# Patient Record
Sex: FEMALE | Race: BLACK OR AFRICAN AMERICAN | NOT HISPANIC OR LATINO | Employment: FULL TIME | URBAN - METROPOLITAN AREA
[De-identification: names, ages, dates, MRNs, and addresses within clinical notes are randomized per-mention and may not be internally consistent; named-entity substitution may affect disease eponyms.]

---

## 2017-01-06 ENCOUNTER — OFFICE VISIT (OUTPATIENT)
Dept: INTERNAL MEDICINE | Facility: CLINIC | Age: 42
End: 2017-01-06
Payer: COMMERCIAL

## 2017-01-06 VITALS
SYSTOLIC BLOOD PRESSURE: 132 MMHG | HEART RATE: 60 BPM | DIASTOLIC BLOOD PRESSURE: 86 MMHG | BODY MASS INDEX: 49.57 KG/M2 | WEIGHT: 269.38 LBS | TEMPERATURE: 98 F | HEIGHT: 62 IN | OXYGEN SATURATION: 99 %

## 2017-01-06 DIAGNOSIS — M54.2 CERVICALGIA: ICD-10-CM

## 2017-01-06 DIAGNOSIS — M62.838 MUSCLE SPASM: ICD-10-CM
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
## 2017-01-06 PROCEDURE — 99213 OFFICE O/P EST LOW 20 MIN: CPT | Mod: S$GLB,,, | Performed by: PHYSICIAN ASSISTANT

## 2017-01-06 PROCEDURE — 96372 THER/PROPH/DIAG INJ SC/IM: CPT | Mod: S$GLB,,, | Performed by: FAMILY MEDICINE

## 2017-01-06 PROCEDURE — 99999 PR PBB SHADOW E&M-EST. PATIENT-LVL IV: CPT | Mod: PBBFAC,,, | Performed by: PHYSICIAN ASSISTANT

## 2017-01-06 PROCEDURE — 1159F MED LIST DOCD IN RCRD: CPT | Mod: S$GLB,,, | Performed by: PHYSICIAN ASSISTANT

## 2017-01-06 RX ORDER — EFINACONAZOLE 100 MG/ML
SOLUTION TOPICAL
Refills: 0 | COMMUNITY
Start: 2016-12-15 | End: 2017-03-13 | Stop reason: SDUPTHER

## 2017-01-06 RX ORDER — MUPIROCIN 20 MG/G
OINTMENT TOPICAL
Refills: 0 | COMMUNITY
Start: 2016-12-15 | End: 2017-02-24

## 2017-01-06 RX ORDER — NABUMETONE 500 MG/1
500 TABLET, FILM COATED ORAL 2 TIMES DAILY
Qty: 14 TABLET | Refills: 0 | Status: SHIPPED | OUTPATIENT
Start: 2017-01-06 | End: 2017-01-13

## 2017-01-06 RX ORDER — KETOROLAC TROMETHAMINE 30 MG/ML
60 INJECTION, SOLUTION INTRAMUSCULAR; INTRAVENOUS
Status: COMPLETED | OUTPATIENT
Start: 2017-01-06 | End: 2017-01-06

## 2017-01-06 RX ORDER — METHOCARBAMOL 750 MG/1
TABLET, FILM COATED ORAL
Qty: 32 TABLET | Refills: 0 | Status: SHIPPED | OUTPATIENT
Start: 2017-01-06 | End: 2017-02-24

## 2017-01-06 RX ADMIN — KETOROLAC TROMETHAMINE 60 MG: 30 INJECTION, SOLUTION INTRAMUSCULAR; INTRAVENOUS at 04:01
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
## 2017-01-08 ENCOUNTER — PATIENT MESSAGE (OUTPATIENT)
Dept: INTERNAL MEDICINE | Facility: CLINIC | Age: 42
End: 2017-01-08

## 2017-01-09 ENCOUNTER — TELEPHONE (OUTPATIENT)
Dept: INTERNAL MEDICINE | Facility: CLINIC | Age: 42
End: 2017-01-09

## 2017-01-30 ENCOUNTER — TELEPHONE (OUTPATIENT)
Dept: SMOKING CESSATION | Facility: CLINIC | Age: 42
End: 2017-01-30

## 2017-01-31 ENCOUNTER — TELEPHONE (OUTPATIENT)
Dept: SMOKING CESSATION | Facility: CLINIC | Age: 42
End: 2017-01-31

## 2017-02-01 ENCOUNTER — TELEPHONE (OUTPATIENT)
Dept: SMOKING CESSATION | Facility: CLINIC | Age: 42
End: 2017-02-01

## 2017-02-02 ENCOUNTER — HOSPITAL ENCOUNTER (EMERGENCY)
Facility: HOSPITAL | Age: 42
Discharge: HOME | End: 2017-02-02
Attending: EMERGENCY MEDICINE
Payer: COMMERCIAL

## 2017-02-02 VITALS
TEMPERATURE: 98 F | WEIGHT: 269 LBS | HEIGHT: 63 IN | SYSTOLIC BLOOD PRESSURE: 134 MMHG | DIASTOLIC BLOOD PRESSURE: 70 MMHG | BODY MASS INDEX: 47.66 KG/M2 | RESPIRATION RATE: 16 BRPM | OXYGEN SATURATION: 99 % | HEART RATE: 78 BPM

## 2017-02-02 DIAGNOSIS — M25.569 KNEE PAIN: ICD-10-CM

## 2017-02-02 PROCEDURE — 99283 EMERGENCY DEPT VISIT LOW MDM: CPT

## 2017-02-02 RX ORDER — NAPROXEN 375 MG/1
375 TABLET ORAL 2 TIMES DAILY WITH MEALS
Qty: 14 TABLET | Refills: 0 | Status: SHIPPED | OUTPATIENT
Start: 2017-02-02 | End: 2017-02-24

## 2017-02-02 NOTE — DISCHARGE INSTRUCTIONS
Arthralgia    Arthralgia is the term for pain in or around the joint. It is a symptom, not a disease. This pain may involve one or more joints. In some cases, the pain moves from joint to joint.  There are many causes for joint pain. These include:  · Injury  · Osteoarthritis (wearing out of the joint surface)  · Gout (inflammation of the joint due to crystals in the joint fluid)  · Infection inside the joint    · Bursitis (inflammation of the fluid-filled sacs around the joint)  · Autoimmune disorders such as rheumatoid arthritis or lupus  · Tendonitis (inflamation of chords that attach muscle to bone)  Home care  · Rest the involved joint(s) until your symptoms improve.   · You may be prescribed pain medication. If none is prescribed, you may use acetaminophen or ibuprofen to control pain and inflammation.  Follow up  Follow up with your healthcare provider or our staff as advised.  When to seek medical care  Contact your healthcare provider right away if any of the following occurs:  · Pain, swelling, or redness of joint increases  · Pain worsens or recurs after a period of improvement  · Pain moves to other joints  · You cannot bear weight on the affected joint   · You cannot move the affected joint  · Joint appears deformed  · New rash appears  · Fever of 101ºF (38.8ºC) or higher, or as directed by your healthcare provider  © 2325-5737 [employer]. [address] [place] [address], [place] [address]. All rights reserved. This information is not intended as a substitute for professional medical care. Always follow your healthcare professional's instructions.

## 2017-02-02 NOTE — ED AVS SNAPSHOT
[facility]  [id number] [place] [id number]               Teresa Cazares   2017  1:07 AM   ED    Description:  Female : 1975   Department:  [facility]           Your Care was Coordinated By:     Provider Role From To    Christina Clements MD Attending Provider 17 0114 --      Reason for Visit     Knee Pain           Diagnoses this Visit        Comments    Knee pain           ED Disposition     ED Disposition Condition Comment    Discharge             To Do List           Follow-up Information     Follow up with Taylor Harrison MD In 2 days.    Specialty:  Family Medicine    Contact information:    [address]  [facility] [id number] [phone]          Follow up with [facility].    Specialty:  Emergency Medicine    Why:  As needed, If symptoms worsen    Contact information:    [address] [place] [id number] [phone]       These Medications        Disp Refills Start End    naproxen (NAPROSYN) 375 MG tablet 14 tablet 0 2017     Take 1 tablet (375 mg total) by mouth 2 (two) times daily with meals. - Oral    Pharmacy: [facility] [address] [place] [address] [address] [facility] Ph #: [phone]         [facility]     [facility] -  Assistance  Registered nurses in the [facility] provide clinical advisement, health education, appointment booking, and other advisory services.  Call for this free service at [phone].             Medications           Message regarding Medications     Verify the changes and/or additions to your medication regime listed below are the same as discussed with your clinician today.  If any of these changes or additions are incorrect, please notify your healthcare provider.        START taking these NEW medications        Refills    naproxen (NAPROSYN) 375 MG  "tablet 0    Sig: Take 1 tablet (375 mg total) by mouth 2 (two) times daily with meals.    Class: Print    Route: Oral           Verify that the below list of medications is an accurate representation of the medications you are currently taking.  If none reported, the list may be blank. If incorrect, please contact your healthcare provider. Carry this list with you in case of emergency.           Current Medications     BIOTIN ORAL Take by mouth.    fluticasone (FLONASE) 50 mcg/actuation nasal spray 1 spray by Each Nare route 2 (two) times daily as needed.    JUBLIA 10 % Nisha APPLY DAILY FOR 48 WEEKS    methocarbamol (ROBAXIN) 750 MG Tab Take 1,500 mg (two tablets) four times daily for first 2 days followed by 750mg (one tablet) four times/day.    mupirocin (BACTROBAN) 2 % ointment APPLY OINTMENT 3 TIMES A DAY FOR 10 DAYS    naproxen (NAPROSYN) 375 MG tablet Take 1 tablet (375 mg total) by mouth 2 (two) times daily with meals.    varenicline (CHANTIX) 1 mg Tab Take 1 tablet (1 mg total) by mouth 2 (two) times daily.           Clinical Reference Information           Your Vitals Were     BP Pulse Temp Resp Height Weight    143/66 (BP Location: Right arm, Patient Position: Sitting) 72 97.9 °F (36.6 °C) (Oral) 20 5' 3" (1.6 m) 122 kg (269 lb)    SpO2 BMI             97% 47.65 kg/m2         Allergies as of 2/2/2017        Reactions    Flagyl [Metronidazole Hcl] Hives      Immunizations Administered on Date of Encounter - 2/2/2017     None      ED Micro, Lab, POCT     None      ED Imaging Orders     Start Ordered       Status Ordering Provider    02/02/17 0139 02/02/17 0139  X-Ray Knee Complete 4 or More Views Left  1 time imaging      In process         Discharge Instructions         Arthralgia    Arthralgia is the term for pain in or around the joint. It is a symptom, not a disease. This pain may involve one or more joints. In some cases, the pain moves from joint to joint.  There are many causes for joint pain. These " include:  · Injury  · Osteoarthritis (wearing out of the joint surface)  · Gout (inflammation of the joint due to crystals in the joint fluid)  · Infection inside the joint    · Bursitis (inflammation of the fluid-filled sacs around the joint)  · Autoimmune disorders such as rheumatoid arthritis or lupus  · Tendonitis (inflamation of chords that attach muscle to bone)  Home care  · Rest the involved joint(s) until your symptoms improve.   · You may be prescribed pain medication. If none is prescribed, you may use acetaminophen or ibuprofen to control pain and inflammation.  Follow up  Follow up with your healthcare provider or our staff as advised.  When to seek medical care  Contact your healthcare provider right away if any of the following occurs:  · Pain, swelling, or redness of joint increases  · Pain worsens or recurs after a period of improvement  · Pain moves to other joints  · You cannot bear weight on the affected joint   · You cannot move the affected joint  · Joint appears deformed  · New rash appears  · Fever of 101ºF (38.8ºC) or higher, or as directed by your healthcare provider  © 2086-0185 [employer]. [address] [place] [address]. All rights reserved. This information is not intended as a substitute for professional medical care. Always follow your healthcare professional's instructions.          Smoking Cessation     If you would like to quit smoking:   You may be eligible for free services if you are a [place] resident and started smoking cigarettes before September 1, 1988.  Call the Smoking Cessation Trust (SCT) toll free at (109) 400-7512 or (377) 696-9628.   Call 1-800-QUIT-NOW if you do not meet the above criteria.             [facility] complies with applicable Federal civil rights laws and does not discriminate on the basis of race, color, national origin, age, disability, or sex.        Language Assistance Services     ATTENTION: Language  assistance services are available, free of charge. Please call [phone].      ATENCIÓN: Si habla español, tiene a heard disposición servicios gratuitos de asistencia lingüística. Llame al [phone].     CHÚ Ý: N?u b?n nói Ti?ng Vi?t, có các d?ch v? h? tr? ngôn ng? mi?n phí dành cho b?n. G?i s? [phone].
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
## 2017-02-24 ENCOUNTER — HOSPITAL ENCOUNTER (OUTPATIENT)
Dept: RADIOLOGY | Facility: HOSPITAL | Age: 42
Discharge: HOME | End: 2017-02-24
Attending: PODIATRIST
Payer: COMMERCIAL

## 2017-02-24 ENCOUNTER — OFFICE VISIT (OUTPATIENT)
Dept: PODIATRY | Facility: CLINIC | Age: 42
End: 2017-02-24
Payer: COMMERCIAL

## 2017-02-24 VITALS
HEIGHT: 63 IN | WEIGHT: 268.94 LBS | SYSTOLIC BLOOD PRESSURE: 140 MMHG | BODY MASS INDEX: 47.65 KG/M2 | DIASTOLIC BLOOD PRESSURE: 82 MMHG | HEART RATE: 62 BPM

## 2017-02-24 DIAGNOSIS — M20.41 HAMMER TOES OF BOTH FEET: Primary | ICD-10-CM

## 2017-02-24 DIAGNOSIS — M20.5X9 ADDUCTOVARUS ROTATION OF TOE, ACQUIRED, UNSPECIFIED LATERALITY: ICD-10-CM

## 2017-02-24 DIAGNOSIS — M20.42 HAMMER TOES OF BOTH FEET: Primary | ICD-10-CM

## 2017-02-24 DIAGNOSIS — Z01.818 PRE-OP TESTING: ICD-10-CM

## 2017-02-24 PROCEDURE — 73630 X-RAY EXAM OF FOOT: CPT | Mod: 26,RT,, | Performed by: RADIOLOGY

## 2017-02-24 PROCEDURE — 73630 X-RAY EXAM OF FOOT: CPT | Mod: 50,TC,PO

## 2017-02-24 PROCEDURE — 99214 OFFICE O/P EST MOD 30 MIN: CPT | Mod: S$GLB,,, | Performed by: PODIATRIST

## 2017-02-24 PROCEDURE — 73630 X-RAY EXAM OF FOOT: CPT | Mod: 26,LT,, | Performed by: RADIOLOGY

## 2017-02-24 PROCEDURE — 71010 XR CHEST 1 VIEW PRE-OP: CPT | Mod: 26,,, | Performed by: RADIOLOGY

## 2017-02-24 PROCEDURE — 1160F RVW MEDS BY RX/DR IN RCRD: CPT | Mod: S$GLB,,, | Performed by: PODIATRIST

## 2017-02-24 PROCEDURE — 99999 PR PBB SHADOW E&M-EST. PATIENT-LVL IV: CPT | Mod: PBBFAC,,, | Performed by: PODIATRIST

## 2017-02-24 PROCEDURE — 71010 XR CHEST 1 VIEW PRE-OP: CPT | Mod: TC,PO

## 2017-02-24 NOTE — MR AVS SNAPSHOT
[facility]  [address] [place] [address]  Phone: [phone]  Fax: [phone]                  Teresa Cazares   2017 1:40 PM   Office Visit    Description:  Female : 1975   Provider:  Anastasia Maurice DPM   Department:  [facility]           Reason for Visit     Callouses           Diagnoses this Visit        Comments    Pre-op testing    -  Primary            To Do List           Future Appointments        Provider Department Dept Phone    2017 2:30 PM LAB, SAME DAY [facility] [phone]    2017 3:00 PM [facility] [phone]    2017 9:00 AM Taylor Harrison MD [facility] Internal Medicine [phone]    2017 3:00 PM Shannon Erazo DPM [facility] [phone]    3/10/2017 11:00 AM Anastasia Maurice DPM [facility] [phone]      Goals (5 Years of Data)     None      [facility]     [facility] -  Assistance  Registered nurses in the [facility] provide clinical advisement, health education, appointment booking, and other advisory services.  Call for this free service at [phone].             Medications           Message regarding Medications     Verify the changes and/or additions to your medication regime listed below are the same as discussed with your clinician today.  If any of these changes or additions are incorrect, please notify your healthcare provider.        STOP taking these medications     methocarbamol (ROBAXIN) 750 MG Tab Take 1,500 mg (two tablets) four times daily for first 2 days followed by 750mg (one tablet) four times/day.    mupirocin (BACTROBAN) 2 % ointment APPLY OINTMENT 3 TIMES A DAY FOR 10 DAYS    naproxen (NAPROSYN) 375 MG tablet Take 1 tablet (375 mg total) by mouth 2 (two) times daily with meals.    varenicline (CHANTIX) 1 mg Tab Take 1 tablet (1 mg total) by mouth 2 (two) times daily.            Verify that the below list of medications is an accurate representation of the medications you are currently taking.  If none reported, the list may be blank. If incorrect, please contact your healthcare provider. Carry this list with you in case of emergency.           Current Medications     BIOTIN ORAL Take by mouth.    JUBLIA 10 % Nisha APPLY DAILY FOR 48 WEEKS    fluticasone (FLONASE) 50 mcg/actuation nasal spray 1 spray by Each Nare route 2 (two) times daily as needed.           Clinical Reference Information           Your Vitals Were     BP                   140/82 (BP Location: Right arm, Patient Position: Sitting, BP Method: Automatic)           Blood Pressure          Most Recent Value    BP  (!)  140/82      Allergies as of 2/24/2017     Flagyl [Metronidazole Hcl]      Immunizations Administered on Date of Encounter - 2/24/2017     None      Orders Placed During Today's Visit      Normal Orders This Visit    POCT urine pregnancy     Future Labs/Procedures Expected by Expires    CBC auto differential  2/24/2017 4/25/2018    X-Ray Foot Complete Bilateral  2/24/2017 2/24/2018    Basic metabolic panel  3/9/2017 4/25/2018    X-Ray Chest 1 View Pre-OP  3/9/2017 2/24/2018      Language Assistance Services     ATTENTION: Language assistance services are available, free of charge. Please call [phone].      ATENCIÓN: Si habla español, tiene a heard disposición servicios gratuitos de asistencia lingüística. Llame al [phone].     CHÚ Ý: N?u b?n nói Ti?ng Vi?t, có các d?ch v? h? tr? ngôn ng? mi?n phí dành cho b?n. G?i s? [phone].         [facility] complies with applicable Federal civil rights laws and does not discriminate on the basis of race, color, national origin, age, disability, or sex.

## 2017-02-24 NOTE — PROGRESS NOTES
Subjective:     Patient ID: Teresa Cazares is a 41 y.o. female.    Chief Complaint: Callouses (bilateral 5th toe callous )    HPI: This 41 year old female presents today complaining of painful callus of bilateral toes. The patient states they have had pain in bilateral toes for several months. Patient denies any history of trauma. When asked to indicate where the pain is located, patient points to both 5th toes. Patient states she has tried to file it but it has become to painful. Patient states she has changed several shoes and tried corn pads with very little relief. Patient rates pain today 4/10. Patient denies other complaints at this time.    Patient Active Problem List   Diagnosis    Depression    Obesity       Medication List with Changes/Refills   Current Medications    BIOTIN ORAL    Take by mouth.    FLUTICASONE (FLONASE) 50 MCG/ACTUATION NASAL SPRAY    1 spray by Each Nare route 2 (two) times daily as needed.    JUBLIA 10 % LORI    APPLY DAILY FOR 48 WEEKS   Discontinued Medications    METHOCARBAMOL (ROBAXIN) 750 MG TAB    Take 1,500 mg (two tablets) four times daily for first 2 days followed by 750mg (one tablet) four times/day.    MUPIROCIN (BACTROBAN) 2 % OINTMENT    APPLY OINTMENT 3 TIMES A DAY FOR 10 DAYS    NAPROXEN (NAPROSYN) 375 MG TABLET    Take 1 tablet (375 mg total) by mouth 2 (two) times daily with meals.    VARENICLINE (CHANTIX) 1 MG TAB    Take 1 tablet (1 mg total) by mouth 2 (two) times daily.       Review of patient's allergies indicates:   Allergen Reactions    Flagyl [metronidazole hcl] Hives       Past Surgical History:   Procedure Laterality Date    FRACTURE SURGERY      C1 neck- halo    [facility]    UMBILICAL HERNIA REPAIR         Family History   Problem Relation Age of Onset    Breast cancer Mother 58    Breast cancer Sister 46       Social History     Social History    Marital status: Single     Spouse name: N/A    Number of children: 5     "Years of education: N/A     Occupational History          Social History Main Topics    Smoking status: Former Smoker     Packs/day: 0.50     Years: 27.00     Quit date: 7/14/2016    Smokeless tobacco: Not on file    Alcohol use Yes      Comment: occasionally    Drug use: No    Sexual activity: Not on file     Other Topics Concern    Not on file     Social History Narrative       Vitals:    02/24/17 1327   BP: (!) 140/82   Pulse: 62   Weight: 122 kg (268 lb 15.4 oz)   Height: 5' 3" (1.6 m)       Review of Systems   Constitutional: Negative for chills and fever.   Respiratory: Negative for shortness of breath.    Cardiovascular: Negative for chest pain, palpitations, orthopnea, claudication and leg swelling.   Gastrointestinal: Negative for diarrhea, nausea and vomiting.   Musculoskeletal: Negative for joint pain.   Skin: Negative for rash.   Neurological: Negative for dizziness, tingling, sensory change, focal weakness and weakness.   Psychiatric/Behavioral: Negative.              Objective:       PHYSICAL EXAM: Apperance: Alert and orient in no distress,well developed, and with good attention to grooming and body habits  Patient presents ambulating in sandals.   Lower Extremity Physical Exam:  VASCULAR: Dorsalis pedis pulses 2/4 bilateral and Posterior Tibial pulses 2/4 bilateral. Capillary fill time <3 seconds bilateral. No edema observed bilateral. Varicosities absent bilateral. Skin temperature of the lower extremities is warm to warm, proximal to distal. Hair growth WNL bilateral.  DERMATOLOGICAL: No skin rashes, subcutaneous nodules, lesions, or ulcers observed bilateral. Mild hyperkeratotic tissue noted bilateral dorsal 5th toes.   NEUROLOGICAL: Light touch, sharp-dull, proprioception all present and equal bilaterally.    MUSCULOSKELETAL: Muscle strength is 5/5 for foot inverters, everters, plantarflexors, and dorsiflexors. Muscle tone is normal. (+) pain on palpation of bilateral 5th toes. " Adductovarus hammertoes noted to bilateral 5th toes. .        Assessment:       Encounter Diagnoses   Name Primary?    Hammer toes of both feet Yes    Adductovarus rotation of toe, acquired, unspecified laterality     Pre-op testing          Plan:   Hammer toes of both feet  -     Case Request Operating Room: REPAIR-HAMMER TOE FIFTH    Adductovarus rotation of toe, acquired, unspecified laterality  -     Case Request Operating Room: REPAIR-HAMMER TOE FIFTH    Pre-op testing  -     CBC auto differential; Future; Expected date: 2/24/17  -     Basic metabolic panel; Future; Expected date: 3/9/17  -     X-Ray Chest 1 View Pre-OP; Future; Expected date: 3/9/17  -     X-Ray Foot Complete Bilateral; Future; Expected date: 2/24/17  -     POCT urine pregnancy    I counseled the patient on her conditions, their implications and medical management.  Treatment options for were discussed with the patient.  The patient was told that she can have no treatment, conservative treatment or surgical treatment.  The patient was informed that the only way to resolve the structural deformity is via surgery.  The patient was instructed that surgical option is reserved for patients with painful deformity and that if she did not have pain that she should not elect the surgical option.  Discussed surgical and conservative management of hammertoe deformity. Conservatively we did discuss padding, and shoe modifications such as softer shoes with wide toe boxes. Surgically we briefly discussed pre and post operative expectations. The patient elects for surgical management at this time.   Medical clearance, pre-op testing ordered.   Patient tentatively scheduled for surgery 3/17/17 pending medical clearance and pre-op testing.   Patient to return for consent signing.             Anastasia Maurice DPM  [facility]

## 2017-03-05 PROBLEM — E66.9 OBESITY: Status: ACTIVE | Noted: 2017-03-05

## 2017-03-05 PROBLEM — F32.A DEPRESSION: Status: ACTIVE | Noted: 2017-03-05

## 2017-03-10 ENCOUNTER — OFFICE VISIT (OUTPATIENT)
Dept: PODIATRY | Facility: CLINIC | Age: 42
End: 2017-03-10
Payer: COMMERCIAL

## 2017-03-10 VITALS
DIASTOLIC BLOOD PRESSURE: 80 MMHG | HEART RATE: 59 BPM | HEIGHT: 63 IN | BODY MASS INDEX: 47.65 KG/M2 | WEIGHT: 268.94 LBS | SYSTOLIC BLOOD PRESSURE: 118 MMHG

## 2017-03-10 DIAGNOSIS — B35.1 ONYCHOMYCOSIS DUE TO DERMATOPHYTE: ICD-10-CM

## 2017-03-10 DIAGNOSIS — M20.5X9 ADDUCTOVARUS ROTATION OF TOE, ACQUIRED, UNSPECIFIED LATERALITY: ICD-10-CM

## 2017-03-10 DIAGNOSIS — M20.41 HAMMER TOES OF BOTH FEET: Primary | ICD-10-CM

## 2017-03-10 DIAGNOSIS — M20.42 HAMMER TOES OF BOTH FEET: Primary | ICD-10-CM

## 2017-03-10 DIAGNOSIS — Z98.890 POST-OPERATIVE STATE: Primary | ICD-10-CM

## 2017-03-10 PROCEDURE — 1160F RVW MEDS BY RX/DR IN RCRD: CPT | Mod: S$GLB,,, | Performed by: PODIATRIST

## 2017-03-10 PROCEDURE — 99212 OFFICE O/P EST SF 10 MIN: CPT | Mod: S$GLB,,, | Performed by: PODIATRIST

## 2017-03-10 PROCEDURE — 99999 PR PBB SHADOW E&M-EST. PATIENT-LVL II: CPT | Mod: PBBFAC,,, | Performed by: PODIATRIST

## 2017-03-10 NOTE — PROGRESS NOTES
Subjective:     Patient ID: Teresa Cazares is a 41 y.o. female.    Chief Complaint: Follow-up (consent signing DOS 3/17/17 bilateral 5th hammertoe repair )    HPI: This 41 year old female presents today for follow-up of bilateral toe deformity.  Date of last exam was 2/24/17.  Patient was advised to go home and review her treatment options.  The patient states that she is here to discuss the surgical treatment.  She chooses to have surgical treatment to alleviate her pain and deformity. When asked to indicate where the pain is located, patient points to both 5th and 4th toes. Today patient states she would also like to have the 4th toes done now. Patient states she has tried to file it but it has become to painful. Patient states she has changed several shoes and tried corn pads with very little relief. Patient rates pain today 4/10. Patient denies other complaints at this time.    Patient Active Problem List   Diagnosis    Morbid obesity with BMI of 45.0-49.9, adult       Medication List with Changes/Refills   New Medications    EFINACONAZOLE 10 % LORI    Apply 1 application topically once daily.   Current Medications    BIOTIN ORAL    Take by mouth.    FLUTICASONE (FLONASE) 50 MCG/ACTUATION NASAL SPRAY    1 spray by Each Nare route 2 (two) times daily as needed.   Discontinued Medications    JUBLIA 10 % LORI    APPLY DAILY FOR 48 WEEKS       Review of patient's allergies indicates:   Allergen Reactions    Flagyl [metronidazole hcl] Hives       Past Surgical History:   Procedure Laterality Date    FRACTURE SURGERY      C1 neck- halo    [facility]    UMBILICAL HERNIA REPAIR         Family History   Problem Relation Age of Onset    Breast cancer Mother 58    Breast cancer Sister 46       Social History     Social History    Marital status: Single     Spouse name: N/A    Number of children: 5    Years of education: N/A     Occupational History          Social History Main  "Topics    Smoking status: Former Smoker     Packs/day: 0.50     Years: 27.00     Quit date: 7/14/2016    Smokeless tobacco: Not on file    Alcohol use Yes      Comment: occasionally    Drug use: No    Sexual activity: Not on file     Other Topics Concern    Not on file     Social History Narrative       Vitals:    03/10/17 0922   BP: 118/80   Pulse: (!) 59   Weight: 122 kg (268 lb 15.4 oz)   Height: 5' 3" (1.6 m)       Review of Systems   Constitutional: Negative for chills and fever.   Respiratory: Negative for shortness of breath.    Cardiovascular: Negative for chest pain, palpitations, orthopnea, claudication and leg swelling.   Gastrointestinal: Negative for diarrhea, nausea and vomiting.   Musculoskeletal: Negative for joint pain.   Skin: Negative for rash.   Neurological: Negative for dizziness, tingling, sensory change, focal weakness and weakness.   Psychiatric/Behavioral: Negative.            Objective:       PHYSICAL EXAM: Apperance: Alert and orient in no distress,well developed, and with good attention to grooming and body habits  Patient presents ambulating in sandals.   Lower Extremity Physical Exam:  VASCULAR: Dorsalis pedis pulses 2/4 bilateral and Posterior Tibial pulses 2/4 bilateral. Capillary fill time <3 seconds bilateral. No edema observed bilateral. Varicosities absent bilateral. Skin temperature of the lower extremities is warm to warm, proximal to distal. Hair growth WNL bilateral.  DERMATOLOGICAL: No skin rashes, subcutaneous nodules, lesions, or ulcers observed bilateral. Mild hyperkeratotic tissue noted bilateral dorsal 5th toes.   NEUROLOGICAL: Light touch, sharp-dull, proprioception all present and equal bilaterally.    MUSCULOSKELETAL: Muscle strength is 5/5 for foot inverters, everters, plantarflexors, and dorsiflexors. Muscle tone is normal. (+) pain on palpation of bilateral 5th toes. Adductovarus hammertoes noted to bilateral 4th and 5th toes.    TEST RESULTS: Radiographs of " bilateral feet reveal there is no radiographic evidence of acute osseous, articular, or soft tissue abnormality.  There are adductovarus contracted toes 3-5 bilateral.There are mild bilateral hallux valgus deformities.  Joint spaces are well preserved.  No erosive osseous changes demonstrated.Small bilateral plantar calcaneal enthesophytes are present.        Assessment:       Encounter Diagnoses   Name Primary?    Hammer toes of both feet Yes    Adductovarus rotation of toe, acquired, unspecified laterality     Onychomycosis due to dermatophyte          Plan:   Hammer toes of both feet    Adductovarus rotation of toe, acquired, unspecified laterality    Onychomycosis due to dermatophyte  -     efinaconazole 10 % Nisha; Apply 1 application topically once daily.  Dispense: 8 mL; Refill: 12      I counseled the patient on her conditions, their implications and medical management.  Treatment options for were discussed with the patient.  The patient was told that she can have no treatment, conservative treatment or surgical treatment.  The patient was informed that the only way to resolve the structural deformity is via surgery.  The patient was instructed that surgical option is reserved for patients with painful deformity and that if she did not have pain that she should not elect the surgical option.  Derotational skin plasty with arthroplasty surgery was discussed with the patient including risks and complications including but not limited to the following: Post-operative infection requiring IV antibiotics and hospitalization, hardware failure and possible need for hardware removal, non-union, mal-union, delayed union, re-occurrence, nerve damage, numbness, overcorrection/undercorrection, painful scar, keloid, prolonged edema and RSD.  Patient stated that she understood.  Arthroplastywith K-wire fixation was discussed in detail include hardware placement, radiographic demonstrations were used.  Patient stated that  she understood.  Post-operative course was also discussed including a period of 6-8 weeks requiring the use of surgical shoes. 2 weeks non-weight bearing period, and sutures for two weeks. Patient stated that she understood.   Consent reviewed and signed with patient. Copies given to patient.   Medical clearance, pre-op testing ordered. Patient tentatively scheduled for surgery 3/31/17 pending medical clearance and pre-op testing.   No guarantees given as to the outcome of surgery.  Answered all questions to patient's satisfaction and told the patient to call should she have any other questions between now and time of surgery.  Prescription refilled for Jublia.           Anastasia Maurice DPM  [facility]

## 2017-03-13 ENCOUNTER — OFFICE VISIT (OUTPATIENT)
Dept: INTERNAL MEDICINE | Facility: CLINIC | Age: 42
End: 2017-03-13
Payer: COMMERCIAL

## 2017-03-13 ENCOUNTER — CLINICAL SUPPORT (OUTPATIENT)
Dept: CARDIOLOGY | Facility: CLINIC | Age: 42
End: 2017-03-13
Payer: COMMERCIAL

## 2017-03-13 VITALS
TEMPERATURE: 98 F | DIASTOLIC BLOOD PRESSURE: 80 MMHG | HEIGHT: 63 IN | HEART RATE: 77 BPM | BODY MASS INDEX: 48.24 KG/M2 | SYSTOLIC BLOOD PRESSURE: 132 MMHG | WEIGHT: 272.25 LBS | OXYGEN SATURATION: 99 %

## 2017-03-13 DIAGNOSIS — J30.2 SEASONAL ALLERGIC RHINITIS, UNSPECIFIED ALLERGIC RHINITIS TRIGGER: ICD-10-CM

## 2017-03-13 DIAGNOSIS — E66.01 MORBID OBESITY WITH BMI OF 45.0-49.9, ADULT: ICD-10-CM

## 2017-03-13 DIAGNOSIS — M20.41 HAMMER TOES, BILATERAL: ICD-10-CM

## 2017-03-13 DIAGNOSIS — M20.42 HAMMER TOES, BILATERAL: ICD-10-CM

## 2017-03-13 DIAGNOSIS — M20.41 HAMMER TOES, BILATERAL: Primary | ICD-10-CM

## 2017-03-13 DIAGNOSIS — Z01.818 PRE-OP EXAM: ICD-10-CM

## 2017-03-13 DIAGNOSIS — M20.42 HAMMER TOES, BILATERAL: Primary | ICD-10-CM

## 2017-03-13 PROBLEM — E66.9 OBESITY: Status: RESOLVED | Noted: 2017-03-05 | Resolved: 2017-03-13

## 2017-03-13 PROBLEM — F32.A DEPRESSION: Status: RESOLVED | Noted: 2017-03-05 | Resolved: 2017-03-13

## 2017-03-13 PROCEDURE — 99999 PR PBB SHADOW E&M-EST. PATIENT-LVL III: CPT | Mod: PBBFAC,,, | Performed by: FAMILY MEDICINE

## 2017-03-13 PROCEDURE — 99214 OFFICE O/P EST MOD 30 MIN: CPT | Mod: S$GLB,,, | Performed by: FAMILY MEDICINE

## 2017-03-13 PROCEDURE — 93000 ELECTROCARDIOGRAM COMPLETE: CPT | Mod: S$GLB,,, | Performed by: NUCLEAR MEDICINE

## 2017-03-13 PROCEDURE — 1160F RVW MEDS BY RX/DR IN RCRD: CPT | Mod: S$GLB,,, | Performed by: FAMILY MEDICINE

## 2017-03-13 NOTE — MR AVS SNAPSHOT
[facility]  [address] [address]  Phone: [phone]  Fax: [phone]                  Teresa Cazares   3/13/2017 4:00 PM   Office Visit    Description:  Female : 1975   Provider:  Taylor Harrison MD   Department:  [facility]           Reason for Visit     Pre-op Exam           Diagnoses this Visit        Comments    Hammer toes, bilateral    -  Primary     Pre-op exam         Seasonal allergic rhinitis, unspecified allergic rhinitis trigger         Morbid obesity with BMI of 45.0-49.9, adult                To Do List           Future Appointments        Provider Department Dept Phone    3[facility]13[facility]2017 3:50 [phone]    3/13/2017 4:00 PM Taylor Harrison MD [facility] [phone]    2017 10:00 AM Anastasia Maurice DPM [facility] [phone]    2017 10:00 AM Anastasia Maurice DPM [facility] [phone]    2017 10:00 AM [facility] XR2 [facility] [phone]      Your Future Surgeries/Procedures     Mar 31, 2017   Surgery with Anastasia Maurice DPM   [facility])    [address] [address] [phone]              Goals (5 Years of Data)     None      Follow-Up and Disposition     Return in about 6 months (around 2017), or if symptoms worsen or fail to improve.      [facility]     [facility] -  Assistance  Registered nurses in the [facility] provide clinical advisement, health education, appointment booking, and other advisory services.  Call for this free service at [phone].             Medications           Message regarding Medications     Verify the changes and/or additions to your medication regime listed below are the same as discussed with your clinician today.  If any of these changes or additions are incorrect, please notify your healthcare  "provider.             Verify that the below list of medications is an accurate representation of the medications you are currently taking.  If none reported, the list may be blank. If incorrect, please contact your healthcare provider. Carry this list with you in case of emergency.           Current Medications     BIOTIN ORAL Take by mouth.    efinaconazole 10 % Nisha Apply 1 application topically once daily.    fluticasone (FLONASE) 50 mcg/actuation nasal spray 1 spray by Each Nare route 2 (two) times daily as needed.           Clinical Reference Information           Your Vitals Were     BP Pulse Temp Height Weight SpO2    132/80 77 97.5 °F (36.4 °C) (Tympanic) 5' 3" (1.6 m) 123.5 kg (272 lb 4.3 oz) 99%    BMI                48.23 kg/m2          Blood Pressure          Most Recent Value    BP  132/80      Allergies as of 3/13/2017     Flagyl [Metronidazole Hcl]      Immunizations Administered on Date of Encounter - 3/13/2017     None      Orders Placed During Today's Visit     Future Labs/Procedures Expected by Expires    EKG 12-lead  As directed 3/13/2018      Language Assistance Services     ATTENTION: Language assistance services are available, free of charge. Please call [phone].      ATENCIÓN: Si habla blank, tiene a heard disposición servicios gratuitos de asistencia lingüística. Llame al [phone].     SAADIA Ý: N?u b?n nói Ti?ng Vi?t, có các d?ch v? h? tr? ngôn ng? mi?n phí dành cho b?n. G?i s? [phone].         [facility] complies with applicable Federal civil rights laws and does not discriminate on the basis of race, color, national origin, age, disability, or sex.        "

## 2017-03-13 NOTE — PROGRESS NOTES
"Subjective:       Patient ID: Teresa Cazares is a 41 y.o. female.    Chief Complaint: Pre-op Exam    HPI Comments: 41-year-old Afro-American female patient here for preop clearance for bilateral hammertoe repair, scheduled by Dr. Maurice on 3/31/17.   Secondary to fungal infection patient has been using Jublia lotion  Patient has been having pain to bilateral feet secondary to Corns, has tried several shoes but no response noted  Denies of any fever, chest pain shortness of breath, leg swelling  Reports that she has quit smoking last year, and has noticed gaining weight, patient has not been cooking, currently in culinary school and, works as a , has been eating anything on the go    Review of Systems   Constitutional: Negative for fatigue and fever.   Eyes: Negative for visual disturbance.   Respiratory: Negative for shortness of breath.    Cardiovascular: Negative for chest pain and leg swelling.   Gastrointestinal: Negative for abdominal pain, nausea and vomiting.   Musculoskeletal: Positive for arthralgias. Negative for joint swelling and myalgias.   Skin: Negative for rash.   Neurological: Negative for weakness, light-headedness, numbness and headaches.   Psychiatric/Behavioral: Negative for sleep disturbance.         /80  Pulse 77  Temp 97.5 °F (36.4 °C) (Tympanic)   Ht 5' 3" (1.6 m)  Wt 123.5 kg (272 lb 4.3 oz)  SpO2 99%  BMI 48.23 kg/m2  Objective:      Physical Exam   Constitutional: She is oriented to person, place, and time. She appears well-developed and well-nourished.   HENT:   Head: Normocephalic and atraumatic.   Mouth/Throat: Oropharynx is clear and moist.   Cardiovascular: Normal rate, regular rhythm and normal heart sounds.    No murmur heard.  Pulmonary/Chest: Effort normal and breath sounds normal. She has no wheezes.   Abdominal: Soft. Bowel sounds are normal. There is no tenderness.   Musculoskeletal: She exhibits tenderness. She exhibits no edema.   Positive for " tenderness to bilateral fifth toe laterally also noted corns.    Neurological: She is alert and oriented to person, place, and time.   Skin: Skin is warm and dry. No rash noted. There is erythema.   Psychiatric: She has a normal mood and affect.         Assessment:       1. Hammer toes, bilateral    2. Pre-op exam    3. Seasonal allergic rhinitis, unspecified allergic rhinitis trigger    4. Morbid obesity with BMI of 45.0-49.9, adult        Plan:   Hammer toes, bilateral  -     EKG 12-lead; Future  Pre-op exam  -     EKG 12-lead; Future  Reviewed preop labs including chest x-ray which alesia been stable.   Will get EKG .    Reviewed EKG showing normal sinus rhythm .   Patient seems to be at low risk for proposed surgery,    Seasonal allergic rhinitis, unspecified allergic rhinitis trigger-stable on Flonase as needed    Morbid obesity with BMI of 45.0-49.9, adult- counseling given to patient today to restrict portion sizes, eat low-fat and low-cholesterol diet and exercise 30 minutes daily to lose weight with BMI 48.2.

## 2017-03-29 NOTE — PRE-PROCEDURE INSTRUCTIONS
.Pre op instructions reviewed with patient per phone:    To confirm, Your surgeon has instructed you:  Surgery is scheduled 3/31/17 at 0700.      Please report to [facility] 1st floor main lobby by 0530 Pre admit nurse will call day prior to surgery for final arrival time.      INSTRUCTIONS IMPORTANT!!!  ¨ Do not eat, drink, or smoke after 12 midnight-including water. OK to brush teeth, no gum, candy or mints!    ¨ Take only these medicines with a small swallow of water-morning of surgery.  None  ____  Do not wear makeup, including mascara.  ____  No powder, lotions or creams to surgical area.  ____  Please remove all jewelry, including piercings and leave at home.  ____  No money or valuables needed. Please leave at home.  ____  Please bring identification and insurance information to hospital.  ____  If going home the same day, arrange for a ride home. You will not be able to   drive if Anesthesia was used.  ____  Children, under 12 years old, must remain in the waiting room with an adult.  They are not allowed in patient areas.  ____  Wear loose fitting clothing. Allow for dressings, bandages.  ____  Stop Aspirin, Ibuprofen, Motrin and Aleve at least 5-7 days before surgery, unless otherwise instructed by your doctor, or the nurse.   You MAY use Tylenol/acetaminophen until day of surgery.  ____  If you take diabetic medication, do not take am of surgery unless instructed by   Doctor.  ____ Stop taking any Fish Oil supplement or any Vitamins that contain Vitamin E at least 5 days prior to surgery.          Bathing Instructions-- The night before surgery and the morning prior to coming to the hospital:   -Do not shave the surgical area.   -Shower and wash your hair and body as usual with anti-bacterial  soap and shampoo.   -Rinse your hair and body completely.   -Use one packet of hibiclens to wash the surgical site (using your hand) gently for 5 minutes.  Do not scrub you skin too  hard.   -Do not use hibiclens on your head, face, or genitals.   -Do not wash with anti-bacterial soap after you use the hibiclens.   -Rinse your body thoroughly.   -Dry with clean, soft towel.  Do not use lotion, cream, deodorant, or powders on   the surgical site.    Use antibacterial soap in place of hibiclens if your surgery is on the head, face or genitals.         Surgical Site Infection    Prevention of surgical site infections:     -Keep incisions clean and dry.   -Do not soak/submerge incisions in water until completely healed.   -Do not apply lotions, powders, creams, or deodorants to site.   -Always make sure hands are cleaned with antibacterial soap/ alcohol-based   prior to touching the surgical site.  (This includes doctors, nurses, staff, and yourself.)    Signs and symptoms:   -Redness and pain around the area where you had surgery   -Drainage of cloudy fluid from your surgical wound   -Fever over 100.4  I have read or had read and explained to me, and understand the above information.

## 2017-03-30 ENCOUNTER — ANESTHESIA EVENT (OUTPATIENT)
Dept: SURGERY | Facility: HOSPITAL | Age: 42
End: 2017-03-30
Payer: COMMERCIAL

## 2017-03-31 ENCOUNTER — HOSPITAL ENCOUNTER (OUTPATIENT)
Facility: HOSPITAL | Age: 42
Discharge: HOME | End: 2017-03-31
Attending: PODIATRIST | Admitting: PODIATRIST
Payer: COMMERCIAL

## 2017-03-31 ENCOUNTER — ANESTHESIA (OUTPATIENT)
Dept: SURGERY | Facility: HOSPITAL | Age: 42
End: 2017-03-31
Payer: COMMERCIAL

## 2017-03-31 ENCOUNTER — SURGERY (OUTPATIENT)
Age: 42
End: 2017-03-31

## 2017-03-31 VITALS
OXYGEN SATURATION: 95 % | TEMPERATURE: 98 F | DIASTOLIC BLOOD PRESSURE: 81 MMHG | RESPIRATION RATE: 20 BRPM | WEIGHT: 264.13 LBS | HEART RATE: 67 BPM | SYSTOLIC BLOOD PRESSURE: 153 MMHG | HEIGHT: 63 IN | BODY MASS INDEX: 46.8 KG/M2

## 2017-03-31 DIAGNOSIS — E66.01 MORBID OBESITY WITH BMI OF 45.0-49.9, ADULT: ICD-10-CM

## 2017-03-31 DIAGNOSIS — Z98.890 POST-OPERATIVE STATE: ICD-10-CM

## 2017-03-31 DIAGNOSIS — M20.42 HAMMER TOES OF BOTH FEET: Primary | ICD-10-CM

## 2017-03-31 DIAGNOSIS — M20.5X9 ADDUCTOVARUS ROTATION OF TOE, ACQUIRED, UNSPECIFIED LATERALITY: ICD-10-CM

## 2017-03-31 DIAGNOSIS — M20.41 HAMMER TOES OF BOTH FEET: Primary | ICD-10-CM

## 2017-03-31 DIAGNOSIS — M20.40 ACQUIRED HAMMERTOE: ICD-10-CM

## 2017-03-31 PROCEDURE — 63600175 PHARM REV CODE 636 W HCPCS: Performed by: PODIATRIST

## 2017-03-31 PROCEDURE — 36000709 HC OR TIME LEV III EA ADD 15 MIN: Performed by: PODIATRIST

## 2017-03-31 PROCEDURE — 36000708 HC OR TIME LEV III 1ST 15 MIN: Performed by: PODIATRIST

## 2017-03-31 PROCEDURE — 71000033 HC RECOVERY, INTIAL HOUR: Performed by: PODIATRIST

## 2017-03-31 PROCEDURE — 25000003 PHARM REV CODE 250: Performed by: PODIATRIST

## 2017-03-31 PROCEDURE — 37000008 HC ANESTHESIA 1ST 15 MINUTES: Performed by: PODIATRIST

## 2017-03-31 PROCEDURE — 28285 REPAIR OF HAMMERTOE: CPT | Mod: T3,,, | Performed by: PODIATRIST

## 2017-03-31 PROCEDURE — 63600175 PHARM REV CODE 636 W HCPCS: Performed by: NURSE ANESTHETIST, CERTIFIED REGISTERED

## 2017-03-31 PROCEDURE — 25000003 PHARM REV CODE 250: Performed by: NURSE ANESTHETIST, CERTIFIED REGISTERED

## 2017-03-31 PROCEDURE — 71000015 HC POSTOP RECOV 1ST HR: Performed by: PODIATRIST

## 2017-03-31 PROCEDURE — 25000003 PHARM REV CODE 250: Performed by: ANESTHESIOLOGY

## 2017-03-31 PROCEDURE — 28286 REPAIR OF HAMMERTOE: CPT | Mod: 51,T4,, | Performed by: PODIATRIST

## 2017-03-31 PROCEDURE — 27201423 OPTIME MED/SURG SUP & DEVICES STERILE SUPPLY: Performed by: PODIATRIST

## 2017-03-31 PROCEDURE — 37000009 HC ANESTHESIA EA ADD 15 MINS: Performed by: PODIATRIST

## 2017-03-31 RX ORDER — MORPHINE SULFATE 10 MG/ML
2 INJECTION INTRAMUSCULAR; INTRAVENOUS; SUBCUTANEOUS EVERY 5 MIN PRN
Status: DISCONTINUED | OUTPATIENT
Start: 2017-03-31 | End: 2017-03-31 | Stop reason: HOSPADM

## 2017-03-31 RX ORDER — OXYCODONE AND ACETAMINOPHEN 5; 325 MG/1; MG/1
1 TABLET ORAL EVERY 4 HOURS PRN
Qty: 60 TABLET | Refills: 0 | Status: SHIPPED | OUTPATIENT
Start: 2017-03-31 | End: 2017-04-06 | Stop reason: SDUPTHER

## 2017-03-31 RX ORDER — BUPIVACAINE HYDROCHLORIDE 5 MG/ML
INJECTION, SOLUTION PERINEURAL
Status: DISCONTINUED | OUTPATIENT
Start: 2017-03-31 | End: 2017-03-31 | Stop reason: HOSPADM

## 2017-03-31 RX ORDER — HYDROCODONE BITARTRATE AND ACETAMINOPHEN 5; 325 MG/1; MG/1
1 TABLET ORAL EVERY 4 HOURS PRN
Status: CANCELLED | OUTPATIENT
Start: 2017-03-31

## 2017-03-31 RX ORDER — DEXAMETHASONE SODIUM PHOSPHATE 4 MG/ML
INJECTION, SOLUTION INTRA-ARTICULAR; INTRALESIONAL; INTRAMUSCULAR; INTRAVENOUS; SOFT TISSUE
Status: DISCONTINUED | OUTPATIENT
Start: 2017-03-31 | End: 2017-03-31

## 2017-03-31 RX ORDER — PROPOFOL 10 MG/ML
VIAL (ML) INTRAVENOUS CONTINUOUS PRN
Status: DISCONTINUED | OUTPATIENT
Start: 2017-03-31 | End: 2017-03-31

## 2017-03-31 RX ORDER — SODIUM CHLORIDE 9 MG/ML
3 INJECTION, SOLUTION INTRAMUSCULAR; INTRAVENOUS; SUBCUTANEOUS
Status: DISCONTINUED | OUTPATIENT
Start: 2017-03-31 | End: 2017-03-31 | Stop reason: HOSPADM

## 2017-03-31 RX ORDER — GLYCOPYRROLATE 0.2 MG/ML
INJECTION INTRAMUSCULAR; INTRAVENOUS
Status: DISCONTINUED | OUTPATIENT
Start: 2017-03-31 | End: 2017-03-31

## 2017-03-31 RX ORDER — LIDOCAINE HYDROCHLORIDE 10 MG/ML
INJECTION, SOLUTION EPIDURAL; INFILTRATION; INTRACAUDAL; PERINEURAL
Status: DISCONTINUED | OUTPATIENT
Start: 2017-03-31 | End: 2017-03-31 | Stop reason: HOSPADM

## 2017-03-31 RX ORDER — MIDAZOLAM HYDROCHLORIDE 1 MG/ML
INJECTION, SOLUTION INTRAMUSCULAR; INTRAVENOUS
Status: DISCONTINUED | OUTPATIENT
Start: 2017-03-31 | End: 2017-03-31

## 2017-03-31 RX ORDER — CEFAZOLIN SODIUM 1 G/50ML
1 SOLUTION INTRAVENOUS
Status: COMPLETED | OUTPATIENT
Start: 2017-03-31 | End: 2017-03-31

## 2017-03-31 RX ORDER — MEPERIDINE HYDROCHLORIDE 50 MG/ML
12.5 INJECTION INTRAMUSCULAR; INTRAVENOUS; SUBCUTANEOUS ONCE AS NEEDED
Status: DISCONTINUED | OUTPATIENT
Start: 2017-03-31 | End: 2017-03-31 | Stop reason: HOSPADM

## 2017-03-31 RX ORDER — METOPROLOL TARTRATE 1 MG/ML
INJECTION, SOLUTION INTRAVENOUS
Status: DISCONTINUED | OUTPATIENT
Start: 2017-03-31 | End: 2017-03-31

## 2017-03-31 RX ORDER — PROPOFOL 10 MG/ML
VIAL (ML) INTRAVENOUS
Status: DISCONTINUED | OUTPATIENT
Start: 2017-03-31 | End: 2017-03-31

## 2017-03-31 RX ORDER — LIDOCAINE HYDROCHLORIDE 20 MG/ML
INJECTION, SOLUTION EPIDURAL; INFILTRATION; INTRACAUDAL; PERINEURAL
Status: DISCONTINUED | OUTPATIENT
Start: 2017-03-31 | End: 2017-03-31

## 2017-03-31 RX ORDER — SODIUM CHLORIDE, SODIUM LACTATE, POTASSIUM CHLORIDE, CALCIUM CHLORIDE 600; 310; 30; 20 MG/100ML; MG/100ML; MG/100ML; MG/100ML
INJECTION, SOLUTION INTRAVENOUS CONTINUOUS
Status: DISCONTINUED | OUTPATIENT
Start: 2017-03-31 | End: 2017-03-31 | Stop reason: HOSPADM

## 2017-03-31 RX ORDER — OXYCODONE HYDROCHLORIDE 5 MG/1
5 TABLET ORAL
Status: DISCONTINUED | OUTPATIENT
Start: 2017-03-31 | End: 2017-03-31 | Stop reason: HOSPADM

## 2017-03-31 RX ORDER — OXYCODONE AND ACETAMINOPHEN 7.5; 325 MG/1; MG/1
1 TABLET ORAL EVERY 4 HOURS PRN
Status: CANCELLED | OUTPATIENT
Start: 2017-03-31

## 2017-03-31 RX ORDER — LIDOCAINE HYDROCHLORIDE 10 MG/ML
1 INJECTION, SOLUTION EPIDURAL; INFILTRATION; INTRACAUDAL; PERINEURAL ONCE
Status: DISCONTINUED | OUTPATIENT
Start: 2017-03-31 | End: 2017-03-31 | Stop reason: HOSPADM

## 2017-03-31 RX ORDER — ONDANSETRON 2 MG/ML
INJECTION INTRAMUSCULAR; INTRAVENOUS
Status: DISCONTINUED | OUTPATIENT
Start: 2017-03-31 | End: 2017-03-31

## 2017-03-31 RX ORDER — HYDROCODONE BITARTRATE AND ACETAMINOPHEN 5; 325 MG/1; MG/1
1 TABLET ORAL EVERY 4 HOURS PRN
Status: DISCONTINUED | OUTPATIENT
Start: 2017-03-31 | End: 2017-03-31 | Stop reason: HOSPADM

## 2017-03-31 RX ADMIN — LIDOCAINE HYDROCHLORIDE 10 ML: 10 INJECTION, SOLUTION EPIDURAL; INFILTRATION; INTRACAUDAL; PERINEURAL at 08:03

## 2017-03-31 RX ADMIN — Medication 20 MG: at 07:03

## 2017-03-31 RX ADMIN — GLYCOPYRROLATE 0.2 MG: 0.2 INJECTION, SOLUTION INTRAMUSCULAR; INTRAVENOUS at 07:03

## 2017-03-31 RX ADMIN — PROPOFOL 200 MCG/KG/MIN: 10 INJECTION, EMULSION INTRAVENOUS at 07:03

## 2017-03-31 RX ADMIN — BUPIVACAINE HYDROCHLORIDE 60 ML: 5 INJECTION, SOLUTION PERINEURAL at 08:03

## 2017-03-31 RX ADMIN — METOPROLOL TARTRATE 2.5 MG: 1 INJECTION, SOLUTION INTRAVENOUS at 08:03

## 2017-03-31 RX ADMIN — SODIUM CHLORIDE, SODIUM LACTATE, POTASSIUM CHLORIDE, AND CALCIUM CHLORIDE: 600; 310; 30; 20 INJECTION, SOLUTION INTRAVENOUS at 06:03

## 2017-03-31 RX ADMIN — CEFAZOLIN SODIUM 2 G: 1 SOLUTION INTRAVENOUS at 06:03

## 2017-03-31 RX ADMIN — PROPOFOL 200 MG: 10 INJECTION, EMULSION INTRAVENOUS at 07:03

## 2017-03-31 RX ADMIN — MIDAZOLAM HYDROCHLORIDE 2 MG: 1 INJECTION, SOLUTION INTRAMUSCULAR; INTRAVENOUS at 06:03

## 2017-03-31 RX ADMIN — ONDANSETRON 4 MG: 2 INJECTION, SOLUTION INTRAMUSCULAR; INTRAVENOUS at 07:03

## 2017-03-31 RX ADMIN — Medication 20 MG: at 08:03

## 2017-03-31 RX ADMIN — DEXAMETHASONE SODIUM PHOSPHATE 8 MG: 4 INJECTION, SOLUTION INTRA-ARTICULAR; INTRALESIONAL; INTRAMUSCULAR; INTRAVENOUS; SOFT TISSUE at 07:03

## 2017-03-31 RX ADMIN — LIDOCAINE HYDROCHLORIDE 40 MG: 20 INJECTION, SOLUTION EPIDURAL; INFILTRATION; INTRACAUDAL; PERINEURAL at 06:03

## 2017-03-31 NOTE — TRANSFER OF CARE
"Anesthesia Transfer of Care Note    Patient: Teresa Cazares    Procedure(s) Performed: Procedure(s) (LRB):  REPAIR-HAMMER TOE FIFTH (Bilateral)    Patient location: PACU    Anesthesia Type: MAC    Transport from OR: Transported from OR on room air with adequate spontaneous ventilation    Post pain: adequate analgesia    Post assessment: no apparent anesthetic complications and tolerated procedure well    Post vital signs: stable    Level of consciousness: awake and responds to stimulation    Nausea/Vomiting: no nausea/vomiting    Complications: none          Last vitals:   Visit Vitals    /65    Pulse 76    Temp 36.1 °C (97 °F) (Temporal)    Resp 19    Ht 5' 3" (1.6 m)    Wt 119.8 kg (264 lb 1.8 oz)    SpO2 96%    Breastfeeding No    BMI 46.79 kg/m2     "

## 2017-03-31 NOTE — BRIEF OP NOTE
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]     * Hammer toes of both feet [M20.41, M20.42]    Procedure(s) (LRB):  REPAIR-HAMMER TOE FIFTH (Bilateral)    Anesthesia: Local MAC    Description of the findings of the procedure: Bilateral 4th arthroplasty at DIPJ and Bilateral 4th arthroplasty at PIPJ    Findings/Key Components: none    Estimated Blood Loss: * No values recorded between 3/31/2017  7:40 AM and 3/31/2017  9:21 AM *         Specimens:   Specimen     None          Discharge Note    SUMMARY     Admit Date: 3/31/2017    Discharge Date and Time:  03/31/2017 9:22 AM    Hospital Course (synopsis of major diagnoses, care, treatment, and services provided during the course of the hospital stay):      Final Diagnosis: Post-Op Diagnosis Codes:     * Adductovarus rotation of toe, acquired, 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      efinaconazole 10 % Nisha Apply 1 application topically once daily.  Qty: 8 mL, Refills: 12    Associated Diagnoses: Onychomycosis due to dermatophyte      fluticasone (FLONASE) 50 mcg/actuation nasal spray 1 spray by Each Nare route 2 (two) times daily as needed.  Qty: 15 g, Refills: 0    Associated Diagnoses: Acute tonsillitis           No discharge procedures on file.

## 2017-03-31 NOTE — ANESTHESIA PREPROCEDURE EVALUATION
03/31/2017  Teresa Cazares is a 41 y.o., female.    [facility] Anesthesia Evaluation    I have reviewed the Patient Summary Reports.    I have reviewed the Nursing Notes.   I have reviewed the Medications.     Review of Systems  Anesthesia Hx:  No problems with previous Anesthesia  Denies Family Hx of Anesthesia complications.   Denies Personal Hx of Anesthesia complications.   Social:  Former Smoker, No Alcohol Use    Cardiovascular:   Exercise tolerance: good Denies Hypertension.  Denies MI.      Pulmonary:   Denies Recent URI.  Denies Sleep Apnea.    Renal/:  Renal/ Normal     Hepatic/GI:  Hepatic/GI Normal    Neurological:  Neurology Normal    Endocrine:   Denies Diabetes.    Psych:   depression          Physical Exam  General:  Morbid Obesity    Airway/Jaw/Neck:  Airway Findings: Mouth Opening: Normal General Airway Assessment: Adult  Mallampati: II  TM Distance: Normal, at least 6 cm      Dental:  Dental Findings: In tact   Chest/Lungs:  Chest/Lungs Findings: Clear to auscultation     Heart/Vascular:  Heart Findings: Rate: Normal  Rhythm: Regular Rhythm  Sounds: Normal        Mental Status:  Mental Status Findings:  Alert and Oriented, Cooperative         Anesthesia Plan  Type of Anesthesia, risks & benefits discussed:  Anesthesia Type:  MAC  Patient's Preference:   Intra-op Monitoring Plan:   Intra-op Monitoring Plan Comments:   Post Op Pain Control Plan:   Post Op Pain Control Plan Comments:   Induction:    Beta Blocker:  Patient is not currently on a Beta-Blocker (No further documentation required).       Informed Consent: Patient understands risks and agrees with Anesthesia plan.  Questions answered. Anesthesia consent signed with patient.  ASA Score: 2     Day of Surgery Review of History & Physical: I have interviewed and examined the patient. I have reviewed the patient's H&P dated:  There are  no significant changes.          Ready For Surgery From Anesthesia Perspective.

## 2017-03-31 NOTE — INTERVAL H&P NOTE
The patient has been examined and the H&P has been reviewed:    I concur with the findings and no changes have occurred since H&P was written.    Anesthesia/Surgery risks, benefits and alternative options discussed and understood by patient/family.          Active Hospital Problems    Diagnosis  POA    Acquired mary [M20.40]  Yes      Resolved Hospital Problems    Diagnosis Date Resolved POA   No resolved problems to display.

## 2017-03-31 NOTE — ANESTHESIA RELEASE NOTE
"Anesthesia Release from PACU Note    Patient: Teresa Cazares    Procedure(s) Performed: Procedure(s) (LRB):  REPAIR-HAMMER TOE FIFTH (Bilateral)    Anesthesia type: MAC    Post pain: Adequate analgesia    Post assessment: no apparent anesthetic complications, tolerated procedure well and no evidence of recall    Last Vitals:   Visit Vitals    /65    Pulse 76    Temp 36.1 °C (97 °F) (Temporal)    Resp 19    Ht 5' 3" (1.6 m)    Wt 119.8 kg (264 lb 1.8 oz)    SpO2 96%    Breastfeeding No    BMI 46.79 kg/m2       Post vital signs: stable    Level of consciousness: awake, alert  and oriented    Nausea/Vomiting: no nausea/no vomiting    Complications: none    Airway Patency: patent    Respiratory: unassisted, spontaneous ventilation, room air    Cardiovascular: stable and blood pressure at baseline    Hydration: euvolemic  "

## 2017-03-31 NOTE — ANESTHESIA POSTPROCEDURE EVALUATION
"Anesthesia Post Evaluation    Patient: Teresa Cazares    Procedure(s) Performed: Procedure(s) (LRB):  REPAIR-HAMMER TOE FIFTH (Bilateral)    Final Anesthesia Type: MAC  Patient location during evaluation: PACU  Patient participation: Yes- Able to Participate  Level of consciousness: awake and alert  Post-procedure vital signs: reviewed and stable  Pain management: adequate  Airway patency: patent  PONV status at discharge: No PONV  Anesthetic complications: no      Cardiovascular status: blood pressure returned to baseline  Respiratory status: unassisted  Hydration status: euvolemic  Follow-up not needed.        Visit Vitals    /65    Pulse 76    Temp 36.1 °C (97 °F) (Temporal)    Resp 19    Ht 5' 3" (1.6 m)    Wt 119.8 kg (264 lb 1.8 oz)    SpO2 96%    Breastfeeding No    BMI 46.79 kg/m2       Pain/Mike Score: Pain Assessment Performed: Yes (3/31/2017 10:00 AM)  Presence of Pain: denies (3/31/2017 10:00 AM)  Mike Score: 10 (3/31/2017 10:00 AM)      "

## 2017-03-31 NOTE — DISCHARGE SUMMARY
[facility]   Discharge Summary      Admit Date: 3/31/2017    Discharge Date and Time:  03/31/2017   Attending Physician: Anastasia Maurice DPM     Reason for Admission: Hammertoe repair    Procedures Performed: Procedure(s) (LRB):  REPAIR-HAMMER TOE FIFTH (Bilateral)    Hospital Course (synopsis of major diagnoses, care, treatment, and services provided during the course of the hospital stay):      Consults: none    Significant Diagnostic Studies:     Final Diagnoses:    Principal Problem: <principal problem not specified>   Secondary Diagnoses: .    Discharged Condition: good    Disposition: Home or Self Care    Follow Up/Patient Instructions:     Medications:  Reconciled Home Medications:   Discharge Medication List as of 3/31/2017  9:52 AM      START taking these medications    Details   oxycodone-acetaminophen (PERCOCET) 5-325 mg per tablet Take 1 tablet by mouth every 4 (four) hours as needed for Pain., Starting 3/31/2017, Until Discontinued, Print         CONTINUE these medications which have NOT CHANGED    Details   BIOTIN ORAL Take by mouth., Until Discontinued, Historical Med      efinaconazole 10 % Nisha Apply 1 application topically once daily., Starting 3/10/2017, Until Discontinued, Normal      fluticasone (FLONASE) 50 mcg/actuation nasal spray 1 spray by Each Nare route 2 (two) times daily as needed., Starting 8/18/2016, Until Discontinued, Print           No discharge procedures on file.

## 2017-03-31 NOTE — PLAN OF CARE
Pt resting on stretcher. Denies pain at present. Respirations even and unlabored on room air and tolerating well with O2 sats of 96%. bilat foot dsg remains c/d/i. Neurovascular checks remain intact. See flow sheet for detailed assessment. Pt tolerating ice chips without a problem. VSS. Will cont to monitor. See flow sheet for detailed assessment.

## 2017-03-31 NOTE — PROGRESS NOTES
This 41 year old female presents for surgical management of painful bilateral toe deformities. Patient states deformity has been present for months. Patient states deformity has not responded to conservative treatment.      Patient Active Problem List   Diagnosis    Morbid obesity with BMI of 45.0-49.9, adult    Acquired hammertoe       No current facility-administered medications on file prior to encounter.      Current Outpatient Prescriptions on File Prior to Encounter   Medication Sig Dispense Refill    BIOTIN ORAL Take by mouth.      fluticasone (FLONASE) 50 mcg/actuation nasal spray 1 spray by Each Nare route 2 (two) times daily as needed. 15 g 0       Review of patient's allergies indicates:   Allergen Reactions    Flagyl [metronidazole hcl] Hives       Past Surgical History:   Procedure Laterality Date    FRACTURE SURGERY      C1 neck- halo    HYSTERECTOMY  2009    tahbso    UMBILICAL HERNIA REPAIR         Family History   Problem Relation Age of Onset    Breast cancer Mother 58    Breast cancer Sister 46       Social History     Social History    Marital status: Single     Spouse name: N/A    Number of children: 5    Years of education: N/A     Occupational History          Social History Main Topics    Smoking status: Former Smoker     Packs/day: 0.50     Years: 27.00     Quit date: 7/14/2016    Smokeless tobacco: Not on file    Alcohol use Yes      Comment: occasionally    Drug use: No    Sexual activity: Not on file     Other Topics Concern    Not on file     Social History Narrative     Medical clearance, consent, and EKG reviewed and signed in chart.    AMILCAR:  PHYSICAL EXAM: Apperance: Alert and orient in no distress,well developed, and with good attention to grooming and body habits  Lower Extremity Physical Exam:  VASCULAR: Dorsalis pedis pulses 2/4 bilateral and Posterior Tibial pulses 2/4 bilateral. Capillary fill time <3 seconds bilateral. No edema observed  bilateral. Varicosities absent bilateral. Skin temperature of the lower extremities is warm to warm, proximal to distal. Hair growth WNL bilateral.  DERMATOLOGICAL: No skin rashes, subcutaneous nodules, lesions, or ulcers observed bilateral. Mild hyperkeratotic tissue noted bilateral dorsal 5th toes.   NEUROLOGICAL: Light touch, sharp-dull, proprioception all present and equal bilaterally.    MUSCULOSKELETAL: Muscle strength is 5/5 for foot inverters, everters, plantarflexors, and dorsiflexors. Muscle tone is normal. (+) pain on palpation of bilateral 5th toes at DIPJ and bilateral 4th PIPJ. Adductovarus hammertoes noted to bilateral 5th toes. .    Assessment:  Hammertoes, bilateral 4th and 5th toes  Adductovarus toe bilateral 5th toes    Plan: Patient to OR 3/31/17 for surgical management for painful bilateral toe deformities. All concerns and questions answered by myself, Dr. Maurice, DPM. No gurantees given nor implied.

## 2017-03-31 NOTE — IP AVS SNAPSHOT
[address] [place] [address]           Patient Discharge Instructions   Our goal is to set you up for success. This packet includes information on your condition, medications, and your home care.  It will help you care for yourself to prevent having to return to the hospital.     Please ask your nurse if you have any questions.      There are many details to remember when preparing to leave the hospital. Here is what you will need to do:    1. Take your medicine. If you are prescribed medications, review your Medication List on the following pages. You may have new medications to  at the pharmacy and others that you'll need to stop taking. Review the instructions for how and when to take your medications. Talk with your doctor or nurses if you are unsure of what to do.     2. Go to your follow-up appointments. Specific follow-up information is listed in the following pages. Your may be contacted by a nurse or clinical provider about future appointments. Be sure we have all of the phone numbers to reach you. Please contact your provider's office if you are unable to make an appointment.     3. Watch for warning signs. Your doctor or nurse will give you detailed warning signs to watch for and when to call for assistance. These instructions may also include educational information about your condition. If you experience any of warning signs to your health, call your doctor.               ** Verify the list of medication(s) below is accurate and up to date. Carry this with you in case of emergency. If your medications have changed, please notify your healthcare provider.             Medication List      CONTINUE taking these medications        Additional Info                      BIOTIN ORAL   Refills:  0    Instructions:  Take by mouth.     Begin Date    AM    Noon    PM    Bedtime       efinaconazole 10 % Hattie   Quantity:  8 mL   Refills:  12   Dose:  1  application    Instructions:  Apply 1 application topically once daily.     Begin Date    AM    Noon    PM    Bedtime       fluticasone 50 mcg/actuation nasal spray   Commonly known as:  FLONASE   Quantity:  15 g   Refills:  0   Dose:  1 spray    Instructions:  1 spray by Each Nare route 2 (two) times daily as needed.     Begin Date    AM    Noon    PM    Bedtime         ASK your doctor about these medications        Additional Info                      oxycodone-acetaminophen 5-325 mg per tablet   Commonly known as:  PERCOCET   Quantity:  60 tablet   Refills:  0   Dose:  1 tablet    Instructions:  Take 1 tablet by mouth every 4 (four) hours as needed for Pain.     Begin Date    AM    Noon    PM    Bedtime            Where to Get Your Medications      You can get these medications from any pharmacy     Bring a paper prescription for each of these medications     oxycodone-acetaminophen 5-325 mg per tablet                  Please bring to all follow up appointments:    1. A copy of your discharge instructions.  2. All medicines you are currently taking in their original bottles.  3. Identification and insurance card.    Please arrive 15 minutes ahead of scheduled appointment time.    Please call 24 hours in advance if you must reschedule your appointment and/or time.        Your Scheduled Appointments     Mar 31, 2017  9:55 AM CDT   Diagnostic Xray with JOSE PORTXR3   [facility] ([facility])    [address] [facility] [address]   [phone]            Apr 06, 2017 10:00 AM CDT   Post OP with Anastasia Maurice DPM   [facility] ([facility]    [address] [facility] [address]   [phone]            Apr 13, 2017 10:00 AM CDT   Post OP with Anastasia Maurice DPM   [facility] ([facility]    [address] [facility] [address]   [phone]            Apr 27, 2017 10:00 AM CDT   Diagnostic Xray with [facility]   [facility] ([facility])    [address] [place] [address]   [phone]            Apr 27, 2017 10:40 AM CDT   Post OP with Anastasia Maurice DPM   [facility] ([facility])    [address] [place] [address]   [phone]                Discharge Instructions     Future Orders    Diet general     Questions:    Total calories:      Fat restriction, if any:      Protein restriction, if any:      Na restriction, if any:      Fluid restriction:      Additional restrictions:      Leave dressing on - Keep it clean, dry, and intact until clinic visit         Discharge Instructions         General Information:    1.  Do not drink alcoholic beverages including beer for 24 hours or as long as you are on pain medication..  2.  Do not drive a motor vehicle, operate machinery or power tools, or signs legal papers for 24 hours or as long as you are on pain medication.   3.  You may experience light-headedness, dizziness, and sleepiness following surgery. Please do not stay alone. A responsible adult should be with you for this 24 hour period.  4.  Go home and rest.    5. Progress slowly to a normal diet unless instructed.  Otherwise, begin with liquids such as soft drinks, then soup and crackers working up to solid foods. Drink plenty of nonalcoholic fluids.  6.  Certain anesthetics and pain medications produce nausea and vomiting in certain       individuals. If nausea becomes a problem at home, call you doctor.    7. A nurse will be calling you sometime after surgery. Do not be alarmed. This is our way of finding out how you are doing.    8. Several times every hour while you are awake, take 2-3 deep breaths and cough. If you had stomach surgery hold a pillow or rolled towel firmly against your stomach before you cough. This will help with any pain the cough might cause.  9. Several times every hour while you are awake, pump and flex your feet 5-6 times and do foot circles. This will help  prevent blood clots.    10.Call your doctor for severe pain, bleeding, fever, or signs or symptoms of infection (pain, swelling, redness, foul odor, drainage).    11.You can contact your doctor anytime by [phone] for the [facility] (at [facility]) or [phone] for the [facility] on [facility].   [email or web address] is another way to contact your doctor if you are an active participant online with [facility].          Foot Surgery: Flexible and Rigid Hammertoes  With hammertoes, one or more toes curl or bend abnormally. This can be caused by an inherited muscle problem, an abnormal bone length, or poor foot mechanics. The affected joints can rub inside shoes, causing corns (buildups of dead skin).  There are many nonsurgical treatments for hammertoes, but if these are not effective, you may want to consider surgery.    Flexible hammertoes  When hammertoes are flexible, you can straighten the buckled joints. Flexible hammertoes may become rigid over time.    Tendon release  This treatment helps release the buckled joint. The bottom (flexor) tendon may be repositioned to the top of the affected toe (flexor tendon transfer). Sometimes, the top or bottom tendon is released but not repositioned (tenotomy).    Rigid hammertoes  Rigid hammertoes are fixed (not flexible). You cannot straighten the buckled joints. Corns, pain, and loss of function may be more severe with rigid hammertoes than with flexible ones.    Arthroplasty  A part of the joint is removed, and the toe is straightened. In some cases, the entire joint may be replaced with an implant. When healed, the bones become connected with scar tissue, making your toe flexible.    Fusion  First, the cartilage and some bone on both sides of the joint are removed. Then, the toe is straightened, and the two bones are held together, often with a pin. The pin is removed after several weeks. Once your foot heals, the toe will be less flexible, but  "more stable.  Healing after surgery  The severity of your condition, number of toes involved, and type of surgery done will affect your recovery time. Many people are able to walk right after surgery with a special surgical shoe. Full healing can take several weeks. Your healthcare provider can advise you on what to expect after surgery.     Date Last Reviewed: 10/15/2015  © 2010-5023 [employer]. [address] [place] [address], [place] [address]. All rights reserved. This information is not intended as a substitute for professional medical care. Always follow your healthcare professional's instructions.            Admission Information     Date & Time Provider Department CSN    3/31/2017  5:55 AM Anastasia Maurice DPM [facility] [id number]      Care Providers     Provider Role Specialty Primary office phone    Anastasia Maurice DPM Attending Provider Podiatry [phone]    Anastasia Maurice DPM Surgeon  Podiatry [phone]      Your Vitals Were     BP Pulse Temp Resp Height Weight    171/88 82 97.2 °F (36.2 °C) (Temporal) 17 5' 3" (1.6 m) 119.8 kg (264 lb 1.8 oz)    SpO2 BMI             92% 46.79 kg/m2         Recent Lab Values     No lab values to display.      Allergies as of 3/31/2017        Reactions    Flagyl [Metronidazole Hcl] Hives      [facility]     [facility] Nurse Care Line - 24/7 Assistance  Unless otherwise directed by your provider, please contact [facility], our nurse care line that is available for 24/7 assistance.     Registered nurses in the [facility] provide clinical advisement, health education, appointment booking, and other advisory services.  Call for this free service at [phone].        Advance Directives     An advance directive is a document which, in the event you are no longer able to make decisions for yourself, tells your healthcare team what kind of treatment you do or do not want to receive, or who you would like to make those " decisions for you.  If you do not currently have an advance directive, [facility] encourages you to create one.  For more information call:  [phone]), [phone] ([phone]),  or log on to [email or web address].        Smoking Cessation     If you would like to quit smoking:   You may be eligible for free services if you are a [place] resident and started smoking cigarettes before September 1, 1988.  Call the Smoking Cessation Trust (SCT) toll free at (323) 525-1521 or (104) 152-0417.   Call 9-331-QUIT-NOW if you do not meet the above criteria.   Contact us via email: [email or web address]   View our website for more information: [email or web address]        Language Assistance Services     ATTENTION: Language assistance services are available, free of charge. Please call [phone].      ATENCIÓN: Si habla español, tiene a heard disposición servicios gratuitos de asistencia lingüística. Llame al [phone].     CHÚ Ý: N?u b?n nói Ti?ng Vi?t, có các d?ch v? h? tr? ngôn ng? mi?n phí dành cho b?n. G?i s? [phone].         [facility] complies with applicable Federal civil rights laws and does not discriminate on the basis of race, color, national origin, age, disability, or sex.

## 2017-03-31 NOTE — DISCHARGE INSTRUCTIONS
General Information:    1.  Do not drink alcoholic beverages including beer for 24 hours or as long as you are on pain medication..  2.  Do not drive a motor vehicle, operate machinery or power tools, or signs legal papers for 24 hours or as long as you are on pain medication.   3.  You may experience light-headedness, dizziness, and sleepiness following surgery. Please do not stay alone. A responsible adult should be with you for this 24 hour period.  4.  Go home and rest.    5. Progress slowly to a normal diet unless instructed.  Otherwise, begin with liquids such as soft drinks, then soup and crackers working up to solid foods. Drink plenty of nonalcoholic fluids.  6.  Certain anesthetics and pain medications produce nausea and vomiting in certain       individuals. If nausea becomes a problem at home, call you doctor.    7. A nurse will be calling you sometime after surgery. Do not be alarmed. This is our way of finding out how you are doing.    8. Several times every hour while you are awake, take 2-3 deep breaths and cough. If you had stomach surgery hold a pillow or rolled towel firmly against your stomach before you cough. This will help with any pain the cough might cause.  9. Several times every hour while you are awake, pump and flex your feet 5-6 times and do foot circles. This will help prevent blood clots.    10.Call your doctor for severe pain, bleeding, fever, or signs or symptoms of infection (pain, swelling, redness, foul odor, drainage).    11.You can contact your doctor anytime by [phone] for the [facility] (at [facility]) or [phone] for the [facility] on [facility].   [email or web address] is another way to contact your doctor if you are an active participant online with [facility].          Foot Surgery: Flexible and Rigid Hammertoes  With hammertoes, one or more toes curl or bend abnormally. This can be caused by an inherited muscle problem, an abnormal bone  length, or poor foot mechanics. The affected joints can rub inside shoes, causing corns (buildups of dead skin).  There are many nonsurgical treatments for hammertoes, but if these are not effective, you may want to consider surgery.    Flexible hammertoes  When hammertoes are flexible, you can straighten the buckled joints. Flexible hammertoes may become rigid over time.    Tendon release  This treatment helps release the buckled joint. The bottom (flexor) tendon may be repositioned to the top of the affected toe (flexor tendon transfer). Sometimes, the top or bottom tendon is released but not repositioned (tenotomy).    Rigid hammertoes  Rigid hammertoes are fixed (not flexible). You cannot straighten the buckled joints. Corns, pain, and loss of function may be more severe with rigid hammertoes than with flexible ones.    Arthroplasty  A part of the joint is removed, and the toe is straightened. In some cases, the entire joint may be replaced with an implant. When healed, the bones become connected with scar tissue, making your toe flexible.    Fusion  First, the cartilage and some bone on both sides of the joint are removed. Then, the toe is straightened, and the two bones are held together, often with a pin. The pin is removed after several weeks. Once your foot heals, the toe will be less flexible, but more stable.  Healing after surgery  The severity of your condition, number of toes involved, and type of surgery done will affect your recovery time. Many people are able to walk right after surgery with a special surgical shoe. Full healing can take several weeks. Your healthcare provider can advise you on what to expect after surgery.     Date Last Reviewed: 10/15/2015  © 1371-3221 [employer]. [address] [place] [address]. All rights reserved. This information is not intended as a substitute for professional medical care. Always follow your healthcare professional's  instructions.

## 2017-04-01 NOTE — OP NOTE
Patient: Teresa Cazares  : [birth year]  MR# [id number]  DOS: 15  Surgeon: Dr. Anastasia Maurice D.P.M.  Assistant: None  Pre-Op Dx: Painful Bilateral 4th and 5th Hammertoe/Adductovarus Toe Deformity   Post-Op Dx: Painful Bilateral 4th and 5th Hammertoe/Adductovarus Toe Deformity   Procedure: Bilateral 4th Arthroplasty at the DIPJ and Bilateral 5th toe Derotational Skin Plasty and Arthroplasty at the PIPJ  Anesthesia: IV sedation with local anesthesia  Hemostasis: Pneumatic bilateral ankle tourniquet @ 250mmHg  EBL: 3cc  Materials: 3.0 vicryl,  4-0 nylon  Injectables: pre-op: 20cc 1:1 mixture of 1% Lidocaine plain and 0.5% Marcaine plain                      intra-op: 30cc of 0.5% Marcaine plain     Procedure in Detail:  Patient was brought into operating room and placed on operating table in the supine position. A pneumatic ankle tourniquet was then place about the patients bilateral ankle. Following IV sedation, local anesthesia was obtained about the patients bilateral 4th and 5th toes utilizing a total of 20cc of  1:1 mixture of 1% Lidocaine plain and 0.5% Marcaine plain. The bilateral foot was then scrubbed, prepped, and draped in the usual aseptic manner. An esmarch bandage was used to exsanguinate the left foot and the pneumatic ankle tourniquet was then inflated.The pneumatic ankle tourniquet was then inflated.    Attention was then directed to the dorsal aspect of the 4th toe of the left foot where a transverse elliptical incision over the DIPJ. The ellipsed skin was passed from the operative site. Sharp and blunt dissection was made down to the level of the joint capsule with care taken to retract all vital neural and vascular structures. A transverse tenotomy and capsulotomy as well as ligation of collateral ligaments were then performed at the level of the distal interphalangeal joint. The extensor tendon was then reflected proximally allowing for exposure of the head of the middle phalanx. Next  utilizing a sagittal saw, the head of the proximal phalanx was resected and passed from the operative filed. All rough edges were then smoothed with bone rasp. Correction of the deformity was assessed at this time and noted to be in a good position. The wound was then irrigated with copious amounts of sterile normal saline.   Attention was then directed to the dorsal aspect of the 5th toe of the left foot where a elliptical incision from distal medial to proximal lateral. The ellipsed skin was passed from the operative site. Sharp and blunt dissection was made down to the level of the joint capsule with care taken to retract all vital neural and vascular structures. A transverse tenotomy and capsulotomy as well as ligation of collateral ligaments were then performed at the level of the proximal interphalangeal joint. The extensor tendon was then reflected proximally allowing for exposure of the head of the proximal phalanx. Next utilizing a sagittal saw, the head of the proximal phalanx was resected and passed from the operative filed. All rough edges were then smoothed with bone rasp. Correction of the deformity was assessed at this time and noted to be in a good position. The wound was then irrigated with copious amounts of sterile normal saline.   The extensor tendon and capsular structures were reapproximated and coapted utilizing 3-0 vicryl. The skin was then reapproximated utilizing 4-0 nylon in simple suture technique. At this time, a pneumatic ankle tourniquet was then deflated and a prompt hyperemic response was noted to all toes of the left foot.    An esmarch bandage was used to exsanguinate the right foot and the pneumatic ankle tourniquet was then inflated.The pneumatic ankle tourniquet was then inflated.  The exact above procedures was then performed on the right 4th and 5th toes.     The wounds were then dressed with Betadine soaked adaptic, gauze, lenin, and ACE.    At this time, a pneumatic ankle  tourniquet was then deflated and a prompt hyperemic response was noted to all toes of the right foot. The patient tolerated anesthesia and procedure well. The patient was transported to PACU with vital signs stable and neurovascular status intact to the bilateral foot.

## 2017-04-06 ENCOUNTER — OFFICE VISIT (OUTPATIENT)
Dept: PODIATRY | Facility: CLINIC | Age: 42
End: 2017-04-06
Payer: COMMERCIAL

## 2017-04-06 VITALS
SYSTOLIC BLOOD PRESSURE: 148 MMHG | HEART RATE: 63 BPM | DIASTOLIC BLOOD PRESSURE: 100 MMHG | HEIGHT: 63 IN | WEIGHT: 264 LBS | BODY MASS INDEX: 46.78 KG/M2

## 2017-04-06 DIAGNOSIS — M20.41 HAMMER TOES OF BOTH FEET: ICD-10-CM

## 2017-04-06 DIAGNOSIS — M20.5X9 ADDUCTOVARUS ROTATION OF TOE, ACQUIRED, UNSPECIFIED LATERALITY: ICD-10-CM

## 2017-04-06 DIAGNOSIS — Z98.890 POST-OPERATIVE STATE: Primary | ICD-10-CM

## 2017-04-06 DIAGNOSIS — M20.42 HAMMER TOES OF BOTH FEET: ICD-10-CM

## 2017-04-06 PROCEDURE — 99999 PR PBB SHADOW E&M-EST. PATIENT-LVL III: CPT | Mod: PBBFAC,,, | Performed by: PODIATRIST

## 2017-04-06 PROCEDURE — 99024 POSTOP FOLLOW-UP VISIT: CPT | Mod: S$GLB,,, | Performed by: PODIATRIST

## 2017-04-06 RX ORDER — OXYCODONE AND ACETAMINOPHEN 5; 325 MG/1; MG/1
1 TABLET ORAL EVERY 4 HOURS PRN
Qty: 60 TABLET | Refills: 0 | Status: SHIPPED | OUTPATIENT
Start: 2017-04-06 | End: 2017-04-27 | Stop reason: SDUPTHER

## 2017-04-06 RX ORDER — CLINDAMYCIN HYDROCHLORIDE 300 MG/1
CAPSULE ORAL
Refills: 0 | COMMUNITY
Start: 2017-01-13 | End: 2017-04-12

## 2017-04-06 RX ORDER — FLUCONAZOLE 150 MG/1
TABLET ORAL
Refills: 0 | COMMUNITY
Start: 2017-01-13 | End: 2017-04-27

## 2017-04-06 NOTE — MR AVS SNAPSHOT
[facility]  [address]  [place] [address]  Phone: [phone]  Fax: [phone]                  Teresa Cazares   2017 10:00 AM   Office Visit    Description:  Female : 1975   Provider:  Anastasia Maurice DPM   Department:  [facility]           Reason for Visit     Follow-up           Diagnoses this Visit        Comments    Hammer toes of both feet         Adductovarus rotation of toe, acquired, unspecified laterality         Post-operative state                To Do List           Future Appointments        Provider Department Dept Phone    2017 10:00 AM Anastasia Maurice DPM [facility] [phone]    2017 10:00 AM [facility] XR2 [facility] [phone]    2017 10:40 AM Anastasia Maurice DPM [facility] [phone]    2017 12:40 PM Taylor Harrison MD [facility] [phone]      Goals (5 Years of Data)     None       These Medications        Disp Refills Start End    oxycodone-acetaminophen (PERCOCET) 5-325 mg per tablet 60 tablet 0 2017     Take 1 tablet by mouth every 4 (four) hours as needed for Pain. - Oral    Pharmacy: [facility] [address] [place] [address] [address] [facility] AT [facility] Ph #: [phone]         [facility]     [facility] Nurse Care Line -  Assistance  Unless otherwise directed by your provider, please contact [facility], our nurse care line that is available for  assistance.     Registered nurses in the [facility] provide: appointment scheduling, clinical advisement, health education, and other advisory services.  Call: [phone] (toll free)               Medications           Message regarding Medications     Verify the changes and/or additions to your medication regime listed below are the same as discussed with your clinician today.  If any of these changes or additions are incorrect, please notify your  "healthcare provider.             Verify that the below list of medications is an accurate representation of the medications you are currently taking.  If none reported, the list may be blank. If incorrect, please contact your healthcare provider. Carry this list with you in case of emergency.           Current Medications     BIOTIN ORAL Take by mouth.    clindamycin (CLEOCIN) 300 MG capsule     efinaconazole 10 % Nisha Apply 1 application topically once daily.    fluconazole (DIFLUCAN) 150 MG Tab     fluticasone (FLONASE) 50 mcg/actuation nasal spray 1 spray by Each Nare route 2 (two) times daily as needed.    oxycodone-acetaminophen (PERCOCET) 5-325 mg per tablet Take 1 tablet by mouth every 4 (four) hours as needed for Pain.           Clinical Reference Information           Your Vitals Were     BP Pulse Height Weight BMI    148/100 (BP Location: Left arm, Patient Position: Sitting, BP Method: Automatic) 63 5' 3" (1.6 m) 119.7 kg (264 lb) 46.77 kg/m2      Blood Pressure          Most Recent Value    BP  (!)  148/100      Allergies as of 4/6/2017     Flagyl [Metronidazole Hcl]      Immunizations Administered on Date of Encounter - 4/6/2017     None      Language Assistance Services     ATTENTION: Language assistance services are available, free of charge. Please call [phone].      ATENCIÓN: Si joanla blank, tiene a heard disposición servicios gratuitos de asistencia lingüística. Llame al [phone].     CHÚ Ý: N?u b?n nói Ti?ng Vi?t, có các d?ch v? h? tr? ngôn ng? mi?n phí dành cho b?n. G?i s? [phone].         [facility] complies with applicable Federal civil rights laws and does not discriminate on the basis of race, color, national origin, age, disability, or sex.        "

## 2017-04-10 NOTE — PROGRESS NOTES
Subjective:     Patient ID: Teresa Cazares is a 41 y.o. female.    Chief Complaint: Follow-up (post-op bilateral 5th toe repair. Patient states no current pain but she has been experiencing pain.)    HPI: This 41 year old female returns to the clinic 1 weeks status post bilateral hammertoe procedures. Patient has no complaints of fever chills or sweats. Positive pain. Patient states dressing was kept dry, clean, and intact.       Patient Active Problem List   Diagnosis    Morbid obesity with BMI of 45.0-49.9, adult    Acquired hammertoe       Medication List with Changes/Refills   Current Medications    BIOTIN ORAL    Take by mouth.    CLINDAMYCIN (CLEOCIN) 300 MG CAPSULE        EFINACONAZOLE 10 % LORI    Apply 1 application topically once daily.    FLUCONAZOLE (DIFLUCAN) 150 MG TAB        FLUTICASONE (FLONASE) 50 MCG/ACTUATION NASAL SPRAY    1 spray by Each Nare route 2 (two) times daily as needed.   Changed and/or Refilled Medications    Modified Medication Previous Medication    OXYCODONE-ACETAMINOPHEN (PERCOCET) 5-325 MG PER TABLET oxycodone-acetaminophen (PERCOCET) 5-325 mg per tablet       Take 1 tablet by mouth every 4 (four) hours as needed for Pain.    Take 1 tablet by mouth every 4 (four) hours as needed for Pain.       Review of patient's allergies indicates:   Allergen Reactions    Flagyl [metronidazole hcl] Hives       Past Surgical History:   Procedure Laterality Date    FRACTURE SURGERY      C1 neck- halo    HYSTERECTOMY  2009    tahbso    UMBILICAL HERNIA REPAIR         Family History   Problem Relation Age of Onset    Breast cancer Mother 58    Breast cancer Sister 46       Social History     Social History    Marital status: Single     Spouse name: N/A    Number of children: 5    Years of education: N/A     Occupational History          Social History Main Topics    Smoking status: Former Smoker     Packs/day: 0.50     Years: 27.00     Quit date: 7/14/2016     "Smokeless tobacco: Not on file    Alcohol use Yes      Comment: occasionally    Drug use: No    Sexual activity: Not on file     Other Topics Concern    Not on file     Social History Narrative       Vitals:    04/06/17 1044   BP: (!) 148/100   Pulse: 63   Weight: 119.7 kg (264 lb)   Height: 5' 3" (1.6 m)   PainSc: 0-No pain     Review of Systems   Constitutional: Negative for chills and fever.   Respiratory: Negative for shortness of breath.    Cardiovascular: Negative for chest pain, palpitations, orthopnea, claudication and leg swelling.   Gastrointestinal: Negative for diarrhea, nausea and vomiting.   Musculoskeletal: Negative for joint pain.   Skin: Negative for rash.   Neurological: Negative for dizziness, tingling, sensory change, focal weakness and weakness.   Psychiatric/Behavioral: Negative.              Objective:        Physical examination: General: Patient is in no acute distress, alert and oriented x 3.  Dressing to bilateral foot clean, dry, and intact.   Lower Extremity Exam:  Vascular: Dorsalis pedis and Posterior tibial pulses palpable on bilateral foot.  Capillary fill time <3 sec to toes on bilateral foot. Mild edema noted on bilateral 4th toes.   Dermatologic: Sutures Intact. Incision site well copated on bilateral foot. Negative erythema, drainage, or increased temp noted to surgical site.   Neurological: Light touch sensation intact to bilateral foot.   Musculoskeletal: Positive pain on palpation/ROM of bilateral 4th and 5th toes.         Assessment:       Encounter Diagnoses   Name Primary?    Post-operative state Yes    Hammer toes of both feet     Adductovarus rotation of toe, acquired, unspecified laterality          Plan:   Post-operative state  -     oxycodone-acetaminophen (PERCOCET) 5-325 mg per tablet; Take 1 tablet by mouth every 4 (four) hours as needed for Pain.  Dispense: 60 tablet; Refill: 0    Hammer toes of both feet  -     oxycodone-acetaminophen (PERCOCET) 5-325 mg " per tablet; Take 1 tablet by mouth every 4 (four) hours as needed for Pain.  Dispense: 60 tablet; Refill: 0    Adductovarus rotation of toe, acquired, unspecified laterality  -     oxycodone-acetaminophen (PERCOCET) 5-325 mg per tablet; Take 1 tablet by mouth every 4 (four) hours as needed for Pain.  Dispense: 60 tablet; Refill: 0      I counseled the patient on her conditions, their implications and medical management.  bilateral foot dressed with betadine soaked adaptic gauze, kerlic, and ACE.   Patient instructed to keep dressing dry, clean and intact.   Patient instructed to continue to ambulate in surgical shoes.   Prescription refilled for Percocet 5-325mg to be taken as needed for pain.   Patient should call the clinic immediately if any signs of infection such as fever chills sweats increased redness or pain.  Patient to return in 1 week for suture removal.                 Anastasia Maurice DPM  [facility]

## 2017-04-11 ENCOUNTER — PATIENT MESSAGE (OUTPATIENT)
Dept: PODIATRY | Facility: CLINIC | Age: 42
End: 2017-04-11

## 2017-04-12 ENCOUNTER — OFFICE VISIT (OUTPATIENT)
Dept: PODIATRY | Facility: CLINIC | Age: 42
End: 2017-04-12
Payer: COMMERCIAL

## 2017-04-12 ENCOUNTER — TELEPHONE (OUTPATIENT)
Dept: PODIATRY | Facility: CLINIC | Age: 42
End: 2017-04-12

## 2017-04-12 VITALS
WEIGHT: 263.88 LBS | HEIGHT: 63 IN | SYSTOLIC BLOOD PRESSURE: 145 MMHG | DIASTOLIC BLOOD PRESSURE: 87 MMHG | BODY MASS INDEX: 46.75 KG/M2 | HEART RATE: 79 BPM

## 2017-04-12 DIAGNOSIS — M20.41 HAMMER TOES OF BOTH FEET: ICD-10-CM

## 2017-04-12 DIAGNOSIS — M20.5X9 ADDUCTOVARUS ROTATION OF TOE, ACQUIRED, UNSPECIFIED LATERALITY: ICD-10-CM

## 2017-04-12 DIAGNOSIS — M20.42 HAMMER TOES OF BOTH FEET: ICD-10-CM

## 2017-04-12 DIAGNOSIS — Z98.890 POST-OPERATIVE STATE: Primary | ICD-10-CM

## 2017-04-12 PROCEDURE — 99024 POSTOP FOLLOW-UP VISIT: CPT | Mod: S$GLB,,, | Performed by: PODIATRIST

## 2017-04-12 PROCEDURE — 99999 PR PBB SHADOW E&M-EST. PATIENT-LVL III: CPT | Mod: PBBFAC,,, | Performed by: PODIATRIST

## 2017-04-12 NOTE — TELEPHONE ENCOUNTER
----- Message from Marcelino Suero sent at 4/12/2017  9:24 AM CDT -----  Contact: Patient  Please call pt back regarding being worked into the schedule for an appt today since she has available transportation for today only and cannot come in tomorrow. Pt can be reached @ ..[phone] (home) Thank you/NH

## 2017-04-17 ENCOUNTER — PATIENT MESSAGE (OUTPATIENT)
Dept: PODIATRY | Facility: CLINIC | Age: 42
End: 2017-04-17

## 2017-04-17 ENCOUNTER — TELEPHONE (OUTPATIENT)
Dept: PODIATRY | Facility: CLINIC | Age: 42
End: 2017-04-17

## 2017-04-17 NOTE — TELEPHONE ENCOUNTER
Ms. Cazares was given an return call, and she agreed to returning to work on Monday, April 24, 2017. She was informed that a letter will be typed, printed, signed by Dr. Maurice, and put in the mail today. Ms. Cazares verbalized understanding, and the call ended well.

## 2017-04-17 NOTE — TELEPHONE ENCOUNTER
----- Message from Elisha Ac sent at 4/17/2017 10:03 AM CDT -----  Contact: pt  Pt requests nurse to call her regarding a release date to go back to school. Pt can be reached at [phone].

## 2017-04-21 NOTE — PROGRESS NOTES
Subjective:     Patient ID: Teresa Cazares is a 41 y.o. female.    Chief Complaint: Post-op Evaluation (hammer toe repair (bilateral), patient is accompanied by her mother )    HPI: This 41 year old female returns to the clinic 2 weeks status post bilateral hammertoe procedure. Patient has no complaints of fever chills or sweats. Negative pain. Patient states dressing was kept dry, clean, and intact.       Patient Active Problem List   Diagnosis    Morbid obesity with BMI of 45.0-49.9, adult    Acquired hammertoe       Medication List with Changes/Refills   Current Medications    BIOTIN ORAL    Take by mouth.    EFINACONAZOLE 10 % LORI    Apply 1 application topically once daily.    FLUCONAZOLE (DIFLUCAN) 150 MG TAB        FLUTICASONE (FLONASE) 50 MCG/ACTUATION NASAL SPRAY    1 spray by Each Nare route 2 (two) times daily as needed.    OXYCODONE-ACETAMINOPHEN (PERCOCET) 5-325 MG PER TABLET    Take 1 tablet by mouth every 4 (four) hours as needed for Pain.   Discontinued Medications    CLINDAMYCIN (CLEOCIN) 300 MG CAPSULE           Review of patient's allergies indicates:   Allergen Reactions    Flagyl [metronidazole hcl] Hives       Past Surgical History:   Procedure Laterality Date    FRACTURE SURGERY      C1 neck- halo    HYSTERECTOMY  2009    tahbso    UMBILICAL HERNIA REPAIR         Family History   Problem Relation Age of Onset    Breast cancer Mother 58    Breast cancer Sister 46       Social History     Social History    Marital status: Single     Spouse name: N/A    Number of children: 5    Years of education: N/A     Occupational History          Social History Main Topics    Smoking status: Former Smoker     Packs/day: 0.50     Years: 27.00     Quit date: 7/14/2016    Smokeless tobacco: Not on file    Alcohol use Yes      Comment: occasionally    Drug use: No    Sexual activity: Not on file     Other Topics Concern    Not on file     Social History Narrative  "      Vitals:    04/12/17 1119   BP: (!) 145/87   Pulse: 79   Weight: 119.7 kg (263 lb 14.3 oz)   Height: 5' 3" (1.6 m)   PainSc: 0-No pain       Review of Systems   Constitutional: Negative for chills and fever.   Respiratory: Negative for shortness of breath.    Cardiovascular: Negative for chest pain, palpitations, orthopnea, claudication and leg swelling.   Gastrointestinal: Negative for diarrhea, nausea and vomiting.   Musculoskeletal: Negative for joint pain.   Skin: Negative for rash.   Neurological: Negative for dizziness, tingling, sensory change, focal weakness and weakness.   Psychiatric/Behavioral: Negative.          Objective:      Physical examination: General: Patient is in no acute distress, alert and oriented x 3.  Dressing to bilateral foot clean, dry, and intact.   Lower Extremity Exam:  Vascular: Dorsalis pedis and Posterior tibial pulses palpable on bilateral foot.  Capillary fill time <3 sec to toes on bilateral foot. Mild edema noted on bilateral 4th toes.   Dermatologic: Sutures Intact. Incision site well copated on bilateral foot. Negative erythema, drainage, or increased temp noted to surgical site.   Neurological: Light touch sensation intact to bilateral foot.   Musculoskeletal: Positive pain on palpation/ROM of bilateral 4th and 5th toes.        Assessment:       Encounter Diagnoses   Name Primary?    Post-operative state Yes    Hammer toes of both feet     Adductovarus rotation of toe, acquired, unspecified laterality          Plan:   Post-operative state    Hammer toes of both feet    Adductovarus rotation of toe, acquired, unspecified laterality    I counseled the patient on her conditions, regarding findings of my examination, my impressions, and usual treatment plan.   All sutures removed.   Bilateral foot dressed with betadine soaked gauze,  Jennifer, and coban.   Patient instructed to keep dressing dry, clean and intact for 2 days and hen remove.  Patient instructed to continue to " ambulate in surgical shoe.   Patient should call the clinic immediately if any signs of infection such as fever chills sweats increased redness or pain.  Patient to return in 2 weeks.             Anastasia Maurice DPM  [facility]

## 2017-04-27 ENCOUNTER — OFFICE VISIT (OUTPATIENT)
Dept: PODIATRY | Facility: CLINIC | Age: 42
End: 2017-04-27
Payer: COMMERCIAL

## 2017-04-27 ENCOUNTER — HOSPITAL ENCOUNTER (OUTPATIENT)
Dept: RADIOLOGY | Facility: HOSPITAL | Age: 42
Discharge: HOME | End: 2017-04-27
Attending: PODIATRIST
Payer: COMMERCIAL

## 2017-04-27 VITALS
BODY MASS INDEX: 46.75 KG/M2 | WEIGHT: 263.88 LBS | DIASTOLIC BLOOD PRESSURE: 75 MMHG | HEART RATE: 59 BPM | SYSTOLIC BLOOD PRESSURE: 123 MMHG | HEIGHT: 63 IN

## 2017-04-27 DIAGNOSIS — Z98.890 POST-OPERATIVE STATE: Primary | ICD-10-CM

## 2017-04-27 DIAGNOSIS — M20.42 HAMMER TOES OF BOTH FEET: ICD-10-CM

## 2017-04-27 DIAGNOSIS — M20.41 HAMMER TOES OF BOTH FEET: ICD-10-CM

## 2017-04-27 DIAGNOSIS — M20.5X9 ADDUCTOVARUS ROTATION OF TOE, ACQUIRED, UNSPECIFIED LATERALITY: ICD-10-CM

## 2017-04-27 DIAGNOSIS — Z98.890 POST-OPERATIVE STATE: ICD-10-CM

## 2017-04-27 PROCEDURE — 73630 X-RAY EXAM OF FOOT: CPT | Mod: 50,TC,PO

## 2017-04-27 PROCEDURE — 99999 PR PBB SHADOW E&M-EST. PATIENT-LVL III: CPT | Mod: PBBFAC,,, | Performed by: PODIATRIST

## 2017-04-27 PROCEDURE — 99024 POSTOP FOLLOW-UP VISIT: CPT | Mod: S$GLB,,, | Performed by: PODIATRIST

## 2017-04-27 PROCEDURE — 73630 X-RAY EXAM OF FOOT: CPT | Mod: 26,50,, | Performed by: RADIOLOGY

## 2017-04-27 RX ORDER — OXYCODONE AND ACETAMINOPHEN 5; 325 MG/1; MG/1
1 TABLET ORAL EVERY 4 HOURS PRN
Qty: 60 TABLET | Refills: 0 | Status: SHIPPED | OUTPATIENT
Start: 2017-04-27 | End: 2017-06-26

## 2017-04-27 RX ORDER — IBUPROFEN 800 MG/1
800 TABLET ORAL 2 TIMES DAILY
Qty: 30 TABLET | Refills: 1 | Status: SHIPPED | OUTPATIENT
Start: 2017-04-27 | End: 2017-06-26

## 2017-04-27 NOTE — MR AVS SNAPSHOT
[facility]  [address] [place] [address]  Phone: [phone]  Fax: [phone]                  Teresa Cazares   2017 10:40 AM   Office Visit    Description:  Female : 1975   Provider:  Anastasia Maurice DPM   Department:  [facility]           Reason for Visit     Post-op Evaluation                To Do List           Future Appointments        Provider Department Dept Phone    2017 3:20 PM Anastasia Maurice DPM [facility] [phone]    2017 12:40 PM Taylor Harrison MD [facility] [phone]      Goals (5 Years of Data)     None      [facility]     [facility] Care [facility] -  Assistance  Unless otherwise directed by your provider, please contact [facility], our nurse care line that is available for  assistance.     Registered nurses in the [facility] provide: appointment scheduling, clinical advisement, health education, and other advisory services.  Call: [phone] (toll free)               Medications           Message regarding Medications     Verify the changes and/or additions to your medication regime listed below are the same as discussed with your clinician today.  If any of these changes or additions are incorrect, please notify your healthcare provider.        STOP taking these medications     fluconazole (DIFLUCAN) 150 MG Tab     fluticasone (FLONASE) 50 mcg/actuation nasal spray 1 spray by Each Nare route 2 (two) times daily as needed.           Verify that the below list of medications is an accurate representation of the medications you are currently taking.  If none reported, the list may be blank. If incorrect, please contact your healthcare provider. Carry this list with you in case of emergency.           Current Medications     BIOTIN ORAL Take by mouth.    oxycodone-acetaminophen (PERCOCET) 5-325 mg per tablet Take 1 tablet by mouth every 4 (four) hours as needed for Pain.     "efinaconazole 10 % Nisha Apply 1 application topically once daily.           Clinical Reference Information           Your Vitals Were     BP Pulse Height Weight BMI    123/75 (BP Location: Right arm, Patient Position: Sitting, BP Method: Automatic) 59 5' 3" (1.6 m) 119.7 kg (263 lb 14.3 oz) 46.75 kg/m2      Blood Pressure          Most Recent Value    BP  123/75      Allergies as of 4/27/2017     Flagyl [Metronidazole Hcl]      Immunizations Administered on Date of Encounter - 4/27/2017     None      Language Assistance Services     ATTENTION: Language assistance services are available, free of charge. Please call [phone].      ATENCIÓN: Si habla español, tiene a heard disposición servicios gratuitos de asistencia lingüística. Llame al [phone].     CHÚ Ý: N?u b?n nói Ti?ng Vi?t, có các d?ch v? h? tr? ngôn ng? mi?n phí dành cho b?n. G?i s? [phone].         [facility] complies with applicable Federal civil rights laws and does not discriminate on the basis of race, color, national origin, age, disability, or sex.        "

## 2017-05-08 NOTE — PROGRESS NOTES
Subjective:     Patient ID: Teresa Cazares is a 41 y.o. female.    Chief Complaint: Post-op Evaluation (4 wk post bilateral 4th and 5th arthroplasty xrays prior minor swelling current pain 8/10)    HPI: This 41 year old female returns to the clinic 1 months status post bilateral toe hammertoe procedures. Patient has no complaints of fever chills or sweats. Positive pain. Patient states that due to family issues she has been on her feet more and when she is on feet for long periods of time she get swelling and pain in toes. Patient states on the days she is able to elevate feet her toes are pain free and look normal. Patient states the soles of her post ops shoes are worn down so she has been wearing sandals.       Patient Active Problem List   Diagnosis    Morbid obesity with BMI of 45.0-49.9, adult    Acquired hammertoe       Medication List with Changes/Refills   New Medications    IBUPROFEN (ADVIL,MOTRIN) 800 MG TABLET    Take 1 tablet (800 mg total) by mouth 2 (two) times daily.   Current Medications    BIOTIN ORAL    Take by mouth.    EFINACONAZOLE 10 % LORI    Apply 1 application topically once daily.   Changed and/or Refilled Medications    Modified Medication Previous Medication    OXYCODONE-ACETAMINOPHEN (PERCOCET) 5-325 MG PER TABLET oxycodone-acetaminophen (PERCOCET) 5-325 mg per tablet       Take 1 tablet by mouth every 4 (four) hours as needed for Pain.    Take 1 tablet by mouth every 4 (four) hours as needed for Pain.   Discontinued Medications    FLUCONAZOLE (DIFLUCAN) 150 MG TAB        FLUTICASONE (FLONASE) 50 MCG/ACTUATION NASAL SPRAY    1 spray by Each Nare route 2 (two) times daily as needed.       Review of patient's allergies indicates:   Allergen Reactions    Flagyl [metronidazole hcl] Hives       Past Surgical History:   Procedure Laterality Date    FRACTURE SURGERY      C1 neck- halo    HYSTERECTOMY  2009    tahbso    UMBILICAL HERNIA REPAIR         Family History   Problem  "Relation Age of Onset    Breast cancer Mother 58    Breast cancer Sister 46       Social History     Social History    Marital status: Single     Spouse name: N/A    Number of children: 5    Years of education: N/A     Occupational History          Social History Main Topics    Smoking status: Former Smoker     Packs/day: 0.50     Years: 27.00     Quit date: 7/14/2016    Smokeless tobacco: Not on file    Alcohol use Yes      Comment: occasionally    Drug use: No    Sexual activity: Not on file     Other Topics Concern    Not on file     Social History Narrative       Vitals:    04/27/17 1524   BP: 123/75   Pulse: (!) 59   Weight: 119.7 kg (263 lb 14.3 oz)   Height: 5' 3" (1.6 m)   PainSc:   8   PainLoc: Foot       No results found for: HGBA1C    Review of Systems   Constitutional: Negative for chills and fever.   Respiratory: Negative for shortness of breath.    Cardiovascular: Negative for chest pain, palpitations, orthopnea, claudication and leg swelling.   Gastrointestinal: Negative for diarrhea, nausea and vomiting.   Musculoskeletal: Negative for joint pain.   Skin: Negative for rash.   Neurological: Negative for dizziness, tingling, sensory change, focal weakness and weakness.   Psychiatric/Behavioral: Negative.              Objective:      Physical examination: General: Patient is in no acute distress, alert and oriented x 3.  Patient presents ambulating flip flop type sandals.   Lower Extremity Exam:  Vascular: Dorsalis pedis and Posterior tibial pulses palpable on bilateral foot.  Capillary fill time <3 sec to toes on bilateral foot. Mild edema noted on bilateral 4th and 5th toes.   Dermatologic: Incision site well copated on bilateral foot. Negative erythema, drainage, or increased temp noted to surgical site.   Neurological: Light touch sensation intact to bilateral foot.   Musculoskeletal: Positive decreased pain on palpation/ROM of bilateral 4th and 5th toes.    TEST RESULTS: " Radiographs taken today reveals there are postoperative changes again noted at the 4th DIP and 5th PIP joints bilaterally.  No lytic bone destruction or soft tissue gas identified.  Degenerative changes present in the midfoot region and 1st MTP joints bilaterally.  Small calcaneal spurs on both sides.  No acute fracture or dislocation.        Assessment:       Encounter Diagnoses   Name Primary?    Hammer toes of both feet     Adductovarus rotation of toe, acquired, unspecified laterality     Post-operative state          Plan:   Hammer toes of both feet  -     oxycodone-acetaminophen (PERCOCET) 5-325 mg per tablet; Take 1 tablet by mouth every 4 (four) hours as needed for Pain.  Dispense: 60 tablet; Refill: 0  -     ibuprofen (ADVIL,MOTRIN) 800 MG tablet; Take 1 tablet (800 mg total) by mouth 2 (two) times daily.  Dispense: 30 tablet; Refill: 1    Adductovarus rotation of toe, acquired, unspecified laterality  -     oxycodone-acetaminophen (PERCOCET) 5-325 mg per tablet; Take 1 tablet by mouth every 4 (four) hours as needed for Pain.  Dispense: 60 tablet; Refill: 0  -     ibuprofen (ADVIL,MOTRIN) 800 MG tablet; Take 1 tablet (800 mg total) by mouth 2 (two) times daily.  Dispense: 30 tablet; Refill: 1    Post-operative state  -     oxycodone-acetaminophen (PERCOCET) 5-325 mg per tablet; Take 1 tablet by mouth every 4 (four) hours as needed for Pain.  Dispense: 60 tablet; Refill: 0  -     ibuprofen (ADVIL,MOTRIN) 800 MG tablet; Take 1 tablet (800 mg total) by mouth 2 (two) times daily.  Dispense: 30 tablet; Refill: 1      I counseled the patient on her conditions, regarding findings of my examination, my impressions, and usual treatment plan.   Reviewed x-rays in exam room with patient.   Patient instructed on adequate icing techniques. Patient should ice the affected area at least once per day x 10 minutes for 10 days . I advised the  patient that extra icing would also be beneficial to ensure adequate anti  inflammatory effect.   Dispensed bilateral post-op shoes for patient to return to wearing daily when ambulating for at least 1 more week  Return to work letter completed.   Prescription refilled for Percocet to be taken as needed for pain.   Prescription written for Ibuprofen 800mg to be taken twice daily for 7 days.   Patient to return in 1 month.             Anastasia Maurice DPM  [facility]

## 2017-05-16 ENCOUNTER — OFFICE VISIT (OUTPATIENT)
Dept: FAMILY MEDICINE | Facility: CLINIC | Age: 42
End: 2017-05-16
Payer: COMMERCIAL

## 2017-05-16 VITALS
HEIGHT: 63 IN | OXYGEN SATURATION: 95 % | HEART RATE: 80 BPM | TEMPERATURE: 97 F | RESPIRATION RATE: 16 BRPM | DIASTOLIC BLOOD PRESSURE: 80 MMHG | BODY MASS INDEX: 45.66 KG/M2 | WEIGHT: 257.69 LBS | SYSTOLIC BLOOD PRESSURE: 120 MMHG

## 2017-05-16 DIAGNOSIS — B37.9 YEAST INFECTION: Primary | ICD-10-CM

## 2017-05-16 DIAGNOSIS — R30.0 DYSURIA: ICD-10-CM

## 2017-05-16 PROCEDURE — 99214 OFFICE O/P EST MOD 30 MIN: CPT | Mod: S$GLB,,, | Performed by: FAMILY MEDICINE

## 2017-05-16 PROCEDURE — 1160F RVW MEDS BY RX/DR IN RCRD: CPT | Mod: S$GLB,,, | Performed by: FAMILY MEDICINE

## 2017-05-16 PROCEDURE — 99999 PR PBB SHADOW E&M-EST. PATIENT-LVL III: CPT | Mod: PBBFAC,,, | Performed by: FAMILY MEDICINE

## 2017-05-16 RX ORDER — CIPROFLOXACIN 250 MG/1
250 TABLET, FILM COATED ORAL 2 TIMES DAILY
Qty: 6 TABLET | Refills: 0 | Status: SHIPPED | OUTPATIENT
Start: 2017-05-16 | End: 2017-05-19

## 2017-05-16 RX ORDER — FLUCONAZOLE 150 MG/1
150 TABLET ORAL DAILY
Qty: 2 TABLET | Refills: 0 | Status: SHIPPED | OUTPATIENT
Start: 2017-05-16 | End: 2017-05-17

## 2017-05-16 NOTE — PROGRESS NOTES
Subjective:       Patient ID: Teresa Cazares is a 41 y.o. female.    Chief Complaint: Cystitis      HPI   Ms. Cazares presents to clinic today for complaints of vaginal itching and dysuria.   She states it has been going on for a few days. She states she initially started with some vaginal itching and noticed some thick discharge. She states she used a over the counter monistat and feels this mostly worked.   She also has had some burning when she urinate. She denies any fever.   She is sexually active with 1 partner only. She states she had not had intercourse in a long time and recently did.    Review of Systems   Constitutional: Negative for fever.   Respiratory: Negative for cough and shortness of breath.    Cardiovascular: Negative for chest pain.   Gastrointestinal: Negative for abdominal pain, blood in stool, diarrhea, nausea and vomiting.   Endocrine: Positive for polydipsia.   Genitourinary: Positive for dysuria, frequency, urgency and vaginal discharge.       Medication List with Changes/Refills   Current Medications    BIOTIN ORAL    Take by mouth.    IBUPROFEN (ADVIL,MOTRIN) 800 MG TABLET    Take 1 tablet (800 mg total) by mouth 2 (two) times daily.    OXYCODONE-ACETAMINOPHEN (PERCOCET) 5-325 MG PER TABLET    Take 1 tablet by mouth every 4 (four) hours as needed for Pain.   Discontinued Medications    EFINACONAZOLE 10 % LORI    Apply 1 application topically once daily.       Patient Active Problem List   Diagnosis    Morbid obesity with BMI of 45.0-49.9, adult    Acquired hammertoe         Objective:     Physical Exam   Constitutional: She is oriented to person, place, and time. She appears well-developed and well-nourished. No distress.   HENT:   Head: Normocephalic and atraumatic.   Right Ear: External ear normal.   Left Ear: External ear normal.   Mouth/Throat: Oropharynx is clear and moist.   Eyes: EOM are normal. Right eye exhibits no discharge. Left eye exhibits no discharge.    Cardiovascular: Normal rate and regular rhythm.    Pulmonary/Chest: Effort normal and breath sounds normal. No respiratory distress. She has no wheezes.   Genitourinary: Vaginal discharge found.   Genitourinary Comments: Thick orange discharge noted - which patient states is due to the AZO   Musculoskeletal: She exhibits no edema.   Neurological: She is alert and oriented to person, place, and time.   Skin: Skin is warm and dry. She is not diaphoretic. No erythema.   Psychiatric: She has a normal mood and affect.   Vitals reviewed.    Vitals:    05/16/17 1538   BP: 120/80   Pulse: 80   Resp: 16   Temp: 97.4 °F (36.3 °C)       Assessment/  PLAN     Yeast infection  -     fluconazole (DIFLUCAN) 150 MG Tab; Take 1 tablet (150 mg total) by mouth once daily.  Dispense: 2 tablet; Refill: 0    Dysuria  -     ciprofloxacin HCl (CIPRO) 250 MG tablet; Take 1 tablet (250 mg total) by mouth 2 (two) times daily.  Dispense: 6 tablet; Refill: 0  - could not do urine dipstick because patient was taking AZO and urine appeared very orange   - increase hydration     Plan as above   If symptoms do not get better, rtc     Alyce Alvarez MD  [facility]

## 2017-05-16 NOTE — LETTER
May 16, 2017             [facility]  Family Medicine  [address] [address]  Phone: [phone]   May 16, 2017     Patient: Teresa Cazares   YOB: 1975   Date of Visit: 5/16/2017       To Whom it May Concern:    Teresa Cazares was seen in my clinic on 5/16/2017. She may return to work on 05/16/2017.    If you have any questions or concerns, please don't hesitate to call.    Sincerely,         Kenyon Espana LPN

## 2017-06-26 ENCOUNTER — TELEPHONE (OUTPATIENT)
Dept: FAMILY MEDICINE | Facility: CLINIC | Age: 42
End: 2017-06-26

## 2017-06-26 ENCOUNTER — OFFICE VISIT (OUTPATIENT)
Dept: FAMILY MEDICINE | Facility: CLINIC | Age: 42
End: 2017-06-26
Payer: COMMERCIAL

## 2017-06-26 VITALS
RESPIRATION RATE: 16 BRPM | TEMPERATURE: 97 F | DIASTOLIC BLOOD PRESSURE: 80 MMHG | HEART RATE: 88 BPM | HEIGHT: 63 IN | BODY MASS INDEX: 44.02 KG/M2 | WEIGHT: 248.44 LBS | SYSTOLIC BLOOD PRESSURE: 120 MMHG | OXYGEN SATURATION: 99 %

## 2017-06-26 DIAGNOSIS — H00.022 HORDEOLUM INTERNUM OF RIGHT LOWER EYELID: Primary | ICD-10-CM

## 2017-06-26 DIAGNOSIS — Z76.0 MEDICATION REFILL: ICD-10-CM

## 2017-06-26 PROCEDURE — 99214 OFFICE O/P EST MOD 30 MIN: CPT | Mod: S$GLB,,, | Performed by: FAMILY MEDICINE

## 2017-06-26 PROCEDURE — 99999 PR PBB SHADOW E&M-EST. PATIENT-LVL III: CPT | Mod: PBBFAC,,, | Performed by: FAMILY MEDICINE

## 2017-06-26 RX ORDER — SULFACETAMIDE SODIUM 100 MG/G
OINTMENT OPHTHALMIC EVERY 6 HOURS
Qty: 3.5 G | Refills: 0 | Status: SHIPPED | OUTPATIENT
Start: 2017-06-26 | End: 2017-06-27 | Stop reason: ALTCHOICE

## 2017-06-26 RX ORDER — IBUPROFEN 800 MG/1
800 TABLET ORAL 2 TIMES DAILY
Qty: 30 TABLET | Refills: 1 | Status: SHIPPED | OUTPATIENT
Start: 2017-06-26 | End: 2018-02-22

## 2017-06-26 NOTE — TELEPHONE ENCOUNTER
----- Message from Teri Anaya sent at 6/26/2017 11:23 AM CDT -----  Contact: pt  Please call pt @ [phone] regarding an eye drop script given today, pt states pharmacy do not have in stock, pt need another script called in, pt will be leaving and will not be back until December, pt need this medication before leaving.

## 2017-06-26 NOTE — PROGRESS NOTES
Subjective:       Patient ID: Teresa Cazares is a 41 y.o. female.    Chief Complaint: Stye      HPI  Ms. Cazares presents to clinic today for complaints of stye on her right eye.   She states it has been there for 2 days.   She states she was wearing some fake eyelashes and it was irritating her.   She states she then woke up and her were draining pus.   She states she had a bump on the outside , but now the bump is on the inside.  She denies any fever or photphobia.   She states her vision is okay.   She did try warm compress on it.         Review of Systems   Constitutional: Negative for activity change and unexpected weight change.   HENT: Negative for hearing loss, rhinorrhea and trouble swallowing.    Eyes: Positive for discharge. Negative for visual disturbance.   Respiratory: Negative for chest tightness and wheezing.    Cardiovascular: Negative for chest pain and palpitations.   Gastrointestinal: Negative for blood in stool, constipation, diarrhea and vomiting.   Endocrine: Negative for polydipsia and polyuria.   Genitourinary: Negative for difficulty urinating, dysuria, hematuria and menstrual problem.   Musculoskeletal: Negative for arthralgias, joint swelling and neck pain.   Neurological: Negative for weakness and headaches.   Psychiatric/Behavioral: Negative for confusion and dysphoric mood.       Medication List with Changes/Refills   New Medications    SULFACETAMIDE-PREDNISOLONE 10-0.2% (BLEPHAMIDE) 10-0.2 % DRPS    Place 1 drop into the right eye every 4 (four) hours.   Changed and/or Refilled Medications    Modified Medication Previous Medication    IBUPROFEN (ADVIL,MOTRIN) 800 MG TABLET ibuprofen (ADVIL,MOTRIN) 800 MG tablet       Take 1 tablet (800 mg total) by mouth 2 (two) times daily.    Take 1 tablet (800 mg total) by mouth 2 (two) times daily.   Discontinued Medications    BIOTIN ORAL    Take by mouth.    OXYCODONE-ACETAMINOPHEN (PERCOCET) 5-325 MG PER TABLET    Take 1 tablet by mouth  every 4 (four) hours as needed for Pain.       Patient Active Problem List   Diagnosis    Morbid obesity with BMI of 45.0-49.9, adult    Acquired mary         Objective:     Physical Exam   Constitutional: She is oriented to person, place, and time. She appears well-developed and well-nourished. No distress.   HENT:   Head: Normocephalic and atraumatic.   Right Ear: External ear normal.   Left Ear: External ear normal.   Eyes: EOM are normal. Pupils are equal, round, and reactive to light. Right eye exhibits discharge. Left eye exhibits no discharge.   Right eye with hordeolum, drainage ( clear )   Erythema of conjunctiva    Cardiovascular: Normal rate and regular rhythm.    Pulmonary/Chest: Effort normal and breath sounds normal. No respiratory distress. She has no wheezes.   Musculoskeletal: She exhibits no edema.   Neurological: She is alert and oriented to person, place, and time.   Skin: Skin is warm and dry. She is not diaphoretic. No erythema.   Psychiatric: She has a normal mood and affect.   Vitals reviewed.    Vitals:    06/26/17 1002   BP: 120/80   Pulse: 88   Resp: 16   Temp: 96.8 °F (36 °C)       Assessment/  PLAN     Hordeolum internum of right lower eyelid  -     sulfacetamide-prednisolone 10-0.2% (BLEPHAMIDE) 10-0.2 % DrpS; Place 1 drop into the right eye every 4 (four) hours.  Dispense: 10 mL; Refill: 0  - continue warm compress  - return precautions advised and advised pt to go to eye doctor if symptoms are not getting better     Medication refill  -     ibuprofen (ADVIL,MOTRIN) 800 MG tablet; Take 1 tablet (800 mg total) by mouth 2 (two) times daily.  Dispense: 30 tablet; Refill: 1          Alyce Alvarez MD  [facility]

## 2017-06-26 NOTE — PATIENT INSTRUCTIONS
When Your Child Has a Stye     A stye is a common infection that appears near the rim of the eyelid.   A stye is a common problem in children. Its an infection that appears as a red bump or swelling near the rim of the upper or lower eyelid. A stye can irritate the eye and cause redness, but it should not be confused with pink eye, also called conjunctivitis. Unlike pink eye, a stye is not contagious. That means it cant be spread to another person. A stye isnt a serious problem and can be easily treated.  What causes a stye?  A stye is caused by a clogged oil gland near the rim of the eyelid.  What are the symptoms of a stye?  · Red bump or swelling near the eyelid  · Itchiness of the eye and eyelid  · Feeling that an object is in the eye  How is a stye diagnosed?  A stye is diagnosed by how it looks. To get more information, the healthcare provider will ask about your childs symptoms and health history. The provider will also examine your child. You will be told if any tests are needed.      Apply a warm compress to your childs eye to relieve itchiness and pain caused by a stye.   How is a stye treated?  · To help relieve your childs symptoms, apply a warm compress to the stye 3 to 4 times a day. This can be done with a warm, clean washcloth.  · Dont squeeze or touch the stye. If the stye drains on its own, cleanse the eye with a warm, clean washcloth.  · While most styes dont require treatment, your childs healthcare provider may prescribe antibiotic eye drops or eye ointment.  · If your child does not get better within 4 to 6 weeks, he or she may be referred to a doctor who specializes in treating eye problems. This is an ophthalmologist. In rare cases, a stye may need to be drained or removed.  When to call your childs healthcare provider  Call the provider if your child has any of the following:  · Fever, as directed by your childs provider or:  ¨ In an infant under 3 months old, a fever of 100.4°F  (38.0°C) or higher  ¨ In a child of any age, repeated fevers above 104°F (40°C)  ¨ A fever that lasts more than 24 hours in a child under 2 years old  ¨ A fever that lasts more than 3 days in a child age 2 years or older  · A seizure caused by the fever  · Red or warm skin around the affected eye  · Drainage from the stye  · Trouble seeing from the affected eye  · A stye that wont go away even with treatment  · Styes that keep coming back   Date Last Reviewed: 8/16/2015  © 7029-3784 [employer]. [address] [place] [address], [place] [address]. All rights reserved. This information is not intended as a substitute for professional medical care. Always follow your healthcare professional's instructions.

## 2017-07-02 DIAGNOSIS — F17.200 NICOTINE DEPENDENCE: ICD-10-CM

## 2017-07-03 ENCOUNTER — TELEPHONE (OUTPATIENT)
Dept: FAMILY MEDICINE | Facility: CLINIC | Age: 42
End: 2017-07-03

## 2017-07-03 RX ORDER — VARENICLINE TARTRATE 1 MG/1
TABLET, FILM COATED ORAL
Qty: 60 TABLET | Refills: 0 | OUTPATIENT
Start: 2017-07-03

## 2017-07-03 RX ORDER — VARENICLINE TARTRATE 1 MG/1
1 TABLET, FILM COATED ORAL 2 TIMES DAILY
Qty: 60 TABLET | Refills: 0 | Status: SHIPPED | OUTPATIENT
Start: 2017-07-03 | End: 2017-12-04 | Stop reason: ALTCHOICE

## 2017-07-03 NOTE — TELEPHONE ENCOUNTER
----- Message from Lory Summers sent at 7/3/2017  8:32 AM CDT -----  Contact: pt  1. What is the name of the medication you are requesting?  chantix  2. What is the dose? ?  3. How do you take the medication? Orally, topically, etc? orally  4. How often do you take this medication? Twice a day  5. Do you need a 30 day or 90 day supply? 30  6. How many refills are you requesting? 3  7. What is your preferred pharmacy and location of the pharmacy? [facility] [place] #[id number], [address], [place] [address]  8. Who can we contact with further questions? Pt    States she's a  and she will be at that [facility] in about 45 minutes. Please call pt at [phone]. Thank you

## 2017-07-03 NOTE — TELEPHONE ENCOUNTER
----- Message from Lory Summers sent at 7/3/2017  8:32 AM CDT -----  Contact: pt  1. What is the name of the medication you are requesting?  chantix  2. What is the dose? ?  3. How do you take the medication? Orally, topically, etc? orally  4. How often do you take this medication? Twice a day  5. Do you need a 30 day or 90 day supply? 30  6. How many refills are you requesting? 3  7. What is your preferred pharmacy and location of the pharmacy? [facility] [place] [address]79, [address], [place] [address]  8. Who can we contact with further questions? Pt    States she's a  and she will be at that [facility] in about 45 minutes. Please call pt at [phone]. Thank you

## 2017-07-03 NOTE — TELEPHONE ENCOUNTER
----- Message from Lory Summers sent at 7/3/2017  8:32 AM CDT -----  Contact: pt  1. What is the name of the medication you are requesting?  chantix  2. What is the dose? ?  3. How do you take the medication? Orally, topically, etc? orally  4. How often do you take this medication? Twice a day  5. Do you need a 30 day or 90 day supply? 30  6. How many refills are you requesting? 3  7. What is your preferred pharmacy and location of the pharmacy? [facility] [place] #2753, [address], [place] [address]  8. Who can we contact with further questions? Pt    States she's a  and she will be at that [facility] in about 45 minutes. Please call pt at [phone]. Thank you

## 2017-07-10 ENCOUNTER — TELEPHONE (OUTPATIENT)
Dept: SMOKING CESSATION | Facility: CLINIC | Age: 42
End: 2017-07-10

## 2017-07-10 NOTE — TELEPHONE ENCOUNTER
Attempt to return previous message from patient in regards to medication. No voicemail set up on number that was left.

## 2017-09-08 ENCOUNTER — CLINICAL SUPPORT (OUTPATIENT)
Dept: SMOKING CESSATION | Facility: CLINIC | Age: 42
End: 2017-09-08
Payer: COMMERCIAL

## 2017-09-08 DIAGNOSIS — F17.200 NICOTINE DEPENDENCE: Primary | ICD-10-CM

## 2017-09-08 PROCEDURE — 99407 BEHAV CHNG SMOKING > 10 MIN: CPT | Mod: S$GLB,,,

## 2017-09-11 ENCOUNTER — TELEPHONE (OUTPATIENT)
Dept: SMOKING CESSATION | Facility: CLINIC | Age: 42
End: 2017-09-11

## 2017-10-03 ENCOUNTER — TELEPHONE (OUTPATIENT)
Dept: SMOKING CESSATION | Facility: CLINIC | Age: 42
End: 2017-10-03

## 2017-10-03 NOTE — TELEPHONE ENCOUNTER
Patient called to ask for a prescription of Chantix over the telephone. An explanation of our program and medication protocols explained. Patient stated understanding. She states that she has a hectic work schedule and can't always make her appointment. I explained the importance on compliance with follow up appointments for her long term success. Appointment scheduled.

## 2017-10-11 ENCOUNTER — TELEPHONE (OUTPATIENT)
Dept: SMOKING CESSATION | Facility: CLINIC | Age: 42
End: 2017-10-11

## 2017-11-20 DIAGNOSIS — M25.561 ACUTE PAIN OF BOTH KNEES: Primary | ICD-10-CM

## 2017-11-20 DIAGNOSIS — M25.562 ACUTE PAIN OF BOTH KNEES: Primary | ICD-10-CM

## 2017-11-27 ENCOUNTER — TELEPHONE (OUTPATIENT)
Dept: SMOKING CESSATION | Facility: CLINIC | Age: 42
End: 2017-11-27

## 2017-11-27 NOTE — TELEPHONE ENCOUNTER
Spoke with patient in regards to her missed appointment. She states that she had to go into work today and would like to reschedule.

## 2017-12-04 ENCOUNTER — CLINICAL SUPPORT (OUTPATIENT)
Dept: SMOKING CESSATION | Facility: CLINIC | Age: 42
End: 2017-12-04
Payer: COMMERCIAL

## 2017-12-04 DIAGNOSIS — F17.210 CIGARETTE NICOTINE DEPENDENCE WITHOUT COMPLICATION: ICD-10-CM

## 2017-12-04 DIAGNOSIS — F17.200 NICOTINE DEPENDENCE: Primary | ICD-10-CM

## 2017-12-04 PROCEDURE — 99404 PREV MED CNSL INDIV APPRX 60: CPT | Mod: S$GLB,,, | Performed by: GENERAL PRACTICE

## 2017-12-04 RX ORDER — VARENICLINE TARTRATE 0.5 (11)-1
KIT ORAL
Qty: 1 PACKAGE | Refills: 0 | Status: SHIPPED | OUTPATIENT
Start: 2017-12-04 | End: 2018-01-08 | Stop reason: ALTCHOICE

## 2017-12-04 RX ORDER — MICONAZOLE NITRATE 2 %
2 CREAM (GRAM) TOPICAL
Qty: 120 EACH | Refills: 2 | Status: SHIPPED | OUTPATIENT
Start: 2017-12-04 | End: 2018-10-01 | Stop reason: ALTCHOICE

## 2017-12-04 RX ORDER — MICONAZOLE NITRATE 2 %
2 CREAM (GRAM) TOPICAL
Qty: 120 EACH | Refills: 2 | Status: SHIPPED | OUTPATIENT
Start: 2017-12-04 | End: 2017-12-04 | Stop reason: SDUPTHER

## 2017-12-04 RX ORDER — VARENICLINE TARTRATE 0.5 (11)-1
KIT ORAL
Qty: 1 PACKAGE | Refills: 0 | Status: SHIPPED | OUTPATIENT
Start: 2017-12-04 | End: 2017-12-04 | Stop reason: SDUPTHER

## 2017-12-18 ENCOUNTER — CLINICAL SUPPORT (OUTPATIENT)
Dept: SMOKING CESSATION | Facility: CLINIC | Age: 42
End: 2017-12-18
Payer: COMMERCIAL

## 2017-12-18 DIAGNOSIS — F17.200 NICOTINE DEPENDENCE: Primary | ICD-10-CM

## 2017-12-18 PROCEDURE — 99404 PREV MED CNSL INDIV APPRX 60: CPT | Mod: S$GLB,,, | Performed by: GENERAL PRACTICE

## 2017-12-18 NOTE — PROGRESS NOTES
Individual Follow-Up Form    12/18/2017    Clinical Status of Patient: Outpatient    Length of Service: 60 minutes    Continuing Medication: yes  Chantix   Target Symptoms: Withdrawal and medication side effects. The following were  rated moderate (3) to severe (4) on TCRS:  · Moderate (3): vivid dreams, desire tobacco  · Severe (4): none    Comments: Patient was seen today for a smoking cessation progress update. She states that she continues to use Chantix 1 mg BID without adverse  Reactions noted. She denies any negative thoughts or behavior at this time. She states that she is still smoking 3 small cigars daily. We discussed setting a quit date. She stated understanding. She states that her p[sharon is to be quit by [place]. Coping strategies reviewed. Will continue to encourage and monitor progress.    Diagnosis: F17.200    Next Visit: 2 weeks

## 2018-01-08 ENCOUNTER — CLINICAL SUPPORT (OUTPATIENT)
Dept: SMOKING CESSATION | Facility: CLINIC | Age: 43
End: 2018-01-08
Payer: COMMERCIAL

## 2018-01-08 DIAGNOSIS — F17.200 NICOTINE DEPENDENCE: Primary | ICD-10-CM

## 2018-01-08 PROCEDURE — 99407 BEHAV CHNG SMOKING > 10 MIN: CPT | Mod: S$GLB,,, | Performed by: GENERAL PRACTICE

## 2018-01-08 RX ORDER — VARENICLINE TARTRATE 1 MG/1
1 TABLET, FILM COATED ORAL 2 TIMES DAILY
Qty: 60 TABLET | Refills: 2 | Status: SHIPPED | OUTPATIENT
Start: 2018-01-08 | End: 2018-10-01 | Stop reason: ALTCHOICE

## 2018-01-15 ENCOUNTER — OFFICE VISIT (OUTPATIENT)
Dept: INTERNAL MEDICINE | Facility: CLINIC | Age: 43
End: 2018-01-15
Payer: COMMERCIAL

## 2018-01-15 VITALS
DIASTOLIC BLOOD PRESSURE: 82 MMHG | OXYGEN SATURATION: 98 % | WEIGHT: 252.63 LBS | HEART RATE: 80 BPM | BODY MASS INDEX: 44.76 KG/M2 | SYSTOLIC BLOOD PRESSURE: 130 MMHG | HEIGHT: 63 IN | TEMPERATURE: 98 F

## 2018-01-15 DIAGNOSIS — Z20.828 EXPOSURE TO THE FLU: ICD-10-CM

## 2018-01-15 DIAGNOSIS — R68.89 FLU-LIKE SYMPTOMS: Primary | ICD-10-CM

## 2018-01-15 PROCEDURE — 99999 PR PBB SHADOW E&M-EST. PATIENT-LVL III: CPT | Mod: PBBFAC,,, | Performed by: FAMILY MEDICINE

## 2018-01-15 PROCEDURE — 99214 OFFICE O/P EST MOD 30 MIN: CPT | Mod: S$GLB,,, | Performed by: FAMILY MEDICINE

## 2018-01-15 RX ORDER — OSELTAMIVIR PHOSPHATE 75 MG/1
75 CAPSULE ORAL 2 TIMES DAILY
Qty: 10 CAPSULE | Refills: 0 | Status: SHIPPED | OUTPATIENT
Start: 2018-01-15 | End: 2018-01-20

## 2018-01-15 RX ORDER — PROMETHAZINE HYDROCHLORIDE AND DEXTROMETHORPHAN HYDROBROMIDE 6.25; 15 MG/5ML; MG/5ML
5 SYRUP ORAL EVERY 6 HOURS PRN
Qty: 180 ML | Refills: 0 | Status: SHIPPED | OUTPATIENT
Start: 2018-01-15 | End: 2018-01-25

## 2018-01-15 NOTE — PROGRESS NOTES
Subjective:   Patient ID: Teresa Cazares is a 42 y.o. female.  Chief Complaint:  Cough; Nasal Congestion; Nausea; and Fatigue      Presents evaluation flu like symptoms.  Flu vaccine last week.  Positive exposure from [facility].  Symptoms less than 48 hours.      Influenza   This is a new problem. The current episode started yesterday. The problem occurs constantly. The problem has been unchanged. Associated symptoms include chills, congestion, coughing, diaphoresis, fatigue, a fever, headaches, myalgias, nausea, neck pain and a sore throat. Pertinent negatives include no abdominal pain, anorexia, arthralgias, change in bowel habit, chest pain, joint swelling, numbness, rash, swollen glands, urinary symptoms, vertigo, visual change, vomiting or weakness. Nothing aggravates the symptoms. She has tried nothing for the symptoms.     Review of Systems   Constitutional: Positive for chills, diaphoresis, fatigue and fever.   HENT: Positive for congestion, postnasal drip, rhinorrhea and sore throat. Negative for ear discharge, ear pain, sinus pain, sinus pressure, sneezing and trouble swallowing.    Eyes: Negative for visual disturbance.   Respiratory: Positive for cough. Negative for chest tightness, shortness of breath and wheezing.    Cardiovascular: Negative for chest pain.   Gastrointestinal: Positive for nausea. Negative for abdominal pain, anorexia, change in bowel habit and vomiting.   Genitourinary: Negative for difficulty urinating.   Musculoskeletal: Positive for myalgias and neck pain. Negative for arthralgias, joint swelling and neck stiffness.   Skin: Negative for rash.   Neurological: Positive for headaches. Negative for dizziness, vertigo, weakness and numbness.   Psychiatric/Behavioral: Positive for sleep disturbance. The patient is not nervous/anxious.      Objective:   /82 (BP Location: Right arm, Patient Position: Sitting, BP Method: Large (Manual))   Pulse 80   Temp 98 °F (36.7 °C)  "(Tympanic)   Ht 5' 3" (1.6 m)   Wt 114.6 kg (252 lb 10.4 oz)   SpO2 98%   BMI 44.75 kg/m²     Physical Exam   Constitutional: She appears well-developed and well-nourished.  Non-toxic appearance. She does not have a sickly appearance. She appears ill. No distress.   HENT:   Right Ear: Hearing, tympanic membrane, external ear and ear canal normal.   Left Ear: Hearing, tympanic membrane, external ear and ear canal normal.   Nose: Mucosal edema and rhinorrhea present. Right sinus exhibits no maxillary sinus tenderness and no frontal sinus tenderness. Left sinus exhibits no maxillary sinus tenderness and no frontal sinus tenderness.   Mouth/Throat: Uvula is midline and mucous membranes are normal. Posterior oropharyngeal edema and posterior oropharyngeal erythema present. No oropharyngeal exudate.   Eyes: Right conjunctiva is not injected. Left conjunctiva is not injected.   Neck: Full passive range of motion without pain.   Cardiovascular: Normal rate, regular rhythm, S1 normal, S2 normal and normal heart sounds.    Pulmonary/Chest: Effort normal. She has no decreased breath sounds. She has no wheezes. She has rhonchi. She has no rales.   Abdominal: Soft. She exhibits no distension. There is no tenderness. There is no rebound, no guarding and no CVA tenderness.   Lymphadenopathy:     She has no cervical adenopathy.   Skin: No rash noted.   Psychiatric: She has a normal mood and affect.   Nursing note and vitals reviewed.    Assessment:     1. Flu-like symptoms    2. Exposure to the flu      Plan:   Flu-like symptoms  Exposure to the flu  -     oseltamivir (TAMIFLU) 75 MG capsule; Take 1 capsule (75 mg total) by mouth 2 (two) times daily.  Dispense: 10 capsule; Refill: 0  -     promethazine-dextromethorphan (PROMETHAZINE-DM) 6.25-15 mg/5 mL Syrp; Take 5 mLs by mouth every 6 (six) hours as needed.  Dispense: 180 mL; Refill: 0    Take Phenergan DM for cough and nasal congestion  Rest and Increase Fluids  Tylenol or " Motrin as needed for fever, aches, or pain  Treat with Tamiflu for 5 days  Follow-up as needed.

## 2018-02-22 ENCOUNTER — HOSPITAL ENCOUNTER (OUTPATIENT)
Dept: RADIOLOGY | Facility: HOSPITAL | Age: 43
Discharge: HOME | End: 2018-02-22
Attending: PHYSICIAN ASSISTANT
Payer: COMMERCIAL

## 2018-02-22 ENCOUNTER — TELEPHONE (OUTPATIENT)
Dept: INTERNAL MEDICINE | Facility: CLINIC | Age: 43
End: 2018-02-22

## 2018-02-22 ENCOUNTER — OFFICE VISIT (OUTPATIENT)
Dept: INTERNAL MEDICINE | Facility: CLINIC | Age: 43
End: 2018-02-22
Payer: COMMERCIAL

## 2018-02-22 VITALS
HEIGHT: 63 IN | DIASTOLIC BLOOD PRESSURE: 82 MMHG | SYSTOLIC BLOOD PRESSURE: 124 MMHG | BODY MASS INDEX: 44.14 KG/M2 | TEMPERATURE: 98 F | WEIGHT: 249.13 LBS | HEART RATE: 92 BPM | OXYGEN SATURATION: 99 %

## 2018-02-22 DIAGNOSIS — M25.561 ACUTE PAIN OF BOTH KNEES: Primary | ICD-10-CM

## 2018-02-22 DIAGNOSIS — Z12.39 BREAST CANCER SCREENING: ICD-10-CM

## 2018-02-22 DIAGNOSIS — M25.561 ACUTE PAIN OF BOTH KNEES: ICD-10-CM

## 2018-02-22 DIAGNOSIS — M25.562 ACUTE PAIN OF BOTH KNEES: Primary | ICD-10-CM

## 2018-02-22 DIAGNOSIS — M25.562 ACUTE PAIN OF BOTH KNEES: ICD-10-CM

## 2018-02-22 PROCEDURE — 73564 X-RAY EXAM KNEE 4 OR MORE: CPT | Mod: TC,50,FY,PO

## 2018-02-22 PROCEDURE — 99213 OFFICE O/P EST LOW 20 MIN: CPT | Mod: S$GLB,,, | Performed by: PHYSICIAN ASSISTANT

## 2018-02-22 PROCEDURE — 73564 X-RAY EXAM KNEE 4 OR MORE: CPT | Mod: 26,50,, | Performed by: RADIOLOGY

## 2018-02-22 PROCEDURE — 99999 PR PBB SHADOW E&M-EST. PATIENT-LVL III: CPT | Mod: PBBFAC,,, | Performed by: PHYSICIAN ASSISTANT

## 2018-02-22 PROCEDURE — 3008F BODY MASS INDEX DOCD: CPT | Mod: S$GLB,,, | Performed by: PHYSICIAN ASSISTANT

## 2018-02-22 RX ORDER — PREDNISONE 20 MG/1
TABLET ORAL
Qty: 10 TABLET | Refills: 0 | Status: SHIPPED | OUTPATIENT
Start: 2018-02-22 | End: 2018-03-01

## 2018-02-22 NOTE — PATIENT INSTRUCTIONS
Knee Pain  Knee pain is very common. Its especially common in active people who put a lot of pressure on their knees, like runners. It affects women more often than men.  Your kneecap (patella) is a thick, round bone. It covers and protects the front portion of your knee joint. It moves along a groove in your thighbone (femur) as part of the patellofemoral joint. A layer of cartilage surrounds the underside of your kneecap. This layer protects it from grinding against your femur.  When this cartilage softens and breaks down, it can cause knee pain. This is partly because of repetitive stress. The stress irritates the lining of the joint. This causes pain in the underlying bone.  What causes knee pain?  Many things can cause knee pain. You may have more than one cause. Some of these include:  · Overuse of the knee joint  · The kneecap doesnt line up with the tissue around it  · Damage to small nerves in the area  · Damage to the ligament-like structure that holds the kneecap in place (retinaculum)  · Breakdown of the bone under the cartilage  · Swelling in the soft tissues around the kneecap  · Injury  You might be more likely to have knee pain if you:  · Exercise a lot  · Recently increased the intensity of your workouts  · Have a body mass index (BMI) greater than 25  · Have poor alignment of your kneecap  · Walk with your feet turned overly outward or inward  · Have weakness in surrounding muscle groups (inner quad or hip adductor muscles)  · Have too much tightness in surrounding muscle groups (hamstrings or iliotibial band)  · Have a recent history of injury to the area  · Are female  Symptoms of knee pain  This type of knee pain is a dull, aching pain in the front of the knee in the area under and around the kneecap. This pain may start quickly or slowly. Your pain might be worse when you squat, run, or sit for a long time. You might also sometimes feel like your knee is giving out. You may have symptoms in  one or both of your knees.  Diagnosing knee pain  Your healthcare provider will ask about your medical history and your symptoms. Be sure to describe any activities that make your knee pain worse. He or she will look at your knee. This will include tests of your range of motion, strength, and areas of pain of your knee. Your knee alignment will be checked.  Your healthcare provider will need to rule out other causes of your knee pain, such as arthritis. You may need an imaging test, such as an X-ray or MRI.  Treatment for knee pain  Treatments that can help ease your symptoms may include:  · Avoiding activities for a while that make your pain worse, returning to activity over time  · Icing the outside of your knee when it causes you pain  · Taking over-the-counter pain medicine  · Wearing a knee brace or taping your knee to support it  · Wearing special shoe inserts to help keep your feet in the proper alignment  · Doing special exercises to stretch and strengthen the muscles around your hip and your knee  These steps help most people manage knee pain. But some cases of knee pain need to be treated with surgery. You may need surgery right away. Or you may need it later if other treatments dont work. Your healthcare provider may refer you to an orthopedic surgeon. He or she will talk with you about your choices.  Preventing knee pain  Losing weight and correcting excess muscle tightness or muscle weakness may help lower your risk.  In some cases, you can prevent knee pain. To help prevent a flare-up of knee pain, you do these things:  · Regularly do all the exercises your doctor or physical therapist advises  · Support your knee as advised by your doctor or physical therapist  · Increase training gradually, and ease up on training when needed  · Have an expert check your gait for running or other sporting activities  · Stretch properly before and after exercise  · Replace your running shoes regularly  · Lose excess  weight     When to call your healthcare provider  Call your healthcare provider right away if:  · Your symptoms dont get better after a few weeks of treatment  · You have any new symptoms   Date Last Reviewed: 4/1/2017  © 4905-2478 [employer]. [address] [place] [address]. All rights reserved. This information is not intended as a substitute for professional medical care. Always follow your healthcare professional's instructions.        Reducing Knee Pain and Swelling    Many treatments can help reduce pain and swelling in your knee. Your healthcare provider or physical therapist may suggest one or more of the following treatments:  · Icing your knee helps reduce swelling. You may be asked to ice your knee once a day or more. Apply ice for about 15 to 20 minutes at a time, with at least 40 minutes between sessions. Always keep a towel between the ice and your skin.   · Keeping your leg raised above your heart helps excess fluid flow out of your knee joint. This reduces swelling.  · Compression means wrapping an elastic bandage or neoprene sleeve snugly around your knees. This keeps fluid from collecting in your knee joint.  · Electrical stimulation, done by a physical therapist or , can help reduce excess fluid in your knee joint.  · Anti-inflammatory medicines may be prescribed by your healthcare provider. You may take pills or receive injections in your knee.  · Isometric (juan antonio) exercises strengthen the muscles that support your knee joint. They also help reduce excess fluid in your knee.  · Massage helps fluid drain away from your knee.  Date Last Reviewed: 10/13/2015  © 9324-7162 [employer]. [address] [place] [address]. All rights reserved. This information is not intended as a substitute for professional medical care. Always follow your healthcare professional's instructions.

## 2018-02-22 NOTE — TELEPHONE ENCOUNTER
----- Message from Cheryl Dai sent at 2/22/2018  3:56 PM CST -----  Contact: self [phone]  Would like to consult with nurse regarding x-ray results.  Please call back at [phone].  Md Melissa

## 2018-02-22 NOTE — PROGRESS NOTES
Subjective:      Patient ID: Teresa Cazares is a 42 y.o. female.    Chief Complaint: Knee Pain    Bilateral recurrent knee pain. This time, flare up is on the right.  4 days ago felt a pop while going up and down the stairs.       Knee Pain    There was no injury mechanism. The pain is present in the right knee. The quality of the pain is described as shooting and aching. The pain is at a severity of 10/10. The pain is severe. The pain has been constant since onset. Associated symptoms include muscle weakness. Pertinent negatives include no inability to bear weight, loss of motion, loss of sensation, numbness or tingling. Associated symptoms comments: swelling. She reports no foreign bodies present. The symptoms are aggravated by weight bearing and movement. She has tried ice for the symptoms. The treatment provided mild relief.   Additionally, pt overdue for mammogram. Requesting order.     Review of Systems   Constitutional: Negative for activity change, appetite change, chills, diaphoresis, fatigue, fever and unexpected weight change.   HENT: Negative.  Negative for congestion, hearing loss, postnasal drip, rhinorrhea, sore throat, trouble swallowing and voice change.    Eyes: Negative.  Negative for visual disturbance.   Respiratory: Negative.  Negative for cough, choking, chest tightness and shortness of breath.    Cardiovascular: Negative for chest pain, palpitations and leg swelling.   Gastrointestinal: Negative for abdominal distention, abdominal pain, blood in stool, constipation, diarrhea, nausea and vomiting.   Endocrine: Negative for cold intolerance, heat intolerance, polydipsia and polyuria.   Genitourinary: Negative.  Negative for difficulty urinating and frequency.   Musculoskeletal: Positive for arthralgias and joint swelling. Negative for back pain, gait problem and myalgias.   Skin: Negative for color change, pallor, rash and wound.   Neurological: Negative for dizziness, tingling,  "tremors, weakness, light-headedness, numbness and headaches.   Hematological: Negative for adenopathy.   Psychiatric/Behavioral: Negative for behavioral problems, confusion, self-injury, sleep disturbance and suicidal ideas. The patient is not nervous/anxious.      Objective:   /82   Pulse 92   Temp 97.6 °F (36.4 °C) (Tympanic)   Ht 5' 3" (1.6 m)   Wt 113 kg (249 lb 1.9 oz)   SpO2 99%   BMI 44.13 kg/m²     Physical Exam   Constitutional: She is oriented to person, place, and time. She appears well-developed and well-nourished.   HENT:   Head: Normocephalic and atraumatic.   Right Ear: External ear normal.   Left Ear: External ear normal.   Nose: Nose normal.   Mouth/Throat: Oropharynx is clear and moist.   Eyes: Conjunctivae and EOM are normal. Pupils are equal, round, and reactive to light.   Neck: Normal range of motion. Neck supple.   Cardiovascular: Normal rate, regular rhythm and normal heart sounds.  Exam reveals no gallop and no friction rub.    No murmur heard.  Pulmonary/Chest: Effort normal and breath sounds normal. No respiratory distress. She has no wheezes. She has no rales. She exhibits no tenderness.   Abdominal: Soft. She exhibits no distension. There is no tenderness.   Musculoskeletal: Normal range of motion.        Right knee: She exhibits swelling and effusion. She exhibits normal range of motion, no ecchymosis, no deformity, no laceration, no erythema, normal alignment, no LCL laxity, normal patellar mobility, no bony tenderness, normal meniscus and no MCL laxity. Tenderness found.   Lymphadenopathy:     She has no cervical adenopathy.   Neurological: She is alert and oriented to person, place, and time.   Skin: Skin is warm and dry.   Psychiatric: She has a normal mood and affect. Her behavior is normal. Judgment and thought content normal.   Vitals reviewed.      Assessment:     1. Acute pain of both knees    2. Breast cancer screening      Plan:   Acute pain of both knees  -     " X-ray Knee Ortho Bilateral; Future; Expected date: 02/22/2018  -     predniSONE (DELTASONE) 20 MG tablet; Take 2 tablets with food for 3 days; then take one tablet with food for 2 days; then take 1/2 tablet with food for 2 days.  Dispense: 10 tablet; Refill: 0  -rice. Educational handout on over-the-counter medications and at-home conservative care, pertinent to the patients diagnosis today, was handed to the patient and discussed in detail.    Breast cancer screening  -     Mammo Digital Screening Bilat with CAD; Future; Expected date: 02/22/2018    -advised that she is due for a well visit with her PCP. Will schedule.    Follow-up if symptoms worsen or fail to improve.

## 2018-03-01 ENCOUNTER — HOSPITAL ENCOUNTER (OUTPATIENT)
Dept: RADIOLOGY | Facility: HOSPITAL | Age: 43
Discharge: HOME | End: 2018-03-01
Attending: PHYSICIAN ASSISTANT
Payer: COMMERCIAL

## 2018-03-01 ENCOUNTER — OFFICE VISIT (OUTPATIENT)
Dept: URGENT CARE | Facility: CLINIC | Age: 43
End: 2018-03-01
Payer: COMMERCIAL

## 2018-03-01 ENCOUNTER — OFFICE VISIT (OUTPATIENT)
Dept: PODIATRY | Facility: CLINIC | Age: 43
End: 2018-03-01
Payer: COMMERCIAL

## 2018-03-01 VITALS
TEMPERATURE: 97 F | SYSTOLIC BLOOD PRESSURE: 138 MMHG | DIASTOLIC BLOOD PRESSURE: 85 MMHG | HEIGHT: 63 IN | WEIGHT: 256.38 LBS | BODY MASS INDEX: 45.43 KG/M2 | HEART RATE: 78 BPM

## 2018-03-01 VITALS
HEART RATE: 82 BPM | BODY MASS INDEX: 44.83 KG/M2 | SYSTOLIC BLOOD PRESSURE: 120 MMHG | TEMPERATURE: 98 F | WEIGHT: 253 LBS | HEIGHT: 63 IN | RESPIRATION RATE: 14 BRPM | OXYGEN SATURATION: 98 % | DIASTOLIC BLOOD PRESSURE: 70 MMHG

## 2018-03-01 VITALS — WEIGHT: 249 LBS | HEIGHT: 63 IN | BODY MASS INDEX: 44.12 KG/M2

## 2018-03-01 DIAGNOSIS — B35.3 TINEA PEDIS OF BOTH FEET: Primary | ICD-10-CM

## 2018-03-01 DIAGNOSIS — R30.0 DYSURIA: Primary | ICD-10-CM

## 2018-03-01 DIAGNOSIS — Z12.39 BREAST CANCER SCREENING: ICD-10-CM

## 2018-03-01 LAB
BILIRUB SERPL-MCNC: NEGATIVE MG/DL
BLOOD URINE, POC: NEGATIVE
COLOR, POC UA: YELLOW
GLUCOSE UR QL STRIP: NORMAL
KETONES UR QL STRIP: NEGATIVE
LEUKOCYTE ESTERASE URINE, POC: ABNORMAL
NITRITE, POC UA: ABNORMAL
PH, POC UA: 7
PROTEIN, POC: NEGATIVE
SPECIFIC GRAVITY, POC UA: 1.01
UROBILINOGEN, POC UA: NORMAL

## 2018-03-01 PROCEDURE — 77067 SCR MAMMO BI INCL CAD: CPT | Mod: 26,,, | Performed by: RADIOLOGY

## 2018-03-01 PROCEDURE — 99999 PR PBB SHADOW E&M-EST. PATIENT-LVL III: CPT | Mod: PBBFAC,,, | Performed by: PODIATRIST

## 2018-03-01 PROCEDURE — 77063 BREAST TOMOSYNTHESIS BI: CPT | Mod: 26,,, | Performed by: RADIOLOGY

## 2018-03-01 PROCEDURE — 77067 SCR MAMMO BI INCL CAD: CPT | Mod: TC,PO

## 2018-03-01 PROCEDURE — 99999 PR PBB SHADOW E&M-EST. PATIENT-LVL III: CPT | Mod: PBBFAC,,, | Performed by: FAMILY MEDICINE

## 2018-03-01 PROCEDURE — 81002 URINALYSIS NONAUTO W/O SCOPE: CPT | Mod: S$GLB,,, | Performed by: FAMILY MEDICINE

## 2018-03-01 PROCEDURE — 99213 OFFICE O/P EST LOW 20 MIN: CPT | Mod: S$GLB,,, | Performed by: PODIATRIST

## 2018-03-01 PROCEDURE — 99214 OFFICE O/P EST MOD 30 MIN: CPT | Mod: 25,S$GLB,, | Performed by: FAMILY MEDICINE

## 2018-03-01 RX ORDER — SULFAMETHOXAZOLE AND TRIMETHOPRIM 800; 160 MG/1; MG/1
1 TABLET ORAL 2 TIMES DAILY
Qty: 6 TABLET | Refills: 0 | Status: SHIPPED | OUTPATIENT
Start: 2018-03-01 | End: 2018-03-05

## 2018-03-01 RX ORDER — TERBINAFINE HYDROCHLORIDE 250 MG/1
250 TABLET ORAL DAILY
Qty: 14 TABLET | Refills: 0 | Status: SHIPPED | OUTPATIENT
Start: 2018-03-01 | End: 2018-03-16 | Stop reason: SDUPTHER

## 2018-03-01 NOTE — PATIENT INSTRUCTIONS
Patient instructed to spray all shoes with Lysol disinfectant spray and let dry before wearing.   Patient instructed to wash all socks in hot water and bleach.

## 2018-03-01 NOTE — PROGRESS NOTES
"Subjective:     Patient ID: Teresa Cazares is a 42 y.o. female.    Chief Complaint: athletes feet    HPI: This 42 year old female presents today complaining of fungus. The patient states the fungus has been presents for several weeks. Patient admits treatment of the fungus. Patient denies other complaints at this time.      Patient Active Problem List   Diagnosis    Morbid obesity with BMI of 45.0-49.9, adult    Acquired hammertoe       Medication List with Changes/Refills   New Medications    TERBINAFINE HCL (LAMISIL) 250 MG TABLET    Take 1 tablet (250 mg total) by mouth once daily.   Current Medications    NICOTINE, POLACRILEX, (NICORETTE) 2 MG GUM    Take 1 each (2 mg total) by mouth as needed (use no more than 8 pieces in 24 hours).    VARENICLINE (CHANTIX) 1 MG TAB    Take 1 tablet (1 mg total) by mouth 2 (two) times daily.       Review of patient's allergies indicates:   Allergen Reactions    Flagyl [metronidazole hcl] Hives       Past Surgical History:   Procedure Laterality Date    FRACTURE SURGERY      C1 neck- halo    HYSTERECTOMY  2009    tahbso    OOPHORECTOMY      UMBILICAL HERNIA REPAIR         Family History   Problem Relation Age of Onset    Breast cancer Mother 58    Breast cancer Sister 46       Social History     Social History    Marital status: Single     Spouse name: N/A    Number of children: 5    Years of education: N/A     Occupational History          Social History Main Topics    Smoking status: Former Smoker     Packs/day: 0.50     Years: 27.00    Smokeless tobacco: Former User    Alcohol use Yes      Comment: occasionally    Drug use: No    Sexual activity: Not on file     Other Topics Concern    Not on file     Social History Narrative    No narrative on file       Vitals:    03/01/18 1545   BP: 138/85   Pulse: 78   Temp: 97.2 °F (36.2 °C)   TempSrc: Oral   Weight: 116.3 kg (256 lb 6.3 oz)   Height: 5' 3" (1.6 m)   PainSc: 0-No pain       Review of " Systems   Constitutional: Negative for chills and fever.   Respiratory: Negative for shortness of breath.    Cardiovascular: Negative for chest pain, palpitations, orthopnea, claudication and leg swelling.   Gastrointestinal: Negative for diarrhea, nausea and vomiting.   Musculoskeletal: Negative for joint pain.   Skin: Positive for itching. Negative for rash.   Neurological: Negative for dizziness, tingling, sensory change, focal weakness and weakness.   Psychiatric/Behavioral: Negative.              Objective:      PHYSICAL EXAM: Apperance: Alert and orient in no distress,well developed, and with good attention to grooming and body habits  Lower Extremity Exam  VASCULAR: Dorsalis pedis pulses 2/4 bilateral and Posterior Tibial pulses 2/4 bilateral. Capillary fill time <3 seconds bilateral. No edema observed bilateral. Varicosities absent bilateral. Skin temperature of the lower extremities is warm to warm, proximal to distal. Hair growth WNL bilateral.  DERMATOLOGICAL: No skin rash, subcutaneous nodules, lesions or ulcers observed. Dry and scaly skin noted plantarly bilateral in moccasin distribution. Webspaces 1,2,3,4 bilateral are clean, dry and without evidence of break in skin integrity.    NEUROLOGICAL: Light touch, sharp-dull, proprioception all present and equal bilaterally.    MUSCULOSKELETAL: Muscle strength is 5/5 for foot inverters, everters, plantarflexors, and dorsiflexors. Muscle tone is normal.         Assessment:       Encounter Diagnosis   Name Primary?    Tinea pedis of both feet Yes         Plan:   Tinea pedis of both feet  -     terbinafine HCl (LAMISIL) 250 mg tablet; Take 1 tablet (250 mg total) by mouth once daily.  Dispense: 14 tablet; Refill: 0      I counseled the patient on her conditions, regarding findings of my examination, my impressions, and usual treatment plan.   Discuss treatment options for tinea pedia.  I explained that fungus lives in a warm dark moist environment and  therefore patient should make every attempt to keep feet clean and dry.  We discussed drying feet thoroughly after shower particularly between the toes.   Patient instructed to spray all shoes with Lysol disinfectant spray and let dry before wearing. Patient instructed to wash all socks in hot water and bleach.  Patient instructed to purchase over-the-counter anti-fungal cream (Lotrimin or Lamisil) and apply to bottom of feet twice daily. Patient instructed not to apply cream in between toes.   We discussed using Lamisil for tinea pedis. This drug offers a fairly high but not universal cure rate. A 2-4 week treatment course is recommended. The patient is aware that rare cases of liver injury have been reported; and agrees to have a liver function tests performed. The symptoms of liver disease have been discussed; call if such occurs. Other side effects, such as headaches and rashes, have also been discussed.  Ordered liver function test.  Prescribed Lamisil 250mg to be taken once daily with food. Patient was instructed on dosing information. Discontinue if adverse effects occur   Prescribed Oxistat to be applied twice daily to areas of feet.   Patient to return in 6 weeks.                   Anastasia Maurice DPM  [facility]

## 2018-03-01 NOTE — PROGRESS NOTES
"Subjective:       Patient ID: Teresa Cazares is a 42 y.o. female.    Chief Complaint: Urinary Tract Infection (burning, frequency x2 days)    /70   Pulse 82   Temp 97.9 °F (36.6 °C) (Tympanic)   Resp 14   Ht 5' 3" (1.6 m)   Wt 114.8 kg (253 lb)   SpO2 98%   BMI 44.82 kg/m²     HPI  Patient presented with symptoms described in chief complaint. Urinates every hour.  No fever. S/p hysterectomy    Review of Systems   Constitutional: Negative.  Negative for chills and fever.   HENT: Negative.    Respiratory: Negative.    Cardiovascular: Negative.    Gastrointestinal: Negative.  Negative for abdominal pain, constipation, nausea and vomiting.   Genitourinary: Positive for dysuria and frequency.   Musculoskeletal: Negative.    Skin: Negative.    Neurological: Negative.    Hematological: Negative.        Objective:      Physical Exam   Constitutional: She is oriented to person, place, and time. She appears well-developed and well-nourished. No distress.   HENT:   Head: Normocephalic and atraumatic.   Neck: Normal range of motion. Neck supple.   Cardiovascular: Normal rate.    Pulmonary/Chest: Effort normal. No respiratory distress.   Abdominal: Soft. Bowel sounds are normal. She exhibits no distension. There is no tenderness. There is no rebound and no guarding.   Musculoskeletal: Normal range of motion.   Neurological: She is alert and oriented to person, place, and time.   Skin: Skin is warm and dry. No rash noted. She is not diaphoretic.   Nursing note and vitals reviewed.      Assessment:       1. Dysuria        Plan:     Teresa was seen today for urinary tract infection.    Diagnoses and all orders for this visit:    Dysuria  -     POCT URINE DIPSTICK WITHOUT MICROSCOPE  -     Cancel: POCT Urine Pregnancy  -     sulfamethoxazole-trimethoprim 800-160mg (BACTRIM DS) 800-160 mg Tab; Take 1 tablet by mouth 2 (two) times daily.              Discussed with pt/family all information and results pertaining to " this visit. Discussed diagnosis and plan of treatment.  All questions and concerns were addressed at this time. Pt/family expresses understanding of information and instructions.  Care and follow up instruction provided.

## 2018-03-01 NOTE — PATIENT INSTRUCTIONS

## 2018-03-02 ENCOUNTER — PATIENT MESSAGE (OUTPATIENT)
Dept: PODIATRY | Facility: CLINIC | Age: 43
End: 2018-03-02

## 2018-03-02 DIAGNOSIS — B35.3 TINEA PEDIS OF BOTH FEET: Primary | ICD-10-CM

## 2018-03-02 RX ORDER — CLOTRIMAZOLE AND BETAMETHASONE DIPROPIONATE 10; .64 MG/G; MG/G
CREAM TOPICAL 2 TIMES DAILY
Qty: 45 TUBE | Refills: 0 | Status: SHIPPED | OUTPATIENT
Start: 2018-03-02 | End: 2018-03-16 | Stop reason: SDUPTHER

## 2018-03-05 ENCOUNTER — OFFICE VISIT (OUTPATIENT)
Dept: INTERNAL MEDICINE | Facility: CLINIC | Age: 43
End: 2018-03-05
Payer: COMMERCIAL

## 2018-03-05 VITALS
WEIGHT: 248.69 LBS | SYSTOLIC BLOOD PRESSURE: 124 MMHG | HEIGHT: 63 IN | DIASTOLIC BLOOD PRESSURE: 82 MMHG | BODY MASS INDEX: 44.06 KG/M2 | HEART RATE: 84 BPM | OXYGEN SATURATION: 96 % | TEMPERATURE: 98 F

## 2018-03-05 DIAGNOSIS — Z00.00 ROUTINE GENERAL MEDICAL EXAMINATION AT A HEALTH CARE FACILITY: Primary | ICD-10-CM

## 2018-03-05 DIAGNOSIS — E66.01 MORBID OBESITY WITH BMI OF 40.0-44.9, ADULT: ICD-10-CM

## 2018-03-05 DIAGNOSIS — B35.3 TINEA PEDIS, UNSPECIFIED LATERALITY: ICD-10-CM

## 2018-03-05 PROCEDURE — 99999 PR PBB SHADOW E&M-EST. PATIENT-LVL III: CPT | Mod: PBBFAC,,, | Performed by: FAMILY MEDICINE

## 2018-03-05 PROCEDURE — 99396 PREV VISIT EST AGE 40-64: CPT | Mod: S$GLB,,, | Performed by: FAMILY MEDICINE

## 2018-03-05 NOTE — PROGRESS NOTES
Subjective:       Patient ID: Teresa Cazares is a 42 y.o. female.    Chief Complaint: Annual Exam      42-year-old  female patient with Patient Active Problem List:     Morbid obesity with BMI of 40.0-44.9, adult     Acquired hammertoe  Here for routine annual physicals.  Patient reports that she has been seen by podiatrist and has been placed on Lamisil and Clotrimazole triamcinolone cream for fungal infection to her feet, just started taking medication for past 3-4 days, has been having hyperpigmentation to both the feet  Patient has not been exercising lately, secondary to being a  reports that she does not get enough time, and has not been able to do exercise secondary to knee pains, could not do treadmill  Has not been watching her diet and not eating healthy  Currently not smoking, currently taking Chantix and nicotine gum  Denies of any changes in appetite.   Reports that she has been having mood disorder secondary to not able to lose weight and requesting to see whether she can get Adipex to help her lose weight  Patient reports that she was recently treated for UTI with Bactrim but did not have any symptoms, has been doing well at this time      Review of Systems   Constitutional: Negative for activity change, fatigue and unexpected weight change.   HENT: Negative for hearing loss, rhinorrhea and trouble swallowing.    Eyes: Negative for discharge and visual disturbance.   Respiratory: Negative for chest tightness, shortness of breath and wheezing.    Cardiovascular: Negative for chest pain, palpitations and leg swelling.   Gastrointestinal: Negative for abdominal pain, blood in stool, constipation, diarrhea, nausea and vomiting.   Endocrine: Negative for polydipsia and polyuria.   Genitourinary: Negative for difficulty urinating, dysuria, hematuria and menstrual problem.   Musculoskeletal: Positive for arthralgias. Negative for joint swelling, myalgias and neck pain.  "  Skin: Negative for rash.   Neurological: Negative for weakness, light-headedness and headaches.   Psychiatric/Behavioral: Positive for dysphoric mood. Negative for confusion and sleep disturbance.         /82   Pulse 84   Temp 97.6 °F (36.4 °C) (Tympanic)   Ht 5' 3" (1.6 m)   Wt 112.8 kg (248 lb 10.9 oz)   SpO2 96%   BMI 44.05 kg/m²   Objective:      Physical Exam   Constitutional: She is oriented to person, place, and time. She appears well-developed and well-nourished.   HENT:   Head: Normocephalic and atraumatic.   Mouth/Throat: Oropharynx is clear and moist.   Neck: Neck supple. No thyromegaly present.   Cardiovascular: Normal rate, regular rhythm and normal heart sounds.    No murmur heard.  Pulmonary/Chest: Effort normal and breath sounds normal. She has no wheezes.   Abdominal: Soft. Bowel sounds are normal. There is no tenderness.   Musculoskeletal: She exhibits tenderness. She exhibits no edema.   Positive for bilateral knee tenderness anteriorly   Neurological: She is alert and oriented to person, place, and time.   Skin: Skin is warm and dry. No rash noted.   Psychiatric: She has a normal mood and affect.         Assessment:       1. Routine general medical examination at a health care facility    2. Tinea pedis, unspecified laterality    3. Morbid obesity with BMI of 40.0-44.9, adult        Plan:   Routine general medical examination at a health care facility  -     CBC auto differential; Future; Expected date: 03/05/2018  -     Comprehensive metabolic panel; Future; Expected date: 03/05/2018  -     Lipid panel; Future; Expected date: 03/05/2018  -     TSH; Future; Expected date: 03/05/2018  -     Urinalysis; Future; Expected date: 03/05/2018  Vital signs stable today.  Clinical exam stable  Will check fasting labs  Strict lifestyle changes recommended with low-fat and low-cholesterol diet and exercise 30 minutes daily  Up-to-date with screenings and vaccinations  Continue to quit smoking " currently with smoking cessation program    Tinea pedis, unspecified laterality-continue Lamisil and cream as prescribed by podiatrist, and give 1-2 months for response    Morbid obesity with BMI of 40.0-44.9, adult-strict lifestyle changes recommended to lose weight with BMI 44  Advised to stay away from diet supplements

## 2018-03-06 ENCOUNTER — LAB VISIT (OUTPATIENT)
Dept: LAB | Facility: HOSPITAL | Age: 43
End: 2018-03-06
Attending: FAMILY MEDICINE
Payer: COMMERCIAL

## 2018-03-06 DIAGNOSIS — Z00.00 ROUTINE GENERAL MEDICAL EXAMINATION AT A HEALTH CARE FACILITY: ICD-10-CM

## 2018-03-06 LAB
BASOPHILS # BLD AUTO: 0.07 K/UL
BASOPHILS NFR BLD: 0.7 %
DIFFERENTIAL METHOD: ABNORMAL
EOSINOPHIL # BLD AUTO: 0.2 K/UL
EOSINOPHIL NFR BLD: 1.9 %
ERYTHROCYTE [DISTWIDTH] IN BLOOD BY AUTOMATED COUNT: 13.2 %
HCT VFR BLD AUTO: 39 %
HGB BLD-MCNC: 12.7 G/DL
IMM GRANULOCYTES # BLD AUTO: 0.03 K/UL
IMM GRANULOCYTES NFR BLD AUTO: 0.3 %
LYMPHOCYTES # BLD AUTO: 3.4 K/UL
LYMPHOCYTES NFR BLD: 32.4 %
MCH RBC QN AUTO: 28.8 PG
MCHC RBC AUTO-ENTMCNC: 32.6 G/DL
MCV RBC AUTO: 88 FL
MONOCYTES # BLD AUTO: 1 K/UL
MONOCYTES NFR BLD: 9.7 %
NEUTROPHILS # BLD AUTO: 5.8 K/UL
NEUTROPHILS NFR BLD: 55 %
NRBC BLD-RTO: 0 /100 WBC
PLATELET # BLD AUTO: 462 K/UL
PMV BLD AUTO: 9.4 FL
RBC # BLD AUTO: 4.41 M/UL
WBC # BLD AUTO: 10.54 K/UL

## 2018-03-06 PROCEDURE — 85025 COMPLETE CBC W/AUTO DIFF WBC: CPT

## 2018-03-06 PROCEDURE — 36415 COLL VENOUS BLD VENIPUNCTURE: CPT | Mod: PO

## 2018-03-06 PROCEDURE — 80053 COMPREHEN METABOLIC PANEL: CPT

## 2018-03-06 PROCEDURE — 84443 ASSAY THYROID STIM HORMONE: CPT

## 2018-03-06 PROCEDURE — 80061 LIPID PANEL: CPT

## 2018-03-07 LAB
ALBUMIN SERPL BCP-MCNC: 4.1 G/DL
ALP SERPL-CCNC: 70 U/L
ALT SERPL W/O P-5'-P-CCNC: 18 U/L
ANION GAP SERPL CALC-SCNC: 9 MMOL/L
AST SERPL-CCNC: 20 U/L
BILIRUB SERPL-MCNC: 0.6 MG/DL
BUN SERPL-MCNC: 12 MG/DL
CALCIUM SERPL-MCNC: 9.7 MG/DL
CHLORIDE SERPL-SCNC: 103 MMOL/L
CHOLEST SERPL-MCNC: 152 MG/DL
CHOLEST/HDLC SERPL: 2.7 {RATIO}
CO2 SERPL-SCNC: 26 MMOL/L
CREAT SERPL-MCNC: 0.9 MG/DL
EST. GFR  (AFRICAN AMERICAN): >60 ML/MIN/1.73 M^2
EST. GFR  (NON AFRICAN AMERICAN): >60 ML/MIN/1.73 M^2
GLUCOSE SERPL-MCNC: 85 MG/DL
HDLC SERPL-MCNC: 56 MG/DL
HDLC SERPL: 36.8 %
LDLC SERPL CALC-MCNC: 82.4 MG/DL
NONHDLC SERPL-MCNC: 96 MG/DL
POTASSIUM SERPL-SCNC: 4.9 MMOL/L
PROT SERPL-MCNC: 7.2 G/DL
SODIUM SERPL-SCNC: 138 MMOL/L
TRIGL SERPL-MCNC: 68 MG/DL
TSH SERPL DL<=0.005 MIU/L-ACNC: 2.51 UIU/ML

## 2018-03-16 ENCOUNTER — TELEPHONE (OUTPATIENT)
Dept: PODIATRY | Facility: CLINIC | Age: 43
End: 2018-03-16

## 2018-03-16 ENCOUNTER — PATIENT MESSAGE (OUTPATIENT)
Dept: PODIATRY | Facility: CLINIC | Age: 43
End: 2018-03-16

## 2018-03-16 DIAGNOSIS — B35.3 TINEA PEDIS OF BOTH FEET: ICD-10-CM

## 2018-03-16 RX ORDER — CLOTRIMAZOLE AND BETAMETHASONE DIPROPIONATE 10; .64 MG/G; MG/G
CREAM TOPICAL 2 TIMES DAILY
Qty: 45 TUBE | Refills: 0 | Status: SHIPPED | OUTPATIENT
Start: 2018-03-16 | End: 2018-12-18

## 2018-03-16 RX ORDER — TERBINAFINE HYDROCHLORIDE 250 MG/1
250 TABLET ORAL DAILY
Qty: 14 TABLET | Refills: 0 | Status: SHIPPED | OUTPATIENT
Start: 2018-03-16 | End: 2018-05-20

## 2018-03-16 NOTE — TELEPHONE ENCOUNTER
----- Message from Dilma Flowers sent at 3/16/2018  2:35 PM CDT -----  Contact: Pt   Pt request call back .[phone] (flgs)

## 2018-03-28 ENCOUNTER — PATIENT MESSAGE (OUTPATIENT)
Dept: PODIATRY | Facility: CLINIC | Age: 43
End: 2018-03-28

## 2018-03-29 ENCOUNTER — PATIENT MESSAGE (OUTPATIENT)
Dept: PODIATRY | Facility: CLINIC | Age: 43
End: 2018-03-29

## 2018-04-04 DIAGNOSIS — B35.3 TINEA PEDIS OF BOTH FEET: Primary | ICD-10-CM

## 2018-04-04 RX ORDER — DESOXIMETASONE 2.5 MG/G
CREAM TOPICAL 2 TIMES DAILY
Qty: 60 G | Refills: 0 | Status: SHIPPED | OUTPATIENT
Start: 2018-04-04 | End: 2018-06-15

## 2018-04-09 ENCOUNTER — OFFICE VISIT (OUTPATIENT)
Dept: INTERNAL MEDICINE | Facility: CLINIC | Age: 43
End: 2018-04-09
Payer: COMMERCIAL

## 2018-04-09 VITALS
DIASTOLIC BLOOD PRESSURE: 78 MMHG | TEMPERATURE: 98 F | OXYGEN SATURATION: 99 % | RESPIRATION RATE: 17 BRPM | BODY MASS INDEX: 44.13 KG/M2 | SYSTOLIC BLOOD PRESSURE: 122 MMHG | HEART RATE: 83 BPM | WEIGHT: 249.13 LBS

## 2018-04-09 DIAGNOSIS — M17.0 OSTEOARTHRITIS OF BOTH KNEES, UNSPECIFIED OSTEOARTHRITIS TYPE: Primary | ICD-10-CM

## 2018-04-09 PROCEDURE — 99999 PR PBB SHADOW E&M-EST. PATIENT-LVL IV: CPT | Mod: PBBFAC,,, | Performed by: NURSE PRACTITIONER

## 2018-04-09 PROCEDURE — 99214 OFFICE O/P EST MOD 30 MIN: CPT | Mod: 25,S$GLB,, | Performed by: NURSE PRACTITIONER

## 2018-04-09 PROCEDURE — 96372 THER/PROPH/DIAG INJ SC/IM: CPT | Mod: S$GLB,,, | Performed by: FAMILY MEDICINE

## 2018-04-09 RX ORDER — KETOROLAC TROMETHAMINE 30 MG/ML
60 INJECTION, SOLUTION INTRAMUSCULAR; INTRAVENOUS
Status: COMPLETED | OUTPATIENT
Start: 2018-04-09 | End: 2018-04-09

## 2018-04-09 RX ORDER — MELOXICAM 7.5 MG/1
7.5 TABLET ORAL DAILY
Qty: 30 TABLET | Refills: 1 | Status: SHIPPED | OUTPATIENT
Start: 2018-04-09 | End: 2018-12-18

## 2018-04-09 RX ADMIN — KETOROLAC TROMETHAMINE 60 MG: 30 INJECTION, SOLUTION INTRAMUSCULAR; INTRAVENOUS at 03:04

## 2018-04-09 NOTE — PATIENT INSTRUCTIONS
What is Arthritis?  Arthritis is a disease that affects the joints (the parts where bones meet and move). It can affect any joint in your body. There are many types of arthritis, including osteoarthritis and rheumatoid arthrtitis. If your symptoms are mild, medications may be enough to reduce pain and swelling. For more severe arthritis, surgery may be needed to improve the condition of the joint or replace the joint entirely.                  What causes arthritis?  Cartilage is a smooth substance that protects the ends of your bones and provides cushioning. When you have arthritis, this cartilage breaks down and can no longer protect your bones. The bones rub against each other, causing pain and swelling. Over time, bone spurs (small pieces of rough or splintered bone) may develop, and the joint's range of motion can become limited.  Symptoms  Some of the more common symptoms of arthritis include:  · Joint pain and stiffness. Pain and stiffness get worse with long periods of rest or using a joint too long or too hard.  · Joints that have lost normal shape and motion.  · Tender, inflamed joints. They may look red and feel warm.  · Grinding or popping noise with joint movement.   · Feeling tired all the time.  Reducing symptoms  Following a healthy lifestyle by losing weight and exercising can help reduce symptoms of osteoarthritis. Medicines can be very helpful for arthritis.     Date Last Reviewed: 9/10/2015  © 1819-6172 [employer]. [address] [place] [address], [place] [address]. All rights reserved. This information is not intended as a substitute for professional medical care. Always follow your healthcare professional's instructions.

## 2018-04-09 NOTE — PROGRESS NOTES
Subjective:       Patient ID: Teresa Cazares is a 42 y.o. female.    Chief Complaint: Knee Pain (right)    Knee Pain    The injury mechanism was a fall (about 2 years ago ). The pain is present in the right knee. The quality of the pain is described as aching. The pain is moderate. The pain has been intermittent since onset. She reports no foreign bodies present. The symptoms are aggravated by movement, palpation and weight bearing. She has tried NSAIDs, acetaminophen and ice for the symptoms.     Review of Systems   Constitutional: Negative for appetite change, chills and fatigue.   Respiratory: Negative for cough and shortness of breath.    Musculoskeletal: Positive for arthralgias and joint swelling.   Skin: Negative for color change.   Psychiatric/Behavioral: Negative for agitation and confusion.       Objective:      Physical Exam   Constitutional: She is oriented to person, place, and time. Vital signs are normal. She appears well-developed and well-nourished.   HENT:   Head: Normocephalic and atraumatic.   Neck: Normal range of motion.   Cardiovascular: Normal rate and regular rhythm.    Pulmonary/Chest: Effort normal and breath sounds normal.   Musculoskeletal: She exhibits tenderness.        Right knee: She exhibits decreased range of motion and swelling (mild).   Neurological: She is alert and oriented to person, place, and time.   Skin: Skin is warm.   Psychiatric: She has a normal mood and affect. Her behavior is normal.       Assessment:       1. Osteoarthritis of both knees, unspecified osteoarthritis type        Plan:           Osteoarthritis of both knees, unspecified osteoarthritis type  -     ketorolac injection 60 mg; Inject 2 mLs (60 mg total) into the muscle one time.  -     meloxicam (MOBIC) 7.5 MG tablet; Take 1 tablet (7.5 mg total) by mouth once daily. Take with food  Dispense: 30 tablet; Refill: 1  -     Ambulatory referral to Orthopedics        Referral to orthopedic.  Will start  Mobic.  Instructed to hold all other NSAIDs and take Tylenol as needed for breakthrough pain.  Follow up if needed.

## 2018-04-10 ENCOUNTER — OFFICE VISIT (OUTPATIENT)
Dept: ORTHOPEDICS | Facility: CLINIC | Age: 43
End: 2018-04-10
Payer: COMMERCIAL

## 2018-04-10 VITALS
HEART RATE: 70 BPM | DIASTOLIC BLOOD PRESSURE: 77 MMHG | BODY MASS INDEX: 44.38 KG/M2 | HEIGHT: 63 IN | WEIGHT: 250.44 LBS | SYSTOLIC BLOOD PRESSURE: 131 MMHG

## 2018-04-10 DIAGNOSIS — M76.32 IT BAND SYNDROME, LEFT: ICD-10-CM

## 2018-04-10 DIAGNOSIS — M17.0 BILATERAL PRIMARY OSTEOARTHRITIS OF KNEE: Primary | ICD-10-CM

## 2018-04-10 DIAGNOSIS — M76.31 IT BAND SYNDROME, RIGHT: ICD-10-CM

## 2018-04-10 PROCEDURE — 99999 PR PBB SHADOW E&M-EST. PATIENT-LVL III: CPT | Mod: PBBFAC,,, | Performed by: ORTHOPAEDIC SURGERY

## 2018-04-10 PROCEDURE — 99244 OFF/OP CNSLTJ NEW/EST MOD 40: CPT | Mod: 25,S$GLB,, | Performed by: ORTHOPAEDIC SURGERY

## 2018-04-10 PROCEDURE — 20610 DRAIN/INJ JOINT/BURSA W/O US: CPT | Mod: RT,S$GLB,, | Performed by: ORTHOPAEDIC SURGERY

## 2018-04-10 RX ORDER — TRIAMCINOLONE ACETONIDE 40 MG/ML
40 INJECTION, SUSPENSION INTRA-ARTICULAR; INTRAMUSCULAR
Status: COMPLETED | OUTPATIENT
Start: 2018-04-10 | End: 2018-04-10

## 2018-04-10 RX ADMIN — TRIAMCINOLONE ACETONIDE 40 MG: 40 INJECTION, SUSPENSION INTRA-ARTICULAR; INTRAMUSCULAR at 10:04

## 2018-04-10 NOTE — LETTER
April 10, 2018      Gaby Alvarez, NICA  [address] [facility] [address]  [place] [address]           [facility]  [address]  [place] [address]  Phone: [phone]  Fax: [phone]          Patient: Teresa Cazares   MR Number: [id number]   YOB: 1975   Date of Visit: 4/10/2018       Dear Gaby Alvarez:    Thank you for referring Teresa Cazares to me for evaluation. Attached you will find relevant portions of my assessment and plan of care.    If you have questions, please do not hesitate to call me. I look forward to following Teresa Cazarse along with you.    Sincerely,    Sarthak Stratton MD    Enclosure  CC:  No Recipients    If you would like to receive this communication electronically, please contact [email or web address] or [phone] to request more information on [employer] access.    For providers and/or their staff who would like to refer a patient to [facility], please contact us through our one-stop-shop provider referral line, [facility], at [phone].    If you feel you have received this communication in error or would no longer like to receive these types of communications, please e-mail [email or web address]

## 2018-04-10 NOTE — PROGRESS NOTES
Subjective:     Patient ID: Teresa Cazares is a 42 y.o. female.    Chief Complaint: Pain of the Left Knee and Pain of the Right Knee    Consult from Gaby Alvarez NP, will receive report electronically.    Patient is here for bilateral knee pain. Rt > Lt. Reports no CAROLIN. Reports no previous injury. History of falling down 13 stairs 2 years ago. .      Knee Pain    The pain is present in the right knee and left knee. The pain radiates to the right thigh and left knee. This is a chronic problem. The current episode started more than 1 year ago. The problem occurs constantly. The problem has been gradually worsening. The quality of the pain is described as sharp, shooting and burning. The pain is at a severity of 9/10. Associated symptoms include joint locking, joint swelling, a limited range of motion and stiffness. Pertinent negatives include no fever or numbness. Associated symptoms comments: Catching, clicking, popping, giving out sensations. The symptoms are aggravated by walking, standing, sitting, bending and activity. She has tried cold, heat and NSAIDs (Mobic) for the symptoms. Physical therapy was not tried.      Past Medical History:   Diagnosis Date    Depression     denies hospitalization or bipolar disorder    Obesity      Past Surgical History:   Procedure Laterality Date    FRACTURE SURGERY      C1 neck- halo    HYSTERECTOMY  2009    tahbso    OOPHORECTOMY      UMBILICAL HERNIA REPAIR       Family History   Problem Relation Age of Onset    Breast cancer Mother 58    Breast cancer Sister 46     Social History     Social History    Marital status: Single     Spouse name: N/A    Number of children: 5    Years of education: N/A     Occupational History          Social History Main Topics    Smoking status: Former Smoker     Packs/day: 0.50     Years: 27.00    Smokeless tobacco: Never Used    Alcohol use Yes      Comment: occasionally    Drug use: No    Sexual  activity: Not on file     Other Topics Concern    Not on file     Social History Narrative    No narrative on file     Medication List with Changes/Refills   Current Medications    CLOTRIMAZOLE-BETAMETHASONE 1-0.05% (LOTRISONE) CREAM    Apply topically 2 (two) times daily.    DESOXIMETASONE (TOPICORT) 0.25 % CREAM    Apply topically 2 (two) times daily.    MELOXICAM (MOBIC) 7.5 MG TABLET    Take 1 tablet (7.5 mg total) by mouth once daily. Take with food    NICOTINE, POLACRILEX, (NICORETTE) 2 MG GUM    Take 1 each (2 mg total) by mouth as needed (use no more than 8 pieces in 24 hours).    TERBINAFINE HCL (LAMISIL) 250 MG TABLET    Take 1 tablet (250 mg total) by mouth once daily.    VARENICLINE (CHANTIX) 1 MG TAB    Take 1 tablet (1 mg total) by mouth 2 (two) times daily.     Review of patient's allergies indicates:   Allergen Reactions    Flagyl [metronidazole hcl] Hives     Review of Systems   Constitution: Negative for fever and night sweats.   HENT: Negative for hearing loss.    Eyes: Negative for blurred vision and visual disturbance.   Cardiovascular: Negative for chest pain and leg swelling.   Respiratory: Negative for shortness of breath.    Endocrine: Negative for polyuria.   Hematologic/Lymphatic: Negative for bleeding problem.   Skin: Negative for rash.   Musculoskeletal: Positive for joint pain, joint swelling and stiffness. Negative for back pain, muscle cramps and muscle weakness.   Gastrointestinal: Negative for melena.   Genitourinary: Negative for hematuria.   Neurological: Negative for loss of balance, numbness and paresthesias.   Psychiatric/Behavioral: Negative for altered mental status.       Objective:   Body mass index is 44.36 kg/m².  Vitals:    04/10/18 0929   BP: 131/77   Pulse: 70       General: Teresa is well-developed, well-nourished, appears stated age, in no acute distress, alert and oriented to time, place and person.       General    Vitals reviewed.  Constitutional: She is  oriented to person, place, and time. She appears well-developed and well-nourished. No distress.   HENT:   Mouth/Throat: No oropharyngeal exudate.   Eyes: Right eye exhibits no discharge. Left eye exhibits no discharge.   Neck: Normal range of motion.   Pulmonary/Chest: Effort normal. No respiratory distress.   Neurological: She is alert and oriented to person, place, and time. She has normal reflexes. No cranial nerve deficit. Coordination normal.   Psychiatric: She has a normal mood and affect.     General Musculoskeletal Exam   Gait: normal       Right Knee Exam     Inspection   Erythema: absent  Scars: absent  Swelling: absent  Effusion: present  Deformity: deformity  Bruising: absent    Tenderness   The patient is tender to palpation of the medial joint line, lateral joint line, condyle and iliotibial band.    Crepitus   The patient has crepitus of the patella.    Range of Motion   Extension: 0   Flexion: 130     Tests   Meniscus   Rasheed:  Medial - negative Lateral - negative  Ligament Examination Lachman: normal (-1 to 2mm) PCL-Posterior Drawer: normal (0 to 2mm)     MCL - Valgus: normal (0 to 2mm)  LCL - Varus: normal  Posterior Sag Test: negative  Patella   Patellar Apprehension: negative  Passive Patellar Tilt: neutral  Patellar Tracking: normal  Patellar Glide (quadrants): Lateral - 1   Medial - 2  Q-Angle at 90 degrees: normal  Patellar Grind: positive  J-Sign: none    Other   Meniscal Cyst: absent  Popliteal (Baker's) Cyst: absent  Sensation: normal    Comments:  R SI joint tenderness    Left Knee Exam     Inspection   Erythema: absent  Scars: absent  Swelling: absent  Effusion: absent  Deformity: deformity  Bruising: absent    Tenderness   The patient is experiencing no tenderness.         Crepitus   The patient has crepitus of the patella.    Range of Motion   Extension: 0   Flexion: 130     Tests   Meniscus   Rasheed:  Medial - negative Lateral - negative  Stability Lachman: normal (-1 to 2mm)  PCL-Posterior Drawer: normal (0 to 2mm)  MCL - Valgus: normal (0 to 2mm)  LCL - Varus: normal (0 to 2mm)  Posterior Sag Test: negative  Patella   Patellar Apprehension: negative  Passive Patellar Tilt: neutral  Patellar Tracking: normal  Patellar Glide (Quadrants): Lateral - 1 Medial - 2  Q-Angle at 90 degrees: normal  Patellar Grind: positive  J-Sign: J sign absent    Other   Meniscal Cyst: absent  Popliteal (Baker's) Cyst: absent  Sensation: normal    Right Hip Exam     Tests   Danica: positive  Left Hip Exam     Tests   Danica: positive          Muscle Strength   Right Lower Extremity   Quadriceps:  5/5   Hamstrin/5   Left Lower Extremity   Quadriceps:  5/5   Hamstrin/5     Reflexes     Left Side  Quadriceps:  2+  Achilles:  2+    Right Side   Quadriceps:  2+  Achilles:  2+    Vascular Exam     Right Pulses  Dorsalis Pedis:      2+  Posterior Tibial:      2+        Left Pulses  Dorsalis Pedis:      2+  Posterior Tibial:      2+        X-rays including standing, weight bearing AP and flexion bilateral knees, lateral and merchant views ordered and images reviewed by me show:    No fracture, dislocation or other pathology   Medial compartment: mild degenerative changes   Lateral compartment: no degenerative changes   Patellofemoral compartment: mild degenerative changes      Assessment:     Encounter Diagnoses   Name Primary?    Bilateral primary osteoarthritis of knee Yes    It band syndrome, left     It band syndrome, right         Plan:     1. PT for quad strengthening/Home exercise program    2. Continue tylenol    3. Steroid injection today    4. Weight loss    5. Recommend PT for IT band dry needling/rolling/stretching.    PROCEDURE NOTE: right KNEE INJECTION  After time out was performed, including verification of patient ID, procedure, site and side, availability of information and equipment, review of safety issues, and agreement with consent, the procedure site was marked and the patient was  prepped aseptically. A diagnostic and therapeutic injection of 2cc 40 mg kenalog and 1% lidocaine/0.5% sensorcaine was given under sterile technique using a 22g x 1.5 needle into the knee inferolaterally in seated position.   The patient had no adverse reactions to the medication. Pain decreased. The patient was instructed to apply ice to the joint for 20 minutes and avoid strenuous activities for 24-36 hours following the injection. Patient was warned of possible blood sugar and/or blood pressure changes during that time. Following that time, patient can resume regular activities.    6. Dr. Callahan for R SI joint

## 2018-05-20 ENCOUNTER — OFFICE VISIT (OUTPATIENT)
Dept: URGENT CARE | Facility: CLINIC | Age: 43
End: 2018-05-20
Payer: COMMERCIAL

## 2018-05-20 VITALS
DIASTOLIC BLOOD PRESSURE: 88 MMHG | BODY MASS INDEX: 46.61 KG/M2 | HEART RATE: 85 BPM | RESPIRATION RATE: 18 BRPM | SYSTOLIC BLOOD PRESSURE: 138 MMHG | HEIGHT: 62 IN | TEMPERATURE: 97 F | OXYGEN SATURATION: 97 % | WEIGHT: 253.31 LBS

## 2018-05-20 DIAGNOSIS — L02.92 FURUNCULOSIS: Primary | ICD-10-CM

## 2018-05-20 PROCEDURE — 3008F BODY MASS INDEX DOCD: CPT | Mod: CPTII,S$GLB,, | Performed by: FAMILY MEDICINE

## 2018-05-20 PROCEDURE — 99999 PR PBB SHADOW E&M-EST. PATIENT-LVL III: CPT | Mod: PBBFAC,,, | Performed by: FAMILY MEDICINE

## 2018-05-20 PROCEDURE — 99214 OFFICE O/P EST MOD 30 MIN: CPT | Mod: S$GLB,,, | Performed by: FAMILY MEDICINE

## 2018-05-20 RX ORDER — DOXYCYCLINE 100 MG/1
100 CAPSULE ORAL EVERY 12 HOURS
Qty: 14 CAPSULE | Refills: 0 | Status: SHIPPED | OUTPATIENT
Start: 2018-05-20 | End: 2018-05-27

## 2018-05-20 NOTE — PATIENT INSTRUCTIONS
Hidradenitis Suppurativa (Antibiotics only)  Hidradenitis suppurativa is chronic inflammation of the sweat glands. It is a severe form of acne. Firm, red, painful bumps called nodules form. They are often filled with pus. They often get larger and may break open and drain. Common affected areas are the armpits, groin, and anal area.  The nodules are not contagious. The condition is not caused by poor hygiene.  You may have been given antibiotics and anti-inflammatory medicines to help treat the flare-up. If nodules keep getting bigger, drainage may be needed. Surgically removing the glands is the most effective treatment in severe cases.  Watch for the signs of early inflammation in the future. Look for small, tender lumps. Then follow the treatment advice below.  Home care  Follow these tips when caring for yourself at home:  · If oral antibiotics were prescribed, take all of them as directed.  · Make a warm compress by running hot water over a washcloth. Apply it to the area until the compress cools off. Repeat over a 15 minute period. Apply the hot compress 3 times a day for the first 3 days. Or you can  the shower and direct the warm spray onto the area.  · Gently wash the area with antibacterial soap. Avoid scrubbing it.  · Put on an over-the-counter antibiotic ointment 3 to 4 times a day, unless another topical medicine was prescribed.  · Use over-the-counter medicine to control pain and swelling, unless another pain medicine was given. If you have kidney or liver disease or ever had a stomach ulcer or GI bleeding, talk with your healthcare provider before using this medicine.  Prevention  Try the following to help prevent your condition:  · Avoid antiperspirants and deodorants.  · Avoid heat and sweating as much as possible.  · Avoid shaving the affected area.  · If you smoke, consider quitting.  · Lose excess weight.  · Wear loose clothing.  Follow-up care  Follow up with your healthcare provider  as advised.  When to seek medical care  Call your healthcare provider right away if any of these occur:  · Fever of 100.4°F (38°C) or higher, or as directed by your healthcare provider  · Increasing pain  · Increasing redness  · Nodules keep getting bigger  Date Last Reviewed: 7/1/2016  © 5324-5782 [employer]. [address] [place] [address], [place] [address]. All rights reserved. This information is not intended as a substitute for professional medical care. Always follow your healthcare professional's instructions.

## 2018-05-20 NOTE — PROGRESS NOTES
"Subjective:       Patient ID: Teresa Cazares is a 42 y.o. female.    Chief Complaint: Abscess (Face and underarm )    /88 (BP Location: Right arm, Patient Position: Sitting, BP Method: Large (Manual))   Pulse 85   Temp 97.3 °F (36.3 °C) (Tympanic)   Resp 18   Ht 5' 2" (1.575 m)   Wt 114.9 kg (253 lb 4.9 oz)   SpO2 97%   BMI 46.33 kg/m²     HPI  Several boils came up in the last 3-4 days. One on left thigh pt busted and is better; one in right axilla oozing pus; a third one just came up on right cheek    Review of Systems   Constitutional: Negative for chills and fever.   Gastrointestinal: Negative for nausea.       Objective:      Physical Exam   Constitutional: She is oriented to person, place, and time. She appears well-developed and well-nourished. No distress.   HENT:   Head: Normocephalic and atraumatic.   Eyes: EOM are normal. Pupils are equal, round, and reactive to light.   Neck: Normal range of motion. Neck supple.   Cardiovascular: Normal rate.    Pulmonary/Chest: Effort normal.   Musculoskeletal: Normal range of motion.   Neurological: She is alert and oriented to person, place, and time.   Skin: Skin is warm and dry. She is not diaphoretic.   Right axilla: small nodule with a busted pustular head, no bogginess, no surrounding induration  Right face: 5 by 5 cm induration with a center pustular head. No bogginess   Psychiatric: She has a normal mood and affect.   Nursing note and vitals reviewed.      Assessment:       1. Furunculosis        Plan:     Teresa was seen today for abscess.    Diagnoses and all orders for this visit:    Furunculosis  -     doxycycline (VIBRAMYCIN) 100 MG Cap; Take 1 capsule (100 mg total) by mouth every 12 (twelve) hours.      Instructions  1. Take Rx antibiotics for one week  2. Apply OTC neosporin ointment to the boil and cover with a band aid    Discussed with pt/family all information and results pertaining to this visit. Discussed diagnosis and plan of " treatment.  All questions and concerns were addressed at this time. Pt/family expresses understanding of information and instructions.  Care and follow up instruction provided.

## 2018-05-23 ENCOUNTER — OFFICE VISIT (OUTPATIENT)
Dept: INTERNAL MEDICINE | Facility: CLINIC | Age: 43
End: 2018-05-23
Payer: COMMERCIAL

## 2018-05-23 VITALS
HEART RATE: 79 BPM | DIASTOLIC BLOOD PRESSURE: 86 MMHG | TEMPERATURE: 99 F | WEIGHT: 257.25 LBS | BODY MASS INDEX: 47.34 KG/M2 | SYSTOLIC BLOOD PRESSURE: 140 MMHG | OXYGEN SATURATION: 95 % | HEIGHT: 62 IN

## 2018-05-23 DIAGNOSIS — Z91.09 ENVIRONMENTAL ALLERGIES: ICD-10-CM

## 2018-05-23 DIAGNOSIS — R09.81 SINUS CONGESTION: Primary | ICD-10-CM

## 2018-05-23 DIAGNOSIS — R05.9 COUGH: ICD-10-CM

## 2018-05-23 PROCEDURE — 3008F BODY MASS INDEX DOCD: CPT | Mod: CPTII,S$GLB,, | Performed by: NURSE PRACTITIONER

## 2018-05-23 PROCEDURE — 99999 PR PBB SHADOW E&M-EST. PATIENT-LVL IV: CPT | Mod: PBBFAC,,, | Performed by: NURSE PRACTITIONER

## 2018-05-23 PROCEDURE — 99214 OFFICE O/P EST MOD 30 MIN: CPT | Mod: S$GLB,,, | Performed by: NURSE PRACTITIONER

## 2018-05-23 RX ORDER — METHYLPREDNISOLONE 4 MG/1
TABLET ORAL
Qty: 1 PACKAGE | Refills: 0 | Status: SHIPPED | OUTPATIENT
Start: 2018-05-23 | End: 2018-06-15 | Stop reason: ALTCHOICE

## 2018-05-23 RX ORDER — MONTELUKAST SODIUM 10 MG/1
10 TABLET ORAL NIGHTLY
Qty: 30 TABLET | Refills: 6 | Status: SHIPPED | OUTPATIENT
Start: 2018-05-23 | End: 2018-06-22

## 2018-05-23 RX ORDER — PROMETHAZINE HYDROCHLORIDE AND DEXTROMETHORPHAN HYDROBROMIDE 6.25; 15 MG/5ML; MG/5ML
5 SYRUP ORAL NIGHTLY PRN
Qty: 118 ML | Refills: 0 | Status: SHIPPED | OUTPATIENT
Start: 2018-05-23 | End: 2018-06-15

## 2018-05-23 NOTE — PROGRESS NOTES
Subjective:       Patient ID: Teresa Cazares is a 42 y.o. female.    Chief Complaint: Sinus Problem    Sinus Problem   This is a recurrent problem. Episode onset: yesterday. The problem is unchanged. There has been no fever. She is experiencing no pain. Associated symptoms include congestion, coughing, ear pain, headaches and sinus pressure. Pertinent negatives include no chills, diaphoresis, hoarse voice, neck pain, shortness of breath, sneezing, sore throat or swollen glands. Treatments tried: pt currently on doxycycline, flonase. The treatment provided mild relief.     Review of Systems   Constitutional: Negative for activity change, appetite change, chills, diaphoresis, fatigue, fever and unexpected weight change.   HENT: Positive for congestion, ear pain, postnasal drip, rhinorrhea, sinus pain and sinus pressure. Negative for hoarse voice, sneezing, sore throat, tinnitus, trouble swallowing and voice change.    Eyes: Negative for photophobia, pain and visual disturbance.   Respiratory: Positive for cough. Negative for chest tightness, shortness of breath and wheezing.    Cardiovascular: Negative for chest pain, palpitations and leg swelling.   Gastrointestinal: Negative for abdominal distention, abdominal pain, blood in stool, constipation, diarrhea, nausea and vomiting.   Genitourinary: Negative for dysuria and frequency.   Musculoskeletal: Negative for arthralgias, back pain, joint swelling, neck pain and neck stiffness.   Neurological: Positive for headaches. Negative for dizziness, tremors, seizures, syncope, facial asymmetry, speech difficulty, weakness, light-headedness and numbness.   Psychiatric/Behavioral: Negative for confusion and sleep disturbance.       Objective:      Physical Exam   Constitutional: She is oriented to person, place, and time.   HENT:   Right Ear: Tympanic membrane normal.   Left Ear: Tympanic membrane normal.   Nose: Mucosal edema present. Right sinus exhibits maxillary  sinus tenderness. Left sinus exhibits maxillary sinus tenderness.   Mouth/Throat: Uvula is midline, oropharynx is clear and moist and mucous membranes are normal.   Cardiovascular: Normal rate and regular rhythm.    Pulmonary/Chest: Effort normal and breath sounds normal.   Neurological: She is alert and oriented to person, place, and time.       Assessment:       1. Sinus congestion    2. Environmental allergies    3. Cough        Plan:   Sinus congestion  -     methylPREDNISolone (MEDROL DOSEPACK) 4 mg tablet; use as directed  Dispense: 1 Package; Refill: 0  -     montelukast (SINGULAIR) 10 mg tablet; Take 1 tablet (10 mg total) by mouth every evening.  Dispense: 30 tablet; Refill: 6    Environmental allergies  -     montelukast (SINGULAIR) 10 mg tablet; Take 1 tablet (10 mg total) by mouth every evening.  Dispense: 30 tablet; Refill: 6    Cough  -     promethazine-dextromethorphan (PROMETHAZINE-DM) 6.25-15 mg/5 mL Syrp; Take 5 mLs by mouth nightly as needed (Cough).  Dispense: 118 mL; Refill: 0    as above  Continue and complete doxycycline   meds above for symptomatic treatment  Increase fluids  Follow up with PCP

## 2018-06-07 DIAGNOSIS — F17.200 NICOTINE DEPENDENCE: ICD-10-CM

## 2018-06-07 RX ORDER — VARENICLINE TARTRATE 1 MG/1
TABLET, FILM COATED ORAL
Qty: 60 TABLET | Refills: 0 | OUTPATIENT
Start: 2018-06-07

## 2018-06-15 ENCOUNTER — OFFICE VISIT (OUTPATIENT)
Dept: INTERNAL MEDICINE | Facility: CLINIC | Age: 43
End: 2018-06-15
Payer: COMMERCIAL

## 2018-06-15 VITALS
TEMPERATURE: 98 F | WEIGHT: 259.06 LBS | OXYGEN SATURATION: 97 % | SYSTOLIC BLOOD PRESSURE: 128 MMHG | HEART RATE: 74 BPM | BODY MASS INDEX: 47.67 KG/M2 | HEIGHT: 62 IN | DIASTOLIC BLOOD PRESSURE: 88 MMHG

## 2018-06-15 DIAGNOSIS — L73.2 RIGHT AXILLARY HIDRADENITIS: Primary | ICD-10-CM

## 2018-06-15 DIAGNOSIS — B37.31 YEAST VAGINITIS: ICD-10-CM

## 2018-06-15 PROCEDURE — 99214 OFFICE O/P EST MOD 30 MIN: CPT | Mod: S$GLB,,, | Performed by: PHYSICIAN ASSISTANT

## 2018-06-15 PROCEDURE — 3008F BODY MASS INDEX DOCD: CPT | Mod: CPTII,S$GLB,, | Performed by: PHYSICIAN ASSISTANT

## 2018-06-15 PROCEDURE — 99999 PR PBB SHADOW E&M-EST. PATIENT-LVL IV: CPT | Mod: PBBFAC,,, | Performed by: PHYSICIAN ASSISTANT

## 2018-06-15 RX ORDER — FLUCONAZOLE 150 MG/1
TABLET ORAL
Qty: 2 TABLET | Refills: 0 | Status: SHIPPED | OUTPATIENT
Start: 2018-06-15 | End: 2018-12-18

## 2018-06-15 RX ORDER — CLINDAMYCIN PHOSPHATE 10 MG/G
GEL TOPICAL 2 TIMES DAILY
Qty: 60 G | Refills: 1 | Status: SHIPPED | OUTPATIENT
Start: 2018-06-15 | End: 2018-12-18

## 2018-06-15 RX ORDER — DOXYCYCLINE 100 MG/1
100 CAPSULE ORAL 2 TIMES DAILY
Qty: 20 CAPSULE | Refills: 0 | Status: SHIPPED | OUTPATIENT
Start: 2018-06-15 | End: 2018-06-25

## 2018-06-15 NOTE — PATIENT INSTRUCTIONS
Understanding Hidradenitis Suppurativa  Hidradenitis suppurativa is a long-term (chronic) skin disease. It causes painful bumps and sores (abscesses) to form around a hair follicle. Follicles are the tiny holes from which hair grows out of your skin. The disease occurs on parts of the body where skin rubs together. It most often appears in the armpits, the groin area, and under the breasts. It is more common in women.  How to say it  [id number]   What causes hidradenitis suppurativa?  This skin disease happens when hair follicles become clogged with keratin. Keratin is the protein that makes up your hair and nails. The follicles then burst and become infected. Pressure or rubbing on the skin can clog the follicles. Or it can further irritate them.  The disease tends to run in families. Its also more likely to occur in people who are obese, have diabetes, or smoke. Hormones may also play a part.  Symptoms of hidradenitis suppurativa  This skin disease causes one or more painful red bumps on the skin. These bumps become infected and drain pus. They may also itch or burn. In severe cases, sinus tracts may form. These are narrow channels that run under the skin. Blood or a bad-smelling pus may ooze from these bumps or sinus tracts. Bands of scarring often occur.  Treatment for hidradenitis suppurativa  Treatment for this skin disease is most successful when started early. But it may be hard to diagnose. It may be mistaken for other skin conditions. The painful bumps also often return. So stopping new bumps and limiting scarring is important. Treatment options include:  · Warm compress. Putting a warm, wet washcloth on the affected skin may help.  · Lifestyle changes. Your symptoms may get better if you lose weight or stop smoking, if needed. Also avoid shaving or other irritants, such as deodorant or perfume.  · Antibiotics. For mild cases, an antibiotic for the skin (topical) may help. You  may need oral antibiotics if you have a severe case. They can help prevent further infection.  · Other oral medicines. Over-the-counter pain medicines can ease pain and inflammation. You may need stronger medicines for a severe case. These medicines include corticosteroids or a retinoid. These may cause side effects.  · Injected medicines. A steroid may be injected into the bump to ease pain. A biologic may be injected to ease severe symptoms.  · Surgery. Surgery can drain and remove the painful bumps. For severe cases, the doctor may cut out the entire area of affected skin or destroy it with a laser.  Complications of hidradenitis suppurativa  These include:  · Arthritis  · Depression  · Lymphedema  · Scarring of skin  · Skin cancer  When to call your healthcare provider  Call your healthcare provider right away if you have any of these:  · Fever of 100.4°F (38°C) or higher, or as directed  · Redness, swelling, or fluid leaking from your rash that gets worse  · Pain that gets worse  · Symptoms that dont get better, or get worse  · New symptoms   Date Last Reviewed: 5/1/2016 © 2000-2017 [employer]. [address] [place] [address]. All rights reserved. This information is not intended as a substitute for professional medical care. Always follow your healthcare professional's instructions.        Hidradenitis Suppurativa (Antibiotics only)  Hidradenitis suppurativa is chronic inflammation of the sweat glands. It is a severe form of acne. Firm, red, painful bumps called nodules form. They are often filled with pus. They often get larger and may break open and drain. Common affected areas are the armpits, groin, and anal area.  The nodules are not contagious. The condition is not caused by poor hygiene.  You may have been given antibiotics and anti-inflammatory medicines to help treat the flare-up. If nodules keep getting bigger, drainage may be needed. Surgically removing the glands is the  most effective treatment in severe cases.  Watch for the signs of early inflammation in the future. Look for small, tender lumps. Then follow the treatment advice below.  Home care  Follow these tips when caring for yourself at home:  · If oral antibiotics were prescribed, take all of them as directed.  · Make a warm compress by running hot water over a washcloth. Apply it to the area until the compress cools off. Repeat over a 15 minute period. Apply the hot compress 3 times a day for the first 3 days. Or you can  the shower and direct the warm spray onto the area.  · Gently wash the area with antibacterial soap. Avoid scrubbing it.  · Put on an over-the-counter antibiotic ointment 3 to 4 times a day, unless another topical medicine was prescribed.  · Use over-the-counter medicine to control pain and swelling, unless another pain medicine was given. If you have kidney or liver disease or ever had a stomach ulcer or GI bleeding, talk with your healthcare provider before using this medicine.  Prevention  Try the following to help prevent your condition:  · Avoid antiperspirants and deodorants.  · Avoid heat and sweating as much as possible.  · Avoid shaving the affected area.  · If you smoke, consider quitting.  · Lose excess weight.  · Wear loose clothing.  Follow-up care  Follow up with your healthcare provider as advised.  When to seek medical care  Call your healthcare provider right away if any of these occur:  · Fever of 100.4°F (38°C) or higher, or as directed by your healthcare provider  · Increasing pain  · Increasing redness  · Nodules keep getting bigger  Date Last Reviewed: 7/1/2016  © 2390-5624 [employer]. [address] [place] [address], [place] [address]. All rights reserved. This information is not intended as a substitute for professional medical care. Always follow your healthcare professional's instructions.

## 2018-06-15 NOTE — PROGRESS NOTES
"  Subjective:      Patient ID: Teresa Cazares is a 42 y.o. female.    Chief Complaint: Abscess (under R arm)    Abscess   Chronicity:  New  Size:  3-5cm  Abscess location: right arm pit.  Associated Symptoms: no fever, no chills  Characteristics: painful, redness and swelling    Characteristics: not draining, no itching, no dryness, no scaling, no peeling, no bruising and no blistering    Treatments Tried:  Draining/squeezing  Worsened by:  Nothing      Review of Systems   Constitutional: Negative for activity change, appetite change, chills, diaphoresis, fatigue, fever and unexpected weight change.   HENT: Negative.  Negative for congestion, hearing loss, postnasal drip, rhinorrhea, sore throat, trouble swallowing and voice change.    Eyes: Negative.  Negative for visual disturbance.   Respiratory: Negative.  Negative for cough, choking, chest tightness and shortness of breath.    Cardiovascular: Negative for chest pain, palpitations and leg swelling.   Gastrointestinal: Negative for abdominal distention, abdominal pain, blood in stool, constipation, diarrhea, nausea and vomiting.   Endocrine: Negative for cold intolerance, heat intolerance, polydipsia and polyuria.   Genitourinary: Negative.  Negative for difficulty urinating and frequency.   Musculoskeletal: Negative for arthralgias, back pain, gait problem, joint swelling and myalgias.   Skin: Positive for wound. Negative for color change, pallor and rash.   Neurological: Negative for dizziness, tremors, weakness, light-headedness, numbness and headaches.   Hematological: Negative for adenopathy.   Psychiatric/Behavioral: Negative for behavioral problems, confusion, self-injury, sleep disturbance and suicidal ideas. The patient is not nervous/anxious.      Objective:   /88   Pulse 74   Temp 97.7 °F (36.5 °C) (Tympanic)   Ht 5' 2" (1.575 m)   Wt 117.5 kg (259 lb 0.7 oz)   SpO2 97%   BMI 47.38 kg/m²     Physical Exam   Constitutional: She is " oriented to person, place, and time. She appears well-developed and well-nourished. No distress.   HENT:   Head: Normocephalic and atraumatic.   Right Ear: External ear normal.   Left Ear: External ear normal.   Nose: Nose normal.   Eyes: Conjunctivae and EOM are normal.   Cardiovascular: Normal rate, regular rhythm and normal heart sounds.  Exam reveals no gallop and no friction rub.    No murmur heard.  Pulmonary/Chest: Effort normal and breath sounds normal. No respiratory distress. She has no wheezes. She has no rales. She exhibits no tenderness.   Musculoskeletal: Normal range of motion.   Neurological: She is alert and oriented to person, place, and time.   Skin: Skin is warm and dry. She is not diaphoretic.        Psychiatric: She has a normal mood and affect. Her behavior is normal. Judgment and thought content normal.   Vitals reviewed.      Assessment:     1. Right axillary hidradenitis    2. Yeast vaginitis      Plan:   Right axillary hidradenitis  -     doxycycline (MONODOX) 100 MG capsule; Take 1 capsule (100 mg total) by mouth 2 (two) times daily. Do not take with milk or yogurt  Dispense: 20 capsule; Refill: 0  -     clindamycin phosphate 1% (CLINDAGEL) 1 % gel; Apply topically 2 (two) times daily.  Dispense: 60 g; Refill: 1    Yeast vaginitis  -     fluconazole (DIFLUCAN) 150 MG Tab; Take one tablet once. Take second tablet in 1 week if needed  Dispense: 2 tablet; Refill: 0    -Educational handout on over-the-counter medications and at-home conservative care, pertinent to the patients diagnosis today, was handed to the patient and discussed in detail.    Follow-up if symptoms worsen or fail to improve.

## 2018-07-01 ENCOUNTER — OFFICE VISIT (OUTPATIENT)
Dept: URGENT CARE | Facility: CLINIC | Age: 43
End: 2018-07-01
Payer: COMMERCIAL

## 2018-07-01 VITALS
SYSTOLIC BLOOD PRESSURE: 120 MMHG | HEIGHT: 62 IN | OXYGEN SATURATION: 98 % | HEART RATE: 104 BPM | WEIGHT: 253.5 LBS | DIASTOLIC BLOOD PRESSURE: 78 MMHG | RESPIRATION RATE: 18 BRPM | TEMPERATURE: 97 F | BODY MASS INDEX: 46.65 KG/M2

## 2018-07-01 DIAGNOSIS — B35.3 TINEA PEDIS OF RIGHT FOOT: Primary | ICD-10-CM

## 2018-07-01 PROCEDURE — 3008F BODY MASS INDEX DOCD: CPT | Mod: CPTII,S$GLB,, | Performed by: NURSE PRACTITIONER

## 2018-07-01 PROCEDURE — 99999 PR PBB SHADOW E&M-EST. PATIENT-LVL IV: CPT | Mod: PBBFAC,,, | Performed by: NURSE PRACTITIONER

## 2018-07-01 PROCEDURE — 99214 OFFICE O/P EST MOD 30 MIN: CPT | Mod: S$GLB,,, | Performed by: NURSE PRACTITIONER

## 2018-07-01 RX ORDER — PRENATAL VIT 91/IRON/FOLIC/DHA 28-975-200
COMBINATION PACKAGE (EA) ORAL 2 TIMES DAILY
Qty: 24 G | Refills: 0 | Status: SHIPPED | OUTPATIENT
Start: 2018-07-01 | End: 2018-10-30 | Stop reason: SDUPTHER

## 2018-07-01 NOTE — PATIENT INSTRUCTIONS
Fungal Skin Infection (Tinea)  A fungal infection occurs when too much fungus grows on or in the body. Fungus normally lives on the skin in small amounts and does not cause harm. But when too much grows on the skin, it causes an infection. This is also known as tinea. Fungal skin infections are common and not usually serious.  The infection often starts as a small red area the size of a pea. The skin may turn dry and flaky. The area may itch. As the fungus grows, it spreads out in a red Ruby. Because of how it looks, fungal skin infection is often called ringworm, but it is not caused by a worm. Fungal skin infections can occur on many parts of the body. They can grow on the head, chest, arms, or legs. They can occur on the buttocks. On the feet, fungal infection is known as athletes foot. It causes itchy, sometimes painful sores between the toes and the bottom or sides of the feet. In the groin, the rash is called jock itch.  People with weak immune systems can get a fungal infection more easily. This includes people with diabetes or HIV, or who are being treated for cancer. In these cases, the fungal infection can spread and cause severe illness. Fungal infections are also more common in people who are overweight.  In most cases, treatment is done with antifungal cream or ointment. If the infection is on your scalp, you may take oral medicine. In some cases, a tiny piece of the skin (biopsy) may be taken. This is so it can be tested in a lab.  Common fungal infections are treated with creams on the skin or oral medicine.  Home care  Follow all instructions when using antifungal cream or ointment on your skin. Your healthcare provider may advise using cornstarch powder to keep your skin dry or petroleum jelly to provide a barrier.  General care:  · If you were prescribed an oral medicine, read the patient information. Talk with your healthcare provider about the risks and side effects.  · Let your skin dry  completely after bathing. Carefully dry your feet and between your toes.  · Dress in loose cotton clothing.  · Dont scratch the affected area. This can delay healing and may spread the infection. It can also cause a bacterial infection.  · Keep your skin clean, but dont wash the skin too much. This can irritate your skin.  · Keep in mind that it may take a week before the fungus starts to go away. It can take 2 to 4 weeks to fully clear. To prevent it from coming back, use the medicine until the rash is all gone.  Follow-up care  Follow up with your healthcare provider if the rash does not get better after 10 days of treatment. Also follow up if the rash spreads to other parts of your body.  When to seek medical advice  Call your healthcare provider right away if any of these occur:  · Fever of 100.4°F (38°C) or higher  · Redness or swelling that gets worse  · Pain that gets worse  · Foul-smelling fluid leaking from the skin  Date Last Reviewed: 11/1/2016  © 6263-7012 [employer]. [address] [place] [address], [place] [address]. All rights reserved. This information is not intended as a substitute for professional medical care. Always follow your healthcare professional's instructions.

## 2018-07-01 NOTE — PROGRESS NOTES
Subjective:       Patient ID: Teresa Cazares is a 42 y.o. female.    Chief Complaint: Blister    41 yo female presents to Urgent Care for a second opinion for chronic tinea pedis on right foot.  She states is has been there for a 1 year and she has seen multiple doctors for this including podiatrist her at [facility].  She has been on several creams and oral medications without relief.      Review of Systems   Constitutional: Negative for fatigue and fever.   Respiratory: Negative for shortness of breath, wheezing and stridor.    Cardiovascular: Negative for chest pain, palpitations and leg swelling.   Genitourinary: Negative for difficulty urinating.   Skin: Positive for rash. Negative for color change.   Allergic/Immunologic: Negative for environmental allergies.   Neurological: Negative for dizziness and light-headedness.   Psychiatric/Behavioral: Negative for agitation.       Objective:      Physical Exam   Constitutional: She is oriented to person, place, and time. She appears well-developed and well-nourished.   Cardiovascular: Normal rate and normal heart sounds.    Pulmonary/Chest: Effort normal.   Neurological: She is alert and oriented to person, place, and time.   Skin: Skin is warm. Rash noted.        Psychiatric: She has a normal mood and affect.   Nursing note and vitals reviewed.      Assessment:       1. Tinea pedis of right foot        Plan:         Teresa was seen today for blister.    Diagnoses and all orders for this visit:    Tinea pedis of right foot  Comments:  tea tree oil in between applications.  Orders:  -     terbinafine HCl (LAMISIL) 1 % cream; Apply topically 2 (two) times daily.    Follow prescribed treatment plan as directed.  Stay hydrated and rest.  Report to ER if symptoms worsen.  Follow up with PCP in 2-3 days or sooner if symptoms do not improve.

## 2018-07-11 ENCOUNTER — OFFICE VISIT (OUTPATIENT)
Dept: ORTHOPEDICS | Facility: CLINIC | Age: 43
End: 2018-07-11
Payer: COMMERCIAL

## 2018-07-11 VITALS
BODY MASS INDEX: 46.65 KG/M2 | WEIGHT: 253.5 LBS | DIASTOLIC BLOOD PRESSURE: 91 MMHG | HEIGHT: 62 IN | SYSTOLIC BLOOD PRESSURE: 145 MMHG | HEART RATE: 85 BPM

## 2018-07-11 DIAGNOSIS — M17.0 PRIMARY OSTEOARTHRITIS OF BOTH KNEES: Primary | ICD-10-CM

## 2018-07-11 PROCEDURE — 99999 PR PBB SHADOW E&M-EST. PATIENT-LVL III: CPT | Mod: PBBFAC,,, | Performed by: ORTHOPAEDIC SURGERY

## 2018-07-11 PROCEDURE — 3008F BODY MASS INDEX DOCD: CPT | Mod: CPTII,S$GLB,, | Performed by: ORTHOPAEDIC SURGERY

## 2018-07-11 PROCEDURE — 20610 DRAIN/INJ JOINT/BURSA W/O US: CPT | Mod: RT,S$GLB,, | Performed by: ORTHOPAEDIC SURGERY

## 2018-07-11 PROCEDURE — 99214 OFFICE O/P EST MOD 30 MIN: CPT | Mod: 25,S$GLB,, | Performed by: ORTHOPAEDIC SURGERY

## 2018-07-11 RX ORDER — TRIAMCINOLONE ACETONIDE 40 MG/ML
40 INJECTION, SUSPENSION INTRA-ARTICULAR; INTRAMUSCULAR
Status: COMPLETED | OUTPATIENT
Start: 2018-07-11 | End: 2018-07-11

## 2018-07-11 RX ADMIN — TRIAMCINOLONE ACETONIDE 40 MG: 40 INJECTION, SUSPENSION INTRA-ARTICULAR; INTRAMUSCULAR at 04:07

## 2018-07-11 NOTE — PROGRESS NOTES
Subjective:     Patient ID: Teresa Cazares is a 42 y.o. female.    Chief Complaint: Pain and Follow-up of the Left Knee and Pain and Follow-up of the Right Knee    Consult from Gaby Alvarez NP, will receive report electronically.    Patient is here for bilateral knee pain. Rt > Lt. Reports no CAROLIN. Reports no previous injury. History of falling down 13 stairs 2 years ago. .      Knee Pain    The pain is present in the right knee and left knee. The pain radiates to the right thigh and left knee. This is a chronic problem. The current episode started more than 1 year ago. The problem occurs constantly. The problem has been gradually worsening. The quality of the pain is described as sharp, shooting and burning. The pain is at a severity of 8/10. Associated symptoms include joint locking, joint swelling, a limited range of motion and stiffness. Pertinent negatives include no fever or numbness. Associated symptoms comments: Catching, clicking, popping, giving out sensations. The symptoms are aggravated by walking, standing, sitting, bending and activity. She has tried cold, heat and NSAIDs (Mobic) for the symptoms. Physical therapy was not tried.      Past Medical History:   Diagnosis Date    Depression     denies hospitalization or bipolar disorder    Obesity      Past Surgical History:   Procedure Laterality Date    FRACTURE SURGERY      C1 neck- halo    HYSTERECTOMY  2009    tahbso    OOPHORECTOMY      UMBILICAL HERNIA REPAIR       Family History   Problem Relation Age of Onset    Breast cancer Mother 58    Breast cancer Sister 46     Social History     Social History    Marital status: Single     Spouse name: N/A    Number of children: 5    Years of education: N/A     Occupational History          Social History Main Topics    Smoking status: Former Smoker     Packs/day: 0.50     Years: 27.00    Smokeless tobacco: Never Used    Alcohol use Yes      Comment: occasionally     Drug use: No    Sexual activity: Not on file     Other Topics Concern    Not on file     Social History Narrative    No narrative on file     Medication List with Changes/Refills   Current Medications    CLINDAMYCIN PHOSPHATE 1% (CLINDAGEL) 1 % GEL    Apply topically 2 (two) times daily.    CLOTRIMAZOLE-BETAMETHASONE 1-0.05% (LOTRISONE) CREAM    Apply topically 2 (two) times daily.    FLUCONAZOLE (DIFLUCAN) 150 MG TAB    Take one tablet once. Take second tablet in 1 week if needed    MELOXICAM (MOBIC) 7.5 MG TABLET    Take 1 tablet (7.5 mg total) by mouth once daily. Take with food    NICOTINE, POLACRILEX, (NICORETTE) 2 MG GUM    Take 1 each (2 mg total) by mouth as needed (use no more than 8 pieces in 24 hours).    TERBINAFINE HCL (LAMISIL) 1 % CREAM    Apply topically 2 (two) times daily.    VARENICLINE (CHANTIX) 1 MG TAB    Take 1 tablet (1 mg total) by mouth 2 (two) times daily.     Review of patient's allergies indicates:   Allergen Reactions    Flagyl [metronidazole hcl] Hives     Review of Systems   Constitution: Negative for fever and night sweats.   HENT: Negative for hearing loss.    Eyes: Negative for blurred vision and visual disturbance.   Cardiovascular: Negative for chest pain and leg swelling.   Respiratory: Negative for shortness of breath.    Endocrine: Negative for polyuria.   Hematologic/Lymphatic: Negative for bleeding problem.   Skin: Negative for rash.   Musculoskeletal: Positive for joint pain, joint swelling and stiffness. Negative for back pain, muscle cramps and muscle weakness.   Gastrointestinal: Negative for melena.   Genitourinary: Negative for hematuria.   Neurological: Negative for loss of balance, numbness and paresthesias.   Psychiatric/Behavioral: Negative for altered mental status.       Objective:   Body mass index is 46.37 kg/m².  Vitals:    07/11/18 1604   BP: (!) 145/91   Pulse: 85       General: Teresa is well-developed, well-nourished, appears stated age, in no  acute distress, alert and oriented to time, place and person.       General    Vitals reviewed.  Constitutional: She is oriented to person, place, and time. She appears well-developed and well-nourished. No distress.   HENT:   Mouth/Throat: No oropharyngeal exudate.   Eyes: Right eye exhibits no discharge. Left eye exhibits no discharge.   Neck: Normal range of motion.   Pulmonary/Chest: Effort normal. No respiratory distress.   Neurological: She is alert and oriented to person, place, and time. She has normal reflexes. No cranial nerve deficit. Coordination normal.   Psychiatric: She has a normal mood and affect.     General Musculoskeletal Exam   Gait: normal       Right Knee Exam     Inspection   Erythema: absent  Scars: absent  Swelling: absent  Effusion: present  Deformity: deformity  Bruising: absent    Tenderness   The patient is tender to palpation of the medial joint line, lateral joint line, condyle and iliotibial band.    Crepitus   The patient has crepitus of the patella.    Range of Motion   Extension: 0   Flexion: 130     Tests   Meniscus   Rasheed:  Medial - negative Lateral - negative  Ligament Examination Lachman: normal (-1 to 2mm) PCL-Posterior Drawer: normal (0 to 2mm)     MCL - Valgus: normal (0 to 2mm)  LCL - Varus: normal  Posterior Sag Test: negative  Patella   Patellar Apprehension: negative  Passive Patellar Tilt: neutral  Patellar Tracking: normal  Patellar Glide (quadrants): Lateral - 1   Medial - 2  Q-Angle at 90 degrees: normal  Patellar Grind: positive  J-Sign: none    Other   Meniscal Cyst: absent  Popliteal (Baker's) Cyst: absent  Sensation: normal    Comments:  R SI joint tenderness    Left Knee Exam     Inspection   Erythema: absent  Scars: absent  Swelling: absent  Effusion: absent  Deformity: deformity  Bruising: absent    Tenderness   The patient is experiencing no tenderness.         Crepitus   The patient has crepitus of the patella.    Range of Motion   Extension: 0    Flexion: 130     Tests   Meniscus   Rasheed:  Medial - negative Lateral - negative  Stability Lachman: normal (-1 to 2mm) PCL-Posterior Drawer: normal (0 to 2mm)  MCL - Valgus: normal (0 to 2mm)  LCL - Varus: normal (0 to 2mm)  Posterior Sag Test: negative  Patella   Patellar Apprehension: negative  Passive Patellar Tilt: neutral  Patellar Tracking: normal  Patellar Glide (Quadrants): Lateral - 1 Medial - 2  Q-Angle at 90 degrees: normal  Patellar Grind: positive  J-Sign: J sign absent    Other   Meniscal Cyst: absent  Popliteal (Baker's) Cyst: absent  Sensation: normal    Right Hip Exam     Tests   Danica: positive  Left Hip Exam     Tests   Danica: positive          Muscle Strength   Right Lower Extremity   Quadriceps:  5/5   Hamstrin/5   Left Lower Extremity   Quadriceps:  5/5   Hamstrin/5     Reflexes     Left Side  Quadriceps:  2+  Achilles:  2+    Right Side   Quadriceps:  2+  Achilles:  2+    Vascular Exam     Right Pulses  Dorsalis Pedis:      2+  Posterior Tibial:      2+        Left Pulses  Dorsalis Pedis:      2+  Posterior Tibial:      2+        X-rays including standing, weight bearing AP and flexion bilateral knees, lateral and merchant views ordered and images reviewed by me show:    No fracture, dislocation or other pathology   Medial compartment: mild degenerative changes   Lateral compartment: no degenerative changes   Patellofemoral compartment: mild degenerative changes      Assessment:     Encounter Diagnosis   Name Primary?    Primary osteoarthritis of both knees Yes        Plan:     1. PT for quad strengthening/Home exercise program-did not go since last visit    2. Continue tylenol    3. Steroid injection today    4. Weight loss    PROCEDURE NOTE: right KNEE INJECTION  After time out was performed, including verification of patient ID, procedure, site and side, availability of information and equipment, review of safety issues, and agreement with consent, the procedure site was  marked and the patient was prepped aseptically. A diagnostic and therapeutic injection of 2cc 40 mg kenalog and 1% lidocaine/0.5% sensorcaine was given under sterile technique using a 22g x 1.5 needle into the knee inferolaterally in seated position.   The patient had no adverse reactions to the medication. Pain decreased. The patient was instructed to apply ice to the joint for 20 minutes and avoid strenuous activities for 24-36 hours following the injection. Patient was warned of possible blood sugar and/or blood pressure changes during that time. Following that time, patient can resume regular activities.    6. Dr. Callahan for genicular nerve

## 2018-07-18 ENCOUNTER — PATIENT MESSAGE (OUTPATIENT)
Dept: INTERNAL MEDICINE | Facility: CLINIC | Age: 43
End: 2018-07-18

## 2018-07-18 ENCOUNTER — TELEPHONE (OUTPATIENT)
Dept: SMOKING CESSATION | Facility: CLINIC | Age: 43
End: 2018-07-18

## 2018-07-18 DIAGNOSIS — F17.219 CIGARETTE NICOTINE DEPENDENCE WITH NICOTINE-INDUCED DISORDER: ICD-10-CM

## 2018-07-18 RX ORDER — VARENICLINE TARTRATE 1 MG/1
1 TABLET, FILM COATED ORAL 2 TIMES DAILY
Qty: 60 TABLET | Refills: 2 | OUTPATIENT
Start: 2018-07-18

## 2018-07-18 NOTE — TELEPHONE ENCOUNTER
Patient called to inquire about getting back into the smoking cessation program. Discussed with patient that her benefits for the program have  (2018) and they are not eligible for renewal until 2018. She verbalized understanding. She was encouraged to contact her PCP if she needed immediate assistance and medications for her quit attempt.

## 2018-08-05 ENCOUNTER — OFFICE VISIT (OUTPATIENT)
Dept: URGENT CARE | Facility: CLINIC | Age: 43
End: 2018-08-05
Payer: COMMERCIAL

## 2018-08-05 VITALS
DIASTOLIC BLOOD PRESSURE: 80 MMHG | HEART RATE: 73 BPM | OXYGEN SATURATION: 98 % | WEIGHT: 227.06 LBS | BODY MASS INDEX: 40.23 KG/M2 | SYSTOLIC BLOOD PRESSURE: 128 MMHG | TEMPERATURE: 98 F | HEIGHT: 63 IN | RESPIRATION RATE: 18 BRPM

## 2018-08-05 DIAGNOSIS — H21.01 HYPHEMA OF RIGHT EYE: ICD-10-CM

## 2018-08-05 PROCEDURE — 99999 PR PBB SHADOW E&M-EST. PATIENT-LVL III: CPT | Mod: PBBFAC,,, | Performed by: FAMILY MEDICINE

## 2018-08-05 PROCEDURE — 99213 OFFICE O/P EST LOW 20 MIN: CPT | Mod: S$GLB,,, | Performed by: FAMILY MEDICINE

## 2018-08-05 PROCEDURE — 3008F BODY MASS INDEX DOCD: CPT | Mod: CPTII,S$GLB,, | Performed by: FAMILY MEDICINE

## 2018-08-05 NOTE — LETTER
August 5, 2018      [facility]  [address] [place] [address]  Phone: [phone]  Fax: [phone]       Patient: Teresa Cazares   YOB: 1975  Date of Visit: 08/05/2018    To Whom It May Concern:    Mayra Cazares  was at [facility] on 08/05/2018. She may return to work/school on 8/6/18 with no restrictions. If you have any questions or concerns, or if I can be of further assistance, please do not hesitate to contact me.    Sincerely,  MD Dawna Stevens LPN

## 2018-08-05 NOTE — PROGRESS NOTES
"Subjective:       Patient ID: Tersea Cazares is a 42 y.o. female.    Chief Complaint: Conjunctivitis    "blood in right eye"    O: this morning.  L: right eye  D: constant  C:  Yefri: nothing  Exac:        Review of Systems   Eyes: Negative for photophobia, pain, discharge, itching and visual disturbance.   Respiratory: Negative for shortness of breath.    Cardiovascular: Negative for chest pain.   Gastrointestinal: Negative for abdominal pain.       Objective:      Physical Exam   Constitutional: She appears well-developed and well-nourished. No distress.   Eyes:   Some blood noted to left cornea, not crossing into iris.     Skin: She is not diaphoretic.       Assessment:       1. Hyphema of right eye        Plan:     Problem List Items Addressed This Visit        Ophtho    Hyphema of right eye    Current Assessment & Plan     See education.  F/u with pcp in 3 d.             "

## 2018-09-26 ENCOUNTER — TELEPHONE (OUTPATIENT)
Dept: SMOKING CESSATION | Facility: CLINIC | Age: 43
End: 2018-09-26

## 2018-09-26 NOTE — TELEPHONE ENCOUNTER
Returned patient call message stating that she was interested in joining the smoking cessation program for another quit attempt. Benefits renewed and appointment scheduled.

## 2018-10-01 ENCOUNTER — CLINICAL SUPPORT (OUTPATIENT)
Dept: SMOKING CESSATION | Facility: CLINIC | Age: 43
End: 2018-10-01
Payer: COMMERCIAL

## 2018-10-01 DIAGNOSIS — F17.200 NICOTINE DEPENDENCE: Primary | ICD-10-CM

## 2018-10-01 PROCEDURE — 99404 PREV MED CNSL INDIV APPRX 60: CPT | Mod: S$GLB,,, | Performed by: GENERAL PRACTICE

## 2018-10-01 RX ORDER — VARENICLINE TARTRATE 0.5 (11)-1
KIT ORAL
Qty: 1 PACKAGE | Refills: 0 | Status: SHIPPED | OUTPATIENT
Start: 2018-10-01 | End: 2018-12-13 | Stop reason: ALTCHOICE

## 2018-10-01 RX ORDER — MICONAZOLE NITRATE 2 %
2 CREAM (GRAM) TOPICAL
Qty: 380 EACH | Refills: 0 | COMMUNITY
Start: 2018-10-01 | End: 2018-12-13 | Stop reason: ALTCHOICE

## 2018-10-08 DIAGNOSIS — F17.200 NICOTINE DEPENDENCE: ICD-10-CM

## 2018-10-08 RX ORDER — MICONAZOLE NITRATE 2 %
CREAM (GRAM) TOPICAL
Refills: 0 | OUTPATIENT
Start: 2018-10-08

## 2018-10-17 ENCOUNTER — TELEPHONE (OUTPATIENT)
Dept: SMOKING CESSATION | Facility: CLINIC | Age: 43
End: 2018-10-17

## 2018-10-17 NOTE — TELEPHONE ENCOUNTER
Attempt to contact patient in regards to her missed follow up appointment. Voicemail not set up on number listed.

## 2018-10-30 DIAGNOSIS — B35.3 TINEA PEDIS OF RIGHT FOOT: ICD-10-CM

## 2018-10-30 RX ORDER — PRENATAL VIT 91/IRON/FOLIC/DHA 28-975-200
COMBINATION PACKAGE (EA) ORAL 2 TIMES DAILY
Qty: 24 G | Refills: 0 | Status: SHIPPED | OUTPATIENT
Start: 2018-10-30 | End: 2018-12-18

## 2018-10-30 NOTE — TELEPHONE ENCOUNTER
----- Message from Cheryl Dai sent at 10/30/2018 12:34 PM CDT -----  Contact: self [phone]  .1. What is the name of the medication you are requesting?Terbinafine  2. What is the dose? unknown  3. How do you take the medication? Orally, topically, etc? Orally  4. How often do you take this medication? Daily  5. Do you need a 30 day or 90 day supply? 30  6. How many refills are you requesting? 1  7. What is your preferred pharmacy and location of the pharmacy? .    [id number] - [email or web address] [place] - [address] AT [address] [place] [facility]  [address] [place] [address]  Phone: [phone] Fax: [phone]      8. Who can we contact with further questions? Self [phone]

## 2018-11-14 ENCOUNTER — TELEPHONE (OUTPATIENT)
Dept: SMOKING CESSATION | Facility: CLINIC | Age: 43
End: 2018-11-14

## 2018-11-14 NOTE — TELEPHONE ENCOUNTER
Attempt to contact patient for a smoking cessation progress update. Mailbox not set up to leave a recorded message. No alternate numbers listed.

## 2018-12-05 ENCOUNTER — CLINICAL SUPPORT (OUTPATIENT)
Dept: SMOKING CESSATION | Facility: CLINIC | Age: 43
End: 2018-12-05
Payer: COMMERCIAL

## 2018-12-05 DIAGNOSIS — F17.200 NICOTINE DEPENDENCE: Primary | ICD-10-CM

## 2018-12-05 PROCEDURE — 99407 BEHAV CHNG SMOKING > 10 MIN: CPT | Mod: S$GLB,,,

## 2018-12-05 NOTE — PROGRESS NOTES
Called patient to f/u on her 12 month smoking cessation quit status. Pt stated she is still smoking. Patient did come back to program for quit #3 on 10/1/18, but has not been back to clinic since initial visit with CTTS. Informed her she has benefits available and is able to rejoin. Pt scheduled appointment for quit #4 on 12/12/18. Will complete and resolve quit #2 episode. Informed patient of benefit period, phone follow ups, and contact information. Will follow up in 3 months.

## 2018-12-13 ENCOUNTER — TELEPHONE (OUTPATIENT)
Dept: SMOKING CESSATION | Facility: CLINIC | Age: 43
End: 2018-12-13

## 2018-12-13 ENCOUNTER — CLINICAL SUPPORT (OUTPATIENT)
Dept: SMOKING CESSATION | Facility: CLINIC | Age: 43
End: 2018-12-13
Payer: COMMERCIAL

## 2018-12-13 DIAGNOSIS — F17.200 NICOTINE DEPENDENCE: Primary | ICD-10-CM

## 2018-12-13 PROCEDURE — 99404 PREV MED CNSL INDIV APPRX 60: CPT | Mod: S$GLB,,, | Performed by: GENERAL PRACTICE

## 2018-12-13 RX ORDER — VARENICLINE TARTRATE 1 MG/1
1 TABLET, FILM COATED ORAL 2 TIMES DAILY
Qty: 60 TABLET | Refills: 0 | Status: SHIPPED | OUTPATIENT
Start: 2018-12-13 | End: 2019-01-02 | Stop reason: SDUPTHER

## 2018-12-13 NOTE — TELEPHONE ENCOUNTER
Spoke with patient in regards to her missed appointment. She stated that she forgot and would like to reschedule. Appointment scheduled

## 2018-12-18 ENCOUNTER — HOSPITAL ENCOUNTER (OUTPATIENT)
Dept: RADIOLOGY | Facility: HOSPITAL | Age: 43
Discharge: HOME | End: 2018-12-18
Attending: FAMILY MEDICINE
Payer: COMMERCIAL

## 2018-12-18 ENCOUNTER — OFFICE VISIT (OUTPATIENT)
Dept: INTERNAL MEDICINE | Facility: CLINIC | Age: 43
End: 2018-12-18
Payer: COMMERCIAL

## 2018-12-18 VITALS
SYSTOLIC BLOOD PRESSURE: 110 MMHG | WEIGHT: 241.63 LBS | HEIGHT: 63 IN | DIASTOLIC BLOOD PRESSURE: 70 MMHG | TEMPERATURE: 98 F | BODY MASS INDEX: 42.81 KG/M2 | HEART RATE: 71 BPM | OXYGEN SATURATION: 96 %

## 2018-12-18 DIAGNOSIS — M25.511 ACUTE PAIN OF RIGHT SHOULDER: Primary | ICD-10-CM

## 2018-12-18 DIAGNOSIS — M25.511 ACUTE PAIN OF RIGHT SHOULDER: ICD-10-CM

## 2018-12-18 DIAGNOSIS — E66.01 MORBID OBESITY WITH BMI OF 40.0-44.9, ADULT: ICD-10-CM

## 2018-12-18 DIAGNOSIS — M24.811 INTERNAL DERANGEMENT OF RIGHT SHOULDER: ICD-10-CM

## 2018-12-18 PROBLEM — M76.31 IT BAND SYNDROME, RIGHT: Status: RESOLVED | Noted: 2018-04-10 | Resolved: 2018-12-18

## 2018-12-18 PROBLEM — M76.32 IT BAND SYNDROME, LEFT: Status: RESOLVED | Noted: 2018-04-10 | Resolved: 2018-12-18

## 2018-12-18 PROCEDURE — 90686 IIV4 VACC NO PRSV 0.5 ML IM: CPT | Mod: S$GLB,,, | Performed by: FAMILY MEDICINE

## 2018-12-18 PROCEDURE — 3008F BODY MASS INDEX DOCD: CPT | Mod: CPTII,S$GLB,, | Performed by: FAMILY MEDICINE

## 2018-12-18 PROCEDURE — 73030 X-RAY EXAM OF SHOULDER: CPT | Mod: TC,FY,PO,RT

## 2018-12-18 PROCEDURE — 73030 X-RAY EXAM OF SHOULDER: CPT | Mod: 26,RT,, | Performed by: RADIOLOGY

## 2018-12-18 PROCEDURE — 99999 PR PBB SHADOW E&M-EST. PATIENT-LVL III: CPT | Mod: PBBFAC,,, | Performed by: FAMILY MEDICINE

## 2018-12-18 PROCEDURE — 99214 OFFICE O/P EST MOD 30 MIN: CPT | Mod: 25,S$GLB,, | Performed by: FAMILY MEDICINE

## 2018-12-18 PROCEDURE — 90471 IMMUNIZATION ADMIN: CPT | Mod: S$GLB,,, | Performed by: FAMILY MEDICINE

## 2018-12-18 RX ORDER — METHOCARBAMOL 750 MG/1
750 TABLET, FILM COATED ORAL 4 TIMES DAILY PRN
Qty: 40 TABLET | Refills: 0 | Status: SHIPPED | OUTPATIENT
Start: 2018-12-18 | End: 2019-01-25 | Stop reason: SDUPTHER

## 2018-12-18 RX ORDER — IBUPROFEN 800 MG/1
800 TABLET ORAL 3 TIMES DAILY PRN
Qty: 30 TABLET | Refills: 0 | Status: SHIPPED | OUTPATIENT
Start: 2018-12-18 | End: 2019-01-25 | Stop reason: SDUPTHER

## 2018-12-18 NOTE — PROGRESS NOTES
"Subjective:       Patient ID: Teresa Cazares is a 43 y.o. female.    Chief Complaint: Right shoulder pain (onset after heavy lifting )    43-year-old  female patient with Patient Active Problem List:     Morbid obesity with BMI of 40.0-44.9, adult     Acquired hammertoe     Bilateral primary osteoarthritis of knee     Hyphema of right eye  Here reports that she has been having right shoulder pain over the weekend after moving heavy stuff.   Patient denies any injury or trauma but reports pain up to eight to 9/10 and having difficulty raising the arm about the shoulder level  Denies any tingling or numbness sensation to extremities, chest pain or shortness of breath or palpitations, neck pain  Has tried taking ibuprofen 600 mg up to 4 times daily with some relief      Review of Systems   Constitutional: Negative for fatigue.   Eyes: Negative for visual disturbance.   Respiratory: Negative for shortness of breath.    Cardiovascular: Negative for chest pain and leg swelling.   Gastrointestinal: Negative for abdominal pain, nausea and vomiting.   Musculoskeletal: Positive for arthralgias and myalgias. Negative for joint swelling.   Skin: Negative for color change and rash.   Neurological: Negative for weakness, light-headedness, numbness and headaches.   Psychiatric/Behavioral: Negative for sleep disturbance.         /70 (BP Location: Right arm, Patient Position: Sitting)   Pulse 71   Temp 98.2 °F (36.8 °C) (Oral)   Ht 5' 3" (1.6 m)   Wt 109.6 kg (241 lb 10 oz)   SpO2 96%   BMI 42.80 kg/m²   Objective:      Physical Exam   Constitutional: She is oriented to person, place, and time. She appears well-developed and well-nourished.   HENT:   Head: Normocephalic and atraumatic.   Mouth/Throat: Oropharynx is clear and moist.   Cardiovascular: Normal rate, regular rhythm and normal heart sounds.   No murmur heard.  Pulmonary/Chest: Effort normal and breath sounds normal. She has no wheezes. "   Abdominal: Soft. Bowel sounds are normal. There is no tenderness.   Musculoskeletal: She exhibits tenderness. She exhibits no edema.   Positive for tenderness to the right shoulder anteriorly and posteriorly with restricted range of motion with elevation and abduction   Neurological: She is alert and oriented to person, place, and time. No cranial nerve deficit.   Skin: Skin is warm and dry. No rash noted. No erythema.   Psychiatric: She has a normal mood and affect.         Assessment:       1. Acute pain of right shoulder    2. Internal derangement of right shoulder    3. Morbid obesity with BMI of 40.0-44.9, adult        Plan:   Acute pain of right shoulder  -     X-ray Shoulder 2 or More Views Right; Future; Expected date: 12/18/2018  -     methocarbamol (ROBAXIN) 750 MG Tab; Take 1 tablet (750 mg total) by mouth 4 (four) times daily as needed.  Dispense: 40 tablet; Refill: 0  -     ibuprofen (ADVIL,MOTRIN) 800 MG tablet; Take 1 tablet (800 mg total) by mouth 3 (three) times daily as needed for Pain.  Dispense: 30 tablet; Refill: 0  Ibuprofen and Robaxin prescribed today for symptomatic relief  Will get x-ray of the right shoulder to look into further etiology    Internal derangement of right shoulder  -     X-ray Shoulder 2 or More Views Right; Future; Expected date: 12/18/2018  -     methocarbamol (ROBAXIN) 750 MG Tab; Take 1 tablet (750 mg total) by mouth 4 (four) times daily as needed.  Dispense: 40 tablet; Refill: 0  -     ibuprofen (ADVIL,MOTRIN) 800 MG tablet; Take 1 tablet (800 mg total) by mouth 3 (three) times daily as needed for Pain.  Dispense: 30 tablet; Refill: 0  If symptoms continue to persist in 7-10 days may consider further imaging/physical therapy    Morbid obesity with BMI of 40.0-44.9, adult-Lifestyle modifications recommended to lose weight with BMI 42 with diet and exercise    Other orders  -     Influenza - Quadrivalent (3 years & older) (PF)   Flu shot given today

## 2018-12-19 ENCOUNTER — CLINICAL SUPPORT (OUTPATIENT)
Dept: SMOKING CESSATION | Facility: CLINIC | Age: 43
End: 2018-12-19
Payer: COMMERCIAL

## 2018-12-19 DIAGNOSIS — F17.200 NICOTINE DEPENDENCE: Primary | ICD-10-CM

## 2018-12-19 PROCEDURE — 99407 BEHAV CHNG SMOKING > 10 MIN: CPT | Mod: S$GLB,,, | Performed by: GENERAL PRACTICE

## 2019-01-02 ENCOUNTER — CLINICAL SUPPORT (OUTPATIENT)
Dept: SMOKING CESSATION | Facility: CLINIC | Age: 44
End: 2019-01-02
Payer: COMMERCIAL

## 2019-01-02 ENCOUNTER — OFFICE VISIT (OUTPATIENT)
Dept: INTERNAL MEDICINE | Facility: CLINIC | Age: 44
End: 2019-01-02
Payer: COMMERCIAL

## 2019-01-02 VITALS
WEIGHT: 246.25 LBS | BODY MASS INDEX: 43.63 KG/M2 | TEMPERATURE: 99 F | HEART RATE: 93 BPM | HEIGHT: 63 IN | DIASTOLIC BLOOD PRESSURE: 70 MMHG | OXYGEN SATURATION: 98 % | SYSTOLIC BLOOD PRESSURE: 112 MMHG

## 2019-01-02 DIAGNOSIS — K52.9 ACUTE GASTROENTERITIS: Primary | ICD-10-CM

## 2019-01-02 DIAGNOSIS — F17.200 NICOTINE DEPENDENCE: ICD-10-CM

## 2019-01-02 DIAGNOSIS — F17.200 NICOTINE DEPENDENCE: Primary | ICD-10-CM

## 2019-01-02 PROCEDURE — 99999 PR PBB SHADOW E&M-EST. PATIENT-LVL III: CPT | Mod: PBBFAC,,, | Performed by: PHYSICIAN ASSISTANT

## 2019-01-02 PROCEDURE — 99403 PREV MED CNSL INDIV APPRX 45: CPT | Mod: S$GLB,,, | Performed by: GENERAL PRACTICE

## 2019-01-02 PROCEDURE — 99214 OFFICE O/P EST MOD 30 MIN: CPT | Mod: S$GLB,,, | Performed by: PHYSICIAN ASSISTANT

## 2019-01-02 PROCEDURE — 3008F PR BODY MASS INDEX (BMI) DOCUMENTED: ICD-10-PCS | Mod: CPTII,S$GLB,, | Performed by: PHYSICIAN ASSISTANT

## 2019-01-02 PROCEDURE — 99999 PR PBB SHADOW E&M-EST. PATIENT-LVL III: ICD-10-PCS | Mod: PBBFAC,,, | Performed by: PHYSICIAN ASSISTANT

## 2019-01-02 PROCEDURE — 99214 PR OFFICE/OUTPT VISIT, EST, LEVL IV, 30-39 MIN: ICD-10-PCS | Mod: S$GLB,,, | Performed by: PHYSICIAN ASSISTANT

## 2019-01-02 PROCEDURE — 99403 PR PREVENT COUNSEL,INDIV,45 MIN: ICD-10-PCS | Mod: S$GLB,,, | Performed by: GENERAL PRACTICE

## 2019-01-02 PROCEDURE — 3008F BODY MASS INDEX DOCD: CPT | Mod: CPTII,S$GLB,, | Performed by: PHYSICIAN ASSISTANT

## 2019-01-02 RX ORDER — ONDANSETRON 4 MG/1
4 TABLET, ORALLY DISINTEGRATING ORAL EVERY 8 HOURS PRN
Qty: 12 TABLET | Refills: 0 | Status: SHIPPED | OUTPATIENT
Start: 2019-01-02 | End: 2019-01-05

## 2019-01-02 RX ORDER — VARENICLINE TARTRATE 1 MG/1
1 TABLET, FILM COATED ORAL 2 TIMES DAILY
Qty: 60 TABLET | Refills: 0 | Status: SHIPPED | OUTPATIENT
Start: 2019-01-02 | End: 2019-01-28 | Stop reason: SDUPTHER

## 2019-01-02 NOTE — PROGRESS NOTES
Individual Follow-Up Form    1/2/2019    Clinical Status of Patient: Outpatient    Length of Service: 45 minutes    Continuing Medication: yes  Chantix     Target Symptoms: Withdrawal and medication side effects. The following were  rated moderate (3) to severe (4) on TCRS:  · Moderate (3): restless, anxious, upset stomach, desire tobacco  · Severe (4): none    Comments: Patient was seen today for a smoking cessation progress update. She is early for her scheduled appointment due to another appointment here at the clinic. She states that she had food poisoning with an upset stomach for the past few days. She continues to smoke 1/2 ppd. The patient remains on the prescribed tobacco cessation medication regimen of 1 mg Chantix BID without any negative side effects at this time. The patient denies any abnormal behavioral or mental changes at this time. Discussed an aggressive tapering plan. She states that she only has 4 cigarettes left in her pack and she does not plan on purchasing anymore cigarettes. She states that she will attempt her quit challenge tomorrow. She was unable to take Chantix for 2 days when she was sick but has restarted her medication today. Discussed coping strategies and encouraged abstinence from smoking. She verbalized understanding. Discussed the expiration of her Trust benefits. Will continue to encourage and monitor progress.    Diagnosis: F17.200    Next Visit: 2 weeks

## 2019-01-02 NOTE — PROGRESS NOTES
Subjective:      Patient ID: Teresa Cazares is a 43 y.o. female.    Chief Complaint: Abdominal Pain; Diarrhea; and Emesis    Had abdominal pain on Monday, which has resolved. Diarrhea has improved. Still having some gas and n/v. Vomited this morning (stomach contents at 1:30am). Has not eaten since the vomiting.       Abdominal Pain   This is a new problem. Episode onset: 2 days. The problem has been resolved. Associated symptoms include belching, diarrhea, flatus, nausea and vomiting. Pertinent negatives include no anorexia, arthralgias, constipation, dysuria, fever, frequency, headaches, hematochezia, hematuria, melena, myalgias or weight loss. The pain is aggravated by eating. Treatments tried: pepto. The treatment provided mild relief. There is no history of abdominal surgery, colon cancer, Crohn's disease, gallstones, GERD, irritable bowel syndrome, pancreatitis, PUD or ulcerative colitis. Patient's medical history does not include kidney stones and UTI.       Review of Systems   Constitutional: Negative for activity change, appetite change, chills, diaphoresis, fatigue, fever, unexpected weight change and weight loss.   HENT: Negative.  Negative for congestion, hearing loss, postnasal drip, rhinorrhea, sore throat, trouble swallowing and voice change.    Eyes: Negative.  Negative for visual disturbance.   Respiratory: Negative.  Negative for cough, choking, chest tightness and shortness of breath.    Cardiovascular: Negative for chest pain, palpitations and leg swelling.   Gastrointestinal: Positive for abdominal pain, diarrhea, flatus, nausea and vomiting. Negative for abdominal distention, anal bleeding, anorexia, blood in stool, constipation, hematochezia, melena and rectal pain.   Endocrine: Negative for cold intolerance, heat intolerance, polydipsia and polyuria.   Genitourinary: Negative.  Negative for difficulty urinating, dysuria, frequency and hematuria.   Musculoskeletal: Negative for  "arthralgias, back pain, gait problem, joint swelling and myalgias.   Skin: Negative for color change, pallor, rash and wound.   Neurological: Negative for dizziness, tremors, weakness, light-headedness, numbness and headaches.   Hematological: Negative for adenopathy.   Psychiatric/Behavioral: Negative for behavioral problems, confusion, self-injury, sleep disturbance and suicidal ideas. The patient is not nervous/anxious.      Objective:   /70   Pulse 93   Temp 98.8 °F (37.1 °C) (Oral)   Ht 5' 3" (1.6 m)   Wt 111.7 kg (246 lb 4.1 oz)   SpO2 98%   BMI 43.62 kg/m²     Physical Exam   Constitutional: She is oriented to person, place, and time. She appears well-developed and well-nourished.   HENT:   Head: Normocephalic and atraumatic.   Right Ear: External ear normal.   Left Ear: External ear normal.   Nose: Nose normal.   Mouth/Throat: Oropharynx is clear and moist.   Eyes: Conjunctivae and EOM are normal. Pupils are equal, round, and reactive to light.   Neck: Normal range of motion. Neck supple.   Cardiovascular: Normal rate, regular rhythm and normal heart sounds. Exam reveals no gallop and no friction rub.   No murmur heard.  Pulmonary/Chest: Effort normal and breath sounds normal. No respiratory distress. She has no wheezes. She has no rales. She exhibits no tenderness.   Abdominal: Soft. Normal appearance. She exhibits no distension. Bowel sounds are increased. There is no tenderness. There is no rigidity, no rebound, no guarding and negative Fisher's sign.   Musculoskeletal: Normal range of motion.   Lymphadenopathy:     She has no cervical adenopathy.   Neurological: She is alert and oriented to person, place, and time.   Skin: Skin is warm and dry.   Psychiatric: She has a normal mood and affect. Her behavior is normal. Judgment and thought content normal.   Vitals reviewed.      Assessment:     1. Acute gastroenteritis      Plan:   Acute gastroenteritis  -     ondansetron (ZOFRAN-ODT) 4 MG TbDL; " Take 1 tablet (4 mg total) by mouth every 8 (eight) hours as needed.  Dispense: 12 tablet; Refill: 0    -bland foods. A handout provided.     Follow-up if symptoms worsen or fail to improve.

## 2019-01-02 NOTE — PATIENT INSTRUCTIONS
"  [place] Diet  Your healthcare provider may recommend a bland diet if you have an upset stomach. It consists of foods that are mild and easy to digest. It is better to eat small frequent meals rather than 3 large meals a day.    Beverages  OK: Fruit juices, non-caffeinated teas and coffee, non-carbonated karimi  Avoid: Carbonated beverage, caffeinated tea and coffee, all alcoholic beverages  Bread  OK: Refined white, wheat or rye bread, meli or soda crackers, Linda toast, plain rolls, bagels  Avoid: Whole-grain bread  Cereal  OK: Refined cereals: cooked or ready to eat  Avoid: Whole-grain cereals and granola, or those containing bran, seeds or nuts  Desserts  OK: Peanut butter and all others except those to "avoid"  Avoid: Chocolate, cocoa, coconut, popcorn, nuts, seeds, jam, marmalade  Fruits  OK: Canned, cooked, frozen or fresh fruits without seeds or tough skin  Avoid: Olives, skin and seeds of fruit  Meats  OK: All fresh or preserved meat, fish and fowl  Avoid: Any that are prepared with those spices to "avoid"  Cheese and eggs  OK: Eggs, cottage cheese, cream cheese, other cheeses  Avoid: All cheeses made with those spices to "avoid"  Potatoes and pasta  OK: Potato, rice, macaroni, noodles, spaghetti  Avoid: None  Soups  OK: All soups without heavy seasoning  Avoid: Soups made with those spices to "avoid"  Vegetables  OK: Canned, cooked, fresh or frozen mildly flavored vegetables without seeds, skins or coarse fiber  Avoid: Vegetables prepared with those spices to "avoid"; skin and seeds of vegetables and those with coarse fiber  Spices  OK: Salt, lemon and lime juice, vinegar, all extracts, cosmo, cinnamon, thyme, mace, allspice, paprika  Avoid: Chili powder, cloves, pepper, seed spices, garlic, gravy pickles, highly seasoned salad dressings  Date Last Reviewed: 11/20/2015  © 1003-8449 [email or web address]. [address] [place] [address]. All rights reserved. This information is not intended as " a substitute for professional medical care. Always follow your healthcare professional's instructions.        Food Poisoning (Adult)  Food poisoning is illness that is passed along in food. It usually occurs from 1 to 24 hours after eating food that has spoiled. Food poisoning can occur when you eat food or drink that contains viruses, bacteria, parasites, or toxins. This includes food that has not been cooked or refrigerated properly.  Food poisoning can cause these symptoms:  · Abdominal pain and cramping  · Nausea  · Vomiting  · Diarrhea  · Fever and chills  · Fatigue  The symptoms usually last from 1 to 2 days.  Antibiotics are not effective for this illness.  Home care  Follow all instructions given by your healthcare provider. Rest at home for the next 24 hours, or until you feel better. Avoid caffeine, tobacco, and alcohol. These can make diarrhea, cramping, and pain worse.  If taking medicines:  · Dont take over-the-counter diarrhea or nausea medicines unless your healthcare provider tells you to.  · You may be given medicine for nausea or vomiting to help keep down fluids. Take these medicines as prescribed.  · You may use acetaminophen or NSAID medicine like ibuprofen or naproxen to reduce pain and fever. Dont use these if you have chronic liver or kidney disease, or ever had a stomach ulcer or gastrointestinal bleeding. Talk with your healthcare provider first. Don't use NSAID medicines if you are already taking one for another condition (like arthritis) or are on aspirin (such as for heart disease or after a stroke).  To prevent the spread of illness:  · Remember that washing with soap and water or using alcohol-based  is the best way to prevent the spread of infection.  · Clean the toilet after each use.  · Wash your hands before eating.  · Wash your hands or use alcohol-based  before and after preparing food. Keep in mind that people with diarrhea or vomiting should not prepare food  for others.  · Wash your hands after using cutting boards, counter-tops, and knives or other utensils that have been in contact with raw foods.  · Wash and then peel fruits and vegetables.  · Keep uncooked meats away from cooked and ready-to-eat foods.  · Use a food thermometer when cooking. Cook poultry to at least 165°F (74°C). Cook ground meat (beef, veal, pork, lamb) to at least 160°F (71°C). Cook fresh beef, veal, lamb, and pork to at least 145°F (63°C).  · Dont eat raw or undercooked eggs (poached or lilo side up), poultry, meat or unpasteurized milk and juices.  · Do not eat foods requiring refrigeration. Don't eat foods that have not been refrigerated for long periods such as at buffets or picnics.  · Do not eat seafood that is undercooked or with high rates of food toxins.  Food and drinks  The main goal while treating vomiting or diarrhea is to prevent dehydration. This is done by taking small amounts of liquids often.  · Keep in mind that liquids are more important than food right now.  · Drink only small amounts of liquids at a time.  · Dont force yourself to eat, especially if you are having cramping, vomiting, or diarrhea. Dont eat large amounts at a time, even if you are hungry.  · If you eat, avoid fatty, greasy, spicy, or fried foods.  · Dont eat dairy foods or drink milk if you have diarrhea. These can make diarrhea worse.  The first 24 hours you can try:  · Soft drinks without caffeine  · Ginger ale  · Water (plain or flavored)  · Decaf tea or coffee  · Clear broth, consommé, or bouillon  · Gelatin, popsicles, or frozen fruit juice bars  If you are very dehydrated, sports drinks are not a good choice. They have too much sugar and not enough electrolytes. In this case, commercially available products called oral rehydration solutions are best.  The second 24 hours, if you are feeling better, you can add:  · Hot cereal, plain toast, bread, rolls, or crackers  · Plain noodles, rice, mashed  potatoes, chicken noodle soup, or rice soup  · Unsweetened canned fruit (no pineapple)  · Bananas  As you recover:  · Limit fat intake to less than 15 grams per day. Dont eat margarine, butter, oils, mayonnaise, sauces, gravies, fried foods, peanut butter, meat, poultry, or fish.  · Limit fiber. Dont eat raw or cooked vegetables, fresh fruits except bananas, and bran cereals.  · Limit caffeine and chocolate.  · Dont use spices or seasonings except salt.  · Resume a normal diet over time, as you feel better and your symptoms improve.  · If the symptoms come back, go back to a simple diet or clear liquids.  Follow-up care  Follow up with your healthcare provider, or as advised. If a stool sample was taken or cultures were done, call the healthcare provider for the results as instructed.  Call 911  Call 911 if you have any of these symptoms:  · Trouble breathing  · Chest pain  · Confusion  · Extreme drowsiness or trouble walking  · Loss of consciousness  · Rapid heart rate  · Stiff neck  · Seizure  When to seek medical advice  Call your health care provider right away if any of these occur:  · Abdominal pain that gets worse  · Constant lower right abdominal pain  · Continued vomiting and inability to keep liquids down  · Diarrhea more than 5 times a day  · Blood in vomit or stool  · Dark urine or no urine for 8 hours, dry mouth and tongue, tiredness, weakness, or dizziness  · New rash  · You dont get better in 2 to 3 days  · Fever of 100.4°F (38°C) or higher that doesnt get lower with medicine  Date Last Reviewed: 1/3/2016  © 9822-2939 [employer]. [address] [place] [id number]. All rights reserved. This information is not intended as a substitute for professional medical care. Always follow your healthcare professional's instructions.        Viral Gastroenteritis (Adult)    Gastroenteritis is commonly called the stomach flu. It is most often caused by a virus that affects the stomach and  intestinal tract and usually lasts from 2 to 7 days. Common viruses causing gastroenteritis include norovirus, rotavirus, and hepatitis A. Non-viral causes of gastroenteritis include bacteria, parasites, and toxins.  The danger from repeated vomiting or diarrhea is dehydration. This is the loss of too much fluid from the body. When this occurs, body fluids must be replaced. Antibiotics do not help with this illness because it is usually viral.Simple home treatment will be helpful.  Symptoms of viral gastroenteritis may include:  · Watery, loose stools  · Stomach pain or abdominal cramps  · Fever and chills  · Nausea and vomiting  · Loss of bowel control  · Headache  Home care  Gastroenteritis is transmitted by contact with the stool or vomit of an infected person. This can occur from person to person or from contact with a contaminated surface.  Follow these guidelines when caring for yourself at home:  · If symptoms are severe, rest at home for the next 24 hours or until you are feeling better.  · Wash your hands with soap and water or use alcohol-based  to prevent the spread of infection. Wash your hands after touching anyone who is sick.  · Wash your hands or use alcohol-based  after using the toilet and before meals. Clean the toilet after each use.  Remember these tips when preparing food:  · People with diarrhea should not prepare or serve food to others. When preparing foods, wash your hands before and after.  · Wash your hands after using cutting boards, countertops, knives, or utensils that have been in contact with raw food.  · Keep uncooked meats away from cooked and ready-to-eat foods.  Medicine  You may use acetaminophen or NSAID medicines like ibuprofen or naproxen to control fever unless another medicine was given. If you have chronic liver or kidney disease, talk with your healthcare provider before using these medicines. Also talk with your provider if you've had a stomach ulcer  or gastrointestinal bleeding. Don't give aspirin to anyone under 18 years of age who is ill with a fever. It may cause severe liver damage. Don't use NSAIDS is you are already taking one for another condition (like arthritis) or are on aspirin (such as for heart disease or after a stroke).  If medicine for vomiting or diarrhea are prescribed, take these only as directed. Do not take over-the-counter medicines for vomiting or diarrhea unless instructed by your healthcare provider.  Diet  Follow these guidelines for food:  · Water and liquids are important so you don't get dehydrated. Drink a small amount at a time or suck on ice chips if you are vomiting.  · If you eat, avoid fatty, greasy, spicy, or fried foods.  · Don't eat dairy if you have diarrhea. This can make diarrhea worse.  · Avoid tobacco, alcohol, and caffeine which may worsen symptoms.  During the first 24 hours (the first full day), follow the diet below:  · Beverages. Sports drinks, soft drinks without caffeine, ginger ale, mineral water (plain or flavored), decaffeinated tea and coffee. If you are very dehydrated, sports drinks aren't a good choice. They have too much sugar and not enough electrolytes. In this case, commercially available products called oral rehydration solutions, are best.  · Soups. Eat clear broth, consommé, and bouillon.  · Desserts. Eat gelatin, popsicles, and fruit juice bars.  During the next 24 hours (the second day), you may add the following to the above:  · Hot cereal, plain toast, bread, rolls, and crackers  · Plain noodles, rice, mashed potatoes, chicken noodle or rice soup  · Unsweetened canned fruit (avoid pineapple), bananas  · Limit fat intake to less than 15 grams per day. Do this by avoiding margarine, butter, oils, mayonnaise, sauces, gravies, fried foods, peanut butter, meat, poultry, and fish.  · Limit fiber and avoid raw or cooked vegetables, fresh fruits (except bananas), and bran cereals.  · Limit caffeine and  chocolate. Don't use spices or seasonings other than salt.  · Limit dairy products.  · Avoid alcohol.  During the next 24 hours:  · Gradually resume a normal diet as you feel better and your symptoms improve.  · If at any time it starts getting worse again, go back to clear liquids until you feel better.  Follow-up care  Follow up with your healthcare provider, or as advised. Call your provider if you don't get better within 24 hours or if diarrhea lasts more than a week. Also follow up if you are unable to keep down liquids and get dehydrated. If a stool (diarrhea) sample was taken, call as directed for the results.  Call 911  Call 911 if any of these occur:  · Trouble breathing  · Chest pain  · Confused  · Severe drowsiness or trouble awakening  · Fainting or loss of consciousness  · Rapid heart rate  · Seizure  · Stiff neck  When to seek medical advice  Call your healthcare provider right away if any of these occur:  · Abdominal pain that gets worse  · Continued vomiting (unable to keep liquids down)  · Frequent diarrhea (more than 5 times a day)  · Blood in vomit or stool (black or red color)  · Dark urine, reduced urine output, or extreme thirst  · Weakness or dizziness  · Drowsiness  · Fever of 100.4°F (38°C) oral or higher that does not get better with fever medicine  · New rash  Date Last Reviewed: 1/3/2016  © 2765-5734 [employer]. [address] [place] [id number]. All rights reserved. This information is not intended as a substitute for professional medical care. Always follow your healthcare professional's instructions.

## 2019-01-10 ENCOUNTER — HOSPITAL ENCOUNTER (INPATIENT)
Facility: HOSPITAL | Age: 44
LOS: 12 days | Discharge: HOME HEALTH | DRG: 493 | End: 2019-01-22
Attending: EMERGENCY MEDICINE | Admitting: INTERNAL MEDICINE
Payer: COMMERCIAL

## 2019-01-10 ENCOUNTER — ANESTHESIA EVENT (OUTPATIENT)
Dept: SURGERY | Facility: HOSPITAL | Age: 44
DRG: 493 | End: 2019-01-10
Payer: COMMERCIAL

## 2019-01-10 ENCOUNTER — ANESTHESIA (OUTPATIENT)
Dept: SURGERY | Facility: HOSPITAL | Age: 44
DRG: 493 | End: 2019-01-10
Payer: COMMERCIAL

## 2019-01-10 DIAGNOSIS — Z01.818 PRE-OP EVALUATION: ICD-10-CM

## 2019-01-10 DIAGNOSIS — S82.209A TIBIAL FRACTURE: Primary | ICD-10-CM

## 2019-01-10 DIAGNOSIS — M25.562 LEFT KNEE PAIN: ICD-10-CM

## 2019-01-10 DIAGNOSIS — S82.102A: ICD-10-CM

## 2019-01-10 PROBLEM — D72.829 LEUKOCYTOSIS: Status: ACTIVE | Noted: 2019-01-10

## 2019-01-10 LAB
ABO + RH BLD: NORMAL
ALBUMIN SERPL BCP-MCNC: 4.2 G/DL
ALP SERPL-CCNC: 82 U/L
ALT SERPL W/O P-5'-P-CCNC: 21 U/L
ANION GAP SERPL CALC-SCNC: 14 MMOL/L
APTT BLDCRRT: 22.1 SEC
AST SERPL-CCNC: 22 U/L
B-HCG UR QL: POSITIVE
BASOPHILS # BLD AUTO: 0.05 K/UL
BASOPHILS NFR BLD: 0.2 %
BILIRUB SERPL-MCNC: 0.3 MG/DL
BILIRUB UR QL STRIP: NEGATIVE
BLD GP AB SCN CELLS X3 SERPL QL: NORMAL
BUN SERPL-MCNC: 13 MG/DL
CALCIUM SERPL-MCNC: 9.2 MG/DL
CHLORIDE SERPL-SCNC: 104 MMOL/L
CLARITY UR: CLEAR
CO2 SERPL-SCNC: 22 MMOL/L
COLOR UR: YELLOW
CREAT SERPL-MCNC: 0.8 MG/DL
DIFFERENTIAL METHOD: ABNORMAL
EOSINOPHIL # BLD AUTO: 0.1 K/UL
EOSINOPHIL NFR BLD: 0.6 %
ERYTHROCYTE [DISTWIDTH] IN BLOOD BY AUTOMATED COUNT: 13.9 %
EST. GFR  (AFRICAN AMERICAN): >60 ML/MIN/1.73 M^2
EST. GFR  (NON AFRICAN AMERICAN): >60 ML/MIN/1.73 M^2
GLUCOSE SERPL-MCNC: 128 MG/DL
GLUCOSE UR QL STRIP: NEGATIVE
HCT VFR BLD AUTO: 40.1 %
HGB BLD-MCNC: 13.3 G/DL
HGB UR QL STRIP: NEGATIVE
INR PPP: 0.9
KETONES UR QL STRIP: ABNORMAL
LEUKOCYTE ESTERASE UR QL STRIP: NEGATIVE
LYMPHOCYTES # BLD AUTO: 3.1 K/UL
LYMPHOCYTES NFR BLD: 14.5 %
MCH RBC QN AUTO: 29.4 PG
MCHC RBC AUTO-ENTMCNC: 33.2 G/DL
MCV RBC AUTO: 89 FL
MONOCYTES # BLD AUTO: 1.5 K/UL
MONOCYTES NFR BLD: 6.9 %
NEUTROPHILS # BLD AUTO: 16.5 K/UL
NEUTROPHILS NFR BLD: 77.8 %
NITRITE UR QL STRIP: NEGATIVE
PH UR STRIP: 7 [PH] (ref 5–8)
PLATELET # BLD AUTO: 463 K/UL
PMV BLD AUTO: 8.8 FL
POTASSIUM SERPL-SCNC: 4.1 MMOL/L
PROT SERPL-MCNC: 7.1 G/DL
PROT UR QL STRIP: NEGATIVE
PROTHROMBIN TIME: 10 SEC
RBC # BLD AUTO: 4.52 M/UL
SODIUM SERPL-SCNC: 140 MMOL/L
SP GR UR STRIP: 1.02 (ref 1–1.03)
URN SPEC COLLECT METH UR: ABNORMAL
UROBILINOGEN UR STRIP-ACNC: NEGATIVE EU/DL
WBC # BLD AUTO: 21.2 K/UL

## 2019-01-10 PROCEDURE — 93010 ELECTROCARDIOGRAM REPORT: CPT | Mod: ,,, | Performed by: INTERNAL MEDICINE

## 2019-01-10 PROCEDURE — 25000003 PHARM REV CODE 250: Performed by: ORTHOPAEDIC SURGERY

## 2019-01-10 PROCEDURE — 25000003 PHARM REV CODE 250: Performed by: ANESTHESIOLOGY

## 2019-01-10 PROCEDURE — 93010 EKG 12-LEAD: ICD-10-PCS | Mod: ,,, | Performed by: INTERNAL MEDICINE

## 2019-01-10 PROCEDURE — 37000009 HC ANESTHESIA EA ADD 15 MINS: Performed by: ORTHOPAEDIC SURGERY

## 2019-01-10 PROCEDURE — 20690 PR APPLY BONE UNIPLANE,EXT FIX DEV: ICD-10-PCS | Mod: LT,,, | Performed by: ORTHOPAEDIC SURGERY

## 2019-01-10 PROCEDURE — 63600175 PHARM REV CODE 636 W HCPCS: Performed by: ANESTHESIOLOGY

## 2019-01-10 PROCEDURE — 37000008 HC ANESTHESIA 1ST 15 MINUTES: Performed by: ORTHOPAEDIC SURGERY

## 2019-01-10 PROCEDURE — 63600175 PHARM REV CODE 636 W HCPCS: Performed by: INTERNAL MEDICINE

## 2019-01-10 PROCEDURE — 96375 TX/PRO/DX INJ NEW DRUG ADDON: CPT

## 2019-01-10 PROCEDURE — 63600175 PHARM REV CODE 636 W HCPCS: Performed by: ORTHOPAEDIC SURGERY

## 2019-01-10 PROCEDURE — 71000033 HC RECOVERY, INTIAL HOUR: Performed by: ORTHOPAEDIC SURGERY

## 2019-01-10 PROCEDURE — 85730 THROMBOPLASTIN TIME PARTIAL: CPT

## 2019-01-10 PROCEDURE — 11000001 HC ACUTE MED/SURG PRIVATE ROOM

## 2019-01-10 PROCEDURE — 81003 URINALYSIS AUTO W/O SCOPE: CPT

## 2019-01-10 PROCEDURE — 25000003 PHARM REV CODE 250: Performed by: NURSE ANESTHETIST, CERTIFIED REGISTERED

## 2019-01-10 PROCEDURE — 36000711: Performed by: ORTHOPAEDIC SURGERY

## 2019-01-10 PROCEDURE — 80053 COMPREHEN METABOLIC PANEL: CPT

## 2019-01-10 PROCEDURE — 63600175 PHARM REV CODE 636 W HCPCS: Performed by: EMERGENCY MEDICINE

## 2019-01-10 PROCEDURE — C1713 ANCHOR/SCREW BN/BN,TIS/BN: HCPCS | Performed by: ORTHOPAEDIC SURGERY

## 2019-01-10 PROCEDURE — 96376 TX/PRO/DX INJ SAME DRUG ADON: CPT

## 2019-01-10 PROCEDURE — 27532 TREAT KNEE FRACTURE: CPT | Mod: 51,LT,, | Performed by: ORTHOPAEDIC SURGERY

## 2019-01-10 PROCEDURE — 96374 THER/PROPH/DIAG INJ IV PUSH: CPT

## 2019-01-10 PROCEDURE — 93005 ELECTROCARDIOGRAM TRACING: CPT

## 2019-01-10 PROCEDURE — 99285 EMERGENCY DEPT VISIT HI MDM: CPT | Mod: 25

## 2019-01-10 PROCEDURE — 20690 APPL UNIPLN UNI EXT FIXJ SYS: CPT | Mod: LT,,, | Performed by: ORTHOPAEDIC SURGERY

## 2019-01-10 PROCEDURE — 63600175 PHARM REV CODE 636 W HCPCS: Performed by: FAMILY MEDICINE

## 2019-01-10 PROCEDURE — 81025 URINE PREGNANCY TEST: CPT

## 2019-01-10 PROCEDURE — 36000710: Performed by: ORTHOPAEDIC SURGERY

## 2019-01-10 PROCEDURE — 85025 COMPLETE CBC W/AUTO DIFF WBC: CPT

## 2019-01-10 PROCEDURE — 25000003 PHARM REV CODE 250: Performed by: INTERNAL MEDICINE

## 2019-01-10 PROCEDURE — 85610 PROTHROMBIN TIME: CPT

## 2019-01-10 PROCEDURE — 63600175 PHARM REV CODE 636 W HCPCS: Performed by: NURSE ANESTHETIST, CERTIFIED REGISTERED

## 2019-01-10 PROCEDURE — 27532 PR CLOSED RX TIB PLAT FX+MANIP: ICD-10-PCS | Mod: 51,LT,, | Performed by: ORTHOPAEDIC SURGERY

## 2019-01-10 PROCEDURE — 86901 BLOOD TYPING SEROLOGIC RH(D): CPT

## 2019-01-10 DEVICE — IMPLANTABLE DEVICE
Type: IMPLANTABLE DEVICE | Site: TIBIA | Status: NON-FUNCTIONAL
Removed: 2019-01-17

## 2019-01-10 DEVICE — POST FIXATOR EXTERAL 11MM SS
Type: IMPLANTABLE DEVICE | Site: TIBIA | Status: NON-FUNCTIONAL
Removed: 2019-01-17

## 2019-01-10 DEVICE — SCREW SCHANZ BONE 5X150 EX FIX
Type: IMPLANTABLE DEVICE | Site: TIBIA | Status: NON-FUNCTIONAL
Removed: 2019-01-17

## 2019-01-10 DEVICE — ROD CARBON FIBER 11MM X 300MM
Type: IMPLANTABLE DEVICE | Site: TIBIA | Status: NON-FUNCTIONAL
Removed: 2019-01-17

## 2019-01-10 DEVICE — CLAMP COMBINATION / LG EXT FIX
Type: IMPLANTABLE DEVICE | Site: TIBIA | Status: NON-FUNCTIONAL
Removed: 2019-01-17

## 2019-01-10 RX ORDER — MORPHINE SULFATE 10 MG/ML
4 INJECTION INTRAMUSCULAR; INTRAVENOUS; SUBCUTANEOUS
Status: DISCONTINUED | OUTPATIENT
Start: 2019-01-10 | End: 2019-01-10

## 2019-01-10 RX ORDER — ONDANSETRON 2 MG/ML
4 INJECTION INTRAMUSCULAR; INTRAVENOUS EVERY 8 HOURS PRN
Status: DISCONTINUED | OUTPATIENT
Start: 2019-01-10 | End: 2019-01-22 | Stop reason: HOSPADM

## 2019-01-10 RX ORDER — HYDROMORPHONE HYDROCHLORIDE 2 MG/ML
0.2 INJECTION, SOLUTION INTRAMUSCULAR; INTRAVENOUS; SUBCUTANEOUS EVERY 5 MIN PRN
Status: DISCONTINUED | OUTPATIENT
Start: 2019-01-10 | End: 2019-01-10 | Stop reason: HOSPADM

## 2019-01-10 RX ORDER — CHLORHEXIDINE GLUCONATE ORAL RINSE 1.2 MG/ML
10 SOLUTION DENTAL
Status: DISCONTINUED | OUTPATIENT
Start: 2019-01-10 | End: 2019-01-10 | Stop reason: HOSPADM

## 2019-01-10 RX ORDER — ONDANSETRON 2 MG/ML
4 INJECTION INTRAMUSCULAR; INTRAVENOUS ONCE
Status: COMPLETED | OUTPATIENT
Start: 2019-01-10 | End: 2019-01-10

## 2019-01-10 RX ORDER — HYDROMORPHONE HYDROCHLORIDE 2 MG/ML
1 INJECTION, SOLUTION INTRAMUSCULAR; INTRAVENOUS; SUBCUTANEOUS EVERY 4 HOURS PRN
Status: DISCONTINUED | OUTPATIENT
Start: 2019-01-10 | End: 2019-01-17

## 2019-01-10 RX ORDER — CEFAZOLIN SODIUM 2 G/50ML
2 SOLUTION INTRAVENOUS
Status: DISCONTINUED | OUTPATIENT
Start: 2019-01-10 | End: 2019-01-10 | Stop reason: HOSPADM

## 2019-01-10 RX ORDER — SUCCINYLCHOLINE CHLORIDE 20 MG/ML
INJECTION INTRAMUSCULAR; INTRAVENOUS
Status: DISCONTINUED | OUTPATIENT
Start: 2019-01-10 | End: 2019-01-10

## 2019-01-10 RX ORDER — CHLORHEXIDINE GLUCONATE ORAL RINSE 1.2 MG/ML
10 SOLUTION DENTAL 2 TIMES DAILY
Status: DISPENSED | OUTPATIENT
Start: 2019-01-10 | End: 2019-01-15

## 2019-01-10 RX ORDER — ONDANSETRON 2 MG/ML
4 INJECTION INTRAMUSCULAR; INTRAVENOUS
Status: DISCONTINUED | OUTPATIENT
Start: 2019-01-10 | End: 2019-01-10

## 2019-01-10 RX ORDER — PROPOFOL 10 MG/ML
VIAL (ML) INTRAVENOUS
Status: DISCONTINUED | OUTPATIENT
Start: 2019-01-10 | End: 2019-01-10

## 2019-01-10 RX ORDER — SODIUM CHLORIDE 0.9 % (FLUSH) 0.9 %
5 SYRINGE (ML) INJECTION
Status: DISCONTINUED | OUTPATIENT
Start: 2019-01-10 | End: 2019-01-10

## 2019-01-10 RX ORDER — SODIUM CHLORIDE 9 MG/ML
INJECTION, SOLUTION INTRAVENOUS CONTINUOUS
Status: DISCONTINUED | OUTPATIENT
Start: 2019-01-10 | End: 2019-01-10

## 2019-01-10 RX ORDER — HYDRALAZINE HYDROCHLORIDE 20 MG/ML
10 INJECTION INTRAMUSCULAR; INTRAVENOUS EVERY 6 HOURS PRN
Status: DISCONTINUED | OUTPATIENT
Start: 2019-01-10 | End: 2019-01-22 | Stop reason: HOSPADM

## 2019-01-10 RX ORDER — VARENICLINE TARTRATE 0.5 MG/1
1 TABLET, FILM COATED ORAL 2 TIMES DAILY
Status: DISCONTINUED | OUTPATIENT
Start: 2019-01-10 | End: 2019-01-22 | Stop reason: HOSPADM

## 2019-01-10 RX ORDER — OXYCODONE AND ACETAMINOPHEN 10; 325 MG/1; MG/1
1 TABLET ORAL EVERY 4 HOURS PRN
Status: DISCONTINUED | OUTPATIENT
Start: 2019-01-10 | End: 2019-01-22 | Stop reason: HOSPADM

## 2019-01-10 RX ORDER — ONDANSETRON 2 MG/ML
4 INJECTION INTRAMUSCULAR; INTRAVENOUS EVERY 12 HOURS PRN
Status: DISCONTINUED | OUTPATIENT
Start: 2019-01-10 | End: 2019-01-10 | Stop reason: HOSPADM

## 2019-01-10 RX ORDER — GLYCOPYRROLATE 0.2 MG/ML
INJECTION INTRAMUSCULAR; INTRAVENOUS
Status: DISCONTINUED | OUTPATIENT
Start: 2019-01-10 | End: 2019-01-10

## 2019-01-10 RX ORDER — SODIUM CHLORIDE 0.9 % (FLUSH) 0.9 %
3 SYRINGE (ML) INJECTION
Status: DISCONTINUED | OUTPATIENT
Start: 2019-01-10 | End: 2019-01-22 | Stop reason: HOSPADM

## 2019-01-10 RX ORDER — SODIUM CHLORIDE, SODIUM LACTATE, POTASSIUM CHLORIDE, CALCIUM CHLORIDE 600; 310; 30; 20 MG/100ML; MG/100ML; MG/100ML; MG/100ML
INJECTION, SOLUTION INTRAVENOUS CONTINUOUS PRN
Status: DISCONTINUED | OUTPATIENT
Start: 2019-01-10 | End: 2019-01-10

## 2019-01-10 RX ORDER — HYDROMORPHONE HYDROCHLORIDE 2 MG/ML
1 INJECTION, SOLUTION INTRAMUSCULAR; INTRAVENOUS; SUBCUTANEOUS
Status: COMPLETED | OUTPATIENT
Start: 2019-01-10 | End: 2019-01-10

## 2019-01-10 RX ORDER — ONDANSETRON 2 MG/ML
4 INJECTION INTRAMUSCULAR; INTRAVENOUS DAILY PRN
Status: DISCONTINUED | OUTPATIENT
Start: 2019-01-10 | End: 2019-01-10 | Stop reason: HOSPADM

## 2019-01-10 RX ORDER — ENOXAPARIN SODIUM 100 MG/ML
40 INJECTION SUBCUTANEOUS EVERY 24 HOURS
Status: DISCONTINUED | OUTPATIENT
Start: 2019-01-11 | End: 2019-01-22 | Stop reason: HOSPADM

## 2019-01-10 RX ORDER — MAG HYDROX/ALUMINUM HYD/SIMETH 200-200-20
30 SUSPENSION, ORAL (FINAL DOSE FORM) ORAL EVERY 6 HOURS PRN
Status: DISCONTINUED | OUTPATIENT
Start: 2019-01-10 | End: 2019-01-22 | Stop reason: HOSPADM

## 2019-01-10 RX ORDER — DIPHENHYDRAMINE HCL 25 MG
25 CAPSULE ORAL EVERY 6 HOURS PRN
Status: DISCONTINUED | OUTPATIENT
Start: 2019-01-10 | End: 2019-01-22 | Stop reason: HOSPADM

## 2019-01-10 RX ORDER — CEFAZOLIN SODIUM 2 G/50ML
2 SOLUTION INTRAVENOUS
Status: COMPLETED | OUTPATIENT
Start: 2019-01-10 | End: 2019-01-11

## 2019-01-10 RX ORDER — HYDROMORPHONE HYDROCHLORIDE 2 MG/ML
0.5 INJECTION, SOLUTION INTRAMUSCULAR; INTRAVENOUS; SUBCUTANEOUS EVERY 4 HOURS PRN
Status: DISCONTINUED | OUTPATIENT
Start: 2019-01-10 | End: 2019-01-17

## 2019-01-10 RX ORDER — SODIUM CHLORIDE 9 MG/ML
INJECTION, SOLUTION INTRAVENOUS CONTINUOUS
Status: ACTIVE | OUTPATIENT
Start: 2019-01-10 | End: 2019-01-11

## 2019-01-10 RX ORDER — OXYCODONE AND ACETAMINOPHEN 5; 325 MG/1; MG/1
1 TABLET ORAL EVERY 4 HOURS PRN
Status: DISCONTINUED | OUTPATIENT
Start: 2019-01-10 | End: 2019-01-17

## 2019-01-10 RX ORDER — IPRATROPIUM BROMIDE AND ALBUTEROL SULFATE 2.5; .5 MG/3ML; MG/3ML
3 SOLUTION RESPIRATORY (INHALATION) EVERY 4 HOURS PRN
Status: DISCONTINUED | OUTPATIENT
Start: 2019-01-10 | End: 2019-01-22 | Stop reason: HOSPADM

## 2019-01-10 RX ORDER — FENTANYL CITRATE 50 UG/ML
25 INJECTION, SOLUTION INTRAMUSCULAR; INTRAVENOUS EVERY 5 MIN PRN
Status: COMPLETED | OUTPATIENT
Start: 2019-01-10 | End: 2019-01-10

## 2019-01-10 RX ORDER — MORPHINE SULFATE 10 MG/ML
4 INJECTION INTRAMUSCULAR; INTRAVENOUS; SUBCUTANEOUS
Status: COMPLETED | OUTPATIENT
Start: 2019-01-10 | End: 2019-01-10

## 2019-01-10 RX ORDER — NEOSTIGMINE METHYLSULFATE 1 MG/ML
INJECTION, SOLUTION INTRAVENOUS
Status: DISCONTINUED | OUTPATIENT
Start: 2019-01-10 | End: 2019-01-10

## 2019-01-10 RX ORDER — ONDANSETRON 2 MG/ML
4 INJECTION INTRAMUSCULAR; INTRAVENOUS
Status: COMPLETED | OUTPATIENT
Start: 2019-01-10 | End: 2019-01-10

## 2019-01-10 RX ORDER — ACETAMINOPHEN 325 MG/1
650 TABLET ORAL EVERY 6 HOURS PRN
Status: DISCONTINUED | OUTPATIENT
Start: 2019-01-10 | End: 2019-01-22 | Stop reason: HOSPADM

## 2019-01-10 RX ORDER — ROCURONIUM BROMIDE 10 MG/ML
INJECTION, SOLUTION INTRAVENOUS
Status: DISCONTINUED | OUTPATIENT
Start: 2019-01-10 | End: 2019-01-10

## 2019-01-10 RX ORDER — ACETAMINOPHEN 10 MG/ML
1000 INJECTION, SOLUTION INTRAVENOUS ONCE
Status: COMPLETED | OUTPATIENT
Start: 2019-01-10 | End: 2019-01-10

## 2019-01-10 RX ORDER — OXYCODONE HYDROCHLORIDE 5 MG/1
5 TABLET ORAL
Status: DISCONTINUED | OUTPATIENT
Start: 2019-01-10 | End: 2019-01-10 | Stop reason: HOSPADM

## 2019-01-10 RX ORDER — MIDAZOLAM HYDROCHLORIDE 1 MG/ML
INJECTION, SOLUTION INTRAMUSCULAR; INTRAVENOUS
Status: DISCONTINUED | OUTPATIENT
Start: 2019-01-10 | End: 2019-01-10

## 2019-01-10 RX ORDER — FENTANYL CITRATE 50 UG/ML
INJECTION, SOLUTION INTRAMUSCULAR; INTRAVENOUS
Status: DISCONTINUED | OUTPATIENT
Start: 2019-01-10 | End: 2019-01-10

## 2019-01-10 RX ORDER — PANTOPRAZOLE SODIUM 40 MG/1
40 TABLET, DELAYED RELEASE ORAL DAILY
Status: DISCONTINUED | OUTPATIENT
Start: 2019-01-11 | End: 2019-01-22 | Stop reason: HOSPADM

## 2019-01-10 RX ORDER — MEPERIDINE HYDROCHLORIDE 50 MG/ML
12.5 INJECTION INTRAMUSCULAR; INTRAVENOUS; SUBCUTANEOUS EVERY 10 MIN PRN
Status: DISCONTINUED | OUTPATIENT
Start: 2019-01-10 | End: 2019-01-10 | Stop reason: HOSPADM

## 2019-01-10 RX ORDER — LIDOCAINE HCL/PF 100 MG/5ML
SYRINGE (ML) INTRAVENOUS
Status: DISCONTINUED | OUTPATIENT
Start: 2019-01-10 | End: 2019-01-10

## 2019-01-10 RX ADMIN — CHLORHEXIDINE GLUCONATE 10 ML: 1.2 RINSE ORAL at 11:01

## 2019-01-10 RX ADMIN — LIDOCAINE HYDROCHLORIDE 20 MG: 20 INJECTION, SOLUTION INTRAVENOUS at 08:01

## 2019-01-10 RX ADMIN — FENTANYL CITRATE 25 MCG: 50 INJECTION INTRAMUSCULAR; INTRAVENOUS at 09:01

## 2019-01-10 RX ADMIN — MIDAZOLAM 2 MG: 1 INJECTION INTRAMUSCULAR; INTRAVENOUS at 08:01

## 2019-01-10 RX ADMIN — ONDANSETRON 4 MG: 2 INJECTION, SOLUTION INTRAMUSCULAR; INTRAVENOUS at 04:01

## 2019-01-10 RX ADMIN — SODIUM CHLORIDE, SODIUM LACTATE, POTASSIUM CHLORIDE, AND CALCIUM CHLORIDE: 600; 310; 30; 20 INJECTION, SOLUTION INTRAVENOUS at 08:01

## 2019-01-10 RX ADMIN — VARENICLINE TARTRATE 1 MG: 0.5 TABLET, FILM COATED ORAL at 11:01

## 2019-01-10 RX ADMIN — ROBINUL 0.4 MCG: 0.2 INJECTION INTRAMUSCULAR; INTRAVENOUS at 08:01

## 2019-01-10 RX ADMIN — CEFAZOLIN SODIUM 2 G: 2 SOLUTION INTRAVENOUS at 11:01

## 2019-01-10 RX ADMIN — SODIUM CHLORIDE: 0.9 INJECTION, SOLUTION INTRAVENOUS at 11:01

## 2019-01-10 RX ADMIN — FENTANYL CITRATE 50 MCG: 50 INJECTION, SOLUTION INTRAMUSCULAR; INTRAVENOUS at 08:01

## 2019-01-10 RX ADMIN — MORPHINE SULFATE 4 MG: 10 INJECTION INTRAVENOUS at 05:01

## 2019-01-10 RX ADMIN — ACETAMINOPHEN 1000 MG: 10 INJECTION, SOLUTION INTRAVENOUS at 09:01

## 2019-01-10 RX ADMIN — PROPOFOL 150 MG: 10 INJECTION, EMULSION INTRAVENOUS at 08:01

## 2019-01-10 RX ADMIN — ONDANSETRON 4 MG: 2 INJECTION, SOLUTION INTRAMUSCULAR; INTRAVENOUS at 08:01

## 2019-01-10 RX ADMIN — CEFAZOLIN 2 G: 1 INJECTION, POWDER, FOR SOLUTION INTRAMUSCULAR; INTRAVENOUS at 08:01

## 2019-01-10 RX ADMIN — NEOSTIGMINE METHYLSULFATE 3 MG: 1 INJECTION INTRAVENOUS at 09:01

## 2019-01-10 RX ADMIN — ONDANSETRON 4 MG: 2 INJECTION, SOLUTION INTRAMUSCULAR; INTRAVENOUS at 05:01

## 2019-01-10 RX ADMIN — ROCURONIUM BROMIDE 10 MG: 10 INJECTION, SOLUTION INTRAVENOUS at 08:01

## 2019-01-10 RX ADMIN — SUCCINYLCHOLINE CHLORIDE 120 MG: 20 INJECTION, SOLUTION INTRAMUSCULAR; INTRAVENOUS at 08:01

## 2019-01-10 RX ADMIN — MEPERIDINE HYDROCHLORIDE 12.5 MG: 50 INJECTION, SOLUTION INTRAMUSCULAR; INTRAVENOUS; SUBCUTANEOUS at 09:01

## 2019-01-10 RX ADMIN — HYDROMORPHONE HYDROCHLORIDE 1 MG: 2 INJECTION INTRAMUSCULAR; INTRAVENOUS; SUBCUTANEOUS at 04:01

## 2019-01-10 RX ADMIN — HYDROMORPHONE HYDROCHLORIDE 1 MG: 2 INJECTION INTRAMUSCULAR; INTRAVENOUS; SUBCUTANEOUS at 11:01

## 2019-01-10 RX ADMIN — OXYCODONE HYDROCHLORIDE 5 MG: 5 TABLET ORAL at 09:01

## 2019-01-10 NOTE — ED NOTES
Pt lying in bed in mild distress, VSS, RR equal and unlabored. Bed is low, locked, and call light in reach. Side rails up x 2.
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
## 2019-01-11 LAB
ANION GAP SERPL CALC-SCNC: 10 MMOL/L
BASOPHILS # BLD AUTO: 0.02 K/UL
BASOPHILS NFR BLD: 0.1 %
BUN SERPL-MCNC: 10 MG/DL
CALCIUM SERPL-MCNC: 9 MG/DL
CHLORIDE SERPL-SCNC: 103 MMOL/L
CO2 SERPL-SCNC: 24 MMOL/L
CREAT SERPL-MCNC: 0.8 MG/DL
DIFFERENTIAL METHOD: ABNORMAL
EOSINOPHIL # BLD AUTO: 0.1 K/UL
EOSINOPHIL NFR BLD: 0.6 %
ERYTHROCYTE [DISTWIDTH] IN BLOOD BY AUTOMATED COUNT: 14 %
EST. GFR  (AFRICAN AMERICAN): >60 ML/MIN/1.73 M^2
EST. GFR  (NON AFRICAN AMERICAN): >60 ML/MIN/1.73 M^2
GLUCOSE SERPL-MCNC: 107 MG/DL
HCG INTACT+B SERPL-ACNC: 3.3 MIU/ML
HCT VFR BLD AUTO: 37.4 %
HGB BLD-MCNC: 12.1 G/DL
LYMPHOCYTES # BLD AUTO: 3.2 K/UL
LYMPHOCYTES NFR BLD: 21.9 %
MAGNESIUM SERPL-MCNC: 2 MG/DL
MCH RBC QN AUTO: 28.9 PG
MCHC RBC AUTO-ENTMCNC: 32.4 G/DL
MCV RBC AUTO: 90 FL
MONOCYTES # BLD AUTO: 1.5 K/UL
MONOCYTES NFR BLD: 10.2 %
NEUTROPHILS # BLD AUTO: 9.9 K/UL
NEUTROPHILS NFR BLD: 67.2 %
PHOSPHATE SERPL-MCNC: 3.8 MG/DL
PLATELET # BLD AUTO: 457 K/UL
PMV BLD AUTO: 8.8 FL
POTASSIUM SERPL-SCNC: 4.2 MMOL/L
RBC # BLD AUTO: 4.18 M/UL
SODIUM SERPL-SCNC: 137 MMOL/L
WBC # BLD AUTO: 14.76 K/UL

## 2019-01-11 PROCEDURE — 25000003 PHARM REV CODE 250: Performed by: ORTHOPAEDIC SURGERY

## 2019-01-11 PROCEDURE — 63600175 PHARM REV CODE 636 W HCPCS: Performed by: ORTHOPAEDIC SURGERY

## 2019-01-11 PROCEDURE — 11000001 HC ACUTE MED/SURG PRIVATE ROOM

## 2019-01-11 PROCEDURE — 97530 THERAPEUTIC ACTIVITIES: CPT

## 2019-01-11 PROCEDURE — 36415 COLL VENOUS BLD VENIPUNCTURE: CPT

## 2019-01-11 PROCEDURE — 97535 SELF CARE MNGMENT TRAINING: CPT

## 2019-01-11 PROCEDURE — 97166 OT EVAL MOD COMPLEX 45 MIN: CPT

## 2019-01-11 PROCEDURE — 83735 ASSAY OF MAGNESIUM: CPT

## 2019-01-11 PROCEDURE — 84100 ASSAY OF PHOSPHORUS: CPT

## 2019-01-11 PROCEDURE — 63600175 PHARM REV CODE 636 W HCPCS: Performed by: INTERNAL MEDICINE

## 2019-01-11 PROCEDURE — 99900037 HC PT THERAPY SCREENING (STAT)

## 2019-01-11 PROCEDURE — 84702 CHORIONIC GONADOTROPIN TEST: CPT

## 2019-01-11 PROCEDURE — 25000003 PHARM REV CODE 250: Performed by: INTERNAL MEDICINE

## 2019-01-11 PROCEDURE — 80048 BASIC METABOLIC PNL TOTAL CA: CPT

## 2019-01-11 PROCEDURE — 94760 N-INVAS EAR/PLS OXIMETRY 1: CPT

## 2019-01-11 PROCEDURE — 85025 COMPLETE CBC W/AUTO DIFF WBC: CPT

## 2019-01-11 PROCEDURE — 97162 PT EVAL MOD COMPLEX 30 MIN: CPT

## 2019-01-11 RX ADMIN — CHLORHEXIDINE GLUCONATE 10 ML: 1.2 RINSE ORAL at 08:01

## 2019-01-11 RX ADMIN — OXYCODONE HYDROCHLORIDE AND ACETAMINOPHEN 1 TABLET: 10; 325 TABLET ORAL at 06:01

## 2019-01-11 RX ADMIN — DIPHENHYDRAMINE HYDROCHLORIDE 25 MG: 25 CAPSULE ORAL at 03:01

## 2019-01-11 RX ADMIN — OXYCODONE HYDROCHLORIDE AND ACETAMINOPHEN 1 TABLET: 10; 325 TABLET ORAL at 12:01

## 2019-01-11 RX ADMIN — ENOXAPARIN SODIUM 40 MG: 100 INJECTION SUBCUTANEOUS at 08:01

## 2019-01-11 RX ADMIN — HYDROMORPHONE HYDROCHLORIDE 1 MG: 2 INJECTION INTRAMUSCULAR; INTRAVENOUS; SUBCUTANEOUS at 08:01

## 2019-01-11 RX ADMIN — PANTOPRAZOLE SODIUM 40 MG: 40 TABLET, DELAYED RELEASE ORAL at 08:01

## 2019-01-11 RX ADMIN — CEFAZOLIN SODIUM 2 G: 2 SOLUTION INTRAVENOUS at 05:01

## 2019-01-11 RX ADMIN — OXYCODONE HYDROCHLORIDE AND ACETAMINOPHEN 1 TABLET: 10; 325 TABLET ORAL at 04:01

## 2019-01-11 RX ADMIN — VARENICLINE TARTRATE 1 MG: 0.5 TABLET, FILM COATED ORAL at 08:01

## 2019-01-11 RX ADMIN — HYDROMORPHONE HYDROCHLORIDE 1 MG: 2 INJECTION INTRAMUSCULAR; INTRAVENOUS; SUBCUTANEOUS at 03:01

## 2019-01-11 RX ADMIN — OXYCODONE HYDROCHLORIDE AND ACETAMINOPHEN 1 TABLET: 10; 325 TABLET ORAL at 08:01

## 2019-01-11 RX ADMIN — HYDROMORPHONE HYDROCHLORIDE 1 MG: 2 INJECTION INTRAMUSCULAR; INTRAVENOUS; SUBCUTANEOUS at 10:01

## 2019-01-11 RX ADMIN — SODIUM CHLORIDE: 0.9 INJECTION, SOLUTION INTRAVENOUS at 10:01

## 2019-01-11 RX ADMIN — DIPHENHYDRAMINE HYDROCHLORIDE 25 MG: 25 CAPSULE ORAL at 10:01

## 2019-01-11 RX ADMIN — DIPHENHYDRAMINE HYDROCHLORIDE 25 MG: 25 CAPSULE ORAL at 08:01

## 2019-01-11 RX ADMIN — HYDROMORPHONE HYDROCHLORIDE 1 MG: 2 INJECTION INTRAMUSCULAR; INTRAVENOUS; SUBCUTANEOUS at 05:01

## 2019-01-11 NOTE — PT/OT/SLP PROGRESS
Physical Therapy      Patient Name:  Teresa Cazares   MRN:  [id number]    P.MARIANNA VALDEZ INITIATED THIS AM VIA CHART REVIEW, ATTEMPTED EVAL, PT LEAVING FOR CT, C/O INCREASED PAIN WITH MOVEMENT, WILL ATTEMPT LATER THIS AM    Iva Lincoln, PT   1/11/2019  0746
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
## 2019-01-11 NOTE — PLAN OF CARE
CM met with patient at the bedside to assess for discharge needs.  Patient lives at home and her 30 year old daughter lives with her.  She was independent prior to her leg fracture.  She had surgery yesterday but is not sure what she will need.  She has an external fixator in place.  PT/OT is ordered but has not yet worked with patient.  CM will continue to follow.  CM provided information on advanced directives, information on pharmacy bedside delivery, and discharge planning begins on admission with contact information for any needs/questions.     D/C Plan: Home health vs rehab, equipment to be determined    [id number] - [place], [place] - [address]  [address]  [place] [address]  Phone: [phone] Fax: [phone]    Taylor Harrison MD  Payor: [facility] / Plan: BCBS ALL OUT OF STATE / Product Type: PPO /       01/11/19 0914   Discharge Assessment   Assessment Type Discharge Planning Assessment   Confirmed/corrected address and phone number on facesheet? Yes   Assessment information obtained from? Patient;Medical Record;Caregiver   Expected Length of Stay (days) (TBD)   Communicated expected length of stay with patient/caregiver yes   Prior to hospitilization cognitive status: Alert/Oriented   Prior to hospitalization functional status: Independent   Current cognitive status: Alert/Oriented   Current Functional Status: Assistive Equipment;Needs Assistance   Facility Arrived From: home   Lives With child(vira), adult   Able to Return to Prior Arrangements yes   Is patient able to care for self after discharge? Unable to determine at this time (comments)   Who are your caregiver(s) and their phone number(s)? Arminda Maldonado, mother [phone]   Patient's perception of discharge disposition home health   Readmission Within the Last 30 Days no previous admission in last 30 days   Patient currently being followed by outpatient case management? No    Patient currently receives any other outside agency services? No   Equipment Currently Used at Home none   Do you have any problems affording any of your prescribed medications? No   Is the patient taking medications as prescribed? yes   Does the patient have transportation home? Yes   Transportation Anticipated family or friend will provide   Dialysis Name and Scheduled days NA   Does the patient receive services at the [facility]? No   Discharge Plan A Home with family;Home Health   Discharge Plan B Rehab   DME Needed Upon Discharge  walker, rolling;wheelchair   Patient/Family in Agreement with Plan yes

## 2019-01-11 NOTE — PLAN OF CARE
Problem: Adult Inpatient Plan of Care  Goal: Plan of Care Review  Outcome: Ongoing (interventions implemented as appropriate)  No acute distress noted. Iv fluids infusing. Antibiotics infusing. Drsg dry and intact. Family at bedside. Safety measures are in place. Call bell within reach. Pain being managed. Pt free of falls. Will continue to monitor. Chart check complete.

## 2019-01-11 NOTE — SUBJECTIVE & OBJECTIVE
Past Medical History:   Diagnosis Date    Depression     denies hospitalization or bipolar disorder    Obesity        Past Surgical History:   Procedure Laterality Date    ARTHROPLASTY-TOE Bilateral 3/31/2017    [facility] OR    FRACTURE SURGERY      C1 neck- halo    HYSTERECTOMY  2009    [facility]    OOPHORECTOMY      UMBILICAL HERNIA REPAIR         Review of patient's allergies indicates:   Allergen Reactions    Flagyl [metronidazole hcl] Hives       No current facility-administered medications on file prior to encounter.      Current Outpatient Medications on File Prior to Encounter   Medication Sig    varenicline (CHANTIX) 1 mg Tab Take 1 tablet (1 mg total) by mouth 2 (two) times daily.    ibuprofen (ADVIL,MOTRIN) 800 MG tablet Take 1 tablet (800 mg total) by mouth 3 (three) times daily as needed for Pain.     Family History     Problem Relation (Age of Onset)    Breast cancer Mother (58), Sister (46)        Tobacco Use    Smoking status: Current Every Day Smoker     Packs/day: 0.50     Years: 27.00     Pack years: 13.50     Types: Cigarettes    Smokeless tobacco: Never Used   Substance and Sexual Activity    Alcohol use: Yes     Comment: occasionally    Drug use: No    Sexual activity: Not on file     Review of Systems   Constitutional: Negative.  Negative for chills, diaphoresis, fatigue and fever.   HENT: Negative.  Negative for congestion, nosebleeds and sinus pressure.    Eyes: Negative.  Negative for visual disturbance.   Respiratory: Negative.  Negative for cough, chest tightness, shortness of breath and wheezing.    Cardiovascular: Negative.  Negative for chest pain, palpitations and leg swelling.   Gastrointestinal: Negative.  Negative for abdominal pain, diarrhea, nausea and vomiting.   Endocrine: Negative.  Negative for polyuria.   Genitourinary: Negative.  Negative for dysuria, flank pain, frequency and urgency.   Musculoskeletal: Negative.  Negative for  back pain, joint swelling and neck stiffness.        Left leg pain   Skin: Negative.  Negative for color change, pallor and rash.   Allergic/Immunologic: Negative.  Negative for immunocompromised state.   Neurological: Negative.  Negative for dizziness, syncope, speech difficulty, numbness and headaches.   Hematological: Negative.  Negative for adenopathy. Does not bruise/bleed easily.   Psychiatric/Behavioral: Negative.  Negative for confusion, decreased concentration and hallucinations. The patient is not nervous/anxious.    All other systems reviewed and are negative.    Objective:     Vital Signs (Most Recent):  Temp: 98.5 °F (36.9 °C) (01/10/19 1514)  Pulse: 91 (01/10/19 1831)  Resp: 15 (01/10/19 1831)  BP: 130/60 (01/10/19 1831)  SpO2: 96 % (01/10/19 1831) Vital Signs (24h Range):  Temp:  [98.5 °F (36.9 °C)] 98.5 °F (36.9 °C)  Pulse:  [65-94] 91  Resp:  [15-22] 15  SpO2:  [96 %-100 %] 96 %  BP: (100-149)/(50-87) 130/60     Weight: 114.3 kg (251 lb 14.4 oz)  Body mass index is 44.62 kg/m².    Physical Exam   Constitutional: She is oriented to person, place, and time. No distress.   Morbidly obese  female, uncomfortable lying in bed due to pain the left lower leg.  Alert, oriented x3.  Family members at the bedside.   HENT:   Head: Normocephalic and atraumatic.   Eyes: Conjunctivae and EOM are normal. No scleral icterus.   Neck: Normal range of motion. Neck supple.   Cardiovascular: Normal rate, regular rhythm, normal heart sounds and intact distal pulses.   No murmur heard.  Pulmonary/Chest: Effort normal and breath sounds normal. No respiratory distress. She has no wheezes. She has no rales. She exhibits no tenderness.   Abdominal: Soft. She exhibits no distension. There is no tenderness.   Obese   Musculoskeletal: She exhibits deformity (left lower leg). She exhibits no edema or tenderness.   Distal pulses bilaterally intact.   Neurological: She is alert and oriented to person, place, and  time. No cranial nerve deficit. She exhibits normal muscle tone. Coordination normal.   Skin: Skin is warm and dry. She is not diaphoretic. No erythema.   Psychiatric: She has a normal mood and affect. Her behavior is normal. Judgment and thought content normal.   Nursing note and vitals reviewed.        CRANIAL NERVES     CN III, IV, VI   Extraocular motions are normal.        Significant Labs:   BMP:   Recent Labs   Lab 01/10/19  1737   *      K 4.1      CO2 22*   BUN 13   CREATININE 0.8   CALCIUM 9.2     CBC:   Recent Labs   Lab 01/10/19  1737   WBC 21.20*   HGB 13.3   HCT 40.1   *     CMP:   Recent Labs   Lab 01/10/19  1737      K 4.1      CO2 22*   *   BUN 13   CREATININE 0.8   CALCIUM 9.2   PROT 7.1   ALBUMIN 4.2   BILITOT 0.3   ALKPHOS 82   AST 22   ALT 21   ANIONGAP 14   EGFRNONAA >60     Urine Studies: No results for input(s): COLORU, APPEARANCEUA, PHUR, SPECGRAV, PROTEINUA, GLUCUA, KETONESU, BILIRUBINUA, OCCULTUA, NITRITE, UROBILINOGEN, LEUKOCYTESUR, RBCUA, WBCUA, BACTERIA, SQUAMEPITHEL, HYALINECASTS in the last 48 hours.    Invalid input(s): WRIGHTSUR  All pertinent labs within the past 24 hours have been reviewed.    Significant Imaging: I have reviewed and interpreted all pertinent imaging results/findings within the past 24 hours.     Imaging Results          X-Ray Chest AP Portable (Final result)  Result time 01/10/19 18:50:55    Final result by MARIA FERNANDA Alexander Sr., MD (01/10/19 18:50:55)                 Impression:      1. The current examination is limited secondary to the lack of inclusion of the entire thorax on the film. The left costophrenic angle is not completely included on the film.  2. The visualized portion of the lungs is clear.  .      Electronically signed by: Eusebio Alexander MD  Date:    01/10/2019  Time:    18:50             Narrative:    EXAMINATION:  XR CHEST AP PORTABLE    CLINICAL HISTORY:  tibial  fracture;    COMPARISON:  02/24/2017    FINDINGS:  The current examination is limited secondary to the lack of inclusion of the entire thorax on the film.  The left costophrenic angle is not completely included on the film.  The right costophrenic angle is sharp.  The size of the heart is normal.  The visualized portion of the lungs is clear.  There is no pneumothorax.                               X-Ray Knee 3 View Left (Final result)  Result time 01/10/19 16:47:32    Final result by Daniel Phan MD (01/10/19 16:47:32)                 Impression:    FINDINGS/  Four views of the left tibia/fibula and two views of the left knee were obtained.    Comminuted fracture involving the proximal tibial metaphysis extending to the intercondylar eminence of the tibial plateau.  Oblique fibular head fracture also noted.  Moderate joint effusion.  Bony mineralization is normal.  Moderate soft tissue swelling.  There is mild varus configuration.  Femur and patella appear intact.  Patellar tendon appears grossly intact.    CT WITHOUT CONTRAST CAN BE OBTAINED FOR FURTHER CHARACTERIZATION.      Electronically signed by: Daniel Phan MD  Date:    01/10/2019  Time:    16:47             Narrative:    EXAMINATION:  XR TIBIA FIBULA 2 VIEW LEFT; XR KNEE 3 VIEW LEFT    CLINICAL HISTORY:  XR TIBIA FIBULA 2 VIEW LEFT; XR KNEE 3 VIEW LEFTPain in left knee    COMPARISON:  None                               X-Ray Tibia Fibula 2 View Left (Final result)  Result time 01/10/19 16:47:32    Final result by Daniel Phan MD (01/10/19 16:47:32)                 Impression:    FINDINGS/  Four views of the left tibia/fibula and two views of the left knee were obtained.    Comminuted fracture involving the proximal tibial metaphysis extending to the intercondylar eminence of the tibial plateau.  Oblique fibular head fracture also noted.  Moderate joint effusion.  Bony mineralization is normal.  Moderate soft tissue swelling.  There is mild varus  configuration.  Femur and patella appear intact.  Patellar tendon appears grossly intact.    CT WITHOUT CONTRAST CAN BE OBTAINED FOR FURTHER CHARACTERIZATION.      Electronically signed by: Daniel Phan MD  Date:    01/10/2019  Time:    16:47             Narrative:    EXAMINATION:  XR TIBIA FIBULA 2 VIEW LEFT; XR KNEE 3 VIEW LEFT    CLINICAL HISTORY:  XR TIBIA FIBULA 2 VIEW LEFT; XR KNEE 3 VIEW LEFTPain in left knee    COMPARISON:  None                                I have independently reviewed and interpreted the EKG.    I have independently reviewed all pertinent labs within the past 24 hours.    I have independently reviewed, visualized and interpreted all pertinent imaging results within the past 24 hours and discussed the findings with the ED physician, Dr. Schmidt.

## 2019-01-11 NOTE — PT/OT/SLP EVAL
Occupational Therapy   Evaluation    Name: Teresa Cazares  MRN: [id number]  Admitting Diagnosis:  Closed fracture of proximal end of left tibia 1 Day Post-Op    Recommendations:     Discharge Recommendations: rehabilitation facility  Discharge Equipment Recommendations:  walker, rolling, commode([facility] ; [facility] [place])  Barriers to discharge:  Decreased caregiver support    Assessment:     Teresa Cazares is a 43 y.o. female with a medical diagnosis of Closed fracture of proximal end of left tibia.  She presents with DEBILITY AND GENERALIZED WEAKNESS. Performance deficits affecting function: weakness, impaired self care skills, impaired balance, decreased safety awareness, impaired endurance, impaired functional mobilty, decreased upper extremity function, gait instability.      Rehab Prognosis: Good; patient would benefit from acute skilled OT services to address these deficits and reach maximum level of function.       Plan:     Patient to be seen 3 x/week to address the above listed problems via self-care/home management, therapeutic activities, therapeutic exercises  · Plan of Care Expires: 01/18/19  · Plan of Care Reviewed with: patient, mother    Subjective     Chief Complaint: DEBILITY AND GENERALIZED WEAKNESS  Patient/Family Comments/goals:     Occupational Profile:  Living Environment:   Previous level of function:   Roles and Routines: OCCUPATIONAL THERAPY  Equipment Used at Home:  none  Assistance upon Discharge:     Pain/Comfort:  · Pain Rating 1: 8/10  · Location - Side 1: Left  · Location - Orientation 1: generalized  · Location 1: leg    Patients cultural, spiritual, Baptist conflicts given the current situation:      Objective:     Communicated with: NURSE SHAKESIA  AND Epic CHART REVIEW prior to session.  Patient found up in chair with peripheral IV upon OT entry to room.    General Precautions: Standard, fall   Orthopedic Precautions:LLE non weight bearing   Braces:       Occupational  Performance:    Bed Mobility:    · Patient completed Rolling/Turning to Left with  maximal assistance  · Patient completed Scooting/Bridging with maximal assistance  · Patient completed Supine to Sit with maximal assistance    Functional Mobility/Transfers:  · Patient completed Sit <> Stand Transfer with maximal assistance  with  rolling walker   · Patient completed Bed <> Chair Transfer using Stand Pivot technique with maximal assistance with rolling walker    Activities of Daily Living:  · Upper Body Dressing: minimum assistance .  · Lower Body Dressing: moderate assistance .CARLOS SOCK ON RIGHT FOOT    Cognitive/Visual Perceptual:  Cognitive/Psychosocial Skills:  -       Oriented to: Person, Place, Time and Situation   -       Follows Commands/attention:Follows two-step commands  -       Communication: clear/fluent  -       Memory: No Deficits noted  -       Safety awareness/insight to disability: impaired and TEARFUL   Visual/Perceptual:      -Intact .    Physical Exam:  Upper Extremity Range of Motion:     -       Right Upper Extremity: WFL  -       Left Upper Extremity: WFL  Upper Extremity Strength:    -       Right Upper Extremity: MMT: 4/5 GROSSLY  -       Left Upper Extremity: MMT: 4/5 GROSSLY   Strength:    -       Right Upper Extremity: MMT: 4/5 GROSSLY  -       Left Upper Extremity: MMT: 4/5 GROSSLY    AMPAC 6 Click ADL:  AMPAC Total Score: 15    Treatment & Education:    Education:    Patient left up in chair with all lines intact, call button in reach, NURSE SHANNON notified and MOTHER present    GOALS:   Multidisciplinary Problems     Occupational Therapy Goals        Problem: Occupational Therapy Goal    Goal Priority Disciplines Outcome Interventions   Occupational Therapy Goal     OT, PT/OT     Description:  OT GOALS TO BE MET BY 1-17-19  MIN A WITH BSC T/F'S  PT WILL TOLERATE 1 SET X 15 REPS B UE ROM EXERCISE WITH MIN RESISTANCE  S WITH UE DRESSING  MIN A WITH BE MOBILITY               "      History:     Past Medical History:   Diagnosis Date    Depression     denies hospitalization or bipolar disorder    Obesity        Past Surgical History:   Procedure Laterality Date    APPLICATION, EXTERNAL FIXATION DEVICE, LARGE, TIBIA Left 1/10/2019    Performed by [facility]ic Galvan [facility] at [facility] OR    ARTHROPLASTY-TOE Bilateral 3/31/2017    Performed by Anastasia Maurice DPM at [facility] OR    FRACTURE SURGERY      C1 neck- halo    HYSTERECTOMY  2009    [facility]    OOPHORECTOMY      UMBILICAL HERNIA REPAIR         Clinical Decision Making:     3.  OT High:  "Pt evaluation falls under high complexity for evaluation category due to 5+ performance deficits identified with comprehensive assessments and significant modifications/assistance required. An expanded review of history and occupational profile completed in addition to expanded review of physical, cognitive and psychosocial history. Several treatment options considered in care."     Time Tracking:     OT Date of Treatment: 01/11/19  OT Start Time: 1045  OT Stop Time: 1124  OT Total Time (min): 39 min    Billable Minutes:Evaluation 15 MINUTES  Self Care/Home Management 9 MINUTES  Therapeutic Activity 15 MINUTES    Lucinda Rhoades, OT  1/11/2019    "

## 2019-01-11 NOTE — PROGRESS NOTES
POD1  S/p closed reduction with external fixation  Patient reports moderate pain  Discussed positive urine pregnancy test and she indicates that she is sexually inactive and had a hysterectomy in 2009.  AVSS  PE  Dressing c/d/i  compartmens soft  NVID  Plan:  1. Once soft tissues allow will need ORIF anticipate early next week.
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
## 2019-01-11 NOTE — TRANSFER OF CARE
Anesthesia Transfer of Care Note    Patient: Teresa Cazares    Procedure(s) Performed: Procedure(s) (LRB):  APPLICATION, EXTERNAL FIXATION DEVICE, LARGE, TIBIA (Left)    Patient location: PACU    Anesthesia Type: general    Transport from OR: Transported from OR on room air with adequate spontaneous ventilation    Post pain: adequate analgesia    Post assessment: no apparent anesthetic complications    Post vital signs: stable    Level of consciousness: awake, alert and oriented    Nausea/Vomiting: no nausea/vomiting    Complications: none    Transfer of care protocol was followed      Last vitals:   Visit Vitals  /60   Pulse 91   Temp 36.9 °C (98.5 °F) (Oral)   Resp 15   Wt 114.3 kg (251 lb 14.4 oz)   SpO2 96%   BMI 44.62 kg/m²

## 2019-01-11 NOTE — ASSESSMENT & PLAN NOTE
Evaluated by Orthopedics (Dr. Galvan).  To OR tonight.  Keep NPO.  Pain control.  Gentle IV hydration.
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
## 2019-01-11 NOTE — ANESTHESIA RELEASE NOTE
Anesthesia Release from PACU Note    Patient: Teresa Cazares    Procedure(s) Performed: Procedure(s) (LRB):  APPLICATION, EXTERNAL FIXATION DEVICE, LARGE, TIBIA (Left)    Anesthesia type: general    Post pain: Adequate analgesia    Post assessment: no apparent anesthetic complications, tolerated procedure well and no evidence of recall    Last Vitals:   Visit Vitals  /60   Pulse 91   Temp 36.9 °C (98.5 °F) (Oral)   Resp 15   Wt 114.3 kg (251 lb 14.4 oz)   SpO2 96%   BMI 44.62 kg/m²       Post vital signs: stable    Level of consciousness: awake    Nausea/Vomiting: no nausea/no vomiting    Complications: none    Airway Patency: patent    Respiratory: unassisted    Cardiovascular: stable and blood pressure at baseline    Hydration: euvolemic

## 2019-01-11 NOTE — BRIEF OP NOTE
[facility] -   Brief Operative Note    SUMMARY     Surgery Date: 1/10/2019     Surgeon(s) and Role:     * Domenic Galvan MD - Primary    Assisting Surgeon: None    Pre-op Diagnosis:  Tibial fracture [S82.209A]    Post-op Diagnosis:  Post-Op Diagnosis Codes:     * Tibial fracture [S82.209A]    Procedure(s) (LRB):  APPLICATION, EXTERNAL FIXATION DEVICE, LARGE, TIBIA (Left)    Anesthesia: Choice    Description of Procedure: as above    Description of the findings of the procedure: as above    Estimated Blood Loss: * No values recorded between 1/10/2019  8:25 PM and 1/10/2019  9:07 PM *         Specimens:   Specimen (12h ago, onward)    None

## 2019-01-11 NOTE — OP NOTE
[facility] -   General Surgery  Operative Note    SUMMARY     Date of Procedure: 1/10/2019     Procedure: Procedure(s) (LRB):  APPLICATION, EXTERNAL FIXATION DEVICE, LARGE, TIBIA (Left)       Surgeon(s) and Role:     * Domenic Galvan MD - Primary    Assisting Surgeon: None    Pre-Operative Diagnosis: Tibial fracture [S82.209A]    Post-Operative Diagnosis: Post-Op Diagnosis Codes:     * Tibial fracture [S82.209A]    Anesthesia: General    Technical Procedures Used: Closed reduction and internal fixation of left proximal tibia fracture    Description of the Findings of the Procedure: Patient identified in pre op holding area. Risk,benefots, surgical plan, follow up re reviewed.Consent obtained.Patient transferred to OR. General anesthetic administered. Ancef 2 g IV given. Extremity prepped and draped in standard and sterile fashion. Timeout performed.   Under C arm guidance  2 5.0 mm half pins placed in the distal tibia and 2 5.0 mm half pins in mid femur. Half pins were bi cortical. Frame assembled and proximal tibia reduced. Construct secured. C arm used to verify reduction. Sterile dressing applied.  Patient extubate dad returned to recovery without event.  Significant Surgical Tasks Conducted by the Assistant(s), if Applicable: NA    Complications: No    Estimated Blood Loss (EBL): * No values recorded between 1/10/2019  8:25 PM and 1/10/2019  9:07 PM *           Implants:   Implant Name Type Inv. Item Serial No.  Lot No. LRB No. Used   CLAMP LARGE OPEN ADJUSTABLE - [id number]  CLAMP LARGE OPEN ADJUSTABLE  SYNTHES  Left 1   CLAMP LARGE MULTI-PIN - [id number]  CLAMP LARGE MULTI-PIN  [employer]  Left 2   JIMMY CARBON FIBER 11MM X 300MM - [id number]  JIMMY CARBON FIBER 11MM X 300MM  SYN[employer]ES  Left 1   JIMMY CARBON FIBER 11MM X 400MM - [id number]  JIMMY CARBON FIBER 11MM X 400MM  SYNTHES  Left 1   SCREW SCHANZ BONE 5X150 EX FIX - [id number]  SCREW SCHANZ BONE 5X150 EX FIX  SYNTHES  Left 2   SCREW SCHANZ  5MM X 200MM - [id number]  SCREW SCHANZ 5MM X 200MM  SYNTHES  Left 1       Specimens:   Specimen (12h ago, onward)    None                  Condition: Good    Disposition: PACU - hemodynamically stable.    Attestation: I was present and scrubbed for the entire procedure.
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
## 2019-01-12 LAB
ANION GAP SERPL CALC-SCNC: 8 MMOL/L
BASOPHILS # BLD AUTO: 0.03 K/UL
BASOPHILS NFR BLD: 0.2 %
BUN SERPL-MCNC: 5 MG/DL
CALCIUM SERPL-MCNC: 8.7 MG/DL
CHLORIDE SERPL-SCNC: 102 MMOL/L
CO2 SERPL-SCNC: 25 MMOL/L
CREAT SERPL-MCNC: 0.7 MG/DL
DIFFERENTIAL METHOD: ABNORMAL
EOSINOPHIL # BLD AUTO: 0.1 K/UL
EOSINOPHIL NFR BLD: 0.9 %
ERYTHROCYTE [DISTWIDTH] IN BLOOD BY AUTOMATED COUNT: 14 %
EST. GFR  (AFRICAN AMERICAN): >60 ML/MIN/1.73 M^2
EST. GFR  (NON AFRICAN AMERICAN): >60 ML/MIN/1.73 M^2
GLUCOSE SERPL-MCNC: 108 MG/DL
HCT VFR BLD AUTO: 32.9 %
HGB BLD-MCNC: 10.6 G/DL
LYMPHOCYTES # BLD AUTO: 3 K/UL
LYMPHOCYTES NFR BLD: 21.9 %
MCH RBC QN AUTO: 29 PG
MCHC RBC AUTO-ENTMCNC: 32.2 G/DL
MCV RBC AUTO: 90 FL
MONOCYTES # BLD AUTO: 1.8 K/UL
MONOCYTES NFR BLD: 12.9 %
NEUTROPHILS # BLD AUTO: 8.8 K/UL
NEUTROPHILS NFR BLD: 64.1 %
PLATELET # BLD AUTO: 383 K/UL
PMV BLD AUTO: 8.4 FL
POTASSIUM SERPL-SCNC: 3.8 MMOL/L
RBC # BLD AUTO: 3.65 M/UL
SODIUM SERPL-SCNC: 135 MMOL/L
WBC # BLD AUTO: 13.68 K/UL

## 2019-01-12 PROCEDURE — 97110 THERAPEUTIC EXERCISES: CPT

## 2019-01-12 PROCEDURE — 63600175 PHARM REV CODE 636 W HCPCS: Performed by: ORTHOPAEDIC SURGERY

## 2019-01-12 PROCEDURE — 25000003 PHARM REV CODE 250: Performed by: INTERNAL MEDICINE

## 2019-01-12 PROCEDURE — 36415 COLL VENOUS BLD VENIPUNCTURE: CPT

## 2019-01-12 PROCEDURE — 63600175 PHARM REV CODE 636 W HCPCS: Performed by: INTERNAL MEDICINE

## 2019-01-12 PROCEDURE — 94761 N-INVAS EAR/PLS OXIMETRY MLT: CPT

## 2019-01-12 PROCEDURE — 80048 BASIC METABOLIC PNL TOTAL CA: CPT

## 2019-01-12 PROCEDURE — 97530 THERAPEUTIC ACTIVITIES: CPT

## 2019-01-12 PROCEDURE — 85025 COMPLETE CBC W/AUTO DIFF WBC: CPT

## 2019-01-12 PROCEDURE — 25000003 PHARM REV CODE 250: Performed by: ORTHOPAEDIC SURGERY

## 2019-01-12 PROCEDURE — 11000001 HC ACUTE MED/SURG PRIVATE ROOM

## 2019-01-12 RX ADMIN — CHLORHEXIDINE GLUCONATE 10 ML: 1.2 RINSE ORAL at 09:01

## 2019-01-12 RX ADMIN — OXYCODONE HYDROCHLORIDE AND ACETAMINOPHEN 1 TABLET: 10; 325 TABLET ORAL at 04:01

## 2019-01-12 RX ADMIN — HYDROMORPHONE HYDROCHLORIDE 1 MG: 2 INJECTION INTRAMUSCULAR; INTRAVENOUS; SUBCUTANEOUS at 02:01

## 2019-01-12 RX ADMIN — PANTOPRAZOLE SODIUM 40 MG: 40 TABLET, DELAYED RELEASE ORAL at 09:01

## 2019-01-12 RX ADMIN — ACETAMINOPHEN 650 MG: 325 TABLET ORAL at 12:01

## 2019-01-12 RX ADMIN — DIPHENHYDRAMINE HYDROCHLORIDE 25 MG: 25 CAPSULE ORAL at 09:01

## 2019-01-12 RX ADMIN — OXYCODONE HYDROCHLORIDE AND ACETAMINOPHEN 1 TABLET: 10; 325 TABLET ORAL at 09:01

## 2019-01-12 RX ADMIN — VARENICLINE TARTRATE 1 MG: 0.5 TABLET, FILM COATED ORAL at 09:01

## 2019-01-12 RX ADMIN — HYDROMORPHONE HYDROCHLORIDE 1 MG: 2 INJECTION INTRAMUSCULAR; INTRAVENOUS; SUBCUTANEOUS at 07:01

## 2019-01-12 RX ADMIN — HYDROMORPHONE HYDROCHLORIDE 1 MG: 2 INJECTION INTRAMUSCULAR; INTRAVENOUS; SUBCUTANEOUS at 09:01

## 2019-01-12 RX ADMIN — OXYCODONE HYDROCHLORIDE AND ACETAMINOPHEN 1 TABLET: 10; 325 TABLET ORAL at 12:01

## 2019-01-12 RX ADMIN — DIPHENHYDRAMINE HYDROCHLORIDE 25 MG: 25 CAPSULE ORAL at 05:01

## 2019-01-12 RX ADMIN — HYDROMORPHONE HYDROCHLORIDE 1 MG: 2 INJECTION INTRAMUSCULAR; INTRAVENOUS; SUBCUTANEOUS at 11:01

## 2019-01-12 RX ADMIN — OXYCODONE HYDROCHLORIDE AND ACETAMINOPHEN 1 TABLET: 10; 325 TABLET ORAL at 11:01

## 2019-01-12 RX ADMIN — DIPHENHYDRAMINE HYDROCHLORIDE 25 MG: 25 CAPSULE ORAL at 11:01

## 2019-01-12 RX ADMIN — ENOXAPARIN SODIUM 40 MG: 100 INJECTION SUBCUTANEOUS at 09:01

## 2019-01-12 RX ADMIN — OXYCODONE HYDROCHLORIDE AND ACETAMINOPHEN 1 TABLET: 10; 325 TABLET ORAL at 05:01
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
## 2019-01-12 NOTE — ASSESSMENT & PLAN NOTE
She has been consulted on the need for increased physical activity when medically able, diet, and weight loss

## 2019-01-12 NOTE — PLAN OF CARE
Problem: Adult Inpatient Plan of Care  Goal: Plan of Care Review  Outcome: Ongoing (interventions implemented as appropriate)  Reviewed POC, including indications and possible side effects of administered medications. Pt verbalized understanding and teach back. External fixator in place; no drainage noted around pins or on ACE wrap. Ice packs continuously to LLE. LLE elevated with pillows. Pain controlled with PO and IV pain medications and repositioning. AAOx3. Bed low and locked, SR x 2 up, call bell within reach.    12 hour chart check complete.

## 2019-01-12 NOTE — SUBJECTIVE & OBJECTIVE
Interval History: Pt seen and examined. Feeling well. PT/OT following. Continue DVT prophylactic. S/P external fixator to Lt proximal tibia fracture on 01/10. Plan for ORIF early next week when swelling improve.     Review of Systems   Constitutional: Negative for chills and fever.   HENT: Negative for congestion, rhinorrhea and sinus pressure.    Respiratory: Negative for apnea, cough, choking, chest tightness, shortness of breath, wheezing and stridor.    Cardiovascular: Negative for chest pain, palpitations and leg swelling.   Gastrointestinal: Negative for abdominal distention, abdominal pain, diarrhea, nausea and vomiting.   Endocrine: Negative for cold intolerance and heat intolerance.   Genitourinary: Negative for difficulty urinating and hematuria.   Musculoskeletal: Negative for arthralgias and joint swelling.        Left lower leg pain    Skin: Negative for color change, pallor and rash.   Neurological: Negative for dizziness, seizures, weakness, numbness and headaches.   Psychiatric/Behavioral: Negative for agitation. The patient is not nervous/anxious.      Objective:     Vital Signs (Most Recent):  Temp: 98.6 °F (37 °C) (01/12/19 1617)  Pulse: 92 (01/12/19 1617)  Resp: 18 (01/12/19 1617)  BP: 126/61 (01/12/19 1617)  SpO2: (!) 93 % (01/12/19 1617) Vital Signs (24h Range):  Temp:  [98.6 °F (37 °C)-101.7 °F (38.7 °C)] 98.6 °F (37 °C)  Pulse:  [71-92] 92  Resp:  [18-20] 18  SpO2:  [93 %-98 %] 93 %  BP: (116-138)/(57-69) 126/61     Weight: 114.3 kg (251 lb 15.8 oz)  Body mass index is 39.47 kg/m².    Intake/Output Summary (Last 24 hours) at 1/12/2019 1705  Last data filed at 1/12/2019 0938  Gross per 24 hour   Intake 840 ml   Output --   Net 840 ml      Physical Exam   Constitutional: She is oriented to person, place, and time. No distress.   HENT:   Head: Normocephalic and atraumatic.   Eyes: Right eye exhibits no discharge. Left eye exhibits no discharge.   Neck: No JVD present.   Cardiovascular: Exam  reveals no gallop and no friction rub.   No murmur heard.  Pulmonary/Chest: No stridor. No respiratory distress. She has no wheezes. She has no rales. She exhibits no tenderness.   Abdominal: She exhibits no distension and no mass. There is no tenderness. There is no rebound and no guarding. No hernia.   Musculoskeletal: She exhibits no edema or deformity.   Lt lower leg external fixator intact.  ACE wrap dressing intact    Neurological: She is alert and oriented to person, place, and time.   Skin: Skin is warm and dry. Capillary refill takes less than 2 seconds. She is not diaphoretic.   Nursing note and vitals reviewed.      Significant Labs:   CBC:   Recent Labs   Lab 01/10/19  1737 01/11/19  0523 01/12/19  0543   WBC 21.20* 14.76* 13.68*   HGB 13.3 12.1 10.6*   HCT 40.1 37.4 32.9*   * 457* 383*     CMP:   Recent Labs   Lab 01/10/19  1737 01/11/19  0523 01/12/19  0543    137 135*   K 4.1 4.2 3.8    103 102   CO2 22* 24 25   * 107 108   BUN 13 10 5*   CREATININE 0.8 0.8 0.7   CALCIUM 9.2 9.0 8.7   PROT 7.1  --   --    ALBUMIN 4.2  --   --    BILITOT 0.3  --   --    ALKPHOS 82  --   --    AST 22  --   --    ALT 21  --   --    ANIONGAP 14 10 8   EGFRNONAA >60 >60 >60       Significant Imaging:     Imaging Results          FL Flouro Usage (In process)                X-Ray Chest AP Portable (Final result)  Result time 01/10/19 18:50:55    Final result by MARIA FERNANDA Alexander Sr., MD (01/10/19 18:50:55)                 Impression:      1. The current examination is limited secondary to the lack of inclusion of the entire thorax on the film. The left costophrenic angle is not completely included on the film.  2. The visualized portion of the lungs is clear.  .      Electronically signed by: Eusebio Alexander MD  Date:    01/10/2019  Time:    18:50             Narrative:    EXAMINATION:  XR CHEST AP PORTABLE    CLINICAL HISTORY:  tibial fracture;    COMPARISON:  02/24/2017    FINDINGS:  The current  examination is limited secondary to the lack of inclusion of the entire thorax on the film.  The left costophrenic angle is not completely included on the film.  The right costophrenic angle is sharp.  The size of the heart is normal.  The visualized portion of the lungs is clear.  There is no pneumothorax.                               X-Ray Knee 3 View Left (Final result)  Result time 01/10/19 16:47:32    Final result by Daniel Phan MD (01/10/19 16:47:32)                 Impression:    FINDINGS/  Four views of the left tibia/fibula and two views of the left knee were obtained.    Comminuted fracture involving the proximal tibial metaphysis extending to the intercondylar eminence of the tibial plateau.  Oblique fibular head fracture also noted.  Moderate joint effusion.  Bony mineralization is normal.  Moderate soft tissue swelling.  There is mild varus configuration.  Femur and patella appear intact.  Patellar tendon appears grossly intact.    CT WITHOUT CONTRAST CAN BE OBTAINED FOR FURTHER CHARACTERIZATION.      Electronically signed by: Daniel Phan MD  Date:    01/10/2019  Time:    16:47             Narrative:    EXAMINATION:  XR TIBIA FIBULA 2 VIEW LEFT; XR KNEE 3 VIEW LEFT    CLINICAL HISTORY:  XR TIBIA FIBULA 2 VIEW LEFT; XR KNEE 3 VIEW LEFTPain in left knee    COMPARISON:  None                               X-Ray Tibia Fibula 2 View Left (Final result)  Result time 01/10/19 16:47:32    Final result by Daniel Phan MD (01/10/19 16:47:32)                 Impression:    FINDINGS/  Four views of the left tibia/fibula and two views of the left knee were obtained.    Comminuted fracture involving the proximal tibial metaphysis extending to the intercondylar eminence of the tibial plateau.  Oblique fibular head fracture also noted.  Moderate joint effusion.  Bony mineralization is normal.  Moderate soft tissue swelling.  There is mild varus configuration.  Femur and patella appear intact.  Patellar  tendon appears grossly intact.    CT WITHOUT CONTRAST CAN BE OBTAINED FOR FURTHER CHARACTERIZATION.      Electronically signed by: Daniel Phan MD  Date:    01/10/2019  Time:    16:47             Narrative:    EXAMINATION:  XR TIBIA FIBULA 2 VIEW LEFT; XR KNEE 3 VIEW LEFT    CLINICAL HISTORY:  XR TIBIA FIBULA 2 VIEW LEFT; XR KNEE 3 VIEW LEFTPain in left knee    COMPARISON:  None

## 2019-01-12 NOTE — PT/OT/SLP PROGRESS
Physical Therapy  Treatment    Teresa Cazares   MRN: [id number]   Admitting Diagnosis: Closed fracture of proximal end of left tibia    PT Received On: 01/12/19  PT Start Time: 1300     PT Stop Time: 1340    PT Total Time (min): 40 min       Billable Minutes:  Therapeutic Activity 30 and Therapeutic Exercise 10    Treatment Type: Treatment  PT/PTA: PTA     PTA Visit Number: 1       General Precautions: Standard, fall  Orthopedic Precautions: LLE non weight bearing(EXTERNAL FIXATOR LLE)   Braces: N/A    Spiritual, Cultural Beliefs, Buddhism Practices, Values that Affect Care: no    Subjective:  Communicated with Epic AND NURSEBARI prior to session.  PATIIENT AGREE TO TX NOW.    Pain/Comfort  Pain Rating 1: 8/10  Location - Side 1: Left  Location - Orientation 1: lower  Location 1: leg  Pain Addressed 1: Pre-medicate for activity, Reposition, Distraction, Cessation of Activity, Nurse notified  Pain Rating Post-Intervention 1: 10/10    Objective:   Patient found with: peripheral IV, SUPINE TO SIT T/F AT MAX ASSIST X1 ,  UTILIZING ELEVATED HOB FOR T/F, AS WELL AS PTA ASSISTING WITH LLE AT DEPENDENT LEVEL FOR T/F SUPINE TO SIT, SIT TO SUPINE. PATIENT INSTRUCTED WITH BRIDGING ACTIVITY FOR SCOOTING TO HOB , PTA SUPPORTING LLE AT DEPENDENT LEVEL. LLE HEEL FLOATED.  SHORTSEATED BALANCE ACTIVITY WITH LLE IN DEPENDENT POSITION PROPPED ATOP A PILLOW FOR SUPPORTS, LLE NWB MAINTAINED ALL TX. PATIENT INSTRUCTED WITH JORDON LE EXERCISES WITH VISUAL AND VERBAL CUES FOR FOLLOW THROUGH. PATIENT PAIN LEVEL AT HIGH LEVEL AT PRESENT TIME, NURSE NOTIFIED.    Functional Mobility:  Bed Mobility:     SCOOTING AT MAX ASSIST WITH LLE, CUES FOR BRIDGING WITH RLE FOR SCOOTING TO HOB.  Transfers:   SUPINE TO SIT, SIT TO SUPINE AT MAX ASSISTX1, UTILIZING ELEVATED HOB FOR T/F.  Gait:    UNABLE AT PRESENT TIME.    Stairs:N/A    Balance:   Static Sit: POOR: Needs MODERATE assist to maintain  Dynamic Sit: POOR: N/A  Static Stand: 0: Needs MAXIMAL  assist to maintain   Dynamic stand: 0: N/A     Therapeutic Activities and Exercises:  T/FS , BED MOBILITY, JORDON LE EXERCISES WITHIN ALL AVAILABLE PLANES.    AM-PAC 6 CLICK MOBILITY  How much help from another person does this patient currently need?   1 = Unable, Total/Dependent Assistance  2 = A lot, Maximum/Moderate Assistance  3 = A little, Minimum/Contact Guard/Supervision  4 = None, Modified [place]/Independent    Turning over in bed (including adjusting bedclothes, sheets and blankets)?: 2  Sitting down on and standing up from a chair with arms (e.g., wheelchair, bedside commode, etc.): 2  Moving from lying on back to sitting on the side of the bed?: 2  Moving to and from a bed to a chair (including a wheelchair)?: 2  Need to walk in hospital room?: 1  Climbing 3-5 steps with a railing?: 1  Basic Mobility Total Score: 10    AM-PAC Raw Score CMS G-Code Modifier Level of Impairment Assistance   6 % Total / Unable   7 - 9 CM 80 - 100% Maximal Assist   10 - 14 CL 60 - 80% Moderate Assist   15 - 19 CK 40 - 60% Moderate Assist   20 - 22 CJ 20 - 40% Minimal Assist   23 CI 1-20% SBA / CGA   24 CH 0% Independent/ Mod I     Patient left supine with all lines intact and call button in reach.    Assessment:  Teresa Cazares is a 43 y.o. female with a medical diagnosis of Closed fracture of proximal end of left tibia .    Rehab identified problem list/impairments: Rehab identified problem list/impairments: weakness, impaired self care skills, impaired balance, impaired endurance, impaired functional mobilty, impaired sensation, gait instability, pain, decreased lower extremity function    Rehab potential is excellent.    Activity tolerance: Good    Discharge recommendations: Discharge Facility/Level of Care Needs: rehabilitation facility     Barriers to discharge:      Equipment recommendations: Equipment Needed After Discharge: commode, walker, rolling(BARIATRIC)     GOALS:   Multidisciplinary Problems      Physical Therapy Goals        Problem: Physical Therapy Goal    Goal Priority Disciplines Outcome Goal Variances Interventions   Physical Therapy Goal     PT, PT/OT      Description:  LTG'S TO BE MET IN 7 DAYS (1-18-19)  1. PT WILL REQUIRE BERTO FOR BED MOBILITY  2. PT WILL REQUIRE BERTO FOR TF'S, NWB FOR LLE  3. PT WILL DEMO F- DYNAMIC BALANCE DURING TF                    PLAN:    Patient to be seen 5 x/week  to address the above listed problems via gait training, therapeutic activities, therapeutic exercises  Plan of Care expires: 01/18/19  Plan of Care reviewed with: patient         Crista Marshall, PTA  01/12/2019

## 2019-01-12 NOTE — PROGRESS NOTES
POD2  S/p closed reduction and bridging knee ex fix, left leg  Reports moderate pain  AVSS  Ex fix intact  Moderate swelling  Compartments soft  NVID  Plan:  1. NWB LLE  2. DVT prophylaxis  3. Patient will need definitive ORIF when soft tissues allow. Anticipate early next week. Will discuss patient with oncall ortho
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
## 2019-01-12 NOTE — ASSESSMENT & PLAN NOTE
- Counseled on the need for increased physical activity when medically able, diet, and weight loss
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
## 2019-01-12 NOTE — ASSESSMENT & PLAN NOTE
- WBCs continue to trend downward  - Likely reactive from trauma as she has no evidence of infection thus far.  - Remains afebrile  - Daily CBC  - Monitor

## 2019-01-12 NOTE — ASSESSMENT & PLAN NOTE
- Admitted to Medicine  - Evaluated by Orthopedics Dr. Galvan and is s/p closed reduction with placement of an external fixator on 1/10  - Continue prn analgesia  - PT/OT following  - NWB to LLE at present time  - Will likely need inpatient rehab upon DC pending progress  - Ortho anticipates an ORIF early next week once soft tissue swelling improves.   - Continue DVT prophylaxis with Lovenox

## 2019-01-12 NOTE — ASSESSMENT & PLAN NOTE
- WBCs trending down  - Likely reactive from trauma as she has no evidence of infection thus far.  - Remains afebrile  - UA and CXR negative for infectious source  - Daily CBC  - Monitor
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
## 2019-01-12 NOTE — SUBJECTIVE & OBJECTIVE
Interval History:  No acute events overnight.  Resting comfortably in bed with family at . Reports pain is controlled with prn analgesics.  Patient is doing well post operatively.  PT/OT are following, patient is NWB to her LLE and will likely need rehab placement upon DC.  Case d/w ortho who anticipates an ORIF early next week once soft tissue swelling improves.  Given limited mobility and need for pain management, will plan to keep until ORIF can be done, then likely DC to inpatient rehab pending progress.  Of note, positive pregnancy test noted on admit, however HCG is negative and patient reports she is sexually inactive and had a hysterectomy in 2009.      Review of Systems   Constitutional: Negative.  Negative for chills, diaphoresis, fatigue and fever.   HENT: Negative.  Negative for congestion, nosebleeds and sinus pressure.    Eyes: Negative.  Negative for visual disturbance.   Respiratory: Negative.  Negative for cough, chest tightness, shortness of breath and wheezing.    Cardiovascular: Negative.  Negative for chest pain, palpitations and leg swelling.   Gastrointestinal: Negative.  Negative for abdominal pain, diarrhea, nausea and vomiting.   Endocrine: Negative.  Negative for polyuria.   Genitourinary: Negative.  Negative for dysuria, flank pain, frequency and urgency.   Musculoskeletal: Negative.  Negative for back pain, joint swelling and neck stiffness.        Left leg pain   Skin: Negative.  Negative for color change, pallor and rash.   Allergic/Immunologic: Negative.  Negative for immunocompromised state.   Neurological: Negative.  Negative for dizziness, syncope, speech difficulty, numbness and headaches.   Hematological: Negative.  Negative for adenopathy. Does not bruise/bleed easily.   Psychiatric/Behavioral: Negative.  Negative for confusion, decreased concentration and hallucinations. The patient is not nervous/anxious.    All other systems reviewed and are negative.    Objective:      Vital Signs (Most Recent):  Temp: 99.5 °F (37.5 °C) (01/11/19 1611)  Pulse: 85 (01/11/19 1611)  Resp: 18 (01/11/19 1611)  BP: (!) 145/65 (01/11/19 1611)  SpO2: 96 % (01/11/19 1611) Vital Signs (24h Range):  Temp:  [97.5 °F (36.4 °C)-99.5 °F (37.5 °C)] 99.5 °F (37.5 °C)  Pulse:  [65-98] 85  Resp:  [11-24] 18  SpO2:  [92 %-100 %] 96 %  BP: (126-166)/() 145/65     Weight: 114.3 kg (251 lb 15.8 oz)  Body mass index is 39.47 kg/m².    Intake/Output Summary (Last 24 hours) at 1/11/2019 1803  Last data filed at 1/11/2019 1611  Gross per 24 hour   Intake 950 ml   Output 10 ml   Net 940 ml      Physical Exam   Constitutional: She is oriented to person, place, and time. No distress.   Morbidly obese  female.  Family members at the bedside.   HENT:   Head: Normocephalic and atraumatic.   Eyes: Conjunctivae and EOM are normal. No scleral icterus.   Neck: Normal range of motion. Neck supple.   Cardiovascular: Normal rate, regular rhythm, normal heart sounds and intact distal pulses.   No murmur heard.  Pulmonary/Chest: Effort normal and breath sounds normal. No respiratory distress. She has no wheezes. She has no rales. She exhibits no tenderness.   Abdominal: Soft. She exhibits no distension. There is no tenderness.   Obese   Musculoskeletal: She exhibits no edema, tenderness or deformity (left lower leg).   Distal pulses bilaterally intact; Surgical dressing and external fixator intact.   Neurological: She is alert and oriented to person, place, and time. No cranial nerve deficit. She exhibits normal muscle tone. Coordination normal.   Skin: Skin is warm and dry. She is not diaphoretic. No erythema.   Psychiatric: She has a normal mood and affect. Her behavior is normal. Judgment and thought content normal.   Nursing note and vitals reviewed.      Significant Labs:   BMP:   Recent Labs   Lab 01/11/19  0523         K 4.2      CO2 24   BUN 10   CREATININE 0.8   CALCIUM 9.0   MG 2.0      CBC:   Recent Labs   Lab 01/10/19  1737 01/11/19  0523   WBC 21.20* 14.76*   HGB 13.3 12.1   HCT 40.1 37.4   * 457*     Magnesium:   Recent Labs   Lab 01/11/19  0523   MG 2.0       Significant Imaging: I have reviewed all pertinent imaging results/findings within the past 24 hours.

## 2019-01-12 NOTE — PLAN OF CARE
Problem: Physical Therapy Goal  Goal: Physical Therapy Goal  LTG'S TO BE MET IN 7 DAYS (1-18-19)  1. PT WILL REQUIRE BERTO FOR BED MOBILITY  2. PT WILL REQUIRE BERTO FOR TF'S, NWB FOR LLE  3. PT WILL DEMO F- DYNAMIC BALANCE DURING TF   Outcome: Ongoing (interventions implemented as appropriate)  PATIENT PERFORM SUPINE TO SIT T/FS, SHORTSEATED BALANCE ACTIVITY AT B/S AT MAX ASSIST X1 , UTILIZING ELEVATED HOB FOR T/F ACTIVITY, MAINTAINING LLE NWB STATUS ALL ACTIVITY.

## 2019-01-12 NOTE — ASSESSMENT & PLAN NOTE
- Admitted to Hospital Medicine  - Evaluated by Orthopedics Dr. Galvan and is s/p closed reduction with placement of an external fixator on 1/10  - Continue prn analgesia  - PT/OT following  - NWB to LLE at present time  - Will likely need inpatient rehab upon DC pending progress  - Ortho anticipates an ORIF early next week once soft tissue swelling improves.   - Continue DVT prophylaxis with Lovenox

## 2019-01-13 LAB
ANION GAP SERPL CALC-SCNC: 11 MMOL/L
BASOPHILS # BLD AUTO: 0.03 K/UL
BASOPHILS NFR BLD: 0.2 %
BUN SERPL-MCNC: 6 MG/DL
CALCIUM SERPL-MCNC: 8.9 MG/DL
CHLORIDE SERPL-SCNC: 99 MMOL/L
CO2 SERPL-SCNC: 26 MMOL/L
CREAT SERPL-MCNC: 0.7 MG/DL
DIFFERENTIAL METHOD: ABNORMAL
EOSINOPHIL # BLD AUTO: 0.2 K/UL
EOSINOPHIL NFR BLD: 1.7 %
ERYTHROCYTE [DISTWIDTH] IN BLOOD BY AUTOMATED COUNT: 13.6 %
EST. GFR  (AFRICAN AMERICAN): >60 ML/MIN/1.73 M^2
EST. GFR  (NON AFRICAN AMERICAN): >60 ML/MIN/1.73 M^2
GLUCOSE SERPL-MCNC: 117 MG/DL
HCT VFR BLD AUTO: 31.8 %
HGB BLD-MCNC: 10.3 G/DL
LYMPHOCYTES # BLD AUTO: 3.2 K/UL
LYMPHOCYTES NFR BLD: 23 %
MCH RBC QN AUTO: 28.9 PG
MCHC RBC AUTO-ENTMCNC: 32.4 G/DL
MCV RBC AUTO: 89 FL
MONOCYTES # BLD AUTO: 1.9 K/UL
MONOCYTES NFR BLD: 13.4 %
NEUTROPHILS # BLD AUTO: 8.6 K/UL
NEUTROPHILS NFR BLD: 61.7 %
PLATELET # BLD AUTO: 370 K/UL
PMV BLD AUTO: 8.7 FL
POTASSIUM SERPL-SCNC: 3.9 MMOL/L
RBC # BLD AUTO: 3.56 M/UL
SODIUM SERPL-SCNC: 136 MMOL/L
WBC # BLD AUTO: 13.85 K/UL

## 2019-01-13 PROCEDURE — 97110 THERAPEUTIC EXERCISES: CPT

## 2019-01-13 PROCEDURE — 11000001 HC ACUTE MED/SURG PRIVATE ROOM

## 2019-01-13 PROCEDURE — 80048 BASIC METABOLIC PNL TOTAL CA: CPT

## 2019-01-13 PROCEDURE — 36415 COLL VENOUS BLD VENIPUNCTURE: CPT

## 2019-01-13 PROCEDURE — 63600175 PHARM REV CODE 636 W HCPCS: Performed by: INTERNAL MEDICINE

## 2019-01-13 PROCEDURE — 85025 COMPLETE CBC W/AUTO DIFF WBC: CPT

## 2019-01-13 PROCEDURE — 25000003 PHARM REV CODE 250: Performed by: ORTHOPAEDIC SURGERY

## 2019-01-13 PROCEDURE — 97116 GAIT TRAINING THERAPY: CPT

## 2019-01-13 PROCEDURE — 94761 N-INVAS EAR/PLS OXIMETRY MLT: CPT

## 2019-01-13 PROCEDURE — 25000003 PHARM REV CODE 250: Performed by: INTERNAL MEDICINE

## 2019-01-13 PROCEDURE — 25000003 PHARM REV CODE 250: Performed by: NURSE PRACTITIONER

## 2019-01-13 PROCEDURE — 63600175 PHARM REV CODE 636 W HCPCS: Performed by: ORTHOPAEDIC SURGERY

## 2019-01-13 RX ORDER — LACTULOSE 10 G/15ML
20 SOLUTION ORAL EVERY 6 HOURS PRN
Status: COMPLETED | OUTPATIENT
Start: 2019-01-13 | End: 2019-01-14

## 2019-01-13 RX ADMIN — HYDROMORPHONE HYDROCHLORIDE 1 MG: 2 INJECTION INTRAMUSCULAR; INTRAVENOUS; SUBCUTANEOUS at 06:01

## 2019-01-13 RX ADMIN — HYDROMORPHONE HYDROCHLORIDE 1 MG: 2 INJECTION INTRAMUSCULAR; INTRAVENOUS; SUBCUTANEOUS at 02:01

## 2019-01-13 RX ADMIN — DIPHENHYDRAMINE HYDROCHLORIDE 25 MG: 25 CAPSULE ORAL at 06:01

## 2019-01-13 RX ADMIN — DIPHENHYDRAMINE HYDROCHLORIDE 25 MG: 25 CAPSULE ORAL at 12:01

## 2019-01-13 RX ADMIN — ENOXAPARIN SODIUM 40 MG: 100 INJECTION SUBCUTANEOUS at 08:01

## 2019-01-13 RX ADMIN — OXYCODONE HYDROCHLORIDE AND ACETAMINOPHEN 1 TABLET: 10; 325 TABLET ORAL at 11:01

## 2019-01-13 RX ADMIN — OXYCODONE HYDROCHLORIDE AND ACETAMINOPHEN 1 TABLET: 10; 325 TABLET ORAL at 08:01

## 2019-01-13 RX ADMIN — HYDROMORPHONE HYDROCHLORIDE 1 MG: 2 INJECTION INTRAMUSCULAR; INTRAVENOUS; SUBCUTANEOUS at 10:01

## 2019-01-13 RX ADMIN — VARENICLINE TARTRATE 1 MG: 0.5 TABLET, FILM COATED ORAL at 09:01

## 2019-01-13 RX ADMIN — VARENICLINE TARTRATE 1 MG: 0.5 TABLET, FILM COATED ORAL at 08:01

## 2019-01-13 RX ADMIN — OXYCODONE HYDROCHLORIDE AND ACETAMINOPHEN 1 TABLET: 10; 325 TABLET ORAL at 09:01

## 2019-01-13 RX ADMIN — ACETAMINOPHEN 650 MG: 325 TABLET ORAL at 12:01

## 2019-01-13 RX ADMIN — CHLORHEXIDINE GLUCONATE 10 ML: 1.2 RINSE ORAL at 09:01

## 2019-01-13 RX ADMIN — PANTOPRAZOLE SODIUM 40 MG: 40 TABLET, DELAYED RELEASE ORAL at 08:01

## 2019-01-13 RX ADMIN — OXYCODONE HYDROCHLORIDE AND ACETAMINOPHEN 1 TABLET: 10; 325 TABLET ORAL at 02:01

## 2019-01-13 RX ADMIN — OXYCODONE HYDROCHLORIDE AND ACETAMINOPHEN 1 TABLET: 10; 325 TABLET ORAL at 05:01

## 2019-01-13 RX ADMIN — LACTULOSE 20 G: 20 SOLUTION ORAL at 09:01

## 2019-01-13 RX ADMIN — HYDROMORPHONE HYDROCHLORIDE 1 MG: 2 INJECTION INTRAMUSCULAR; INTRAVENOUS; SUBCUTANEOUS at 05:01

## 2019-01-13 NOTE — PLAN OF CARE
Problem: Adult Inpatient Plan of Care  Goal: Plan of Care Review  Outcome: Ongoing (interventions implemented as appropriate)  Plan of care reviewed and discussed with patient.  No acute distress noted.  Safety and fall precautions in place.  Patient free from injury.  Pain managed adequately.  Oral hydration and bed mobility promoted.  Will continue to monitor.     12 hour chart check complete.
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
## 2019-01-13 NOTE — PT/OT/SLP PROGRESS
Physical Therapy  Treatment    Teresa Cazares   MRN: [id number]   Admitting Diagnosis: Closed fracture of proximal end of left tibia    PT Received On: 01/13/19  PT Start Time: 1041     PT Stop Time: 1104    PT Total Time (min): 23 min       Billable Minutes:  Gait Training 10 minutes  Therapeutic exercise 13 minutes    Treatment Type: Treatment  PT/PTA: PTA     PTA Visit Number: 2       General Precautions: Standard, fall  Orthopedic Precautions: LLE non weight bearing   Braces:      Spiritual, Cultural Beliefs, Restorationist Practices, Values that Affect Care: no    Subjective:  Communicated with epic and nurse Carolina prior to session.      Pain/Comfort  Pain Rating 1: 7/10  Location - Side 1: Left  Location 1: leg  Pain Addressed 1: Pre-medicate for activity    Objective:   Patient found with: peripheral IV, external fixator    Functional Mobility:  Bed Mobility: min assist for LLE management       Transfers:min assist for LLE management       Gait: min assist using RW       Stairs:N/A           Balance:   Static Sit: GOOD+: Takes MAXIMAL challenges from all directions.    Dynamic Sit: GOOD: Maintains balance through MODERATE excursions of active trunk movement  Static Stand: FAIR+: Takes MINIMAL challenges from all directions  Dynamic stand: POOR+: Needs MIN (minimal ) assist during gait     Therapeutic Activities and Exercises:  Good safety awareness with bed mobility and supine <-->sit<-->stand transfer. Min assist needed. Gait training with RW, min assist 2-3 small steps forward pushing through BUE with hop step to maintain NWB status of LLE. Completed 3-small backward steps and returned to sitting. Completed APs, QS, GS in supine.Patient agreeable to do these exercises multiple times per day.     AM-PAC 6 CLICK MOBILITY  How much help from another person does this patient currently need?   1 = Unable, Total/Dependent Assistance  2 = A lot, Maximum/Moderate Assistance  3 = A little, Minimum/Contact  Guard/Supervision  4 = None, Modified [place]/Independent    Turning over in bed (including adjusting bedclothes, sheets and blankets)?: 3  Sitting down on and standing up from a chair with arms (e.g., wheelchair, bedside commode, etc.): 3  Moving from lying on back to sitting on the side of the bed?: 3  Moving to and from a bed to a chair (including a wheelchair)?: 2  Need to walk in hospital room?: 2  Climbing 3-5 steps with a railing?: 1  Basic Mobility Total Score: 14    AM-PAC Raw Score CMS G-Code Modifier Level of Impairment Assistance   6 % Total / Unable   7 - 9 CM 80 - 100% Maximal Assist   10 - 14 CL 60 - 80% Moderate Assist   15 - 19 CK 40 - 60% Moderate Assist   20 - 22 CJ 20 - 40% Minimal Assist   23 CI 1-20% SBA / CGA   24 CH 0% Independent/ Mod I     Patient left supine with all lines intact, call button in reach, nursing notified and family present.    Assessment:  Teresa Cazares is a 43 y.o. female with a medical diagnosis of Closed fracture of proximal end of left tibia.    Rehab identified problem list/impairments: Rehab identified problem list/impairments: weakness, impaired endurance, pain, decreased lower extremity function    Rehab potential is good.    Activity tolerance: Good    Discharge recommendations: Discharge Facility/Level of Care Needs: rehabilitation facility, other (see comments)(HHPT depending on progress)     Barriers to discharge:      Equipment recommendations: Equipment Needed After Discharge: tub bench, walker, rolling     GOALS:   Multidisciplinary Problems     Physical Therapy Goals        Problem: Physical Therapy Goal    Goal Priority Disciplines Outcome Goal Variances Interventions   Physical Therapy Goal     PT, PT/OT Ongoing (interventions implemented as appropriate)     Description:  LTG'S TO BE MET IN 7 DAYS (1-18-19)  1. PT WILL REQUIRE BERTO FOR BED MOBILITY  2. PT WILL REQUIRE BERTO FOR TF'S, NWB FOR LLE  3. PT WILL DEMO F- DYNAMIC BALANCE DURING  TF                    PLAN:    Patient to be seen 5 x/week  to address the above listed problems via gait training, therapeutic activities, therapeutic exercises  Plan of Care expires: 01/18/19  Plan of Care reviewed with: patient         Bladimir Alexey, PTA  01/13/2019

## 2019-01-13 NOTE — ASSESSMENT & PLAN NOTE
- WBCs 13.85 today   - Likely reactive from trauma as she has no evidence of infection thus far.  - Remains afebrile  - Daily CBC  - Monitor

## 2019-01-13 NOTE — SUBJECTIVE & OBJECTIVE
Interval History: S/P left external fixation placement to Lt proximal tibia fracture on 1/10. Will need ORIF later this week.     Review of Systems   Constitutional: Negative for chills and fever.   HENT: Negative for congestion, rhinorrhea and sinus pressure.    Respiratory: Negative for apnea, cough, choking, chest tightness, shortness of breath, wheezing and stridor.    Cardiovascular: Negative for chest pain, palpitations and leg swelling.   Gastrointestinal: Negative for abdominal distention, abdominal pain, diarrhea, nausea and vomiting.   Endocrine: Negative for cold intolerance and heat intolerance.   Genitourinary: Negative for difficulty urinating and hematuria.   Musculoskeletal: Negative for arthralgias and joint swelling.        Left lower leg pain    Skin: Negative for color change, pallor and rash.   Neurological: Negative for dizziness, seizures, weakness, numbness and headaches.   Psychiatric/Behavioral: Negative for agitation. The patient is not nervous/anxious.      Objective:     Vital Signs (Most Recent):  Temp: 98.8 °F (37.1 °C) (01/13/19 1134)  Pulse: 88 (01/13/19 1134)  Resp: 18 (01/13/19 1134)  BP: 121/63 (01/13/19 1134)  SpO2: 97 % (01/13/19 1134) Vital Signs (24h Range):  Temp:  [98.6 °F (37 °C)-99.3 °F (37.4 °C)] 98.8 °F (37.1 °C)  Pulse:  [73-92] 88  Resp:  [18-20] 18  SpO2:  [93 %-98 %] 97 %  BP: (118-139)/(58-73) 121/63     Weight: 114.3 kg (251 lb 15.8 oz)  Body mass index is 39.47 kg/m².    Intake/Output Summary (Last 24 hours) at 1/13/2019 1312  Last data filed at 1/13/2019 1030  Gross per 24 hour   Intake 1080 ml   Output --   Net 1080 ml      Physical Exam   Constitutional: She is oriented to person, place, and time. No distress.   HENT:   Head: Normocephalic and atraumatic.   Mouth/Throat: No oropharyngeal exudate.   Eyes: Right eye exhibits no discharge. Left eye exhibits no discharge.   Neck: No JVD present.   Cardiovascular: Exam reveals no gallop and no friction rub.   No  murmur heard.  Pulmonary/Chest: No stridor. No respiratory distress. She has no wheezes. She has no rales. She exhibits no tenderness.   Abdominal: She exhibits no distension and no mass. There is no tenderness. There is no rebound and no guarding.   Musculoskeletal: She exhibits no edema or deformity.   Lt lower leg external fixator intact, ACE wrap dressing intact/   Neurological: She is alert and oriented to person, place, and time.   Skin: Skin is warm and dry. She is not diaphoretic.   Nursing note and vitals reviewed.      Significant Labs:   CBC:   Recent Labs   Lab 01/12/19 0543 01/13/19 0511   WBC 13.68* 13.85*   HGB 10.6* 10.3*   HCT 32.9* 31.8*   * 370*     CMP:   Recent Labs   Lab 01/12/19 0543 01/13/19 0511   * 136   K 3.8 3.9    99   CO2 25 26    117*   BUN 5* 6   CREATININE 0.7 0.7   CALCIUM 8.7 8.9   ANIONGAP 8 11   EGFRNONAA >60 >60       Significant Imaging:     Imaging Results          FL Flouro Usage (In process)                X-Ray Chest AP Portable (Final result)  Result time 01/10/19 18:50:55    Final result by MARIA FERNANDA Alexander Sr., MD (01/10/19 18:50:55)                 Impression:      1. The current examination is limited secondary to the lack of inclusion of the entire thorax on the film. The left costophrenic angle is not completely included on the film.  2. The visualized portion of the lungs is clear.  .      Electronically signed by: Eusebio Alexander MD  Date:    01/10/2019  Time:    18:50             Narrative:    EXAMINATION:  XR CHEST AP PORTABLE    CLINICAL HISTORY:  tibial fracture;    COMPARISON:  02/24/2017    FINDINGS:  The current examination is limited secondary to the lack of inclusion of the entire thorax on the film.  The left costophrenic angle is not completely included on the film.  The right costophrenic angle is sharp.  The size of the heart is normal.  The visualized portion of the lungs is clear.  There is no pneumothorax.                                X-Ray Knee 3 View Left (Final result)  Result time 01/10/19 16:47:32    Final result by Daniel Phan MD (01/10/19 16:47:32)                 Impression:    FINDINGS/  Four views of the left tibia/fibula and two views of the left knee were obtained.    Comminuted fracture involving the proximal tibial metaphysis extending to the intercondylar eminence of the tibial plateau.  Oblique fibular head fracture also noted.  Moderate joint effusion.  Bony mineralization is normal.  Moderate soft tissue swelling.  There is mild varus configuration.  Femur and patella appear intact.  Patellar tendon appears grossly intact.    CT WITHOUT CONTRAST CAN BE OBTAINED FOR FURTHER CHARACTERIZATION.      Electronically signed by: Daniel Phan MD  Date:    01/10/2019  Time:    16:47             Narrative:    EXAMINATION:  XR TIBIA FIBULA 2 VIEW LEFT; XR KNEE 3 VIEW LEFT    CLINICAL HISTORY:  XR TIBIA FIBULA 2 VIEW LEFT; XR KNEE 3 VIEW LEFTPain in left knee    COMPARISON:  None                               X-Ray Tibia Fibula 2 View Left (Final result)  Result time 01/10/19 16:47:32    Final result by Daniel Phan MD (01/10/19 16:47:32)                 Impression:    FINDINGS/  Four views of the left tibia/fibula and two views of the left knee were obtained.    Comminuted fracture involving the proximal tibial metaphysis extending to the intercondylar eminence of the tibial plateau.  Oblique fibular head fracture also noted.  Moderate joint effusion.  Bony mineralization is normal.  Moderate soft tissue swelling.  There is mild varus configuration.  Femur and patella appear intact.  Patellar tendon appears grossly intact.    CT WITHOUT CONTRAST CAN BE OBTAINED FOR FURTHER CHARACTERIZATION.      Electronically signed by: Daniel Phan MD  Date:    01/10/2019  Time:    16:47             Narrative:    EXAMINATION:  XR TIBIA FIBULA 2 VIEW LEFT; XR KNEE 3 VIEW LEFT    CLINICAL HISTORY:  XR TIBIA FIBULA 2 VIEW LEFT; XR  KNEE 3 VIEW LEFTPain in left knee    COMPARISON:  None

## 2019-01-13 NOTE — PLAN OF CARE
Problem: Physical Therapy Goal  Goal: Physical Therapy Goal  LTG'S TO BE MET IN 7 DAYS (1-18-19)  1. PT WILL REQUIRE BERTO FOR BED MOBILITY  2. PT WILL REQUIRE BERTO FOR TF'S, NWB FOR LLE  3. PT WILL DEMO F- DYNAMIC BALANCE DURING TF   Outcome: Ongoing (interventions implemented as appropriate)  Min assist for LLE management during transfers into/out of bed as well as supine <--> sit. Gait with RW min assist and verbal cues to push through BUE on RW to maintain NWB status of LLE and complete small hop step. Took 2-3 small steps forward and backward at bedside.
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
## 2019-01-13 NOTE — ASSESSMENT & PLAN NOTE
- Admitted to Hospital Medicine  - Evaluated by Orthopedics Dr. Galvan and is s/p closed reduction with placement of an external fixator on 1/10  - Continue prn analgesia  - PT/OT following  - NWB to LLE at present time  - Will likely need inpatient rehab upon DC pending progress  - Ortho anticipates an ORIF early this week once soft tissue swelling improves.   - Continue DVT prophylaxis with Lovenox

## 2019-01-14 LAB
ANION GAP SERPL CALC-SCNC: 9 MMOL/L
BASOPHILS # BLD AUTO: 0.03 K/UL
BASOPHILS NFR BLD: 0.3 %
BUN SERPL-MCNC: 8 MG/DL
CALCIUM SERPL-MCNC: 9.2 MG/DL
CHLORIDE SERPL-SCNC: 100 MMOL/L
CO2 SERPL-SCNC: 28 MMOL/L
CREAT SERPL-MCNC: 0.7 MG/DL
DIFFERENTIAL METHOD: ABNORMAL
EOSINOPHIL # BLD AUTO: 0.4 K/UL
EOSINOPHIL NFR BLD: 3.6 %
ERYTHROCYTE [DISTWIDTH] IN BLOOD BY AUTOMATED COUNT: 13.5 %
EST. GFR  (AFRICAN AMERICAN): >60 ML/MIN/1.73 M^2
EST. GFR  (NON AFRICAN AMERICAN): >60 ML/MIN/1.73 M^2
GLUCOSE SERPL-MCNC: 106 MG/DL
HCT VFR BLD AUTO: 31.8 %
HGB BLD-MCNC: 10.4 G/DL
LYMPHOCYTES # BLD AUTO: 3.1 K/UL
LYMPHOCYTES NFR BLD: 27.3 %
MCH RBC QN AUTO: 29.4 PG
MCHC RBC AUTO-ENTMCNC: 32.7 G/DL
MCV RBC AUTO: 90 FL
MONOCYTES # BLD AUTO: 1.3 K/UL
MONOCYTES NFR BLD: 11.3 %
NEUTROPHILS # BLD AUTO: 6.6 K/UL
NEUTROPHILS NFR BLD: 57.7 %
PLATELET # BLD AUTO: 372 K/UL
PMV BLD AUTO: 8.7 FL
POTASSIUM SERPL-SCNC: 4.1 MMOL/L
RBC # BLD AUTO: 3.54 M/UL
SODIUM SERPL-SCNC: 137 MMOL/L
WBC # BLD AUTO: 11.48 K/UL

## 2019-01-14 PROCEDURE — 63600175 PHARM REV CODE 636 W HCPCS: Performed by: INTERNAL MEDICINE

## 2019-01-14 PROCEDURE — 85025 COMPLETE CBC W/AUTO DIFF WBC: CPT

## 2019-01-14 PROCEDURE — 63600175 PHARM REV CODE 636 W HCPCS: Performed by: ORTHOPAEDIC SURGERY

## 2019-01-14 PROCEDURE — 97530 THERAPEUTIC ACTIVITIES: CPT | Performed by: PHYSICAL THERAPIST

## 2019-01-14 PROCEDURE — 25000003 PHARM REV CODE 250: Performed by: INTERNAL MEDICINE

## 2019-01-14 PROCEDURE — 80048 BASIC METABOLIC PNL TOTAL CA: CPT

## 2019-01-14 PROCEDURE — 36415 COLL VENOUS BLD VENIPUNCTURE: CPT

## 2019-01-14 PROCEDURE — 25000003 PHARM REV CODE 250: Performed by: ORTHOPAEDIC SURGERY

## 2019-01-14 PROCEDURE — 97116 GAIT TRAINING THERAPY: CPT

## 2019-01-14 PROCEDURE — 11000001 HC ACUTE MED/SURG PRIVATE ROOM

## 2019-01-14 PROCEDURE — 25000003 PHARM REV CODE 250: Performed by: NURSE PRACTITIONER

## 2019-01-14 PROCEDURE — 97530 THERAPEUTIC ACTIVITIES: CPT

## 2019-01-14 PROCEDURE — 94760 N-INVAS EAR/PLS OXIMETRY 1: CPT

## 2019-01-14 PROCEDURE — 97116 GAIT TRAINING THERAPY: CPT | Performed by: PHYSICAL THERAPIST

## 2019-01-14 RX ORDER — ALPRAZOLAM 0.25 MG/1
0.25 TABLET ORAL 2 TIMES DAILY PRN
Status: DISCONTINUED | OUTPATIENT
Start: 2019-01-14 | End: 2019-01-22 | Stop reason: HOSPADM

## 2019-01-14 RX ADMIN — OXYCODONE HYDROCHLORIDE AND ACETAMINOPHEN 1 TABLET: 10; 325 TABLET ORAL at 03:01

## 2019-01-14 RX ADMIN — VARENICLINE TARTRATE 1 MG: 0.5 TABLET, FILM COATED ORAL at 08:01

## 2019-01-14 RX ADMIN — OXYCODONE HYDROCHLORIDE AND ACETAMINOPHEN 1 TABLET: 10; 325 TABLET ORAL at 01:01

## 2019-01-14 RX ADMIN — HYDROMORPHONE HYDROCHLORIDE 1 MG: 2 INJECTION INTRAMUSCULAR; INTRAVENOUS; SUBCUTANEOUS at 05:01

## 2019-01-14 RX ADMIN — OXYCODONE HYDROCHLORIDE AND ACETAMINOPHEN 1 TABLET: 10; 325 TABLET ORAL at 08:01

## 2019-01-14 RX ADMIN — HYDROMORPHONE HYDROCHLORIDE 1 MG: 2 INJECTION INTRAMUSCULAR; INTRAVENOUS; SUBCUTANEOUS at 01:01

## 2019-01-14 RX ADMIN — OXYCODONE HYDROCHLORIDE AND ACETAMINOPHEN 1 TABLET: 10; 325 TABLET ORAL at 11:01

## 2019-01-14 RX ADMIN — ALPRAZOLAM 0.25 MG: 0.25 TABLET ORAL at 07:01

## 2019-01-14 RX ADMIN — HYDROMORPHONE HYDROCHLORIDE 1 MG: 2 INJECTION INTRAMUSCULAR; INTRAVENOUS; SUBCUTANEOUS at 10:01

## 2019-01-14 RX ADMIN — LACTULOSE 20 G: 20 SOLUTION ORAL at 03:01

## 2019-01-14 RX ADMIN — CHLORHEXIDINE GLUCONATE 10 ML: 1.2 RINSE ORAL at 08:01

## 2019-01-14 RX ADMIN — HYDROMORPHONE HYDROCHLORIDE 1 MG: 2 INJECTION INTRAMUSCULAR; INTRAVENOUS; SUBCUTANEOUS at 09:01

## 2019-01-14 RX ADMIN — HYDROMORPHONE HYDROCHLORIDE 1 MG: 2 INJECTION INTRAMUSCULAR; INTRAVENOUS; SUBCUTANEOUS at 02:01

## 2019-01-14 RX ADMIN — OXYCODONE HYDROCHLORIDE AND ACETAMINOPHEN 1 TABLET: 10; 325 TABLET ORAL at 06:01

## 2019-01-14 RX ADMIN — ACETAMINOPHEN 650 MG: 325 TABLET ORAL at 11:01

## 2019-01-14 RX ADMIN — ENOXAPARIN SODIUM 40 MG: 100 INJECTION SUBCUTANEOUS at 08:01

## 2019-01-14 RX ADMIN — PANTOPRAZOLE SODIUM 40 MG: 40 TABLET, DELAYED RELEASE ORAL at 08:01

## 2019-01-14 NOTE — ASSESSMENT & PLAN NOTE
- WBCs 11.48 today   - Likely reactive from trauma as she has no evidence of infection thus far.  - Remains afebrile  - Daily CBC  - Monitor

## 2019-01-14 NOTE — PLAN OF CARE
Problem: Physical Therapy Goal  Goal: Physical Therapy Goal  LTG'S TO BE MET IN 7 DAYS (1-18-19)  1. PT WILL REQUIRE BERTO FOR BED MOBILITY  2. PT WILL REQUIRE BERTO FOR TF'S, NWB FOR LLE  3. PT WILL DEMO F- DYNAMIC BALANCE DURING TF   Outcome: Ongoing (interventions implemented as appropriate)  Performed bed mobility with Min A lifting (L) LE, t/f with Min A using RW, gait trained using RW ~40ft with Min A and NWB (L) LE
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
## 2019-01-14 NOTE — SUBJECTIVE & OBJECTIVE
Interval History: Patient seen and examined. No distress. Pt having increase Lt leg pain with ambulating physical therapy.     Review of Systems   Constitutional: Negative for chills and fever.   HENT: Negative for congestion, rhinorrhea and sinus pressure.    Respiratory: Negative for apnea, cough, choking, chest tightness, shortness of breath, wheezing and stridor.    Cardiovascular: Negative for chest pain, palpitations and leg swelling.   Gastrointestinal: Negative for abdominal distention, abdominal pain, diarrhea, nausea and vomiting.   Endocrine: Negative for cold intolerance and heat intolerance.   Genitourinary: Negative for difficulty urinating and hematuria.   Musculoskeletal: Negative for arthralgias and joint swelling.        Left leg pain and swelling    Skin: Negative for color change, pallor and rash.        Itching to all over body    Neurological: Negative for dizziness, seizures, weakness, numbness and headaches.   Psychiatric/Behavioral: Negative for agitation. The patient is not nervous/anxious.      Objective:     Vital Signs (Most Recent):  Temp: 98.4 °F (36.9 °C) (01/14/19 0729)  Pulse: 87 (01/14/19 0729)  Resp: 20 (01/14/19 0729)  BP: 126/65 (01/14/19 0729)  SpO2: 96 % (01/14/19 0851) Vital Signs (24h Range):  Temp:  [98.4 °F (36.9 °C)-99.7 °F (37.6 °C)] 98.4 °F (36.9 °C)  Pulse:  [78-91] 87  Resp:  [16-20] 20  SpO2:  [93 %-97 %] 96 %  BP: (114-134)/(58-65) 126/65     Weight: 114.3 kg (251 lb 15.8 oz)  Body mass index is 39.47 kg/m².    Intake/Output Summary (Last 24 hours) at 1/14/2019 1419  Last data filed at 1/14/2019 0826  Gross per 24 hour   Intake 720 ml   Output --   Net 720 ml      Physical Exam   Constitutional: She is oriented to person, place, and time. No distress.   HENT:   Head: Normocephalic and atraumatic.   Eyes: Right eye exhibits no discharge. Left eye exhibits no discharge.   Neck: No JVD present.   Cardiovascular: Exam reveals no gallop and no friction rub.   No murmur  heard.  Pulmonary/Chest: No stridor. No respiratory distress. She has no wheezes. She has no rales. She exhibits no tenderness.   Abdominal: She exhibits no distension and no mass. There is no tenderness. There is no rebound and no guarding. No hernia.   Musculoskeletal: She exhibits no edema or deformity.   Lt lower leg external fixator    Neurological: She is alert and oriented to person, place, and time.   Skin: Skin is warm and dry. Capillary refill takes less than 2 seconds. She is not diaphoretic.   Psychiatric: She has a normal mood and affect. Her behavior is normal. Judgment and thought content normal.   Nursing note and vitals reviewed.      Significant Labs:   CBC:   Recent Labs   Lab 01/13/19  0511 01/14/19  0435   WBC 13.85* 11.48   HGB 10.3* 10.4*   HCT 31.8* 31.8*   * 372*     CMP:   Recent Labs   Lab 01/13/19 0511 01/14/19  0435    137   K 3.9 4.1   CL 99 100   CO2 26 28   * 106   BUN 6 8   CREATININE 0.7 0.7   CALCIUM 8.9 9.2   ANIONGAP 11 9   EGFRNONAA >60 >60       Significant Imaging:     Imaging Results          FL Flouro Usage (In process)                X-Ray Chest AP Portable (Final result)  Result time 01/10/19 18:50:55    Final result by MARIA FERNANDA Alexander Sr., MD (01/10/19 18:50:55)                 Impression:      1. The current examination is limited secondary to the lack of inclusion of the entire thorax on the film. The left costophrenic angle is not completely included on the film.  2. The visualized portion of the lungs is clear.  .      Electronically signed by: Eusebio Alexander MD  Date:    01/10/2019  Time:    18:50             Narrative:    EXAMINATION:  XR CHEST AP PORTABLE    CLINICAL HISTORY:  tibial fracture;    COMPARISON:  02/24/2017    FINDINGS:  The current examination is limited secondary to the lack of inclusion of the entire thorax on the film.  The left costophrenic angle is not completely included on the film.  The right costophrenic angle is sharp.   The size of the heart is normal.  The visualized portion of the lungs is clear.  There is no pneumothorax.                               X-Ray Knee 3 View Left (Final result)  Result time 01/10/19 16:47:32    Final result by Daniel Phan MD (01/10/19 16:47:32)                 Impression:    FINDINGS/  Four views of the left tibia/fibula and two views of the left knee were obtained.    Comminuted fracture involving the proximal tibial metaphysis extending to the intercondylar eminence of the tibial plateau.  Oblique fibular head fracture also noted.  Moderate joint effusion.  Bony mineralization is normal.  Moderate soft tissue swelling.  There is mild varus configuration.  Femur and patella appear intact.  Patellar tendon appears grossly intact.    CT WITHOUT CONTRAST CAN BE OBTAINED FOR FURTHER CHARACTERIZATION.      Electronically signed by: Daniel Phan MD  Date:    01/10/2019  Time:    16:47             Narrative:    EXAMINATION:  XR TIBIA FIBULA 2 VIEW LEFT; XR KNEE 3 VIEW LEFT    CLINICAL HISTORY:  XR TIBIA FIBULA 2 VIEW LEFT; XR KNEE 3 VIEW LEFTPain in left knee    COMPARISON:  None                               X-Ray Tibia Fibula 2 View Left (Final result)  Result time 01/10/19 16:47:32    Final result by Daniel Phan MD (01/10/19 16:47:32)                 Impression:    FINDINGS/  Four views of the left tibia/fibula and two views of the left knee were obtained.    Comminuted fracture involving the proximal tibial metaphysis extending to the intercondylar eminence of the tibial plateau.  Oblique fibular head fracture also noted.  Moderate joint effusion.  Bony mineralization is normal.  Moderate soft tissue swelling.  There is mild varus configuration.  Femur and patella appear intact.  Patellar tendon appears grossly intact.    CT WITHOUT CONTRAST CAN BE OBTAINED FOR FURTHER CHARACTERIZATION.      Electronically signed by: Daniel Phan MD  Date:    01/10/2019  Time:    16:47              Narrative:    EXAMINATION:  XR TIBIA FIBULA 2 VIEW LEFT; XR KNEE 3 VIEW LEFT    CLINICAL HISTORY:  XR TIBIA FIBULA 2 VIEW LEFT; XR KNEE 3 VIEW LEFTPain in left knee    COMPARISON:  None

## 2019-01-14 NOTE — PLAN OF CARE
Problem: Occupational Therapy Goal  Goal: Occupational Therapy Goal  OT GOALS TO BE MET BY 1-17-19  MIN A WITH BSC T/F'S  PT WILL TOLERATE 1 SET X 15 REPS B UE ROM EXERCISE WITH MIN RESISTANCE  S WITH UE DRESSING  MIN A WITH BE MOBILITY   Pt argumentative pt refused OOB activities and reported excurciating pain s/p ambulating . Pt refused ue dressing and said she will do everything by herself.

## 2019-01-14 NOTE — PT/OT/SLP PROGRESS
Physical Therapy  Treatment    Teresa Cazares   MRN: [id number]   Admitting Diagnosis: Closed fracture of proximal end of left tibia    PT Received On: 01/14/19  PT Start Time: 1150     PT Stop Time: 1215    PT Total Time (min): 25 min       Billable Minutes:  Gait Training 10 min and Therapeutic Activity 15 min    Treatment Type: Treatment  PT/PTA: PT             General Precautions: Standard, fall  Orthopedic Precautions: LLE non weight bearing   Braces: N/A    Spiritual, Cultural Beliefs, Jain Practices, Values that Affect Care: no    Subjective:  Communicated with Nurse Fabby and epic chart review prior to session.  Pt found supine in bed, reports just finished bath with CNA but will woke with PT right now.    Pain/Comfort  Pain Rating 1: 10/10  Location - Side 1: Left  Location 1: leg  Pain Addressed 1: Pre-medicate for activity, Reposition, Distraction  Pain Rating Post-Intervention 1: 10/10    Objective:   Patient found with: peripheral IV, external fixator    Pt performed bed mobility with Min A for lifting (L) LE, scooted with EOB with Min A, donned gown as a robe with SBA, sit>stand using RW with Min A, gait train~40ft using RW with Min A and NWB (L) LE, t/f to recliner chair using RW with Min A. Pt educated in and performed (L) LE therapeutic exercises 1 x 10 reps: ankle pumps, knee extensions, quad sets, glute sets.      AM-PAC 6 CLICK MOBILITY  How much help from another person does this patient currently need?   1 = Unable, Total/Dependent Assistance  2 = A lot, Maximum/Moderate Assistance  3 = A little, Minimum/Contact Guard/Supervision  4 = None, Modified [place]/Independent    Turning over in bed (including adjusting bedclothes, sheets and blankets)?: 3  Sitting down on and standing up from a chair with arms (e.g., wheelchair, bedside commode, etc.): 3  Moving from lying on back to sitting on the side of the bed?: 3  Moving to and from a bed to a chair (including a wheelchair)?:  2  Need to walk in hospital room?: 2  Climbing 3-5 steps with a railing?: 1  Basic Mobility Total Score: 14    AM-PAC Raw Score CMS G-Code Modifier Level of Impairment Assistance   6 % Total / Unable   7 - 9 CM 80 - 100% Maximal Assist   10 - 14 CL 60 - 80% Moderate Assist   15 - 19 CK 40 - 60% Moderate Assist   20 - 22 CJ 20 - 40% Minimal Assist   23 CI 1-20% SBA / CGA   24 CH 0% Independent/ Mod I     Patient left up in chair with all lines intact, call button in reach, Nurse Trachessa notified, family present and (B) LE Elevated.    Assessment:  Teresa Cazares is a 43 y.o. female with a medical diagnosis of Closed fracture of proximal end of left tibia and presents with impaired functional mobility. Pt will benefit from continued skilled PT in order to address impairments.    Rehab identified problem list/impairments: Rehab identified problem list/impairments: weakness, impaired endurance, impaired functional mobilty, decreased coordination, impaired balance, gait instability, decreased lower extremity function, decreased safety awareness, pain, edema    Rehab potential is good.    Activity tolerance: Good    Discharge recommendations: Discharge Facility/Level of Care Needs: rehabilitation facility     Barriers to discharge:      Equipment recommendations: Equipment Needed After Discharge: tub bench, walker, rolling     GOALS:   Multidisciplinary Problems     Physical Therapy Goals        Problem: Physical Therapy Goal    Goal Priority Disciplines Outcome Goal Variances Interventions   Physical Therapy Goal     PT, PT/OT Ongoing (interventions implemented as appropriate)     Description:  LTG'S TO BE MET IN 7 DAYS (1-18-19)  1. PT WILL REQUIRE BERTO FOR BED MOBILITY  2. PT WILL REQUIRE BERTO FOR TF'S, NWB FOR LLE  3. PT WILL DEMO F- DYNAMIC BALANCE DURING TF                    PLAN:    Patient to be seen 5 x/week  to address the above listed problems via gait training, therapeutic activities,  therapeutic exercises  Plan of Care expires: 01/18/19  Plan of Care reviewed with: patient, mother    PT G-Codes  Functional Assessment Tool Used: [facility]  Score: 14    Alley Villagomez, PT/OT  01/14/2019

## 2019-01-14 NOTE — PLAN OF CARE
Problem: Adult Inpatient Plan of Care  Goal: Plan of Care Review  Outcome: Ongoing (interventions implemented as appropriate)  Patient remained free from injury. PRN pain meds given as prescribed. No bowel movement, just passing gas. Completed wound dressing change. No s/s of acute distress. 12hr chart check complete.

## 2019-01-14 NOTE — ASSESSMENT & PLAN NOTE
- Admitted to Hospital Medicine  - Evaluated by Orthopedics Dr. Galvan and is s/p closed reduction with placement of an external fixator on 1/10  - Continue prn analgesia  - PT/OT following  - NWB to LLE at present time  - Will likely need inpatient rehab upon DC pending progress  - Ortho anticipates an ORIF this week once soft tissue swelling improves.   - Continue DVT prophylaxis with Lovenox
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
## 2019-01-15 LAB
ANION GAP SERPL CALC-SCNC: 9 MMOL/L
BASOPHILS # BLD AUTO: 0.03 K/UL
BASOPHILS NFR BLD: 0.3 %
BUN SERPL-MCNC: 8 MG/DL
CALCIUM SERPL-MCNC: 8.7 MG/DL
CHLORIDE SERPL-SCNC: 99 MMOL/L
CO2 SERPL-SCNC: 28 MMOL/L
CREAT SERPL-MCNC: 0.7 MG/DL
DIFFERENTIAL METHOD: ABNORMAL
EOSINOPHIL # BLD AUTO: 0.5 K/UL
EOSINOPHIL NFR BLD: 4.5 %
ERYTHROCYTE [DISTWIDTH] IN BLOOD BY AUTOMATED COUNT: 13.6 %
EST. GFR  (AFRICAN AMERICAN): >60 ML/MIN/1.73 M^2
EST. GFR  (NON AFRICAN AMERICAN): >60 ML/MIN/1.73 M^2
GLUCOSE SERPL-MCNC: 134 MG/DL
HCT VFR BLD AUTO: 30.7 %
HGB BLD-MCNC: 10 G/DL
LYMPHOCYTES # BLD AUTO: 2.5 K/UL
LYMPHOCYTES NFR BLD: 24.4 %
MCH RBC QN AUTO: 29.2 PG
MCHC RBC AUTO-ENTMCNC: 32.6 G/DL
MCV RBC AUTO: 90 FL
MONOCYTES # BLD AUTO: 1 K/UL
MONOCYTES NFR BLD: 10.1 %
NEUTROPHILS # BLD AUTO: 6.3 K/UL
NEUTROPHILS NFR BLD: 60.7 %
PLATELET # BLD AUTO: 438 K/UL
PMV BLD AUTO: 8.8 FL
POTASSIUM SERPL-SCNC: 3.6 MMOL/L
RBC # BLD AUTO: 3.43 M/UL
SODIUM SERPL-SCNC: 136 MMOL/L
WBC # BLD AUTO: 10.34 K/UL

## 2019-01-15 PROCEDURE — 25000003 PHARM REV CODE 250: Performed by: ORTHOPAEDIC SURGERY

## 2019-01-15 PROCEDURE — 11000001 HC ACUTE MED/SURG PRIVATE ROOM

## 2019-01-15 PROCEDURE — 97530 THERAPEUTIC ACTIVITIES: CPT | Performed by: PHYSICAL THERAPIST

## 2019-01-15 PROCEDURE — 96372 THER/PROPH/DIAG INJ SC/IM: CPT

## 2019-01-15 PROCEDURE — 36415 COLL VENOUS BLD VENIPUNCTURE: CPT

## 2019-01-15 PROCEDURE — 63600175 PHARM REV CODE 636 W HCPCS: Performed by: ORTHOPAEDIC SURGERY

## 2019-01-15 PROCEDURE — 97116 GAIT TRAINING THERAPY: CPT

## 2019-01-15 PROCEDURE — 25000003 PHARM REV CODE 250: Performed by: INTERNAL MEDICINE

## 2019-01-15 PROCEDURE — 94761 N-INVAS EAR/PLS OXIMETRY MLT: CPT

## 2019-01-15 PROCEDURE — 85025 COMPLETE CBC W/AUTO DIFF WBC: CPT

## 2019-01-15 PROCEDURE — 63600175 PHARM REV CODE 636 W HCPCS: Performed by: INTERNAL MEDICINE

## 2019-01-15 PROCEDURE — 25000003 PHARM REV CODE 250: Performed by: NURSE PRACTITIONER

## 2019-01-15 PROCEDURE — 97116 GAIT TRAINING THERAPY: CPT | Performed by: PHYSICAL THERAPIST

## 2019-01-15 PROCEDURE — 97530 THERAPEUTIC ACTIVITIES: CPT

## 2019-01-15 PROCEDURE — 97535 SELF CARE MNGMENT TRAINING: CPT | Performed by: PHYSICAL THERAPIST

## 2019-01-15 PROCEDURE — 80048 BASIC METABOLIC PNL TOTAL CA: CPT

## 2019-01-15 RX ADMIN — VARENICLINE TARTRATE 1 MG: 0.5 TABLET, FILM COATED ORAL at 08:01

## 2019-01-15 RX ADMIN — OXYCODONE HYDROCHLORIDE AND ACETAMINOPHEN 1 TABLET: 10; 325 TABLET ORAL at 03:01

## 2019-01-15 RX ADMIN — DIPHENHYDRAMINE HYDROCHLORIDE 25 MG: 25 CAPSULE ORAL at 08:01

## 2019-01-15 RX ADMIN — OXYCODONE HYDROCHLORIDE AND ACETAMINOPHEN 1 TABLET: 10; 325 TABLET ORAL at 04:01

## 2019-01-15 RX ADMIN — ENOXAPARIN SODIUM 40 MG: 100 INJECTION SUBCUTANEOUS at 08:01

## 2019-01-15 RX ADMIN — ALPRAZOLAM 0.25 MG: 0.25 TABLET ORAL at 08:01

## 2019-01-15 RX ADMIN — HYDROMORPHONE HYDROCHLORIDE 1 MG: 2 INJECTION INTRAMUSCULAR; INTRAVENOUS; SUBCUTANEOUS at 10:01

## 2019-01-15 RX ADMIN — HYDROMORPHONE HYDROCHLORIDE 1 MG: 2 INJECTION INTRAMUSCULAR; INTRAVENOUS; SUBCUTANEOUS at 07:01

## 2019-01-15 RX ADMIN — OXYCODONE HYDROCHLORIDE AND ACETAMINOPHEN 1 TABLET: 10; 325 TABLET ORAL at 08:01

## 2019-01-15 RX ADMIN — HYDROMORPHONE HYDROCHLORIDE 1 MG: 2 INJECTION INTRAMUSCULAR; INTRAVENOUS; SUBCUTANEOUS at 02:01

## 2019-01-15 RX ADMIN — CHLORHEXIDINE GLUCONATE 10 ML: 1.2 RINSE ORAL at 08:01

## 2019-01-15 RX ADMIN — PANTOPRAZOLE SODIUM 40 MG: 40 TABLET, DELAYED RELEASE ORAL at 08:01

## 2019-01-15 RX ADMIN — HYDROMORPHONE HYDROCHLORIDE 1 MG: 2 INJECTION INTRAMUSCULAR; INTRAVENOUS; SUBCUTANEOUS at 08:01

## 2019-01-15 RX ADMIN — OXYCODONE HYDROCHLORIDE AND ACETAMINOPHEN 1 TABLET: 10; 325 TABLET ORAL at 09:01

## 2019-01-15 RX ADMIN — OXYCODONE HYDROCHLORIDE AND ACETAMINOPHEN 1 TABLET: 10; 325 TABLET ORAL at 01:01

## 2019-01-15 NOTE — ASSESSMENT & PLAN NOTE
- Admitted to Hospital Medicine  - Evaluated by Orthopedics Dr. Galvan and is s/p closed reduction with placement of an external fixator on 1/10  - Continue prn analgesia  - PT/OT following  - NWB to LLE at present time  - Will likely need inpatient rehab upon DC pending progress  - Ortho anticipates an ORIF this week once soft tissue swelling improves.   - Continue DVT prophylaxis with Lovenox    - Elevated Left lower extremely about the level to the heart, to help decrease swelling. Ice to Left leg.  Plan Left proximal tibia ORIF on Thursday if swelling improves.

## 2019-01-15 NOTE — PLAN OF CARE
Problem: Adult Inpatient Plan of Care  Goal: Plan of Care Review  Outcome: Ongoing (interventions implemented as appropriate)  POC reviewed, including indications and possible side effects of administered medications. Patient verbalized understanding and teach back. No adverse reactions noted. Patient c/o LLE pain and anxiety. Administered medications per order. Wound care performed. Ice packs to site. Patient remains free of falls and injuries during shift. Daughter at bedside. Will continue to monitor.    Chart check complete.
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
## 2019-01-15 NOTE — PLAN OF CARE
Problem: Occupational Therapy Goal  Goal: Occupational Therapy Goal  OT GOALS TO BE MET BY 1-17-19  MIN A WITH BSC T/F'S  PT WILL TOLERATE 1 SET X 15 REPS B UE ROM EXERCISE WITH MIN RESISTANCE  S WITH UE DRESSING  MIN A WITH BE MOBILITY   Outcome: Ongoing (interventions implemented as appropriate)  Min A with bed mobility; performed G/H tasks while sitting on sofa in rrom

## 2019-01-15 NOTE — PLAN OF CARE
Problem: Physical Therapy Goal  Goal: Physical Therapy Goal  LTG'S TO BE MET IN 7 DAYS (1-18-19)  1. PT WILL REQUIRE BERTO FOR BED MOBILITY  2. PT WILL REQUIRE BERTO FOR TF'S, NWB FOR LLE  3. PT WILL DEMO F- DYNAMIC BALANCE DURING TF   Outcome: Ongoing (interventions implemented as appropriate)  PT GT TRAINED X 25' WITH RW AND NWB L CYNDY.

## 2019-01-15 NOTE — PT/OT/SLP PROGRESS
"Occupational Therapy  Treatment    Teresa Cazares   MRN: [id number]   Admitting Diagnosis: Closed fracture of proximal end of left tibia    OT Date of Treatment: 01/14/19   OT Start Time: 1645  OT Stop Time: 1700  OT Total Time (min): 15 min    Billable Minutes:  Therapeutic Activity 15 minutes    General Precautions: Standard, fall  Orthopedic Precautions: LLE non weight bearing  Braces: N/A         Subjective:  Communicated with nurse Nikia prior to session.  Pain/Comfort  Pain Rating 1: 0/10(pt reported excurciating pain earlier s/p ambulation)  Pain Addressed 1: Pre-medicate for activity, Reposition, Distraction    Objective:  Patient found with: peripheral IV, external fixator     Functional Mobility:  Bed Mobility: long sitting in bed s                   Activities of Daily Living:     Feeding adaptive equipment: na     UE adaptive equipment: na     LE adaptive equipment: na        Bathing adaptive equipment: na    Balance:   Static Sit: FAIR+: Able to take MINIMAL challenges from all directions long sitting in bed  Dynamic Sit: na  Static Stand: na  Dynamic stand: na    Therapeutic Activities and Exercises:  Pt seen in room with CNA in room and mother, family. Therapist reported to CNA we will finish up positioning and ice placement with pt once complete with therapy session. Conversation about employment prior to therapy session. Therapist reported lowering b le of bed. Pt educated on tx session . Pt informed we (PTA that will be arriving shortly) would like for you to walk. Mother reported she can not walk due to increase pain s/p ambulation of other therapy session earlier today. Mother then said if she walks it will only be to door and back. therapist said ok and proceeded to get hospital gown to wear as a robe. Once back in room, Mother then reported she can only pivot and pt stated I cant walk as "Dr. Winters said I don't have to walk," I said "ok but Dr. Winters did not tell me that." ( Dr. Winters is NP " "Clint Winters) mother reported she can only pivot to right.." Therapist reported ok and proceeded to move bed over  And table to give her room to get to chair and placement of chair. Pt became very arugmentative. Therapist stated " I can see up getting upset I apologize if this is making you upset but you are a priority pt. Due to being an orthopedic pt.  and we will like to work with you. I will call PTA to make sure we can all document on tx session and not having you to do things over and over again." pt increasing in tone and yelling at me reported "now you want to apologize now that I have an attitude  "I reported I will have another O.T. to work with you tomorrow and have a nice day.  AM-PAC 6 CLICK ADL   How much help from another person does this patient currently need?   1 = Unable, Total/Dependent Assistance  2 = A lot, Maximum/Moderate Assistance  3 = A little, Minimum/Contact Guard/Supervision  4 = None, Modified [place]/Independent    Putting on and taking off regular lower body clothing? : 1(not performed)  Bathing (including washing, rinsing, drying)?: 1(not performed)  Toileting, which includes using toilet, bedpan, or urinal? : 1(not performed)  Putting on and taking off regular upper body clothing?: 1(pt refefused ue dressing)  Taking care of personal grooming such as brushing teeth?: 3(not performed)  Eating meals?: 4(not performed)  Daily Activity Total Score: 11     AM-PAC Raw Score CMS "G-Code Modifier Level of Impairment Assistance   6 % Total / Unable   7 - 8 CM 80 - 100% Maximal Assist   9-13 CL 60 - 80% Moderate Assist   14 - 19 CK 40 - 60% Moderate Assist   20 - 22 CJ 20 - 40% Minimal Assist   23 CI 1-20% SBA / CGA   24 CH 0% Independent/ Mod I       Patient left long sitting in bed with all lines intact, call button in reach, nurse Gricel Montejo rehab supervisior , Alley PT/OT,  NP Clint Winters. notified and family present    ASSESSMENT:  Teresa Cazares is a 43 y.o. female " with a medical diagnosis of Closed fracture of proximal end of left tibia and presents unable to access  Rehab identified problem list/impairments: Rehab identified problem list/impairments: weakness, impaired self care skills, impaired balance, decreased safety awareness, impaired endurance, impaired functional mobilty, decreased upper extremity function, gait instability    Rehab potential is to be determined.    Activity tolerance: to be determined    Discharge recommendations: Discharge Facility/Level of Care Needs: other (see comments)(to be determined)     Barriers to discharge: Barriers to Discharge: Decreased caregiver support    Equipment recommendations: tub bench, transfer board, wheelchair, manual, commode(drop arm bsc;)     GOALS:   Multidisciplinary Problems     Occupational Therapy Goals        Problem: Occupational Therapy Goal    Goal Priority Disciplines Outcome Interventions   Occupational Therapy Goal     OT, PT/OT     Description:  OT GOALS TO BE MET BY 1-17-19  MIN A WITH BSC T/F'S  PT WILL TOLERATE 1 SET X 15 REPS B UE ROM EXERCISE WITH MIN RESISTANCE  S WITH UE DRESSING  MIN A WITH BE MOBILITY                    Plan:  Patient to be seen 3 x/week to address the above listed problems via self-care/home management, therapeutic activities, therapeutic exercises  Plan of Care expires: 01/18/19  Plan of Care reviewed with: patient, mother, family         Lucinda Rhoades OT  01/14/2019

## 2019-01-15 NOTE — SUBJECTIVE & OBJECTIVE
Interval History: Patient seen and examined. Continues to have Left leg swelling. Case discussed with Orthopedic Surgeon, Dr Guzman, plan for surgery on 1/17/2019 if swelling allows.     Review of Systems   Constitutional: Negative for chills and fever.   HENT: Negative for congestion, rhinorrhea and sinus pressure.    Respiratory: Negative for apnea, cough, choking, chest tightness, shortness of breath, wheezing and stridor.    Cardiovascular: Negative for chest pain, palpitations and leg swelling.   Gastrointestinal: Negative for abdominal distention, abdominal pain, diarrhea, nausea and vomiting.   Endocrine: Negative for cold intolerance and heat intolerance.   Genitourinary: Negative for difficulty urinating and hematuria.   Musculoskeletal: Negative for arthralgias and joint swelling.        Left leg pain    Skin: Negative for color change, pallor and rash.   Neurological: Negative for dizziness, seizures, weakness, numbness and headaches.   Psychiatric/Behavioral: Positive for agitation. The patient is not nervous/anxious.      Objective:     Vital Signs (Most Recent):  Temp: 97.7 °F (36.5 °C) (01/15/19 0738)  Pulse: 93 (01/15/19 1110)  Resp: 16 (01/15/19 0738)  BP: 138/64 (01/15/19 0738)  SpO2: 98 % (01/15/19 1110) Vital Signs (24h Range):  Temp:  [97.7 °F (36.5 °C)-98.7 °F (37.1 °C)] 97.7 °F (36.5 °C)  Pulse:  [80-93] 93  Resp:  [16-18] 16  SpO2:  [96 %-99 %] 98 %  BP: (117-138)/(57-64) 138/64     Weight: 114.3 kg (251 lb 15.8 oz)  Body mass index is 39.47 kg/m².    Intake/Output Summary (Last 24 hours) at 1/15/2019 1151  Last data filed at 1/15/2019 1010  Gross per 24 hour   Intake 600 ml   Output --   Net 600 ml      Physical Exam   Constitutional: She is oriented to person, place, and time. No distress.   HENT:   Head: Normocephalic and atraumatic.   Eyes: Right eye exhibits no discharge. Left eye exhibits no discharge.   Neck: No JVD present.   Cardiovascular: Exam reveals no gallop and no friction rub.    No murmur heard.  Pulmonary/Chest: No stridor. No respiratory distress. She has no wheezes. She has no rales. She exhibits no tenderness.   Abdominal: She exhibits no distension and no mass. There is no tenderness. There is no rebound and no guarding. No hernia.   Musculoskeletal: She exhibits edema (Lt leg ).   External fixator intact to Lt leg    Neurological: She is alert and oriented to person, place, and time.   Skin: Skin is warm and dry. Capillary refill takes less than 2 seconds. She is not diaphoretic.   Psychiatric: Her mood appears anxious.   Nursing note and vitals reviewed.      Significant Labs:   BMP:   Recent Labs   Lab 01/15/19  0447   *      K 3.6   CL 99   CO2 28   BUN 8   CREATININE 0.7   CALCIUM 8.7     CBC:   Recent Labs   Lab 01/14/19  0435 01/15/19  0447   WBC 11.48 10.34   HGB 10.4* 10.0*   HCT 31.8* 30.7*   * 438*       Significant Imaging:     Imaging Results          FL Flouro Usage (Final result)  Result time 01/14/19 16:08:39    Final result by RADIOLOGIST, VIRTUAL (01/14/19 16:08:39)                 Impression:      Please see above.      Electronically signed by: Joycelyn Radiologist  Date:    01/14/2019  Time:    16:08             Narrative:    EXAMINATION:  FL FLOURO USAGE    CLINICAL HISTORY:  intra op;    COMPARISON:  None    FINDINGS:  Fluoroscopy used for procedure.  Please see physician notes for details and radiology report with accession #    Fluorscopy time:    Number of images:                               X-Ray Chest AP Portable (Final result)  Result time 01/10/19 18:50:55    Final result by MARIA FERNANDA Alexander Sr., MD (01/10/19 18:50:55)                 Impression:      1. The current examination is limited secondary to the lack of inclusion of the entire thorax on the film. The left costophrenic angle is not completely included on the film.  2. The visualized portion of the lungs is clear.  .      Electronically signed by: Eusebio Alexander  MD  Date:    01/10/2019  Time:    18:50             Narrative:    EXAMINATION:  XR CHEST AP PORTABLE    CLINICAL HISTORY:  tibial fracture;    COMPARISON:  02/24/2017    FINDINGS:  The current examination is limited secondary to the lack of inclusion of the entire thorax on the film.  The left costophrenic angle is not completely included on the film.  The right costophrenic angle is sharp.  The size of the heart is normal.  The visualized portion of the lungs is clear.  There is no pneumothorax.                               X-Ray Knee 3 View Left (Final result)  Result time 01/10/19 16:47:32    Final result by Daniel Phan MD (01/10/19 16:47:32)                 Impression:    FINDINGS/  Four views of the left tibia/fibula and two views of the left knee were obtained.    Comminuted fracture involving the proximal tibial metaphysis extending to the intercondylar eminence of the tibial plateau.  Oblique fibular head fracture also noted.  Moderate joint effusion.  Bony mineralization is normal.  Moderate soft tissue swelling.  There is mild varus configuration.  Femur and patella appear intact.  Patellar tendon appears grossly intact.    CT WITHOUT CONTRAST CAN BE OBTAINED FOR FURTHER CHARACTERIZATION.      Electronically signed by: Daniel Phan MD  Date:    01/10/2019  Time:    16:47             Narrative:    EXAMINATION:  XR TIBIA FIBULA 2 VIEW LEFT; XR KNEE 3 VIEW LEFT    CLINICAL HISTORY:  XR TIBIA FIBULA 2 VIEW LEFT; XR KNEE 3 VIEW LEFTPain in left knee    COMPARISON:  None                               X-Ray Tibia Fibula 2 View Left (Final result)  Result time 01/10/19 16:47:32    Final result by Daniel Phan MD (01/10/19 16:47:32)                 Impression:    FINDINGS/  Four views of the left tibia/fibula and two views of the left knee were obtained.    Comminuted fracture involving the proximal tibial metaphysis extending to the intercondylar eminence of the tibial plateau.  Oblique fibular head  fracture also noted.  Moderate joint effusion.  Bony mineralization is normal.  Moderate soft tissue swelling.  There is mild varus configuration.  Femur and patella appear intact.  Patellar tendon appears grossly intact.    CT WITHOUT CONTRAST CAN BE OBTAINED FOR FURTHER CHARACTERIZATION.      Electronically signed by: Daniel Phan MD  Date:    01/10/2019  Time:    16:47             Narrative:    EXAMINATION:  XR TIBIA FIBULA 2 VIEW LEFT; XR KNEE 3 VIEW LEFT    CLINICAL HISTORY:  XR TIBIA FIBULA 2 VIEW LEFT; XR KNEE 3 VIEW LEFTPain in left knee    COMPARISON:  None

## 2019-01-15 NOTE — PT/OT/SLP PROGRESS
Physical Therapy Treatment    Patient Name:  Teresa Cazares   MRN:  [id number]    Recommendations:     Discharge Recommendations:  rehabilitation facility   Discharge Equipment Recommendations: tub bench, walker, rolling   Barriers to discharge: None    Assessment:     Teresa Cazares is a 43 y.o. female admitted with a medical diagnosis of Closed fracture of proximal end of left tibia.  She presents with the following impairments/functional limitations:  weakness, impaired self care skills, impaired functional mobilty, gait instability, decreased ROM .    Pt presents agitated and slightly argumentative after an interaction with other staff just prior to my arrival. Pt stated multiple times that she doesn't need help. She and her mom have been moving around the room with out issue and ambulating to the restroom. Pt tolerated tx well despite being upset. Tx was limited by her current mood.     Rehab Prognosis: Fair; patient would benefit from acute skilled PT services to address these deficits and reach maximum level of function.    Recent Surgery: Procedure(s) (LRB):  APPLICATION, EXTERNAL FIXATION DEVICE, LARGE, TIBIA (Left) 4 Days Post-Op    Plan:     During this hospitalization, patient to be seen 5 x/week to address the identified rehab impairments via gait training, therapeutic exercises, therapeutic activities and progress toward the following goals:    · Plan of Care Expires:  01/18/19    Subjective     Chief Complaint: L leg pain from morning PT session. Increased swelling from activity.   Patient/Family Comments/goals: Get out of the hospital  Pain/Comfort:  · Pain Rating 1: 10/10  · Location - Side 1: Left  · Location 1: leg  · Pain Addressed 1: Pre-medicate for activity, Reposition, Nurse notified, Other (see comments)(ice pack )  · Pain Rating Post-Intervention 1: 10/10      Objective:     Communicated with epic and nurse prior to session.  Patient found all lines intact and call button in reach  peripheral IV(ex fix to L thigh)  upon PT entry to room.     General Precautions: Standard, fall   Orthopedic Precautions:LLE non weight bearing   Braces:       Functional Mobility:  · Bed Mobility:     · Rolling Left:  contact guard assistance  · Rolling Right: contact guard assistance  · Supine to Sit: minimum assistance  · Sit to Supine: minimum assistance  · Transfers:     · Sit to Stand:  supervision with rolling walker  · Gait: 20 feet in the room with a RW. Appears to put some pressure through the L foot however pt states she is letting it swing. No lob or sob. Distance limited by pt due to her pain. (capable of more if her pain was less)       AM-PAC 6 CLICK MOBILITY  Turning over in bed (including adjusting bedclothes, sheets and blankets)?: 4  Sitting down on and standing up from a chair with arms (e.g., wheelchair, bedside commode, etc.): 4  Moving from lying on back to sitting on the side of the bed?: 3  Moving to and from a bed to a chair (including a wheelchair)?: 4  Need to walk in hospital room?: 4  Climbing 3-5 steps with a railing?: 1  Basic Mobility Total Score: 20       Therapeutic Activities and Exercises:   None attempted due to pt's agitated state. Reports normal sensation in L foot . Demonstrated the ability to dorsiflex foot x 10 reps. Leg elevated after tx and Ice applied.     Patient left HOB elevated with all lines intact, Nurse: Trachessa  notified and mom and other family present..    GOALS:   Multidisciplinary Problems     Physical Therapy Goals        Problem: Physical Therapy Goal    Goal Priority Disciplines Outcome Goal Variances Interventions   Physical Therapy Goal     PT, PT/OT Ongoing (interventions implemented as appropriate)     Description:  LTG'S TO BE MET IN 7 DAYS (1-18-19)  1. PT WILL REQUIRE BERTO FOR BED MOBILITY  2. PT WILL REQUIRE BERTO FOR TF'S, NWB FOR LLE  3. PT WILL DEMO F- DYNAMIC BALANCE DURING TF                    Time Tracking:     PT Received On: 01/14/19  PT  Start Time: 1700     PT Stop Time: 1715  PT Total Time (min): 15 min     Billable Minutes: Gait Training 15       PT/PTA: PT     PTA Visit Number: 1     Anne Lan PTA  01/14/2019

## 2019-01-15 NOTE — PLAN OF CARE
Problem: Adult Inpatient Plan of Care  Goal: Plan of Care Review  Outcome: Ongoing (interventions implemented as appropriate)  Pt requires min a for supine to sit and sit to supine. Ambulated 20 feet with SPV with a RW.

## 2019-01-15 NOTE — PT/OT/SLP PROGRESS
"Occupational Therapy  Treatment    Teresa Cazares   MRN: [id number]   Admitting Diagnosis: Closed fracture of proximal end of left tibia    OT Date of Treatment: 01/15/19   OT Start Time: 0830  OT Stop Time: 0855  OT Total Time (min): 25 min    Billable Minutes:  Self Care/Home Management 15 min and Therapeutic Activity 10 min    General Precautions: Standard, fall  Orthopedic Precautions: LLE non weight bearing  Braces: N/A         Subjective:  Communicated with Nurse Yadav and Epic chart review prior to session.  Pt found supine in bed and agreeable to tx at this time.   Pain/Comfort  Pain Rating 1: 10/10  Location - Side 1: Left  Location - Orientation 1: lower  Location 1: leg  Pain Addressed 1: Pre-medicate for activity, Reposition, Distraction, Cessation of Activity, Nurse notified  Pain Rating Post-Intervention 1: 10/10    Objective:  Patient found with: peripheral IV     Pt performed sit>supine with Min A moving (L) LE, scooted to EOB with CGA, sit>stand using RW with Min A, functional mobility ~25 ft using RW with Min A and NWB (L) LE, t/f to sitting on sofa using RW with Min A. Pt performed G/H tasks with set up while sitting on sofa in room.      AM-PAC 6 CLICK ADL   How much help from another person does this patient currently need?   1 = Unable, Total/Dependent Assistance  2 = A lot, Maximum/Moderate Assistance  3 = A little, Minimum/Contact Guard/Supervision  4 = None, Modified [place]/Independent    Putting on and taking off regular lower body clothing? : 2  Bathing (including washing, rinsing, drying)?: 2  Toileting, which includes using toilet, bedpan, or urinal? : 3  Putting on and taking off regular upper body clothing?: 3  Taking care of personal grooming such as brushing teeth?: 4  Eating meals?: 4  Daily Activity Total Score: 18     AM-PAC Raw Score CMS "G-Code Modifier Level of Impairment Assistance   6 % Total / Unable   7 - 8 CM 80 - 100% Maximal Assist   9-13 CL 60 - 80% " Moderate Assist   14 - 19 CK 40 - 60% Moderate Assist   20 - 22 CJ 20 - 40% Minimal Assist   23 CI 1-20% SBA / CGA   24 CH 0% Independent/ Mod I       Patient left sitting on sofa with (L) LE elevated on 2 sofa cushions with all lines intact, call button in reach, Nurse Vicenta notified and pt set up for breakfast.    ASSESSMENT:  Teresa Cazares is a 43 y.o. female with a medical diagnosis of Closed fracture of proximal end of left tibia and presents with impaired functional mobility and ADLs. Pt will benefit from continued skilled OT in order to address impairments.    Rehab identified problem list/impairments: Rehab identified problem list/impairments: weakness, impaired endurance, impaired self care skills, impaired functional mobilty, decreased coordination, impaired balance, gait instability, decreased lower extremity function, decreased safety awareness, pain, edema, decreased ROM    Rehab potential is good.    Activity tolerance: Good    Discharge recommendations: Discharge Facility/Level of Care Needs: rehabilitation facility     Barriers to discharge:      Equipment recommendations: walker, rolling, tub bench     GOALS:   Multidisciplinary Problems     Occupational Therapy Goals        Problem: Occupational Therapy Goal    Goal Priority Disciplines Outcome Interventions   Occupational Therapy Goal     OT, PT/OT Ongoing (interventions implemented as appropriate)    Description:  OT GOALS TO BE MET BY 1-17-19  MIN A WITH BSC T/F'S  PT WILL TOLERATE 1 SET X 15 REPS B UE ROM EXERCISE WITH MIN RESISTANCE  S WITH UE DRESSING  MIN A WITH BE MOBILITY                    Plan:  Patient to be seen 3 x/week to address the above listed problems via self-care/home management, therapeutic activities, therapeutic exercises  Plan of Care expires: 01/18/19  Plan of Care reviewed with: patient    OT G-codes  Functional Assessment Tool Used: [facility]  Score: 18    Alley Villagomez, PT/OT  01/15/2019

## 2019-01-15 NOTE — PT/OT/SLP PROGRESS
Physical Therapy  Treatment    Teresa Cazares   MRN: [id number]   Admitting Diagnosis: Closed fracture of proximal end of left tibia    PT Received On: 01/15/19  PT Start Time: 0905     PT Stop Time: 0930    PT Total Time (min): 25 min       Billable Minutes:  Gait Training 15 and Therapeutic Activity 10    Treatment Type: Treatment  PT/PTA: PT     PTA Visit Number: 1       General Precautions: Standard, fall  Orthopedic Precautions: LLE non weight bearing   Braces: N/A    Spiritual, Cultural Beliefs, Muslim Practices, Values that Affect Care: no    Subjective:  Communicated with NURSE RAJAN AND [facility] CHART REVIEW prior to session.   PT AGREED TO TX    Pain/Comfort  Pain Rating 1: 10/10  Location - Side 1: Left  Location 1: leg  Pain Rating Post-Intervention 1: 10/10    Objective:   Patient found with: peripheral IV    Functional Mobility:  PT MET IN RM.P.T. DISCUSSED TX PLAN WITH PT AS PT WAS UPSET ABOUT HOW TX SESSION YESTERDAY. PT SUP>SIT EOB WITH MIN A. PT SCOOTED AND STOOD WITH RW AND MIN A NWB L LE. PT GT TRAINED X 25' WITH SLOW PACE GT NWB L LE. PT RETURNED TO RM T/F TO CHAIR WITH MIN A. PT L LE ELEVATED AND PT LEFT SEATED WITH ALL NEEDS MET.     AM-PAC 6 CLICK MOBILITY  How much help from another person does this patient currently need?   1 = Unable, Total/Dependent Assistance  2 = A lot, Maximum/Moderate Assistance  3 = A little, Minimum/Contact Guard/Supervision  4 = None, Modified [place]/Independent    Turning over in bed (including adjusting bedclothes, sheets and blankets)?: 3  Sitting down on and standing up from a chair with arms (e.g., wheelchair, bedside commode, etc.): 3  Moving from lying on back to sitting on the side of the bed?: 3  Moving to and from a bed to a chair (including a wheelchair)?: 3  Need to walk in hospital room?: 3  Climbing 3-5 steps with a railing?: 1  Basic Mobility Total Score: 16    AM-PAC Raw Score CMS G-Code Modifier Level of Impairment Assistance   6 CN  100% Total / Unable   7 - 9 CM 80 - 100% Maximal Assist   10 - 14 CL 60 - 80% Moderate Assist   15 - 19 CK 40 - 60% Moderate Assist   20 - 22 CJ 20 - 40% Minimal Assist   23 CI 1-20% SBA / CGA   24 CH 0% Independent/ Mod I     Patient left up in chair with call button in reach.    Assessment:  PT PROGRESSING WITH GT AND GROSS FUNC MOBILITY     Rehab identified problem list/impairments: Rehab identified problem list/impairments: weakness, impaired self care skills, impaired functional mobilty, impaired endurance, gait instability, decreased lower extremity function, pain, decreased ROM, impaired balance, decreased upper extremity function, orthopedic precautions    Rehab potential is good.    Activity tolerance: Fair    Discharge recommendations: Discharge Facility/Level of Care Needs: rehabilitation facility     Barriers to discharge:      Equipment recommendations:       GOALS:   Multidisciplinary Problems     Physical Therapy Goals        Problem: Physical Therapy Goal    Goal Priority Disciplines Outcome Goal Variances Interventions   Physical Therapy Goal     PT, PT/OT Ongoing (interventions implemented as appropriate)     Description:  LTG'S TO BE MET IN 7 DAYS (1-18-19)  1. PT WILL REQUIRE BERTO FOR BED MOBILITY  2. PT WILL REQUIRE BERTO FOR TF'S, NWB FOR LLE  3. PT WILL DEMO F- DYNAMIC BALANCE DURING TF                    PLAN:    Patient to be seen 5 x/week  to address the above listed problems via gait training, therapeutic activities, therapeutic exercises  Plan of Care expires: 01/18/19  Plan of Care reviewed with: patient         Brenda Pugh, PT  01/15/2019

## 2019-01-15 NOTE — PLAN OF CARE
Problem: Physical Therapy Goal  Goal: Physical Therapy Goal  LTG'S TO BE MET IN 7 DAYS (1-18-19)  1. PT WILL REQUIRE BERTO FOR BED MOBILITY  2. PT WILL REQUIRE BERTO FOR TF'S, NWB FOR LLE  3. PT WILL DEMO F- DYNAMIC BALANCE DURING TF   Outcome: Ongoing (interventions implemented as appropriate)  Pt gait trained ~25ft using RW with CGA and NWB (L) LE; t/f on/on commode with SBA

## 2019-01-15 NOTE — PT/OT/SLP PROGRESS
Physical Therapy  Treatment    Teresa Cazares   MRN: [id number]   Admitting Diagnosis: Closed fracture of proximal end of left tibia    PT Received On: 01/15/19  PT Start Time: 1150     PT Stop Time: 1215    PT Total Time (min): 25 min       Billable Minutes:  Gait Training 10 min and Therapeutic Activity 15 min    Treatment Type: Treatment  PT/PTA: PT     PTA Visit Number: 0       General Precautions: Standard, fall  Orthopedic Precautions: LLE non weight bearing   Braces: N/A    Spiritual, Cultural Beliefs, Synagogue Practices, Values that Affect Care: no    Subjective:  Communicated with Nurse Yadav and [facility] chart review prior to session.  Pt found supine with (L) LE elevated on pillow with ice and agreeable to tx at this time.     Pain/Comfort  Pain Rating 1: 7/10  Location - Side 1: Left  Location 1: leg  Pain Addressed 1: Pre-medicate for activity, Reposition, Distraction, Cessation of Activity  Pain Rating Post-Intervention 1: 7/10    Objective:   Patient found with: peripheral IV    Pt performed supine>sit with Min A for moving (L) LE, sit>stand using RW with CGA, gait trained ~25ft using RW with CGA, t/f on/off commode with SBA, toileted with SBA, t/f to sitting on bed with CGA, sit>supine with Min A for moving (L) LE, scooted up in bed with SBA.      AM-PAC 6 CLICK MOBILITY  How much help from another person does this patient currently need?   1 = Unable, Total/Dependent Assistance  2 = A lot, Maximum/Moderate Assistance  3 = A little, Minimum/Contact Guard/Supervision  4 = None, Modified [place]/Independent    Turning over in bed (including adjusting bedclothes, sheets and blankets)?: 3  Sitting down on and standing up from a chair with arms (e.g., wheelchair, bedside commode, etc.): 3  Moving from lying on back to sitting on the side of the bed?: 3  Moving to and from a bed to a chair (including a wheelchair)?: 3  Need to walk in hospital room?: 3  Climbing 3-5 steps with a railing?: 1  Basic  Mobility Total Score: 16    AM-PAC Raw Score CMS G-Code Modifier Level of Impairment Assistance   6 % Total / Unable   7 - 9 CM 80 - 100% Maximal Assist   10 - 14 CL 60 - 80% Moderate Assist   15 - 19 CK 40 - 60% Moderate Assist   20 - 22 CJ 20 - 40% Minimal Assist   23 CI 1-20% SBA / CGA   24 CH 0% Independent/ Mod I     Patient left supine inbed with HOB elevated and (L) LE elevated on pillow and 3 ice packs on (L) LE with all lines intact, call button in reach, Nurse Vicenta notified and ligia present.    Assessment:  Teresa Cazares is a 43 y.o. female with a medical diagnosis of Closed fracture of proximal end of left tibia and presents with impaired functional mobility. Pt will benefit from continued skilled PT in order to address impairments.    Rehab identified problem list/impairments: Rehab identified problem list/impairments: weakness, impaired endurance, impaired self care skills, impaired functional mobilty, decreased coordination, impaired balance, gait instability, decreased lower extremity function, decreased safety awareness, pain, edema    Rehab potential is good.    Activity tolerance: Good    Discharge recommendations: Discharge Facility/Level of Care Needs: rehabilitation facility     Barriers to discharge:      Equipment recommendations: Equipment Needed After Discharge: walker, rolling, tub bench     GOALS:   Multidisciplinary Problems     Physical Therapy Goals        Problem: Physical Therapy Goal    Goal Priority Disciplines Outcome Goal Variances Interventions   Physical Therapy Goal     PT, PT/OT Ongoing (interventions implemented as appropriate)     Description:  LTG'S TO BE MET IN 7 DAYS (1-18-19)  1. PT WILL REQUIRE BERTO FOR BED MOBILITY  2. PT WILL REQUIRE BERTO FOR TF'S, NWB FOR LLE  3. PT WILL DEMO F- DYNAMIC BALANCE DURING TF                    PLAN:    Patient to be seen 5 x/week  to address the above listed problems via gait training, therapeutic activities,  therapeutic exercises  Plan of Care expires: 01/18/19  Plan of Care reviewed with: patient, daughter    PT G-Codes  Functional Assessment Tool Used: [facility]  Score: 16    Alley Villagoemz, PT/OT  01/15/2019

## 2019-01-16 PROBLEM — K59.00 CONSTIPATION: Status: ACTIVE | Noted: 2019-01-16

## 2019-01-16 LAB
ANION GAP SERPL CALC-SCNC: 8 MMOL/L
BASOPHILS # BLD AUTO: 0.02 K/UL
BASOPHILS NFR BLD: 0.2 %
BUN SERPL-MCNC: 9 MG/DL
CALCIUM SERPL-MCNC: 9.4 MG/DL
CHLORIDE SERPL-SCNC: 98 MMOL/L
CO2 SERPL-SCNC: 29 MMOL/L
CREAT SERPL-MCNC: 0.7 MG/DL
DIFFERENTIAL METHOD: ABNORMAL
EOSINOPHIL # BLD AUTO: 0.4 K/UL
EOSINOPHIL NFR BLD: 4 %
ERYTHROCYTE [DISTWIDTH] IN BLOOD BY AUTOMATED COUNT: 13.5 %
EST. GFR  (AFRICAN AMERICAN): >60 ML/MIN/1.73 M^2
EST. GFR  (NON AFRICAN AMERICAN): >60 ML/MIN/1.73 M^2
GLUCOSE SERPL-MCNC: 101 MG/DL
HCT VFR BLD AUTO: 32.5 %
HGB BLD-MCNC: 10.6 G/DL
LYMPHOCYTES # BLD AUTO: 2.5 K/UL
LYMPHOCYTES NFR BLD: 25 %
MCH RBC QN AUTO: 29.1 PG
MCHC RBC AUTO-ENTMCNC: 32.6 G/DL
MCV RBC AUTO: 89 FL
MONOCYTES # BLD AUTO: 1.3 K/UL
MONOCYTES NFR BLD: 12.8 %
NEUTROPHILS # BLD AUTO: 5.8 K/UL
NEUTROPHILS NFR BLD: 58 %
PLATELET # BLD AUTO: 466 K/UL
PMV BLD AUTO: 8.6 FL
POTASSIUM SERPL-SCNC: 4.6 MMOL/L
RBC # BLD AUTO: 3.64 M/UL
SODIUM SERPL-SCNC: 135 MMOL/L
WBC # BLD AUTO: 10.04 K/UL

## 2019-01-16 PROCEDURE — 97116 GAIT TRAINING THERAPY: CPT

## 2019-01-16 PROCEDURE — 80048 BASIC METABOLIC PNL TOTAL CA: CPT

## 2019-01-16 PROCEDURE — 63600175 PHARM REV CODE 636 W HCPCS: Performed by: ORTHOPAEDIC SURGERY

## 2019-01-16 PROCEDURE — 85025 COMPLETE CBC W/AUTO DIFF WBC: CPT

## 2019-01-16 PROCEDURE — 94761 N-INVAS EAR/PLS OXIMETRY MLT: CPT

## 2019-01-16 PROCEDURE — 36415 COLL VENOUS BLD VENIPUNCTURE: CPT

## 2019-01-16 PROCEDURE — 25000003 PHARM REV CODE 250: Performed by: ORTHOPAEDIC SURGERY

## 2019-01-16 PROCEDURE — 25000003 PHARM REV CODE 250: Performed by: INTERNAL MEDICINE

## 2019-01-16 PROCEDURE — 11000001 HC ACUTE MED/SURG PRIVATE ROOM

## 2019-01-16 PROCEDURE — 25000003 PHARM REV CODE 250: Performed by: NURSE PRACTITIONER

## 2019-01-16 PROCEDURE — 63600175 PHARM REV CODE 636 W HCPCS: Performed by: INTERNAL MEDICINE

## 2019-01-16 PROCEDURE — 97530 THERAPEUTIC ACTIVITIES: CPT

## 2019-01-16 RX ORDER — SODIUM CHLORIDE, SODIUM LACTATE, POTASSIUM CHLORIDE, CALCIUM CHLORIDE 600; 310; 30; 20 MG/100ML; MG/100ML; MG/100ML; MG/100ML
INJECTION, SOLUTION INTRAVENOUS CONTINUOUS
Status: DISCONTINUED | OUTPATIENT
Start: 2019-01-17 | End: 2019-01-20

## 2019-01-16 RX ORDER — BISACODYL 5 MG
10 TABLET, DELAYED RELEASE (ENTERIC COATED) ORAL ONCE
Status: COMPLETED | OUTPATIENT
Start: 2019-01-16 | End: 2019-01-16

## 2019-01-16 RX ORDER — SYRING-NEEDL,DISP,INSUL,0.3 ML 29 G X1/2"
296 SYRINGE, EMPTY DISPOSABLE MISCELLANEOUS ONCE
Status: COMPLETED | OUTPATIENT
Start: 2019-01-16 | End: 2019-01-16

## 2019-01-16 RX ADMIN — HYDROMORPHONE HYDROCHLORIDE 1 MG: 2 INJECTION INTRAMUSCULAR; INTRAVENOUS; SUBCUTANEOUS at 01:01

## 2019-01-16 RX ADMIN — BISACODYL 10 MG: 5 TABLET, COATED ORAL at 11:01

## 2019-01-16 RX ADMIN — ALPRAZOLAM 0.25 MG: 0.25 TABLET ORAL at 08:01

## 2019-01-16 RX ADMIN — HYDROMORPHONE HYDROCHLORIDE 1 MG: 2 INJECTION INTRAMUSCULAR; INTRAVENOUS; SUBCUTANEOUS at 09:01

## 2019-01-16 RX ADMIN — HYDROMORPHONE HYDROCHLORIDE 1 MG: 2 INJECTION INTRAMUSCULAR; INTRAVENOUS; SUBCUTANEOUS at 05:01

## 2019-01-16 RX ADMIN — OXYCODONE HYDROCHLORIDE AND ACETAMINOPHEN 1 TABLET: 10; 325 TABLET ORAL at 07:01

## 2019-01-16 RX ADMIN — PANTOPRAZOLE SODIUM 40 MG: 40 TABLET, DELAYED RELEASE ORAL at 08:01

## 2019-01-16 RX ADMIN — ENOXAPARIN SODIUM 40 MG: 100 INJECTION SUBCUTANEOUS at 08:01

## 2019-01-16 RX ADMIN — MAGESIUM CITRATE 296 ML: 1.75 LIQUID ORAL at 11:01

## 2019-01-16 RX ADMIN — HYDROMORPHONE HYDROCHLORIDE 1 MG: 2 INJECTION INTRAMUSCULAR; INTRAVENOUS; SUBCUTANEOUS at 11:01

## 2019-01-16 RX ADMIN — VARENICLINE TARTRATE 1 MG: 0.5 TABLET, FILM COATED ORAL at 08:01

## 2019-01-16 RX ADMIN — DIPHENHYDRAMINE HYDROCHLORIDE 25 MG: 25 CAPSULE ORAL at 09:01

## 2019-01-16 RX ADMIN — OXYCODONE HYDROCHLORIDE AND ACETAMINOPHEN 1 TABLET: 10; 325 TABLET ORAL at 03:01

## 2019-01-16 RX ADMIN — OXYCODONE HYDROCHLORIDE AND ACETAMINOPHEN 1 TABLET: 10; 325 TABLET ORAL at 02:01

## 2019-01-16 RX ADMIN — OXYCODONE HYDROCHLORIDE AND ACETAMINOPHEN 1 TABLET: 10; 325 TABLET ORAL at 11:01

## 2019-01-16 NOTE — PT/OT/SLP PROGRESS
Physical Therapy  Treatment    Teresa Cazares   MRN: [id number]   Admitting Diagnosis: Closed fracture of proximal end of left tibia    PT Received On: 01/16/19  PT Start Time: 0858     PT Stop Time: 0921    PT Total Time (min): 23 min       Billable Minutes:  Gait Training 13 and Therapeutic Activity 10    Treatment Type: Treatment  PT/PTA: PT     PTA Visit Number: 1       General Precautions: Standard, fall  Orthopedic Precautions: LLE non weight bearing   Braces: N/A    Spiritual, Cultural Beliefs, Voodoo Practices, Values that Affect Care: no    Subjective:  Communicated with NURSE WAN AND [facility] CHART REVIEW prior to session.   PT AGREED TO TX     Pain/Comfort  Pain Rating 1: 7/10  Location - Side 1: Left  Location 1: leg  Pain Addressed 1: Pre-medicate for activity  Pain Rating Post-Intervention 1: 7/10    Objective:   Patient found with: peripheral IV    Functional Mobility:  PT MET IN RM SUP>SIT EOB WITH MIN A OF L LE. PT SCOOTED TO EOB WITH BERTO. PT STOOD WITH RW AND SBA FOR GT X 25' WITH RW AND NWB L LE. PT TO BATHROOM FOR TOILETING WITH CGA ON /OFF COMMODE. PT RETURNED TO RM T/F TO EOB AND SUP IN BED WITH MIN A. P.T. EDUCATED PT ON TE AND PT DEMONSTRATED TE. PT LEFT SUP IN BED WITH ALL NEEDS MET.     AM-PAC 6 CLICK MOBILITY  How much help from another person does this patient currently need?   1 = Unable, Total/Dependent Assistance  2 = A lot, Maximum/Moderate Assistance  3 = A little, Minimum/Contact Guard/Supervision  4 = None, Modified [place]/Independent    Turning over in bed (including adjusting bedclothes, sheets and blankets)?: 3  Sitting down on and standing up from a chair with arms (e.g., wheelchair, bedside commode, etc.): 3  Moving from lying on back to sitting on the side of the bed?: 3  Moving to and from a bed to a chair (including a wheelchair)?: 3  Need to walk in hospital room?: 4  Climbing 3-5 steps with a railing?: 1  Basic Mobility Total Score: 17    AM-PAC Raw Score CMS  G-Code Modifier Level of Impairment Assistance   6 % Total / Unable   7 - 9 CM 80 - 100% Maximal Assist   10 - 14 CL 60 - 80% Moderate Assist   15 - 19 CK 40 - 60% Moderate Assist   20 - 22 CJ 20 - 40% Minimal Assist   23 CI 1-20% SBA / CGA   24 CH 0% Independent/ Mod I     Patient left supine with call button in reach.    Assessment:  PT [place] WELL WITH INC PAIN.     Rehab identified problem list/impairments: Rehab identified problem list/impairments: weakness, gait instability, impaired balance, pain, decreased ROM, orthopedic precautions, impaired functional mobilty    Rehab potential is good.    Activity tolerance: Fair    Discharge recommendations: Discharge Facility/Level of Care Needs: ([facility] P.T. )     Barriers to discharge:      Equipment recommendations: Equipment Needed After Discharge: walker, rolling     GOALS:   Multidisciplinary Problems     Physical Therapy Goals        Problem: Physical Therapy Goal    Goal Priority Disciplines Outcome Goal Variances Interventions   Physical Therapy Goal     PT, PT/OT Ongoing (interventions implemented as appropriate)     Description:  LTG'S TO BE MET IN 7 DAYS (1-18-19)  1. PT WILL REQUIRE BERTO FOR BED MOBILITY  2. PT WILL REQUIRE BERTO FOR TF'S, NWB FOR LLE  3. PT WILL DEMO F- DYNAMIC BALANCE DURING TF                    PLAN:    Patient to be seen 5 x/week  to address the above listed problems via gait training, therapeutic activities, therapeutic exercises  Plan of Care expires: 01/18/19  Plan of Care reviewed with: patient         Brenda Pugh, PT  01/16/2019
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
## 2019-01-16 NOTE — PT/OT/SLP PROGRESS
Physical Therapy      Patient Name:  Teresa Cazares   MRN:  [id number]    PT attempt tx at this time, pt refused secondary to having 10/10 (L) LE pain and wanted to rest. PT will attempt tx at later date in order to continue with POC.     Alley Villagomez, PT/OT   1/16/2019

## 2019-01-16 NOTE — PLAN OF CARE
Problem: Adult Inpatient Plan of Care  Goal: Plan of Care Review  Outcome: Ongoing (interventions implemented as appropriate)  Remains free from injury. States relief of pain with available prn meds. External fixator in place, dressing CDI. Vital signs stable. Chart reviewed. Will continue to monitor.

## 2019-01-16 NOTE — SUBJECTIVE & OBJECTIVE
Interval History: Patient seen and examined. No distress. Vital signs stable. Plan for Left proximal tibia ORIF on tomorrow, if swelling allows.     Review of Systems   Constitutional: Negative for chills and fever.   HENT: Negative for congestion, rhinorrhea and sinus pressure.    Respiratory: Negative for apnea, cough, choking, chest tightness, shortness of breath, wheezing and stridor.    Cardiovascular: Negative for chest pain, palpitations and leg swelling.   Gastrointestinal: Positive for constipation. Negative for abdominal distention, abdominal pain, diarrhea, nausea and vomiting.   Endocrine: Negative for cold intolerance and heat intolerance.   Genitourinary: Negative for difficulty urinating and hematuria.   Musculoskeletal: Negative for arthralgias and joint swelling.        Left leg pain    Skin: Negative for color change, pallor and rash.   Neurological: Negative for dizziness, seizures, weakness, numbness and headaches.   Psychiatric/Behavioral: Negative for agitation. The patient is not nervous/anxious.      Objective:     Vital Signs (Most Recent):  Temp: 98.2 °F (36.8 °C) (01/16/19 1225)  Pulse: 79 (01/16/19 1225)  Resp: 18 (01/16/19 1225)  BP: 122/62 (01/16/19 1225)  SpO2: 100 % (01/16/19 1225) Vital Signs (24h Range):  Temp:  [98 °F (36.7 °C)-99.2 °F (37.3 °C)] 98.2 °F (36.8 °C)  Pulse:  [79-92] 79  Resp:  [16-18] 18  SpO2:  [94 %-100 %] 100 %  BP: (121-135)/(62-87) 122/62     Weight: 114.3 kg (251 lb 15.8 oz)  Body mass index is 39.47 kg/m².    Intake/Output Summary (Last 24 hours) at 1/16/2019 1319  Last data filed at 1/16/2019 0612  Gross per 24 hour   Intake 120 ml   Output --   Net 120 ml      Physical Exam   Constitutional: She is oriented to person, place, and time. No distress.   Eyes: Right eye exhibits no discharge. Left eye exhibits no discharge.   Cardiovascular: Exam reveals no gallop and no friction rub.   No murmur heard.  Pulmonary/Chest: No stridor. No respiratory distress. She  has no wheezes. She has no rales. She exhibits no tenderness.   Abdominal: She exhibits no distension and no mass. Bowel sounds are decreased. There is no tenderness. There is no rebound and no guarding. No hernia.   Musculoskeletal: She exhibits no edema or deformity.   Lt leg external fixation    Neurological: She is alert and oriented to person, place, and time.   Skin: Skin is warm and dry. Capillary refill takes less than 2 seconds. She is not diaphoretic.   Nursing note and vitals reviewed.      Significant Labs:   CBC:   Recent Labs   Lab 01/15/19  0447 01/16/19  0510   WBC 10.34 10.04   HGB 10.0* 10.6*   HCT 30.7* 32.5*   * 466*     CMP:   Recent Labs   Lab 01/15/19  0447 01/16/19  0510    135*   K 3.6 4.6   CL 99 98   CO2 28 29   * 101   BUN 8 9   CREATININE 0.7 0.7   CALCIUM 8.7 9.4   ANIONGAP 9 8   EGFRNONAA >60 >60       Significant Imaging:     Imaging Results          FL Flouro Usage (Final result)  Result time 01/14/19 16:08:39    Final result by RADIOLOGIST, VIRTUAL (01/14/19 16:08:39)                 Impression:      Please see above.      Electronically signed by: Joycelyn Radiologist  Date:    01/14/2019  Time:    16:08             Narrative:    EXAMINATION:  FL FLOURO USAGE    CLINICAL HISTORY:  intra op;    COMPARISON:  None    FINDINGS:  Fluoroscopy used for procedure.  Please see physician notes for details and radiology report with accession #    Fluorscopy time:    Number of images:                               X-Ray Chest AP Portable (Final result)  Result time 01/10/19 18:50:55    Final result by MARIA FERNANDA Alexander Sr., MD (01/10/19 18:50:55)                 Impression:      1. The current examination is limited secondary to the lack of inclusion of the entire thorax on the film. The left costophrenic angle is not completely included on the film.  2. The visualized portion of the lungs is clear.  .      Electronically signed by: Eusebio Alexander  MD  Date:    01/10/2019  Time:    18:50             Narrative:    EXAMINATION:  XR CHEST AP PORTABLE    CLINICAL HISTORY:  tibial fracture;    COMPARISON:  02/24/2017    FINDINGS:  The current examination is limited secondary to the lack of inclusion of the entire thorax on the film.  The left costophrenic angle is not completely included on the film.  The right costophrenic angle is sharp.  The size of the heart is normal.  The visualized portion of the lungs is clear.  There is no pneumothorax.                               X-Ray Knee 3 View Left (Final result)  Result time 01/10/19 16:47:32    Final result by Daniel Phan MD (01/10/19 16:47:32)                 Impression:    FINDINGS/  Four views of the left tibia/fibula and two views of the left knee were obtained.    Comminuted fracture involving the proximal tibial metaphysis extending to the intercondylar eminence of the tibial plateau.  Oblique fibular head fracture also noted.  Moderate joint effusion.  Bony mineralization is normal.  Moderate soft tissue swelling.  There is mild varus configuration.  Femur and patella appear intact.  Patellar tendon appears grossly intact.    CT WITHOUT CONTRAST CAN BE OBTAINED FOR FURTHER CHARACTERIZATION.      Electronically signed by: Daniel Phan MD  Date:    01/10/2019  Time:    16:47             Narrative:    EXAMINATION:  XR TIBIA FIBULA 2 VIEW LEFT; XR KNEE 3 VIEW LEFT    CLINICAL HISTORY:  XR TIBIA FIBULA 2 VIEW LEFT; XR KNEE 3 VIEW LEFTPain in left knee    COMPARISON:  None                               X-Ray Tibia Fibula 2 View Left (Final result)  Result time 01/10/19 16:47:32    Final result by Daniel Phan MD (01/10/19 16:47:32)                 Impression:    FINDINGS/  Four views of the left tibia/fibula and two views of the left knee were obtained.    Comminuted fracture involving the proximal tibial metaphysis extending to the intercondylar eminence of the tibial plateau.  Oblique fibular head  fracture also noted.  Moderate joint effusion.  Bony mineralization is normal.  Moderate soft tissue swelling.  There is mild varus configuration.  Femur and patella appear intact.  Patellar tendon appears grossly intact.    CT WITHOUT CONTRAST CAN BE OBTAINED FOR FURTHER CHARACTERIZATION.      Electronically signed by: Daniel Phan MD  Date:    01/10/2019  Time:    16:47             Narrative:    EXAMINATION:  XR TIBIA FIBULA 2 VIEW LEFT; XR KNEE 3 VIEW LEFT    CLINICAL HISTORY:  XR TIBIA FIBULA 2 VIEW LEFT; XR KNEE 3 VIEW LEFTPain in left knee    COMPARISON:  None

## 2019-01-16 NOTE — PLAN OF CARE
Problem: Physical Therapy Goal  Goal: Physical Therapy Goal  LTG'S TO BE MET IN 7 DAYS (1-18-19)  1. PT WILL REQUIRE BERTO FOR BED MOBILITY  2. PT WILL REQUIRE BERTO FOR TF'S, NWB FOR LLE  3. PT WILL DEMO F- DYNAMIC BALANCE DURING TF   Outcome: Ongoing (interventions implemented as appropriate)  PT GT TRAINED X 25' WITH RW NWB L LE WITH SBA.

## 2019-01-16 NOTE — PLAN OF CARE
Problem: Adult Inpatient Plan of Care  Goal: Plan of Care Review  Outcome: Ongoing (interventions implemented as appropriate)  Remains injury free. Pain managed with pain medication.left leg elevated. Patient repositions frequently. No s/s acute distress.

## 2019-01-16 NOTE — PT/OT/SLP PROGRESS
Occupational Therapy      Patient Name:  Teresa Cazares   MRN:  [id number]    OT attempted tx at this time, pt refused secondary to 10/10 (L) LE pain and would like to rest. OT will attempt tx at later date in order to continue with POC.     Alley Villagomez, PT/OT  1/16/2019

## 2019-01-16 NOTE — PLAN OF CARE
Problem: Adult Inpatient Plan of Care  Goal: Plan of Care Review  Outcome: Ongoing (interventions implemented as appropriate)  Plan of care and medication reviewed with patient. Verbalized understanding using teach back method. Patient has constant c/o pain which are moderately controlled with PRN pain medication, repositioning, and relaxation techniques. LLE remained elevated with ice. External fixator dressing changed per MD. Dressing remained CDI. Patient ambulates with rolling walker and assistance x1. NWB to LLE.  Regular liquid diet tolerated. Peripheral IV maintained. Fall precautions in place. Patient remained free from falls and/or injuries. Bed low and locked. Side rails up x2. Personal items and call light within reach. Chart Check complete

## 2019-01-17 ENCOUNTER — ANESTHESIA EVENT (OUTPATIENT)
Dept: SURGERY | Facility: HOSPITAL | Age: 44
DRG: 493 | End: 2019-01-17
Payer: COMMERCIAL

## 2019-01-17 ENCOUNTER — ANESTHESIA (OUTPATIENT)
Dept: SURGERY | Facility: HOSPITAL | Age: 44
DRG: 493 | End: 2019-01-17
Payer: COMMERCIAL

## 2019-01-17 LAB
ANION GAP SERPL CALC-SCNC: 9 MMOL/L
BASOPHILS # BLD AUTO: 0.02 K/UL
BASOPHILS NFR BLD: 0.2 %
BUN SERPL-MCNC: 10 MG/DL
CALCIUM SERPL-MCNC: 9.2 MG/DL
CHLORIDE SERPL-SCNC: 97 MMOL/L
CO2 SERPL-SCNC: 28 MMOL/L
CREAT SERPL-MCNC: 0.7 MG/DL
DIFFERENTIAL METHOD: ABNORMAL
EOSINOPHIL # BLD AUTO: 0.4 K/UL
EOSINOPHIL NFR BLD: 3.1 %
ERYTHROCYTE [DISTWIDTH] IN BLOOD BY AUTOMATED COUNT: 13.7 %
EST. GFR  (AFRICAN AMERICAN): >60 ML/MIN/1.73 M^2
EST. GFR  (NON AFRICAN AMERICAN): >60 ML/MIN/1.73 M^2
GLUCOSE SERPL-MCNC: 87 MG/DL
HCT VFR BLD AUTO: 30.9 %
HGB BLD-MCNC: 10 G/DL
LYMPHOCYTES # BLD AUTO: 2.5 K/UL
LYMPHOCYTES NFR BLD: 22.2 %
MCH RBC QN AUTO: 28.9 PG
MCHC RBC AUTO-ENTMCNC: 32.4 G/DL
MCV RBC AUTO: 89 FL
MONOCYTES # BLD AUTO: 1.4 K/UL
MONOCYTES NFR BLD: 12 %
NEUTROPHILS # BLD AUTO: 7.2 K/UL
NEUTROPHILS NFR BLD: 62.5 %
PLATELET # BLD AUTO: 475 K/UL
PMV BLD AUTO: 8.6 FL
POTASSIUM SERPL-SCNC: 4.3 MMOL/L
RBC # BLD AUTO: 3.46 M/UL
SODIUM SERPL-SCNC: 134 MMOL/L
WBC # BLD AUTO: 11.44 K/UL

## 2019-01-17 PROCEDURE — 25000003 PHARM REV CODE 250: Performed by: ORTHOPAEDIC SURGERY

## 2019-01-17 PROCEDURE — 25000003 PHARM REV CODE 250: Performed by: NURSE PRACTITIONER

## 2019-01-17 PROCEDURE — 71000033 HC RECOVERY, INTIAL HOUR: Performed by: ORTHOPAEDIC SURGERY

## 2019-01-17 PROCEDURE — C1729 CATH, DRAINAGE: HCPCS | Performed by: ORTHOPAEDIC SURGERY

## 2019-01-17 PROCEDURE — 25000003 PHARM REV CODE 250: Performed by: INTERNAL MEDICINE

## 2019-01-17 PROCEDURE — 25000003 PHARM REV CODE 250: Performed by: NURSE ANESTHETIST, CERTIFIED REGISTERED

## 2019-01-17 PROCEDURE — 36000710: Performed by: ORTHOPAEDIC SURGERY

## 2019-01-17 PROCEDURE — 80048 BASIC METABOLIC PNL TOTAL CA: CPT

## 2019-01-17 PROCEDURE — 36000711: Performed by: ORTHOPAEDIC SURGERY

## 2019-01-17 PROCEDURE — 63600175 PHARM REV CODE 636 W HCPCS: Performed by: NURSE ANESTHETIST, CERTIFIED REGISTERED

## 2019-01-17 PROCEDURE — 27201423 OPTIME MED/SURG SUP & DEVICES STERILE SUPPLY: Performed by: ORTHOPAEDIC SURGERY

## 2019-01-17 PROCEDURE — C1769 GUIDE WIRE: HCPCS | Performed by: ORTHOPAEDIC SURGERY

## 2019-01-17 PROCEDURE — 31500 INSERT EMERGENCY AIRWAY: CPT | Performed by: ANESTHESIOLOGY

## 2019-01-17 PROCEDURE — C1713 ANCHOR/SCREW BN/BN,TIS/BN: HCPCS | Performed by: ORTHOPAEDIC SURGERY

## 2019-01-17 PROCEDURE — 63600175 PHARM REV CODE 636 W HCPCS: Performed by: INTERNAL MEDICINE

## 2019-01-17 PROCEDURE — 94761 N-INVAS EAR/PLS OXIMETRY MLT: CPT

## 2019-01-17 PROCEDURE — 37000009 HC ANESTHESIA EA ADD 15 MINS: Performed by: ORTHOPAEDIC SURGERY

## 2019-01-17 PROCEDURE — 85025 COMPLETE CBC W/AUTO DIFF WBC: CPT

## 2019-01-17 PROCEDURE — 11000001 HC ACUTE MED/SURG PRIVATE ROOM

## 2019-01-17 PROCEDURE — 37000008 HC ANESTHESIA 1ST 15 MINUTES: Performed by: ORTHOPAEDIC SURGERY

## 2019-01-17 PROCEDURE — 36415 COLL VENOUS BLD VENIPUNCTURE: CPT

## 2019-01-17 PROCEDURE — 25000003 PHARM REV CODE 250: Performed by: ANESTHESIOLOGY

## 2019-01-17 PROCEDURE — 63600175 PHARM REV CODE 636 W HCPCS: Performed by: ANESTHESIOLOGY

## 2019-01-17 PROCEDURE — 97116 GAIT TRAINING THERAPY: CPT

## 2019-01-17 PROCEDURE — 63600175 PHARM REV CODE 636 W HCPCS: Performed by: ORTHOPAEDIC SURGERY

## 2019-01-17 DEVICE — SCREW CORTEX 3.5 38M: Type: IMPLANTABLE DEVICE | Site: TIBIA | Status: FUNCTIONAL

## 2019-01-17 DEVICE — SCREW CORTEX 3.5MM X 14MM.: Type: IMPLANTABLE DEVICE | Site: TIBIA | Status: FUNCTIONAL

## 2019-01-17 DEVICE — SCREW LOCKING 3.5MM/60MM RECES: Type: IMPLANTABLE DEVICE | Site: TIBIA | Status: FUNCTIONAL

## 2019-01-17 DEVICE — SCREW CORTEX 3.5 X 30: Type: IMPLANTABLE DEVICE | Site: TIBIA | Status: FUNCTIONAL

## 2019-01-17 DEVICE — SCREW CORTEX 3.5 32M: Type: IMPLANTABLE DEVICE | Site: TIBIA | Status: FUNCTIONAL

## 2019-01-17 DEVICE — SCREW CORTEX S/T 3.5X55: Type: IMPLANTABLE DEVICE | Site: TIBIA | Status: FUNCTIONAL

## 2019-01-17 DEVICE — IMPLANTABLE DEVICE: Type: IMPLANTABLE DEVICE | Site: TIBIA | Status: FUNCTIONAL

## 2019-01-17 DEVICE — SCREW CORTEX 3.5 X 40: Type: IMPLANTABLE DEVICE | Site: TIBIA | Status: FUNCTIONAL

## 2019-01-17 DEVICE — SCREW CORTEX 3.5 45M: Type: IMPLANTABLE DEVICE | Site: TIBIA | Status: FUNCTIONAL

## 2019-01-17 DEVICE — SCREW CORTEX 3.5MM X 34MM: Type: IMPLANTABLE DEVICE | Site: TIBIA | Status: FUNCTIONAL

## 2019-01-17 DEVICE — SCREW LOCKING 3.5X 70MM: Type: IMPLANTABLE DEVICE | Site: TIBIA | Status: FUNCTIONAL

## 2019-01-17 DEVICE — SCREW LOCKING 3.5X 75MM: Type: IMPLANTABLE DEVICE | Site: TIBIA | Status: FUNCTIONAL

## 2019-01-17 DEVICE — SCREW CORTEX 3.5 X 24: Type: IMPLANTABLE DEVICE | Site: TIBIA | Status: FUNCTIONAL

## 2019-01-17 RX ORDER — FENTANYL CITRATE 50 UG/ML
INJECTION, SOLUTION INTRAMUSCULAR; INTRAVENOUS
Status: DISCONTINUED | OUTPATIENT
Start: 2019-01-17 | End: 2019-01-17

## 2019-01-17 RX ORDER — SUCCINYLCHOLINE CHLORIDE 20 MG/ML
INJECTION INTRAMUSCULAR; INTRAVENOUS
Status: DISCONTINUED | OUTPATIENT
Start: 2019-01-17 | End: 2019-01-17

## 2019-01-17 RX ORDER — AMOXICILLIN 250 MG
1 CAPSULE ORAL 2 TIMES DAILY
Status: DISCONTINUED | OUTPATIENT
Start: 2019-01-17 | End: 2019-01-22 | Stop reason: HOSPADM

## 2019-01-17 RX ORDER — POLYETHYLENE GLYCOL 3350 17 G/17G
17 POWDER, FOR SOLUTION ORAL DAILY
Status: DISCONTINUED | OUTPATIENT
Start: 2019-01-18 | End: 2019-01-22 | Stop reason: HOSPADM

## 2019-01-17 RX ORDER — SODIUM CHLORIDE, SODIUM LACTATE, POTASSIUM CHLORIDE, CALCIUM CHLORIDE 600; 310; 30; 20 MG/100ML; MG/100ML; MG/100ML; MG/100ML
INJECTION, SOLUTION INTRAVENOUS CONTINUOUS PRN
Status: DISCONTINUED | OUTPATIENT
Start: 2019-01-17 | End: 2019-01-17

## 2019-01-17 RX ORDER — PROPOFOL 10 MG/ML
VIAL (ML) INTRAVENOUS
Status: DISCONTINUED | OUTPATIENT
Start: 2019-01-17 | End: 2019-01-17

## 2019-01-17 RX ORDER — OXYCODONE HYDROCHLORIDE 5 MG/1
5 TABLET ORAL
Status: DISCONTINUED | OUTPATIENT
Start: 2019-01-17 | End: 2019-01-17 | Stop reason: HOSPADM

## 2019-01-17 RX ORDER — ONDANSETRON 2 MG/ML
4 INJECTION INTRAMUSCULAR; INTRAVENOUS DAILY PRN
Status: DISCONTINUED | OUTPATIENT
Start: 2019-01-17 | End: 2019-01-17 | Stop reason: HOSPADM

## 2019-01-17 RX ORDER — CHLORHEXIDINE GLUCONATE ORAL RINSE 1.2 MG/ML
10 SOLUTION DENTAL 2 TIMES DAILY
Status: DISCONTINUED | OUTPATIENT
Start: 2019-01-17 | End: 2019-01-22 | Stop reason: HOSPADM

## 2019-01-17 RX ORDER — ONDANSETRON 2 MG/ML
INJECTION INTRAMUSCULAR; INTRAVENOUS
Status: DISCONTINUED | OUTPATIENT
Start: 2019-01-17 | End: 2019-01-17

## 2019-01-17 RX ORDER — SODIUM CHLORIDE 0.9 % (FLUSH) 0.9 %
5 SYRINGE (ML) INJECTION
Status: DISCONTINUED | OUTPATIENT
Start: 2019-01-17 | End: 2019-01-22 | Stop reason: HOSPADM

## 2019-01-17 RX ORDER — CEFAZOLIN SODIUM 2 G/50ML
2 SOLUTION INTRAVENOUS
Status: DISPENSED | OUTPATIENT
Start: 2019-01-17 | End: 2019-01-18

## 2019-01-17 RX ORDER — HYDROCODONE BITARTRATE AND ACETAMINOPHEN 5; 325 MG/1; MG/1
1 TABLET ORAL EVERY 4 HOURS PRN
Status: DISCONTINUED | OUTPATIENT
Start: 2019-01-17 | End: 2019-01-18

## 2019-01-17 RX ORDER — NEOSTIGMINE METHYLSULFATE 1 MG/ML
INJECTION, SOLUTION INTRAVENOUS
Status: DISCONTINUED | OUTPATIENT
Start: 2019-01-17 | End: 2019-01-17

## 2019-01-17 RX ORDER — GLYCOPYRROLATE 0.2 MG/ML
INJECTION INTRAMUSCULAR; INTRAVENOUS
Status: DISCONTINUED | OUTPATIENT
Start: 2019-01-17 | End: 2019-01-17

## 2019-01-17 RX ORDER — ACETAMINOPHEN 10 MG/ML
INJECTION, SOLUTION INTRAVENOUS
Status: DISCONTINUED | OUTPATIENT
Start: 2019-01-17 | End: 2019-01-17

## 2019-01-17 RX ORDER — TRANEXAMIC ACID 100 MG/ML
1000 INJECTION, SOLUTION INTRAVENOUS ONCE
Status: COMPLETED | OUTPATIENT
Start: 2019-01-17 | End: 2019-01-17

## 2019-01-17 RX ORDER — MIDAZOLAM HYDROCHLORIDE 1 MG/ML
INJECTION, SOLUTION INTRAMUSCULAR; INTRAVENOUS
Status: DISCONTINUED | OUTPATIENT
Start: 2019-01-17 | End: 2019-01-17

## 2019-01-17 RX ORDER — DEXAMETHASONE SODIUM PHOSPHATE 4 MG/ML
INJECTION, SOLUTION INTRA-ARTICULAR; INTRALESIONAL; INTRAMUSCULAR; INTRAVENOUS; SOFT TISSUE
Status: DISCONTINUED | OUTPATIENT
Start: 2019-01-17 | End: 2019-01-17

## 2019-01-17 RX ORDER — SODIUM CHLORIDE 0.9 % (FLUSH) 0.9 %
3 SYRINGE (ML) INJECTION EVERY 8 HOURS
Status: DISCONTINUED | OUTPATIENT
Start: 2019-01-17 | End: 2019-01-17 | Stop reason: HOSPADM

## 2019-01-17 RX ORDER — SODIUM CHLORIDE 0.9 % (FLUSH) 0.9 %
3 SYRINGE (ML) INJECTION
Status: DISCONTINUED | OUTPATIENT
Start: 2019-01-17 | End: 2019-01-17 | Stop reason: HOSPADM

## 2019-01-17 RX ORDER — VANCOMYCIN HYDROCHLORIDE 1 G/20ML
INJECTION, POWDER, LYOPHILIZED, FOR SOLUTION INTRAVENOUS
Status: DISCONTINUED | OUTPATIENT
Start: 2019-01-17 | End: 2019-01-17 | Stop reason: HOSPADM

## 2019-01-17 RX ORDER — FENTANYL CITRATE 50 UG/ML
25 INJECTION, SOLUTION INTRAMUSCULAR; INTRAVENOUS EVERY 5 MIN PRN
Status: COMPLETED | OUTPATIENT
Start: 2019-01-17 | End: 2019-01-17

## 2019-01-17 RX ORDER — ROCURONIUM BROMIDE 10 MG/ML
INJECTION, SOLUTION INTRAVENOUS
Status: DISCONTINUED | OUTPATIENT
Start: 2019-01-17 | End: 2019-01-17

## 2019-01-17 RX ORDER — MEPERIDINE HYDROCHLORIDE 50 MG/ML
12.5 INJECTION INTRAMUSCULAR; INTRAVENOUS; SUBCUTANEOUS ONCE AS NEEDED
Status: COMPLETED | OUTPATIENT
Start: 2019-01-17 | End: 2019-01-17

## 2019-01-17 RX ORDER — LIDOCAINE HYDROCHLORIDE 10 MG/ML
INJECTION INFILTRATION; PERINEURAL
Status: DISCONTINUED | OUTPATIENT
Start: 2019-01-17 | End: 2019-01-17

## 2019-01-17 RX ADMIN — FENTANYL CITRATE 25 MCG: 50 INJECTION INTRAMUSCULAR; INTRAVENOUS at 08:01

## 2019-01-17 RX ADMIN — DIPHENHYDRAMINE HYDROCHLORIDE 25 MG: 25 CAPSULE ORAL at 10:01

## 2019-01-17 RX ADMIN — DIPHENHYDRAMINE HYDROCHLORIDE 25 MG: 25 CAPSULE ORAL at 08:01

## 2019-01-17 RX ADMIN — HYDROMORPHONE HYDROCHLORIDE 1 MG: 2 INJECTION INTRAMUSCULAR; INTRAVENOUS; SUBCUTANEOUS at 12:01

## 2019-01-17 RX ADMIN — ROCURONIUM BROMIDE 15 MG: 10 INJECTION, SOLUTION INTRAVENOUS at 04:01

## 2019-01-17 RX ADMIN — OXYCODONE HYDROCHLORIDE 5 MG: 5 TABLET ORAL at 08:01

## 2019-01-17 RX ADMIN — ROCURONIUM BROMIDE 10 MG: 10 INJECTION, SOLUTION INTRAVENOUS at 06:01

## 2019-01-17 RX ADMIN — HYDROMORPHONE HYDROCHLORIDE 1 MG: 2 INJECTION INTRAMUSCULAR; INTRAVENOUS; SUBCUTANEOUS at 08:01

## 2019-01-17 RX ADMIN — HYDROMORPHONE HYDROCHLORIDE 1 MG: 2 INJECTION INTRAMUSCULAR; INTRAVENOUS; SUBCUTANEOUS at 04:01

## 2019-01-17 RX ADMIN — SODIUM CHLORIDE, SODIUM LACTATE, POTASSIUM CHLORIDE, AND CALCIUM CHLORIDE: 600; 310; 30; 20 INJECTION, SOLUTION INTRAVENOUS at 07:01

## 2019-01-17 RX ADMIN — PANTOPRAZOLE SODIUM 40 MG: 40 TABLET, DELAYED RELEASE ORAL at 08:01

## 2019-01-17 RX ADMIN — ACETAMINOPHEN 1000 MG: 10 INJECTION, SOLUTION INTRAVENOUS at 08:01

## 2019-01-17 RX ADMIN — MEPERIDINE HYDROCHLORIDE 12.5 MG: 50 INJECTION, SOLUTION INTRAMUSCULAR; INTRAVENOUS; SUBCUTANEOUS at 08:01

## 2019-01-17 RX ADMIN — ALPRAZOLAM 0.25 MG: 0.25 TABLET ORAL at 10:01

## 2019-01-17 RX ADMIN — ONDANSETRON 8 MG: 2 INJECTION, SOLUTION INTRAMUSCULAR; INTRAVENOUS at 03:01

## 2019-01-17 RX ADMIN — FENTANYL CITRATE 50 MCG: 50 INJECTION, SOLUTION INTRAMUSCULAR; INTRAVENOUS at 03:01

## 2019-01-17 RX ADMIN — PROPOFOL 150 MG: 10 INJECTION, EMULSION INTRAVENOUS at 03:01

## 2019-01-17 RX ADMIN — SUCCINYLCHOLINE CHLORIDE 120 MG: 20 INJECTION, SOLUTION INTRAMUSCULAR; INTRAVENOUS at 03:01

## 2019-01-17 RX ADMIN — TRANEXAMIC ACID 1000 MG: 100 INJECTION, SOLUTION INTRAVENOUS at 04:01

## 2019-01-17 RX ADMIN — OXYCODONE HYDROCHLORIDE AND ACETAMINOPHEN 1 TABLET: 10; 325 TABLET ORAL at 06:01

## 2019-01-17 RX ADMIN — OXYCODONE HYDROCHLORIDE AND ACETAMINOPHEN 1 TABLET: 10; 325 TABLET ORAL at 12:01

## 2019-01-17 RX ADMIN — FENTANYL CITRATE 50 MCG: 50 INJECTION, SOLUTION INTRAMUSCULAR; INTRAVENOUS at 04:01

## 2019-01-17 RX ADMIN — ENOXAPARIN SODIUM 40 MG: 100 INJECTION SUBCUTANEOUS at 08:01

## 2019-01-17 RX ADMIN — FENTANYL CITRATE 50 MCG: 50 INJECTION, SOLUTION INTRAMUSCULAR; INTRAVENOUS at 07:01

## 2019-01-17 RX ADMIN — ROCURONIUM BROMIDE 35 MG: 10 INJECTION, SOLUTION INTRAVENOUS at 03:01

## 2019-01-17 RX ADMIN — ROBINUL 0.2 MG: 0.2 INJECTION INTRAMUSCULAR; INTRAVENOUS at 03:01

## 2019-01-17 RX ADMIN — VARENICLINE TARTRATE 1 MG: 0.5 TABLET, FILM COATED ORAL at 08:01

## 2019-01-17 RX ADMIN — FENTANYL CITRATE 50 MCG: 50 INJECTION, SOLUTION INTRAMUSCULAR; INTRAVENOUS at 06:01

## 2019-01-17 RX ADMIN — OXYCODONE HYDROCHLORIDE AND ACETAMINOPHEN 1 TABLET: 10; 325 TABLET ORAL at 10:01

## 2019-01-17 RX ADMIN — NEOSTIGMINE METHYLSULFATE 4 MG: 1 INJECTION INTRAVENOUS at 07:01

## 2019-01-17 RX ADMIN — MIDAZOLAM 1 MG: 1 INJECTION INTRAMUSCULAR; INTRAVENOUS at 03:01

## 2019-01-17 RX ADMIN — FENTANYL CITRATE 50 MCG: 50 INJECTION, SOLUTION INTRAMUSCULAR; INTRAVENOUS at 08:01

## 2019-01-17 RX ADMIN — SODIUM CHLORIDE, SODIUM LACTATE, POTASSIUM CHLORIDE, AND CALCIUM CHLORIDE: .6; .31; .03; .02 INJECTION, SOLUTION INTRAVENOUS at 10:01

## 2019-01-17 RX ADMIN — ROBINUL 0.3 MG: 0.2 INJECTION INTRAMUSCULAR; INTRAVENOUS at 07:01

## 2019-01-17 RX ADMIN — DEXAMETHASONE SODIUM PHOSPHATE 8 MG: 4 INJECTION, SOLUTION INTRA-ARTICULAR; INTRALESIONAL; INTRAMUSCULAR; INTRAVENOUS; SOFT TISSUE at 03:01

## 2019-01-17 RX ADMIN — FENTANYL CITRATE 50 MCG: 50 INJECTION, SOLUTION INTRAMUSCULAR; INTRAVENOUS at 05:01

## 2019-01-17 RX ADMIN — LIDOCAINE HYDROCHLORIDE 50 MG: 10 INJECTION, SOLUTION INFILTRATION; PERINEURAL at 03:01

## 2019-01-17 RX ADMIN — FENTANYL CITRATE 25 MCG: 50 INJECTION INTRAMUSCULAR; INTRAVENOUS at 09:01

## 2019-01-17 RX ADMIN — SODIUM CHLORIDE, SODIUM LACTATE, POTASSIUM CHLORIDE, AND CALCIUM CHLORIDE: .6; .31; .03; .02 INJECTION, SOLUTION INTRAVENOUS at 12:01

## 2019-01-17 RX ADMIN — SODIUM CHLORIDE, SODIUM LACTATE, POTASSIUM CHLORIDE, AND CALCIUM CHLORIDE: 600; 310; 30; 20 INJECTION, SOLUTION INTRAVENOUS at 03:01

## 2019-01-17 RX ADMIN — ALPRAZOLAM 0.25 MG: 0.25 TABLET ORAL at 08:01

## 2019-01-17 NOTE — PT/OT/SLP PROGRESS
Physical Therapy      Patient Name:  Teresa Cazares   MRN:  [id number]    Patient not seen today secondary to  PATIENT ADAMANTLY DECLINED DUE TO CURRENT PAIN LEVEL AND GOING TO SX LATER TODAY.. Will follow-up WHEN APPROPRIATE.  TIME: 11:50A.MDeya Marshall PTA

## 2019-01-17 NOTE — PLAN OF CARE
ALAN requested a home health order from Dr Guzman.  ALAN ordered equipment requested by patient:  Tub transfer bench, rolling walker, wheelchair, and bedside commode.  Equipment orders faxed to Jj at [facility] @[phone].  ALAN will fax home health orders when received.
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
## 2019-01-17 NOTE — PT/OT/SLP PROGRESS
Occupational Therapy      Patient Name:  Teresa Cazares   MRN:  [id number]    OT attempted tx at 10:00am, pt refused secondary to wanting to rest since she will be going to surgery later today.     Alley Villagomez, PT/OT  1/17/2019

## 2019-01-17 NOTE — PLAN OF CARE
Patient prepared for surgery. Mother at bedside. Pt states she does not want to receive blood products; blood refusal form completed and placed on chart.

## 2019-01-17 NOTE — PT/OT/SLP PROGRESS
Physical Therapy  Treatment    Teresa Cazares   MRN: [id number]   Admitting Diagnosis: Closed fracture of proximal end of left tibia    PT Received On: 01/17/19  PT Start Time: 0841     PT Stop Time: 0905    PT Total Time (min): 24 min       Billable Minutes:  Gait Training 24    Treatment Type: Treatment  PT/PTA: PT     PTA Visit Number: 1       General Precautions: Standard, fall  Orthopedic Precautions: LLE non weight bearing   Braces: N/A    Spiritual, Cultural Beliefs, Anglican Practices, Values that Affect Care: no    Subjective:  Communicated with NURSE RIVAS AND Epic CHART REVIEW prior to session.  PT AGREED TO TX     Pain/Comfort  Pain Rating 1: 7/10  Location - Side 1: Left  Location 1: leg  Pain Addressed 1: Pre-medicate for activity  Pain Rating Post-Intervention 1: 7/10    Objective:   Patient found with: peripheral IV    Functional Mobility:  PT MET IN RM SUP IN BED AND SEATED EOB WITH MIN A OF L LE. PT STOOD WITH RW AND CGA. PT GT TRAINED X 60' WITH NWB L LE AND SBA AND SLOW PACED GT. PT RETURNED TO RM T/F TO BED AND SUP IN BED WITH MIN A OF L LE. PT SCOOTED TO HOB AND LEFT SEATED WITH ALL NEEDS MET.     AM-PAC 6 CLICK MOBILITY  How much help from another person does this patient currently need?   1 = Unable, Total/Dependent Assistance  2 = A lot, Maximum/Moderate Assistance  3 = A little, Minimum/Contact Guard/Supervision  4 = None, Modified [place]/Independent    Turning over in bed (including adjusting bedclothes, sheets and blankets)?: 3  Sitting down on and standing up from a chair with arms (e.g., wheelchair, bedside commode, etc.): 4  Moving from lying on back to sitting on the side of the bed?: 3  Moving to and from a bed to a chair (including a wheelchair)?: 4  Need to walk in hospital room?: 4  Climbing 3-5 steps with a railing?: 1  Basic Mobility Total Score: 19    AM-PAC Raw Score CMS G-Code Modifier Level of Impairment Assistance   6 % Total / Unable   7 - 9 CM 80 - 100%  Maximal Assist   10 - 14 CL 60 - 80% Moderate Assist   15 - 19 CK 40 - 60% Moderate Assist   20 - 22 CJ 20 - 40% Minimal Assist   23 CI 1-20% SBA / CGA   24 CH 0% Independent/ Mod I     Patient left supine with call button in reach.    Assessment:  PT PROGRESSING WITH GT TRAINING.     Rehab identified problem list/impairments: Rehab identified problem list/impairments: weakness, impaired endurance, impaired functional mobilty, gait instability, impaired self care skills, impaired balance, decreased lower extremity function, decreased upper extremity function, decreased ROM, orthopedic precautions, pain    Rehab potential is good.    Activity tolerance: Fair    Discharge recommendations: Discharge Facility/Level of Care Needs: other (see comments)([facility] P[facility]T.)     Barriers to discharge:      Equipment recommendations: Equipment Needed After Discharge: walker, rolling     GOALS:   Multidisciplinary Problems     Physical Therapy Goals        Problem: Physical Therapy Goal    Goal Priority Disciplines Outcome Goal Variances Interventions   Physical Therapy Goal     PT, PT/OT Ongoing (interventions implemented as appropriate)     Description:  LTG'S TO BE MET IN 7 DAYS (1-18-19)  1. PT WILL REQUIRE BERTO FOR BED MOBILITY  2. PT WILL REQUIRE BERTO FOR TF'S, NWB FOR LLE  3. PT WILL DEMO F- DYNAMIC BALANCE DURING TF                    PLAN:    Patient to be seen 5 x/week  to address the above listed problems via gait training, therapeutic activities, therapeutic exercises  Plan of Care expires: 01/18/19  Plan of Care reviewed with: patient         Brenda Pugh, PT  01/17/2019
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
## 2019-01-17 NOTE — ANESTHESIA PROCEDURE NOTES
Intubation    Diagnosis: left tibia plateau fracture  Patient location during procedure: pre-op  Staffing  Anesthesiologist: Omar Hawkins MD  Resident/CRNA: Higinio Tapia Jr., CRNA  Anesthesiologist was present at the time of the procedure.  Preanesthetic Checklist  Completed: patient identified, site marked, surgical consent, pre-op evaluation, timeout performed, IV checked, risks and benefits discussed, monitors and equipment checked and anesthesia consent given  Intubation  Indication: airway protection, surgery  Pre-oxygenation. Induction: inhalational - mask, mask ventilation: easy mask.  Intubation: postinduction, laryngoscopy direct, Melgar 2.  Endotracheal Tube: oral, 7.0 mm ID, cuffed (inflated to minimal occlusive pressure)  Attempts: 1, Grade II - cords partially seen  Complicating Factors: none  Tube secured at 22 cm at the lips.  Findings post-intubation: bilateral breath sounds, positive ETCO2, atraumatic / condition of teeth unchanged  Position Confirmation: auscultation  Eye Care: taped closed

## 2019-01-17 NOTE — ASSESSMENT & PLAN NOTE
- Admitted to Hospital Medicine  - Evaluated by Orthopedics Dr. Galvan and is s/p closed reduction with placement of an external fixator on 1/10  - Continue prn analgesia  - PT/OT following  - NWB to LLE at present time  - Will likely need inpatient rehab upon DC pending progress  - Ortho anticipates an ORIF this week once soft tissue swelling improves.   - Continue DVT prophylaxis with Lovenox    - Elevated Left lower extremely about the level to the heart, to help decrease swelling. Ice to Left leg.  Plan Left proximal tibia ORIF today if enough swelling has resolved.

## 2019-01-17 NOTE — SUBJECTIVE & OBJECTIVE
Interval History: Pt seen and examined today. Vital signs and labs stable. Plan for ORIF of Lt proximal femur if swelling has decrease enough for surgery.    Review of Systems   Constitutional: Negative for chills and fever.   HENT: Negative for congestion, rhinorrhea and sinus pressure.    Respiratory: Negative for apnea, cough, choking, chest tightness, shortness of breath, wheezing and stridor.    Cardiovascular: Negative for chest pain, palpitations and leg swelling.   Gastrointestinal: Negative for abdominal distention, abdominal pain, diarrhea, nausea and vomiting.   Endocrine: Negative for cold intolerance and heat intolerance.   Genitourinary: Negative for difficulty urinating and hematuria.   Musculoskeletal: Negative for arthralgias and joint swelling.        Left leg pain    Skin: Negative for color change, pallor and rash.   Neurological: Negative for dizziness, seizures, weakness, numbness and headaches.   Psychiatric/Behavioral: Negative for agitation. The patient is not nervous/anxious.      Objective:     Vital Signs (Most Recent):  Temp: 98.9 °F (37.2 °C) (01/17/19 1144)  Pulse: 69 (01/17/19 1144)  Resp: 18 (01/17/19 1144)  BP: 124/63 (01/17/19 1144)  SpO2: 98 % (01/17/19 1144) Vital Signs (24h Range):  Temp:  [98.2 °F (36.8 °C)-99.3 °F (37.4 °C)] 98.9 °F (37.2 °C)  Pulse:  [68-91] 69  Resp:  [18] 18  SpO2:  [94 %-98 %] 98 %  BP: (112-131)/(53-73) 124/63     Weight: 114.3 kg (251 lb 15.8 oz)  Body mass index is 39.47 kg/m².    Intake/Output Summary (Last 24 hours) at 1/17/2019 1321  Last data filed at 1/17/2019 0800  Gross per 24 hour   Intake 503.75 ml   Output --   Net 503.75 ml      Physical Exam   Constitutional: No distress.   HENT:   Mouth/Throat: No oropharyngeal exudate.   Eyes: Right eye exhibits no discharge. Left eye exhibits no discharge.   Cardiovascular: Exam reveals no gallop and no friction rub.   No murmur heard.  Pulmonary/Chest: No stridor. No respiratory distress. She has no  wheezes. She has no rales. She exhibits no tenderness.   Abdominal: She exhibits no distension and no mass. There is no tenderness. There is no rebound and no guarding. No hernia.   Musculoskeletal: She exhibits no edema or deformity.   Left leg external fixator    Neurological: She is alert.   Skin: Skin is warm and dry. Capillary refill takes less than 2 seconds. She is not diaphoretic.   Psychiatric: She has a normal mood and affect. Her behavior is normal. Judgment and thought content normal.   Nursing note and vitals reviewed.      Significant Labs:   CBC:   Recent Labs   Lab 01/16/19 0510 01/17/19 0430   WBC 10.04 11.44   HGB 10.6* 10.0*   HCT 32.5* 30.9*   * 475*     CMP:   Recent Labs   Lab 01/16/19 0510 01/17/19 0430   * 134*   K 4.6 4.3   CL 98 97   CO2 29 28    87   BUN 9 10   CREATININE 0.7 0.7   CALCIUM 9.4 9.2   ANIONGAP 8 9   EGFRNONAA >60 >60       Significant Imaging:     Imaging Results          FL Flouro Usage (Final result)  Result time 01/14/19 16:08:39    Final result by RADIOLOGIST, VIRTUAL (01/14/19 16:08:39)                 Impression:      Please see above.      Electronically signed by: Joycelyn Radiologist  Date:    01/14/2019  Time:    16:08             Narrative:    EXAMINATION:  FL FLOURO USAGE    CLINICAL HISTORY:  intra op;    COMPARISON:  None    FINDINGS:  Fluoroscopy used for procedure.  Please see physician notes for details and radiology report with accession #    Fluorscopy time:    Number of images:                               X-Ray Chest AP Portable (Final result)  Result time 01/10/19 18:50:55    Final result by MARIA FERNANDA Alexander Sr., MD (01/10/19 18:50:55)                 Impression:      1. The current examination is limited secondary to the lack of inclusion of the entire thorax on the film. The left costophrenic angle is not completely included on the film.  2. The visualized portion of the lungs is clear.  .      Electronically signed by: Eusebio  MD Benjamin  Date:    01/10/2019  Time:    18:50             Narrative:    EXAMINATION:  XR CHEST AP PORTABLE    CLINICAL HISTORY:  tibial fracture;    COMPARISON:  02/24/2017    FINDINGS:  The current examination is limited secondary to the lack of inclusion of the entire thorax on the film.  The left costophrenic angle is not completely included on the film.  The right costophrenic angle is sharp.  The size of the heart is normal.  The visualized portion of the lungs is clear.  There is no pneumothorax.                               X-Ray Knee 3 View Left (Final result)  Result time 01/10/19 16:47:32    Final result by Daniel Phan MD (01/10/19 16:47:32)                 Impression:    FINDINGS/  Four views of the left tibia/fibula and two views of the left knee were obtained.    Comminuted fracture involving the proximal tibial metaphysis extending to the intercondylar eminence of the tibial plateau.  Oblique fibular head fracture also noted.  Moderate joint effusion.  Bony mineralization is normal.  Moderate soft tissue swelling.  There is mild varus configuration.  Femur and patella appear intact.  Patellar tendon appears grossly intact.    CT WITHOUT CONTRAST CAN BE OBTAINED FOR FURTHER CHARACTERIZATION.      Electronically signed by: Daniel Phan MD  Date:    01/10/2019  Time:    16:47             Narrative:    EXAMINATION:  XR TIBIA FIBULA 2 VIEW LEFT; XR KNEE 3 VIEW LEFT    CLINICAL HISTORY:  XR TIBIA FIBULA 2 VIEW LEFT; XR KNEE 3 VIEW LEFTPain in left knee    COMPARISON:  None                               X-Ray Tibia Fibula 2 View Left (Final result)  Result time 01/10/19 16:47:32    Final result by Daniel Phan MD (01/10/19 16:47:32)                 Impression:    FINDINGS/  Four views of the left tibia/fibula and two views of the left knee were obtained.    Comminuted fracture involving the proximal tibial metaphysis extending to the intercondylar eminence of the tibial plateau.  Oblique fibular  head fracture also noted.  Moderate joint effusion.  Bony mineralization is normal.  Moderate soft tissue swelling.  There is mild varus configuration.  Femur and patella appear intact.  Patellar tendon appears grossly intact.    CT WITHOUT CONTRAST CAN BE OBTAINED FOR FURTHER CHARACTERIZATION.      Electronically signed by: Daniel Phan MD  Date:    01/10/2019  Time:    16:47             Narrative:    EXAMINATION:  XR TIBIA FIBULA 2 VIEW LEFT; XR KNEE 3 VIEW LEFT    CLINICAL HISTORY:  XR TIBIA FIBULA 2 VIEW LEFT; XR KNEE 3 VIEW LEFTPain in left knee    COMPARISON:  None

## 2019-01-17 NOTE — PLAN OF CARE
Problem: Physical Therapy Goal  Goal: Physical Therapy Goal  LTG'S TO BE MET IN 7 DAYS (1-18-19)  1. PT WILL REQUIRE BERTO FOR BED MOBILITY  2. PT WILL REQUIRE BERTO FOR TF'S, NWB FOR LLE  3. PT WILL DEMO F- DYNAMIC BALANCE DURING TF   Outcome: Ongoing (interventions implemented as appropriate)  PT GT TRAINED X 60' WITH RW AND NWB L LE.

## 2019-01-17 NOTE — PLAN OF CARE
Problem: Adult Inpatient Plan of Care  Goal: Plan of Care Review  Reviewed POC,  Including indications and possible side effects of administer medications. Pt. Verbalized understanding and teach back.  Remain free from injury. Bed low and lock, SRx2 up and call bell within reach.  Patient compliant of pain especially during ambulation to bathroom  Often.  Chart Check complete.

## 2019-01-18 LAB
ANION GAP SERPL CALC-SCNC: 12 MMOL/L
BASOPHILS # BLD AUTO: 0.01 K/UL
BASOPHILS NFR BLD: 0.1 %
BUN SERPL-MCNC: 7 MG/DL
CALCIUM SERPL-MCNC: 9.2 MG/DL
CHLORIDE SERPL-SCNC: 97 MMOL/L
CO2 SERPL-SCNC: 25 MMOL/L
CREAT SERPL-MCNC: 0.7 MG/DL
DIFFERENTIAL METHOD: ABNORMAL
EOSINOPHIL # BLD AUTO: 0 K/UL
EOSINOPHIL NFR BLD: 0 %
ERYTHROCYTE [DISTWIDTH] IN BLOOD BY AUTOMATED COUNT: 13.6 %
EST. GFR  (AFRICAN AMERICAN): >60 ML/MIN/1.73 M^2
EST. GFR  (NON AFRICAN AMERICAN): >60 ML/MIN/1.73 M^2
GLUCOSE SERPL-MCNC: 115 MG/DL
HCT VFR BLD AUTO: 32.6 %
HGB BLD-MCNC: 10.7 G/DL
LYMPHOCYTES # BLD AUTO: 1.5 K/UL
LYMPHOCYTES NFR BLD: 9.7 %
MCH RBC QN AUTO: 28.9 PG
MCHC RBC AUTO-ENTMCNC: 32.8 G/DL
MCV RBC AUTO: 88 FL
MONOCYTES # BLD AUTO: 1.4 K/UL
MONOCYTES NFR BLD: 8.9 %
NEUTROPHILS # BLD AUTO: 13 K/UL
NEUTROPHILS NFR BLD: 81.3 %
PLATELET # BLD AUTO: 496 K/UL
PMV BLD AUTO: 8.3 FL
POTASSIUM SERPL-SCNC: 4.4 MMOL/L
RBC # BLD AUTO: 3.7 M/UL
SODIUM SERPL-SCNC: 134 MMOL/L
WBC # BLD AUTO: 15.91 K/UL

## 2019-01-18 PROCEDURE — 63600175 PHARM REV CODE 636 W HCPCS: Performed by: ORTHOPAEDIC SURGERY

## 2019-01-18 PROCEDURE — 11000001 HC ACUTE MED/SURG PRIVATE ROOM

## 2019-01-18 PROCEDURE — 25000003 PHARM REV CODE 250: Performed by: NURSE PRACTITIONER

## 2019-01-18 PROCEDURE — 97802 MEDICAL NUTRITION INDIV IN: CPT

## 2019-01-18 PROCEDURE — 97116 GAIT TRAINING THERAPY: CPT

## 2019-01-18 PROCEDURE — 80048 BASIC METABOLIC PNL TOTAL CA: CPT

## 2019-01-18 PROCEDURE — 36415 COLL VENOUS BLD VENIPUNCTURE: CPT

## 2019-01-18 PROCEDURE — 94761 N-INVAS EAR/PLS OXIMETRY MLT: CPT

## 2019-01-18 PROCEDURE — 25000003 PHARM REV CODE 250: Performed by: ORTHOPAEDIC SURGERY

## 2019-01-18 PROCEDURE — 85025 COMPLETE CBC W/AUTO DIFF WBC: CPT

## 2019-01-18 PROCEDURE — 97530 THERAPEUTIC ACTIVITIES: CPT

## 2019-01-18 PROCEDURE — 63600175 PHARM REV CODE 636 W HCPCS: Performed by: NURSE PRACTITIONER

## 2019-01-18 PROCEDURE — 25000003 PHARM REV CODE 250: Performed by: INTERNAL MEDICINE

## 2019-01-18 RX ORDER — MORPHINE SULFATE 4 MG/ML
2 INJECTION, SOLUTION INTRAMUSCULAR; INTRAVENOUS EVERY 6 HOURS PRN
Status: DISPENSED | OUTPATIENT
Start: 2019-01-18 | End: 2019-01-19

## 2019-01-18 RX ADMIN — VARENICLINE TARTRATE 1 MG: 0.5 TABLET, FILM COATED ORAL at 08:01

## 2019-01-18 RX ADMIN — OXYCODONE HYDROCHLORIDE AND ACETAMINOPHEN 1 TABLET: 10; 325 TABLET ORAL at 08:01

## 2019-01-18 RX ADMIN — CEFAZOLIN SODIUM 2 G: 2 SOLUTION INTRAVENOUS at 12:01

## 2019-01-18 RX ADMIN — OXYCODONE HYDROCHLORIDE AND ACETAMINOPHEN 1 TABLET: 10; 325 TABLET ORAL at 05:01

## 2019-01-18 RX ADMIN — CHLORHEXIDINE GLUCONATE 10 ML: 1.2 RINSE ORAL at 08:01

## 2019-01-18 RX ADMIN — MORPHINE SULFATE 2 MG: 4 INJECTION INTRAVENOUS at 11:01

## 2019-01-18 RX ADMIN — POLYETHYLENE GLYCOL 3350 17 G: 17 POWDER, FOR SOLUTION ORAL at 08:01

## 2019-01-18 RX ADMIN — OXYCODONE HYDROCHLORIDE AND ACETAMINOPHEN 1 TABLET: 10; 325 TABLET ORAL at 03:01

## 2019-01-18 RX ADMIN — OXYCODONE HYDROCHLORIDE AND ACETAMINOPHEN 1 TABLET: 10; 325 TABLET ORAL at 12:01

## 2019-01-18 RX ADMIN — STANDARDIZED SENNA CONCENTRATE AND DOCUSATE SODIUM 1 TABLET: 8.6; 5 TABLET, FILM COATED ORAL at 08:01

## 2019-01-18 RX ADMIN — OXYCODONE HYDROCHLORIDE AND ACETAMINOPHEN 1 TABLET: 10; 325 TABLET ORAL at 11:01

## 2019-01-18 RX ADMIN — MORPHINE SULFATE 2 MG: 4 INJECTION INTRAVENOUS at 10:01

## 2019-01-18 RX ADMIN — PANTOPRAZOLE SODIUM 40 MG: 40 TABLET, DELAYED RELEASE ORAL at 08:01

## 2019-01-18 RX ADMIN — ALPRAZOLAM 0.25 MG: 0.25 TABLET ORAL at 08:01

## 2019-01-18 RX ADMIN — DIPHENHYDRAMINE HYDROCHLORIDE 25 MG: 25 CAPSULE ORAL at 08:01

## 2019-01-18 RX ADMIN — ENOXAPARIN SODIUM 40 MG: 100 INJECTION SUBCUTANEOUS at 08:01

## 2019-01-18 RX ADMIN — HYDROCODONE BITARTRATE AND ACETAMINOPHEN 1 TABLET: 5; 325 TABLET ORAL at 08:01

## 2019-01-18 RX ADMIN — MORPHINE SULFATE 2 MG: 4 INJECTION INTRAVENOUS at 05:01

## 2019-01-18 NOTE — OP NOTE
Operative note    Date of surgery: 1/17/19    Surgeon: Barry Guzman MD    Assistant: None    Preoperative diagnosis: Left knee comminuted bicondylar tibial plateau fracture, retained external fixator    Postoperative diagnosis: same    Procedure: Left lower extremity external fixator removal under anesthesia, left bicondylar tibial plateau fracture open reduction internal fixation    EBL: 200 cc    Specimens: None    Drains: Hemovac x1, incisional VAC over the wound    Indications: Patient is a 43-year-old female status post fall.  She suffered an axial load to her left leg.  She was found to have a highly comminuted bicondylar tibial plateau fracture.  My colleagues placed her into an external fixator shortly after injury secondary to swelling.  Her swelling has improved over the last week.  She is amenable to fixation at this time.  Had an extensive discussion with the patient regarding her diagnosis and options for further diagnosis and treatment.  Risk benefits and alternatives of surgery were discussed.  Specific risks including pain, bleeding, infection, nerve vessel injury, stiffness, arthritis, malunion, nonunion, painful hardware, loss of limb function, loss of limb, need for additional surgery were all discussed in great detail.  She expressed complete understanding regarding all this and elected to the.  I consented a marker prior to transfer to the.    Procedure detail after induction of anesthesia the patient was positioned supine on the operative table.  All bony prominences padded.  IV antibiotics were administered appropriate timeout was performed confirming correct patient, site, site suture to be performed.    Left lower extremity external fixator was removed by clipping the pins just below the bar clamps.  Pins were then removed from the tibia and the femur uneventfully.  Left lower extremity was then prepped and draped in standard fashion.  A repeat timeout was performed.  Limb was exsanguinated  upper thigh tourniquet was elevated to 300 mmHg.  It was up for 2 hours total tourniquet time.  Standard posterior medial approach to tibia was performed, incising skin along the posterior medial border, extending up toward the medial epicondyle.  Subcutaneous tissues were divided.  Deep fascia was incised. Pes tendons were incised.  Fracture site was identified.  Fracture is quite complex in nature.  Involved the distal shaft component, comminuted articular components, as well as tibial tubercle component.  Using a combination of clamps and wires, the plateau was pieced back together.  Lag screws were used to place the tibial tubercle component back to the shaft.  This reconstituted a portion of the medial plateau, but was brought back together with the posterior medial fragments.  Then brought together with the lateral plateau.  Overall the anatomy was restored quite nicely.  Synthes 3.5 mm medial tibial plate was then positioned along the medial aspect of the proximal tibia to serve as a buttress.  It was anchored down distally using cortical screws, and then proximally using a combination of conical and locking screws.  Conical screw was then replaced with a locking screw after adequate compression was achieved.  Additional plate was placed along the posterior aspect of the tibia, also to serve as a buttress for the posterior medial fragment.  Overall the anatomy of the tibia been restored quite nicely.  The knee was stable.  Is able to range it comfortably.  Wounds were copiously irrigated and closed in a layered fashion after ensuring adequate hemostasis and released tourniquet.  1 g of vancomycin powder was placed into the wound and the drain was left exiting the distal medial thigh.  Incisional VAC was placed over the wound after closing it with interrupted nylon stitches on the skin.  2-0 Vicryl and 0 Vicryl had to use the deeper tissues.  Pin sites were irrigated left open, covered with Xeroform and gauze  dressing.  Bulky dressing was applied she was placed in a knee immobilizer.  All of the proximal tolerated the procedure well without complication.  I was present performed all portion of the procedure.  All counts were correct.    Postoperative plan: Nonweightbearing left lower extremity, the immobilizer on at all times.  Drain will be removed once output slows.  Incisional VAC will stay on for 5 days total.  She was treated perioperative antibiotics.  Lovenox restart tomorrow.  Plan to follow-up in 2 weeks with the for likely stitch removal.  We will initiate range of motion at that visit    22-modifier justification:  Patient is obese BMI of 39.47.  The added weight added complexity to the case, with regard to positioning, exposure of the fracture site, reduction of fragments, fixation, as well as closure.  Additional payment is warranted.

## 2019-01-18 NOTE — PLAN OF CARE
Problem: Physical Therapy Goal  Goal: Physical Therapy Goal  LTG'S TO BE MET IN 7 DAYS (1-18-19)  1. PT WILL REQUIRE BERTO FOR BED MOBILITY  2. PT WILL REQUIRE BERTO FOR TF'S, NWB FOR LLE  3. PT WILL DEMO F- DYNAMIC BALANCE DURING TF   Outcome: Ongoing (interventions implemented as appropriate)  PATIENT DID WELL WITH GT 5', 15' INTO HALLWAY WITH RW ASSISTX2 FOR SAFETY, FOLLOWING WITH CHAIR . PATIENT MAINTAINING LLE ,NWB STATUS THROUGHOUT ALL TX.

## 2019-01-18 NOTE — NURSING
"Pt called saying her foot felt hot/tingling and her leg was very painful. Anxious, worried that surgery was not successful.  Administered pain medication. Pt states she has been unable to sleep but mother at bedside stated she was asleep after she came back from surgery. No discoloration to foot, MIREYA pulse d/t dressing in place, pt able to wiggle her toes and flex her foot. Foot is warm, no excess heat. Pt said she is unsure if she can feel her foot, applied pressure to foot and pt said "ouch, I can feel that". Reassured pt and encouraged her to rest for the night. Applied ice to knee and ankle.   "

## 2019-01-18 NOTE — PT/OT/SLP PROGRESS
"Physical Therapy      Patient Name:  Teresa Cazares   MRN:  [id number]    0826 P.T. COMPLETED CHART REVIEW. PT MET IN RM. PT REFUSED P.T. TX AT THIS TIME. PT STATED, " NO WAY! IM IN PAIN MORE THAN 10/10." P.T. EDUCATAED PT WE WOULD ATTEMPT TX IN P.M. PT REPORTED UNDERSTANDING.     Brenda Pugh, PT,1/18/2019      "

## 2019-01-18 NOTE — PROGRESS NOTES
"  [facility] -   Adult Nutrition  Progress Note    SUMMARY     Recommendations    Recommendation: 1. Continue current diet. 2. If PO intake is consistently < 50 %, add boost plus TID.3. Will continue to monitor.   Goals: Meet > 85 % EEN/EPN while admitted  Nutrition Goal Status: new  Communication of RD Recs: (POc, sticky note)    Reason for Assessment    Reason For Assessment: length of stay   Dx:  1. Tibial fracture    2. Left knee pain    3. Pre-op evaluation    4. Closed fracture of proximal end of left tibia      Past Medical History:   Diagnosis Date    Depression     denies hospitalization or bipolar disorder    Obesity      General info comments: S/p ORIF Lt proximal tibia. Pt on regular diet. PO intake today 25 %. Per epic records, no wt loss. NFPE not completed d/t pt w/ other healthcare providers at time of visit. Will complete at f/u.   Discharge plan:  Regular diet     Nutrition Risk Screen    Nutrition Risk Screen: no indicators present    Nutrition/Diet History    Spiritual, Cultural Beliefs, Anabaptist Practices, Values that Affect Care: no    Anthropometrics    Temp: 98 °F (36.7 °C)  Height Method: Stated  Height: 5' 7" (170.2 cm)  Height (inches): 67 in  Weight Method: Bed Scale  Weight: 114.3 kg (251 lb 15.8 oz)  Weight (lb): 251.99 lb  Ideal Body Weight (IBW), Female: 135 lb  % Ideal Body Weight, Female (lb): 186.66 lb  BMI (Calculated): 39.5  BMI Grade: 35 - 39.9 - obesity - grade II       Lab/Procedures/Meds    Pertinent Labs Reviewed: reviewed  BMP  Lab Results   Component Value Date     (L) 01/18/2019    K 4.4 01/18/2019    CL 97 01/18/2019    CO2 25 01/18/2019    BUN 7 01/18/2019    CREATININE 0.7 01/18/2019    CALCIUM 9.2 01/18/2019    ANIONGAP 12 01/18/2019    ESTGFRAFRICA >60 01/18/2019    EGFRNONAA >60 01/18/2019     Lab Results   Component Value Date    CALCIUM 9.2 01/18/2019    PHOS 3.8 01/11/2019     Lab Results   Component Value Date    ALBUMIN 4.2 01/10/2019 "     No results for input(s): POCTGLUCOSE in the last 24 hours.    Pertinent Medications Reviewed: reviewed    Physical Findings/Assessment     skin: incision leg     Estimated/Assessed Needs    Weight Used For Calorie Calculations: 114.3 kg (251 lb 15.8 oz)  Energy Calorie Requirements (kcal): 2184  Energy Need Method: [place]-St Jeor(x1.2)  Protein Requirements: 137 g  Weight Used For Protein Calculations: 114.3 kg (251 lb 15.8 oz)     Estimated Fluid Requirement Method: RDA Method(or pe rMD)  RDA Method (mL): 2184         Nutrition Prescription Ordered    Nutrition Order Comments: Regular diet    Evaluation of Received Nutrient/Fluid Intake          Intake/Output Summary (Last 24 hours) at 1/18/2019 1455  Last data filed at 1/18/2019 1041  Gross per 24 hour   Intake 4190 ml   Output 2600 ml   Net 1590 ml       % Intake of Estimated Energy Needs: 25 - 50 %  % Meal Intake: 25 - 50 %    Nutrition Risk      2xweekly    Assessment and Plan      Nutrition Problem  Inadequate energy intake     Related to (etiology):   Decreased appetite     Signs and Symptoms (as evidenced by):   PO intake 25 %     Interventions/Recommendations (treatment strategy):  See above     Nutrition Diagnosis Status:   New      Monitor and Evaluation    Food and Nutrient Intake: energy intake, food and beverage intake  Food and Nutrient Adminstration: diet order  Anthropometric Measurements: weight  Biochemical Data, Medical Tests and Procedures: electrolyte and renal panel, glucose/endocrine profile  Nutrition-Focused Physical Findings: overall appearance     Malnutrition Assessment               to be completed at f/u                        Nutrition Follow-Up    RD Follow-up?: Yes

## 2019-01-18 NOTE — PLAN OF CARE
Problem: Adult Inpatient Plan of Care  Goal: Plan of Care Review  Outcome: Ongoing (interventions implemented as appropriate)  Pt had no adverse events during shift. Pt free of falls. Call light in reach. Side rails x 2. Pain managed w/ PRN PO meds. IVF/abx administered as ordered. Dressing maintained CDI. Hemovac/wound vac output monitored. Knee immobilizer in place. LLE elevated on pillows. VSS. Chart reviewed, will continue to monitor.

## 2019-01-18 NOTE — PT/OT/SLP PROGRESS
"Occupational Therapy      Patient Name:  Teresa Cazares   MRN:  [id number]    OT attempted tx at 8:30am, pt refused and stated "NO WAY! I'M IN PAIN MORE THAN 10/10." OT will attempt tx at later time/ date in order to continue with POC.       Alley Villagomez, PT/OT  1/18/2019  "

## 2019-01-18 NOTE — SUBJECTIVE & OBJECTIVE
Interval History: s/p ORIF Lt proximal tibia, POD # 1, vital signs and labs stable. Continue to experiencing Left leg pain. Continue pain management.     Review of Systems   Constitutional: Positive for activity change. Negative for chills and fever.   HENT: Negative for congestion, rhinorrhea and sinus pressure.    Respiratory: Negative for apnea, cough, choking, chest tightness, shortness of breath, wheezing and stridor.    Cardiovascular: Negative for chest pain, palpitations and leg swelling.   Gastrointestinal: Negative for abdominal distention, abdominal pain, diarrhea, nausea and vomiting.   Endocrine: Negative for cold intolerance and heat intolerance.   Genitourinary: Negative for difficulty urinating and hematuria.   Musculoskeletal: Negative for arthralgias and joint swelling.        Lt leg pain    Skin: Negative for color change, pallor and rash.   Neurological: Positive for weakness. Negative for dizziness, seizures, numbness and headaches.   Psychiatric/Behavioral: Negative for agitation. The patient is not nervous/anxious.      Objective:     Vital Signs (Most Recent):  Temp: 99 °F (37.2 °C) (01/18/19 0845)  Pulse: 106 (01/18/19 0845)  Resp: 18 (01/18/19 0845)  BP: 126/61 (01/18/19 0845)  SpO2: 95 % (01/18/19 0845) Vital Signs (24h Range):  Temp:  [97.9 °F (36.6 °C)-99 °F (37.2 °C)] 99 °F (37.2 °C)  Pulse:  [] 106  Resp:  [10-20] 18  SpO2:  [91 %-100 %] 95 %  BP: (122-161)/(61-86) 126/61     Weight: 114.3 kg (251 lb 15.8 oz)  Body mass index is 39.47 kg/m².    Intake/Output Summary (Last 24 hours) at 1/18/2019 1210  Last data filed at 1/18/2019 1041  Gross per 24 hour   Intake 4190 ml   Output 2600 ml   Net 1590 ml      Physical Exam   HENT:   Head: Normocephalic and atraumatic.   Eyes: Right eye exhibits no discharge. Left eye exhibits no discharge.   Neck: No JVD present.   Cardiovascular: Exam reveals no gallop and no friction rub.   No murmur heard.  Pulmonary/Chest: No stridor. No  respiratory distress. She has no wheezes. She has no rales. She exhibits no tenderness.   Abdominal: She exhibits no distension and no mass. There is no tenderness. There is no rebound and no guarding.   Musculoskeletal: She exhibits no edema or deformity.   Left leg ACE wrap and knee immobilizer intact   Neurological: She is alert.   Skin: Skin is warm and dry.   Psychiatric: Her mood appears anxious.   Nursing note and vitals reviewed.      Significant Labs:   CBC:   Recent Labs   Lab 01/17/19 0430 01/18/19  0526   WBC 11.44 15.91*   HGB 10.0* 10.7*   HCT 30.9* 32.6*   * 496*     CMP:   Recent Labs   Lab 01/17/19 0430 01/18/19  0526   * 134*   K 4.3 4.4   CL 97 97   CO2 28 25   GLU 87 115*   BUN 10 7   CREATININE 0.7 0.7   CALCIUM 9.2 9.2   ANIONGAP 9 12   EGFRNONAA >60 >60       Significant Imaging:     Imaging Results          FL Flouro Usage (Final result)  Result time 01/14/19 16:08:39    Final result by RADIOLOGIST, VIRTUAL (01/14/19 16:08:39)                 Impression:      Please see above.      Electronically signed by: Joycelyn Radiologist  Date:    01/14/2019  Time:    16:08             Narrative:    EXAMINATION:  FL FLOURO USAGE    CLINICAL HISTORY:  intra op;    COMPARISON:  None    FINDINGS:  Fluoroscopy used for procedure.  Please see physician notes for details and radiology report with accession #    Fluorscopy time:    Number of images:                               X-Ray Chest AP Portable (Final result)  Result time 01/10/19 18:50:55    Final result by MARIA FERNANDA Alexander Sr., MD (01/10/19 18:50:55)                 Impression:      1. The current examination is limited secondary to the lack of inclusion of the entire thorax on the film. The left costophrenic angle is not completely included on the film.  2. The visualized portion of the lungs is clear.  .      Electronically signed by: Eusebio Alexander MD  Date:    01/10/2019  Time:    18:50             Narrative:    EXAMINATION:  XR  CHEST AP PORTABLE    CLINICAL HISTORY:  tibial fracture;    COMPARISON:  02/24/2017    FINDINGS:  The current examination is limited secondary to the lack of inclusion of the entire thorax on the film.  The left costophrenic angle is not completely included on the film.  The right costophrenic angle is sharp.  The size of the heart is normal.  The visualized portion of the lungs is clear.  There is no pneumothorax.                               X-Ray Knee 3 View Left (Final result)  Result time 01/10/19 16:47:32    Final result by Daniel Phan MD (01/10/19 16:47:32)                 Impression:    FINDINGS/  Four views of the left tibia/fibula and two views of the left knee were obtained.    Comminuted fracture involving the proximal tibial metaphysis extending to the intercondylar eminence of the tibial plateau.  Oblique fibular head fracture also noted.  Moderate joint effusion.  Bony mineralization is normal.  Moderate soft tissue swelling.  There is mild varus configuration.  Femur and patella appear intact.  Patellar tendon appears grossly intact.    CT WITHOUT CONTRAST CAN BE OBTAINED FOR FURTHER CHARACTERIZATION.      Electronically signed by: Daniel Phan MD  Date:    01/10/2019  Time:    16:47             Narrative:    EXAMINATION:  XR TIBIA FIBULA 2 VIEW LEFT; XR KNEE 3 VIEW LEFT    CLINICAL HISTORY:  XR TIBIA FIBULA 2 VIEW LEFT; XR KNEE 3 VIEW LEFTPain in left knee    COMPARISON:  None                               X-Ray Tibia Fibula 2 View Left (Final result)  Result time 01/10/19 16:47:32    Final result by Daniel Phan MD (01/10/19 16:47:32)                 Impression:    FINDINGS/  Four views of the left tibia/fibula and two views of the left knee were obtained.    Comminuted fracture involving the proximal tibial metaphysis extending to the intercondylar eminence of the tibial plateau.  Oblique fibular head fracture also noted.  Moderate joint effusion.  Bony mineralization is normal.   Moderate soft tissue swelling.  There is mild varus configuration.  Femur and patella appear intact.  Patellar tendon appears grossly intact.    CT WITHOUT CONTRAST CAN BE OBTAINED FOR FURTHER CHARACTERIZATION.      Electronically signed by: Daniel Phan MD  Date:    01/10/2019  Time:    16:47             Narrative:    EXAMINATION:  XR TIBIA FIBULA 2 VIEW LEFT; XR KNEE 3 VIEW LEFT    CLINICAL HISTORY:  XR TIBIA FIBULA 2 VIEW LEFT; XR KNEE 3 VIEW LEFTPain in left knee    COMPARISON:  None

## 2019-01-18 NOTE — PLAN OF CARE
Problem: Adult Inpatient Plan of Care  Goal: Plan of Care Review  Outcome: Ongoing (interventions implemented as appropriate)  Recommendations     Recommendation: 1. Continue current diet. 2. If PO intake is consistently < 50 %, add boost plus TID.3. Will continue to monitor.   Goals: Meet > 85 % EEN/EPN while admitted  Nutrition Goal Status: new  Communication of RD Recs: (POc, sticky note)
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
## 2019-01-18 NOTE — PLAN OF CARE
ALAN spoke to Andra with WC @[phone].  She has received all orders for wheelchair, rolling walker, bedside commode, and tub transfer bench.  ALAN provided Andra with a phone number to contact the patient to schedule delivery.  Andra requested that the home health orders be faxed directly to her so she can process.  ALAN faxed home health orders to Andra @[phone]

## 2019-01-18 NOTE — PT/OT/SLP PROGRESS
Physical Therapy  Treatment    Teresa Cazares   MRN: [id number]   Admitting Diagnosis: Closed fracture of proximal end of left tibia    PT Received On: 01/18/19  PT Start Time: 1525     PT Stop Time: 1605    PT Total Time (min): 40 min       Billable Minutes:  Gait Training 30 and Therapeutic Activity 10    Treatment Type: Treatment  PT/PTA: PTA     PTA Visit Number: 1       General Precautions: Standard, fall  Orthopedic Precautions: LLE non weight bearing   Braces: N/A    Spiritual, Cultural Beliefs, Temple Practices, Values that Affect Care: no    Subjective:  Communicated with *Epic AND NURSEEVELYN  prior to session.  PATIENT AGREE TO TX NOW.    Pain/Comfort  Pain Rating 1: 8/10  Location - Side 1: Left  Location - Orientation 1: lower  Location 1: leg  Pain Addressed 1: Pre-medicate for activity, Cessation of Activity, Reposition, Distraction  Pain Rating Post-Intervention 1: 8/10    Objective:   Patient found with: peripheral IV, SUPINE , ASSISTED WITH OOB T/FS, GT INTO HALLWAY AT MOD ASSIST X2 FOR SAFETY , W/C IN TOW, PATIENT LLE NWB , KNEE IMMOBILIZER, SEVERAL REST PERIODS STANDING AND SEATED. PATIENT GT AT 5',15' WITH RW AT MOD ASSIST X2 FOR SAFETY.LLE HEEL FLOATED, SCD DONNED TO RLE TURNED ON AFTER TX.    Functional Mobility:  Bed Mobility:    SUPINE TO SIT, SIT TO SUPINE AT MIN TO MOD ASSIST X1    Transfers:   SIT TO STAND , STAND TO SIT AT MOD ASSIST X2 FOR SAFETY.  Gait:    AMBULATE INTO HALLWAY , MOD ASSIST X2 WITH W/C IN TOW, 5',15' NWB LLE.    Stairs:  N/A      Balance:   Static Sit: FAIR: Maintains without assist, but unable to take any challenges   Dynamic Sit: FAIR: Cannot move trunk without losing balance  Static Stand: POOR+: Needs MINIMAL assist to maintain  Dynamic stand: 0: N/A     Therapeutic Activities and Exercises:  OOB T/FS, GT INTO HALLWAY, LE EXERCISES WITHIN AVAILABLE PLANES.    AM-PAC 6 CLICK MOBILITY  How much help from another person does this patient currently need?   1 =  Unable, Total/Dependent Assistance  2 = A lot, Maximum/Moderate Assistance  3 = A little, Minimum/Contact Guard/Supervision  4 = None, Modified [place]/Independent    Turning over in bed (including adjusting bedclothes, sheets and blankets)?: 3  Sitting down on and standing up from a chair with arms (e.g., wheelchair, bedside commode, etc.): 3  Moving from lying on back to sitting on the side of the bed?: 3  Moving to and from a bed to a chair (including a wheelchair)?: 3  Need to walk in hospital room?: 2  Climbing 3-5 steps with a railing?: 1  Basic Mobility Total Score: 15    AM-PAC Raw Score CMS G-Code Modifier Level of Impairment Assistance   6 % Total / Unable   7 - 9 CM 80 - 100% Maximal Assist   10 - 14 CL 60 - 80% Moderate Assist   15 - 19 CK 40 - 60% Moderate Assist   20 - 22 CJ 20 - 40% Minimal Assist   23 CI 1-20% SBA / CGA   24 CH 0% Independent/ Mod I     Patient left supine with all lines intact and call button in reach.    Assessment:  Teresa Cazares is a 43 y.o. female with a medical diagnosis of Closed fracture of proximal end of left tibia.    Rehab identified problem list/impairments: Rehab identified problem list/impairments: weakness, impaired self care skills, impaired balance, decreased coordination, impaired endurance, impaired functional mobilty, gait instability, impaired sensation, decreased lower extremity function, pain, edema, decreased ROM    Rehab potential is excellent.    Activity tolerance: Excellent    Discharge recommendations: Discharge Facility/Level of Care Needs: rehabilitation facility     Barriers to discharge:      Equipment recommendations: Equipment Needed After Discharge: walker, rolling     GOALS:   Multidisciplinary Problems     Physical Therapy Goals        Problem: Physical Therapy Goal    Goal Priority Disciplines Outcome Goal Variances Interventions   Physical Therapy Goal     PT, PT/OT Ongoing (interventions implemented as appropriate)      Description:  LTG'S TO BE MET IN 7 DAYS (1-18-19)  1. PT WILL REQUIRE BERTO FOR BED MOBILITY  2. PT WILL REQUIRE BERTO FOR TF'S, NWB FOR LLE  3. PT WILL DEMO F- DYNAMIC BALANCE DURING TF                    PLAN:    Patient to be seen 5 x/week  to address the above listed problems via gait training, therapeutic exercises  Plan of Care expires: 01/18/19  Plan of Care reviewed with: patient         Crista Marshall, PTA  01/18/2019

## 2019-01-18 NOTE — ASSESSMENT & PLAN NOTE
- Admitted to Hospital Medicine  - Evaluated by Orthopedics Dr. Galvan and is s/p closed reduction with placement of an external fixator on 1/10  - Continue prn analgesia  - PT/OT following  - NWB to LLE at present time  - Will likely need inpatient rehab upon DC pending progress  - Ortho anticipates an ORIF this week once soft tissue swelling improves.   - Continue DVT prophylaxis with Lovenox    - Elevated Left lower extremely about the level to the heart, to help decrease swelling. Ice to Left leg.  Plan Left proximal tibia ORIF today if enough swelling has resolved.     1/18  S/P Left proximal tibia ORIF on 1/17/2019  Continue pain management   PT/OT   Non weight bearing Left lower extremely  DVT prophylactic   for Discharge planning

## 2019-01-18 NOTE — ANESTHESIA RELEASE NOTE
"Anesthesia Release from PACU Note    Patient: Teresa Cazraes    Procedure(s) Performed: Procedure(s) (LRB):  ORIF, FRACTURE, TIBIA, PLATEAU (Left)  REMOVAL, EXTERNAL FIXATION DEVICE (Left)    Anesthesia type: {PROCEDURES; ANE POST ANESTHESIA TYPE:}    Post pain: {FINDINGS; ANE POST PAIN:}    Post assessment: {ASSESSMENT; ANE POST:}    Last Vitals:   Visit Vitals  /71   Pulse 99   Temp 37 °C (98.6 °F) (Temporal)   Resp 14   Ht 5' 7" (1.702 m)   Wt 114.3 kg (251 lb 15.8 oz)   SpO2 100%   Breastfeeding? No   BMI 39.47 kg/m²       Post vital signs: {DESC; ANE POST VITALS:::"stable"}    Level of consciousness: {FINDINGS; ANE POST LEVEL OF CONSCIOUSNESS:}    Nausea/Vomiting: {Nausea/vomitin}    Complications: {FINDINGS; ANE POST COMPLICATIONS:}    Airway Patency: {OHS FINDINGS; AN POST AIRWAY PATENCY:43809::"patent"}    Respiratory: {OHS ASSESSMENT; AN RESPIRATORY:45240::"unassisted"}    Cardiovascular: {OHS ASSESSMENT; AN CARDIOVASCULAR:19461::"stable","blood pressure at baseline"}    Hydration: {OHS FINDINGS; AN POST HYDRATION:31258::"euvolemic"}  "

## 2019-01-18 NOTE — TRANSFER OF CARE
"Anesthesia Transfer of Care Note    Patient: Teresa Cazares    Procedure(s) Performed: Procedure(s) (LRB):  ORIF, FRACTURE, TIBIA, PLATEAU (Left)  REMOVAL, EXTERNAL FIXATION DEVICE (Left)    Patient location: PACU    Anesthesia Type: general    Transport from OR: Transported from OR on room air with adequate spontaneous ventilation    Post pain: adequate analgesia    Post assessment: no apparent anesthetic complications    Post vital signs: stable    Level of consciousness: awake    Nausea/Vomiting: no nausea/vomiting    Complications: none    Transfer of care protocol was followed      Last vitals:   Visit Vitals  /63 (Patient Position: Lying)   Pulse 69   Temp 37.2 °C (98.9 °F) (Oral)   Resp 18   Ht 5' 7" (1.702 m)   Wt 114.3 kg (251 lb 15.8 oz)   SpO2 98%   Breastfeeding? No   BMI 39.47 kg/m²     "

## 2019-01-18 NOTE — ANESTHESIA POSTPROCEDURE EVALUATION
"Anesthesia Post Evaluation    Patient: Teresa Cazares    Procedure(s) Performed: Procedure(s) (LRB):  ORIF, FRACTURE, TIBIA, PLATEAU (Left)  REMOVAL, EXTERNAL FIXATION DEVICE (Left)    Final Anesthesia Type: general  Patient location during evaluation: PACU  Patient participation: Yes- Able to Participate  Level of consciousness: awake and alert  Post-procedure vital signs: reviewed and stable  Pain management: adequate  Airway patency: patent  PONV status at discharge: No PONV  Anesthetic complications: no      Cardiovascular status: blood pressure returned to baseline  Respiratory status: spontaneous ventilation  Hydration status: euvolemic  Follow-up not needed.        Visit Vitals  BP (!) 147/67   Pulse 109   Temp 37 °C (98.6 °F) (Temporal)   Resp 13   Ht 5' 7" (1.702 m)   Wt 114.3 kg (251 lb 15.8 oz)   SpO2 96%   Breastfeeding? No   BMI 39.47 kg/m²       Pain/Mike Score: Pain Rating Prior to Med Admin: 8 (1/17/2019  8:58 PM)  Pain Rating Post Med Admin: 7 (1/17/2019 12:57 PM)  Mike Score: 9 (1/17/2019  1:04 AM)        "

## 2019-01-19 PROBLEM — R52 PAIN: Status: ACTIVE | Noted: 2019-01-19

## 2019-01-19 LAB
ANION GAP SERPL CALC-SCNC: 11 MMOL/L
BASOPHILS # BLD AUTO: 0.03 K/UL
BASOPHILS NFR BLD: 0.3 %
BUN SERPL-MCNC: 8 MG/DL
CALCIUM SERPL-MCNC: 9 MG/DL
CHLORIDE SERPL-SCNC: 101 MMOL/L
CO2 SERPL-SCNC: 23 MMOL/L
CREAT SERPL-MCNC: 0.7 MG/DL
DIFFERENTIAL METHOD: ABNORMAL
EOSINOPHIL # BLD AUTO: 0.3 K/UL
EOSINOPHIL NFR BLD: 2.4 %
ERYTHROCYTE [DISTWIDTH] IN BLOOD BY AUTOMATED COUNT: 13.6 %
EST. GFR  (AFRICAN AMERICAN): >60 ML/MIN/1.73 M^2
EST. GFR  (NON AFRICAN AMERICAN): >60 ML/MIN/1.73 M^2
GLUCOSE SERPL-MCNC: 97 MG/DL
HCT VFR BLD AUTO: 28.4 %
HGB BLD-MCNC: 9.1 G/DL
LYMPHOCYTES # BLD AUTO: 2.7 K/UL
LYMPHOCYTES NFR BLD: 23.8 %
MCH RBC QN AUTO: 28.3 PG
MCHC RBC AUTO-ENTMCNC: 32 G/DL
MCV RBC AUTO: 89 FL
MONOCYTES # BLD AUTO: 1.6 K/UL
MONOCYTES NFR BLD: 14.1 %
NEUTROPHILS # BLD AUTO: 6.7 K/UL
NEUTROPHILS NFR BLD: 59.4 %
PLATELET # BLD AUTO: 481 K/UL
PMV BLD AUTO: 8.5 FL
POTASSIUM SERPL-SCNC: 4.3 MMOL/L
RBC # BLD AUTO: 3.21 M/UL
SODIUM SERPL-SCNC: 135 MMOL/L
WBC # BLD AUTO: 11.26 K/UL

## 2019-01-19 PROCEDURE — G8988 SELF CARE GOAL STATUS: HCPCS | Mod: CK

## 2019-01-19 PROCEDURE — 25000003 PHARM REV CODE 250: Performed by: INTERNAL MEDICINE

## 2019-01-19 PROCEDURE — 63600175 PHARM REV CODE 636 W HCPCS: Performed by: NURSE PRACTITIONER

## 2019-01-19 PROCEDURE — 80048 BASIC METABOLIC PNL TOTAL CA: CPT

## 2019-01-19 PROCEDURE — 97116 GAIT TRAINING THERAPY: CPT

## 2019-01-19 PROCEDURE — 97110 THERAPEUTIC EXERCISES: CPT

## 2019-01-19 PROCEDURE — G8987 SELF CARE CURRENT STATUS: HCPCS | Mod: CL

## 2019-01-19 PROCEDURE — 25000003 PHARM REV CODE 250: Performed by: ORTHOPAEDIC SURGERY

## 2019-01-19 PROCEDURE — 36415 COLL VENOUS BLD VENIPUNCTURE: CPT

## 2019-01-19 PROCEDURE — 63600175 PHARM REV CODE 636 W HCPCS: Performed by: ORTHOPAEDIC SURGERY

## 2019-01-19 PROCEDURE — 25000003 PHARM REV CODE 250: Performed by: NURSE PRACTITIONER

## 2019-01-19 PROCEDURE — 85025 COMPLETE CBC W/AUTO DIFF WBC: CPT

## 2019-01-19 PROCEDURE — 11000001 HC ACUTE MED/SURG PRIVATE ROOM

## 2019-01-19 RX ORDER — AMITRIPTYLINE HYDROCHLORIDE 25 MG/1
25 TABLET, FILM COATED ORAL NIGHTLY
Status: DISCONTINUED | OUTPATIENT
Start: 2019-01-19 | End: 2019-01-20

## 2019-01-19 RX ADMIN — SODIUM CHLORIDE, SODIUM LACTATE, POTASSIUM CHLORIDE, AND CALCIUM CHLORIDE: .6; .31; .03; .02 INJECTION, SOLUTION INTRAVENOUS at 09:01

## 2019-01-19 RX ADMIN — OXYCODONE HYDROCHLORIDE AND ACETAMINOPHEN 1 TABLET: 10; 325 TABLET ORAL at 10:01

## 2019-01-19 RX ADMIN — VARENICLINE TARTRATE 1 MG: 0.5 TABLET, FILM COATED ORAL at 08:01

## 2019-01-19 RX ADMIN — ENOXAPARIN SODIUM 40 MG: 100 INJECTION SUBCUTANEOUS at 08:01

## 2019-01-19 RX ADMIN — MORPHINE SULFATE 2 MG: 4 INJECTION INTRAVENOUS at 08:01

## 2019-01-19 RX ADMIN — STANDARDIZED SENNA CONCENTRATE AND DOCUSATE SODIUM 1 TABLET: 8.6; 5 TABLET, FILM COATED ORAL at 08:01

## 2019-01-19 RX ADMIN — CHLORHEXIDINE GLUCONATE 10 ML: 1.2 RINSE ORAL at 09:01

## 2019-01-19 RX ADMIN — OXYCODONE HYDROCHLORIDE AND ACETAMINOPHEN 1 TABLET: 10; 325 TABLET ORAL at 01:01

## 2019-01-19 RX ADMIN — CHLORHEXIDINE GLUCONATE 10 ML: 1.2 RINSE ORAL at 08:01

## 2019-01-19 RX ADMIN — VARENICLINE TARTRATE 1 MG: 0.5 TABLET, FILM COATED ORAL at 09:01

## 2019-01-19 RX ADMIN — PANTOPRAZOLE SODIUM 40 MG: 40 TABLET, DELAYED RELEASE ORAL at 08:01

## 2019-01-19 RX ADMIN — STANDARDIZED SENNA CONCENTRATE AND DOCUSATE SODIUM 1 TABLET: 8.6; 5 TABLET, FILM COATED ORAL at 09:01

## 2019-01-19 RX ADMIN — AMITRIPTYLINE HYDROCHLORIDE 25 MG: 25 TABLET, FILM COATED ORAL at 09:01

## 2019-01-19 RX ADMIN — POLYETHYLENE GLYCOL 3350 17 G: 17 POWDER, FOR SOLUTION ORAL at 08:01

## 2019-01-19 NOTE — PLAN OF CARE
Problem: Physical Therapy Goal  Goal: Physical Therapy Goal  LTG'S TO BE MET IN 7 DAYS (1-26-19)  1. PT WILL REQUIRE BERTO FOR BED MOBILITY  2. PT WILL REQUIRE BERTO FOR TF'S, NWB FOR LLE  3. PT WILL DEMO F- DYNAMIC BALANCE DURING TF   4. PT WILL AMB 50' WITH RW WITH MOD ASSIST FOLLOWING NWB LLE STATUS AND NO GROSS LOB  Outcome: Ongoing (interventions implemented as appropriate)  Goals for bed mobility, transfers, and balance were continued as patient had not met them as of 1/18/19.  A new goal was added for ambulation.

## 2019-01-19 NOTE — PT/OT/SLP PROGRESS
Physical Therapy  Treatment    Teresa Cazares   MRN: [id number]   Admitting Diagnosis: Closed fracture of proximal end of left tibia    PT Received On: 01/19/19  PT Start Time: 0910     PT Stop Time: 0950    PT Total Time (min): 40 min       Billable Minutes:  Gait Training 30 and Therapeutic Exercise 10    Treatment Type: Treatment  PT/PTA: PTA     PTA Visit Number: 2       General Precautions: Standard, fall  Orthopedic Precautions: LLE non weight bearing   Braces: Knee immobilizer    Spiritual, Cultural Beliefs, [employer]itan Practices, Values that Affect Care: no    Subjective:  Communicated with Epic AND NURSEJIGAR prior to session.  PATIENT AGREE TO TX NOW.    Pain/Comfort  Pain Rating 1: 8/10  Location - Side 1: Left  Location - Orientation 1: lower  Location 1: leg  Pain Addressed 1: Pre-medicate for activity, Cessation of Activity, Reposition, Distraction  Pain Rating Post-Intervention 1: 4/10(AT REST ,S HORTSEATED.)    Objective:   Patient found with: peripheral IV, wound vac, SUPINE . PATIENT ASSISTED OOB T/FS, GT INTO HALLWAY AT MOD ASSIST X2 FOR SAFETY , FOLLOWING WITH CHAIR FOR SAFETY, GT AT 40'X2, VERY SLOWED STEP TO GT . PATIENT MAINTAIN NWB LLE STATUS ALL TX .    Functional Mobility:  Bed Mobility:    SUPINE TO SIT AT MOD ASSIST X1.    Transfers:   SIT TO STAND, STAND TO SIT AT MOD ASSIST X2 FOR SAFETY WITH RW.NWB LLE.LLE IMMOBILIZER ON ALL TX.    Gait:    AMBULATE 40'X2 AT MOD ASSIST X2 WITH RW , CHAIR IN TOW FOR SAFETY, LLE NWB.    Balance:   Static Sit: FAIR+: Able to take MINIMAL challenges from all directions  Dynamic Sit: FAIR: Cannot move trunk without losing balance  Static Stand: FAIR: Maintains without assist but unable to take challenges  Dynamic stand: POOR: Needs MOD (moderate) assist during gait     Therapeutic Activities and Exercises:  JORDON LE ALL AVAILABLE PLANES, LONGSEATED, GT INTO HALLWAY, T/FS OOB TO B/S CHAIR JORDON LE ELEVATED.    AM-PAC 6 CLICK MOBILITY  How much help from  another person does this patient currently need?   1 = Unable, Total/Dependent Assistance  2 = A lot, Maximum/Moderate Assistance  3 = A little, Minimum/Contact Guard/Supervision  4 = None, Modified [place]/Independent    Turning over in bed (including adjusting bedclothes, sheets and blankets)?: 3  Sitting down on and standing up from a chair with arms (e.g., wheelchair, bedside commode, etc.): 3  Moving from lying on back to sitting on the side of the bed?: 3  Moving to and from a bed to a chair (including a wheelchair)?: 3  Need to walk in hospital room?: 2  Climbing 3-5 steps with a railing?: 1  Basic Mobility Total Score: 15    AM-PAC Raw Score CMS G-Code Modifier Level of Impairment Assistance   6 % Total / Unable   7 - 9 CM 80 - 100% Maximal Assist   10 - 14 CL 60 - 80% Moderate Assist   15 - 19 CK 40 - 60% Moderate Assist   20 - 22 CJ 20 - 40% Minimal Assist   23 CI 1-20% SBA / CGA   24 CH 0% Independent/ Mod I     Patient left up in chair with all lines intact and call button in reach.    Assessment:  Teresa Cazares is a 43 y.o. female with a medical diagnosis of Closed fracture of proximal end of left tibia .    Rehab identified problem list/impairments: Rehab identified problem list/impairments: weakness, impaired self care skills, impaired balance, impaired endurance, impaired functional mobilty, decreased lower extremity function, gait instability, impaired sensation, pain    Rehab potential is excellent.    Activity tolerance: Excellent    Discharge recommendations: Discharge Facility/Level of Care Needs: rehabilitation facility     Barriers to discharge:      Equipment recommendations: Equipment Needed After Discharge: walker, rolling     GOALS:   Multidisciplinary Problems     Physical Therapy Goals        Problem: Physical Therapy Goal    Goal Priority Disciplines Outcome Goal Variances Interventions   Physical Therapy Goal     PT, PT/OT Ongoing (interventions implemented as  appropriate)     Description:  LTG'S TO BE MET IN 7 DAYS (1-18-19)  1. PT WILL REQUIRE BERTO FOR BED MOBILITY  2. PT WILL REQUIRE BERTO FOR TF'S, NWB FOR LLE  3. PT WILL DEMO F- DYNAMIC BALANCE DURING TF                    PLAN:    Patient to be seen 5 x/week  to address the above listed problems via gait training, therapeutic activities, therapeutic exercises  Plan of Care expires: 01/18/19  Plan of Care reviewed with: patient         Crista Marshall, PTA  01/19/2019

## 2019-01-19 NOTE — ASSESSMENT & PLAN NOTE
--pt c/o burning pain upon standing to R thigh, unrelieved with MS04  --discussed with pharmacy  --start amitriptyline today

## 2019-01-19 NOTE — PLAN OF CARE
Problem: Adult Inpatient Plan of Care  Goal: Plan of Care Review  Outcome: Ongoing (interventions implemented as appropriate)  Reviewed POC,  Including indications and possible side effects of administer medications. Pt. Verbalized understanding and teach back.  Remain free from injury. Bed low and lock, SRx2 up and call bell within reach. Patient continue to receive pain med prn around clock.    Chart Check complete.

## 2019-01-19 NOTE — PLAN OF CARE
Problem: Occupational Therapy Goal  Goal: Occupational Therapy Goal  OT GOALS TO BE MET BY 1-17-19  MIN A WITH BSC T/F'S  PT WILL TOLERATE 1 SET X 15 REPS B UE ROM EXERCISE WITH MIN RESISTANCE  S WITH UE DRESSING  MIN A WITH BE MOBILITY   Outcome: Ongoing (interventions implemented as appropriate)  PT SEATED IN BEDSIDE CHAIR WITH LE'S ELEVATED AT START OF TREATMENT SESSION. PT STATES HER PAIN IS HIGH AT INCISION SITE, AND IS EXHAUSTED FROM PT SESSION. OT ENCOURAGED PT TO PERFORM UE AROM EXERCISES WHILE SEATED IN CHAIR TO MAINTAIN MOBILITY/STRENGTH. PT PERFORMED AROM 1X10 REPS WITH SHLDR FLEXION, CURLS, AND CHEST PRESS, AS WELL AS FIST PUMPS. PT LEFT SEATED UP IN CHAIR WITH ALL NEEDS MET.
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
## 2019-01-19 NOTE — PLAN OF CARE
Problem: Physical Therapy Goal  Goal: Physical Therapy Goal  LTG'S TO BE MET IN 7 DAYS (1-18-19)  1. PT WILL REQUIRE BERTO FOR BED MOBILITY  2. PT WILL REQUIRE BERTO FOR TF'S, NWB FOR LLE  3. PT WILL DEMO F- DYNAMIC BALANCE DURING TF   Outcome: Ongoing (interventions implemented as appropriate)  PATIENT DID WELL WITH OOB T/FS, GT INTO HALLWAY 40'X2 AT MOD ASSIST X2 FOR SAFETYWITH RW, 2ND ASSIST FOLLOWING WITH CHAIR FOR SAFETY. PATIENT ADHERE WELL TO NWB LLE STATUS ALL TX.

## 2019-01-19 NOTE — SUBJECTIVE & OBJECTIVE
Interval History: Pt c/o burning pain to incision site that is made much worse with movement. Will start Elavil and monitor    Review of Systems   Constitutional: Positive for activity change. Negative for chills and fever.   HENT: Negative for congestion, rhinorrhea and sinus pressure.    Eyes: Negative for pain and visual disturbance.   Respiratory: Negative for cough, shortness of breath and wheezing.    Cardiovascular: Negative for chest pain, palpitations and leg swelling.   Gastrointestinal: Negative for abdominal distention, abdominal pain, diarrhea, nausea and vomiting.   Endocrine: Negative for cold intolerance and heat intolerance.   Genitourinary: Negative for difficulty urinating, dysuria and hematuria.   Musculoskeletal: Negative for arthralgias and joint swelling.        Lt leg pain    Skin: Negative for color change, pallor and rash.   Allergic/Immunologic: Negative.    Neurological: Positive for weakness. Negative for dizziness, seizures, numbness and headaches.   Hematological: Negative.    Psychiatric/Behavioral: Negative for agitation, behavioral problems and confusion. The patient is nervous/anxious.      Objective:     Vital Signs (Most Recent):  Temp: 98.8 °F (37.1 °C) (01/19/19 0823)  Pulse: 80 (01/19/19 0823)  Resp: 18 (01/19/19 0823)  BP: 113/68 (01/19/19 0823)  SpO2: 95 % (01/19/19 0823) Vital Signs (24h Range):  Temp:  [98 °F (36.7 °C)-99.1 °F (37.3 °C)] 98.8 °F (37.1 °C)  Pulse:  [73-90] 80  Resp:  [16-20] 18  SpO2:  [92 %-96 %] 95 %  BP: (111-123)/(53-68) 113/68     Weight: 114.3 kg (251 lb 15.8 oz)  Body mass index is 39.47 kg/m².    Intake/Output Summary (Last 24 hours) at 1/19/2019 1214  Last data filed at 1/19/2019 0506  Gross per 24 hour   Intake 1822.5 ml   Output 100 ml   Net 1722.5 ml      Physical Exam   Constitutional: She is oriented to person, place, and time. She appears well-developed and well-nourished. No distress.   HENT:   Head: Normocephalic and atraumatic.   Nose:  Nose normal.   Eyes: Conjunctivae and EOM are normal. No scleral icterus.   Neck: Normal range of motion. Neck supple. No JVD present.   Cardiovascular: Normal rate, regular rhythm, normal heart sounds and intact distal pulses. Exam reveals no gallop and no friction rub.   No murmur heard.  Pulmonary/Chest: Effort normal and breath sounds normal. No stridor. No respiratory distress. She has no wheezes. She has no rales. She exhibits no tenderness.   Abdominal: Soft. Bowel sounds are normal. She exhibits no distension and no mass. There is no tenderness.   Musculoskeletal: She exhibits no edema or deformity.   Left leg ACE wrap and knee immobilizer intact with hemovac drain    Neurological: She is alert and oriented to person, place, and time.   Skin: Skin is warm and dry. She is not diaphoretic.   Psychiatric: Her mood appears anxious.   Nursing note and vitals reviewed.      Significant Labs:   Recent Lab Results       01/19/19  0426        Anion Gap 11     Baso # 0.03     Basophil% 0.3     BUN, Bld 8     Calcium 9.0     Chloride 101     CO2 23     Creatinine 0.7     Differential Method Automated     eGFR if  >60     eGFR if non  >60  Comment:  Calculation used to obtain the estimated glomerular filtration  rate (eGFR) is the CKD-EPI equation.        Eos # 0.3     Eosinophil% 2.4     Glucose 97     Gran # (ANC) 6.7     Gran% 59.4     Hematocrit 28.4     Hemoglobin 9.1     Lymph # 2.7     Lymph% 23.8     MCH 28.3     MCHC 32.0     MCV 89     Mono # 1.6     Mono% 14.1     MPV 8.5     Platelets 481     Potassium 4.3     RBC 3.21     RDW 13.6     Sodium 135     WBC 11.26         All pertinent labs within the past 24 hours have been reviewed.    Significant Imaging: I have reviewed all pertinent imaging results/findings within the past 24 hours.

## 2019-01-19 NOTE — PT/OT/SLP PROGRESS
Occupational Therapy  Treatment    Teresa Cazares   MRN: [id number]   Admitting Diagnosis: Closed fracture of proximal end of left tibia    OT Date of Treatment: 01/19/19   OT Start Time: 1010  OT Stop Time: 1025  OT Total Time (min): 15 min    Billable Minutes:  Therapeutic Exercise 15    General Precautions: Standard, fall  Orthopedic Precautions: LLE non weight bearing  Braces: Knee immobilizer         Subjective:  Communicated with NURSE SIMONS prior to session.    Pain/Comfort  Pain Rating 1: 10/10  Location - Side 1: Left  Location - Orientation 1: lower  Location 1: leg  Pain Addressed 1: Pre-medicate for activity, Cessation of Activity, Reposition, Distraction, Nurse notified  Pain Rating Post-Intervention 1: 10/10    Objective:  Patient found with: peripheral IV, wound vac     Functional Mobility:  Bed Mobility:       Transfers:        Functional Ambulation: DID NOT OCCUR    Balance:   Static Sit: GOOD: Takes MODERATE challenges from all directions  Dynamic Sit: GOOD-: Incosistently Maintains balance through MODERATE excursions of active trunk movement,         Therapeutic Activities and Exercises:  PT SEATED IN BEDSIDE CHAIR WITH LE'S ELEVATED AT START OF TREATMENT SESSION. PT STATES HER PAIN IS HIGH AT INCISION SITE, AND IS EXHAUSTED FROM PT SESSION. OT ENCOURAGED PT TO PERFORM UE AROM EXERCISES WHILE SEATED IN CHAIR TO MAINTAIN MOBILITY/STRENGTH. PT PERFORMED AROM 1X10 REPS WITH SHLDR FLEXION, CURLS, AND CHEST PRESS, AS WELL AS FIST PUMPS. PT LEFT SEATED UP IN CHAIR WITH ALL NEEDS MET.     AM-PAC 6 CLICK ADL   How much help from another person does this patient currently need?   1 = Unable, Total/Dependent Assistance  2 = A lot, Maximum/Moderate Assistance  3 = A little, Minimum/Contact Guard/Supervision  4 = None, Modified [place]/Independent    Putting on and taking off regular lower body clothing? : 2  Bathing (including washing, rinsing, drying)?: 2  Toileting, which includes using toilet,  "bedpan, or urinal? : 3  Putting on and taking off regular upper body clothing?: 3  Taking care of personal grooming such as brushing teeth?: 4  Eating meals?: 4  Daily Activity Total Score: 18     AM-PAC Raw Score CMS "G-Code Modifier Level of Impairment Assistance   6 % Total / Unable   7 - 8 CM 80 - 100% Maximal Assist   9-13 CL 60 - 80% Moderate Assist   14 - 19 CK 40 - 60% Moderate Assist   20 - 22 CJ 20 - 40% Minimal Assist   23 CI 1-20% SBA / CGA   24 CH 0% Independent/ Mod I       Patient left up in chair with all lines intact, call button in reach and NRSING notified    ASSESSMENT:  Teresa Cazares is a 43 y.o. female with a medical diagnosis of Closed fracture of proximal end of left tibia and presents with DEBILITY, IMPAIRED ADLS, FUNCTIONAL MOBILITY, PAIN, AND IMPAIRED SAFETY AWARENESS. PT WILL BENEFIT FROM CONTINUED SKILLED OT SERVICES TO INCREASE FUNCTIONAL INDEPENDENCE AND SAFETY AWARENESS.    Rehab identified problem list/impairments: Rehab identified problem list/impairments: weakness, impaired endurance, impaired self care skills, impaired functional mobilty, impaired balance, gait instability, decreased lower extremity function, pain, decreased safety awareness, decreased ROM, orthopedic precautions    Rehab potential is good.    Activity tolerance: Good    Discharge recommendations: Discharge Facility/Level of Care Needs: rehabilitation facility     Barriers to discharge: Barriers to Discharge: Decreased caregiver support    Equipment recommendations: walker, rolling     GOALS:   Multidisciplinary Problems     Occupational Therapy Goals        Problem: Occupational Therapy Goal    Goal Priority Disciplines Outcome Interventions   Occupational Therapy Goal     OT, PT/OT Ongoing (interventions implemented as appropriate)    Description:  OT GOALS TO BE MET BY 1-17-19  MIN A WITH BSC T/F'S  PT WILL TOLERATE 1 SET X 15 REPS B UE ROM EXERCISE WITH MIN RESISTANCE  S WITH UE DRESSING  MIN A " WITH BE MOBILITY                    Plan:  Patient to be seen 3 x/week to address the above listed problems via self-care/home management, therapeutic activities, therapeutic exercises  Plan of Care expires: 01/24/19  Plan of Care reviewed with: patient    OT G-codes  Functional Assessment Tool Used: [place] AM-PAC  Score: 18  Functional Limitation: Self care  Self Care Current Status (): CL  Self Care Goal Status (): TOBY Rosa, OT  01/19/2019

## 2019-01-20 LAB
ANION GAP SERPL CALC-SCNC: 10 MMOL/L
BASOPHILS # BLD AUTO: 0.03 K/UL
BASOPHILS NFR BLD: 0.3 %
BUN SERPL-MCNC: 9 MG/DL
CALCIUM SERPL-MCNC: 9.1 MG/DL
CHLORIDE SERPL-SCNC: 101 MMOL/L
CO2 SERPL-SCNC: 26 MMOL/L
CREAT SERPL-MCNC: 0.7 MG/DL
DIFFERENTIAL METHOD: ABNORMAL
EOSINOPHIL # BLD AUTO: 0.4 K/UL
EOSINOPHIL NFR BLD: 3.1 %
ERYTHROCYTE [DISTWIDTH] IN BLOOD BY AUTOMATED COUNT: 13.6 %
EST. GFR  (AFRICAN AMERICAN): >60 ML/MIN/1.73 M^2
EST. GFR  (NON AFRICAN AMERICAN): >60 ML/MIN/1.73 M^2
GLUCOSE SERPL-MCNC: 90 MG/DL
HCT VFR BLD AUTO: 28.2 %
HGB BLD-MCNC: 9.2 G/DL
LYMPHOCYTES # BLD AUTO: 2.8 K/UL
LYMPHOCYTES NFR BLD: 23.8 %
MCH RBC QN AUTO: 28.9 PG
MCHC RBC AUTO-ENTMCNC: 32.6 G/DL
MCV RBC AUTO: 89 FL
MONOCYTES # BLD AUTO: 1.5 K/UL
MONOCYTES NFR BLD: 12.9 %
NEUTROPHILS # BLD AUTO: 7 K/UL
NEUTROPHILS NFR BLD: 59.9 %
PLATELET # BLD AUTO: 526 K/UL
PMV BLD AUTO: 8.2 FL
POTASSIUM SERPL-SCNC: 3.9 MMOL/L
RBC # BLD AUTO: 3.18 M/UL
SODIUM SERPL-SCNC: 137 MMOL/L
WBC # BLD AUTO: 11.68 K/UL

## 2019-01-20 PROCEDURE — 25000003 PHARM REV CODE 250: Performed by: INTERNAL MEDICINE

## 2019-01-20 PROCEDURE — 25000003 PHARM REV CODE 250: Performed by: ORTHOPAEDIC SURGERY

## 2019-01-20 PROCEDURE — 11000001 HC ACUTE MED/SURG PRIVATE ROOM

## 2019-01-20 PROCEDURE — 36415 COLL VENOUS BLD VENIPUNCTURE: CPT

## 2019-01-20 PROCEDURE — 63600175 PHARM REV CODE 636 W HCPCS: Performed by: ORTHOPAEDIC SURGERY

## 2019-01-20 PROCEDURE — 25000003 PHARM REV CODE 250: Performed by: NURSE PRACTITIONER

## 2019-01-20 PROCEDURE — 96372 THER/PROPH/DIAG INJ SC/IM: CPT

## 2019-01-20 PROCEDURE — 85025 COMPLETE CBC W/AUTO DIFF WBC: CPT

## 2019-01-20 PROCEDURE — 97116 GAIT TRAINING THERAPY: CPT

## 2019-01-20 PROCEDURE — 80048 BASIC METABOLIC PNL TOTAL CA: CPT

## 2019-01-20 RX ORDER — AMITRIPTYLINE HYDROCHLORIDE 50 MG/1
50 TABLET, FILM COATED ORAL NIGHTLY
Status: DISCONTINUED | OUTPATIENT
Start: 2019-01-20 | End: 2019-01-22 | Stop reason: HOSPADM

## 2019-01-20 RX ADMIN — VARENICLINE TARTRATE 1 MG: 0.5 TABLET, FILM COATED ORAL at 09:01

## 2019-01-20 RX ADMIN — STANDARDIZED SENNA CONCENTRATE AND DOCUSATE SODIUM 1 TABLET: 8.6; 5 TABLET, FILM COATED ORAL at 09:01

## 2019-01-20 RX ADMIN — CHLORHEXIDINE GLUCONATE 10 ML: 1.2 RINSE ORAL at 08:01

## 2019-01-20 RX ADMIN — OXYCODONE HYDROCHLORIDE AND ACETAMINOPHEN 1 TABLET: 10; 325 TABLET ORAL at 09:01

## 2019-01-20 RX ADMIN — ENOXAPARIN SODIUM 40 MG: 100 INJECTION SUBCUTANEOUS at 09:01

## 2019-01-20 RX ADMIN — PANTOPRAZOLE SODIUM 40 MG: 40 TABLET, DELAYED RELEASE ORAL at 09:01

## 2019-01-20 RX ADMIN — STANDARDIZED SENNA CONCENTRATE AND DOCUSATE SODIUM 1 TABLET: 8.6; 5 TABLET, FILM COATED ORAL at 08:01

## 2019-01-20 RX ADMIN — AMITRIPTYLINE HYDROCHLORIDE 50 MG: 50 TABLET, FILM COATED ORAL at 08:01

## 2019-01-20 RX ADMIN — CHLORHEXIDINE GLUCONATE 10 ML: 1.2 RINSE ORAL at 09:01

## 2019-01-20 RX ADMIN — VARENICLINE TARTRATE 1 MG: 0.5 TABLET, FILM COATED ORAL at 08:01

## 2019-01-20 RX ADMIN — POLYETHYLENE GLYCOL 3350 17 G: 17 POWDER, FOR SOLUTION ORAL at 09:01
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
## 2019-01-20 NOTE — ASSESSMENT & PLAN NOTE
--pt c/o burning pain upon standing to R thigh, unrelieved with MS04  --discussed with pharmacy  --start amitriptyline today  1/20:  --pt reports pain much better with amitriptyline  --continue and monitor

## 2019-01-20 NOTE — ASSESSMENT & PLAN NOTE
- Admitted to Hospital Medicine  - Evaluated by Orthopedics Dr. Galvan and is s/p closed reduction with placement of an external fixator on 1/10  - Continue prn analgesia  - PT/OT following  - NWB to LLE at present time  - Will likely need inpatient rehab upon DC pending progress  - Ortho anticipates an ORIF this week once soft tissue swelling improves.   - Continue DVT prophylaxis with Lovenox    - Elevated Left lower extremely about the level to the heart, to help decrease swelling. Ice to Left leg.  Plan Left proximal tibia ORIF today if enough swelling has resolved.   1/18  S/P Left proximal tibia ORIF on 1/17/2019  Continue pain management   PT/OT   Non weight bearing Left lower extremely  DVT prophylactic   for Discharge planning   1/20:  --PT/OT  --PRN analgesia  --DVT prophylactic  --Ortho following  --will likely dc in next day or two

## 2019-01-20 NOTE — PT/OT/SLP PROGRESS
Physical Therapy  Treatment    Teresa Cazares   MRN: [id number]   Admitting Diagnosis: Closed fracture of proximal end of left tibia    PT Received On: 01/20/19  PT Start Time: 0830     PT Stop Time: 0845    PT Total Time (min): 15 min       Billable Minutes:  Gait Training 15 minutes    Treatment Type: Treatment  PT/PTA: PTA     PTA Visit Number: 1       General Precautions: Standard, fall  Orthopedic Precautions: LLE non weight bearing   Braces:      Spiritual, Cultural Beliefs, Lutheran Practices, Values that Affect Care: no    Subjective:  Communicated with epic and nurse Lacey prior to session.      Pain/Comfort  Pain Rating 1: 5/10  Location - Side 1: Left  Location 1: leg  Pain Addressed 1: Pre-medicate for activity    Objective:   Patient found with: peripheral IV, wound vac    Functional Mobility:  Bed Mobility: min assist for L LE management       Transfers:min assist       Gait: min assist with RW       Stairs:n/a          Balance:   Static Sit: GOOD+: Takes MAXIMAL challenges from all directions.    Dynamic Sit: GOOD: Maintains balance through MODERATE excursions of active trunk movement  Static Stand: GOOD-: Takes MODERATE challenges from all directions inconsistently  Dynamic stand: FAIR: Needs CONTACT GUARD during gait     Therapeutic Activities and Exercises:  Min asssit with bed mobility for L LE management. Gait training with RW and min assist ambulated ~ 50 feet x 2 with slow marilyn, step-to pattern. SPT with min assist into bedside chair.  AM-PAC 6 CLICK MOBILITY  How much help from another person does this patient currently need?   1 = Unable, Total/Dependent Assistance  2 = A lot, Maximum/Moderate Assistance  3 = A little, Minimum/Contact Guard/Supervision  4 = None, Modified [place]/Independent    Turning over in bed (including adjusting bedclothes, sheets and blankets)?: 3  Sitting down on and standing up from a chair with arms (e.g., wheelchair, bedside commode, etc.): 3  Moving  from lying on back to sitting on the side of the bed?: 3  Moving to and from a bed to a chair (including a wheelchair)?: 3  Need to walk in hospital room?: 3  Climbing 3-5 steps with a railing?: 1  Basic Mobility Total Score: 16    AM-PAC Raw Score CMS G-Code Modifier Level of Impairment Assistance   6 % Total / Unable   7 - 9 CM 80 - 100% Maximal Assist   10 - 14 CL 60 - 80% Moderate Assist   15 - 19 CK 40 - 60% Moderate Assist   20 - 22 CJ 20 - 40% Minimal Assist   23 CI 1-20% SBA / CGA   24 CH 0% Independent/ Mod I     Patient left up in chair with all lines intact, call button in reach and nursing present.    Assessment:  Teresa Cazares is a 43 y.o. female with a medical diagnosis of Closed fracture of proximal end of left tibia.    Rehab identified problem list/impairments: Rehab identified problem list/impairments: weakness, impaired endurance    Rehab potential is good.    Activity tolerance: Good    Discharge recommendations: Discharge Facility/Level of Care Needs: rehabilitation facility     Barriers to discharge:      Equipment recommendations: Equipment Needed After Discharge: tub bench, walker, rolling     GOALS:   Multidisciplinary Problems     Physical Therapy Goals        Problem: Physical Therapy Goal    Goal Priority Disciplines Outcome Goal Variances Interventions   Physical Therapy Goal     PT, PT/OT Ongoing (interventions implemented as appropriate)     Description:  LTG'S TO BE MET IN 7 DAYS (1-26-19)  1. PT WILL REQUIRE BERTO FOR BED MOBILITY  2. PT WILL REQUIRE BERTO FOR TF'S, NWB FOR LLE  3. PT WILL DEMO F- DYNAMIC BALANCE DURING TF   4. PT WILL AMB 50' WITH RW WITH MOD ASSIST FOLLOWING NWB LLE STATUS AND NO GROSS LOB                    PLAN:    Patient to be seen 5 x/week  to address the above listed problems via gait training, therapeutic activities, therapeutic exercises  Plan of Care expires: 01/18/19  Plan of Care reviewed with: patient         Bladimir Blackburn,  PTA  01/20/2019

## 2019-01-20 NOTE — PROGRESS NOTES
Pt reports not resting night before. Pt appeared to be resting comfortably and sleeping well. Did not log roll pt while pt appeared to be sleeping.

## 2019-01-20 NOTE — PLAN OF CARE
Problem: Physical Therapy Goal  Goal: Physical Therapy Goal  LTG'S TO BE MET IN 7 DAYS (1-26-19)  1. PT WILL REQUIRE BERTO FOR BED MOBILITY  2. PT WILL REQUIRE BERTO FOR TF'S, NWB FOR LLE  3. PT WILL DEMO F- DYNAMIC BALANCE DURING TF   4. PT WILL AMB 50' WITH RW WITH MOD ASSIST FOLLOWING NWB LLE STATUS AND NO GROSS LOB   Outcome: Ongoing (interventions implemented as appropriate)  Ambulated ~ 50 feet x 2 with RW and min assist.

## 2019-01-20 NOTE — PLAN OF CARE
Problem: Adult Inpatient Plan of Care  Goal: Plan of Care Review  Outcome: Ongoing (interventions implemented as appropriate)  Patient remained free from injury. No c/o pain at this time. Resting with eyes closed. Mother at bedside. No distress noted. Oriented x3. Respirations even and non labored. IV patent and infusing. Bed locked and low. Call light in reach. Knee immobilizer in place. Safety measures in place. Will continue to monitor. Reviewed plan of care. Patient verbalized understanding and teach back.    12hr chart check complete.

## 2019-01-20 NOTE — SUBJECTIVE & OBJECTIVE
Interval History: Surgeon removed hemovac drain late yesterday. Patient had amitriptaline last night for neuropathic pain - she reports she had a great night of sleep and is not having any pain this morning.     Review of Systems   Constitutional: Positive for activity change. Negative for chills and fever.   HENT: Negative for congestion, rhinorrhea and sinus pressure.    Eyes: Negative for pain and visual disturbance.   Respiratory: Negative for cough, shortness of breath and wheezing.    Cardiovascular: Negative for chest pain, palpitations and leg swelling.   Gastrointestinal: Negative for abdominal distention, abdominal pain, diarrhea, nausea and vomiting.   Endocrine: Negative for cold intolerance and heat intolerance.   Genitourinary: Negative for difficulty urinating, dysuria and hematuria.   Musculoskeletal: Negative for arthralgias and joint swelling.        Lt leg pain    Skin: Negative for color change, pallor and rash.   Allergic/Immunologic: Negative.    Neurological: Positive for weakness. Negative for dizziness, seizures, numbness and headaches.   Hematological: Negative.    Psychiatric/Behavioral: Negative for agitation, behavioral problems and confusion. The patient is nervous/anxious.      Objective:     Vital Signs (Most Recent):  Temp: 98.8 °F (37.1 °C) (01/20/19 0302)  Pulse: 84 (01/20/19 0302)  Resp: 18 (01/20/19 0302)  BP: 118/72 (01/20/19 0302)  SpO2: 96 % (01/20/19 0302) Vital Signs (24h Range):  Temp:  [98.5 °F (36.9 °C)-99.8 °F (37.7 °C)] 98.8 °F (37.1 °C)  Pulse:  [81-90] 84  Resp:  [16-18] 18  SpO2:  [93 %-96 %] 96 %  BP: (118-130)/(59-78) 118/72     Weight: 114.3 kg (251 lb 15.8 oz)  Body mass index is 39.47 kg/m².    Intake/Output Summary (Last 24 hours) at 1/20/2019 0845  Last data filed at 1/20/2019 0600  Gross per 24 hour   Intake 3067.5 ml   Output 0 ml   Net 3067.5 ml      Physical Exam   Constitutional: She is oriented to person, place, and time. She appears well-developed and  well-nourished. No distress.   HENT:   Head: Normocephalic and atraumatic.   Nose: Nose normal.   Eyes: Conjunctivae and EOM are normal. No scleral icterus.   Neck: Normal range of motion. Neck supple. No JVD present.   Cardiovascular: Normal rate, regular rhythm, normal heart sounds and intact distal pulses. Exam reveals no gallop and no friction rub.   No murmur heard.  Pulmonary/Chest: Effort normal and breath sounds normal. No stridor. No respiratory distress. She has no wheezes. She has no rales. She exhibits no tenderness.   Abdominal: Soft. Bowel sounds are normal. She exhibits no distension and no mass. There is no tenderness.   Musculoskeletal: She exhibits no edema or deformity.   Left leg ACE wrap and knee immobilizer intact    Neurological: She is alert and oriented to person, place, and time.   Skin: Skin is warm and dry. She is not diaphoretic.   Psychiatric: Her mood appears anxious.   Nursing note and vitals reviewed.      Significant Labs:   Recent Lab Results       01/20/19  0419        Anion Gap 10     Baso # 0.03     Basophil% 0.3     BUN, Bld 9     Calcium 9.1     Chloride 101     CO2 26     Creatinine 0.7     Differential Method Automated     eGFR if  >60     eGFR if non  >60  Comment:  Calculation used to obtain the estimated glomerular filtration  rate (eGFR) is the CKD-EPI equation.        Eos # 0.4     Eosinophil% 3.1     Glucose 90     Gran # (ANC) 7.0     Gran% 59.9     Hematocrit 28.2     Hemoglobin 9.2     Lymph # 2.8     Lymph% 23.8     MCH 28.9     MCHC 32.6     MCV 89     Mono # 1.5     Mono% 12.9     MPV 8.2     Platelets 526     Potassium 3.9     RBC 3.18     RDW 13.6     Sodium 137     WBC 11.68         All pertinent labs within the past 24 hours have been reviewed.    Significant Imaging: I have reviewed all pertinent imaging results/findings within the past 24 hours.

## 2019-01-21 LAB
ANION GAP SERPL CALC-SCNC: 8 MMOL/L
BASOPHILS # BLD AUTO: 0.03 K/UL
BASOPHILS NFR BLD: 0.2 %
BUN SERPL-MCNC: 11 MG/DL
CALCIUM SERPL-MCNC: 9.3 MG/DL
CHLORIDE SERPL-SCNC: 101 MMOL/L
CO2 SERPL-SCNC: 28 MMOL/L
CREAT SERPL-MCNC: 0.7 MG/DL
DIFFERENTIAL METHOD: ABNORMAL
EOSINOPHIL # BLD AUTO: 0.4 K/UL
EOSINOPHIL NFR BLD: 3 %
ERYTHROCYTE [DISTWIDTH] IN BLOOD BY AUTOMATED COUNT: 13.6 %
EST. GFR  (AFRICAN AMERICAN): >60 ML/MIN/1.73 M^2
EST. GFR  (NON AFRICAN AMERICAN): >60 ML/MIN/1.73 M^2
GLUCOSE SERPL-MCNC: 96 MG/DL
HCT VFR BLD AUTO: 28.7 %
HGB BLD-MCNC: 9.3 G/DL
LYMPHOCYTES # BLD AUTO: 3.2 K/UL
LYMPHOCYTES NFR BLD: 25.8 %
MCH RBC QN AUTO: 28.5 PG
MCHC RBC AUTO-ENTMCNC: 32.4 G/DL
MCV RBC AUTO: 88 FL
MONOCYTES # BLD AUTO: 1.4 K/UL
MONOCYTES NFR BLD: 11.1 %
NEUTROPHILS # BLD AUTO: 7.5 K/UL
NEUTROPHILS NFR BLD: 59.9 %
PLATELET # BLD AUTO: 565 K/UL
PMV BLD AUTO: 8.2 FL
POTASSIUM SERPL-SCNC: 4.3 MMOL/L
RBC # BLD AUTO: 3.26 M/UL
SODIUM SERPL-SCNC: 137 MMOL/L
WBC # BLD AUTO: 12.5 K/UL

## 2019-01-21 PROCEDURE — 80048 BASIC METABOLIC PNL TOTAL CA: CPT

## 2019-01-21 PROCEDURE — 85025 COMPLETE CBC W/AUTO DIFF WBC: CPT

## 2019-01-21 PROCEDURE — 11000001 HC ACUTE MED/SURG PRIVATE ROOM

## 2019-01-21 PROCEDURE — 97803 MED NUTRITION INDIV SUBSEQ: CPT

## 2019-01-21 PROCEDURE — 97530 THERAPEUTIC ACTIVITIES: CPT | Performed by: PHYSICAL THERAPIST

## 2019-01-21 PROCEDURE — 25000003 PHARM REV CODE 250: Performed by: INTERNAL MEDICINE

## 2019-01-21 PROCEDURE — 97116 GAIT TRAINING THERAPY: CPT

## 2019-01-21 PROCEDURE — 25000003 PHARM REV CODE 250: Performed by: NURSE PRACTITIONER

## 2019-01-21 PROCEDURE — 63600175 PHARM REV CODE 636 W HCPCS: Performed by: ORTHOPAEDIC SURGERY

## 2019-01-21 PROCEDURE — 25000003 PHARM REV CODE 250: Performed by: ORTHOPAEDIC SURGERY

## 2019-01-21 PROCEDURE — 36415 COLL VENOUS BLD VENIPUNCTURE: CPT

## 2019-01-21 PROCEDURE — 97535 SELF CARE MNGMENT TRAINING: CPT | Performed by: PHYSICAL THERAPIST

## 2019-01-21 RX ADMIN — POLYETHYLENE GLYCOL 3350 17 G: 17 POWDER, FOR SOLUTION ORAL at 08:01

## 2019-01-21 RX ADMIN — AMITRIPTYLINE HYDROCHLORIDE 50 MG: 50 TABLET, FILM COATED ORAL at 08:01

## 2019-01-21 RX ADMIN — PANTOPRAZOLE SODIUM 40 MG: 40 TABLET, DELAYED RELEASE ORAL at 08:01

## 2019-01-21 RX ADMIN — VARENICLINE TARTRATE 1 MG: 0.5 TABLET, FILM COATED ORAL at 08:01

## 2019-01-21 RX ADMIN — STANDARDIZED SENNA CONCENTRATE AND DOCUSATE SODIUM 1 TABLET: 8.6; 5 TABLET, FILM COATED ORAL at 08:01

## 2019-01-21 RX ADMIN — CHLORHEXIDINE GLUCONATE 10 ML: 1.2 RINSE ORAL at 08:01

## 2019-01-21 RX ADMIN — ENOXAPARIN SODIUM 40 MG: 100 INJECTION SUBCUTANEOUS at 08:01

## 2019-01-21 RX ADMIN — OXYCODONE HYDROCHLORIDE AND ACETAMINOPHEN 1 TABLET: 10; 325 TABLET ORAL at 12:01

## 2019-01-21 NOTE — SUBJECTIVE & OBJECTIVE
Interval History: Patient reports nerve pain to L thigh much better with amitriptyline. She will have dressing change tomorrow by Ortho and then will be ready to transfer to rehab pending insurance approval. Discussed with case management this morning.    Review of Systems   Constitutional: Positive for activity change. Negative for chills and fever.   HENT: Negative for congestion, rhinorrhea and sinus pressure.    Eyes: Negative for pain and visual disturbance.   Respiratory: Negative for cough, shortness of breath and wheezing.    Cardiovascular: Negative for chest pain, palpitations and leg swelling.   Gastrointestinal: Negative for abdominal distention, abdominal pain, diarrhea, nausea and vomiting.   Endocrine: Negative for cold intolerance and heat intolerance.   Genitourinary: Negative for difficulty urinating, dysuria and hematuria.   Musculoskeletal: Negative for arthralgias and joint swelling.        Lt leg pain    Skin: Negative for color change, pallor and rash.   Allergic/Immunologic: Negative.    Neurological: Positive for weakness. Negative for dizziness, seizures, numbness and headaches.   Hematological: Negative.    Psychiatric/Behavioral: Negative for agitation, behavioral problems and confusion. The patient is nervous/anxious.      Objective:     Vital Signs (Most Recent):  Temp: 99 °F (37.2 °C) (01/21/19 0721)  Pulse: 78 (01/21/19 0721)  Resp: 16 (01/21/19 0721)  BP: 113/66 (01/21/19 0721)  SpO2: 96 % (01/21/19 0721) Vital Signs (24h Range):  Temp:  [98 °F (36.7 °C)-99.3 °F (37.4 °C)] 99 °F (37.2 °C)  Pulse:  [78-90] 78  Resp:  [16-18] 16  SpO2:  [93 %-97 %] 96 %  BP: (103-114)/(52-66) 113/66     Weight: 114.3 kg (251 lb 15.8 oz)  Body mass index is 39.47 kg/m².    Intake/Output Summary (Last 24 hours) at 1/21/2019 0944  Last data filed at 1/20/2019 2000  Gross per 24 hour   Intake 420 ml   Output 0 ml   Net 420 ml      Physical Exam   Constitutional: She is oriented to person, place, and time.  She appears well-developed and well-nourished. No distress.   HENT:   Head: Normocephalic and atraumatic.   Nose: Nose normal.   Eyes: Conjunctivae and EOM are normal. No scleral icterus.   Neck: Normal range of motion. Neck supple. No JVD present.   Cardiovascular: Normal rate, regular rhythm, normal heart sounds and intact distal pulses. Exam reveals no gallop and no friction rub.   No murmur heard.  Pulmonary/Chest: Effort normal and breath sounds normal. No stridor. No respiratory distress. She has no wheezes. She has no rales. She exhibits no tenderness.   Abdominal: Soft. Bowel sounds are normal. She exhibits no distension and no mass. There is no tenderness.   Genitourinary:   Genitourinary Comments: deferred   Musculoskeletal: She exhibits no edema or deformity.   Left leg ACE wrap and knee immobilizer intact    Neurological: She is alert and oriented to person, place, and time.   Skin: Skin is warm and dry. She is not diaphoretic.   Psychiatric: Her mood appears anxious.   Nursing note and vitals reviewed.      Significant Labs:   Recent Lab Results       01/21/19  0423        Anion Gap 8     Baso # 0.03     Basophil% 0.2     BUN, Bld 11     Calcium 9.3     Chloride 101     CO2 28     Creatinine 0.7     Differential Method Automated     eGFR if  >60     eGFR if non  >60  Comment:  Calculation used to obtain the estimated glomerular filtration  rate (eGFR) is the CKD-EPI equation.        Eos # 0.4     Eosinophil% 3.0     Glucose 96     Gran # (ANC) 7.5     Gran% 59.9     Hematocrit 28.7     Hemoglobin 9.3     Lymph # 3.2     Lymph% 25.8     MCH 28.5     MCHC 32.4     MCV 88     Mono # 1.4     Mono% 11.1     MPV 8.2     Platelets 565     Potassium 4.3     RBC 3.26     RDW 13.6     Sodium 137     WBC 12.50         All pertinent labs within the past 24 hours have been reviewed.    Significant Imaging: I have reviewed all pertinent imaging results/findings within the past 24  hours.

## 2019-01-21 NOTE — PLAN OF CARE
Problem: Adult Inpatient Plan of Care  Goal: Plan of Care Review  Outcome: Ongoing (interventions implemented as appropriate)  Free from falls and injuries. Pain controlled. Knee immobilizer to the left leg intact. Wound vac intact at 125. Chart check completed.

## 2019-01-21 NOTE — PLAN OF CARE
CM spoke to patient.  She does not want to discharge to an inpatient rehab facility.  She would like to go home with home health at discharge.  ALAN spoke to Jj at  [phone] to update that patient does not want inpatient rehab.  She wants to go home with home health and equipment.

## 2019-01-21 NOTE — PLAN OF CARE
Problem: Adult Inpatient Plan of Care  Goal: Plan of Care Review  Outcome: Ongoing (interventions implemented as appropriate)  Recommendations     Recommendation: 1. Continue current diet. 2. Will continue to monitor.   Goals: Meet > 85 % EEN/EPN while admitted  Nutrition Goal Status: goal met   Communication of RD Recs: (POc, sticky note)

## 2019-01-21 NOTE — PROGRESS NOTES
No acute events.  Pain controlled.    Vitals:    19 0315   BP: (!) 108/52   Pulse: 84   Resp: 18   Temp: 98 °F (36.7 °C)     Temp (24hrs), Av.6 °F (37 °C), Min:98 °F (36.7 °C), Max:99.3 °F (37.4 °C)  ]    NAD  LLE: wound vac without leaks. SILT. EHL/tA/GS fire.    Labs:  Lab Results   Component Value Date/Time    HCT 28.7 (L) 2019 04:23 AM    K 4.3 2019 04:23 AM    CREATININE 0.7 2019 04:23 AM    INR 0.9 01/10/2019 05:37 PM   ]    Plan:  NWB LLE  Knee immobilizer on at all times  Incisional VAC stays on until tomorrow.  Tomorrow morning, incisional vac comes down, and apply dry, sterile dressings (4x4 gauze, abd pad) and re-wrap with ace wrap  DVT prophylaxis  Follow-up 2 weeks with me, will initiate knee ROM at that time      Barry Guzman M.D.  Orthopaedic Surgery  [facility]  [phone]
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
## 2019-01-21 NOTE — PLAN OF CARE
Problem: Adult Inpatient Plan of Care  Goal: Plan of Care Review  Remains injury free. Pain managed. C/O burning sensation, states it has improved. Repositions per self. Up to chair. tolerated well. No s/s acute distress.

## 2019-01-21 NOTE — PLAN OF CARE
Problem: Physical Therapy Goal  Goal: Physical Therapy Goal  LTG'S TO BE MET IN 7 DAYS (1-26-19)  1. PT WILL REQUIRE BERTO FOR BED MOBILITY  2. PT WILL REQUIRE BERTO FOR TF'S, NWB FOR LLE  3. PT WILL DEMO F- DYNAMIC BALANCE DURING TF   4. PT WILL AMB 50' WITH RW WITH MOD ASSIST FOLLOWING NWB LLE STATUS AND NO GROSS LOB   Outcome: Ongoing (interventions implemented as appropriate)  PT GT TRAINED X 60' WITH RW AND S NWB L LE.

## 2019-01-21 NOTE — PT/OT/SLP PROGRESS
"Occupational Therapy  Treatment    Teresa Cazares   MRN: [id number]   Admitting Diagnosis: Closed fracture of proximal end of left tibia    OT Date of Treatment: 01/21/19   OT Start Time: 0955  OT Stop Time: 1020  OT Total Time (min): 25 min    Billable Minutes:  Self Care/Home Management 15 min and Therapeutic Activity 10 min    General Precautions: Standard, fall  Orthopedic Precautions: LLE non weight bearing  Braces: Knee immobilizer         Subjective:  Communicated with Nurse Hortencia and epic chart review prior to session.  Pt found supine in bed and agreeable to tx at this time.   Pain/Comfort  Pain Rating 1: 0/10  Location - Side 1: Left  Location 1: leg  Pain Addressed 1: Pre-medicate for activity    Objective:  Patient found with: wound vac, peripheral IV     Pt performed supine>sit with SBA, scooted to EOB. Pt educated in and performed (L) LE dressing using sock aid with Min A. Pt performed functional mobility using RW with SBA ~50ft and NWB (L)LE. Pt t/f on/off commode using RW with Supervision and toileted Mod I, t/f to chair using RW with Supervision.     AM-PAC 6 CLICK ADL   How much help from another person does this patient currently need?   1 = Unable, Total/Dependent Assistance  2 = A lot, Maximum/Moderate Assistance  3 = A little, Minimum/Contact Guard/Supervision  4 = None, Modified Mobile/Independent    Putting on and taking off regular lower body clothing? : 2  Bathing (including washing, rinsing, drying)?: 3  Toileting, which includes using toilet, bedpan, or urinal? : 4  Putting on and taking off regular upper body clothing?: 4  Taking care of personal grooming such as brushing teeth?: 4  Eating meals?: 4  Daily Activity Total Score: 21     AM-PAC Raw Score CMS "G-Code Modifier Level of Impairment Assistance   6 % Total / Unable   7 - 8 CM 80 - 100% Maximal Assist   9-13 CL 60 - 80% Moderate Assist   14 - 19 CK 40 - 60% Moderate Assist   20 - 22 CJ 20 - 40% Minimal Assist   23 " CI 1-20% SBA / CGA   24 CH 0% Independent/ Mod I       Patient left up in chair with all lines intact, call button in reach and Nurse Hortencia notified    ASSESSMENT:  Teresa Cazares is a 43 y.o. female with a medical diagnosis of Closed fracture of proximal end of left tibia and presents with impaired functional mobility and ADLs. Pt will benefit from continued skilled OT in order to address impairments. .    Rehab identified problem list/impairments: Rehab identified problem list/impairments: weakness, impaired endurance, gait instability, impaired balance, edema, pain, decreased safety awareness, decreased ROM    Rehab potential is good.    Activity tolerance: Good    Discharge recommendations: Discharge Facility/Level of Care Needs: other (see comments)(home health)     Barriers to discharge: Barriers to Discharge: Decreased caregiver support    Equipment recommendations: walker, rolling     GOALS:   Multidisciplinary Problems     Occupational Therapy Goals        Problem: Occupational Therapy Goal    Goal Priority Disciplines Outcome Interventions   Occupational Therapy Goal     OT, PT/OT Ongoing (interventions implemented as appropriate)    Description:  OT GOALS TO BE MET BY 1-23-19  Mod I WITH BSC T/F'S  PT WILL TOLERATE 1 SET X 15 REPS B UE ROM EXERCISE WITH MIN RESISTANCE  S WITH UE DRESSING  Mod I WITH BE MOBILITY                     Plan:  Patient to be seen 3 x/week to address the above listed problems via self-care/home management, therapeutic activities, therapeutic exercises  Plan of Care expires: 01/23/19  Plan of Care reviewed with: patient    OT G-codes  Functional Assessment Tool Used: [facility]  Score: 21    Alley Villagomez, PT/OT  01/21/2019

## 2019-01-21 NOTE — PLAN OF CARE
Problem: Adult Inpatient Plan of Care  Goal: Patient-Specific Goal (Individualization)  Fall precautions in place. Call light and personal items within reach. Educated pt on side effects of medications administered. Pt verbalized understanding. 24 hour chart check done. Will continue to monitor.      Problem: Infection  Goal: Infection Symptom Resolution  Outcome: Ongoing (interventions implemented as appropriate)  Intervention: Prevent or Manage Infection  Aseptic technique maintained. Educated the patient on the importance of handwashing. Fall precautions in place. Call light and personal items within reach.Pt verbalized understanding. 24 hour chart check done. Will continue to monitor.

## 2019-01-21 NOTE — PLAN OF CARE
Problem: Occupational Therapy Goal  Goal: Occupational Therapy Goal  OT GOALS TO BE MET BY 1-23-19  Mod I WITH BSC T/F'S  PT WILL TOLERATE 1 SET X 15 REPS B UE ROM EXERCISE WITH MIN RESISTANCE  S WITH UE DRESSING  Mod I WITH BE MOBILITY   Outcome: Ongoing (interventions implemented as appropriate)  Working toward goals

## 2019-01-21 NOTE — PROGRESS NOTES
"  [facility] -   Adult Nutrition  Progress Note    SUMMARY     Recommendations    Recommendation: 1. Continue current diet. 2. Will continue to monitor.   Goals: Meet > 85 % EEN/EPN while admitted  Nutrition Goal Status: goal met   Communication of RD Recs: (POc, sticky note)    Reason for Assessment    Reason For Assessment: Rd f/u  Dx:  1. Tibial fracture    2. Left knee pain    3. Pre-op evaluation    4. Closed fracture of proximal end of left tibia      Past Medical History:   Diagnosis Date    Depression     denies hospitalization or bipolar disorder    Obesity      General info comments:   1.18.19.S/p ORIF Lt proximal tibia. Pt on regular diet. PO intake today 25 %. Per epic records, no wt loss. NFPE not completed d/t pt w/ other healthcare providers at time of visit. Will complete at f/u.   1.21.19. Pt reports good appetite today (PO intake ~ 100%). Pt reports no wt loss. Pt reports good appetite PTA. Per NFPE 1.21.19, no muscle wasting/ fat depletion noted.   Discharge plan:  Regular diet     Nutrition Risk Screen    Nutrition Risk Screen: no indicators present    Nutrition/Diet History    Spiritual, Cultural Beliefs, Episcopalian Practices, Values that Affect Care: no    Anthropometrics    Temp: 98.7 °F (37.1 °C)  Height Method: Stated  Height: 5' 7" (170.2 cm)  Height (inches): 67 in  Weight Method: Bed Scale  Weight: 114.3 kg (251 lb 15.8 oz)  Weight (lb): 251.99 lb  Ideal Body Weight (IBW), Female: 135 lb  % Ideal Body Weight, Female (lb): 186.66 lb  BMI (Calculated): 39.5  BMI Grade: 35 - 39.9 - obesity - grade II       Lab/Procedures/Meds    Pertinent Labs Reviewed: reviewed  BMP  Lab Results   Component Value Date     01/21/2019    K 4.3 01/21/2019     01/21/2019    CO2 28 01/21/2019    BUN 11 01/21/2019    CREATININE 0.7 01/21/2019    CALCIUM 9.3 01/21/2019    ANIONGAP 8 01/21/2019    ESTGFRAFRICA >60 01/21/2019    EGFRNONAA >60 01/21/2019     Lab Results   Component Value " Date    CALCIUM 9.3 01/21/2019    PHOS 3.8 01/11/2019     Lab Results   Component Value Date    ALBUMIN 4.2 01/10/2019     No results for input(s): POCTGLUCOSE in the last 24 hours.    Pertinent Medications Reviewed: reviewed    Physical Findings/Assessment     skin: incision leg     Estimated/Assessed Needs    Weight Used For Calorie Calculations: 114.3 kg (251 lb 15.8 oz)  Energy Calorie Requirements (kcal): 2184  Energy Need Method: Atkinson-St Jeor(x1.2)  Protein Requirements: 137 g  Weight Used For Protein Calculations: 114.3 kg (251 lb 15.8 oz)     Estimated Fluid Requirement Method: RDA Method(or pe rMD)  RDA Method (mL): 2184         Nutrition Prescription Ordered    Nutrition Order Comments: Regular diet    Evaluation of Received Nutrient/Fluid Intake          Intake/Output Summary (Last 24 hours) at 1/21/2019 1525  Last data filed at 1/21/2019 1200  Gross per 24 hour   Intake 1170 ml   Output 0 ml   Net 1170 ml       % Intake of Estimated Energy Needs: 25 - 50 %  % Meal Intake: 25 - 50 %    Nutrition Risk      1xweekly    Assessment and Plan      Nutrition Problem  Inadequate energy intake     Related to (etiology):   Decreased appetite     Signs and Symptoms (as evidenced by):   PO intake 25 %     Interventions/Recommendations (treatment strategy):  See above     Nutrition Diagnosis Status:   Resolved. PO today ~ 100%      Monitor and Evaluation    Food and Nutrient Intake: energy intake, food and beverage intake  Food and Nutrient Adminstration: diet order  Anthropometric Measurements: weight  Biochemical Data, Medical Tests and Procedures: electrolyte and renal panel, glucose/endocrine profile  Nutrition-Focused Physical Findings: overall appearance          Nutrition Follow-Up    RD Follow-up?: Yes

## 2019-01-21 NOTE — PT/OT/SLP PROGRESS
Physical Therapy  Treatment    Teresa Cazares   MRN: [id number]   Admitting Diagnosis: Closed fracture of proximal end of left tibia    PT Received On: 01/21/19  PT Start Time: 1201     PT Stop Time: 1215    PT Total Time (min): 14 min       Billable Minutes:  Gait Training 14    Treatment Type: Treatment  PT/PTA: PT     PTA Visit Number: 1       General Precautions: Standard, fall  Orthopedic Precautions: LLE non weight bearing   Braces: Knee immobilizer    Spiritual, Cultural Beliefs, Rastafari Practices, Values that Affect Care: no    Subjective:  Communicated with NURSE FIGUEROA AND [facility] CHART REVIEW prior to session.   PT MET IN  AGREED TO TX. PT REFUSED GT BELT    Pain/Comfort  Pain Rating 1: 0/10  Pain Addressed 1: Pre-medicate for activity    Objective:   Patient found with: peripheral IV, wound vac    Functional Mobility:  PT STOOD FROM CHAIR WITH S AND GT TRAINED X 60' WITH NWB  L LE AND S. PT RETURNED TO RM AFTER IV CAME OUT. P.T. CALLED FOR NURSE. PT RETURNED TO RM T/F TO CHAIR WITH RW AND S. PT STOOD WITH RW AND NWB L LE AND T/F TO CHAIR WITH S. PT SUP IN BED WITH CGA OF L LE. PT LEFT SUP WITH ALL NEEDS MET AND CALL BELL IN REACH.     AM-PAC 6 CLICK MOBILITY  How much help from another person does this patient currently need?   1 = Unable, Total/Dependent Assistance  2 = A lot, Maximum/Moderate Assistance  3 = A little, Minimum/Contact Guard/Supervision  4 = None, Modified [place]/Independent    Turning over in bed (including adjusting bedclothes, sheets and blankets)?: 3  Sitting down on and standing up from a chair with arms (e.g., wheelchair, bedside commode, etc.): 4  Moving from lying on back to sitting on the side of the bed?: 3  Moving to and from a bed to a chair (including a wheelchair)?: 4  Need to walk in hospital room?: 4  Climbing 3-5 steps with a railing?: 1  Basic Mobility Total Score: 19    AM-PAC Raw Score CMS G-Code Modifier Level of Impairment Assistance   6 % Total  / Unable   7 - 9 CM 80 - 100% Maximal Assist   10 - 14 CL 60 - 80% Moderate Assist   15 - 19 CK 40 - 60% Moderate Assist   20 - 22 CJ 20 - 40% Minimal Assist   23 CI 1-20% SBA / CGA   24 CH 0% Independent/ Mod I     Patient left supine with call button in reach.    Assessment:  PT [place] MIN TX. PT DECLINED TE AS SHE REPORTED SHE HAS BEEN DOING THEM AND SOME OF THEM ARE TO MUCH AS HER L LE WAS TO HEAVY.     Rehab identified problem list/impairments: Rehab identified problem list/impairments: weakness, impaired endurance, decreased lower extremity function, impaired functional mobilty    Rehab potential is good.    Activity tolerance: Fair    Discharge recommendations: Discharge Facility/Level of Care Needs: other (see comments)(HH P.T. )     Barriers to discharge:      Equipment recommendations: Equipment Needed After Discharge: walker, rolling     GOALS:   Multidisciplinary Problems     Physical Therapy Goals        Problem: Physical Therapy Goal    Goal Priority Disciplines Outcome Goal Variances Interventions   Physical Therapy Goal     PT, PT/OT Ongoing (interventions implemented as appropriate)     Description:  LTG'S TO BE MET IN 7 DAYS (1-28-19)  1. PT WILL REQUIRE SBA FOR BED MOBILITY  2. PT WILL REQUIRE SBA FOR TF'S, NWB FOR LLE  3. PT WILL DEMO F- DYNAMIC BALANCE DURING TF   4. PT WILL ' WITH RW WITH S ASSIST FOLLOWING NWB LLE STATUS AND NO GROSS LOB                     PLAN:    Patient to be seen 5 x/week  to address the above listed problems via gait training, therapeutic activities, therapeutic exercises  Plan of Care expires: 01/28/19  Plan of Care reviewed with: patient         Brenda Pugh, PT  01/21/2019

## 2019-01-22 VITALS
RESPIRATION RATE: 18 BRPM | OXYGEN SATURATION: 96 % | HEIGHT: 67 IN | WEIGHT: 252 LBS | BODY MASS INDEX: 39.55 KG/M2 | SYSTOLIC BLOOD PRESSURE: 123 MMHG | HEART RATE: 98 BPM | TEMPERATURE: 99 F | DIASTOLIC BLOOD PRESSURE: 73 MMHG

## 2019-01-22 LAB
ANION GAP SERPL CALC-SCNC: 8 MMOL/L
BASOPHILS # BLD AUTO: 0.04 K/UL
BASOPHILS NFR BLD: 0.3 %
BUN SERPL-MCNC: 13 MG/DL
CALCIUM SERPL-MCNC: 9.3 MG/DL
CHLORIDE SERPL-SCNC: 100 MMOL/L
CO2 SERPL-SCNC: 27 MMOL/L
CREAT SERPL-MCNC: 0.7 MG/DL
DIFFERENTIAL METHOD: ABNORMAL
EOSINOPHIL # BLD AUTO: 0.4 K/UL
EOSINOPHIL NFR BLD: 2.8 %
ERYTHROCYTE [DISTWIDTH] IN BLOOD BY AUTOMATED COUNT: 13.4 %
EST. GFR  (AFRICAN AMERICAN): >60 ML/MIN/1.73 M^2
EST. GFR  (NON AFRICAN AMERICAN): >60 ML/MIN/1.73 M^2
GLUCOSE SERPL-MCNC: 107 MG/DL
HCT VFR BLD AUTO: 29.3 %
HGB BLD-MCNC: 9.5 G/DL
LYMPHOCYTES # BLD AUTO: 3.2 K/UL
LYMPHOCYTES NFR BLD: 24.2 %
MCH RBC QN AUTO: 28.5 PG
MCHC RBC AUTO-ENTMCNC: 32.4 G/DL
MCV RBC AUTO: 88 FL
MONOCYTES # BLD AUTO: 1.3 K/UL
MONOCYTES NFR BLD: 9.7 %
NEUTROPHILS # BLD AUTO: 8.3 K/UL
NEUTROPHILS NFR BLD: 63.2 %
PLATELET # BLD AUTO: 601 K/UL
PMV BLD AUTO: 8.2 FL
POTASSIUM SERPL-SCNC: 4.3 MMOL/L
RBC # BLD AUTO: 3.33 M/UL
SODIUM SERPL-SCNC: 135 MMOL/L
WBC # BLD AUTO: 13.12 K/UL

## 2019-01-22 PROCEDURE — 97116 GAIT TRAINING THERAPY: CPT

## 2019-01-22 PROCEDURE — 97110 THERAPEUTIC EXERCISES: CPT

## 2019-01-22 PROCEDURE — 80048 BASIC METABOLIC PNL TOTAL CA: CPT

## 2019-01-22 PROCEDURE — 25000003 PHARM REV CODE 250: Performed by: ORTHOPAEDIC SURGERY

## 2019-01-22 PROCEDURE — 25000003 PHARM REV CODE 250: Performed by: INTERNAL MEDICINE

## 2019-01-22 PROCEDURE — 63600175 PHARM REV CODE 636 W HCPCS: Performed by: ORTHOPAEDIC SURGERY

## 2019-01-22 PROCEDURE — 85025 COMPLETE CBC W/AUTO DIFF WBC: CPT

## 2019-01-22 PROCEDURE — 36415 COLL VENOUS BLD VENIPUNCTURE: CPT

## 2019-01-22 RX ORDER — OXYCODONE AND ACETAMINOPHEN 10; 325 MG/1; MG/1
1 TABLET ORAL EVERY 4 HOURS PRN
Qty: 42 TABLET | Refills: 0 | Status: SHIPPED | OUTPATIENT
Start: 2019-01-22 | End: 2019-03-26

## 2019-01-22 RX ORDER — AMOXICILLIN 250 MG
1 CAPSULE ORAL 2 TIMES DAILY
COMMUNITY
Start: 2019-01-22 | End: 2019-03-26

## 2019-01-22 RX ORDER — AMITRIPTYLINE HYDROCHLORIDE 50 MG/1
50 TABLET, FILM COATED ORAL NIGHTLY
Qty: 30 TABLET | Refills: 1 | Status: SHIPPED | OUTPATIENT
Start: 2019-01-22 | End: 2019-03-26

## 2019-01-22 RX ORDER — POLYETHYLENE GLYCOL 3350 17 G/17G
17 POWDER, FOR SOLUTION ORAL DAILY
Qty: 510 G | Refills: 0 | Status: SHIPPED | OUTPATIENT
Start: 2019-01-23 | End: 2019-03-28 | Stop reason: ALTCHOICE

## 2019-01-22 RX ADMIN — STANDARDIZED SENNA CONCENTRATE AND DOCUSATE SODIUM 1 TABLET: 8.6; 5 TABLET, FILM COATED ORAL at 08:01

## 2019-01-22 RX ADMIN — CHLORHEXIDINE GLUCONATE 10 ML: 1.2 RINSE ORAL at 08:01

## 2019-01-22 RX ADMIN — VARENICLINE TARTRATE 1 MG: 0.5 TABLET, FILM COATED ORAL at 08:01

## 2019-01-22 RX ADMIN — POLYETHYLENE GLYCOL 3350 17 G: 17 POWDER, FOR SOLUTION ORAL at 08:01

## 2019-01-22 RX ADMIN — ENOXAPARIN SODIUM 40 MG: 100 INJECTION SUBCUTANEOUS at 08:01

## 2019-01-22 RX ADMIN — PANTOPRAZOLE SODIUM 40 MG: 40 TABLET, DELAYED RELEASE ORAL at 08:01

## 2019-01-22 NOTE — PT/OT/SLP PROGRESS
Physical Therapy  Treatment    Teresa Cazares   MRN: [id number]   Admitting Diagnosis: Closed fracture of proximal end of left tibia    PT Received On: 01/22/19  PT Start Time: 0830     PT Stop Time: 0855    PT Total Time (min): 25 min       Billable Minutes:  Gait Training 15 and Therapeutic Exercise 10    Treatment Type: Treatment  PT/PTA: PTA     PTA Visit Number: 2       General Precautions: Standard, fall  Orthopedic Precautions: LLE non weight bearing   Braces: Knee immobilizer    Spiritual, Cultural Beliefs, Mu-ism Practices, Values that Affect Care: no    Subjective:  Communicated with Epic AND NURSEMOHINI prior to session.  PATIENT AGREE TO TX NOW.    Pain/Comfort  Pain Rating 1: 0/10    Objective:   Patient found with: peripheral IV, wound vac, SHORTSEATED IN BED.    Functional Mobility:  Bed Mobility:    T/FS OOB AT CLOSE SBAX1    Transfers:   SIT TO STAND AT CGAX1    Gait:    GT 'X1 WITH RW, ADHERE WELL WITH NWB STATUS WITH LLE.        Balance:   Static Sit: GOOD-: Takes MODERATE challenges from all directions but inconsistently  Dynamic Sit: GOOD: Maintains balance through MODERATE excursions of active trunk movement  Static Stand: GOOD-: Takes MODERATE challenges from all directions inconsistently  Dynamic stand: GOOD: Needs SUPERVISION only during gait and able to self right with moderate LOB     Therapeutic Activities and Exercises:  JORDON LE EXERCISES, OOB T/FS, GT     AM-PAC 6 CLICK MOBILITY  How much help from another person does this patient currently need?   1 = Unable, Total/Dependent Assistance  2 = A lot, Maximum/Moderate Assistance  3 = A little, Minimum/Contact Guard/Supervision  4 = None, Modified [place]/Independent    Turning over in bed (including adjusting bedclothes, sheets and blankets)?: 3  Sitting down on and standing up from a chair with arms (e.g., wheelchair, bedside commode, etc.): 4  Moving from lying on back to sitting on the side of the bed?: 3  Moving to  and from a bed to a chair (including a wheelchair)?: 4  Need to walk in hospital room?: 1  Climbing 3-5 steps with a railing?: 1  Basic Mobility Total Score: 16    AM-PAC Raw Score CMS G-Code Modifier Level of Impairment Assistance   6 % Total / Unable   7 - 9 CM 80 - 100% Maximal Assist   10 - 14 CL 60 - 80% Moderate Assist   15 - 19 CK 40 - 60% Moderate Assist   20 - 22 CJ 20 - 40% Minimal Assist   23 CI 1-20% SBA / CGA   24 CH 0% Independent/ Mod I     Patient LEFT IN BED LONG SEATED,  with all lines intact and call button in reach.    Assessment:  Teresa Cazares is a 43 y.o. female with a medical diagnosis of Closed fracture of proximal end of left tibia .    Rehab identified problem list/impairments: Rehab identified problem list/impairments: weakness, impaired self care skills, impaired endurance, impaired functional mobilty, impaired sensation, gait instability    Rehab potential is excellent.    Activity tolerance: Excellent    Discharge recommendations: Discharge Facility/Level of Care Needs: rehabilitation facility     Barriers to discharge:      Equipment recommendations: Equipment Needed After Discharge: walker, rolling     GOALS:   Multidisciplinary Problems     Physical Therapy Goals        Problem: Physical Therapy Goal    Goal Priority Disciplines Outcome Goal Variances Interventions   Physical Therapy Goal     PT, PT/OT Ongoing (interventions implemented as appropriate)     Description:  LTG'S TO BE MET IN 7 DAYS (1-28-19)  1. PT WILL REQUIRE SBA FOR BED MOBILITY  2. PT WILL REQUIRE SBA FOR TF'S, NWB FOR LLE  3. PT WILL DEMO F- DYNAMIC BALANCE DURING TF   4. PT WILL ' WITH RW WITH S ASSIST FOLLOWING NWB LLE STATUS AND NO GROSS LOB                     PLAN:    Patient to be seen 5 x/week  to address the above listed problems via gait training, therapeutic activities, therapeutic exercises  Plan of Care expires: 01/28/19  Plan of Care reviewed with: patient         Crista Rolando,  PTA  01/22/2019

## 2019-01-22 NOTE — PLAN OF CARE
Problem: Adult Inpatient Plan of Care  Goal: Plan of Care Review  Outcome: Ongoing (interventions implemented as appropriate)  NWB  To LLE, Q2hr turns, nv check complete, Pt remains free of injury, pain managed adequately, no s/s of distress. 24 hour chart check completed. Will continue to monitor.

## 2019-01-22 NOTE — NURSING
AVS reviewed with patient. Patient verbalized understanding with teach back. Patient understands to change dressing to LLE every other day and to have knee immobilizer in place at all times. D/C medication sent to [facility]. 2 out of 3 medications approved by workman's comp. Patient refused to wait for 3rd medication to be approved. States she will have family go pick it up from pharmacy. Patient agrees to pay for medication out of pocket if not covered by [employer]. Spoke with pharmacist. States they will deliver the 2 medications to bedside. Discussed follow up appointment with Dr. Guzman. States it has already been scheduled per Dr. Guzman's staff. No further needs at this time. Family to transport home and Home Health is set up to follow up with patient on scheduled visit. Family at bedside to transport patient home. Aware to call nurses station for staff assisted transport to vehicle in w/c

## 2019-01-22 NOTE — PLAN OF CARE
Problem: Physical Therapy Goal  Goal: Physical Therapy Goal  LTG'S TO BE MET IN 7 DAYS (1-28-19)  1. PT WILL REQUIRE SBA FOR BED MOBILITY  2. PT WILL REQUIRE SBA FOR TF'S, NWB FOR LLE  3. PT WILL DEMO F- DYNAMIC BALANCE DURING TF   4. PT WILL ' WITH RW WITH S ASSIST FOLLOWING NWB LLE STATUS AND NO GROSS LOB    Outcome: Ongoing (interventions implemented as appropriate)  PATIENT AMBUALTING 100'X1 , GOOD STEADY PACE WITH RW , ADHERE WELL WITH NWB STATUS LLE.

## 2019-01-22 NOTE — PHYSICIAN QUERY
"PT Name: Teresa Cazares  MR #: [id number]    Physician Query Form - Hematology Clarification      CDS/: Eveline Blackmon RN              Contact information:[email or web address]    This form is a permanent document in the medical record.      Query Date: January 22, 2019    By submitting this query, we are merely seeking further clarification of documentation. Please utilize your independent clinical judgment when addressing the question(s) below.    The Medical record contains the following:   Indicators  Supporting Clinical Findings Location in Medical Record    "Anemia" documented     x H & H = H&H= 13.3/40.1  H&H= 10.6/32.9  H&H=10/30.7  H&H= 9.1/28.4  H&H= 9.2/28.2  H&H= 9.5/29.3 Labs 1/10  Labs 1/12  Labs 1/15  Labs 1/19  Labs 1/20  Labs 1/22   x BP =                     HR= BP: 100/50    HR: 65  BP:  128/69   HR: 88  BP: 134/64    HR: 91  BP: 122/65    HR: 102  BP: 118/55    HR: 83  BP: 103/53    HR: 85 Vital signs 1/10@1514  Vital signs 1/11@2338  Vital signs 1/13@2110  Vital signs 1/18@0314  Vital signs 1/19@2347  Vital signs 1/20@1249    "GI bleeding" documented      Acute bleeding (Non GI site)      Transfusion(s)      Treatment:     x Other:  Tibial fracture. Procedure: external fixation device application to left tibia    Left knee comminuted bicondylar tibial plateau fracture, retained external fixator  Procedure: Left lower extremity external fixator removal under anesthesia, left bicondylar tibial plateau fracture open reduction internal fixation  EBL: 200 cc    Smoker, leukocytosis OP note 1/10      OP note 1/17              H&P     Provider, please specify diagnosis or diagnoses associated with above clinical findings.    [X  ] Acute blood loss anemia expected post-operatively   [  ] Acute blood loss anemia   [  ] Precipitous drop in Hematocrit       [  ] Other Hematological Diagnosis (please specify):     [  ] Clinically Undetermined       Please document in your progress notes " daily for the duration of treatment, until resolved, and include in your discharge summary.

## 2019-01-22 NOTE — PLAN OF CARE
Jan30 Follow up with Barry Guzman MD   Wednesday Jan 30, 2019   Post op apppointment at 1:00 pm.  Please arrive a few minutes early to complete paperwork  [address] [facility]   [address]   [facility] [place] [id number]   [phone]      DME already delivered to patient's home.  CM notified Ariadne with [employer] ([phone]) with  of discharge.  CM spoke to Crista (- [phone]) to determine how medication would be paid.  Awaiting a call back from Crista.     01/22/19 1141   Final Note   Assessment Type Final Discharge Note   Anticipated Discharge Disposition Home-Health   Hospital Follow Up  Appt(s) scheduled? Yes   Right Care Referral Info   Post Acute Recommendation Home-care   Facility Name [place], [place]
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
## 2019-01-24 ENCOUNTER — PATIENT OUTREACH (OUTPATIENT)
Dept: ADMINISTRATIVE | Facility: CLINIC | Age: 44
End: 2019-01-24

## 2019-01-24 RX ORDER — METAXALONE 800 MG/1
800 TABLET ORAL 3 TIMES DAILY
COMMUNITY
End: 2019-03-26

## 2019-01-24 NOTE — PATIENT INSTRUCTIONS
Preventing Deep Vein Thrombosis  Healthcare providers use the term venous thromboembolism (VTE) to describe two conditions, deep vein thrombosis (DVT) and pulmonary embolism (PE). They use the term VTE because the two conditions are very closely related. And, because their prevention and treatment are closely related.   DVT is a blood clot or thrombus in a deep vein. Most of these clots develop in the leg or thigh. But, they may form in a vein in the arm, or other part of the body.   Part of the blood clot may separate from the vein. This is called an embolus. It may travel to the lungs and form a pulmonary embolus. This can cut off the flow of blood to a portion of or to the entire lung. A blood clot in the lungs is a medical emergency and may cause death.  Over time, blood clots can also permanently damage veins. They must be treated right away to prevent problems.   Risk factors  Anyone can develop a blood clot. But the following risk factors make a blood clot more likely to happen:  · Being inactive for a long period, such as when youre in the hospital, or traveling by plane or car  · Injury to a vein from an accident, a broken bone, or surgery  · Having blood clots in the past or a family history of blood clots  · Blood clotting disorder  · Recent surgery  · Cancer and certain cancer treatments  · Smoking  Other factors can also put you at higher risk for a blood clot. They include:  · Age over 60 years  · Pregnancy  · Taking birth control pills or hormone replacement  · Having other vein problems, such as varicose veins   · Being overweight  · Having a pacemaker or a central venous catheter. They increase the chance of a blood clot forming in an arm.  · Injection drug use. This also increases the chance of a blood clot forming in an arm.  How to prevent DVT  Preventing a blood clot means improving blood flow back to your heart. To help prevent a blood clot:  · Talk with your healthcare provider about a  program of regular exercise.  · If your legs feel swollen or heavy, take a break and sit comfortably or lie down with your feet up.  · Keep a healthy weight.  · Quit smoking, if you smoke.  · Avoid sitting, standing, or lying down for long periods without moving your legs and feet:  ¨ When traveling by car, make frequent stops to get out and move around.  ¨ On long airplane, train, or bus rides, get up and move around when possible.  ¨ If you cant get up, wiggle your toes and tighten your calves to keep your blood moving, as pictured below.  If you need to have surgery, talk with your healthcare provider about a plan to prevent blood clots.   If you are in the hospital, your risk for blood clots increases. Your healthcare provider may prescribe an anticoagulant medicine or a blood thinner to help prevent blood clots. Or your healthcare provider may prescribe a sequential compression device (SCD) or intermittent pneumatic compression (IPC). The device has sleeves that fit around your legs. It applies gentle pressure to help with blood flow and prevent blood clots. Remove the sleeves so that you do not trip or fall when you are walking, like when you use the bathroom or shower. If you need help removing the sleeves, ask the nurse or aid. You may also want to try the following:    When to seek immediate medical attention  If you have symptoms of a blood clot in your lungs, call 911 or get emergency help. The symptoms are:  · Chest pain  · Trouble breathing  · Fast heartbeat  · Coughing (may cough up blood)  · Sweating  · Fainting  When to call your healthcare provider  If you have symptoms of a blood clot, call your healthcare provider. The symptoms are:  · Pain  · Swelling  · Redness or discoloration in a leg, arm, or other area   Date Last Reviewed: 5/1/2016  © 4472-1088 [employer]. [address] [place] [address], [place] [address]. All rights reserved. This information is not intended as a substitute for  professional medical care. Always follow your healthcare professional's instructions.

## 2019-01-25 ENCOUNTER — HOSPITAL ENCOUNTER (EMERGENCY)
Facility: HOSPITAL | Age: 44
Discharge: HOME | End: 2019-01-25
Attending: EMERGENCY MEDICINE
Payer: COMMERCIAL

## 2019-01-25 VITALS
TEMPERATURE: 98 F | SYSTOLIC BLOOD PRESSURE: 168 MMHG | RESPIRATION RATE: 20 BRPM | HEART RATE: 78 BPM | OXYGEN SATURATION: 97 % | DIASTOLIC BLOOD PRESSURE: 89 MMHG | BODY MASS INDEX: 46.09 KG/M2 | HEIGHT: 62 IN

## 2019-01-25 DIAGNOSIS — F17.200 NICOTINE DEPENDENCE: ICD-10-CM

## 2019-01-25 DIAGNOSIS — M79.662 PAIN AND SWELLING OF LEFT LOWER LEG: ICD-10-CM

## 2019-01-25 DIAGNOSIS — M25.511 ACUTE PAIN OF RIGHT SHOULDER: ICD-10-CM

## 2019-01-25 DIAGNOSIS — M24.811 INTERNAL DERANGEMENT OF RIGHT SHOULDER: ICD-10-CM

## 2019-01-25 DIAGNOSIS — M79.89 PAIN AND SWELLING OF LEFT LOWER LEG: ICD-10-CM

## 2019-01-25 PROCEDURE — 99284 EMERGENCY DEPT VISIT MOD MDM: CPT | Mod: 25

## 2019-01-25 RX ORDER — VARENICLINE TARTRATE 1 MG/1
1 TABLET, FILM COATED ORAL 2 TIMES DAILY
Qty: 60 TABLET | Refills: 0 | OUTPATIENT
Start: 2019-01-25

## 2019-01-25 RX ORDER — IBUPROFEN 800 MG/1
800 TABLET ORAL 3 TIMES DAILY PRN
Qty: 30 TABLET | Refills: 0 | Status: SHIPPED | OUTPATIENT
Start: 2019-01-25 | End: 2019-03-26

## 2019-01-25 RX ORDER — METHOCARBAMOL 750 MG/1
750 TABLET, FILM COATED ORAL 4 TIMES DAILY PRN
Qty: 40 TABLET | Refills: 0 | Status: SHIPPED | OUTPATIENT
Start: 2019-01-25 | End: 2019-02-04
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
## 2019-01-28 ENCOUNTER — CLINICAL SUPPORT (OUTPATIENT)
Dept: SMOKING CESSATION | Facility: CLINIC | Age: 44
End: 2019-01-28
Payer: COMMERCIAL

## 2019-01-28 DIAGNOSIS — F17.200 NICOTINE DEPENDENCE: Primary | ICD-10-CM

## 2019-01-28 DIAGNOSIS — F17.200 NICOTINE DEPENDENCE: ICD-10-CM

## 2019-01-28 PROCEDURE — 99407 BEHAV CHNG SMOKING > 10 MIN: CPT | Mod: S$GLB,,, | Performed by: GENERAL PRACTICE

## 2019-01-28 PROCEDURE — 99407 PR TOBACCO USE CESSATION INTENSIVE >10 MINUTES: ICD-10-PCS | Mod: S$GLB,,, | Performed by: GENERAL PRACTICE

## 2019-01-28 RX ORDER — VARENICLINE TARTRATE 1 MG/1
1 TABLET, FILM COATED ORAL 2 TIMES DAILY
Qty: 60 TABLET | Refills: 0 | Status: SHIPPED | OUTPATIENT
Start: 2019-01-28 | End: 2019-03-26

## 2019-01-31 ENCOUNTER — TELEPHONE (OUTPATIENT)
Dept: INTERNAL MEDICINE | Facility: CLINIC | Age: 44
End: 2019-01-31

## 2019-01-31 NOTE — TELEPHONE ENCOUNTER
----- Message from Wandy MACIEJ Charles sent at 1/31/2019  3:40 PM CST -----  Contact: Pt   States she's calling to get a refill and can be reached at [phone]//thanks/dbw     1. What is the name of the medication you are requesting? clindomycian gel  2. What is the dose? Unknown to pt   3. How do you take the medication? Orally, topically, etc? gel  4. How often do you take this medication? Once daily   5. Do you need a 30 day or 90 day supply? 30 days  6. How many refills are you requesting?  7. What is your preferred pharmacy and location of the pharmacy?   8. Who can we contact with further questions? Pt     [id number]  [place]ON - [address] AT [facility]  [address] [address]  Phone: [phone] Fax: [phone]

## 2019-01-31 NOTE — TELEPHONE ENCOUNTER
Spoke with pt. Advised pt she would have to come in for evaluation to get the gel prescribed. Pt stated she broke her leg and cannot make it to an appt at this time. She will schedule an appt when appropriate. Pt was thankful for the call back. Call ended well.

## 2019-02-19 ENCOUNTER — TELEPHONE (OUTPATIENT)
Dept: SMOKING CESSATION | Facility: CLINIC | Age: 44
End: 2019-02-19

## 2019-03-26 ENCOUNTER — OFFICE VISIT (OUTPATIENT)
Dept: INTERNAL MEDICINE | Facility: CLINIC | Age: 44
End: 2019-03-26
Payer: COMMERCIAL

## 2019-03-26 ENCOUNTER — CLINICAL SUPPORT (OUTPATIENT)
Dept: SMOKING CESSATION | Facility: CLINIC | Age: 44
End: 2019-03-26
Payer: COMMERCIAL

## 2019-03-26 VITALS
WEIGHT: 250 LBS | HEIGHT: 62 IN | HEART RATE: 68 BPM | OXYGEN SATURATION: 99 % | TEMPERATURE: 99 F | BODY MASS INDEX: 46.01 KG/M2

## 2019-03-26 DIAGNOSIS — Z29.9 PREVENTIVE MEASURE: ICD-10-CM

## 2019-03-26 DIAGNOSIS — M17.0 BILATERAL PRIMARY OSTEOARTHRITIS OF KNEE: ICD-10-CM

## 2019-03-26 DIAGNOSIS — L81.9 HYPERPIGMENTED SKIN LESION: Primary | ICD-10-CM

## 2019-03-26 DIAGNOSIS — Z72.0 TOBACCO ABUSE: ICD-10-CM

## 2019-03-26 DIAGNOSIS — F41.8 ANXIETY ASSOCIATED WITH DEPRESSION: ICD-10-CM

## 2019-03-26 DIAGNOSIS — F17.200 NICOTINE DEPENDENCE: Primary | ICD-10-CM

## 2019-03-26 DIAGNOSIS — E66.01 MORBID OBESITY WITH BMI OF 45.0-49.9, ADULT: ICD-10-CM

## 2019-03-26 PROBLEM — M20.40 ACQUIRED HAMMERTOE: Status: RESOLVED | Noted: 2017-03-31 | Resolved: 2019-03-26

## 2019-03-26 PROBLEM — S82.102A CLOSED FRACTURE OF PROXIMAL END OF LEFT TIBIA: Status: RESOLVED | Noted: 2019-01-10 | Resolved: 2019-03-26

## 2019-03-26 PROBLEM — R52 PAIN: Status: RESOLVED | Noted: 2019-01-19 | Resolved: 2019-03-26

## 2019-03-26 PROBLEM — K59.00 CONSTIPATION: Status: RESOLVED | Noted: 2019-01-16 | Resolved: 2019-03-26

## 2019-03-26 PROBLEM — S82.209A TIBIAL FRACTURE: Status: RESOLVED | Noted: 2019-01-10 | Resolved: 2019-03-26

## 2019-03-26 PROCEDURE — 99214 PR OFFICE/OUTPT VISIT, EST, LEVL IV, 30-39 MIN: ICD-10-PCS | Mod: S$GLB,,, | Performed by: FAMILY MEDICINE

## 2019-03-26 PROCEDURE — 99407 PR TOBACCO USE CESSATION INTENSIVE >10 MINUTES: ICD-10-PCS | Mod: S$GLB,,,

## 2019-03-26 PROCEDURE — 99999 PR PBB SHADOW E&M-EST. PATIENT-LVL IV: ICD-10-PCS | Mod: PBBFAC,,, | Performed by: FAMILY MEDICINE

## 2019-03-26 PROCEDURE — 99214 OFFICE O/P EST MOD 30 MIN: CPT | Mod: S$GLB,,, | Performed by: FAMILY MEDICINE

## 2019-03-26 PROCEDURE — 99407 BEHAV CHNG SMOKING > 10 MIN: CPT | Mod: S$GLB,,,

## 2019-03-26 PROCEDURE — 3008F BODY MASS INDEX DOCD: CPT | Mod: CPTII,S$GLB,, | Performed by: FAMILY MEDICINE

## 2019-03-26 PROCEDURE — 99999 PR PBB SHADOW E&M-EST. PATIENT-LVL IV: CPT | Mod: PBBFAC,,, | Performed by: FAMILY MEDICINE

## 2019-03-26 PROCEDURE — 3008F PR BODY MASS INDEX (BMI) DOCUMENTED: ICD-10-PCS | Mod: CPTII,S$GLB,, | Performed by: FAMILY MEDICINE

## 2019-03-26 RX ORDER — ESCITALOPRAM OXALATE 10 MG/1
10 TABLET ORAL DAILY
Qty: 30 TABLET | Refills: 1 | Status: SHIPPED | OUTPATIENT
Start: 2019-03-26 | End: 2019-03-27 | Stop reason: SDUPTHER

## 2019-03-26 RX ORDER — TRIAMCINOLONE ACETONIDE 1 MG/G
CREAM TOPICAL 2 TIMES DAILY
Qty: 45 G | Refills: 0 | Status: SHIPPED | OUTPATIENT
Start: 2019-03-26 | End: 2019-03-27 | Stop reason: SDUPTHER

## 2019-03-26 RX ORDER — OXYCODONE AND ACETAMINOPHEN 5; 325 MG/1; MG/1
TABLET ORAL
Refills: 0 | COMMUNITY
Start: 2019-01-31 | End: 2019-03-26

## 2019-03-26 RX ORDER — GABAPENTIN 100 MG/1
300 CAPSULE ORAL 3 TIMES DAILY
COMMUNITY
Start: 2019-02-18 | End: 2019-04-08 | Stop reason: SDUPTHER

## 2019-03-26 NOTE — PROGRESS NOTES
"Subjective:       Patient ID: Teresa Cazares is a 43 y.o. female.    Chief Complaint: No chief complaint on file.    43-year-old  female patient with Patient Active Problem List:     Morbid obesity with BMI of 45.0-49.9, adult     Bilateral primary osteoarthritis of knee     Hyphema of right eye     Leukocytosis     Anxiety associated with depression     Tobacco abuse  Here reports that she has been having hyperpigmentation rash to bilateral heels, occasionally causing burning sensation, reports that she has been having the symptoms ongoing for the past 2-3 years for which she had seen podiatrist and was prescribed steroid creams and Lamisil but did not have any improvement.  Patient reports that she has responded to Cipro antibiotic in the past and requesting antibiotic today.   Reports that the rash, gets worse after prolonged walking.   Reports that occasionally flares up but has been using cold towels.   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   Denies any suicidal homicidal thoughts  Started back smoking up to 1 pack of cigarettes daily and currently not taking Chantix.   Patient reports that she has been taking gabapentin as needed for neuropathy      Review of Systems   Constitutional: Negative for fatigue.   Eyes: Negative for visual disturbance.   Respiratory: Negative for shortness of breath.    Cardiovascular: Negative for chest pain and leg swelling.   Gastrointestinal: Negative for abdominal pain, nausea and vomiting.   Musculoskeletal: Negative for myalgias.   Skin: Positive for rash.   Neurological: Negative for light-headedness and headaches.   Psychiatric/Behavioral: Positive for dysphoric mood. Negative for decreased concentration, self-injury, sleep disturbance and suicidal ideas. The patient is nervous/anxious.          Pulse 68   Temp 98.8 °F (37.1 °C) (Tympanic)   Ht 5' 2" (1.575 m)   Wt 113.4 kg (250 " lb)   SpO2 99%   BMI 45.73 kg/m²   Objective:      Physical Exam   Constitutional: She is oriented to person, place, and time. She appears well-developed and well-nourished.   HENT:   Head: Normocephalic and atraumatic.   Mouth/Throat: Oropharynx is clear and moist.   Cardiovascular: Normal rate, regular rhythm and normal heart sounds.   No murmur heard.  Pulmonary/Chest: Effort normal and breath sounds normal. She has no wheezes.   Abdominal: Soft. Bowel sounds are normal. There is no tenderness.   Musculoskeletal: She exhibits no edema.   Neurological: She is alert and oriented to person, place, and time.   Skin: Skin is warm and dry. No rash noted.   Positive for hyperpigmented patchy skin lesions noted to bilateral heels, but no tenderness erythema or warmth noted   Psychiatric:   Patient tearful today         Assessment:       1. Hyperpigmented skin lesion    2. Bilateral primary osteoarthritis of knee    3. Morbid obesity with BMI of 45.0-49.9, adult    4. Anxiety associated with depression    5. Tobacco abuse    6. Preventive measure        Plan:   Hyperpigmented skin lesion  -     Ambulatory Referral to Dermatology  -     triamcinolone acetonide 0.1% (KENALOG) 0.1 % cream; Apply topically 2 (two) times daily. Apply to bilateral feet  Dispense: 45 g; Refill: 0  -     CBC auto differential; Future; Expected date: 03/26/2019  -     Sedimentation rate; Future; Expected date: 03/26/2019  -     C-reactive protein; Future; Expected date: 03/26/2019  -     Procalcitonin; Future; Expected date: 03/26/2019  -     DEBBIE Screen w/Reflex; Future; Expected date: 03/26/2019  -     Cardiology Lab Ankle Brachial Indices W Stress; Future  Patient reports that she has been having ongoing skin lesion/rash and has not responded to previous treatments  Kenalog cream prescribed today for symptomatic relief and will refer to Dermatology for further evaluation  Patient requesting antibiotics, Will check further labs to rule out any  infection, does not look like infection and no antibiotics has been prescribed today  Will get circulation studies as well    Bilateral primary osteoarthritis of knee    Morbid obesity with BMI of 45.0-49.9, adult  Lifestyle modifications recommended to lose weight with BMI 45    Anxiety associated with depression  -     escitalopram oxalate (LEXAPRO) 10 MG tablet; Take 1 tablet (10 mg total) by mouth once daily.  Dispense: 30 tablet; Refill: 1  -     Ambulatory consult to Psychiatry  Will start on Lexapro 10 mg daily and will refer to Psychiatry  Patient was encouraged to avoid stress and do deep breathing exercises  If any worsening advised to go to ER immediately    Tobacco abuse  Encouraged to quit smoking and not ready to quit at this time    Preventive measure  -     CBC auto differential; Future; Expected date: 03/26/2019  -     Comprehensive metabolic panel; Future; Expected date: 03/26/2019  -     Lipid panel; Future; Expected date: 03/26/2019  -     TSH; Future; Expected date: 03/26/2019  Will check further fasting labs and follow-up with me in 4 weeks

## 2019-03-26 NOTE — PROGRESS NOTES
Called pt to f/u on her 3 month smoking cessation quit status. Pt stated she remains tobacco free. Congratulated her on her hard work and success. Informed her of benefit period, phone follow ups, and contact information. Will complete smart form and resolve quit #3 episode, and complete 3 month for quit #4. Pt stated she is experiencing strong urges and cravings and wants to rejoin program since she has benefits available until 5/28/19. Scheduled her appointment for 3/28/19 at 10 am. Will continue to follow up on quit status for episode #5.

## 2019-03-27 DIAGNOSIS — F41.8 ANXIETY ASSOCIATED WITH DEPRESSION: ICD-10-CM

## 2019-03-27 DIAGNOSIS — L81.9 HYPERPIGMENTED SKIN LESION: ICD-10-CM

## 2019-03-27 RX ORDER — TRIAMCINOLONE ACETONIDE 1 MG/G
CREAM TOPICAL 2 TIMES DAILY
Qty: 45 G | Refills: 0 | Status: SHIPPED | OUTPATIENT
Start: 2019-03-27 | End: 2019-03-28 | Stop reason: ALTCHOICE

## 2019-03-27 RX ORDER — ESCITALOPRAM OXALATE 10 MG/1
10 TABLET ORAL DAILY
Qty: 30 TABLET | Refills: 1 | Status: SHIPPED | OUTPATIENT
Start: 2019-03-27 | End: 2019-03-28 | Stop reason: ALTCHOICE

## 2019-03-27 NOTE — TELEPHONE ENCOUNTER
----- Message from Lory Summers sent at 3/27/2019 11:56 AM CDT -----  Contact: pt  States she hasn't received her prescription at the [facility] in [place]. States she's been waiting on her prescription since yesterday. States she got a message on [facility] that her prescriptions were ready. Please call pt at [phone]. Thank you

## 2019-03-28 ENCOUNTER — CLINICAL SUPPORT (OUTPATIENT)
Dept: SMOKING CESSATION | Facility: CLINIC | Age: 44
End: 2019-03-28
Payer: COMMERCIAL

## 2019-03-28 ENCOUNTER — OFFICE VISIT (OUTPATIENT)
Dept: INTERNAL MEDICINE | Facility: CLINIC | Age: 44
End: 2019-03-28
Payer: COMMERCIAL

## 2019-03-28 VITALS
SYSTOLIC BLOOD PRESSURE: 108 MMHG | DIASTOLIC BLOOD PRESSURE: 70 MMHG | BODY MASS INDEX: 44.29 KG/M2 | HEIGHT: 63 IN | OXYGEN SATURATION: 97 % | TEMPERATURE: 98 F | HEART RATE: 69 BPM

## 2019-03-28 DIAGNOSIS — S82.92XD: ICD-10-CM

## 2019-03-28 DIAGNOSIS — F17.200 NICOTINE DEPENDENCE: Primary | ICD-10-CM

## 2019-03-28 DIAGNOSIS — F51.02 INSOMNIA DUE TO STRESS: ICD-10-CM

## 2019-03-28 DIAGNOSIS — F33.1 MAJOR DEPRESSIVE DISORDER, RECURRENT, MODERATE: Primary | ICD-10-CM

## 2019-03-28 DIAGNOSIS — F43.0 ACUTE STRESS REACTION: ICD-10-CM

## 2019-03-28 DIAGNOSIS — G89.11 PAIN, ACUTE DUE TO TRAUMA: ICD-10-CM

## 2019-03-28 PROCEDURE — 99999 PR PBB SHADOW E&M-EST. PATIENT-LVL IV: ICD-10-PCS | Mod: PBBFAC,,, | Performed by: FAMILY MEDICINE

## 2019-03-28 PROCEDURE — 99999 PR PBB SHADOW E&M-EST. PATIENT-LVL IV: CPT | Mod: PBBFAC,,, | Performed by: FAMILY MEDICINE

## 2019-03-28 PROCEDURE — 99404 PR PREVENT COUNSEL,INDIV,60 MIN: ICD-10-PCS | Mod: S$GLB,,, | Performed by: GENERAL PRACTICE

## 2019-03-28 PROCEDURE — 99999 PR PBB SHADOW E&M-EST. PATIENT-LVL I: ICD-10-PCS | Mod: PBBFAC,,,

## 2019-03-28 PROCEDURE — 99999 PR PBB SHADOW E&M-EST. PATIENT-LVL I: CPT | Mod: PBBFAC,,,

## 2019-03-28 PROCEDURE — 3008F BODY MASS INDEX DOCD: CPT | Mod: CPTII,S$GLB,, | Performed by: FAMILY MEDICINE

## 2019-03-28 PROCEDURE — 99214 PR OFFICE/OUTPT VISIT, EST, LEVL IV, 30-39 MIN: ICD-10-PCS | Mod: S$GLB,,, | Performed by: FAMILY MEDICINE

## 2019-03-28 PROCEDURE — 99404 PREV MED CNSL INDIV APPRX 60: CPT | Mod: S$GLB,,, | Performed by: GENERAL PRACTICE

## 2019-03-28 PROCEDURE — 99214 OFFICE O/P EST MOD 30 MIN: CPT | Mod: S$GLB,,, | Performed by: FAMILY MEDICINE

## 2019-03-28 PROCEDURE — 3008F PR BODY MASS INDEX (BMI) DOCUMENTED: ICD-10-PCS | Mod: CPTII,S$GLB,, | Performed by: FAMILY MEDICINE

## 2019-03-28 RX ORDER — MICONAZOLE NITRATE 2 %
2 CREAM (GRAM) TOPICAL
Qty: 120 EACH | Refills: 2 | Status: SHIPPED | OUTPATIENT
Start: 2019-03-28 | End: 2020-04-21

## 2019-03-28 RX ORDER — FLUOXETINE HYDROCHLORIDE 20 MG/1
20 CAPSULE ORAL DAILY
Qty: 30 CAPSULE | Refills: 0 | Status: SHIPPED | OUTPATIENT
Start: 2019-03-28 | End: 2019-04-08 | Stop reason: SDUPTHER

## 2019-03-28 RX ORDER — QUETIAPINE FUMARATE 50 MG/1
50 TABLET, FILM COATED ORAL NIGHTLY
Qty: 30 TABLET | Refills: 0 | Status: SHIPPED | OUTPATIENT
Start: 2019-03-28 | End: 2019-04-08 | Stop reason: SDUPTHER
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
## 2019-04-08 ENCOUNTER — LAB VISIT (OUTPATIENT)
Dept: LAB | Facility: HOSPITAL | Age: 44
End: 2019-04-08
Attending: FAMILY MEDICINE
Payer: COMMERCIAL

## 2019-04-08 ENCOUNTER — CLINICAL SUPPORT (OUTPATIENT)
Dept: SMOKING CESSATION | Facility: CLINIC | Age: 44
End: 2019-04-08
Payer: COMMERCIAL

## 2019-04-08 ENCOUNTER — OFFICE VISIT (OUTPATIENT)
Dept: INTERNAL MEDICINE | Facility: CLINIC | Age: 44
End: 2019-04-08
Payer: COMMERCIAL

## 2019-04-08 ENCOUNTER — OFFICE VISIT (OUTPATIENT)
Dept: PAIN MEDICINE | Facility: CLINIC | Age: 44
End: 2019-04-08
Payer: COMMERCIAL

## 2019-04-08 VITALS
RESPIRATION RATE: 18 BRPM | HEIGHT: 63 IN | WEIGHT: 250 LBS | HEART RATE: 61 BPM | SYSTOLIC BLOOD PRESSURE: 142 MMHG | BODY MASS INDEX: 44.3 KG/M2 | DIASTOLIC BLOOD PRESSURE: 87 MMHG

## 2019-04-08 VITALS
OXYGEN SATURATION: 96 % | HEART RATE: 60 BPM | BODY MASS INDEX: 44.29 KG/M2 | SYSTOLIC BLOOD PRESSURE: 120 MMHG | HEIGHT: 63 IN | DIASTOLIC BLOOD PRESSURE: 85 MMHG | TEMPERATURE: 98 F

## 2019-04-08 DIAGNOSIS — L81.9 HYPERPIGMENTED SKIN LESION: ICD-10-CM

## 2019-04-08 DIAGNOSIS — F43.0 ACUTE STRESS REACTION: Primary | ICD-10-CM

## 2019-04-08 DIAGNOSIS — Z29.9 PREVENTIVE MEASURE: ICD-10-CM

## 2019-04-08 DIAGNOSIS — M79.18 MYOFASCIAL MUSCLE PAIN: ICD-10-CM

## 2019-04-08 DIAGNOSIS — F33.1 MAJOR DEPRESSIVE DISORDER, RECURRENT, MODERATE: ICD-10-CM

## 2019-04-08 DIAGNOSIS — S82.92XD: ICD-10-CM

## 2019-04-08 DIAGNOSIS — F17.200 NICOTINE DEPENDENCE: Primary | ICD-10-CM

## 2019-04-08 DIAGNOSIS — F17.211 NICOTINE DEPENDENCE, CIGARETTES, IN REMISSION: Chronic | ICD-10-CM

## 2019-04-08 DIAGNOSIS — M79.2 NERVE PAIN: ICD-10-CM

## 2019-04-08 DIAGNOSIS — E66.01 MORBID OBESITY WITH BMI OF 45.0-49.9, ADULT: ICD-10-CM

## 2019-04-08 DIAGNOSIS — M17.0 BILATERAL PRIMARY OSTEOARTHRITIS OF KNEE: Primary | ICD-10-CM

## 2019-04-08 DIAGNOSIS — Z12.39 SCREENING FOR BREAST CANCER: ICD-10-CM

## 2019-04-08 LAB
ALBUMIN SERPL BCP-MCNC: 4.5 G/DL (ref 3.5–5.2)
ALP SERPL-CCNC: 90 U/L (ref 55–135)
ALT SERPL W/O P-5'-P-CCNC: 16 U/L (ref 10–44)
ANION GAP SERPL CALC-SCNC: 9 MMOL/L (ref 8–16)
AST SERPL-CCNC: 17 U/L (ref 10–40)
BASOPHILS # BLD AUTO: 0.06 K/UL (ref 0–0.2)
BASOPHILS NFR BLD: 0.5 % (ref 0–1.9)
BILIRUB SERPL-MCNC: 0.5 MG/DL (ref 0.1–1)
BUN SERPL-MCNC: 10 MG/DL (ref 6–20)
CALCIUM SERPL-MCNC: 10.1 MG/DL (ref 8.7–10.5)
CHLORIDE SERPL-SCNC: 104 MMOL/L (ref 95–110)
CHOLEST SERPL-MCNC: 169 MG/DL (ref 120–199)
CHOLEST/HDLC SERPL: 2.8 {RATIO} (ref 2–5)
CO2 SERPL-SCNC: 28 MMOL/L (ref 23–29)
CREAT SERPL-MCNC: 0.8 MG/DL (ref 0.5–1.4)
CRP SERPL-MCNC: 10.3 MG/L (ref 0–8.2)
DIFFERENTIAL METHOD: ABNORMAL
EOSINOPHIL # BLD AUTO: 0.2 K/UL (ref 0–0.5)
EOSINOPHIL NFR BLD: 1.4 % (ref 0–8)
ERYTHROCYTE [DISTWIDTH] IN BLOOD BY AUTOMATED COUNT: 14.2 % (ref 11.5–14.5)
ERYTHROCYTE [SEDIMENTATION RATE] IN BLOOD BY WESTERGREN METHOD: 6 MM/HR (ref 0–36)
EST. GFR  (AFRICAN AMERICAN): >60 ML/MIN/1.73 M^2
EST. GFR  (NON AFRICAN AMERICAN): >60 ML/MIN/1.73 M^2
GLUCOSE SERPL-MCNC: 91 MG/DL (ref 70–110)
HCT VFR BLD AUTO: 41.8 % (ref 37–48.5)
HDLC SERPL-MCNC: 60 MG/DL (ref 40–75)
HDLC SERPL: 35.5 % (ref 20–50)
HGB BLD-MCNC: 13.2 G/DL (ref 12–16)
IMM GRANULOCYTES # BLD AUTO: 0.03 K/UL (ref 0–0.04)
IMM GRANULOCYTES NFR BLD AUTO: 0.3 % (ref 0–0.5)
LDLC SERPL CALC-MCNC: 98.4 MG/DL (ref 63–159)
LYMPHOCYTES # BLD AUTO: 3.1 K/UL (ref 1–4.8)
LYMPHOCYTES NFR BLD: 27.8 % (ref 18–48)
MCH RBC QN AUTO: 28.5 PG (ref 27–31)
MCHC RBC AUTO-ENTMCNC: 31.6 G/DL (ref 32–36)
MCV RBC AUTO: 90 FL (ref 82–98)
MONOCYTES # BLD AUTO: 0.8 K/UL (ref 0.3–1)
MONOCYTES NFR BLD: 7.6 % (ref 4–15)
NEUTROPHILS # BLD AUTO: 6.9 K/UL (ref 1.8–7.7)
NEUTROPHILS NFR BLD: 62.4 % (ref 38–73)
NONHDLC SERPL-MCNC: 109 MG/DL
NRBC BLD-RTO: 0 /100 WBC
PLATELET # BLD AUTO: 514 K/UL (ref 150–350)
PMV BLD AUTO: 9.8 FL (ref 9.2–12.9)
POTASSIUM SERPL-SCNC: 4 MMOL/L (ref 3.5–5.1)
PROCALCITONIN SERPL IA-MCNC: 0.02 NG/ML
PROT SERPL-MCNC: 7.6 G/DL (ref 6–8.4)
RBC # BLD AUTO: 4.63 M/UL (ref 4–5.4)
SODIUM SERPL-SCNC: 141 MMOL/L (ref 136–145)
TRIGL SERPL-MCNC: 53 MG/DL (ref 30–150)
TSH SERPL DL<=0.005 MIU/L-ACNC: 2.35 UIU/ML (ref 0.4–4)
WBC # BLD AUTO: 11.07 K/UL (ref 3.9–12.7)

## 2019-04-08 PROCEDURE — 36415 COLL VENOUS BLD VENIPUNCTURE: CPT

## 2019-04-08 PROCEDURE — 99999 PR PBB SHADOW E&M-EST. PATIENT-LVL III: CPT | Mod: PBBFAC,,, | Performed by: FAMILY MEDICINE

## 2019-04-08 PROCEDURE — 80053 COMPREHEN METABOLIC PANEL: CPT

## 2019-04-08 PROCEDURE — 3008F PR BODY MASS INDEX (BMI) DOCUMENTED: ICD-10-PCS | Mod: CPTII,S$GLB,, | Performed by: FAMILY MEDICINE

## 2019-04-08 PROCEDURE — 99999 PR PBB SHADOW E&M-EST. PATIENT-LVL I: CPT | Mod: PBBFAC,,,

## 2019-04-08 PROCEDURE — 84443 ASSAY THYROID STIM HORMONE: CPT

## 2019-04-08 PROCEDURE — 99214 PR OFFICE/OUTPT VISIT, EST, LEVL IV, 30-39 MIN: ICD-10-PCS | Mod: S$GLB,,, | Performed by: FAMILY MEDICINE

## 2019-04-08 PROCEDURE — 80061 LIPID PANEL: CPT

## 2019-04-08 PROCEDURE — 85652 RBC SED RATE AUTOMATED: CPT

## 2019-04-08 PROCEDURE — 99999 PR PBB SHADOW E&M-EST. PATIENT-LVL III: ICD-10-PCS | Mod: PBBFAC,,, | Performed by: ANESTHESIOLOGY

## 2019-04-08 PROCEDURE — 99204 OFFICE O/P NEW MOD 45 MIN: CPT | Mod: S$GLB,,, | Performed by: ANESTHESIOLOGY

## 2019-04-08 PROCEDURE — 99999 PR PBB SHADOW E&M-EST. PATIENT-LVL III: ICD-10-PCS | Mod: PBBFAC,,, | Performed by: FAMILY MEDICINE

## 2019-04-08 PROCEDURE — 99404 PREV MED CNSL INDIV APPRX 60: CPT | Mod: S$GLB,,, | Performed by: GENERAL PRACTICE

## 2019-04-08 PROCEDURE — 99999 PR PBB SHADOW E&M-EST. PATIENT-LVL III: CPT | Mod: PBBFAC,,, | Performed by: ANESTHESIOLOGY

## 2019-04-08 PROCEDURE — 86038 ANTINUCLEAR ANTIBODIES: CPT

## 2019-04-08 PROCEDURE — 86140 C-REACTIVE PROTEIN: CPT

## 2019-04-08 PROCEDURE — 3008F PR BODY MASS INDEX (BMI) DOCUMENTED: ICD-10-PCS | Mod: CPTII,S$GLB,, | Performed by: ANESTHESIOLOGY

## 2019-04-08 PROCEDURE — 85025 COMPLETE CBC W/AUTO DIFF WBC: CPT

## 2019-04-08 PROCEDURE — 3008F BODY MASS INDEX DOCD: CPT | Mod: CPTII,S$GLB,, | Performed by: FAMILY MEDICINE

## 2019-04-08 PROCEDURE — 99999 PR PBB SHADOW E&M-EST. PATIENT-LVL I: ICD-10-PCS | Mod: PBBFAC,,,

## 2019-04-08 PROCEDURE — 99404 PR PREVENT COUNSEL,INDIV,60 MIN: ICD-10-PCS | Mod: S$GLB,,, | Performed by: GENERAL PRACTICE

## 2019-04-08 PROCEDURE — 3008F BODY MASS INDEX DOCD: CPT | Mod: CPTII,S$GLB,, | Performed by: ANESTHESIOLOGY

## 2019-04-08 PROCEDURE — 99214 OFFICE O/P EST MOD 30 MIN: CPT | Mod: S$GLB,,, | Performed by: FAMILY MEDICINE

## 2019-04-08 PROCEDURE — 99204 PR OFFICE/OUTPT VISIT, NEW, LEVL IV, 45-59 MIN: ICD-10-PCS | Mod: S$GLB,,, | Performed by: ANESTHESIOLOGY

## 2019-04-08 PROCEDURE — 84145 PROCALCITONIN (PCT): CPT

## 2019-04-08 RX ORDER — GABAPENTIN 300 MG/1
CAPSULE ORAL
Qty: 120 CAPSULE | Refills: 1 | Status: SHIPPED | OUTPATIENT
Start: 2019-04-08 | End: 2019-04-08 | Stop reason: SDUPTHER

## 2019-04-08 RX ORDER — TIZANIDINE 4 MG/1
TABLET ORAL
Qty: 180 TABLET | Refills: 0 | Status: SHIPPED | OUTPATIENT
Start: 2019-04-08 | End: 2019-07-11 | Stop reason: SDUPTHER

## 2019-04-08 RX ORDER — FLUOXETINE HYDROCHLORIDE 20 MG/1
20 CAPSULE ORAL DAILY
Qty: 90 CAPSULE | Refills: 1 | Status: SHIPPED | OUTPATIENT
Start: 2019-04-08 | End: 2019-10-18 | Stop reason: SDUPTHER

## 2019-04-08 RX ORDER — GABAPENTIN 300 MG/1
CAPSULE ORAL
Qty: 120 CAPSULE | Refills: 1 | Status: SHIPPED | OUTPATIENT
Start: 2019-04-08 | End: 2019-05-08

## 2019-04-08 RX ORDER — TIZANIDINE 4 MG/1
4 TABLET ORAL 2 TIMES DAILY PRN
Qty: 60 TABLET | Refills: 0 | Status: SHIPPED | OUTPATIENT
Start: 2019-04-08 | End: 2019-04-08 | Stop reason: SDUPTHER

## 2019-04-08 RX ORDER — GABAPENTIN 300 MG/1
300 CAPSULE ORAL 3 TIMES DAILY
Qty: 120 CAPSULE | Refills: 1 | Status: SHIPPED | OUTPATIENT
Start: 2019-04-08 | End: 2019-11-11 | Stop reason: SDUPTHER

## 2019-04-08 RX ORDER — QUETIAPINE FUMARATE 100 MG/1
100 TABLET, FILM COATED ORAL NIGHTLY
Qty: 30 TABLET | Refills: 1 | Status: SHIPPED | OUTPATIENT
Start: 2019-04-08 | End: 2019-07-09 | Stop reason: SDUPTHER

## 2019-04-08 NOTE — PROGRESS NOTES
Chief Pain Complaint:  Knee Pain        History of Present Illness:   Teresa Cazares is a 43 y.o. female  who is presenting with a chief complaint of Knee Pain  . The patient began experiencing this problem abruptly, and the pain has been gradually improving over the past 3 month(s). The pain is described as throbbing, shooting, burning and electrical and is located in the left knee. Pain is intermittent and lasts hours. The pain radiates to  left leg. The patient rates her pain a 8 out of ten and interferes with activities of daily living a 7 out of ten. Pain is exacerbated by prolonged standing, ambulation, and is improved by rest. Patient reports prior trauma she fell at work with Left knee comminuted bicondylar tibial plateau fracture s/p left Ex-fix of Tibia on 1/10/18 then ORIF of left Tibia on 1/17/19.      - pertinent negatives: No fever, No chills, No weight loss, No bladder dysfunction, No bowel dysfunction, No saddle anesthesia  - pertinent positives: left leg weakness    - medications, other therapies tried (physical therapy, injections):     >> NSAIDs, Tylenol, Tramadol, Norco and gabapentin    >> Has previously undergone Physical Therapy    >> Has NOT previously undergone spinal injection/s      Imaging / Labs / Studies (reviewed on 4/8/2019):    Review of Systems:  CONSTITUTIONAL: patient denies any fever, chills, or weight loss  SKIN: patient denies any rash or itching  RESPIRATORY: patient denies having any shortness of breath  GASTROINTESTINAL: patient denies having any diarrhea, constipation, or bowel incontinence  GENITOURINARY: patient denies having any abnormal bladder function    MUSCULOSKELETAL:  - patient complains of the above noted pain/s (see chief pain complaint)    NEUROLOGICAL:   - pain as above  - strength in Lower extremities is decreased, on the LEFT  - sensation in Lower extremities is intact, BILATERALLY  - patient denies any loss of bowel or bladder control     "  PSYCHIATRIC: patient denies any change in mood    Other:  All other systems reviewed and are negative      Physical Exam:  BP (!) 142/87 (BP Location: Right arm, Patient Position: Sitting, BP Method: Medium (Automatic))   Pulse 61   Resp 18   Ht 5' 3" (1.6 m)   Wt 113.4 kg (250 lb)   BMI 44.29 kg/m²  (reviewed on 4/8/2019)  General: Alert and oriented, in no apparent distress.  Gait: normal gait.  Skin: No rashes, No discoloration, No obvious lesions  HEENT: Normocephalic, atraumatic. Pupils equal and round.  Cardiovascular: Regular rate and rhythm , no significant peripheral edema present  Respiratory: Without audible wheezing, without use of accessory muscles of respiration.    Musculoskeletal:    Lumbar Spine    - Pain on flexion of lumbar spine Absent  - Straight Leg Raise:  Absent    - Pain on extension of lumbar spine Absent  - TTP over the lumbar facet joints Absent  - Lumbar facet loading Absent    -Pain on palpation over the SI joint  Absent  - ARAVIND: Absent    -TTP over left Patella   -Patient wearing external knee brace      Neuro:    Strength:  LE R/L: HF: 5/5, HE: 5/5, KF: 5/5; KE: 5/5; FE: 5/5; FF: 5/5    Extremity Reflexes: Brisk and symmetric throughout.      Extremity Sensory: Sensation to pinprick and temperature symmetric. Proprioception intact.      Psych:  Mood and affect is appropriate      Assessment:    Teresa Cazares is a 43 y.o. year old female who is presenting with     Encounter Diagnoses   Name Primary?    Bilateral primary osteoarthritis of knee Yes    Closed fracture of left lower leg with routine healing     Morbid obesity with BMI of 45.0-49.9, adult        Plan:    1. Interventional: Consider Left Genicular nerve block.     2. Pharmacologic: Increase Gabapentin from 300 mg PO TID to 300/300/600 mg PO TID. This is helping with minimal side effects. Compound cream prescribed. Tizanidine 4 mg PO BID PRN.      3. Rehabilitative: Encouraged ambulation.     4. Diagnostic: " None for now.    5.  Follow up: PRN.      20 minutes were spent in this encounter with more than 50% of the time used for counseling and review of the plan.  Imaging / studies reviewed, detailed above.  I discussed in detail the risks, benefits, and alternatives to any and all potential treatment options.  All questions and concerns were fully addressed today in clinic. Medical decision making moderate.    Thank you for the opportunity to assist in the care of this patient.    Best wishes,    Signed:    Bebo Callahan MD          Disclaimer:  This note may have been prepared using voice recognition software, it may have not been extensively proofed, as such there could be errors within the text such as sound alike errors.

## 2019-04-08 NOTE — PROGRESS NOTES
"Individual Follow-Up Form    4/8/2019    Quit Date: 1-    Clinical Status of Patient: Outpatient    Length of Service: 60 minutes    Continuing Medication: yes  Nicotine gum     Target Symptoms: Withdrawal and medication side effects. The following were  rated moderate (3) to severe (4) on TCRS:  · Moderate (3): anxious, insomnia, desire tobacco  · Severe (4): none    Comments: Patient was seen today for a smoking cessation progress update. She states that she is doing much better today and is not as emotional as she has been in the past. She stated that the medication that her PCP put her own for anxiety and depression have greatly helped. She stated that she is having insomnia these past few nights but will talk to her PCP today for advice. The patient denies any abnormal behavioral or mental changes at this time. She states that she is using a "few" pieces of nicotine gum daily for strong urges and desires to smoke. Will continue to encourage and monitor progress.    Diagnosis: F17.200    Next Visit: 2 weeks  "

## 2019-04-08 NOTE — PROGRESS NOTES
CHIEF COMPLAINT  Follow-up      ENCOUNTER DIAGNOSES  1. Acute stress reaction    2. Major depressive disorder, recurrent, moderate    3. Nicotine dependence, cigarettes, in remission    4. Screening for breast cancer    5. Closed fracture of left lower leg with routine healing        NEW ORDERS SUMMARY  Orders Placed This Encounter    Mammo Digital Screening Bilat    FLUoxetine 20 MG capsule    QUEtiapine (SEROQUEL) 100 MG Tab       HISTORY, ASSESSMENT, and PLAN    Problem List Items Addressed This Visit        Psychiatric    Acute stress reaction - Primary    Current Assessment & Plan     Much improved.  She looks much more relaxed, and she says she feels much less stressed.         Major depressive disorder, recurrent, moderate    Current Assessment & Plan     Already improved on current treatment.  She feels that the quetiapine dose is sub-therapeutic, and she wants to increased dose, commenting that previously she was on 100-200 milligrams daily.          Relevant Medications    FLUoxetine 20 MG capsule    QUEtiapine (SEROQUEL) 100 MG Tab       Orthopedic    Closed fracture of left lower leg with routine healing    Current Assessment & Plan     Pain control is improving.            Other    Nicotine dependence, cigarettes, in remission (Chronic)    Overview     Tobacco-free since January 09, 2019         Current Assessment & Plan     She is currently at the maintenance stage of smoking cessation (remaining a non-smoker). Smoking cessation encouraged.           Other Visit Diagnoses     Screening for breast cancer        Relevant Orders    Mammo Digital Screening Bilat (Completed)          Outpatient Medications Prior to Visit   Medication Sig Dispense Refill    gabapentin (NEURONTIN) 300 MG capsule Take 1 capsule (300 mg total) by mouth 2 (two) times daily AND 2 capsules (600 mg total) every evening. Can take 3 caps qhs or tid if tolerated, may cause drowsiness. 120 capsule 1    gabapentin (NEURONTIN) 300  MG capsule Take 1 capsule (300 mg total) by mouth 3 (three) times daily. Can take 3 caps tid, can take 2 pills at night 120 capsule 1    nicotine, polacrilex, (NICORETTE) 2 mg Gum Take 1 each (2 mg total) by mouth as needed (use no more than 5 pieces in 24 hours). 120 each 2    FLUoxetine 20 MG capsule Take 1 capsule (20 mg total) by mouth once daily. 30 capsule 0    gabapentin (NEURONTIN) 100 MG capsule Take 300 mg by mouth 3 (three) times daily.       QUEtiapine (SEROQUEL) 50 MG tablet Take 1 tablet (50 mg total) by mouth every evening. 30 tablet 0    tiZANidine (ZANAFLEX) 4 MG tablet Take 1 tablet (4 mg total) by mouth 2 (two) times daily as needed. 60 tablet 0    oxyCODONE-acetaminophen (PERCOCET) 5-325 mg per tablet Take 1 tablet by mouth every 12 (twelve) hours as needed. 14 tablet 0     No facility-administered medications prior to visit.         Medications Ordered This Encounter   Medications    FLUoxetine 20 MG capsule     Sig: Take 1 capsule (20 mg total) by mouth once daily.     Dispense:  90 capsule     Refill:  1    QUEtiapine (SEROQUEL) 100 MG Tab     Sig: Take 1 tablet (100 mg total) by mouth every evening.     Dispense:  30 tablet     Refill:  1     NOTE DOSE CHANGE. This REPLACES any prior prescription for this drug. DISCONTINUE any prior prescription for this drug.       Medications Discontinued During This Encounter   Medication Reason    gabapentin (NEURONTIN) 100 MG capsule Duplicate Order    oxyCODONE-acetaminophen (PERCOCET) 5-325 mg per tablet Patient no longer taking    FLUoxetine 20 MG capsule Reorder    QUEtiapine (SEROQUEL) 50 MG tablet Reorder        Follow up in about 1 month (around 5/8/2019) for re-evaluate problem(s) discussed today.    REVIEW OF SYSTEMS  PSYCHIATRIC: No mood instability reported.   ENDOCRINE: No polyuria or polydipsia reported.     PHYSICAL EXAM  Vitals:    04/08/19 1330   BP: 120/85   BP Location: Left arm   Patient Position: Sitting   Pulse: 60  "  Temp: 98.2 °F (36.8 °C)   TempSrc: Tympanic   SpO2: 96%   Height: 5' 3" (1.6 m)     CONSTITUTIONAL: Vital signs noted. No apparent distress. Does not appear acutely ill or septic. Appears adequately hydrated.  ENT: Oropharynx moist.  PULM: Breathing unlabored.  CV: Heart regular.  DERM: Skin normothermic.  PSYCHIATRIC: Alert and conversant. Grossly oriented. Mood is grossly neutral. Affect appropriate. Judgment and insight not grossly compromised.     Documentation entered by me for this encounter may have been done in part using speech-recognition technology. Although I have made an effort to ensure accuracy, "sound like" errors may exist and should be interpreted in context. -JIM Chery MD   "

## 2019-04-08 NOTE — LETTER
April 8, 2019      JIM Chery MD  [address] [place] [address]           [facility]  [address] [facility] [address] [facility] [address]  Phone: [phone]  Fax: [phone]          Patient: Teresa Cazares   MR Number: [id number]   YOB: 1975   Date of Visit: 4/8/2019       Dear Dr. JIM Chery:    Thank you for referring Teresa Cazares to me for evaluation. Attached you will find relevant portions of my assessment and plan of care.    If you have questions, please do not hesitate to call me. I look forward to following Teresa Cazares along with you.    Sincerely,    Bebo Callahan MD    Enclosure  CC:  No Recipients    If you would like to receive this communication electronically, please contact [email or web address] or [phone] to request more information on [employer] Link access.    For providers and/or their staff who would like to refer a patient to [facility], please contact us through our one-stop-shop provider referral line, [facility], at [phone].    If you feel you have received this communication in error or would no longer like to receive these types of communications, please e-mail [email or web address]

## 2019-04-09 ENCOUNTER — INITIAL CONSULT (OUTPATIENT)
Dept: DERMATOLOGY | Facility: CLINIC | Age: 44
End: 2019-04-09
Payer: COMMERCIAL

## 2019-04-09 DIAGNOSIS — Q82.8 KERATODERMA: Primary | ICD-10-CM

## 2019-04-09 LAB — ANA SER QL IF: NORMAL

## 2019-04-09 PROCEDURE — 99202 PR OFFICE/OUTPT VISIT, NEW, LEVL II, 15-29 MIN: ICD-10-PCS | Mod: S$GLB,,, | Performed by: STUDENT IN AN ORGANIZED HEALTH CARE EDUCATION/TRAINING PROGRAM

## 2019-04-09 PROCEDURE — 99202 OFFICE O/P NEW SF 15 MIN: CPT | Mod: S$GLB,,, | Performed by: STUDENT IN AN ORGANIZED HEALTH CARE EDUCATION/TRAINING PROGRAM

## 2019-04-09 PROCEDURE — 99999 PR PBB SHADOW E&M-EST. PATIENT-LVL II: CPT | Mod: PBBFAC,,, | Performed by: STUDENT IN AN ORGANIZED HEALTH CARE EDUCATION/TRAINING PROGRAM

## 2019-04-09 PROCEDURE — 99999 PR PBB SHADOW E&M-EST. PATIENT-LVL II: ICD-10-PCS | Mod: PBBFAC,,, | Performed by: STUDENT IN AN ORGANIZED HEALTH CARE EDUCATION/TRAINING PROGRAM

## 2019-04-09 NOTE — PROGRESS NOTES
Subjective:       Patient ID:  Teresa Cazares is a 43 y.o. female who presents for   Chief Complaint   Patient presents with    Skin Discoloration     c/o both feet heels being discolored on and off x 1 year tx cipro      History of Present Illness: The patient presents with chief complaint of skin discoloration and thickened skin.  Location: inner heels of the feet.   Duration:  1 year on and off   Signs/Symptoms:  Discolored skin, thickened skin in the areas of the heels, occasionally burn at times   Prior treatments: cipro     She reports to having tight fitting shoes and believes the inner feet frequently rub up against the insides of the shoes.         Review of Systems   Skin: Negative for itching, rash and dry skin.        Objective:    Physical Exam   Constitutional: She appears well-developed and well-nourished. No distress.   Neurological: She is alert and oriented to person, place, and time. She is not disoriented.   Psychiatric: She has a normal mood and affect.   Skin:   Areas Examined (abnormalities noted in diagram):   Head / Face Inspection Performed  Neck Inspection Performed  Nails and Digits Inspection Performed             Diagram Legend     Erythematous scaling macule/papule c/w actinic keratosis       Vascular papule c/w angioma      Pigmented verrucoid papule/plaque c/w seborrheic keratosis      Yellow umbilicated papule c/w sebaceous hyperplasia      Irregularly shaped tan macule c/w lentigo     1-2 mm smooth white papules consistent with Milia      Movable subcutaneous cyst with punctum c/w epidermal inclusion cyst      Subcutaneous movable cyst c/w pilar cyst      Firm pink to brown papule c/w dermatofibroma      Pedunculated fleshy papule(s) c/w skin tag(s)      Evenly pigmented macule c/w junctional nevus     Mildly variegated pigmented, slightly irregular-bordered macule c/w mildly atypical nevus      Flesh colored to evenly pigmented papule c/w intradermal nevus       Pink  pearly papule/plaque c/w basal cell carcinoma      Erythematous hyperkeratotic cursted plaque c/w SCC      Surgical scar with no sign of skin cancer recurrence      Open and closed comedones      Inflammatory papules and pustules      Verrucoid papule consistent consistent with wart     Erythematous eczematous patches and plaques     Dystrophic onycholytic nail with subungual debris c/w onychomycosis     Umbilicated papule    Erythematous-base heme-crusted tan verrucoid plaque consistent with inflamed seborrheic keratosis     Erythematous Silvery Scaling Plaque c/w Psoriasis     See annotation      Assessment / Plan:        Keratoderma - feet.   -     urea (CARMOL) 40 % ointment; Apply topically 3 (three) times daily.  Dispense: 30 g; Refill: 2  -     salicylic acid 40 % Plst; Apply topically to the feet daily.  Dispense: 18 each; Refill: 1  -      If no improvement at follow-up visit, can consider biopsy.              Follow up in about 3 months (around 7/9/2019).

## 2019-04-09 NOTE — LETTER
April 9, 2019      Taylor Harrison MD  [address] [place] [address]           [facility]  [address]  [place] [address]  Phone: [phone]  Fax: [phone]          Patient: Teresa Cazares   MR Number: [id number]   YOB: 1975   Date of Visit: 4/9/2019       Dear Dr. Taylor Harrison:    Thank you for referring Teresa Cazares to me for evaluation. Attached you will find relevant portions of my assessment and plan of care.    If you have questions, please do not hesitate to call me. I look forward to following Teresa Cazares along with you.    Sincerely,    Galo Means MD    Enclosure  CC:  No Recipients    If you would like to receive this communication electronically, please contact [email or web address] or [phone] to request more information on [employer] Link access.    For providers and/or their staff who would like to refer a patient to [facility], please contact us through our one-stop-shop provider referral line, [facility], at [phone].    If you feel you have received this communication in error or would no longer like to receive these types of communications, please e-mail [email or web address]

## 2019-04-10 ENCOUNTER — TELEPHONE (OUTPATIENT)
Dept: DERMATOLOGY | Facility: CLINIC | Age: 44
End: 2019-04-10

## 2019-04-10 ENCOUNTER — PATIENT MESSAGE (OUTPATIENT)
Dept: DERMATOLOGY | Facility: CLINIC | Age: 44
End: 2019-04-10

## 2019-04-10 NOTE — TELEPHONE ENCOUNTER
----- Message from Sandhya Borges sent at 4/10/2019  4:16 PM CDT -----  Contact: self   Patient has spoken with pharmacy and they are saying they did not receive rx.Please call back at [phone].        Thanks,  Sandhya Borges

## 2019-04-11 ENCOUNTER — TELEPHONE (OUTPATIENT)
Dept: DERMATOLOGY | Facility: CLINIC | Age: 44
End: 2019-04-11

## 2019-04-11 DIAGNOSIS — Q82.8 KERATODERMA: ICD-10-CM

## 2019-04-11 RX ORDER — QUETIAPINE FUMARATE 100 MG/1
TABLET, FILM COATED ORAL
Qty: 90 TABLET | Refills: 1 | OUTPATIENT
Start: 2019-04-11

## 2019-04-11 NOTE — TELEPHONE ENCOUNTER
Called patient and informed her that Dr. Chery did not do a 90 day refill of her Seroquel because they will address the dose at her upcomming appointment on May 9th. She verbalized understanding.

## 2019-04-11 NOTE — ASSESSMENT & PLAN NOTE
Already improved on current treatment.  She feels that the quetiapine dose is sub-therapeutic, and she wants to increased dose, commenting that previously she was on 100-200 milligrams daily.

## 2019-04-11 NOTE — TELEPHONE ENCOUNTER
----- Message from Ligia Vann sent at 4/11/2019  3:28 PM CDT -----  Contact: self  Patient requesting that prescription for Salicylic acid 40% plst be sent in again or someone to call and let her know where to purchase it from. Patient states that pharmacy is giving her the run around on the prescription, and says they only carry the 2% on their shelf. Please call back at [phone].      Thanks,  Ligia Vann

## 2019-04-11 NOTE — TELEPHONE ENCOUNTER
Request for 90-day supply NOT approved. REASON: She is to return within next few weeks, and we may adjust dose.

## 2019-04-12 PROBLEM — D75.839 THROMBOCYTOSIS: Status: ACTIVE | Noted: 2019-04-12

## 2019-04-12 NOTE — PROGRESS NOTES
"Hi, Thisia.    I'm happy to report that these tests are NORMAL or at least ACCEPTABLE.  We will discuss your results further at your next appointment.    If you want to ask us a question, you can do so by replying to this message or by calling [phone].    Thanks for letting me care for you, thanks for trusting us with your healthcare needs, and thanks for using [facility].    Sincerely,    JIM Chery MD    P.S. - Want to learn more about your test results and what they mean? It's as simple as 1, 2, 3.     (1) Log in to your [facility] account at [email or web address]     (2) From the "View test results" tab, click on the test you want to know more about.     (3) Click on the "About This Test" link."

## 2019-04-16 ENCOUNTER — HOSPITAL ENCOUNTER (OUTPATIENT)
Dept: RADIOLOGY | Facility: HOSPITAL | Age: 44
Discharge: HOME | End: 2019-04-16
Attending: FAMILY MEDICINE
Payer: COMMERCIAL

## 2019-04-16 VITALS — BODY MASS INDEX: 44.3 KG/M2 | HEIGHT: 63 IN | WEIGHT: 250 LBS

## 2019-04-16 DIAGNOSIS — Z12.39 SCREENING FOR BREAST CANCER: ICD-10-CM

## 2019-04-16 PROCEDURE — 77067 MAMMO DIGITAL SCREENING BILAT WITH CAD: ICD-10-PCS | Mod: 26,,, | Performed by: RADIOLOGY

## 2019-04-16 PROCEDURE — 77067 SCR MAMMO BI INCL CAD: CPT | Mod: TC

## 2019-04-16 PROCEDURE — 77067 SCR MAMMO BI INCL CAD: CPT | Mod: 26,,, | Performed by: RADIOLOGY

## 2019-04-17 ENCOUNTER — HOSPITAL ENCOUNTER (OUTPATIENT)
Dept: RADIOLOGY | Facility: HOSPITAL | Age: 44
Discharge: HOME | End: 2019-04-17
Attending: FAMILY MEDICINE
Payer: COMMERCIAL

## 2019-04-17 DIAGNOSIS — R92.8 ABNORMAL MAMMOGRAM: ICD-10-CM

## 2019-04-17 PROCEDURE — 76642 ULTRASOUND BREAST LIMITED: CPT | Mod: TC,RT

## 2019-04-17 PROCEDURE — 76642 US BREAST RIGHT LIMITED: ICD-10-PCS | Mod: 26,RT,, | Performed by: RADIOLOGY

## 2019-04-17 PROCEDURE — 77061 BREAST TOMOSYNTHESIS UNI: CPT | Mod: 26,,, | Performed by: RADIOLOGY

## 2019-04-17 PROCEDURE — 77061 BREAST TOMOSYNTHESIS UNI: CPT | Mod: TC

## 2019-04-17 PROCEDURE — 77061 MAMMO DIGITAL DIAGNOSTIC RIGHT WITH TOMOSYNTHESIS_CAD: ICD-10-PCS | Mod: 26,,, | Performed by: RADIOLOGY

## 2019-04-17 PROCEDURE — 77065 DX MAMMO INCL CAD UNI: CPT | Mod: 26,,, | Performed by: RADIOLOGY

## 2019-04-17 PROCEDURE — 77065 MAMMO DIGITAL DIAGNOSTIC RIGHT WITH TOMOSYNTHESIS_CAD: ICD-10-PCS | Mod: 26,,, | Performed by: RADIOLOGY

## 2019-04-17 PROCEDURE — 77065 DX MAMMO INCL CAD UNI: CPT | Mod: TC

## 2019-04-17 PROCEDURE — 76642 ULTRASOUND BREAST LIMITED: CPT | Mod: 26,RT,, | Performed by: RADIOLOGY

## 2019-04-18 ENCOUNTER — TELEPHONE (OUTPATIENT)
Dept: INTERNAL MEDICINE | Facility: CLINIC | Age: 44
End: 2019-04-18

## 2019-04-18 NOTE — TELEPHONE ENCOUNTER
"TASK: Please read Patient Portal Message (below), and then contact her to verify her receipt and understanding. Thanks.      Teresa Damico.    I am proud of you for getting your breast cancer screening done!     I have reviewed the radiologist's report of your breast imaging test. The radiologist interpreted your test result findings as "PROBABLY BENIGN." This means that, although it was not normal, it was NOT felt to be high risk for cancer.    You need to have follow-up breast imaging in 6 months to re-evaluate these findings. If they are stable or improved, then you are at low risk for these findings to be cancer.  If the findings are changing, then you will need additional testing.    I'm optimistic that your repeat breast imaging will show everything to be OK. Still, it is important that you get it done so we can know the answer.    Someone from our mammography center should be contacting you to schedule this follow-up test. If the time comes for your follow-up test and no one has reached you, please let me know and I'll help schedule your test.    Annual mammography is the best test to screen for breast cancer, but it is not perfect, and it can miss some cancers. So, even though your mammogram did not show evidence of cancer, if you notice any lump or change in one of your breasts, please schedule an appointment with me for a proper evaluation.    Thanks for letting me care for you.    Sincerely,    JIM Chery MD      "

## 2019-04-18 NOTE — TELEPHONE ENCOUNTER
----- Message from Dom Bowen sent at 4/18/2019  3:29 PM CDT -----  Contact: [phone]  Would like a nurse to explain her mammogram results to her, please contact her @ [phone]. Thanks

## 2019-04-22 NOTE — TELEPHONE ENCOUNTER
Spoke with patient regarding mammogram results. Patient expressed understanding of information given. Patient had no further questions or concerns. DEA

## 2019-04-25 NOTE — ASSESSMENT & PLAN NOTE
She is currently at the maintenance stage of smoking cessation (remaining a non-smoker). Smoking cessation encouraged.

## 2019-04-26 DIAGNOSIS — F33.1 MAJOR DEPRESSIVE DISORDER, RECURRENT, MODERATE: ICD-10-CM

## 2019-04-30 RX ORDER — QUETIAPINE FUMARATE 50 MG/1
TABLET, FILM COATED ORAL
Qty: 30 TABLET | Refills: 0 | OUTPATIENT
Start: 2019-04-30

## 2019-05-02 ENCOUNTER — CLINICAL SUPPORT (OUTPATIENT)
Dept: SMOKING CESSATION | Facility: CLINIC | Age: 44
End: 2019-05-02
Payer: COMMERCIAL

## 2019-05-02 DIAGNOSIS — F17.200 NICOTINE DEPENDENCE: Primary | ICD-10-CM

## 2019-05-02 PROCEDURE — 99407 PR TOBACCO USE CESSATION INTENSIVE >10 MINUTES: ICD-10-PCS | Mod: S$GLB,,, | Performed by: GENERAL PRACTICE

## 2019-05-02 PROCEDURE — 99407 BEHAV CHNG SMOKING > 10 MIN: CPT | Mod: S$GLB,,, | Performed by: GENERAL PRACTICE

## 2019-05-20 RX ORDER — TIZANIDINE 4 MG/1
TABLET ORAL
Qty: 180 TABLET | Refills: 0 | Status: SHIPPED | OUTPATIENT
Start: 2019-05-20 | End: 2019-10-18

## 2019-05-20 RX ORDER — TIZANIDINE 4 MG/1
TABLET ORAL
Qty: 60 TABLET | Refills: 0 | Status: SHIPPED | OUTPATIENT
Start: 2019-05-20 | End: 2019-05-20 | Stop reason: SDUPTHER

## 2019-06-17 ENCOUNTER — TELEPHONE (OUTPATIENT)
Dept: INTERNAL MEDICINE | Facility: CLINIC | Age: 44
End: 2019-06-17

## 2019-06-17 NOTE — TELEPHONE ENCOUNTER
Spoke with patient and she stated that she is still in [place] having her physical therapy and has been having a hard time being out of work and such. She said she has been having to double her fluoxetine to make it sometimes. She wants to know if Dr. Chery will increase the dose and also send in a refill of her gabapentin to the [facility] in [place].

## 2019-06-17 NOTE — TELEPHONE ENCOUNTER
----- Message from Marsha Callahan sent at 6/17/2019  1:55 PM CDT -----  Contact: Pt  Please give pt a call at .[phone] (home) she is requesting her PROZAC to be upped and also she needs to have a GABAPENTIN. (pt is having severe anxiety issues and is very jittery)        ..  [facility] in [place], [place]

## 2019-06-18 NOTE — TELEPHONE ENCOUNTER
Spoke with patient and informed her that Dr. Chery would like her to schedule either an in office visit or a virtual visit to discuss her anxiety and any medication changes that need to be made. Scheduled a virtual visit for this Friday.

## 2019-06-18 NOTE — TELEPHONE ENCOUNTER
There are four [facility] pharmacies in [place]. Which one did she want to us to use?    Regarding request for fluoxetine increase:  Increasing dose above 20 milligrams daily is unlikely to provide significant benefit and may cause side effects.    At her last appointment, we increased her dose of quetiapine (Seroquel), and she was supposed to follow-up in early May for re-evaluation.  If she has been taking the quetiapine as directed, she should have run out by now.    RECOMMENDATION: If she cannot come in for an appointment, schedule her for a Virtual Visit so I can properly evaluate and treat her.    Thanks.

## 2019-07-01 PROBLEM — G89.11 PAIN, ACUTE DUE TO TRAUMA: Status: RESOLVED | Noted: 2019-03-28 | Resolved: 2019-07-01

## 2019-07-09 DIAGNOSIS — F33.1 MAJOR DEPRESSIVE DISORDER, RECURRENT, MODERATE: ICD-10-CM

## 2019-07-09 RX ORDER — FLUOXETINE HYDROCHLORIDE 20 MG/1
20 CAPSULE ORAL DAILY
Qty: 90 CAPSULE | Refills: 1 | Status: CANCELLED | OUTPATIENT
Start: 2019-07-09 | End: 2020-07-08

## 2019-07-09 RX ORDER — TIZANIDINE 4 MG/1
4 TABLET ORAL 2 TIMES DAILY PRN
Qty: 180 TABLET | Refills: 0 | Status: CANCELLED | OUTPATIENT
Start: 2019-07-09

## 2019-07-09 NOTE — TELEPHONE ENCOUNTER
----- Message from Teresa Avila sent at 7/9/2019  8:33 AM CDT -----  Contact: self  Type:  RX Refill Request    Who Called: pt  Refill or New Rx:refill  RX Name and Strength:prozac-20mg/ceraquil-100mg/tincanine- n/a  How is the patient currently taking it? (ex. 1XDay):1xday/1xday/2xday  Is this a 30 day or 90 day RX:30/30  Preferred Pharmacy with phone number:  [facility] [address] [place] [address] [address]  AT [facility] [address]  [address] [facility] [address]  Phone: [phone] Fax: [phone]  Local or Mail Order:local  Ordering Provider:tone  Would the patient rather a call back or a response via "[employer]? Call back  Best Call Back Number:[phone]  Additional Information: none    Thanks,  Teresa Avila

## 2019-07-10 DIAGNOSIS — F33.1 MAJOR DEPRESSIVE DISORDER, RECURRENT, MODERATE: ICD-10-CM

## 2019-07-10 RX ORDER — QUETIAPINE FUMARATE 100 MG/1
100 TABLET, FILM COATED ORAL NIGHTLY
Qty: 30 TABLET | Refills: 0 | Status: SHIPPED | OUTPATIENT
Start: 2019-07-10 | End: 2019-07-11 | Stop reason: SDUPTHER

## 2019-07-10 RX ORDER — QUETIAPINE FUMARATE 100 MG/1
100 TABLET, FILM COATED ORAL NIGHTLY
Qty: 30 TABLET | Refills: 0 | Status: SHIPPED | OUTPATIENT
Start: 2019-07-10 | End: 2019-07-10 | Stop reason: SDUPTHER

## 2019-07-10 RX ORDER — QUETIAPINE FUMARATE 100 MG/1
TABLET, FILM COATED ORAL
Qty: 90 TABLET | Refills: 0 | OUTPATIENT
Start: 2019-07-10

## 2019-07-10 NOTE — TELEPHONE ENCOUNTER
Refill approved.    Medications Ordered This Encounter   Medications    QUEtiapine (SEROQUEL) 100 MG Tab     Sig: Take 1 tablet (100 mg total) by mouth every evening. - APPOINTMENT REQUIRED FOR MORE REFILLS     Dispense:  30 tablet     Refill:  0     Dispense only QTY of 30. Do not request QTY of 90. Please notify her when prescription ready to  and instruct her that APPOINTMENT IS REQUIRED FOR ADDITIONAL REFILLS. Thank you.      Future Appointments   Date Time Provider Department Center   7/11/2019  1:00 PM JIM Chery MD [facility]

## 2019-07-11 ENCOUNTER — OFFICE VISIT (OUTPATIENT)
Dept: INTERNAL MEDICINE | Facility: CLINIC | Age: 44
End: 2019-07-11
Payer: COMMERCIAL

## 2019-07-11 DIAGNOSIS — F33.1 MAJOR DEPRESSIVE DISORDER, RECURRENT, MODERATE: ICD-10-CM

## 2019-07-11 DIAGNOSIS — S82.92XD: ICD-10-CM

## 2019-07-11 DIAGNOSIS — N30.00 ACUTE CYSTITIS WITHOUT HEMATURIA: Primary | ICD-10-CM

## 2019-07-11 PROCEDURE — 99214 OFFICE O/P EST MOD 30 MIN: CPT | Mod: 95,,, | Performed by: FAMILY MEDICINE

## 2019-07-11 PROCEDURE — 99214 PR OFFICE/OUTPT VISIT, EST, LEVL IV, 30-39 MIN: ICD-10-PCS | Mod: 95,,, | Performed by: FAMILY MEDICINE

## 2019-07-11 RX ORDER — QUETIAPINE FUMARATE 100 MG/1
100 TABLET, FILM COATED ORAL NIGHTLY
Qty: 30 TABLET | Refills: 1 | Status: SHIPPED | OUTPATIENT
Start: 2019-07-11 | End: 2019-10-16 | Stop reason: SDUPTHER

## 2019-07-11 RX ORDER — NITROFURANTOIN (MACROCRYSTALS) 100 MG/1
100 CAPSULE ORAL EVERY 12 HOURS
Qty: 28 CAPSULE | Refills: 0 | Status: SHIPPED | OUTPATIENT
Start: 2019-07-11 | End: 2019-07-16

## 2019-07-11 NOTE — PROGRESS NOTES
TELEMEDICINE VIRTUAL VISIT    CHIEF COMPLAINT  Follow-up      HISTORY, ASSESSMENT, and PLAN    1.  Acute cystitis without hematuria        ASSESSMENT:  She reports symptoms of urinary tract infection.  Quality described as urinary urgency.  Associated symptoms include burning dysuria.  Severity described as mild.  Onset reported as over the last day or so.  She reports no associated hematuria, abdominal pain, flank pain, or fever.  She reports that her symptoms are consistent with previous urinary tract infections, but of lesser severity.  We discussed treatment options.  It was agreed to begin a trial of empiric antibiotic treatment and she will come in if this fails to resolve the problem or if her problem worsens.    2.  Major depressive disorder, recurrent, moderate        ASSESSMENT:  Improved, but still not optimally controlled.  She has not schedule appointment with mental health counselor as previously ordered.  She agreed to call and schedule this soon.    3.  Closed fracture of left lower leg with routine healing       ASSESSMENT:  She reports that her pain is continuing to improve, adequately controlled.    Problem List Items Addressed This Visit        Psychiatric    Major depressive disorder, recurrent, moderate    Relevant Medications    QUEtiapine (SEROQUEL) 100 MG Tab       Orthopedic    Closed fracture of left lower leg with routine healing      Other Visit Diagnoses     Acute cystitis without hematuria    -  Primary           Outpatient Medications Prior to Visit   Medication Sig Dispense Refill    FLUoxetine 20 MG capsule Take 1 capsule (20 mg total) by mouth once daily. 90 capsule 1    gabapentin (NEURONTIN) 300 MG capsule Take 1 capsule (300 mg total) by mouth 3 (three) times daily. Can take 3 caps tid, can take 2 pills at night 120 capsule 1    nicotine, polacrilex, (NICORETTE) 2 mg Gum Take 1 each (2 mg total) by mouth as needed (use no more than 5 pieces in 24 hours). 120 each 2     tiZANidine (ZANAFLEX) 4 MG tablet TAKE 1 TABLET BY MOUTH TWICE DAILY AS NEEDED 180 tablet 0    salicylic acid 40 % Plst Apply topically to the feet daily. 18 each 1    urea (CARMOL) 40 % ointment Apply topically 3 (three) times daily. 30 g 2    QUEtiapine (SEROQUEL) 100 MG Tab Take 1 tablet (100 mg total) by mouth every evening. - APPOINTMENT REQUIRED FOR MORE REFILLS 30 tablet 0    tiZANidine (ZANAFLEX) 4 MG tablet TAKE 1 TABLET BY MOUTH TWICE DAILY AS NEEDED 180 tablet 0     No facility-administered medications prior to visit.         Medications Ordered This Encounter   Medications    nitrofurantoin (MACRODANTIN) 100 MG capsule     Sig: Take 1 capsule (100 mg total) by mouth every 12 (twelve) hours. for 5 days     Dispense:  28 capsule     Refill:  0    QUEtiapine (SEROQUEL) 100 MG Tab     Sig: Take 1 tablet (100 mg total) by mouth every evening.     Dispense:  30 tablet     Refill:  1     Do not fill until patient requests.       Medications Discontinued During This Encounter   Medication Reason    tiZANidine (ZANAFLEX) 4 MG tablet Duplicate Order    QUEtiapine (SEROQUEL) 100 MG Tab Reorder        Follow up if symptoms worsen or fail to improve.    REVIEW OF SYSTEMS  CONSTITUTIONAL: No fever or chills reported.   GASTROINTESTINAL: No vomiting or diarrhea reported.   PSYCHIATRIC: No suicidal ideations or mood instability reported.     PHYSICAL EXAM  CONSTITUTIONAL: No apparent distress. Appears well. Does not appear acutely ill or septic. Appears adequately hydrated.  PULM: Breathing unlabored.  PSYCHIATRIC: Alert and conversant and grossly oriented. Mood is grossly neutral. Affect appropriate. Judgment and insight not grossly compromised.     PATIENT INSTRUCTIONS: It is important that act soon and call [phone] to schedule your appointment with the mental health specialist as we discussed today.    Documentation entered by me for this encounter may have been done in part using speech-recognition  "technology. Although I have made an effort to ensure accuracy, "sound like" errors may exist and should be interpreted in context. -JIM Chery MD     Visit Details: This visit was a telemedicine virtual visit with synchronous audio and video. Teresa reported that her location at the time of this visit was in the [facility]. Teresa had the choice to come into office today to receive these medical services. Teresa chose and consented to receive these medical services by telemedicine.          "

## 2019-07-18 ENCOUNTER — TELEPHONE (OUTPATIENT)
Dept: INTERNAL MEDICINE | Facility: CLINIC | Age: 44
End: 2019-07-18

## 2019-07-18 RX ORDER — QUETIAPINE FUMARATE 100 MG/1
100 TABLET, FILM COATED ORAL NIGHTLY
Qty: 90 TABLET | Refills: 0 | Status: SHIPPED | OUTPATIENT
Start: 2019-07-18 | End: 2019-10-16 | Stop reason: SDUPTHER

## 2019-07-18 NOTE — TELEPHONE ENCOUNTER
ACTION NEEDED: Please contact her and let her know that I gave her a 90 day prescription refill for this medicine, but I must see her in the office for re-evaluation before I can give her additional refills.  Remind her that she needs to schedule her appointment with the mental health counselor as we discussed.  Thanks.    Medications Ordered This Encounter   Medications    QUEtiapine (SEROQUEL) 100 MG Tab     Sig: Take 1 tablet (100 mg total) by mouth every evening. - APPOINTMENT REQUIRED FOR MORE REFILLS     Dispense:  90 tablet     Refill:  0     **Patient requests 90 days supply**

## 2019-07-18 NOTE — TELEPHONE ENCOUNTER
Spoke with patient and she stated that she started breaking out in small bumps on her legs back and arms after starting her Nitrofurantoin on Tuesday . Dr. Chery informed and he instructed for patient to stop taking the medication and if her urinary problems persist, for her to schedule an appointment to come in for further evaluation. Called patient back and informed her, she verbalized understanding.

## 2019-10-02 ENCOUNTER — HOSPITAL ENCOUNTER (OUTPATIENT)
Dept: RADIOLOGY | Facility: HOSPITAL | Age: 44
Discharge: HOME | End: 2019-10-02
Attending: NURSE PRACTITIONER
Payer: COMMERCIAL

## 2019-10-02 ENCOUNTER — CLINICAL SUPPORT (OUTPATIENT)
Dept: SMOKING CESSATION | Facility: CLINIC | Age: 44
End: 2019-10-02
Payer: COMMERCIAL

## 2019-10-02 ENCOUNTER — OFFICE VISIT (OUTPATIENT)
Dept: INTERNAL MEDICINE | Facility: CLINIC | Age: 44
End: 2019-10-02
Payer: COMMERCIAL

## 2019-10-02 VITALS
WEIGHT: 229.75 LBS | BODY MASS INDEX: 40.71 KG/M2 | OXYGEN SATURATION: 97 % | HEART RATE: 77 BPM | SYSTOLIC BLOOD PRESSURE: 120 MMHG | TEMPERATURE: 99 F | DIASTOLIC BLOOD PRESSURE: 70 MMHG | HEIGHT: 63 IN

## 2019-10-02 DIAGNOSIS — F17.200 NICOTINE DEPENDENCE: Primary | ICD-10-CM

## 2019-10-02 DIAGNOSIS — S69.92XA INJURY OF LEFT HAND, INITIAL ENCOUNTER: ICD-10-CM

## 2019-10-02 DIAGNOSIS — R39.9 UTI SYMPTOMS: ICD-10-CM

## 2019-10-02 DIAGNOSIS — S69.92XA INJURY OF LEFT HAND, INITIAL ENCOUNTER: Primary | ICD-10-CM

## 2019-10-02 LAB
BILIRUB UR QL STRIP: NEGATIVE
CLARITY UR: CLEAR
COLOR UR: YELLOW
GLUCOSE UR QL STRIP: NEGATIVE
HGB UR QL STRIP: NEGATIVE
KETONES UR QL STRIP: ABNORMAL
LEUKOCYTE ESTERASE UR QL STRIP: NEGATIVE
NITRITE UR QL STRIP: NEGATIVE
PH UR STRIP: 6 [PH] (ref 5–8)
PROT UR QL STRIP: NEGATIVE
SP GR UR STRIP: 1.02 (ref 1–1.03)
URN SPEC COLLECT METH UR: ABNORMAL

## 2019-10-02 PROCEDURE — 99404 PR PREVENT COUNSEL,INDIV,60 MIN: ICD-10-PCS | Mod: S$GLB,,, | Performed by: GENERAL PRACTICE

## 2019-10-02 PROCEDURE — 73130 X-RAY EXAM OF HAND: CPT | Mod: 26,LT,, | Performed by: RADIOLOGY

## 2019-10-02 PROCEDURE — 99999 PR PBB SHADOW E&M-EST. PATIENT-LVL IV: ICD-10-PCS | Mod: PBBFAC,,, | Performed by: NURSE PRACTITIONER

## 2019-10-02 PROCEDURE — 73130 XR HAND COMPLETE 3 VIEW LEFT: ICD-10-PCS | Mod: 26,LT,, | Performed by: RADIOLOGY

## 2019-10-02 PROCEDURE — 99999 PR PBB SHADOW E&M-EST. PATIENT-LVL I: CPT | Mod: PBBFAC,,,

## 2019-10-02 PROCEDURE — 99214 OFFICE O/P EST MOD 30 MIN: CPT | Mod: S$GLB,,, | Performed by: NURSE PRACTITIONER

## 2019-10-02 PROCEDURE — 87086 URINE CULTURE/COLONY COUNT: CPT

## 2019-10-02 PROCEDURE — 3008F BODY MASS INDEX DOCD: CPT | Mod: CPTII,S$GLB,, | Performed by: NURSE PRACTITIONER

## 2019-10-02 PROCEDURE — 99404 PREV MED CNSL INDIV APPRX 60: CPT | Mod: S$GLB,,, | Performed by: GENERAL PRACTICE

## 2019-10-02 PROCEDURE — 81003 URINALYSIS AUTO W/O SCOPE: CPT

## 2019-10-02 PROCEDURE — 99999 PR PBB SHADOW E&M-EST. PATIENT-LVL I: ICD-10-PCS | Mod: PBBFAC,,,

## 2019-10-02 PROCEDURE — 99214 PR OFFICE/OUTPT VISIT, EST, LEVL IV, 30-39 MIN: ICD-10-PCS | Mod: S$GLB,,, | Performed by: NURSE PRACTITIONER

## 2019-10-02 PROCEDURE — 3008F PR BODY MASS INDEX (BMI) DOCUMENTED: ICD-10-PCS | Mod: CPTII,S$GLB,, | Performed by: NURSE PRACTITIONER

## 2019-10-02 PROCEDURE — 73130 X-RAY EXAM OF HAND: CPT | Mod: TC,LT

## 2019-10-02 PROCEDURE — 99999 PR PBB SHADOW E&M-EST. PATIENT-LVL IV: CPT | Mod: PBBFAC,,, | Performed by: NURSE PRACTITIONER

## 2019-10-02 RX ORDER — SULFAMETHOXAZOLE AND TRIMETHOPRIM 800; 160 MG/1; MG/1
1 TABLET ORAL 2 TIMES DAILY
Qty: 14 TABLET | Refills: 0 | Status: SHIPPED | OUTPATIENT
Start: 2019-10-02 | End: 2019-10-09

## 2019-10-02 RX ORDER — IBUPROFEN 200 MG
1 TABLET ORAL DAILY
Qty: 14 PATCH | Refills: 1 | Status: SHIPPED | OUTPATIENT
Start: 2019-10-02 | End: 2020-04-21

## 2019-10-02 RX ORDER — IBUPROFEN 800 MG/1
800 TABLET ORAL 3 TIMES DAILY PRN
Qty: 30 TABLET | Refills: 1 | Status: SHIPPED | OUTPATIENT
Start: 2019-10-02 | End: 2020-04-21

## 2019-10-02 RX ORDER — VARENICLINE TARTRATE 0.5 (11)-1
KIT ORAL
Qty: 1 PACKAGE | Refills: 0 | Status: SHIPPED | OUTPATIENT
Start: 2019-10-02 | End: 2019-11-13 | Stop reason: ALTCHOICE

## 2019-10-02 RX ORDER — IBUPROFEN 800 MG/1
800 TABLET ORAL 3 TIMES DAILY PRN
Qty: 30 TABLET | Refills: 1 | Status: SHIPPED | OUTPATIENT
Start: 2019-10-02 | End: 2019-10-02 | Stop reason: SDUPTHER

## 2019-10-02 RX ORDER — NITROFURANTOIN 25; 75 MG/1; MG/1
100 CAPSULE ORAL 2 TIMES DAILY
Qty: 14 CAPSULE | Refills: 0 | Status: SHIPPED | OUTPATIENT
Start: 2019-10-02 | End: 2019-10-02

## 2019-10-02 NOTE — PROGRESS NOTES
Subjective:       Patient ID: Teresa Cazares is a 44 y.o. female.    Chief Complaint: Fracture (left hand ) and Cystitis (x 4 days)    Patient presents with left hand pain that started in Jan 2019.  Reports leaping out of her work truck in Jan 2019 and fell on hands and legs.  Had fractures to left lower leg but did not have any imaging of the left hand.  Reports some UTI symptoms that started about 4 days.      Review of Systems   Constitutional: Negative for chills and fatigue.   Respiratory: Negative for cough and shortness of breath.    Genitourinary: Positive for dysuria and frequency.   Musculoskeletal: Positive for arthralgias and joint swelling.   Psychiatric/Behavioral: Negative for agitation and confusion.       Objective:      Physical Exam   Constitutional: She is oriented to person, place, and time. Vital signs are normal. She appears well-developed and well-nourished.   HENT:   Head: Normocephalic and atraumatic.   Neck: Normal range of motion.   Cardiovascular: Normal rate.   Pulmonary/Chest: Effort normal.   Musculoskeletal: Normal range of motion. She exhibits tenderness.        Left hand: She exhibits tenderness.        Hands:  Neurological: She is alert and oriented to person, place, and time.   Skin: Skin is warm.   Psychiatric: She has a normal mood and affect. Her behavior is normal.       Assessment:       1. Injury of left hand, initial encounter    2. UTI symptoms        Plan:     Injury of left hand, initial encounter  -     Cancel: X-Ray Hand 2 View Left; Future; Expected date: 10/02/2019  -     Discontinue: ibuprofen (ADVIL,MOTRIN) 800 MG tablet; Take 1 tablet (800 mg total) by mouth 3 (three) times daily as needed for Pain. Take with food  Dispense: 30 tablet; Refill: 1  -     ibuprofen (ADVIL,MOTRIN) 800 MG tablet; Take 1 tablet (800 mg total) by mouth 3 (three) times daily as needed for Pain. Take with food  Dispense: 30 tablet; Refill: 1    UTI symptoms  -     Urinalysis  -      Urine culture    Other orders  -     Discontinue: nitrofurantoin, macrocrystal-monohydrate, (MACROBID) 100 MG capsule; Take 1 capsule (100 mg total) by mouth 2 (two) times daily. for 7 days  Dispense: 14 capsule; Refill: 0  -     sulfamethoxazole-trimethoprim 800-160mg (BACTRIM DS) 800-160 mg Tab; Take 1 tablet by mouth 2 (two) times daily. for 7 days  Dispense: 14 tablet; Refill: 0        Instructed to take all medications as ordered.  Informed if no improvement or symptoms worsens to follow up with primary care physician.  Informed to hydrate.  Printed and review after visit summary with patient.

## 2019-10-04 LAB — BACTERIA UR CULT: NO GROWTH

## 2019-10-11 ENCOUNTER — TELEPHONE (OUTPATIENT)
Dept: SMOKING CESSATION | Facility: CLINIC | Age: 44
End: 2019-10-11

## 2019-10-11 NOTE — TELEPHONE ENCOUNTER
First attempt regarding smoking cessation quit 4 and 5 episode, unable to leave message due to no answering service available.

## 2019-10-13 DIAGNOSIS — F33.1 MAJOR DEPRESSIVE DISORDER, RECURRENT, MODERATE: ICD-10-CM

## 2019-10-16 RX ORDER — QUETIAPINE FUMARATE 100 MG/1
100 TABLET, FILM COATED ORAL NIGHTLY
Qty: 90 TABLET | Refills: 0 | Status: SHIPPED | OUTPATIENT
Start: 2019-10-16 | End: 2020-01-21

## 2019-10-16 NOTE — TELEPHONE ENCOUNTER
Hi, Thisia.    I received a request for refill of your quetiapine (Seroquel) (shown below).    I approved the refill request, assuming that you wanted to continue the medicine because it was working well for you, you felt well on the medicine, you were tolerating the medicine without significant side-effects. If this is not the case, please schedule an appointment with me ASAP to discuss other treatment options.    Thanks for letting me care for you, thanks for trusting us with your healthcare needs, and thanks for using [facility].    Sincerely,    JIM Chery MD      Medications Ordered This Encounter   Medications    QUEtiapine (SEROQUEL) 100 MG Tab     Sig: Take 1 tablet (100 mg total) by mouth every evening. - APPOINTMENT REQUIRED FOR MORE REFILLS     Dispense:  90 tablet     Refill:  0

## 2019-10-17 ENCOUNTER — TELEPHONE (OUTPATIENT)
Dept: RADIOLOGY | Facility: HOSPITAL | Age: 44
End: 2019-10-17

## 2019-10-17 DIAGNOSIS — F33.1 MAJOR DEPRESSIVE DISORDER, RECURRENT, MODERATE: ICD-10-CM

## 2019-10-17 RX ORDER — QUETIAPINE FUMARATE 100 MG/1
100 TABLET, FILM COATED ORAL NIGHTLY
Qty: 90 TABLET | Refills: 0 | Status: CANCELLED | OUTPATIENT
Start: 2019-10-17

## 2019-10-17 NOTE — TELEPHONE ENCOUNTER
This medicine can be purchased for less than 40 cents per dose WITHOUT insurance. (See [email or web address].)  We will handle refills at her appointment with me, Tomorrow, Friday, October 18, 2019 at 11:00 AM.

## 2019-10-17 NOTE — TELEPHONE ENCOUNTER
Spoke with patient and she stated that her Seroquel is requiring a prior authorization and she has been out of it for a few nights. I informed her that we will send the prescription to our pharmacy here for a prior authorization to be done. She verbalized understanding.

## 2019-10-18 ENCOUNTER — HOSPITAL ENCOUNTER (OUTPATIENT)
Dept: RADIOLOGY | Facility: HOSPITAL | Age: 44
Discharge: HOME | End: 2019-10-18
Attending: FAMILY MEDICINE
Payer: COMMERCIAL

## 2019-10-18 ENCOUNTER — OFFICE VISIT (OUTPATIENT)
Dept: INTERNAL MEDICINE | Facility: CLINIC | Age: 44
End: 2019-10-18
Payer: COMMERCIAL

## 2019-10-18 VITALS
WEIGHT: 226.44 LBS | OXYGEN SATURATION: 99 % | BODY MASS INDEX: 40.12 KG/M2 | DIASTOLIC BLOOD PRESSURE: 81 MMHG | SYSTOLIC BLOOD PRESSURE: 123 MMHG | TEMPERATURE: 99 F | HEART RATE: 67 BPM | HEIGHT: 63 IN

## 2019-10-18 DIAGNOSIS — F17.211 NICOTINE DEPENDENCE, CIGARETTES, IN REMISSION: Chronic | ICD-10-CM

## 2019-10-18 DIAGNOSIS — F51.02 INSOMNIA DUE TO STRESS: ICD-10-CM

## 2019-10-18 DIAGNOSIS — Z29.9 PREVENTIVE MEASURE: ICD-10-CM

## 2019-10-18 DIAGNOSIS — D75.839 THROMBOCYTOSIS: ICD-10-CM

## 2019-10-18 DIAGNOSIS — G89.29 OTHER CHRONIC PAIN: Chronic | ICD-10-CM

## 2019-10-18 DIAGNOSIS — F33.41 RECURRENT MAJOR DEPRESSION IN PARTIAL REMISSION: Chronic | ICD-10-CM

## 2019-10-18 DIAGNOSIS — R92.8 ABNORMAL MAMMOGRAM: ICD-10-CM

## 2019-10-18 DIAGNOSIS — G47.33 OBSTRUCTIVE SLEEP APNEA: ICD-10-CM

## 2019-10-18 DIAGNOSIS — Z79.899 LONG TERM CURRENT USE OF ANTIPSYCHOTIC MEDICATION: Chronic | ICD-10-CM

## 2019-10-18 DIAGNOSIS — M17.0 BILATERAL PRIMARY OSTEOARTHRITIS OF KNEE: ICD-10-CM

## 2019-10-18 DIAGNOSIS — M25.50 POLYARTHRALGIA: Primary | Chronic | ICD-10-CM

## 2019-10-18 PROBLEM — D72.829 LEUKOCYTOSIS: Status: RESOLVED | Noted: 2019-01-10 | Resolved: 2019-10-18

## 2019-10-18 PROCEDURE — 90472 IMMUNIZATION ADMIN EACH ADD: CPT | Mod: S$GLB,,, | Performed by: FAMILY MEDICINE

## 2019-10-18 PROCEDURE — 90732 PNEUMOCOCCAL POLYSACCHARIDE VACCINE 23-VALENT =>2YO SQ IM: ICD-10-PCS | Mod: S$GLB,,, | Performed by: FAMILY MEDICINE

## 2019-10-18 PROCEDURE — 3008F PR BODY MASS INDEX (BMI) DOCUMENTED: ICD-10-PCS | Mod: CPTII,S$GLB,, | Performed by: FAMILY MEDICINE

## 2019-10-18 PROCEDURE — 99999 PR PBB SHADOW E&M-EST. PATIENT-LVL IV: CPT | Mod: PBBFAC,,, | Performed by: FAMILY MEDICINE

## 2019-10-18 PROCEDURE — 90472 PNEUMOCOCCAL POLYSACCHARIDE VACCINE 23-VALENT =>2YO SQ IM: ICD-10-PCS | Mod: S$GLB,,, | Performed by: FAMILY MEDICINE

## 2019-10-18 PROCEDURE — 99214 PR OFFICE/OUTPT VISIT, EST, LEVL IV, 30-39 MIN: ICD-10-PCS | Mod: 25,S$GLB,, | Performed by: FAMILY MEDICINE

## 2019-10-18 PROCEDURE — 99214 OFFICE O/P EST MOD 30 MIN: CPT | Mod: 25,S$GLB,, | Performed by: FAMILY MEDICINE

## 2019-10-18 PROCEDURE — 90686 IIV4 VACC NO PRSV 0.5 ML IM: CPT | Mod: S$GLB,,, | Performed by: FAMILY MEDICINE

## 2019-10-18 PROCEDURE — 90732 PPSV23 VACC 2 YRS+ SUBQ/IM: CPT | Mod: S$GLB,,, | Performed by: FAMILY MEDICINE

## 2019-10-18 PROCEDURE — 90471 FLU VACCINE (QUAD) GREATER THAN OR EQUAL TO 3YO PRESERVATIVE FREE IM: ICD-10-PCS | Mod: S$GLB,,, | Performed by: FAMILY MEDICINE

## 2019-10-18 PROCEDURE — 90471 IMMUNIZATION ADMIN: CPT | Mod: S$GLB,,, | Performed by: FAMILY MEDICINE

## 2019-10-18 PROCEDURE — 3008F BODY MASS INDEX DOCD: CPT | Mod: CPTII,S$GLB,, | Performed by: FAMILY MEDICINE

## 2019-10-18 PROCEDURE — 99999 PR PBB SHADOW E&M-EST. PATIENT-LVL IV: ICD-10-PCS | Mod: PBBFAC,,, | Performed by: FAMILY MEDICINE

## 2019-10-18 PROCEDURE — 90686 FLU VACCINE (QUAD) GREATER THAN OR EQUAL TO 3YO PRESERVATIVE FREE IM: ICD-10-PCS | Mod: S$GLB,,, | Performed by: FAMILY MEDICINE

## 2019-10-18 RX ORDER — FLUOXETINE HYDROCHLORIDE 20 MG/1
20 CAPSULE ORAL DAILY
Qty: 90 CAPSULE | Refills: 1 | Status: SHIPPED | OUTPATIENT
Start: 2019-10-18 | End: 2020-05-20

## 2019-10-18 NOTE — ASSESSMENT & PLAN NOTE
Lab Results   Component Value Date    WBC 11.07 04/08/2019    WBC 13.12 (H) 01/22/2019    RBC 4.63 04/08/2019    RBC 3.33 (L) 01/22/2019    HGB 13.2 04/08/2019    HGB 9.5 (L) 01/22/2019    HCT 41.8 04/08/2019    HCT 29.3 (L) 01/22/2019     (H) 04/08/2019     (H) 01/22/2019     Asymptomatic.

## 2019-10-18 NOTE — PATIENT INSTRUCTIONS
Someone from [facility] will be contacting you soon to help you get the home sleep test done. If you haven't heard from them within 2 weeks, please call the [facility] at [phone] and let them know that you were ordered to have a home sleep test done and that you haven't yet been contacted.      It is important that act soon and call [phone] to schedule your appointment with the psychiatrist as we discussed today.

## 2019-10-18 NOTE — ASSESSMENT & PLAN NOTE
STOP-BANG questionnaire to assess risk for obstructive sleep apnea (DEYA)  · Snoring: Do you snore loudly? ANSWER: YES  · Tired: Do you often feel tired, fatigued, or sleepy during daytime? ANSWER: YES [place] Sleepiness Scale Score = 16 (SEVERE Excessive Daytime Sleepiness)  · Observed: Has anyone observed you stop breathing during your sleep? ANSWER: YES  · Pressure: Do you have or are you being treated for high blood pressure? ANSWER: NO  · BMI: BMI more than 35 kg/m2? ANSWER: YES (BMI = Body mass index is 40.11 kg/m².)   · Age: Age over 50 yr old? ANSWER: NO (Age = 44 y.o.)  · Neck circumference: Neck circumference greater than 40 cm? ANSWER: NO (Neck circumference = 38.5 cm, Mallampati class 3 oropharynx.  · Gender: Gender male? ANSWER: NO (Gender = female)    STOP-BANG Score = 4 (INTERMEDIATE risk of DEYA)    ADDITIONAL RELEVANT HISTORY: She has noticed or has been told that she wakes up gasping or choking.  She has been told she stops breathing when she sleeps.  She reports non-restorative sleep.     INSTRUCTIONS PROVIDED: Do not drive or perform hazardous activities while drowsy.

## 2019-10-29 ENCOUNTER — TELEPHONE (OUTPATIENT)
Dept: INTERNAL MEDICINE | Facility: CLINIC | Age: 44
End: 2019-10-29

## 2019-10-29 DIAGNOSIS — R05.9 COUGH: Primary | ICD-10-CM

## 2019-10-29 RX ORDER — BENZONATATE 100 MG/1
100-200 CAPSULE ORAL 3 TIMES DAILY PRN
Qty: 45 CAPSULE | Refills: 1 | Status: SHIPPED | OUTPATIENT
Start: 2019-10-29 | End: 2019-10-31

## 2019-10-29 NOTE — TELEPHONE ENCOUNTER
Patient stated that she has had a cold lately and wants to know if Dr. Chery can prescribe a cough medicine and an albuterol inhaler for her.

## 2019-10-29 NOTE — TELEPHONE ENCOUNTER
----- Message from Alli Roa sent at 10/25/2019  2:55 PM CDT -----  Contact: [phone]  .Type:  Needs Medical Advice    Who Called: Teresa Cazares  Symptoms (please be specific): Cold    How long has patient had these symptoms:  2 day   Pharmacy name and phone #:  .  [id number] [place], [place] - [address] [address]  Phone: [phone] Fax: [phone]        Would the patient rather a call back or a response via [facility]? Call back   Best Call Back Number: .[phone] (home)     Additional Information: Pt would like to know if she can schedule a video call.       Thank You,   Alli Roa

## 2019-10-29 NOTE — TELEPHONE ENCOUNTER
ACTION NEEDED:  Please read Patient Portal Message (below), then verify her receipt and understanding. Thanks.         Teresa Damico.    I read your message. I'm sorry to hear you have a cough and cold.    I understand that you want something to treat your cough. Cough lozenges that contain menthol can help, but the other over-the-counter cough medicines are pretty worthless, and they can have side effects, so I personally do not use them or recommend them.    I have sent you a prescription for the cough medicine that I use, if I ever have a bad cough: benzonatate (TESSALON) 100 MG capsule. I sent the prescription to the pharmacy you listed as your preferred pharmacy. If you want to fill the prescription at a different pharmacy, simply tell your pharmacist to contact the other pharmacy to have the prescription transferred.    If you are running fevers, having trouble breathing, having chest pain, having blood in your sputum, having trouble swallowing, having a fast (>100) heart rate, or if you feel acutely ill, you should come in or go to a local urgent care for evaluation. Otherwise, rest, stay well-hydrated, and you can take Tylenol as needed for discomfort or pain.    You should expect to start getting gradually better over the next week or so.  If you get worse or fail to get better in a timely manner, schedule an appointment with me at your earliest convenience. I'll properly evaluate you and get you the treatment you need.    Thanks for letting me care for you, thanks for trusting us with your healthcare needs, and thanks for using [facility].    I hope you feel better soon.    Sincerely,    JIM Chery MD   --------------------------------------------------------------------------------  Medications Ordered This Encounter   Medications    benzonatate (TESSALON) 100 MG capsule     Sig: Take 1-2 capsules (100-200 mg total) by mouth 3 (three) times daily as needed for Cough.     Dispense:  45 capsule      Refill:  1

## 2019-10-29 NOTE — TELEPHONE ENCOUNTER
I have attempted on three separate occasions today to call patient back at number provided to remind her to look at her My Chart message from Dr. Chery about cough medicine. The phone rings and states that there is no voicemail set up.

## 2019-10-30 ENCOUNTER — TELEPHONE (OUTPATIENT)
Dept: RADIOLOGY | Facility: HOSPITAL | Age: 44
End: 2019-10-30

## 2019-10-31 ENCOUNTER — HOSPITAL ENCOUNTER (OUTPATIENT)
Dept: RADIOLOGY | Facility: HOSPITAL | Age: 44
Discharge: HOME | End: 2019-10-31
Attending: FAMILY MEDICINE
Payer: COMMERCIAL

## 2019-10-31 ENCOUNTER — OFFICE VISIT (OUTPATIENT)
Dept: INTERNAL MEDICINE | Facility: CLINIC | Age: 44
End: 2019-10-31
Payer: COMMERCIAL

## 2019-10-31 VITALS
DIASTOLIC BLOOD PRESSURE: 82 MMHG | HEART RATE: 68 BPM | HEIGHT: 63 IN | SYSTOLIC BLOOD PRESSURE: 134 MMHG | BODY MASS INDEX: 39.61 KG/M2 | TEMPERATURE: 98 F | OXYGEN SATURATION: 97 % | WEIGHT: 223.56 LBS

## 2019-10-31 VITALS — HEIGHT: 63 IN | BODY MASS INDEX: 40.12 KG/M2 | WEIGHT: 226.44 LBS

## 2019-10-31 DIAGNOSIS — J00 ACUTE NASOPHARYNGITIS: Primary | ICD-10-CM

## 2019-10-31 DIAGNOSIS — R92.8 ABNORMAL MAMMOGRAM: ICD-10-CM

## 2019-10-31 PROCEDURE — 76642 ULTRASOUND BREAST LIMITED: CPT | Mod: 26,RT,, | Performed by: RADIOLOGY

## 2019-10-31 PROCEDURE — 77061 BREAST TOMOSYNTHESIS UNI: CPT | Mod: 26,,, | Performed by: RADIOLOGY

## 2019-10-31 PROCEDURE — 3008F PR BODY MASS INDEX (BMI) DOCUMENTED: ICD-10-PCS | Mod: CPTII,S$GLB,, | Performed by: FAMILY MEDICINE

## 2019-10-31 PROCEDURE — 77065 DX MAMMO INCL CAD UNI: CPT | Mod: 26,,, | Performed by: RADIOLOGY

## 2019-10-31 PROCEDURE — 77061 MAMMO DIGITAL DIAGNOSTIC RIGHT WITH TOMOSYNTHESIS_CAD: ICD-10-PCS | Mod: 26,,, | Performed by: RADIOLOGY

## 2019-10-31 PROCEDURE — 77061 BREAST TOMOSYNTHESIS UNI: CPT | Mod: TC

## 2019-10-31 PROCEDURE — 99214 PR OFFICE/OUTPT VISIT, EST, LEVL IV, 30-39 MIN: ICD-10-PCS | Mod: S$GLB,,, | Performed by: FAMILY MEDICINE

## 2019-10-31 PROCEDURE — 99214 OFFICE O/P EST MOD 30 MIN: CPT | Mod: S$GLB,,, | Performed by: FAMILY MEDICINE

## 2019-10-31 PROCEDURE — 99999 PR PBB SHADOW E&M-EST. PATIENT-LVL III: CPT | Mod: PBBFAC,,, | Performed by: FAMILY MEDICINE

## 2019-10-31 PROCEDURE — 77065 DX MAMMO INCL CAD UNI: CPT | Mod: TC

## 2019-10-31 PROCEDURE — 76642 ULTRASOUND BREAST LIMITED: CPT | Mod: TC,RT

## 2019-10-31 PROCEDURE — 99999 PR PBB SHADOW E&M-EST. PATIENT-LVL III: ICD-10-PCS | Mod: PBBFAC,,, | Performed by: FAMILY MEDICINE

## 2019-10-31 PROCEDURE — 76642 US BREAST RIGHT LIMITED: ICD-10-PCS | Mod: 26,RT,, | Performed by: RADIOLOGY

## 2019-10-31 PROCEDURE — 3008F BODY MASS INDEX DOCD: CPT | Mod: CPTII,S$GLB,, | Performed by: FAMILY MEDICINE

## 2019-10-31 PROCEDURE — 77065 MAMMO DIGITAL DIAGNOSTIC RIGHT WITH TOMOSYNTHESIS_CAD: ICD-10-PCS | Mod: 26,,, | Performed by: RADIOLOGY

## 2019-10-31 RX ORDER — PROMETHAZINE HYDROCHLORIDE AND DEXTROMETHORPHAN HYDROBROMIDE 6.25; 15 MG/5ML; MG/5ML
5 SYRUP ORAL EVERY 6 HOURS PRN
Qty: 180 ML | Refills: 0 | Status: SHIPPED | OUTPATIENT
Start: 2019-10-31 | End: 2020-04-21

## 2019-10-31 RX ORDER — ALBUTEROL SULFATE 90 UG/1
2 AEROSOL, METERED RESPIRATORY (INHALATION)
Qty: 1 INHALER | Refills: 0 | Status: SHIPPED | OUTPATIENT
Start: 2019-10-31 | End: 2019-11-13 | Stop reason: SDUPTHER

## 2019-10-31 NOTE — PROGRESS NOTES
"Subjective:   Patient ID: Teresa Cazares is a 44 y.o. female.  Chief Complaint:  URI      PCP Dr. Chery.    Presents for evaluation of upper respiratory infection.  Five day duration.    Did receive flu vaccine.    No sick contacts.    URI    This is a new problem. The current episode started in the past 7 days. The problem has been gradually improving. There has been no fever. Associated symptoms include congestion, coughing, rhinorrhea and wheezing. Pertinent negatives include no abdominal pain, chest pain, diarrhea, dysuria, ear pain, headaches, joint pain, joint swelling, nausea, neck pain, plugged ear sensation, rash, sinus pain, sneezing, sore throat, swollen glands or vomiting. Treatments tried: Tessalon and NyQuil. The treatment provided no relief.     Review of Systems   Constitutional: Negative for chills, diaphoresis, fatigue and fever.   HENT: Positive for congestion, postnasal drip and rhinorrhea. Negative for dental problem, ear discharge, ear pain, sinus pressure, sinus pain, sneezing, sore throat, tinnitus, trouble swallowing and voice change.    Eyes: Negative for pain, discharge, redness and itching.   Respiratory: Positive for cough and wheezing. Negative for chest tightness and shortness of breath.    Cardiovascular: Negative for chest pain, palpitations and leg swelling.   Gastrointestinal: Negative for abdominal pain, diarrhea, nausea and vomiting.   Genitourinary: Negative for dysuria.   Musculoskeletal: Negative for joint pain, myalgias, neck pain and neck stiffness.   Skin: Negative for rash.   Neurological: Negative for headaches.     Objective:   /82   Pulse 68   Temp 97.8 °F (36.6 °C) (Tympanic)   Ht 5' 3" (1.6 m)   Wt 101.4 kg (223 lb 8.7 oz)   SpO2 97%   BMI 39.60 kg/m²     Physical Exam   Constitutional: Vital signs are normal. She appears well-developed and well-nourished.  Non-toxic appearance. She does not have a sickly appearance. She does not appear ill. No " distress.   HENT:   Right Ear: Hearing, tympanic membrane, external ear and ear canal normal.   Left Ear: Hearing, tympanic membrane, external ear and ear canal normal.   Nose: Mucosal edema and rhinorrhea present. Right sinus exhibits no maxillary sinus tenderness and no frontal sinus tenderness. Left sinus exhibits no maxillary sinus tenderness and no frontal sinus tenderness.   Mouth/Throat: Uvula is midline and mucous membranes are normal. Posterior oropharyngeal edema and posterior oropharyngeal erythema present. No tonsillar exudate.   Eyes: Right conjunctiva is not injected. Left conjunctiva is not injected.   Neck: Normal range of motion and full passive range of motion without pain. Neck supple.   Cardiovascular: Normal rate, regular rhythm and normal heart sounds.   Pulmonary/Chest: Effort normal and breath sounds normal. No accessory muscle usage. No tachypnea. No respiratory distress. She has no decreased breath sounds. She has no wheezes. She has no rhonchi. She has no rales.   Abdominal: Soft. She exhibits no distension. There is no tenderness.   Lymphadenopathy:     She has no cervical adenopathy.   Skin: Skin is warm, dry and intact. No rash noted.   Psychiatric: She has a normal mood and affect. Judgment normal.     Assessment:       ICD-10-CM ICD-9-CM   1. Acute nasopharyngitis J00 460     Plan:   Acute nasopharyngitis  -     albuterol (PROVENTIL/VENTOLIN HFA) 90 mcg/actuation inhaler; Inhale 2 puffs into the lungs every 4 to 6 hours as needed for Wheezing or Shortness of Breath (or Cough).  Dispense: 1 Inhaler; Refill: 0  -     promethazine-dextromethorphan (PROMETHAZINE-DM) 6.25-15 mg/5 mL Syrp; Take 5 mLs by mouth every 6 (six) hours as needed (Cough).  Dispense: 180 mL; Refill: 0    Take Phenergan DM for cough and nasal congestion  Albuterol as needed for shortness of breath, wheezing, or cough  Rest and Increase Fluids  Tylenol or Motrin as needed for fever, aches, or pain  If any worsening in  the next 48 to 72 hours  please RTC  Otherwise follow up with Dr. Chery in December as scheduled.

## 2019-11-03 NOTE — PROGRESS NOTES
CHIEF COMPLAINT  Pain (chronic)      HISTORY, ASSESSMENT, and PLAN    1. Polyarthralgia    2. Bilateral primary osteoarthritis of knee    3. Chronic pain        ASSESSMENT: Chronic musculoskeletal pain conditions are stable, unchanged.     4. Recurrent major depression in partial remission        ASSESSMENT: Well controlled, improved. She appears to be tolerating her current medications well and reports preceiving no adverse side-effects.      5. Insomnia due to stress        ASSESSMENT: Chronic, mildly worse due to recent stressors.     6. Long term current use of antipsychotic medication        ASSESSMENT: She appears to be tolerating her current medications well and reports preceiving no adverse disordered movements or other side-effects.      7. Nicotine dependence, cigarettes, in remission        ASSESSMENT: She is currently at the maintenance stage of smoking cessation (remaining a non-smoker). Smoking cessation encouraged.      8. Thrombocytosis        ASSESSMENT: Asymptomatic. CBC reviewed.     9. Obstructive sleep apnea        ASSESSMENT: She has multiple anatomic and historical risk factors for obstructive sleep apnea, as noted below.      10. Preventive measure        Orders Placed This Encounter    (In Office Administered) Pneumococcal Polysaccharide Vaccine (23 Valent) (SQ/IM)    Influenza - Quadrivalent (PF)    Ambulatory Referral to Psychiatry    Home Sleep Studies    FLUoxetine 20 MG capsule       Problem List Items Addressed This Visit        Neuro    Chronic pain (Chronic)       Psychiatric    Recurrent major depression in partial remission (Chronic)    Relevant Medications    FLUoxetine 20 MG capsule    Other Relevant Orders    Ambulatory Referral to Psychiatry       Oncology    Thrombocytosis    Current Assessment & Plan     Lab Results   Component Value Date    WBC 11.07 04/08/2019    WBC 13.12 (H) 01/22/2019    RBC 4.63 04/08/2019    RBC 3.33 (L) 01/22/2019    HGB 13.2 04/08/2019    HGB  9.5 (L) 01/22/2019    HCT 41.8 04/08/2019    HCT 29.3 (L) 01/22/2019     (H) 04/08/2019     (H) 01/22/2019     Asymptomatic.            Orthopedic    Bilateral primary osteoarthritis of knee    Polyarthralgia - Primary (Chronic)       Other    Insomnia due to stress    Long term current use of antipsychotic medication (Chronic)    Relevant Orders    Ambulatory Referral to Psychiatry    Nicotine dependence, cigarettes, in remission (Chronic)    Overview     Tobacco-free since January 09, 2019         Obstructive sleep apnea syndrome    Current Assessment & Plan     STOP-BANG questionnaire to assess risk for obstructive sleep apnea (DEYA)  · Snoring: Do you snore loudly? ANSWER: YES  · Tired: Do you often feel tired, fatigued, or sleepy during daytime? ANSWER: YES [place] Sleepiness Scale Score = 16 (SEVERE Excessive Daytime Sleepiness)  · Observed: Has anyone observed you stop breathing during your sleep? ANSWER: YES  · Pressure: Do you have or are you being treated for high blood pressure? ANSWER: NO  · BMI: BMI more than 35 kg/m2? ANSWER: YES (BMI = Body mass index is 40.11 kg/m².)   · Age: Age over 50 yr old? ANSWER: NO (Age = 44 y.o.)  · Neck circumference: Neck circumference greater than 40 cm? ANSWER: NO (Neck circumference = 38.5 cm, Mallampati class 3 oropharynx.  · Gender: Gender male? ANSWER: NO (Gender = female)    STOP-BANG Score = 4 (INTERMEDIATE risk of DEYA)    ADDITIONAL RELEVANT HISTORY: She has noticed or has been told that she wakes up gasping or choking.  She has been told she stops breathing when she sleeps.  She reports non-restorative sleep.     INSTRUCTIONS PROVIDED: Do not drive or perform hazardous activities while drowsy.            Other Visit Diagnoses     Preventive measure        Relevant Orders    (In Office Administered) Pneumococcal Polysaccharide Vaccine (23 Valent) (SQ/IM) (Completed)           Outpatient Medications Prior to Visit   Medication Sig Dispense Refill     gabapentin (NEURONTIN) 300 MG capsule Take 1 capsule (300 mg total) by mouth 3 (three) times daily. Can take 3 caps tid, can take 2 pills at night 120 capsule 1    ibuprofen (ADVIL,MOTRIN) 800 MG tablet Take 1 tablet (800 mg total) by mouth 3 (three) times daily as needed for Pain. Take with food 30 tablet 1    nicotine (NICODERM CQ) 14 mg/24 hr Place 1 patch onto the skin once daily. 14 patch 1    nicotine, polacrilex, (NICORETTE) 2 mg Gum Take 1 each (2 mg total) by mouth as needed (use no more than 5 pieces in 24 hours). 120 each 2    QUEtiapine (SEROQUEL) 100 MG Tab Take 1 tablet (100 mg total) by mouth every evening. - APPOINTMENT REQUIRED FOR MORE REFILLS 90 tablet 0    salicylic acid 40 % Plst Apply topically to the feet daily. 18 each 1    urea (CARMOL) 40 % ointment Apply topically 3 (three) times daily. 30 g 2    varenicline (CHANTIX STARTING MONTH BOX) 0.5 mg (11)- 1 mg (42) tablet Take one 0.5mg tab by mouth once daily X3 days,then increase to one 0.5mg tab twice daily X4 days,then increase to one 1mg tab twice daily 1 Package 0    FLUoxetine 20 MG capsule Take 1 capsule (20 mg total) by mouth once daily. 90 capsule 1    tiZANidine (ZANAFLEX) 4 MG tablet TAKE 1 TABLET BY MOUTH TWICE DAILY AS NEEDED 180 tablet 0     No facility-administered medications prior to visit.         Medications Ordered This Encounter   Medications    FLUoxetine 20 MG capsule     Sig: Take 1 capsule (20 mg total) by mouth once daily.     Dispense:  90 capsule     Refill:  1       Medications Discontinued During This Encounter   Medication Reason    FLUoxetine 20 MG capsule Reorder    tiZANidine (ZANAFLEX) 4 MG tablet Therapy not effective        Follow up in about 2 months (around 12/23/2019) for re-evaluate problem(s) discussed today.    REVIEW OF SYSTEMS  HEMATOLOGIC: No recent blood clots or bleeding problems reported.   PSYCHIATRIC: No suicidal ideations or mood instability reported.   CARDIOVASCULAR: No angina or  "orthopnea reported.     PHYSICAL EXAM  Vitals:    10/18/19 1057   BP: 123/81   BP Location: Right arm   Patient Position: Sitting   BP Method: Large (Automatic)   Pulse: 67   Temp: 99.1 °F (37.3 °C)   SpO2: 99%   Weight: 102.7 kg (226 lb 6.6 oz)   Height: 5' 3" (1.6 m)     CONSTITUTIONAL: Vital signs noted. No apparent distress. Does not appear acutely ill or septic. Appears adequately hydrated.  ENT: Oropharynx moist.  PULM: Breathing unlabored.  CV: Heart regular.  DERM: Skin normothermic.  PSYCHIATRIC: Alert and conversant. Grossly oriented. Mood is grossly neutral. Affect appropriate. Judgment and insight not grossly compromised.   MUSCULOSKELETAL: Stance and gait are grossly normal.       Documentation entered by me for this encounter may have been done in part using speech-recognition technology. Although I have made an effort to ensure accuracy, "sound like" errors may exist and should be interpreted in context. -JIM Chery MD.   "

## 2019-11-11 RX ORDER — GABAPENTIN 300 MG/1
CAPSULE ORAL
Qty: 120 CAPSULE | Refills: 0 | Status: SHIPPED | OUTPATIENT
Start: 2019-11-11 | End: 2020-02-12

## 2019-11-13 ENCOUNTER — CLINICAL SUPPORT (OUTPATIENT)
Dept: SMOKING CESSATION | Facility: CLINIC | Age: 44
End: 2019-11-13
Payer: COMMERCIAL

## 2019-11-13 ENCOUNTER — PATIENT MESSAGE (OUTPATIENT)
Dept: SMOKING CESSATION | Facility: CLINIC | Age: 44
End: 2019-11-13

## 2019-11-13 DIAGNOSIS — J00 ACUTE NASOPHARYNGITIS: ICD-10-CM

## 2019-11-13 DIAGNOSIS — F17.200 NICOTINE DEPENDENCE: Primary | ICD-10-CM

## 2019-11-13 PROCEDURE — 99407 BEHAV CHNG SMOKING > 10 MIN: CPT | Mod: S$GLB,,, | Performed by: GENERAL PRACTICE

## 2019-11-13 PROCEDURE — 99407 PR TOBACCO USE CESSATION INTENSIVE >10 MINUTES: ICD-10-PCS | Mod: S$GLB,,, | Performed by: GENERAL PRACTICE

## 2019-11-13 RX ORDER — ALBUTEROL SULFATE 90 UG/1
2 AEROSOL, METERED RESPIRATORY (INHALATION) EVERY 4 HOURS PRN
Qty: 8.5 G | Refills: 0 | Status: SHIPPED | OUTPATIENT
Start: 2019-11-13 | End: 2020-04-21

## 2019-11-13 RX ORDER — VARENICLINE TARTRATE 1 MG/1
1 TABLET, FILM COATED ORAL 2 TIMES DAILY
Qty: 60 TABLET | Refills: 2 | Status: SHIPPED | OUTPATIENT
Start: 2019-11-13 | End: 2020-08-01

## 2019-11-14 ENCOUNTER — TELEPHONE (OUTPATIENT)
Dept: PULMONOLOGY | Facility: CLINIC | Age: 44
End: 2019-11-14

## 2019-11-14 NOTE — TELEPHONE ENCOUNTER
----- Message from Jermaine Levi sent at 11/14/2019  7:33 AM CST -----  Checking on this one,   Review Chart,HSAT

## 2019-11-18 ENCOUNTER — OFFICE VISIT (OUTPATIENT)
Dept: PSYCHIATRY | Facility: CLINIC | Age: 44
End: 2019-11-18
Payer: MEDICAID

## 2019-11-18 DIAGNOSIS — F43.22 ADJUSTMENT REACTION WITH ANXIETY: ICD-10-CM

## 2019-11-18 DIAGNOSIS — F33.2 SEVERE RECURRENT MAJOR DEPRESSION WITHOUT PSYCHOTIC FEATURES: Primary | ICD-10-CM

## 2019-11-18 PROCEDURE — 90791 PR PSYCHIATRIC DIAGNOSTIC EVALUATION: ICD-10-PCS | Mod: ,,, | Performed by: SOCIAL WORKER

## 2019-11-18 PROCEDURE — 90791 PSYCH DIAGNOSTIC EVALUATION: CPT | Mod: ,,, | Performed by: SOCIAL WORKER

## 2019-11-26 ENCOUNTER — TELEPHONE (OUTPATIENT)
Dept: SMOKING CESSATION | Facility: CLINIC | Age: 44
End: 2019-11-26
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
## 2019-12-05 ENCOUNTER — TELEPHONE (OUTPATIENT)
Dept: PULMONOLOGY | Facility: HOSPITAL | Age: 44
End: 2019-12-05

## 2019-12-05 NOTE — TELEPHONE ENCOUNTER
----- Message from Jermaine Levi sent at 12/5/2019  9:58 AM CST -----  STILL IN QUE ,Review Chart,HSAT

## 2019-12-12 ENCOUNTER — CLINICAL SUPPORT (OUTPATIENT)
Dept: SMOKING CESSATION | Facility: CLINIC | Age: 44
End: 2019-12-12
Payer: COMMERCIAL

## 2019-12-12 DIAGNOSIS — F17.200 NICOTINE DEPENDENCE: Primary | ICD-10-CM

## 2019-12-12 PROCEDURE — 99407 PR TOBACCO USE CESSATION INTENSIVE >10 MINUTES: ICD-10-PCS | Mod: S$GLB,,, | Performed by: GENERAL PRACTICE

## 2019-12-12 PROCEDURE — 99407 BEHAV CHNG SMOKING > 10 MIN: CPT | Mod: S$GLB,,, | Performed by: GENERAL PRACTICE

## 2020-01-02 ENCOUNTER — PATIENT MESSAGE (OUTPATIENT)
Dept: PULMONOLOGY | Facility: CLINIC | Age: 45
End: 2020-01-02

## 2020-01-02 ENCOUNTER — PROCEDURE VISIT (OUTPATIENT)
Dept: SLEEP MEDICINE | Facility: CLINIC | Age: 45
End: 2020-01-02
Payer: COMMERCIAL

## 2020-01-02 DIAGNOSIS — G47.33 OBSTRUCTIVE SLEEP APNEA: Primary | ICD-10-CM

## 2020-01-02 DIAGNOSIS — G47.33 OBSTRUCTIVE SLEEP APNEA SYNDROME: ICD-10-CM

## 2020-01-02 PROCEDURE — 95806 PR SLEEP STUDY, UNATTENDED, SIMUL RECORD HR/O2 SAT/RESP FLOW/RESP EFFT: ICD-10-PCS | Mod: 26,S$GLB,, | Performed by: INTERNAL MEDICINE

## 2020-01-02 PROCEDURE — 99499 UNLISTED E&M SERVICE: CPT | Mod: S$GLB,,, | Performed by: INTERNAL MEDICINE

## 2020-01-02 PROCEDURE — 95806 SLEEP STUDY UNATT&RESP EFFT: CPT | Mod: 26,S$GLB,, | Performed by: INTERNAL MEDICINE

## 2020-01-02 PROCEDURE — 99499 NO LOS: ICD-10-PCS | Mod: S$GLB,,, | Performed by: INTERNAL MEDICINE

## 2020-01-02 NOTE — PROGRESS NOTES
"Hi, Thisia.    Your sleep test shows that you have VERY MILD sleep apnea, with an AHI ("Apnea-Hypopnea Index") of 6.1 events per hour. This means that you stop breathing an average of 6.1 times an hour. This disrupts your sleep cycle and can cause you to feel tired.    I've entered an order for you to see one of our excellent sleep specialists to discuss your treatment options and decide if you would benefit from treatment and which treatment will be best for you.    Someone from the [facility] will be contacting you soon to help you schedule an appointment. If you haven't been contacted about this within the next week, call our office, [phone], and tell them that you were referred to the sleep clinic by me and you need help scheduling your appointment.    Thanks for letting me care for you, thanks for trusting us with your healthcare needs, and thanks for using [facility].    Sincerely,    JIM Chery MD  --------------------------------------------------------------------------------  Orders Placed This Encounter    Ambulatory referral to Sleep Disorders      "

## 2020-01-02 NOTE — PROGRESS NOTES
Assessment and Recommendations  Based on the [employer] practice parameter of CPAP would be the guideline  recommendation of choice in symptomatic individuals and also relevant comorbidities. Other therapies  may include ENT procedures were appropriate, significant weight loss.  Inspire hypoglossal nerve stimulator and nasal PROVENT or mandibular advancement device may also  be considered.  Close follow up to ensure resolution of symptoms.  2 night study  MILD/BORDERLINE OBSTRUCTIVE SLEEP APNEA with overall AHI 6.1/hr ( 37 events):  Night #2  Oxygen desaturation: 78%. SpO2 between 70% to 79% for <1 min.  Patient snored 94% time above 50 .  Heart rate range: 57 bpm - 127 bpm  REC's:  Therapy with APAP at 4-20 cm WP using mask of choice with heated humidification is an option.  Weight loss/management. with regular exercise per direction of physician.  Avoid drowsy driving.  Referal to sleep clinic ( ) .

## 2020-01-02 NOTE — Clinical Note
Assessment and RecommendationsBased on the American academy Sleep Medicine practice parameter of CPAP would be the guidelinerecommendation of choice in symptomatic individuals and also relevant comorbidities. Other therapiesmay include ENT procedures were appropriate, significant weight loss.Inspire hypoglossal nerve stimulator and nasal PROVENT or mandibular advancement device may alsobe considered.Close follow up to ensure resolution of symptoms.2 night studyMILD/BORDERLINE OBSTRUCTIVE SLEEP APNEA with overall AHI 6.1/hr ( 37 events):Night #2Oxygen desaturation: 78%. SpO2 between 70% to 79% for <1 min.Patient snored 94% time above 50 .Heart rate range: 57 bpm - 127 bpmREC's:Therapy with APAP at 4-20 cm WP using mask of choice with heated humidification is an option.Weight loss/management. with regular exercise per direction of physician.Avoid drowsy driving.Referal to sleep clinic ( ) .

## 2020-01-06 ENCOUNTER — TELEPHONE (OUTPATIENT)
Dept: INTERNAL MEDICINE | Facility: CLINIC | Age: 45
End: 2020-01-06

## 2020-01-06 NOTE — TELEPHONE ENCOUNTER
----- Message from Harriett Sampson sent at 1/6/2020  1:06 PM CST -----  Contact: Pt  Pt is requesting call back in regards to giving update on medication list.        Pls call back at [phone]

## 2020-01-06 NOTE — TELEPHONE ENCOUNTER
Called and was unable to leave a voicemail. Sounded like someone picked up the phone, however, only heard static on the other end.

## 2020-01-07 ENCOUNTER — TELEPHONE (OUTPATIENT)
Dept: SMOKING CESSATION | Facility: CLINIC | Age: 45
End: 2020-01-07
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
## 2020-01-07 NOTE — TELEPHONE ENCOUNTER
----- Message from Randi Blackman sent at 1/7/2020  9:08 AM CST -----  Contact: pt  .Type:  Patient Returning Call    Who Called: pt  Who Left Message for Patient: Leonardo   Does the patient know what this is regarding?: yes   Would the patient rather a call back or a response via [email or web address]?  Call back   Best Call Back Number: [phone] (home)   Additional Information:

## 2020-01-07 NOTE — TELEPHONE ENCOUNTER
If this problem IS NOT due to a work injury, schedule first available follow-up appointment.  If this problem IS due to a work injury, follow usual procedure.    Either way, in the meantime if she is experiencing worsening (increased pain, swelling, warmth, tenderness, fever, etc.), or if she is unable to schedule a timely appointment with me and she feels that she needs more urgent treatment, then she should seek care immediately at the nearest emergency department and/or ask for guidance from her surgeon.

## 2020-01-12 DIAGNOSIS — F33.1 MAJOR DEPRESSIVE DISORDER, RECURRENT, MODERATE: ICD-10-CM

## 2020-01-14 DIAGNOSIS — G47.33 OBSTRUCTIVE SLEEP APNEA SYNDROME: Primary | ICD-10-CM

## 2020-01-16 ENCOUNTER — OFFICE VISIT (OUTPATIENT)
Dept: SLEEP MEDICINE | Facility: CLINIC | Age: 45
End: 2020-01-16
Payer: MEDICAID

## 2020-01-16 ENCOUNTER — HOSPITAL ENCOUNTER (OUTPATIENT)
Dept: RADIOLOGY | Facility: HOSPITAL | Age: 45
Discharge: HOME | End: 2020-01-16
Attending: INTERNAL MEDICINE
Payer: COMMERCIAL

## 2020-01-16 VITALS
DIASTOLIC BLOOD PRESSURE: 72 MMHG | BODY MASS INDEX: 41.18 KG/M2 | OXYGEN SATURATION: 96 % | SYSTOLIC BLOOD PRESSURE: 116 MMHG | HEART RATE: 65 BPM | WEIGHT: 232.38 LBS | RESPIRATION RATE: 18 BRPM | HEIGHT: 63 IN

## 2020-01-16 DIAGNOSIS — F17.211 NICOTINE DEPENDENCE, CIGARETTES, IN REMISSION: Chronic | ICD-10-CM

## 2020-01-16 DIAGNOSIS — F33.41 RECURRENT MAJOR DEPRESSION IN PARTIAL REMISSION: Chronic | ICD-10-CM

## 2020-01-16 DIAGNOSIS — G47.33 OBSTRUCTIVE SLEEP APNEA SYNDROME: ICD-10-CM

## 2020-01-16 DIAGNOSIS — G47.33 OBSTRUCTIVE SLEEP APNEA SYNDROME: Primary | ICD-10-CM

## 2020-01-16 DIAGNOSIS — F51.02 INSOMNIA DUE TO STRESS: ICD-10-CM

## 2020-01-16 PROCEDURE — 99214 OFFICE O/P EST MOD 30 MIN: CPT | Mod: PBBFAC,25 | Performed by: INTERNAL MEDICINE

## 2020-01-16 PROCEDURE — 71046 X-RAY EXAM CHEST 2 VIEWS: CPT | Mod: 26,,, | Performed by: RADIOLOGY

## 2020-01-16 PROCEDURE — 99999 PR PBB SHADOW E&M-EST. PATIENT-LVL IV: CPT | Mod: PBBFAC,,, | Performed by: INTERNAL MEDICINE

## 2020-01-16 PROCEDURE — 99999 PR PBB SHADOW E&M-EST. PATIENT-LVL IV: ICD-10-PCS | Mod: PBBFAC,,, | Performed by: INTERNAL MEDICINE

## 2020-01-16 PROCEDURE — 99205 PR OFFICE/OUTPT VISIT, NEW, LEVL V, 60-74 MIN: ICD-10-PCS | Mod: S$PBB,,, | Performed by: INTERNAL MEDICINE

## 2020-01-16 PROCEDURE — 99205 OFFICE O/P NEW HI 60 MIN: CPT | Mod: S$PBB,,, | Performed by: INTERNAL MEDICINE

## 2020-01-16 PROCEDURE — 71046 XR CHEST PA AND LATERAL: ICD-10-PCS | Mod: 26,,, | Performed by: RADIOLOGY

## 2020-01-16 PROCEDURE — 71046 X-RAY EXAM CHEST 2 VIEWS: CPT | Mod: TC

## 2020-01-16 NOTE — ASSESSMENT & PLAN NOTE
Home Sleep Test 12/31/2019  MILD/BORDERLINE OBSTRUCTIVE SLEEP APNEA with overall AHI 6.1/hr ( 37 events):  Oxygen desaturation: 78%. SpO2 between 70% to 79% for <1 min.      APAP 4-20 ordered

## 2020-01-16 NOTE — LETTER
January 16, 2020      JIM Chery MD  [address]  [place] [address]           [facility]  [address]  [place] [address]  Phone: [phone]  Fax: [phone]          Patient: Teresa Cazares   MR Number: [id number]   YOB: 1975   Date of Visit: 1/16/2020       Dear Dr. JIM Chery:    Thank you for referring Teresa Cazares to me for evaluation. Attached you will find relevant portions of my assessment and plan of care.    If you have questions, please do not hesitate to call me. I look forward to following Teresa Cazares along with you.    Sincerely,    Joseph Avendaño MD    Enclosure  CC:  No Recipients    If you would like to receive this communication electronically, please contact [email or web address] or [phone] to request more information on [employer] Link access.    For providers and/or their staff who would like to refer a patient to [facility], please contact us through our one-stop-shop provider referral line, [facility], at [phone].    If you feel you have received this communication in error or would no longer like to receive these types of communications, please e-mail [email or web address]

## 2020-01-16 NOTE — PROGRESS NOTES
Subjective:       Patient ID: Teresa Cazares is a 44 y.o. female.  Patient Active Problem List   Diagnosis    BMI 40.0-44.9, adult    Bilateral primary osteoarthritis of knee    Hyphema of right eye    Recurrent major depression in partial remission    Nicotine dependence, cigarettes, in remission    Closed fracture of left lower leg with routine healing    Acute stress reaction    Insomnia due to stress    Thrombocytosis    Polyarthralgia    Long term current use of antipsychotic medication    Obstructive sleep apnea syndrome    Chronic pain      Immunization History   Administered Date(s) Administered    Influenza - Quadrivalent - PF (6 months and older) 10/11/2016, 2018, 2018, 10/18/2019    Pneumococcal Polysaccharide - 23 Valent 10/18/2019    Tdap 2016      Social History     Tobacco Use   Smoking Status Former Smoker    Packs/day: 1.00    Years: 27.00    Pack years: 27.00    Types: Cigarettes    Start date: 2019    Last attempt to quit: 2019    Years since quittin.1   Smokeless Tobacco Never Used      EPWORTH SLEEPINESS SCALE 2020   Sitting and reading 2   Watching TV 2   Sitting, inactive in a public place (e.g. a theatre or a meeting) 1   As a passenger in a car for an hour without a break 2   Lying down to rest in the afternoon when circumstances permit 3   Sitting and talking to someone 1   Sitting quietly after a lunch without alcohol 3   In a car, while stopped for a few minutes in traffic 3   Total score 17        Chief Complaint:   Teresa Cazares is 44 y.o.   Asked to see me by EDGAR Chery MD   Follows after [facility]: +ve for EDYA Mild; Spo2 evonne was 78%  Comorbidity: BMI, Insomnia,   Sleep study reviewed: SDi reviewed: Bed time 10 pm, SOL 2 mins, Wake tiime 5 am  Snoring, Apnea, Daytime fatigue  Sore throat in AM  , on Workman's comp: Compound left tibia Fracture  Snoring since childhood.  Indigestion at night.  Goals  of CPAP discussed  Sleep Disorder inventory reviewed:  Snoring, nocturnal diaphoresis, witnessed apnea, gasping for air at night, dry mouth, frequent coughing and headaches in the morning  Difficulty falling asleep  Thoughts through the head keep her awake at night  Worry about falling asleep stay asleep at night.      The following portions of the patient's history were reviewed and updated as appropriate:   She  has a past medical history of Anxiety, Depression, Nicotine dependence, cigarettes, in remission (3/26/2019), Obesity, and Recurrent major depression in partial remission (3/26/2019).  She does not have any pertinent problems on file.  She  has a past surgical history that includes Umbilical hernia repair; Hysterectomy (2009); Fracture surgery; Oophorectomy; Application of large external fixation device to tibia (Left, 1/10/2019); Open reduction and internal fixation (ORIF) of fracture of tibial plateau (Left, 1/17/2019); Removal of external fixation device (Left, 1/17/2019); Application of wound vacuum-assisted closure device (Left, 1/17/2019); and Hernia repair.  Her family history includes Breast cancer (age of onset: 46) in her sister; Breast cancer (age of onset: 58) in her mother; No Known Problems in her father.  She  reports that she quit smoking about 8 weeks ago. Her smoking use included cigarettes. She started smoking about 4 months ago. She has a 27.00 pack-year smoking history. She has never used smokeless tobacco. She reports that she drinks alcohol. She reports that she does not use drugs.  She has a current medication list which includes the following prescription(s): albuterol, fluoxetine, gabapentin, ibuprofen, nicotine, nicotine (polacrilex), promethazine-dextromethorphan, quetiapine, salicylic acid, urea, and varenicline.  Current Outpatient Medications on File Prior to Visit   Medication Sig Dispense Refill    albuterol (PROVENTIL/VENTOLIN HFA) 90 mcg/actuation inhaler Inhale 2 puffs  "into the lungs every 4 (four) hours as needed for Wheezing or Shortness of Breath (or Cough). 8.5 g 0    FLUoxetine 20 MG capsule Take 1 capsule (20 mg total) by mouth once daily. 90 capsule 1    gabapentin (NEURONTIN) 300 MG capsule TAKE ONE CAPSULE BY MOUTH THREE TIMES DAILY 120 capsule 0    ibuprofen (ADVIL,MOTRIN) 800 MG tablet Take 1 tablet (800 mg total) by mouth 3 (three) times daily as needed for Pain. Take with food 30 tablet 1    nicotine (NICODERM CQ) 14 mg/24 hr Place 1 patch onto the skin once daily. 14 patch 1    nicotine, polacrilex, (NICORETTE) 2 mg Gum Take 1 each (2 mg total) by mouth as needed (use no more than 5 pieces in 24 hours). 120 each 2    promethazine-dextromethorphan (PROMETHAZINE-DM) 6.25-15 mg/5 mL Syrp Take 5 mLs by mouth every 6 (six) hours as needed (Cough). 180 mL 0    QUEtiapine (SEROQUEL) 100 MG Tab Take 1 tablet (100 mg total) by mouth every evening. - APPOINTMENT REQUIRED FOR MORE REFILLS 90 tablet 0    salicylic acid 40 % Plst Apply topically to the feet daily. 18 each 1    urea (CARMOL) 40 % ointment Apply topically 3 (three) times daily. 30 g 2    varenicline (CHANTIX) 1 mg Tab Take 1 tablet (1 mg total) by mouth 2 (two) times daily. 60 tablet 2     No current facility-administered medications on file prior to visit.      She is allergic to macrobid [nitrofurantoin monohyd/m-cryst] and flagyl [metronidazole hcl]..    Review of Systems  Pertinent items are noted in HPI.      Objective:     Vitals:    01/16/20 1023   BP: 116/72   Pulse: 65   Resp: 18   SpO2: 96%   Weight: 105.4 kg (232 lb 5.8 oz)   Height: 5' 3" (1.6 m)      Physical Exam   Constitutional: She is oriented to person, place, and time. Vital signs are normal. She appears well-developed and well-nourished.       HENT:   Head: Normocephalic and atraumatic.   Nose: Nose normal.   Mouth/Throat: Oropharynx is clear and moist. No oropharyngeal exudate.   malampati score 4   Eyes: Pupils are equal, round, and " "reactive to light. Conjunctivae and EOM are normal. No scleral icterus.   Neck: Normal range of motion. Neck supple. No JVD present. No tracheal deviation present. No thyromegaly present.   Neck 18"   Cardiovascular: Normal rate, regular rhythm, S1 normal, S2 normal, normal heart sounds and intact distal pulses.   No murmur heard.  Pulmonary/Chest: Effort normal and breath sounds normal. No accessory muscle usage or stridor. No tachypnea. No respiratory distress.   Abdominal: Soft. Bowel sounds are normal. She exhibits no distension, no ascites and no mass. There is no hepatosplenomegaly, splenomegaly or hepatomegaly. There is no tenderness. There is no rebound, no guarding and no CVA tenderness.   Musculoskeletal: Normal range of motion. She exhibits no edema or tenderness.   Lymphadenopathy:     She has no axillary adenopathy.        Right: No supraclavicular adenopathy present.        Left: No supraclavicular adenopathy present.   Neurological: She is alert and oriented to person, place, and time. She has normal strength and normal reflexes. Coordination and gait normal.   Skin: Skin is warm and dry. No rash noted. No cyanosis. Nails show no clubbing.   Psychiatric: She has a normal mood and affect.   Nursing note and vitals reviewed.        Data Review:   CBC:   Lab Results   Component Value Date    WBC 11.07 04/08/2019    RBC 4.63 04/08/2019    HGB 13.2 04/08/2019    HCT 41.8 04/08/2019     (H) 04/08/2019     BMP:   Lab Results   Component Value Date    GLU 91 04/08/2019     04/08/2019    K 4.0 04/08/2019     04/08/2019    CO2 28 04/08/2019    BUN 10 04/08/2019    CREATININE 0.8 04/08/2019    CALCIUM 10.1 04/08/2019     Diagnostics: Chest x-ray:     CLINICAL HISTORY:  Obstructive sleep apnea (adult) (pediatric)    TECHNIQUE:  PA and lateral views of the chest were performed.    COMPARISON:  01/10/2019    FINDINGS:  Cardiac silhouette and mediastinal contours are stable.  Lungs are clear. "  Osseous structures are intact.      Impression       No acute cardiopulmonary process.         Assessment and Recommendations  Based on the American academy Sleep Medicine practice parameter of CPAP would be the guideline  recommendation of choice in symptomatic individuals and also relevant comorbidities. Other therapies  may include ENT procedures were appropriate, significant weight loss.  Inspire hypoglossal nerve stimulator and nasal PROVENT or mandibular advancement device may also  be considered.  Close follow up to ensure resolution of symptoms.  2 night study  MILD/BORDERLINE OBSTRUCTIVE SLEEP APNEA with overall AHI 6.1/hr ( 37 events):  Night #2  Oxygen desaturation: 78%. SpO2 between 70% to 79% for <1 min.  Patient snored 94% time above 50 .  Heart rate range: 57 bpm - 127 bpm  REC's:  Therapy with APAP at 4-20 cm WP using mask of choice with heated humidification is an option.  Weight loss/management. with regular exercise per direction of physician.  Avoid drowsy driving.  Referal to sleep clinic ( ) .  Assessment:      Problem List Items Addressed This Visit     Recurrent major depression in partial remission (Chronic)     Stable.  Continue therapy Plan Seroquel.  Prozac.         Nicotine dependence, cigarettes, in remission (Chronic)     Importance of staying smoke-free since January 1019 chest x-ray was clear         BMI 40.0-44.9, adult     General weight loss/lifestyle modification strategies discussed (elicit support from others; identify saboteurs; non-food rewards).  Diet interventions: low calorie (1000 kCal/d) deficit diet           Insomnia due to stress     Good sleep hygiene         Obstructive sleep apnea syndrome - Primary     Home Sleep Test 12/31/2019  MILD/BORDERLINE OBSTRUCTIVE SLEEP APNEA with overall AHI 6.1/hr ( 37 events):  Oxygen desaturation: 78%. SpO2 between 70% to 79% for <1 min.      APAP 4-20 ordered         Relevant Orders    CPAP FOR HOME USE    [facility]  PATIENT ENTERED CPAP USAGE         Plan:        Orders Placed This Encounter   Procedures    [facility] PATIENT ENTERED CPAP USAGE     Order Specific Question:   Patient consents to share Brianne CPAP usage and settings data with [facility]?     Answer:   Yes    CPAP FOR HOME USE     Assessment and Recommendations  Based on the American academy Sleep Medicine practice parameter of CPAP would be the guideline  recommendation of choice in symptomatic individuals and also relevant comorbidities. Other therapies  may include ENT procedures were appropriate, significant weight loss.  Inspire hypoglossal nerve stimulator and nasal PROVENT or mandibular advancement device may also  be considered.  Close follow up to ensure resolution of symptoms.  2 night study  MILD/BORDERLINE OBSTRUCTIVE SLEEP APNEA with overall AHI 6.1/hr ( 37 events):  Night #2  Oxygen desaturation: 78%. SpO2 between 70% to 79% for <1 min.  Patient snored 94% time above 50 .  Heart rate range: 57 bpm - 127 bpm  REC's:  Therapy with APAP at 4-20 cm WP using mask of choice with heated humidification is an option.  Weight loss/management. with regular exercise per direction of physician.  Avoid drowsy driving.  Referal to sleep clinic ( )     Order Specific Question:   Type:     Answer:   Auto CPAP     Order Specific Question:   Auto CPAP pressure setting range (cmH20):     Answer:   4-20     Order Specific Question:   Length of need (1-99 months):     Answer:   99     Order Specific Question:   Humidification:     Answer:   Heated     Order Specific Question:   Type of mask:     Answer:   FFM     Order Specific Question:   Headgear?     Answer:   Yes     Order Specific Question:   Tubing?     Answer:   Yes     Order Specific Question:   Humidifier chamber?     Answer:   Yes     Order Specific Question:   Chin strap?     Answer:   Yes     Order Specific Question:   Filters?     Answer:   Yes          Follow up in about 8 weeks (around  3/12/2020), or New  CPAP. weight loss, follow up with Nicolasa.    This note was prepared using voice recognition system and is likely to have sound alike errors that may have been overlooked even after proof reading.  Please call me with any questions    Discussed diagnosis, its evaluation, treatment and usual course. All questions answered.    Thank you for the courtesy of participating in the care of this patient    Joseph Avendaño MD

## 2020-01-21 RX ORDER — QUETIAPINE FUMARATE 100 MG/1
TABLET, FILM COATED ORAL
Qty: 30 TABLET | Refills: 0 | Status: SHIPPED | OUTPATIENT
Start: 2020-01-21 | End: 2020-02-26

## 2020-01-21 NOTE — TELEPHONE ENCOUNTER
She has cancelled or no-showed almost 3/4 of her appointments in last 2 months.    ACTION NEEDED: Please notify her of LIMITED REFILL given and emphasize importance of her keeping her upcoming psychiatry appointment.        Medications Ordered This Encounter   Medications    QUEtiapine (SEROQUEL) 100 MG Tab     Sig: TAKE 1 TABLET BY MOUTH EVERY EVENING     Dispense:  30 tablet     Refill:  0     Dispense only 30-day QTY. Do not request 90-day QTY. FURTHER REFILLS MUST COME FROM PSYCHIATRIST.

## 2020-02-12 RX ORDER — GABAPENTIN 300 MG/1
CAPSULE ORAL
Qty: 120 CAPSULE | Refills: 0 | Status: SHIPPED | OUTPATIENT
Start: 2020-02-12 | End: 2020-04-24

## 2020-02-19 ENCOUNTER — TELEPHONE (OUTPATIENT)
Dept: PULMONOLOGY | Facility: CLINIC | Age: 45
End: 2020-02-19

## 2020-02-19 DIAGNOSIS — G47.33 OSA ON CPAP: Primary | ICD-10-CM

## 2020-02-19 NOTE — TELEPHONE ENCOUNTER
Orders Placed This Encounter   Procedures    HME - OTHER     Change APAP 7-20     Order Specific Question:   Type of Equipment:     Answer:   CPAP     Order Specific Question:   Height:     Answer:   5'3''     Order Specific Question:   Weight:     Answer:   232lb

## 2020-02-26 ENCOUNTER — TELEPHONE (OUTPATIENT)
Dept: SLEEP MEDICINE | Facility: CLINIC | Age: 45
End: 2020-02-26

## 2020-02-26 DIAGNOSIS — F33.1 MAJOR DEPRESSIVE DISORDER, RECURRENT, MODERATE: ICD-10-CM

## 2020-02-26 RX ORDER — TIZANIDINE 4 MG/1
TABLET ORAL
Qty: 180 TABLET | Refills: 0 | Status: SHIPPED | OUTPATIENT
Start: 2020-02-26 | End: 2021-10-14

## 2020-02-26 RX ORDER — QUETIAPINE FUMARATE 100 MG/1
TABLET, FILM COATED ORAL
Qty: 30 TABLET | Refills: 0 | Status: SHIPPED | OUTPATIENT
Start: 2020-02-26 | End: 2020-03-25

## 2020-02-26 NOTE — TELEPHONE ENCOUNTER
----- Message from Joseph Avendaño MD sent at 2/26/2020  3:08 PM CST -----  Contact: pt  Office visit see provider and bring in machine to rajesh Avendaño MD    ----- Message -----  From: Viky Lockhart LPN  Sent: 2/26/2020   3:01 PM CST  To: Joseph Avendaño MD    Pt states she is having problems with her cpap machine. The pressure keeps turning up to an uncomfortable level and she feels like she is choking at times . Please advise  ----- Message -----  From: Teri Anaya  Sent: 2/26/2020  11:02 AM CST  To: Kateryna Ruiz Staff    Please call pt @ [phone] regarding CPak machine, pt states it need to turn up, pt is having problems.feel like she be choking at tmes.

## 2020-02-27 NOTE — TELEPHONE ENCOUNTER
TO MY STAFF: Please let her know that I sent eRx refill for the requested medications, but I will need to see her for re-evaluation prior to giving additional refills. If she doesn't already have a timely follow-up appointment with me scheduled, please offer to help schedule one soon, and definitely prior to needing another refill.  Thanks!

## 2020-02-27 NOTE — TELEPHONE ENCOUNTER
Spoke with patient and informed her of her 30 day refill of Seroquel and need for a follow up with Dr. Chery before it runs out. She verbalized understanding and stated that she will call back to schedule that appointment because she is not home at this time.

## 2020-03-11 ENCOUNTER — TELEPHONE (OUTPATIENT)
Dept: PULMONOLOGY | Facility: CLINIC | Age: 45
End: 2020-03-11

## 2020-03-12 ENCOUNTER — OFFICE VISIT (OUTPATIENT)
Dept: PULMONOLOGY | Facility: CLINIC | Age: 45
End: 2020-03-12
Payer: MEDICAID

## 2020-03-12 VITALS
BODY MASS INDEX: 42.76 KG/M2 | RESPIRATION RATE: 20 BRPM | WEIGHT: 232.38 LBS | HEART RATE: 93 BPM | OXYGEN SATURATION: 98 % | SYSTOLIC BLOOD PRESSURE: 132 MMHG | DIASTOLIC BLOOD PRESSURE: 72 MMHG | HEIGHT: 62 IN

## 2020-03-12 DIAGNOSIS — G47.33 OSA ON CPAP: Primary | ICD-10-CM

## 2020-03-12 DIAGNOSIS — E66.01 MORBID OBESITY WITH BMI OF 40.0-44.9, ADULT: ICD-10-CM

## 2020-03-12 DIAGNOSIS — G47.10 HYPERSOMNIA: ICD-10-CM

## 2020-03-12 PROCEDURE — 99999 PR PBB SHADOW E&M-EST. PATIENT-LVL IV: ICD-10-PCS | Mod: PBBFAC,,, | Performed by: NURSE PRACTITIONER

## 2020-03-12 PROCEDURE — 99214 PR OFFICE/OUTPT VISIT, EST, LEVL IV, 30-39 MIN: ICD-10-PCS | Mod: S$PBB,,, | Performed by: NURSE PRACTITIONER

## 2020-03-12 PROCEDURE — 99214 OFFICE O/P EST MOD 30 MIN: CPT | Mod: S$PBB,,, | Performed by: NURSE PRACTITIONER

## 2020-03-12 PROCEDURE — 99999 PR PBB SHADOW E&M-EST. PATIENT-LVL IV: CPT | Mod: PBBFAC,,, | Performed by: NURSE PRACTITIONER

## 2020-03-12 PROCEDURE — 99214 OFFICE O/P EST MOD 30 MIN: CPT | Mod: PBBFAC | Performed by: NURSE PRACTITIONER

## 2020-03-12 NOTE — PROGRESS NOTES
"Subjective:      Patient ID: Teresa Cazares is a 44 y.o. female.    Chief Complaint: Sleep Apnea (dl in media wants new nasal mask for cpap )    HPI  Follow up for DEYA on autopap. She feels as though the pressure is too low.   She having difficulty tolerating but motivated to continue.   She benefits when able to wear.    Patient Active Problem List   Diagnosis    BMI 40.0-44.9, adult    Bilateral primary osteoarthritis of knee    Hyphema of right eye    Recurrent major depression in partial remission    Nicotine dependence, cigarettes, in remission    Closed fracture of left lower leg with routine healing    Acute stress reaction    Insomnia due to stress    Thrombocytosis    Polyarthralgia    Long term current use of antipsychotic medication    DEYA on CPAP    Chronic pain       /72   Pulse 93   Resp 20   Ht 5' 2" (1.575 m)   Wt 105.4 kg (232 lb 5.8 oz)   SpO2 98%   BMI 42.50 kg/m²   Body mass index is 42.5 kg/m².    Review of Systems   Constitutional: Negative.    HENT: Negative.    Respiratory: Negative.    Cardiovascular: Negative.    Musculoskeletal: Negative.    Gastrointestinal: Negative.    Neurological: Negative.    Psychiatric/Behavioral: Positive for sleep disturbance.     Objective:      Physical Exam   Constitutional: She is oriented to person, place, and time. She appears well-developed and well-nourished.   obese   HENT:   Head: Normocephalic and atraumatic.   Neck: Normal range of motion. Neck supple.   Cardiovascular: Normal rate and regular rhythm.   Pulmonary/Chest: Effort normal and breath sounds normal.   Musculoskeletal: Normal range of motion. She exhibits no edema.   Neurological: She is alert and oriented to person, place, and time.   Skin: Skin is warm and dry.   Psychiatric: She has a normal mood and affect.     Personal Diagnostic Review  Compliance Information  Teresa Cazares  Device: DreamStation Auto CPAP (500X110) ([id number])  Summary " Report  Patient:  Teresa Cazares  1/29/2020 - 3/7/2020  YOB: 1975  Mask:  Compliance Summary  1/29/2020 - 3/7/2020 (39 days)  Days with Device Usage 25 days  Days without Device Usage 14 days  Percent Days with Device Usage 64.1%  Cumulative Usage 2 days 21 hrs. 51 mins. 29 secs.  Maximum Usage (1 Day) 7 hrs. 56 mins. 55 secs.  Average Usage (All Days) 1 hrs. 47 mins. 28 secs.  Average Usage (Days Used) 2 hrs. 47 mins. 39 secs.  Minimum Usage (1 Day) 3 secs.  Percent of Days with Usage >= 4 Hours 25.6%  Percent of Days with Usage < 4 Hours 74.4%  Date Range  Total Blower Time 2 days 21 hrs. 54 mins. 35 secs.  Average AHI 4.9  Auto-CPAP Summary  Auto-CPAP Mean Pressure 14.2 cmH2O  Auto-CPAP Peak Average Pressure 16.9 cmH2O  Average Device Pressure <= 90% of Time 17.5 cmH2O  Average Time in Large Leak Per Day 29 secs.    Results for orders placed during the hospital encounter of 01/16/20   X-Ray Chest PA And Lateral    Narrative EXAMINATION:  XR CHEST PA AND LATERAL    CLINICAL HISTORY:  Obstructive sleep apnea (adult) (pediatric)    TECHNIQUE:  PA and lateral views of the chest were performed.    COMPARISON:  01/10/2019    FINDINGS:  Cardiac silhouette and mediastinal contours are stable.  Lungs are clear.  Osseous structures are intact.      Impression No acute cardiopulmonary process.      Electronically signed by: Cong Mcbride MD  Date:    01/16/2020  Time:    10:01         Assessment:       1. DEYA on CPAP    2. Morbid obesity with BMI of 40.0-44.9, adult    3. Hypersomnia        Outpatient Encounter Medications as of 3/12/2020   Medication Sig Dispense Refill    albuterol (PROVENTIL/VENTOLIN HFA) 90 mcg/actuation inhaler Inhale 2 puffs into the lungs every 4 (four) hours as needed for Wheezing or Shortness of Breath (or Cough). 8.5 g 0    FLUoxetine 20 MG capsule Take 1 capsule (20 mg total) by mouth once daily. 90 capsule 1    gabapentin (NEURONTIN) 300 MG capsule TAKE 1 CAPSULE BY MOUTH  THREE TIMES DAILY 120 capsule 0    ibuprofen (ADVIL,MOTRIN) 800 MG tablet Take 1 tablet (800 mg total) by mouth 3 (three) times daily as needed for Pain. Take with food 30 tablet 1    nicotine (NICODERM CQ) 14 mg/24 hr Place 1 patch onto the skin once daily. 14 patch 1    nicotine, polacrilex, (NICORETTE) 2 mg Gum Take 1 each (2 mg total) by mouth as needed (use no more than 5 pieces in 24 hours). 120 each 2    QUEtiapine (SEROQUEL) 100 MG Tab TAKE 1 TABLET BY MOUTH EVERY EVENING 30 tablet 0    tiZANidine (ZANAFLEX) 4 MG tablet TAKE 1 TABLET BY MOUTH TWICE DAILY AS NEEDED 180 tablet 0    varenicline (CHANTIX) 1 mg Tab Take 1 tablet (1 mg total) by mouth 2 (two) times daily. 60 tablet 2    promethazine-dextromethorphan (PROMETHAZINE-DM) 6.25-15 mg/5 mL Syrp Take 5 mLs by mouth every 6 (six) hours as needed (Cough). (Patient not taking: Reported on 3/12/2020) 180 mL 0    salicylic acid 40 % Plst Apply topically to the feet daily. (Patient not taking: Reported on 3/12/2020) 18 each 1    urea (CARMOL) 40 % ointment Apply topically 3 (three) times daily. (Patient not taking: Reported on 3/12/2020) 30 g 2     No facility-administered encounter medications on file as of 3/12/2020.      No orders of the defined types were placed in this encounter.    Plan:        Problem List Items Addressed This Visit        Other    DEYA on CPAP - Primary    Overview     Home Sleep Test 12/31/2019  MILD/BORDERLINE OBSTRUCTIVE SLEEP APNEA with overall AHI 6.1/hr ( 37 events):  Oxygen desaturation: 78%. SpO2 between 70% to 79% for <1 min.  Patient snored 94% time above 50 .  Heart rate range: 57 bpm - 127 bpm           Other Visit Diagnoses     Morbid obesity with BMI of 40.0-44.9, adult        Hypersomnia          Weight loss and exercise to improve overall health.  Change setting 15-18cm H20

## 2020-03-25 DIAGNOSIS — F33.1 MAJOR DEPRESSIVE DISORDER, RECURRENT, MODERATE: ICD-10-CM

## 2020-03-25 RX ORDER — QUETIAPINE FUMARATE 100 MG/1
TABLET, FILM COATED ORAL
Qty: 30 TABLET | Refills: 3 | Status: SHIPPED | OUTPATIENT
Start: 2020-03-25 | End: 2020-09-14 | Stop reason: SDUPTHER

## 2020-03-25 NOTE — TELEPHONE ENCOUNTER
"ACTION NEEDED:  Please contact her to verify her receipt and understanding of my message (below) and to help her schedule this. Thanks.  Orders Placed This Encounter    QUEtiapine (SEROQUEL) 100 MG Tab      --------------------------------------------------------------------------------  --------------------------------------------------------------------------------   Teresa Damico.    I received a request for refill for your medicine listed below.    You are overdue for appointment with me. Normally this would be required before I could give you additional refills.    However, in an effort to protect you from possible exposure to COVID-19, I'm giving you a limited refill.  Medications Ordered This Encounter   Medications    QUEtiapine (SEROQUEL) 100 MG Tab     Sig: TAKE 1 TABLET BY MOUTH EVERY EVENING     Dispense:  30 tablet     Refill:  3     APPOINTMENT REQUIRED FOR MORE REFILLS     --------------------------------------------------------------------------------    Please take the time right now to schedule your appointment with me in late-June, 2020.The appointment with me can be either a telemedicine virtual visit or an in-office visit. If you have any difficulty, send my staff a message, and they will be glad to help.    I look forward to seeing you then.    Thanks for letting me care for you, thanks for trusting us with your healthcare needs, and thanks for using [facility].    Stay safe, take care and be well.    Sincerely,    JIM Chery MD  ----------------------------------------  FOLLOW THE CDC'S RECOMMENDATIONS ON HOW TO PROTECT YOURSELF AND YOUR FAMILY:   - https://www.cdc.gov/coronavirus/2019-ncov/hcp/guidance-prevent-spread.html#precautions    WHERE TO GET INFORMATION ABOUT COVID-19:   - Dial 211 or Text keyword ANDREIA to [phone] for general questions and information   - [email or web address]    IF YOU OR A FAMILY MEMBER HAS SYMPTOMS:   - Use the CDC "Coronavirus Self-," available at " the following link:        https://www.cdc.gov/coronavirus/2019-ncov/symptoms-testing/index.html#   - Do a video visit using [facility] ([email or web address])   - Call the [facility] COVID-19 Line ([phone]) for guidance.

## 2020-03-25 NOTE — TELEPHONE ENCOUNTER
Spoke with pt regarding Rx refills and making  Her a appointment . Pt was schedule for 6/4/2020 @ 8:40.//LB

## 2020-03-26 ENCOUNTER — CLINICAL SUPPORT (OUTPATIENT)
Dept: SMOKING CESSATION | Facility: CLINIC | Age: 45
End: 2020-03-26
Payer: COMMERCIAL

## 2020-03-26 DIAGNOSIS — F17.200 NICOTINE DEPENDENCE: Primary | ICD-10-CM

## 2020-03-26 PROCEDURE — 99407 BEHAV CHNG SMOKING > 10 MIN: CPT | Mod: S$GLB,,,

## 2020-03-26 PROCEDURE — 99407 PR TOBACCO USE CESSATION INTENSIVE >10 MINUTES: ICD-10-PCS | Mod: S$GLB,,,

## 2020-03-26 NOTE — PROGRESS NOTES
Spoke with patient today in regard to smoking cessation progress for 12 month phone follow up on Quit 2 and 3 month on quit 3. Patient stated that she is tobacco free at this time. Patient commended on her quit attempt. Informed patient of benefit period, future follow ups, and contact information if any further help or support is needed. Will resolve episode and complete smart form for Quit attempt #2 and complete smart form for 3 month on quit 3.

## 2020-04-15 ENCOUNTER — PATIENT MESSAGE (OUTPATIENT)
Dept: SLEEP MEDICINE | Facility: CLINIC | Age: 45
End: 2020-04-15

## 2020-04-15 DIAGNOSIS — G47.33 OSA ON CPAP: Primary | ICD-10-CM

## 2020-04-16 ENCOUNTER — PATIENT MESSAGE (OUTPATIENT)
Dept: SLEEP MEDICINE | Facility: CLINIC | Age: 45
End: 2020-04-16

## 2020-04-17 NOTE — TELEPHONE ENCOUNTER
Spoke with patient advised there is a  Nasal mask available at no cost,  Donated   Brand new supplies.   patient states her mom Arminda Maldonado will  today  From [facility] at the [address] [place] [address].

## 2020-04-21 ENCOUNTER — PATIENT MESSAGE (OUTPATIENT)
Dept: SLEEP MEDICINE | Facility: CLINIC | Age: 45
End: 2020-04-21

## 2020-04-21 ENCOUNTER — PATIENT MESSAGE (OUTPATIENT)
Dept: INTERNAL MEDICINE | Facility: CLINIC | Age: 45
End: 2020-04-21

## 2020-04-21 ENCOUNTER — OFFICE VISIT (OUTPATIENT)
Dept: INTERNAL MEDICINE | Facility: CLINIC | Age: 45
End: 2020-04-21
Payer: COMMERCIAL

## 2020-04-21 DIAGNOSIS — B37.9 ANTIBIOTIC-INDUCED YEAST INFECTION: ICD-10-CM

## 2020-04-21 DIAGNOSIS — T63.441A BEE STING REACTION, ACCIDENTAL OR UNINTENTIONAL, INITIAL ENCOUNTER: Primary | ICD-10-CM

## 2020-04-21 DIAGNOSIS — T36.95XA ANTIBIOTIC-INDUCED YEAST INFECTION: ICD-10-CM

## 2020-04-21 DIAGNOSIS — G47.33 OSA ON CPAP: Primary | ICD-10-CM

## 2020-04-21 PROBLEM — F43.0 ACUTE STRESS REACTION: Status: RESOLVED | Noted: 2019-03-28 | Resolved: 2020-04-21

## 2020-04-21 PROCEDURE — 99214 PR OFFICE/OUTPT VISIT, EST, LEVL IV, 30-39 MIN: ICD-10-PCS | Mod: 95,,, | Performed by: FAMILY MEDICINE

## 2020-04-21 PROCEDURE — 99214 OFFICE O/P EST MOD 30 MIN: CPT | Mod: 95,,, | Performed by: FAMILY MEDICINE

## 2020-04-21 RX ORDER — SULFAMETHOXAZOLE AND TRIMETHOPRIM 400; 80 MG/1; MG/1
1 TABLET ORAL 2 TIMES DAILY
Qty: 14 TABLET | Refills: 0 | Status: SHIPPED | OUTPATIENT
Start: 2020-04-21 | End: 2020-04-28

## 2020-04-21 RX ORDER — METHYLPREDNISOLONE 4 MG/1
TABLET ORAL
Qty: 1 PACKAGE | Refills: 0 | Status: SHIPPED | OUTPATIENT
Start: 2020-04-21 | End: 2020-05-19

## 2020-04-21 RX ORDER — FLUCONAZOLE 150 MG/1
150 TABLET ORAL DAILY
Qty: 1 TABLET | Refills: 0 | Status: SHIPPED | OUTPATIENT
Start: 2020-04-21 | End: 2020-04-22

## 2020-04-21 NOTE — TELEPHONE ENCOUNTER
"Patient requesting pressure no higher than 10 cm, having difficulty with CPAP tolerance some what Improved with Nasal mask    Orders Placed This Encounter   Procedures    HME - OTHER     Please change remotely to auto CPAP 7-10 cm   HME: [facility]     Order Specific Question:   Type of Equipment:     Answer:   cpap     Order Specific Question:   Height:     Answer:   5'2"     Order Specific Question:   Weight:     Answer:   232 lbs     Order Specific Question:   Does patient have medical equipment at home?     Answer:   CPAP       1. DEYA on CPAP  HME - OTHER       Compliance Summary  Min Pressure 7 cmH2O  Max Pressure 20 cmH2O  1/30/2020 - 2/29/2020 (31 days)  Days with Device Usage 23 days  Days without Device Usage 8 days  Percent Days with Device Usage 74.2%  Cumulative Usage 2 days 21 hrs. 48 mins. 43 secs.  Maximum Usage (1 Day) 7 hrs. 58 mins. 55 secs.  Average Usage (All Days) 2 hrs. 15 mins. 7 secs.  Average Usage (Days Used) 3 hrs. 2 mins. 7 secs.  Minimum Usage (1 Day) 3 secs.  Percent of Days with Usage >= 4 Hours 32.3%  Percent of Days with Usage < 4 Hours 67.7%  Date Range  Average AHI 5.0  Auto-CPAP Summary  Auto-CPAP Mean Pressure 14.2 cmH2O  Auto-CPAP Peak Average Pressure 16.9 cmH2O  Average Device Pressure <= 90% of Time 17.5 cmH2O  Average Time in Large Leak Per Day 31 secs.  "

## 2020-04-21 NOTE — PROGRESS NOTES
The patient location is: Home  The chief complaint leading to consultation is: Bee sting bite  Visit type: audiovisual  Total time spent with patient: 10 mins  Each patient to whom he or she provides medical services by telemedicine is:  (1) informed of the relationship between the physician and patient and the respective role of any other health care provider with respect to management of the patient; and (2) notified that he or she may decline to receive medical services by telemedicine and may withdraw from such care at any time.    Notes:   Subjective:       Patient ID: Teresa Cazares is a 44 y.o. female.    Chief Complaint: No chief complaint on file.    44-year-old  female patient with Patient Active Problem List:     BMI 40.0-44.9, adult     Bilateral primary osteoarthritis of knee     Hyphema of right eye     Recurrent major depression in partial remission     Nicotine dependence, cigarettes, in remission     Closed fracture of left lower leg with routine healing     Acute stress reaction     Insomnia due to stress     Thrombocytosis     Polyarthralgia     Long term current use of antipsychotic medication     DEYA on CPAP     Chronic pain  Reports that she was  taking a walk outside yesterday, and was bit by a bee behind the left earlobe, saw a swarm of bees.  Patient denies any fever or difficulty with breathing, reports been to the left earlobe, up to 6/10, with minimal swelling and drainage.  Feels hot to touch.  Denies any other complaints otherwise.  Has been nicotine free since 2018 on Chantix.  Denies any worsening anxiety depression.     Review of Systems   Constitutional: Negative for fever.   HENT: Positive for ear discharge, ear pain and facial swelling. Negative for trouble swallowing.    Respiratory: Negative for chest tightness, shortness of breath and wheezing.    Gastrointestinal: Negative for nausea and vomiting.   Neurological: Negative for headaches.         There were  no vitals taken for this visit.  Objective:      Physical Exam   Constitutional: She is oriented to person, place, and time. She appears well-developed and well-nourished.   Pulmonary/Chest: No respiratory distress.   Musculoskeletal:   Noted swelling to the left earlobe   Neurological: She is alert and oriented to person, place, and time.   Psychiatric: She has a normal mood and affect.         Assessment/Plan:   1. Bee sting reaction, accidental or unintentional, initial encounter  - sulfamethoxazole-trimethoprim 400-80mg (BACTRIM,SEPTRA) 400-80 mg per tablet; Take 1 tablet by mouth 2 (two) times daily. for 7 days  Dispense: 14 tablet; Refill: 0  - methylPREDNISolone (MEDROL DOSEPACK) 4 mg tablet; use as directed  Dispense: 1 Package; Refill: 0    Bactrim has been sent to pharmacy and Medrol Dosepak for symptomatic relief.  Advised to take over-the-counter ibuprofen as needed for pain and continue Benadryl for swelling.  If symptoms do not improve in 3-5 days patient to call us back.  If any worsening advised to go to ER immediately.     2. Antibiotic-induced yeast infection  - fluconazole (DIFLUCAN) 150 MG Tab; Take 1 tablet (150 mg total) by mouth once daily. for 1 day  Dispense: 1 tablet; Refill: 0   Diflucan prescribed today and advise to take it after finishing Medrol Dosepak

## 2020-04-24 RX ORDER — GABAPENTIN 300 MG/1
CAPSULE ORAL
Qty: 120 CAPSULE | Refills: 0 | Status: SHIPPED | OUTPATIENT
Start: 2020-04-24 | End: 2020-06-12

## 2020-04-25 ENCOUNTER — PATIENT MESSAGE (OUTPATIENT)
Dept: SLEEP MEDICINE | Facility: CLINIC | Age: 45
End: 2020-04-25

## 2020-05-01 ENCOUNTER — TELEPHONE (OUTPATIENT)
Dept: PULMONOLOGY | Facility: CLINIC | Age: 45
End: 2020-05-01

## 2020-05-04 ENCOUNTER — OFFICE VISIT (OUTPATIENT)
Dept: PULMONOLOGY | Facility: CLINIC | Age: 45
End: 2020-05-04
Payer: COMMERCIAL

## 2020-05-04 ENCOUNTER — TELEPHONE (OUTPATIENT)
Dept: PULMONOLOGY | Facility: CLINIC | Age: 45
End: 2020-05-04

## 2020-05-04 VITALS — BODY MASS INDEX: 40.48 KG/M2 | WEIGHT: 220 LBS | HEIGHT: 62 IN

## 2020-05-04 DIAGNOSIS — G47.10 HYPERSOMNIA: ICD-10-CM

## 2020-05-04 DIAGNOSIS — G47.33 OSA ON CPAP: Primary | ICD-10-CM

## 2020-05-04 DIAGNOSIS — E66.01 MORBID OBESITY WITH BMI OF 40.0-44.9, ADULT: ICD-10-CM

## 2020-05-04 PROCEDURE — 99213 PR OFFICE/OUTPT VISIT, EST, LEVL III, 20-29 MIN: ICD-10-PCS | Mod: 95,,, | Performed by: NURSE PRACTITIONER

## 2020-05-04 PROCEDURE — 99213 OFFICE O/P EST LOW 20 MIN: CPT | Mod: 95,,, | Performed by: NURSE PRACTITIONER

## 2020-05-04 PROCEDURE — 3008F BODY MASS INDEX DOCD: CPT | Mod: CPTII,,, | Performed by: NURSE PRACTITIONER

## 2020-05-04 PROCEDURE — 3008F PR BODY MASS INDEX (BMI) DOCUMENTED: ICD-10-PCS | Mod: CPTII,,, | Performed by: NURSE PRACTITIONER

## 2020-05-04 NOTE — PROGRESS NOTES
"Subjective:      Patient ID: Teresa Cazares is a 44 y.o. female.    Chief Complaint: Sleep Apnea  The patient location is: Home  The chief complaint leading to consultation is: sleep apnea  Visit type: Virtual visit with synchronous audio and video  Total time spent with patient: 18 min   Each patient to whom he or she provides medical services by telemedicine is:  (1) informed of the relationship between the physician and patient and the respective role of any other health care provider with respect to management of the patient; and (2) notified that he or she may decline to receive medical services by telemedicine and may withdraw from such care at any time.    HPI  Follow up for autopap. She is having problem with mask. She is showing the tubing issues and leaking connections. We did some troubleshooting and appears to have fixed the problem. She is motivated to continue treatment. She now has a nasal mask and is more comfortable. Setting now 7-10cm       Patient Active Problem List   Diagnosis    BMI 40.0-44.9, adult    Bilateral primary osteoarthritis of knee    Hyphema of right eye    Recurrent major depression in partial remission    Nicotine dependence, cigarettes, in remission    Closed fracture of left lower leg with routine healing    Insomnia due to stress    Thrombocytosis    Polyarthralgia    Long term current use of antipsychotic medication    DEYA on CPAP    Chronic pain       Ht 5' 2" (1.575 m)   Wt 99.8 kg (220 lb)   BMI 40.24 kg/m²   Body mass index is 40.24 kg/m².    Review of Systems   Constitutional: Negative.    HENT: Negative.    Respiratory: Negative.    Cardiovascular: Negative.    Musculoskeletal: Negative.    Gastrointestinal: Negative.    Neurological: Negative.    Psychiatric/Behavioral: Positive for sleep disturbance.     Objective:      Physical Exam   Constitutional: She is oriented to person, place, and time. She appears well-developed and well-nourished.   HENT: "   Head: Normocephalic and atraumatic.   Neck: Normal range of motion.   Pulmonary/Chest: Effort normal. No accessory muscle usage. No tachypnea and no bradypnea. No respiratory distress.   Neurological: She is alert and oriented to person, place, and time.   Skin: No rash noted.   Psychiatric: She has a normal mood and affect. Her behavior is normal. Judgment and thought content normal.     Personal Diagnostic Review  Compliance Summary  1/29/2020 - 4/23/2020 (86 days)  Days with Device Usage 30 days  Days without Device Usage 56 days  Percent Days with Device Usage 34.9%  Cumulative Usage 2 days 22 hrs. 35 mins. 8 secs.  Maximum Usage (1 Day) 7 hrs. 58 mins. 55 secs.  Average Usage (All Days) 49 mins. 14 secs.  Average Usage (Days Used) 2 hrs. 21 mins. 10 secs.  Minimum Usage (1 Day) 3 secs.  Percent of Days with Usage >= 4 Hours 11.6%  Percent of Days with Usage < 4 Hours 88.4%  Date Range  Average AHI 4.9  Auto-CPAP Summary  Auto-CPAP Mean Pressure 14.1 cmH2O  Auto-CPAP Peak Average Pressure 16.9 cmH2O  Average Device Pressure <= 90% of Time 17.5 cmH2O  Average Time in Large Leak Per Day 24 secs.  Results for orders placed during the hospital encounter of 01/16/20   X-Ray Chest PA And Lateral    Narrative EXAMINATION:  XR CHEST PA AND LATERAL    CLINICAL HISTORY:  Obstructive sleep apnea (adult) (pediatric)    TECHNIQUE:  PA and lateral views of the chest were performed.    COMPARISON:  01/10/2019    FINDINGS:  Cardiac silhouette and mediastinal contours are stable.  Lungs are clear.  Osseous structures are intact.      Impression No acute cardiopulmonary process.      Electronically signed by: Cong Mcbride MD  Date:    01/16/2020  Time:    10:01         Assessment:       1. DEYA on CPAP    2. Morbid obesity with BMI of 40.0-44.9, adult    3. Hypersomnia        Outpatient Encounter Medications as of 5/4/2020   Medication Sig Dispense Refill    FLUoxetine 20 MG capsule Take 1 capsule (20 mg total) by mouth  once daily. 90 capsule 1    gabapentin (NEURONTIN) 300 MG capsule TAKE 1 CAPSULE BY MOUTH THREE TIMES DAILY 120 capsule 0    methylPREDNISolone (MEDROL DOSEPACK) 4 mg tablet use as directed 1 Package 0    QUEtiapine (SEROQUEL) 100 MG Tab TAKE 1 TABLET BY MOUTH EVERY EVENING 30 tablet 3    tiZANidine (ZANAFLEX) 4 MG tablet TAKE 1 TABLET BY MOUTH TWICE DAILY AS NEEDED 180 tablet 0    varenicline (CHANTIX) 1 mg Tab Take 1 tablet (1 mg total) by mouth 2 (two) times daily. 60 tablet 2     No facility-administered encounter medications on file as of 5/4/2020.      No orders of the defined types were placed in this encounter.    Plan:   Her issue with tubing corrected and she is not ready to wear nightly. Eager to feel better and sleep better.   Follow up 2 months  Problem List Items Addressed This Visit        Other    DEYA on CPAP - Primary    Overview     Home Sleep Test 12/31/2019  MILD/BORDERLINE OBSTRUCTIVE SLEEP APNEA with overall AHI 6.1/hr ( 37 events):  Oxygen desaturation: 78%. SpO2 between 70% to 79% for <1 min.  Patient snored 94% time above 50 .  Heart rate range: 57 bpm - 127 bpm           Other Visit Diagnoses     Morbid obesity with BMI of 40.0-44.9, adult        Hypersomnia          Weight loss and exercise to improve overall health.

## 2020-05-05 ENCOUNTER — HOSPITAL ENCOUNTER (OUTPATIENT)
Dept: RADIOLOGY | Facility: HOSPITAL | Age: 45
Discharge: HOME | End: 2020-05-05
Attending: FAMILY MEDICINE
Payer: COMMERCIAL

## 2020-05-05 DIAGNOSIS — R92.8 ABNORMAL MAMMOGRAM: ICD-10-CM

## 2020-05-05 PROCEDURE — 77062 BREAST TOMOSYNTHESIS BI: CPT | Mod: 26,,, | Performed by: RADIOLOGY

## 2020-05-05 PROCEDURE — 77066 MAMMO DIGITAL DIAGNOSTIC BILAT WITH TOMOSYNTHESIS_CAD: ICD-10-PCS | Mod: 26,,, | Performed by: RADIOLOGY

## 2020-05-05 PROCEDURE — 77062 BREAST TOMOSYNTHESIS BI: CPT | Mod: TC

## 2020-05-05 PROCEDURE — 77066 DX MAMMO INCL CAD BI: CPT | Mod: 26,,, | Performed by: RADIOLOGY

## 2020-05-05 PROCEDURE — 77062 MAMMO DIGITAL DIAGNOSTIC BILAT WITH TOMOSYNTHESIS_CAD: ICD-10-PCS | Mod: 26,,, | Performed by: RADIOLOGY

## 2020-05-18 DIAGNOSIS — F33.41 RECURRENT MAJOR DEPRESSION IN PARTIAL REMISSION: Chronic | ICD-10-CM

## 2020-05-19 ENCOUNTER — OFFICE VISIT (OUTPATIENT)
Dept: INTERNAL MEDICINE | Facility: CLINIC | Age: 45
End: 2020-05-19
Payer: COMMERCIAL

## 2020-05-19 ENCOUNTER — PATIENT MESSAGE (OUTPATIENT)
Dept: INTERNAL MEDICINE | Facility: CLINIC | Age: 45
End: 2020-05-19

## 2020-05-19 DIAGNOSIS — S00.262A INSECT BITE OF LEFT EYELID, INITIAL ENCOUNTER: Primary | ICD-10-CM
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
## 2020-05-19 PROCEDURE — 99213 PR OFFICE/OUTPT VISIT, EST, LEVL III, 20-29 MIN: ICD-10-PCS | Mod: 95,,, | Performed by: PHYSICIAN ASSISTANT

## 2020-05-19 PROCEDURE — 99213 OFFICE O/P EST LOW 20 MIN: CPT | Mod: 95,,, | Performed by: PHYSICIAN ASSISTANT

## 2020-05-19 RX ORDER — PREDNISONE 20 MG/1
TABLET ORAL
Qty: 9 TABLET | Refills: 0 | Status: SHIPPED | OUTPATIENT
Start: 2020-05-19 | End: 2020-08-01 | Stop reason: SDUPTHER

## 2020-05-19 RX ORDER — CEPHALEXIN 500 MG/1
500 CAPSULE ORAL EVERY 12 HOURS
Qty: 20 CAPSULE | Refills: 0 | Status: SHIPPED | OUTPATIENT
Start: 2020-05-19 | End: 2020-05-29

## 2020-05-19 NOTE — PATIENT INSTRUCTIONS
"  Insect, Spider, and Scorpion Bites and Stings  Most insect bites are harmless and cause only minor swelling or itching. But if youre allergic to insects such as wasps or bees, a sting can cause a life-threatening allergic reaction. Some ticks can carry and transmit serious diseases. The venom (poison) from scorpions and certain spiders can also be deadly, although this is rare. Knowing when to seek emergency care could save your life.     The black  (top) and brown recluse (bottom) are two poisonous spiders found in the [place].   When to go to the emergency room (ER)  · Scorpion sting  · Bite from a black, red, or brown  spider or brown recluse spider  · Severe pain or swelling at the site of bite  · A tick that is embedded in your skin and can not be easily removed at home  · Signs of an allergic reaction such as:  ¨ Hives  ¨ Swelling of your eyes, lips, or the inside of your throat  ¨ Trouble breathing  ¨ Dizziness or confusion  What to expect in the ER  · If youre having trouble breathing, youll be given oxygen through a mask. In case of severe breathing difficulty, you may have a tube inserted in your throat and be placed on a ventilator (breathing machine).  · If you are having a severe allergic reaction from a sting (called anaphylaxis), you may be given a shot of epinephrine. If it is known that you are allergic to bee or wasp stings, your doctor may give you a prescription for an "epi-pen" that you can keep with you at all times in case of a sting.  · You may receive antivenin (a substance that reverses the effects of poison) for some spider bites and scorpion stings. Because antivenin can sometimes cause other problems, your doctor will weigh the risks and benefits of this treatment.  · Steroids such as prednisone are often used to treat allergic reactions. In many cases, your doctor will also prescribe an antihistamine to help relieve itching.  Easing symptoms of an insect bite or " sting  · Try to remove a stinger you can see. Use your fingernail, a knife edge, or credit card to scrape against the skin. Do not squeeze or pull.  · Apply ice or a cold compress to reduce pain and swelling (keep a thin cloth between the cold source and the skin).   Date Last Reviewed: 12/1/2016 © 2000-2017 [employer]. [address] [place] [address], [place] [address]. All rights reserved. This information is not intended as a substitute for professional medical care. Always follow your healthcare professional's instructions.        Insect Bite  Insects most often bite to protect themselves or their nests. Certain bugs, like fleas and mosquitoes, bite to feed. In some cases, the actual bite causes no pain. An itchy red welt or swelling may develop at the site of the bite. Most insect bites do not cause illness. And the itching and swelling most often go away without treatment. However, an infection can develop if the bite is scratched and the skin broken. Rarely, a person may have an allergic reaction to an insect bite.  If a stinger is visible at the bite spot, remove it as quickly as possible, as this can decrease the amount of venom that gets into your body. Scrape it out with a dull edge, such as the edge of a credit card. Try not to squeeze it. Do not try to dig it out, as you may damage the skin and also increase the chance of infection.     To help reduce swelling and itching, apply a cold pack or ice in a zip-top plastic bag wrapped in a thin towel.   Home care  · Your healthcare provider may prescribe over-the-counter medicines to help relieve itching and swelling. Use each medicine according to the directions on the package. If the bite becomes infected, you will need an antibiotic. This may be in pill form taken by mouth or as an ointment or cream put directly on the skin. Be sure to use them exactly as prescribed.  · Bite symptoms usually go away on their own within a week or two.  · To help  prevent infection, avoid scratching or picking at the bite.  · To help relieve itching and swelling, apply ice in a zip-top plastic bag wrapped in a thin towel to the bites. Do this for up to 10 minutes at a time. Avoid hot showers or baths as these tend to make itching worse.  · An over-the-counter anti-itch medicine such as calamine lotion or an antihistamine cream may be helpful.  · If you suspect you have insects in your home, talk to a licensed pest-control professional. He or she can inspect your home and tell you how to get rid of bugs safely.  Follow-up care  Follow up with your healthcare provider, or as advised.  Call 911  Call 911 if any of these occur:  · Trouble breathing or swallowing  · Wheezing  · Feeling like your throat is closing up  · Fainting, loss of consciousness  · Swelling around the face or mouth  When to seek medical advice  Call your healthcare provider right away if any of these occur:  · Fever of 100.4°F (38°C) or higher, or as directed by your healthcare provider  · Signs of infection, such as increased swelling and pain, warmth, red streaks, or drainage from the skin  · Signs of allergic reaction, such as hives, a spreading rash, or throat itching  Date Last Reviewed: 10/1/2016  © 7144-4627 [employer]. [address] [place] [address], [place] [address]. All rights reserved. This information is not intended as a substitute for professional medical care. Always follow your healthcare professional's instructions.        Infected Insect Bite or Sting    When an insect stings you, it injects venom. When an insect bites you, it does not. Stings and bites may cause a local reaction. Or they may cause a reaction that affects your whole body. Bites and stings may become infected. Signs of infection include redness, warmth, pain, drainage of pus, and swelling. Infections will need treatment with antibiotics and should get better over the next 10 days.  Home care  The following will help  you care for your bite or sting at home:  · If a stinger is still in your skin, it will need to be removed. Don't use tweezers. Gently scrape the stinger from the side with a firm object such as the side of a credit card. This will loosen it and remove it from your skin.  · If itching is a problem, applying ice packs to the sting area will help.  · Wash the area with soap and water at least 3 times a day. Apply a topical antibiotic cream or ointment.  · You can use an over-the counter antihistamine unless your doctor has given you a prescription antihistamine. You may use antihistamines to reduce itching if large areas of the skin are involved. Use lower doses during the daytime and higher doses at bedtime since the drug may make you sleepy. Don't use an antihistamine if you have glaucoma or if you are a man with trouble urinating due to an enlarged prostate. Some antihistamines cause less drowsiness and are a good choice for daytime use.  · If oral antibiotics have been prescribed, be sure to take them as directed until they are all finished.  · You may use over-the-counter pain medicine to control pain, unless another pain medicine was prescribed. Talk with your doctor before using acetaminophen or ibuprofen if you have chronic liver or kidney disease. Also talk with your doctor if you have ever had a stomach ulcer or gastrointestinal bleeding.  Follow-up care  Follow up with your healthcare provider, or as advised if you don't get better over the next 2 days or if your symptoms get worse.  Call 911  Call 911 if any of these occur:  · Swelling of the face, eyelids, mouth, throat, or tongue  · Difficulty swallowing or breathing  When to seek medical advice  Call your healthcare provider right away if any of these occur:  · Spreading areas of redness or swelling  · Fever of 100.4°F (38°C) or higher, or as directed by your healthcare provider  · Increased local pain  · Headache, fever, chills, muscle or joint aching,  or vomiting,  · New rash  Date Last Reviewed: 10/1/2016  © 5164-3047 [employer], [employer]. [address] [place] [address], [place] [address]. All rights reserved. This information is not intended as a substitute for professional medical care. Always follow your healthcare professional's instructions.        Infected Insect Bite or Sting    When an insect stings you, it injects venom. When an insect bites you, it does not. Stings and bites may cause a local reaction. Or they may cause a reaction that affects your whole body. Bites and stings may become infected. Signs of infection include redness, warmth, pain, drainage of pus, and swelling. Infections will need treatment with antibiotics and should get better over the next 10 days.  Home care  The following will help you care for your bite or sting at home:  · If a stinger is still in your skin, it will need to be removed. Don't use tweezers. Gently scrape the stinger from the side with a firm object such as the side of a credit card. This will loosen it and remove it from your skin.  · If itching is a problem, applying ice packs to the sting area will help.  · Wash the area with soap and water at least 3 times a day. Apply a topical antibiotic cream or ointment.  · You can use an over-the counter antihistamine unless your doctor has given you a prescription antihistamine. You may use antihistamines to reduce itching if large areas of the skin are involved. Use lower doses during the daytime and higher doses at bedtime since the drug may make you sleepy. Don't use an antihistamine if you have glaucoma or if you are a man with trouble urinating due to an enlarged prostate. Some antihistamines cause less drowsiness and are a good choice for daytime use.  · If oral antibiotics have been prescribed, be sure to take them as directed until they are all finished.  · You may use over-the-counter pain medicine to control pain, unless another pain medicine was prescribed. Talk  with your doctor before using acetaminophen or ibuprofen if you have chronic liver or kidney disease. Also talk with your doctor if you have ever had a stomach ulcer or gastrointestinal bleeding.  Follow-up care  Follow up with your healthcare provider, or as advised if you don't get better over the next 2 days or if your symptoms get worse.  Call 911  Call 911 if any of these occur:  · Swelling of the face, eyelids, mouth, throat, or tongue  · Difficulty swallowing or breathing  When to seek medical advice  Call your healthcare provider right away if any of these occur:  · Spreading areas of redness or swelling  · Fever of 100.4°F (38°C) or higher, or as directed by your healthcare provider  · Increased local pain  · Headache, fever, chills, muscle or joint aching, or vomiting,  · New rash  Date Last Reviewed: 10/1/2016  © 4888-3178 [employer]. [address] [place] [address], [place] [address]. All rights reserved. This information is not intended as a substitute for professional medical care. Always follow your healthcare professional's instructions.

## 2020-05-20 RX ORDER — FLUOXETINE HYDROCHLORIDE 20 MG/1
CAPSULE ORAL
Qty: 90 CAPSULE | Refills: 1 | Status: SHIPPED | OUTPATIENT
Start: 2020-05-20 | End: 2021-10-14

## 2020-07-07 ENCOUNTER — OFFICE VISIT (OUTPATIENT)
Dept: SLEEP MEDICINE | Facility: CLINIC | Age: 45
End: 2020-07-07
Payer: COMMERCIAL

## 2020-07-07 VITALS
OXYGEN SATURATION: 98 % | RESPIRATION RATE: 17 BRPM | DIASTOLIC BLOOD PRESSURE: 80 MMHG | SYSTOLIC BLOOD PRESSURE: 112 MMHG | WEIGHT: 222.69 LBS | BODY MASS INDEX: 40.98 KG/M2 | HEART RATE: 69 BPM | HEIGHT: 62 IN

## 2020-07-07 DIAGNOSIS — E66.01 MORBID OBESITY WITH BMI OF 40.0-44.9, ADULT: Primary | ICD-10-CM

## 2020-07-07 DIAGNOSIS — F17.211 NICOTINE DEPENDENCE, CIGARETTES, IN REMISSION: Chronic | ICD-10-CM

## 2020-07-07 DIAGNOSIS — G47.33 OSA ON CPAP: ICD-10-CM

## 2020-07-07 PROCEDURE — 99999 PR PBB SHADOW E&M-EST. PATIENT-LVL III: CPT | Mod: PBBFAC,,, | Performed by: NURSE PRACTITIONER

## 2020-07-07 PROCEDURE — 99214 OFFICE O/P EST MOD 30 MIN: CPT | Mod: S$GLB,,, | Performed by: NURSE PRACTITIONER

## 2020-07-07 PROCEDURE — 3008F PR BODY MASS INDEX (BMI) DOCUMENTED: ICD-10-PCS | Mod: CPTII,S$GLB,, | Performed by: NURSE PRACTITIONER

## 2020-07-07 PROCEDURE — 3008F BODY MASS INDEX DOCD: CPT | Mod: CPTII,S$GLB,, | Performed by: NURSE PRACTITIONER

## 2020-07-07 PROCEDURE — 99999 PR PBB SHADOW E&M-EST. PATIENT-LVL III: ICD-10-PCS | Mod: PBBFAC,,, | Performed by: NURSE PRACTITIONER

## 2020-07-07 PROCEDURE — 99214 PR OFFICE/OUTPT VISIT, EST, LEVL IV, 30-39 MIN: ICD-10-PCS | Mod: S$GLB,,, | Performed by: NURSE PRACTITIONER

## 2020-07-07 NOTE — PROGRESS NOTES
"Subjective:      Patient ID: Teresa Cazares is a 44 y.o. female.    Chief Complaint: Sleep Apnea    HPI  Follow up for sleep apnea. She is not wearing pap at this time. She has new supplies and motivated to start back wearing on regular basis.   She quit smoking november 2019.  No fever, chills, or hemoptysis. No pleuritic type chest pain. Breathing is stable as compared to 6 months ago.    Patient Active Problem List   Diagnosis    BMI 40.0-44.9, adult    Bilateral primary osteoarthritis of knee    Hyphema of right eye    Recurrent major depression in partial remission    Nicotine dependence, cigarettes, in remission    Closed fracture of left lower leg with routine healing    Insomnia due to stress    Thrombocytosis    Polyarthralgia    Long term current use of antipsychotic medication    DEYA on CPAP    Chronic pain       /80   Pulse 69   Resp 17   Ht 5' 2" (1.575 m)   Wt 101 kg (222 lb 10.6 oz)   SpO2 98%   BMI 40.73 kg/m²   Body mass index is 40.73 kg/m².    Review of Systems   Psychiatric/Behavioral: Positive for sleep disturbance.   All other systems reviewed and are negative.    Objective:      Physical Exam  Constitutional:       Appearance: She is well-developed.   HENT:      Head: Normocephalic and atraumatic.   Neck:      Musculoskeletal: Normal range of motion and neck supple.   Cardiovascular:      Rate and Rhythm: Normal rate and regular rhythm.      Heart sounds: No murmur. No gallop.    Pulmonary:      Effort: Pulmonary effort is normal.      Breath sounds: Normal breath sounds.   Abdominal:      General: Bowel sounds are normal.      Palpations: Abdomen is soft.   Musculoskeletal: Normal range of motion.   Skin:     General: Skin is warm and dry.   Neurological:      Mental Status: She is alert and oriented to person, place, and time.       Personal Diagnostic Review          Assessment:       1. Morbid obesity with BMI of 40.0-44.9, adult    2. Nicotine dependence, " cigarettes, in remission    3. DEYA on CPAP        Outpatient Encounter Medications as of 7/7/2020   Medication Sig Dispense Refill    FLUoxetine 20 MG capsule TAKE 1 CAPSULE(20 MG) BY MOUTH EVERY DAY 90 capsule 1    gabapentin (NEURONTIN) 300 MG capsule TAKE 1 CAPSULE BY MOUTH THREE TIMES DAILY 120 capsule 0    QUEtiapine (SEROQUEL) 100 MG Tab TAKE 1 TABLET BY MOUTH EVERY EVENING 30 tablet 3    tiZANidine (ZANAFLEX) 4 MG tablet TAKE 1 TABLET BY MOUTH TWICE DAILY AS NEEDED 180 tablet 0    predniSONE (DELTASONE) 20 MG tablet Take 2 tablets with food for 3 days; then take one tablet with food for 2 days; then take 1/2 tablet with food for 2 days. (Patient not taking: Reported on 7/7/2020) 9 tablet 0    varenicline (CHANTIX) 1 mg Tab Take 1 tablet (1 mg total) by mouth 2 (two) times daily. (Patient not taking: Reported on 7/7/2020) 60 tablet 2     No facility-administered encounter medications on file as of 7/7/2020.      No orders of the defined types were placed in this encounter.    Plan:        Problem List Items Addressed This Visit        Other    Nicotine dependence, cigarettes, in remission (Chronic)    Overview     Tobacco-free since November, 2019         DEYA on CPAP    Overview     Home Sleep Test 12/31/2019  MILD/BORDERLINE OBSTRUCTIVE SLEEP APNEA with overall AHI 6.1/hr ( 37 events):  Oxygen desaturation: 78%. SpO2 between 70% to 79% for <1 min.  Patient snored 94% time above 50 .  Heart rate range: 57 bpm - 127 bpm         Current Assessment & Plan     Start wearing nightly           Other Visit Diagnoses     Morbid obesity with BMI of 40.0-44.9, adult    -  Primary      Weight loss and exercise to improve overall health.

## 2020-07-27 ENCOUNTER — TELEPHONE (OUTPATIENT)
Dept: INTERNAL MEDICINE | Facility: CLINIC | Age: 45
End: 2020-07-27

## 2020-07-27 NOTE — TELEPHONE ENCOUNTER
Attempted to contact pt and she did not have a voice mailbox set up .There was no option to leave a message.//LB

## 2020-07-30 ENCOUNTER — OFFICE VISIT (OUTPATIENT)
Dept: FAMILY MEDICINE | Facility: CLINIC | Age: 45
End: 2020-07-30
Payer: COMMERCIAL

## 2020-07-30 DIAGNOSIS — R30.0 DYSURIA: ICD-10-CM

## 2020-07-30 DIAGNOSIS — N76.0 ACUTE VAGINITIS: Primary | ICD-10-CM

## 2020-07-30 PROCEDURE — 99213 PR OFFICE/OUTPT VISIT, EST, LEVL III, 20-29 MIN: ICD-10-PCS | Mod: 95,,, | Performed by: NURSE PRACTITIONER

## 2020-07-30 PROCEDURE — 99213 OFFICE O/P EST LOW 20 MIN: CPT | Mod: 95,,, | Performed by: NURSE PRACTITIONER

## 2020-07-30 RX ORDER — SULFAMETHOXAZOLE AND TRIMETHOPRIM 800; 160 MG/1; MG/1
1 TABLET ORAL 2 TIMES DAILY
Qty: 10 TABLET | Refills: 0 | Status: SHIPPED | OUTPATIENT
Start: 2020-07-30 | End: 2020-08-01

## 2020-07-30 RX ORDER — CLINDAMYCIN PHOSPHATE 20 MG/G
CREAM VAGINAL NIGHTLY
Qty: 100 G | Refills: 0 | Status: SHIPPED | OUTPATIENT
Start: 2020-07-30 | End: 2020-08-01

## 2020-07-30 NOTE — PROGRESS NOTES
"Teresa Cazares is a 44 y.o. female patient of EDGAR Chery MD who presents to the clinic today for a Virtual Visit for No chief complaint on file.  .    The patient location is: [facility]  The chief complaint leading to consultation is: burning, freq of urination and vag d/c  Visit type: audiovisual  Total time spent with patient: 10 minutes  Each patient to whom he or she provides medical services by telemedicine is:  (1) informed of the relationship between the physician and patient and the respective role of any other health care provider with respect to management of the patient; and (2) notified that he or she may decline to receive medical services by telemedicine and may withdraw from such care at any time.    12 minutes of total time spent on the encounter, which includes face to face time and non-face to face time preparing to see the patient (eg, review of tests), Obtaining and/or reviewing separately obtained history, Documenting clinical information in the electronic or other health record, Independently interpreting results (not separately reported) and communicating results to the patient/family/caregiver, or Care coordination (not separately reported).     HPI    Pt, who is not known to me, reports a new problem to me:  + dysuria, occ urgency, + frequency of urination and brownish white, odorous vaginal discharge.  Had a video visit 2 weeks ago and got a gel that "stalled it" but sxs back.  Some inc freq.  Has d/c and odor.  No itching, not thick white d/c.  Not sexually active presently--last 1 month ago.    Answers for HPI/ROS submitted by the patient on 7/30/2020   Dysuria  Onset: in the past 7 days  Frequency: intermittently  Progression since onset: gradually worsening  Pain quality: burning  Pain - numeric: 5/10  Fever: no fever  Sexually active?: Yes  History of pyelonephritis?: No  chills: No  discharge: Yes  flank pain: No  frequency: Yes  hematuria: No  hesitancy: No  nausea: " No  possible pregnancy: No  sweats: No  urgency: Yes  vomiting: No  constipation: No  rash: No  weight loss: No  withholding: No  behavior changes: No  Treatments tried: NSAIDs  Improvement on treatment: no relief  Pain severity: mild  catheterization: No  diabetes insipidus: No  diabetes mellitus: No  genitourinary reflux: No  recurrent UTIs: No  single kidney: No  STD: No  urinary stasis: No  urological procedure: No  kidney stones: No    These symptoms began >2 weeks  ago and status is worse again after being a little better with the gel prescribed 2 weeks ago..     Pt denies the following symptoms:   fever, nausea, vomiting.    Aggravating factors include urinating.    Relieving factors include prescription gel 2 weeks ago    OTC Medications tried are above.    Prescription medications taken for symptoms are gel for vaginitis.     Pertinent history:   h/o UTIs and vaginitis    ROS    Constitutional: No fever, not feeling ill.    GI:  No stomach ache, no nausea, no vomiting, no diarrhea    Urinary:  + dysuria, + frequency, sight urgency..     HEENT/Heart/Lung/MS/Skin:  No symptoms or no changes.    Past Medical History:   Diagnosis Date    Anxiety     Depression     denies hospitalization or bipolar disorder    Nicotine dependence, cigarettes, in remission 3/26/2019    Obesity     Recurrent major depression in partial remission 3/26/2019       Current Outpatient Medications:     FLUoxetine 20 MG capsule, TAKE 1 CAPSULE(20 MG) BY MOUTH EVERY DAY, Disp: 90 capsule, Rfl: 1    gabapentin (NEURONTIN) 300 MG capsule, TAKE 1 CAPSULE BY MOUTH THREE TIMES DAILY, Disp: 120 capsule, Rfl: 0    predniSONE (DELTASONE) 20 MG tablet, Take 2 tablets with food for 3 days; then take one tablet with food for 2 days; then take 1/2 tablet with food for 2 days. (Patient not taking: Reported on 7/7/2020), Disp: 9 tablet, Rfl: 0    QUEtiapine (SEROQUEL) 100 MG Tab, TAKE 1 TABLET BY MOUTH EVERY EVENING, Disp: 30 tablet, Rfl: 3     tiZANidine (ZANAFLEX) 4 MG tablet, TAKE 1 TABLET BY MOUTH TWICE DAILY AS NEEDED, Disp: 180 tablet, Rfl: 0    varenicline (CHANTIX) 1 mg Tab, Take 1 tablet (1 mg total) by mouth 2 (two) times daily. (Patient not taking: Reported on 7/7/2020), Disp: 60 tablet, Rfl: 2    Pt is not a smoker.  Quit in November 2019.    ALLERGIES AND MEDICATIONS: updated and reviewed.  Review of patient's allergies indicates:   Allergen Reactions    Macrobid [nitrofurantoin monohyd/m-cryst] Hives    Flagyl [metronidazole hcl] Hives     Current Outpatient Medications   Medication Sig Dispense Refill    FLUoxetine 20 MG capsule TAKE 1 CAPSULE(20 MG) BY MOUTH EVERY DAY 90 capsule 1    gabapentin (NEURONTIN) 300 MG capsule TAKE 1 CAPSULE BY MOUTH THREE TIMES DAILY 120 capsule 0    predniSONE (DELTASONE) 20 MG tablet Take 2 tablets with food for 3 days; then take one tablet with food for 2 days; then take 1/2 tablet with food for 2 days. (Patient not taking: Reported on 7/7/2020) 9 tablet 0    QUEtiapine (SEROQUEL) 100 MG Tab TAKE 1 TABLET BY MOUTH EVERY EVENING 30 tablet 3    tiZANidine (ZANAFLEX) 4 MG tablet TAKE 1 TABLET BY MOUTH TWICE DAILY AS NEEDED 180 tablet 0    varenicline (CHANTIX) 1 mg Tab Take 1 tablet (1 mg total) by mouth 2 (two) times daily. (Patient not taking: Reported on 7/7/2020) 60 tablet 2     No current facility-administered medications for this visit.        PHYSICAL EXAM    There were no vitals filed for this visit.  Vital signs not taken per patient.  Patient's questionnaire (if available), medications & PMH all reviewed today.  Gen:  Alert, coop 44 y.o. female patient in no acute distress, is not ill-appearing.           Was driving in her car at the start of the visit.           Well developed, well-nourished  Psych:   Behavior and affect appropriate for situation and setting.  HENT:   Normocephalic, atraumatic,  Symmetrical facial movements.    Eyes without visible discharge or swelling.  No sneezing  during the visit.  Voice steady, no hoarseness noted.  Resp:   Normal respiratory effort  No retractions  No increased work of breathing  No audible wheezes  Talks in full sentences.  Neuro:  Responds promptly to questions showing good insight.  MSK:  Moves head and upper extremities evenly.  Skin:   No observed rashes/bruises    Acute vaginitis  -     clindamycin (CLINDESSE) 2 % vaginal cream; Place vaginally every evening. for 7 days  Dispense: 100 g; Refill: 0    Dysuria  -     sulfamethoxazole-trimethoprim 800-160mg (BACTRIM DS) 800-160 mg Tab; Take 1 tablet by mouth 2 (two) times daily. for 5 days  Dispense: 10 tablet; Refill: 0            Pt today presents with report of recurrence of urethral/vaginal burning, urinary freq and odorous brownish white vaginal d/c that she treated with a prescription gel 2 weeks ago.  States she would like to be treated for vaginitis and urine infection because she has a wax scheduled for 2 days from now and wants everything cleared up.  .  Additionally, she requested cipro for the UTI but there is a major interaction with tizanidine.    Physical exam shows key findings of alert, coop 44 yr old female in NAD.    Findings consistent with recurrent yeast vaginitis and possible UTI .    This is a new problem to me.  No work up is planned.     Significantly, the patient's CMP shows normal renal function.  Medication adjustments is not necessary, based on this.  However, she is on tizanidine that interacts with cipro so that cannot be prescribed for her urine symptoms.    Patient's allergies/medical conditions/anti-coagulant therapy significant for hives in response to metronidazole, limiting prescribing choices for her probable bacterial vaginosis.    Referred to her PCP for an in-person visit if her symptoms are not improved in 5 days or if they continue to worsen. .    Pt advised to perform comfort measures recommended on patient instruction sheet, which were reviewed at the time  of the visit..    If worse or concerns, the patient is advised to call her PCP office or call [facility] or go to the nearest URGENT CARE or ER.  Explained exam findings, diagnosis and treatment plan to patient.  Questions answered and patient states understanding.

## 2020-07-30 NOTE — Clinical Note
EDGAR Chery MD,  I saw your patient today in [facility]. Via Virtual Visit If you have any questions, please do not hesitate to contact me.  Thank you!  TATO Barragan, [facility]

## 2020-07-30 NOTE — PATIENT INSTRUCTIONS
The medication was changed from Cipro to Bactrim, because there is a reaction between Cipro and Tizanidine.      Bacterial Vaginosis    You have a vaginal infection called bacterial vaginosis (BV). Both good and bad bacteria are present in a healthy vagina. BV occurs when these bacteria get out of balance. The number of bad bacteria increase. And the number of good bacteria decrease.  BV may or may not cause symptoms. If symptoms do occur, they can include:  · Thin, gray, milky-white, or sometimes green discharge  · Unpleasant odor or fishy smell  · Itching, burning, or pain in or around the vagina  It is not known what causes BV, but certain factors can make the problem more likely. This can include:  · Douching  · Having sex with a new partner  · Having sex with more than one partner  BV will sometimes go away on its own. But treatment is usually recommended. This is because untreated BV can increase the risk of more serious health problems such as:  · Pelvic inflammatory disease (PID)  ·  delivery (giving birth to a baby early if youre pregnant)  · HIV and certain other sexually transmitted diseases (STDs)  · Infection after surgery on the reproductive organs  Home care  General care  · BV is most often treated with medicines called antibiotics. These may be given as pills or as a vaginal cream. If antibiotics are prescribed, be sure to use them exactly as directed. Also, be sure to complete all of the medicine, even if your symptoms go away.  · Avoid douching or having sex during treatment.  · If you have sex with a female partner, ask your healthcare provider if she should also be treated.  Prevention  · Limit or avoid douching.  · Avoid having sex. If you do have sex, then take steps to lower your risk:  ¨ Use condoms when having sex.  ¨ Limit the number of partners you have sex with.  Follow-up care  Follow up with your healthcare provider, or as advised.  When to seek medical advice  Call your  "healthcare provider right away if:  · You have a fever of 100.4ºF (38ºC) or higher, or as directed by your provider.  · Your symptoms worsen, or they dont go away within a few days of starting treatment.  · You have new pain in the lower belly or pelvic region.  · You have side effects that bother you or a reaction to the pills or cream youre prescribed.  · You or any partners you have sex with have new symptoms, such as a rash, joint pain, or sores.  Date Last Reviewed: 7/30/2015 © 2000-2017 "[employer]. [address] [place], [place] [address]. All rights reserved. This information is not intended as a substitute for professional medical care. Always follow your healthcare professional's instructions.      Bladder Infection, Female (Adult)    Urine is normally doesn't have any bacteria in it. But bacteria can get into the urinary tract from the skin around the rectum. Or they can travel in the blood from elsewhere in the body. Once they are in your urinary tract, they can cause infection in the urethra (urethritis), the bladder (cystitis), or the kidneys (pyelonephritis).  The most common place for an infection is in the bladder. This is called a bladder infection. This is one of the most common infections in women. Most bladder infections are easily treated. They are not serious unless the infection spreads to the kidney.  The phrases "bladder infection," "UTI," and "cystitis" are often used to describe the same thing. But they are not always the same. Cystitis is an inflammation of the bladder. The most common cause of cystitis is an infection.  Symptoms  The infection causes inflammation in the urethra and bladder. This causes many of the symptoms. The most common symptoms of a bladder infection are:  · Pain or burning when urinating  · Having to urinate more often than usual  · Urgent need to urinate  · Only a small amount of urine comes out  · Blood in urine  · Abdominal discomfort. This is " usually in the lower abdomen above the pubic bone.  · Cloudy urine  · Strong- or bad-smelling urine  · Unable to urinate (urinary retention)  · Unable to hold urine in (urinary incontinence)  · Fever  · Loss of appetite  · Confusion (in older adults)  Causes  Bladder infections are not contagious. You can't get one from someone else, from a toilet seat, or from sharing a bath.  The most common cause of bladder infections is bacteria from the bowels. The bacteria get onto the skin around the opening of the urethra. From there, they can get into the urine and travel up to the bladder, causing inflammation and infection. This usually happens because of:  · Wiping improperly after urinating. Always wipe from front to back.  · Bowel incontinence  · Pregnancy  · Procedures such as having a catheter inserted  · Older age  · Not emptying your bladder. This can allow bacteria a chance to grow in your urine.  · Dehydration  · Constipation  · Sex  · Use of a diaphragm for birth control   Treatment  Bladder infections are diagnosed by a urine test. They are treated with antibiotics and usually clear up quickly without complications. Treatment helps prevent a more serious kidney infection.  Medicines  Medicines can help in the treatment of a bladder infection:  · Take antibiotics until they are used up, even if you feel better. It is important to finish them to make sure the infection has cleared.  · You can use acetaminophen or ibuprofen for pain, fever, or discomfort, unless another medicine was prescribed. If you have chronic liver or kidney disease, talk with your healthcare provider before using these medicines. Also talk with your provider if you've ever had a stomach ulcer or gastrointestinal bleeding, or are taking blood-thinner medicines.  · If you are given phenazopydridine to reduce burning with urination, it will cause your urine to become a bright orange color. This can stain clothing.  Care and prevention  These  self-care steps can help prevent future infections:  · Drink plenty of fluids to prevent dehydration and flush out your bladder. Do this unless you must restrict fluids for other health reasons, or your doctor told you not to.  · Proper cleaning after going to the bathroom is important. Wipe from front to back after using the toilet to prevent the spread of bacteria.  · Urinate more often. Don't try to hold urine in for a long time.  · Wear loose-fitting clothes and cotton underwear. Avoid tight-fitting pants.  · Improve your diet and prevent constipation. Eat more fresh fruit and vegetables, and fiber, and less junk and fatty foods.  · Avoid sex until your symptoms are gone.  · Avoid caffeine, alcohol, and spicy foods. These can irritate your bladder.  · Urinate right after intercourse to flush out your bladder.  · If you use birth control pills and have frequent bladder infections, discuss it with your doctor.  Follow-up care  Call your healthcare provider if all symptoms are not gone after 3 days of treatment. This is especially important if you have repeat infections.  If a culture was done, you will be told if your treatment needs to be changed. If directed, you can call to find out the results.  If X-rays were done, you will be told if the results will affect your treatment.  Call 911  Call 911 if any of the following occur:  · Trouble breathing  · Hard to wake up or confusion  · Fainting or loss of consciousness  · Rapid heart rate  When to seek medical advice  Call your healthcare provider right away if any of these occur:  · Fever of 100.4ºF (38.0ºC) or higher, or as directed by your healthcare provider  · Symptoms are not better by the third day of treatment  · Back or belly (abdominal) pain that gets worse  · Repeated vomiting, or unable to keep medicine down  · Weakness or dizziness  · Vaginal discharge  · Pain, redness, or swelling in the outer vaginal area (labia)  Date Last Reviewed: 10/1/2016  ©  5295-1634 [employer]. [address] [place] [address], [place] [address]. All rights reserved. This information is not intended as a substitute for professional medical care. Always follow your healthcare professional's instructions.    Use the clindamycin vaginal cream 7 nights in a row and take the bactrim 1 tablet by mouth twice daily for 5 days.    If your symptoms persist, see your primary care provider.    Thank you for using the [facility]!  Verena MCDONALD CNP, Family Nurse Practitioner  [facility]           DISCHARGE

## 2020-07-31 ENCOUNTER — TELEPHONE (OUTPATIENT)
Dept: FAMILY MEDICINE | Facility: CLINIC | Age: 45
End: 2020-07-31

## 2020-07-31 NOTE — TELEPHONE ENCOUNTER
Called pharmacy, ([facility]) they state that the clindesse is usually just prescribed as one dose and want clarification.

## 2020-07-31 NOTE — TELEPHONE ENCOUNTER
----- Message from Mecca Diana sent at 7/31/2020  8:08 AM CDT -----  Regarding: pharmacy needs clarification  Type:  Needs Medical Advice    Who Called: pt  Symptoms (please be specific): n/a   How long has patient had these symptoms:  n/a  Pharmacy name and phone #:  n/a  Would the patient rather a call back or a response via [facility]? Call back   Best Call Back Number: [phone]  Additional Information: Pt states cant be filled until they speak with  pharmacy is [facility] in [facility] [phone]. Please call back Pt or pharmacy Please. Thanks

## 2020-08-01 ENCOUNTER — OFFICE VISIT (OUTPATIENT)
Dept: FAMILY MEDICINE | Facility: CLINIC | Age: 45
End: 2020-08-01
Payer: MEDICAID

## 2020-08-01 DIAGNOSIS — N39.0 URINARY TRACT INFECTION WITHOUT HEMATURIA, SITE UNSPECIFIED: ICD-10-CM

## 2020-08-01 DIAGNOSIS — N76.0 VAGINOSIS: ICD-10-CM

## 2020-08-01 DIAGNOSIS — L50.9 URTICARIA: Primary | ICD-10-CM

## 2020-08-01 PROCEDURE — 99213 OFFICE O/P EST LOW 20 MIN: CPT | Mod: 95,,, | Performed by: PHYSICIAN ASSISTANT

## 2020-08-01 PROCEDURE — 99213 PR OFFICE/OUTPT VISIT, EST, LEVL III, 20-29 MIN: ICD-10-PCS | Mod: 95,,, | Performed by: PHYSICIAN ASSISTANT

## 2020-08-01 RX ORDER — CIPROFLOXACIN 500 MG/1
500 TABLET ORAL 2 TIMES DAILY
Qty: 20 TABLET | Refills: 0 | Status: SHIPPED | OUTPATIENT
Start: 2020-08-01 | End: 2020-08-11

## 2020-08-01 RX ORDER — CLINDAMYCIN PHOSPHATE 20 MG/G
CREAM VAGINAL NIGHTLY
Qty: 40 G | Refills: 1 | Status: SHIPPED | OUTPATIENT
Start: 2020-08-01 | End: 2020-08-08

## 2020-08-01 RX ORDER — PREDNISONE 20 MG/1
TABLET ORAL
Qty: 9 TABLET | Refills: 0 | Status: SHIPPED | OUTPATIENT
Start: 2020-08-01 | End: 2021-10-14

## 2020-08-01 RX ORDER — HYDROXYZINE PAMOATE 50 MG/1
50 CAPSULE ORAL EVERY 8 HOURS PRN
Qty: 20 CAPSULE | Refills: 0 | Status: SHIPPED | OUTPATIENT
Start: 2020-08-01 | End: 2021-10-14

## 2020-08-02 NOTE — PROGRESS NOTES
Subjective:       Patient ID: Teresa Cazares is a 44 y.o. female.    Chief Complaint: No chief complaint on file.    The patient location is: [place], [place]  The chief complaint leading to consultation is: allergic reaction due to bactrim    Visit type: audiovisual    Face to Face time with patient: 15 minutes of total time spent on the encounter, which includes face to face time and non-face to face time preparing to see the patient (eg, review of tests), Obtaining and/or reviewing separately obtained history, Documenting clinical information in the electronic or other health record, Independently interpreting results (not separately reported) and communicating results to the patient/family/caregiver, or Care coordination (not separately reported).         Each patient to whom he or she provides medical services by telemedicine is:  (1) informed of the relationship between the physician and patient and the respective role of any other health care provider with respect to management of the patient; and (2) notified that he or she may decline to receive medical services by telemedicine and may withdraw from such care at any time.    Notes:     Dysuria   This is a new problem. The current episode started 1 to 4 weeks ago. The problem occurs intermittently. The problem has been gradually worsening. The pain is mild. There has been no fever. She is not sexually active. There is no history of pyelonephritis. Associated symptoms include a discharge. Pertinent negatives include no behavior changes, chills, flank pain, frequency, hematuria, hesitancy, nausea, possible pregnancy, sweats, urgency, vomiting, weight loss, constipation, rash or withholding. She has tried antibiotics for the symptoms. The treatment provided no relief. There is no history of catheterization, diabetes insipidus, diabetes mellitus, genitourinary reflux, hypertension, kidney stones, recurrent UTIs, a single kidney, STD, urinary stasis or a  urological procedure.   Rash  This is a new problem. The current episode started yesterday. The problem is unchanged. The rash is diffuse. The rash is characterized by itchiness (whelps). Associated with: recently started bactrim for an UTI. Pertinent negatives include no cough, facial edema, shortness of breath or vomiting. Past treatments include antihistamine. The treatment provided mild relief.     Past Medical History:   Diagnosis Date    Anxiety     Depression     denies hospitalization or bipolar disorder    Nicotine dependence, cigarettes, in remission 3/26/2019    Obesity     Recurrent major depression in partial remission 3/26/2019       Review of Systems   Constitutional: Negative for chills and weight loss.   HENT: Negative for trouble swallowing.    Respiratory: Negative for apnea, cough, choking, chest tightness, shortness of breath, wheezing and stridor.    Cardiovascular: Negative for chest pain.   Gastrointestinal: Negative for constipation, nausea and vomiting.   Genitourinary: Positive for dysuria. Negative for flank pain, frequency, hematuria, hesitancy and urgency.   Integumentary:  Negative for rash.         Objective:      Physical Exam  Constitutional:       General: She is not in acute distress.     Appearance: Normal appearance. She is not ill-appearing, toxic-appearing or diaphoretic.   Skin:     Findings: Rash present. Rash is urticarial.   Neurological:      Mental Status: She is alert.         Assessment:       1. Urticaria    2. Vaginosis    3. Urinary tract infection without hematuria, site unspecified        Plan:       Urticaria  -     predniSONE (DELTASONE) 20 MG tablet; Take 2 tablets with food for 3 days; then take one tablet with food for 2 days; then take 1/2 tablet with food for 2 days.  Dispense: 9 tablet; Refill: 0  -     hydrOXYzine pamoate (VISTARIL) 50 MG Cap; Take 1 capsule (50 mg total) by mouth every 8 (eight) hours as needed.  Dispense: 20 capsule; Refill:  0    Vaginosis  -     clindamycin (CLINDESSE) 2 % vaginal cream; Place vaginally every evening. for 7 days  Dispense: 40 g; Refill: 1    Urinary tract infection without hematuria, site unspecified  -     ciprofloxacin HCl (CIPRO) 500 MG tablet; Take 1 tablet (500 mg total) by mouth 2 (two) times daily. for 10 days  Dispense: 20 tablet; Refill: 0

## 2020-08-02 NOTE — PROGRESS NOTES
Answers for HPI/ROS submitted by the patient on 8/1/2020   Dysuria  Chronicity: new  Onset: 1 to 4 weeks ago  Frequency: intermittently  Progression since onset: gradually worsening  Fever: no fever  Sexually active?: No  History of pyelonephritis?: No  chills: No  discharge: Yes  flank pain: No  frequency: No  hematuria: No  hesitancy: No  nausea: No  possible pregnancy: No  sweats: No  urgency: No  vomiting: No  constipation: No  rash: No  weight loss: No  withholding: No  behavior changes: No  Treatments tried: antibiotics  Improvement on treatment: no relief  Pain severity: mild  catheterization: No  diabetes insipidus: No  diabetes mellitus: No  genitourinary reflux: No  hypertension: No  recurrent UTIs: No  single kidney: No  STD: No  urinary stasis: No  urological procedure: No  kidney stones: No

## 2020-08-30 DIAGNOSIS — F33.1 MAJOR DEPRESSIVE DISORDER, RECURRENT, MODERATE: ICD-10-CM

## 2020-08-31 RX ORDER — QUETIAPINE FUMARATE 100 MG/1
TABLET, FILM COATED ORAL
Qty: 30 TABLET | Refills: 3 | OUTPATIENT
Start: 2020-08-31

## 2020-08-31 NOTE — TELEPHONE ENCOUNTER
Teresa Damico.    I received a request to refill of your quetiapine listed below. I was not able to approve the request for the reason(s) listed below.    IMPORTANT: Before I can give you refills of any of your medicines, you need an appointment with me (either telemedicine virtual visit or in-office appointment).     Please take the time right now to schedule your appointment at your earliest convenience and before you will need any additional refills You can schedule your appointments from your [facility] account or from the [employer] [facility] on your smartphone or by calling our appointment desk at [phone]. If you have any difficulty, send my staff a message, and they will be glad to help.      I look forward to seeing you at your next appointment.    Sincerely,    JIM Chery MD       REFILL REFUSED  REASON: She is overdue for a follow-up appointment. (Last appointment with me was over 10 months ago.) I have given her refills previously with instructions that an APPOINTMENT was required prior to additional refills. (Refer to my previous documentation.) She was referred to a specialist (Psychiatrist Dr. Ligia Osman) for management of this medicine. We can discuss this further at next appointment.    Requested Prescriptions     Refused Prescriptions Disp Refills    QUEtiapine (SEROQUEL) 100 MG Tab [Pharmacy Med Name: QUETIAPINE 100MG TABLETS] 30 tablet 3     Sig: TAKE 1 TABLET BY MOUTH EVERY EVENING     Refused By: EDGAR CHERY     Reason for Refusal: Refill not appropriate    #[id number]

## 2020-09-14 ENCOUNTER — TELEPHONE (OUTPATIENT)
Dept: INTERNAL MEDICINE | Facility: CLINIC | Age: 45
End: 2020-09-14

## 2020-09-14 DIAGNOSIS — F33.1 MAJOR DEPRESSIVE DISORDER, RECURRENT, MODERATE: ICD-10-CM

## 2020-09-14 RX ORDER — QUETIAPINE FUMARATE 100 MG/1
100 TABLET, FILM COATED ORAL NIGHTLY
Qty: 7 TABLET | Refills: 0 | Status: SHIPPED | OUTPATIENT
Start: 2020-09-14 | End: 2020-09-16 | Stop reason: SDUPTHER

## 2020-09-14 NOTE — TELEPHONE ENCOUNTER
ACTION NEEDED: Please notify her of limited refill provided. We'll discuss further at her next appointment with me on Wednesday, September 16, 2020 at 8:00 AM. Thanks.    Medications Ordered This Encounter   Medications    QUEtiapine (SEROQUEL) 100 MG Tab     Sig: Take 1 tablet (100 mg total) by mouth every evening.     Dispense:  7 tablet     Refill:  0     Dispense only in 7-day quantities. Do not request higher quantity. Next appointment is 09-.         #[id number] #[id number]

## 2020-09-14 NOTE — TELEPHONE ENCOUNTER
----- Message from Regina Mitchell sent at 9/14/2020 10:57 AM CDT -----  Regarding: Refill  Contact: Pt  Pt is calling the staff regarding a refill RX QUEtiapine (SEROQUEL) 100 MG Tab  Once a day/ Pt has been out for a week now already    Pt is requesting a RX at least until the  Sept 16th scheduled appt    Pt call back to be advised [phone]      [id number] - [facility] [address] AT [address] [facility] [address] [facility]  [address] [facility] [address] [address]  Phone: [phone] Fax: [phone]    thanks

## 2020-09-14 NOTE — TELEPHONE ENCOUNTER
Spoke with patient and informed her of limited refill of her Seroquel per Dr. Chery and reminded her of appointment scheduled on Wednesday at 0800. Patient verbalized understanding.

## 2020-09-16 ENCOUNTER — OFFICE VISIT (OUTPATIENT)
Dept: INTERNAL MEDICINE | Facility: CLINIC | Age: 45
End: 2020-09-16
Payer: MEDICAID

## 2020-09-16 DIAGNOSIS — F33.41 RECURRENT MAJOR DEPRESSION IN PARTIAL REMISSION: Primary | Chronic | ICD-10-CM

## 2020-09-16 DIAGNOSIS — F41.1 GENERALIZED ANXIETY DISORDER: Chronic | ICD-10-CM

## 2020-09-16 DIAGNOSIS — Z11.4 SCREENING FOR HIV WITHOUT PRESENCE OF RISK FACTORS: ICD-10-CM

## 2020-09-16 DIAGNOSIS — Z11.59 ENCOUNTER FOR HEPATITIS C SCREENING TEST FOR LOW RISK PATIENT: ICD-10-CM

## 2020-09-16 DIAGNOSIS — F33.1 MAJOR DEPRESSIVE DISORDER, RECURRENT, MODERATE: ICD-10-CM

## 2020-09-16 DIAGNOSIS — Z79.899 LONG TERM CURRENT USE OF ANTIPSYCHOTIC MEDICATION: Chronic | ICD-10-CM

## 2020-09-16 DIAGNOSIS — Z13.220 SCREENING FOR LIPID DISORDERS: ICD-10-CM

## 2020-09-16 DIAGNOSIS — G89.28 OTHER CHRONIC POSTPROCEDURAL PAIN: Chronic | ICD-10-CM

## 2020-09-16 DIAGNOSIS — D75.839 THROMBOCYTOSIS: ICD-10-CM

## 2020-09-16 DIAGNOSIS — Z79.899 ENCOUNTER FOR LONG-TERM CURRENT USE OF MEDICATION: ICD-10-CM

## 2020-09-16 DIAGNOSIS — E66.01 MORBID OBESITY WITH BMI OF 40.0-44.9, ADULT: Chronic | ICD-10-CM

## 2020-09-16 DIAGNOSIS — G47.33 OSA ON CPAP: ICD-10-CM

## 2020-09-16 PROCEDURE — 99214 PR OFFICE/OUTPT VISIT, EST, LEVL IV, 30-39 MIN: ICD-10-PCS | Mod: 95,,, | Performed by: FAMILY MEDICINE

## 2020-09-16 PROCEDURE — 99214 OFFICE O/P EST MOD 30 MIN: CPT | Mod: 95,,, | Performed by: FAMILY MEDICINE

## 2020-09-16 RX ORDER — QUETIAPINE FUMARATE 100 MG/1
100 TABLET, FILM COATED ORAL NIGHTLY
Qty: 30 TABLET | Refills: 0 | Status: SHIPPED | OUTPATIENT
Start: 2020-09-16 | End: 2021-10-14

## 2020-09-16 NOTE — ASSESSMENT & PLAN NOTE
She reports poor compliance. We discussed strategies to help her overcome any barriers to adherence to CPAP treatment.  Future Appointments   Date Time Provider Department Center   1/7/2021  8:20 AM [facility]

## 2020-09-16 NOTE — ASSESSMENT & PLAN NOTE
"Estimated body mass index is 40.73 kg/m² as calculated from the following:    Height as of 7/7/20: 5' 2" (1.575 m).    Weight as of 7/7/20: 101 kg (222 lb 10.6 oz).  "

## 2020-09-16 NOTE — PROGRESS NOTES
TELEMEDICINE VIRTUAL VISIT    CHIEF COMPLAINT  Follow-up, Medication Refill, Anxiety, and Depression      DIAGNOSES, HISTORY, ASSESSMENT, AND PLAN  Assessment   1. Recurrent major depression in partial remission    2. Generalized anxiety disorder    3. Long term current use of antipsychotic medication    4. DEYA on CPAP    5. Thrombocytosis    6. Chronic post-op pain    7. Screening for lipid disorders    8. Screening for HIV without presence of risk factors    9. Encounter for hepatitis C screening test for low risk patient    10. Encounter for long-term current use of medication    11. Morbid obesity with BMI of 40.0-44.9, adult    12. Major depressive disorder, recurrent, moderate         Orders Placed This Encounter    CBC Without Differential    Lipid Panel    HIV 1/2 Ag/Ab (4th Gen)    Hepatitis C Antibody    Comprehensive metabolic panel    Hemoglobin A1C    TSH    Ambulatory referral/consult to Psychiatry    QUEtiapine (SEROQUEL) 100 MG Tab       Except as noted herein, any chronic conditions are compensated/controlled and stable, and no other significant new complaints or concerns reported.     Plan   Problem List Items Addressed This Visit     Chronic post-op pain (Chronic)    Overview     After 12/2019 LLE ORIF complicated by post-op MRSA wound infection requiring hardware removal and revision, incision and drainage of abscess, completed treatment with IV vancomycin         Current Assessment & Plan     Improving. No longer on antibiotics.         Generalized anxiety disorder (Chronic)    Current Assessment & Plan     Not optimally controlled.  Future Appointments   Date Time Provider Department Center   9/30/2020  8:00 AM Patrick Schmitt LCSW [facility]   She agreed to see psychiatry for assistance with medication management.         Relevant Orders    Ambulatory referral/consult to Psychiatry    Long term current use of antipsychotic medication (Chronic)    Current Assessment & Plan  "    She wants to continue seroquel. We discussed risks and benefits of treatment options. No abnormal movements, lip smacking, or tongue thrusting reported.  Future Appointments   Date Time Provider Department Center   9/30/2020  8:00 AM Patrick Schmitt LCSW [facility]   She agreed to see psychiatry for assistance with medication management.         Relevant Orders    Comprehensive metabolic panel    Hemoglobin A1C    TSH    Ambulatory referral/consult to Psychiatry    Morbid obesity with BMI of 40.0-44.9, adult (Chronic)    Current Assessment & Plan     Estimated body mass index is 40.73 kg/m² as calculated from the following:    Height as of 7/7/20: 5' 2" (1.575 m).    Weight as of 7/7/20: 101 kg (222 lb 10.6 oz).         Relevant Orders    Lipid Panel    Comprehensive metabolic panel    TSH    DEYA on CPAP    Overview     Home Sleep Test 12/31/2019  MILD/BORDERLINE OBSTRUCTIVE SLEEP APNEA with overall AHI 6.1/hr ( 37 events):  Oxygen desaturation: 78%. SpO2 between 70% to 79% for <1 min.  Patient snored 94% time above 50 .  Heart rate range: 57 bpm - 127 bpm         Current Assessment & Plan     She reports poor compliance. We discussed strategies to help her overcome any barriers to adherence to CPAP treatment.  Future Appointments   Date Time Provider Department Center   1/7/2021  8:20 AM Elizabeth Lejeune, NP [facility]             Relevant Orders    CBC Without Differential    Recurrent major depression in partial remission - Primary (Chronic)    Current Assessment & Plan     Not optimally controlled.  Future Appointments   Date Time Provider Department Center   9/30/2020  8:00 AM Patrick Schmitt LCSW [facility]   She agreed to see psychiatry for assistance with medication management.         Relevant Medications    QUEtiapine (SEROQUEL) 100 MG Tab    Other Relevant Orders    Ambulatory referral/consult to Psychiatry    Thrombocytosis    Current Assessment & Plan     " Asymptomatic.  She is due for diagnostic tests to evaluate and monitor this problem.          Relevant Orders    CBC Without Differential      Other Visit Diagnoses     Screening for lipid disorders        Relevant Orders    Lipid Panel    Screening for HIV without presence of risk factors        Relevant Orders    HIV 1/2 Ag/Ab (4th Gen)    Encounter for hepatitis C screening test for low risk patient        Relevant Orders    Hepatitis C Antibody    Encounter for long-term current use of medication        Relevant Orders    CBC Without Differential    Lipid Panel    Comprehensive metabolic panel    Hemoglobin A1C    TSH    Major depressive disorder, recurrent, moderate        Relevant Medications    QUEtiapine (SEROQUEL) 100 MG Tab          Outpatient Medications Prior to Visit   Medication Sig Dispense Refill    FLUoxetine 20 MG capsule TAKE 1 CAPSULE(20 MG) BY MOUTH EVERY DAY 90 capsule 1    gabapentin (NEURONTIN) 300 MG capsule TAKE 1 CAPSULE BY MOUTH THREE TIMES DAILY 120 capsule 0    hydrOXYzine pamoate (VISTARIL) 50 MG Cap Take 1 capsule (50 mg total) by mouth every 8 (eight) hours as needed. 20 capsule 0    predniSONE (DELTASONE) 20 MG tablet Take 2 tablets with food for 3 days; then take one tablet with food for 2 days; then take 1/2 tablet with food for 2 days. 9 tablet 0    tiZANidine (ZANAFLEX) 4 MG tablet TAKE 1 TABLET BY MOUTH TWICE DAILY AS NEEDED 180 tablet 0    QUEtiapine (SEROQUEL) 100 MG Tab Take 1 tablet (100 mg total) by mouth every evening. 7 tablet 0     No facility-administered medications prior to visit.         Medications Discontinued During This Encounter   Medication Reason    QUEtiapine (SEROQUEL) 100 MG Tab Reorder        Medications Ordered This Encounter   Medications    QUEtiapine (SEROQUEL) 100 MG Tab     Sig: Take 1 tablet (100 mg total) by mouth every evening.     Dispense:  30 tablet     Refill:  0     Dispense only 30-day QTY. Do not request 90-day QTY.  - LABS AND  "APPOINTMENT REQUIRED FOR MORE REFILLS     Additional instructions were reviewed with her. Refer to "Patient Instructions" section of after visit summary.     Subjective   Review of Systems   Constitutional: Negative for activity change and unexpected weight change.   HENT: Negative for hearing loss, rhinorrhea and trouble swallowing.    Eyes: Negative for discharge and visual disturbance.   Respiratory: Negative for chest tightness and wheezing.    Cardiovascular: Negative for chest pain and palpitations.   Gastrointestinal: Negative for blood in stool, constipation, diarrhea and vomiting.   Endocrine: Negative for polydipsia and polyuria.   Genitourinary: Negative for difficulty urinating, dysuria, hematuria and menstrual problem.   Musculoskeletal: Positive for arthralgias. Negative for joint swelling and neck pain.   Neurological: Positive for headaches. Negative for weakness.   Psychiatric/Behavioral: Positive for dysphoric mood and suicidal ideas. Negative for hallucinations and self-injury. The patient is nervous/anxious.         Objective   CONSTITUTIONAL: No apparent distress. Appears comfortable. Does not appear acutely ill or septic. Appears adequately hydrated.  CARDIOVASCULAR: No perioral cyanosis.  PULMONARY: Breathing unlabored. No audible wheezes. Easily speaks in full sentences.  PSYCHIATRIC: Alert and conversant and grossly oriented. Mood is grossly neutral. Affect appropriate. Judgment and insight grossly intact.     Documentation entered by me for this encounter may have been done in part using speech-recognition technology. Although I have made an effort to ensure accuracy, "sound like" errors may exist and should be interpreted in context. -JIM Chery MD.    Visit Details: This visit was a telemedicine virtual visit with synchronous audio and video. Teresa represented that her location at the time of this visit was in the [facility]. Teresa chose and consented to receive these " medical services by telemedicine. TOTAL TIME evaluating and managing this patient for this encounter exceeded 20 minutes, the majority spent counseling and coordinating care for the listed diagnoses.

## 2020-09-16 NOTE — ASSESSMENT & PLAN NOTE
Not optimally controlled.  Future Appointments   Date Time Provider Department Center   9/30/2020  8:00 AM Patrick Schmitt LCSW [facility]   She agreed to see psychiatry for assistance with medication management.

## 2020-09-16 NOTE — ASSESSMENT & PLAN NOTE
She wants to continue seroquel. We discussed risks and benefits of treatment options. No abnormal movements, lip smacking, or tongue thrusting reported.  Future Appointments   Date Time Provider Department Center   9/30/2020  8:00 AM Patrick Schmitt LCSW [facility]   She agreed to see psychiatry for assistance with medication management.

## 2020-09-16 NOTE — PATIENT INSTRUCTIONS
"Hi, Teresa.    It was nice seeing you during your virtual visit. Please review your After Visit Summary from your [employer] erika. (Just click on "Appointments," choose the appropriate past appointment, and click on "View After Visit Summary.")    Please take the time right now to schedule your labs (listed below) within the next 2 weeks and an appointment with me in the office (not a telemedicine virtual visit) a few days later to review the test results. You can schedule your labs and appointment by calling our appointment desk at [phone]. If you have any difficulty, send my staff a message, and they will be glad to help.    I have referred you to see one of our excellent psychiatrists to help manage your mental health medications. It is important that you act ASAP and call [phone] to schedule your appointment with the psychiatrist. I encourage you to ask to be placed on the waiting list for canceled appointments, because new appointments often open up a day or two in advance when someone else cancels their appointment.    Thanks for letting me care for you, and thanks for trusting [facility] with your healthcare needs. I look forward to seeing you at your next appointment.     Sincerely,    JIM Chery MD     Lab Orders Placed This Encounter    CBC Without Differential    Lipid Panel    HIV 1/2 Ag/Ab (4th Gen)    Hepatitis C Antibody    Comprehensive metabolic panel    Hemoglobin A1C    TSH        Medications Ordered This Encounter   Medications    QUEtiapine (SEROQUEL) 100 MG Tab     Sig: Take 1 tablet (100 mg total) by mouth every evening.     Dispense:  30 tablet     Refill:  0     Dispense only 30-day QTY. Do not request 90-day QTY.  - LABS AND APPOINTMENT REQUIRED FOR MORE REFILLS     "

## 2020-10-08 ENCOUNTER — LAB VISIT (OUTPATIENT)
Dept: LAB | Facility: HOSPITAL | Age: 45
End: 2020-10-08
Attending: FAMILY MEDICINE
Payer: MEDICAID

## 2020-10-08 DIAGNOSIS — E66.01 MORBID OBESITY WITH BMI OF 40.0-44.9, ADULT: Chronic | ICD-10-CM

## 2020-10-08 DIAGNOSIS — Z11.4 SCREENING FOR HIV WITHOUT PRESENCE OF RISK FACTORS: ICD-10-CM

## 2020-10-08 DIAGNOSIS — Z79.899 LONG TERM CURRENT USE OF ANTIPSYCHOTIC MEDICATION: Chronic | ICD-10-CM

## 2020-10-08 DIAGNOSIS — D75.839 THROMBOCYTOSIS: ICD-10-CM

## 2020-10-08 DIAGNOSIS — Z79.899 ENCOUNTER FOR LONG-TERM CURRENT USE OF MEDICATION: ICD-10-CM

## 2020-10-08 DIAGNOSIS — Z11.59 ENCOUNTER FOR HEPATITIS C SCREENING TEST FOR LOW RISK PATIENT: ICD-10-CM

## 2020-10-08 DIAGNOSIS — Z13.220 SCREENING FOR LIPID DISORDERS: ICD-10-CM

## 2020-10-08 DIAGNOSIS — G47.33 OSA ON CPAP: ICD-10-CM

## 2020-10-08 LAB
ALBUMIN SERPL BCP-MCNC: 4.4 G/DL (ref 3.5–5.2)
ALP SERPL-CCNC: 71 U/L (ref 55–135)
ALT SERPL W/O P-5'-P-CCNC: 13 U/L (ref 10–44)
ANION GAP SERPL CALC-SCNC: 11 MMOL/L (ref 8–16)
AST SERPL-CCNC: 17 U/L (ref 10–40)
BILIRUB SERPL-MCNC: 0.7 MG/DL (ref 0.1–1)
BUN SERPL-MCNC: 8 MG/DL (ref 6–20)
CALCIUM SERPL-MCNC: 9.2 MG/DL (ref 8.7–10.5)
CHLORIDE SERPL-SCNC: 102 MMOL/L (ref 95–110)
CHOLEST SERPL-MCNC: 157 MG/DL (ref 120–199)
CHOLEST/HDLC SERPL: 2.6 {RATIO} (ref 2–5)
CO2 SERPL-SCNC: 28 MMOL/L (ref 23–29)
CREAT SERPL-MCNC: 0.7 MG/DL (ref 0.5–1.4)
ERYTHROCYTE [DISTWIDTH] IN BLOOD BY AUTOMATED COUNT: 13.2 % (ref 11.5–14.5)
EST. GFR  (AFRICAN AMERICAN): >60 ML/MIN/1.73 M^2
EST. GFR  (NON AFRICAN AMERICAN): >60 ML/MIN/1.73 M^2
ESTIMATED AVG GLUCOSE: 117 MG/DL (ref 68–131)
GLUCOSE SERPL-MCNC: 97 MG/DL (ref 70–110)
HBA1C MFR BLD HPLC: 5.7 % (ref 4–5.6)
HCT VFR BLD AUTO: 41.9 % (ref 37–48.5)
HDLC SERPL-MCNC: 61 MG/DL (ref 40–75)
HDLC SERPL: 38.9 % (ref 20–50)
HGB BLD-MCNC: 13.1 G/DL (ref 12–16)
LDLC SERPL CALC-MCNC: 85.2 MG/DL (ref 63–159)
MCH RBC QN AUTO: 29.4 PG (ref 27–31)
MCHC RBC AUTO-ENTMCNC: 31.3 G/DL (ref 32–36)
MCV RBC AUTO: 94 FL (ref 82–98)
NONHDLC SERPL-MCNC: 96 MG/DL
PLATELET # BLD AUTO: 494 K/UL (ref 150–350)
PMV BLD AUTO: 9.6 FL (ref 9.2–12.9)
POTASSIUM SERPL-SCNC: 3.9 MMOL/L (ref 3.5–5.1)
PROT SERPL-MCNC: 7.2 G/DL (ref 6–8.4)
RBC # BLD AUTO: 4.45 M/UL (ref 4–5.4)
SODIUM SERPL-SCNC: 141 MMOL/L (ref 136–145)
TRIGL SERPL-MCNC: 54 MG/DL (ref 30–150)
TSH SERPL DL<=0.005 MIU/L-ACNC: 2.19 UIU/ML (ref 0.4–4)
WBC # BLD AUTO: 9.77 K/UL (ref 3.9–12.7)

## 2020-10-08 PROCEDURE — 36415 COLL VENOUS BLD VENIPUNCTURE: CPT

## 2020-10-08 PROCEDURE — 86703 HIV-1/HIV-2 1 RESULT ANTBDY: CPT

## 2020-10-08 PROCEDURE — 80053 COMPREHEN METABOLIC PANEL: CPT

## 2020-10-08 PROCEDURE — 84443 ASSAY THYROID STIM HORMONE: CPT

## 2020-10-08 PROCEDURE — 85027 COMPLETE CBC AUTOMATED: CPT

## 2020-10-08 PROCEDURE — 86803 HEPATITIS C AB TEST: CPT

## 2020-10-08 PROCEDURE — 80061 LIPID PANEL: CPT

## 2020-10-08 PROCEDURE — 83036 HEMOGLOBIN GLYCOSYLATED A1C: CPT

## 2020-10-10 LAB
HCV AB SERPL QL IA: NEGATIVE
HIV 1+2 AB+HIV1 P24 AG SERPL QL IA: NEGATIVE

## 2020-10-11 NOTE — PROGRESS NOTES
"Teresa Damico.    I'm happy to report that these results are fine and do not require a change in treatment.    Thanks for letting me care for you, thanks for trusting us with your healthcare needs, and thanks for using [facility].    Sincerely,    JIM Chery MD  --------------------------------------------------------------------------------   Want to learn more about your test results and what they mean?  (1) Log in to your [facility] account at [email or web address].  (2) From the "View test results" tab, click on the test you want to know more about.  (3) Click on the "About This Test" link."

## 2021-01-31 ENCOUNTER — PATIENT MESSAGE (OUTPATIENT)
Dept: SLEEP MEDICINE | Facility: CLINIC | Age: 46
End: 2021-01-31

## 2021-06-17 NOTE — TELEPHONE ENCOUNTER
Ms. Marroquins was contacted by Dr. Maurice via her [employer] portal. Matter taken care of.   Anesthesia Volume In Cc: 9

## 2021-06-29 NOTE — TELEPHONE ENCOUNTER
----- Message from Kali Roa sent at 1/9/2017  2:24 PM CST -----  Pt is requesting a call from nurse in regards to a prescription.             Please call pt back at [phone]   
Spoke with patient. Inquired about reason for request for clindamycin. Patient states she will contact her PCP for prescription.//xochitl  
Initial (On Arrival)

## 2021-08-10 NOTE — TELEPHONE ENCOUNTER
Ms. Teresa Cazares was given an return call back, and informed to be in route to the clinic so that Dr. Maurice can see her this morning before lunch. Ms. Cazares verbalized understanding stating she will be on her way, and the matter was taken care of.    (4) no impairment

## 2021-09-08 ENCOUNTER — TELEPHONE (OUTPATIENT)
Dept: INTERNAL MEDICINE | Facility: CLINIC | Age: 46
End: 2021-09-08

## 2021-10-14 ENCOUNTER — OFFICE VISIT (OUTPATIENT)
Dept: INTERNAL MEDICINE | Facility: CLINIC | Age: 46
End: 2021-10-14
Payer: COMMERCIAL

## 2021-10-14 ENCOUNTER — TELEPHONE (OUTPATIENT)
Dept: INTERNAL MEDICINE | Facility: CLINIC | Age: 46
End: 2021-10-14

## 2021-10-14 VITALS
HEART RATE: 93 BPM | SYSTOLIC BLOOD PRESSURE: 137 MMHG | TEMPERATURE: 99 F | HEIGHT: 62 IN | DIASTOLIC BLOOD PRESSURE: 70 MMHG | WEIGHT: 216.69 LBS | BODY MASS INDEX: 39.88 KG/M2 | OXYGEN SATURATION: 98 %

## 2021-10-14 DIAGNOSIS — R73.03 PREDIABETES: ICD-10-CM

## 2021-10-14 DIAGNOSIS — L24.9 IRRITANT CONTACT DERMATITIS, UNSPECIFIED TRIGGER: ICD-10-CM

## 2021-10-14 DIAGNOSIS — G47.33 OSA (OBSTRUCTIVE SLEEP APNEA): ICD-10-CM

## 2021-10-14 DIAGNOSIS — L91.8 SKIN TAG: ICD-10-CM

## 2021-10-14 DIAGNOSIS — Z00.00 ANNUAL PHYSICAL EXAM: ICD-10-CM

## 2021-10-14 DIAGNOSIS — Z12.31 ENCOUNTER FOR SCREENING MAMMOGRAM FOR MALIGNANT NEOPLASM OF BREAST: Primary | ICD-10-CM

## 2021-10-14 DIAGNOSIS — R30.0 DYSURIA: ICD-10-CM

## 2021-10-14 DIAGNOSIS — R41.840 ATTENTION DEFICIT: ICD-10-CM

## 2021-10-14 PROCEDURE — 99999 PR PBB SHADOW E&M-EST. PATIENT-LVL V: ICD-10-PCS | Mod: PBBFAC,,, | Performed by: PHYSICIAN ASSISTANT

## 2021-10-14 PROCEDURE — 99214 PR OFFICE/OUTPT VISIT, EST, LEVL IV, 30-39 MIN: ICD-10-PCS | Mod: S$GLB,,, | Performed by: PHYSICIAN ASSISTANT

## 2021-10-14 PROCEDURE — 99214 OFFICE O/P EST MOD 30 MIN: CPT | Mod: S$GLB,,, | Performed by: PHYSICIAN ASSISTANT

## 2021-10-14 PROCEDURE — 99215 OFFICE O/P EST HI 40 MIN: CPT | Mod: PBBFAC | Performed by: PHYSICIAN ASSISTANT

## 2021-10-14 PROCEDURE — 99999 PR PBB SHADOW E&M-EST. PATIENT-LVL V: CPT | Mod: PBBFAC,,, | Performed by: PHYSICIAN ASSISTANT

## 2021-10-14 RX ORDER — TEMAZEPAM 22.5 MG/1
22.5 CAPSULE ORAL NIGHTLY PRN
COMMUNITY
End: 2021-12-16 | Stop reason: SDUPTHER

## 2021-10-14 RX ORDER — BUPROPION HYDROCHLORIDE 100 MG/1
100 TABLET ORAL DAILY
COMMUNITY
Start: 2021-09-07 | End: 2021-12-16

## 2021-10-14 RX ORDER — TRIAMCINOLONE ACETONIDE 1 MG/G
CREAM TOPICAL 2 TIMES DAILY PRN
Qty: 80 G | Refills: 0 | Status: CANCELLED | OUTPATIENT
Start: 2021-10-14 | End: 2021-10-21

## 2021-10-19 ENCOUNTER — OFFICE VISIT (OUTPATIENT)
Dept: DERMATOLOGY | Facility: CLINIC | Age: 46
End: 2021-10-19
Payer: COMMERCIAL

## 2021-10-19 DIAGNOSIS — D48.5 NEOPLASM OF UNCERTAIN BEHAVIOR OF SKIN: ICD-10-CM

## 2021-10-19 PROCEDURE — 11102 TANGNTL BX SKIN SINGLE LES: CPT | Mod: S$GLB,,, | Performed by: STUDENT IN AN ORGANIZED HEALTH CARE EDUCATION/TRAINING PROGRAM

## 2021-10-19 PROCEDURE — 99499 UNLISTED E&M SERVICE: CPT | Mod: S$GLB,,, | Performed by: STUDENT IN AN ORGANIZED HEALTH CARE EDUCATION/TRAINING PROGRAM

## 2021-10-19 PROCEDURE — 99999 PR PBB SHADOW E&M-EST. PATIENT-LVL III: CPT | Mod: PBBFAC,,, | Performed by: STUDENT IN AN ORGANIZED HEALTH CARE EDUCATION/TRAINING PROGRAM

## 2021-10-19 PROCEDURE — 99499 NO LOS: ICD-10-PCS | Mod: S$GLB,,, | Performed by: STUDENT IN AN ORGANIZED HEALTH CARE EDUCATION/TRAINING PROGRAM

## 2021-10-19 PROCEDURE — 88304 TISSUE EXAM BY PATHOLOGIST: CPT | Mod: 26,,, | Performed by: PATHOLOGY

## 2021-10-19 PROCEDURE — 11102 PR TANGENTIAL BIOPSY, SKIN, SINGLE LESION: ICD-10-PCS | Mod: S$GLB,,, | Performed by: STUDENT IN AN ORGANIZED HEALTH CARE EDUCATION/TRAINING PROGRAM

## 2021-10-19 PROCEDURE — 88304 PR  SURG PATH,LEVEL III: ICD-10-PCS | Mod: 26,,, | Performed by: PATHOLOGY

## 2021-10-19 PROCEDURE — 99999 PR PBB SHADOW E&M-EST. PATIENT-LVL III: ICD-10-PCS | Mod: PBBFAC,,, | Performed by: STUDENT IN AN ORGANIZED HEALTH CARE EDUCATION/TRAINING PROGRAM

## 2021-10-19 PROCEDURE — 88304 TISSUE EXAM BY PATHOLOGIST: CPT | Performed by: PATHOLOGY

## 2021-10-20 ENCOUNTER — OFFICE VISIT (OUTPATIENT)
Dept: PULMONOLOGY | Facility: CLINIC | Age: 46
End: 2021-10-20
Payer: COMMERCIAL

## 2021-10-20 VITALS
BODY MASS INDEX: 39.15 KG/M2 | OXYGEN SATURATION: 96 % | WEIGHT: 212.75 LBS | HEART RATE: 76 BPM | HEIGHT: 62 IN | SYSTOLIC BLOOD PRESSURE: 120 MMHG | DIASTOLIC BLOOD PRESSURE: 72 MMHG | RESPIRATION RATE: 16 BRPM

## 2021-10-20 DIAGNOSIS — E66.09 CLASS 2 OBESITY DUE TO EXCESS CALORIES WITHOUT SERIOUS COMORBIDITY WITH BODY MASS INDEX (BMI) OF 38.0 TO 38.9 IN ADULT: ICD-10-CM

## 2021-10-20 DIAGNOSIS — F17.211 NICOTINE DEPENDENCE, CIGARETTES, IN REMISSION: Chronic | ICD-10-CM

## 2021-10-20 DIAGNOSIS — G47.33 OSA ON CPAP: Primary | ICD-10-CM

## 2021-10-20 DIAGNOSIS — F41.1 GENERALIZED ANXIETY DISORDER: Chronic | ICD-10-CM

## 2021-10-20 DIAGNOSIS — G47.33 OSA (OBSTRUCTIVE SLEEP APNEA): ICD-10-CM

## 2021-10-20 PROBLEM — E66.812 CLASS 2 OBESITY DUE TO EXCESS CALORIES WITHOUT SERIOUS COMORBIDITY WITH BODY MASS INDEX (BMI) OF 38.0 TO 38.9 IN ADULT: Status: ACTIVE | Noted: 2017-03-13

## 2021-10-20 PROCEDURE — 99999 PR PBB SHADOW E&M-EST. PATIENT-LVL IV: ICD-10-PCS | Mod: PBBFAC,,, | Performed by: NURSE PRACTITIONER

## 2021-10-20 PROCEDURE — 99214 PR OFFICE/OUTPT VISIT, EST, LEVL IV, 30-39 MIN: ICD-10-PCS | Mod: S$GLB,,, | Performed by: NURSE PRACTITIONER

## 2021-10-20 PROCEDURE — 99214 OFFICE O/P EST MOD 30 MIN: CPT | Mod: S$GLB,,, | Performed by: NURSE PRACTITIONER

## 2021-10-20 PROCEDURE — 99999 PR PBB SHADOW E&M-EST. PATIENT-LVL IV: CPT | Mod: PBBFAC,,, | Performed by: NURSE PRACTITIONER

## 2021-10-21 LAB
FINAL PATHOLOGIC DIAGNOSIS: NORMAL
GROSS: NORMAL
Lab: NORMAL
MICROSCOPIC EXAM: NORMAL

## 2021-11-04 ENCOUNTER — HOSPITAL ENCOUNTER (OUTPATIENT)
Dept: RADIOLOGY | Facility: HOSPITAL | Age: 46
Discharge: HOME | End: 2021-11-04
Attending: PHYSICIAN ASSISTANT
Payer: COMMERCIAL

## 2021-11-04 DIAGNOSIS — Z12.31 ENCOUNTER FOR SCREENING MAMMOGRAM FOR MALIGNANT NEOPLASM OF BREAST: ICD-10-CM

## 2021-11-04 PROCEDURE — 77067 SCR MAMMO BI INCL CAD: CPT | Mod: TC

## 2021-11-04 PROCEDURE — 77067 SCR MAMMO BI INCL CAD: CPT | Mod: 26,,, | Performed by: RADIOLOGY

## 2021-11-04 PROCEDURE — 77067 MAMMO DIGITAL SCREENING BILAT: ICD-10-PCS | Mod: 26,,, | Performed by: RADIOLOGY

## 2021-12-10 ENCOUNTER — LAB VISIT (OUTPATIENT)
Dept: LAB | Facility: HOSPITAL | Age: 46
End: 2021-12-10
Attending: SPECIALIST
Payer: COMMERCIAL

## 2021-12-10 ENCOUNTER — TELEPHONE (OUTPATIENT)
Dept: INTERNAL MEDICINE | Facility: CLINIC | Age: 46
End: 2021-12-10
Payer: COMMERCIAL

## 2021-12-10 ENCOUNTER — PATIENT MESSAGE (OUTPATIENT)
Dept: INTERNAL MEDICINE | Facility: CLINIC | Age: 46
End: 2021-12-10
Payer: COMMERCIAL

## 2021-12-10 DIAGNOSIS — R73.03 PREDIABETES: ICD-10-CM

## 2021-12-10 DIAGNOSIS — Z00.00 ANNUAL PHYSICAL EXAM: ICD-10-CM

## 2021-12-10 LAB
ALBUMIN SERPL BCP-MCNC: 4.3 G/DL (ref 3.5–5.2)
ALP SERPL-CCNC: 63 U/L (ref 55–135)
ALT SERPL W/O P-5'-P-CCNC: 22 U/L (ref 10–44)
ANION GAP SERPL CALC-SCNC: 9 MMOL/L (ref 8–16)
AST SERPL-CCNC: 21 U/L (ref 10–40)
BASOPHILS # BLD AUTO: 0.05 K/UL (ref 0–0.2)
BASOPHILS NFR BLD: 0.5 % (ref 0–1.9)
BILIRUB SERPL-MCNC: 0.6 MG/DL (ref 0.1–1)
BUN SERPL-MCNC: 11 MG/DL (ref 6–20)
CALCIUM SERPL-MCNC: 9.9 MG/DL (ref 8.7–10.5)
CHLORIDE SERPL-SCNC: 104 MMOL/L (ref 95–110)
CHOLEST SERPL-MCNC: 159 MG/DL (ref 120–199)
CHOLEST/HDLC SERPL: 2.8 {RATIO} (ref 2–5)
CO2 SERPL-SCNC: 27 MMOL/L (ref 23–29)
CREAT SERPL-MCNC: 0.8 MG/DL (ref 0.5–1.4)
DIFFERENTIAL METHOD: ABNORMAL
EOSINOPHIL # BLD AUTO: 0.2 K/UL (ref 0–0.5)
EOSINOPHIL NFR BLD: 1.6 % (ref 0–8)
ERYTHROCYTE [DISTWIDTH] IN BLOOD BY AUTOMATED COUNT: 13.3 % (ref 11.5–14.5)
EST. GFR  (AFRICAN AMERICAN): >60 ML/MIN/1.73 M^2
EST. GFR  (NON AFRICAN AMERICAN): >60 ML/MIN/1.73 M^2
ESTIMATED AVG GLUCOSE: 117 MG/DL (ref 68–131)
GLUCOSE SERPL-MCNC: 92 MG/DL (ref 70–110)
HBA1C MFR BLD: 5.7 % (ref 4–5.6)
HCT VFR BLD AUTO: 40.9 % (ref 37–48.5)
HDLC SERPL-MCNC: 57 MG/DL (ref 40–75)
HDLC SERPL: 35.8 % (ref 20–50)
HGB BLD-MCNC: 13.7 G/DL (ref 12–16)
IMM GRANULOCYTES # BLD AUTO: 0.03 K/UL (ref 0–0.04)
IMM GRANULOCYTES NFR BLD AUTO: 0.3 % (ref 0–0.5)
LDLC SERPL CALC-MCNC: 94.2 MG/DL (ref 63–159)
LYMPHOCYTES # BLD AUTO: 3 K/UL (ref 1–4.8)
LYMPHOCYTES NFR BLD: 31.6 % (ref 18–48)
MCH RBC QN AUTO: 29.7 PG (ref 27–31)
MCHC RBC AUTO-ENTMCNC: 33.5 G/DL (ref 32–36)
MCV RBC AUTO: 89 FL (ref 82–98)
MONOCYTES # BLD AUTO: 0.8 K/UL (ref 0.3–1)
MONOCYTES NFR BLD: 8.9 % (ref 4–15)
NEUTROPHILS # BLD AUTO: 5.4 K/UL (ref 1.8–7.7)
NEUTROPHILS NFR BLD: 57.1 % (ref 38–73)
NONHDLC SERPL-MCNC: 102 MG/DL
NRBC BLD-RTO: 0 /100 WBC
PLATELET # BLD AUTO: 411 K/UL (ref 150–450)
PMV BLD AUTO: 9.1 FL (ref 9.2–12.9)
POTASSIUM SERPL-SCNC: 4.2 MMOL/L (ref 3.5–5.1)
PROT SERPL-MCNC: 7.1 G/DL (ref 6–8.4)
RBC # BLD AUTO: 4.61 M/UL (ref 4–5.4)
SODIUM SERPL-SCNC: 140 MMOL/L (ref 136–145)
TRIGL SERPL-MCNC: 39 MG/DL (ref 30–150)
WBC # BLD AUTO: 9.48 K/UL (ref 3.9–12.7)

## 2021-12-10 PROCEDURE — 80061 LIPID PANEL: CPT | Performed by: PHYSICIAN ASSISTANT

## 2021-12-10 PROCEDURE — 83036 HEMOGLOBIN GLYCOSYLATED A1C: CPT | Performed by: PHYSICIAN ASSISTANT

## 2021-12-10 PROCEDURE — 85025 COMPLETE CBC W/AUTO DIFF WBC: CPT | Performed by: PHYSICIAN ASSISTANT

## 2021-12-10 PROCEDURE — 36415 COLL VENOUS BLD VENIPUNCTURE: CPT | Performed by: PHYSICIAN ASSISTANT

## 2021-12-10 PROCEDURE — 80053 COMPREHEN METABOLIC PANEL: CPT | Performed by: PHYSICIAN ASSISTANT

## 2021-12-14 ENCOUNTER — PATIENT MESSAGE (OUTPATIENT)
Dept: INTERNAL MEDICINE | Facility: CLINIC | Age: 46
End: 2021-12-14
Payer: COMMERCIAL

## 2021-12-14 NOTE — TELEPHONE ENCOUNTER
Phoned pt x2 attempts to gather information before appointment with np lejeune on 5/4/2020 at 8am. Pt has no vm set up . No answer  
There are no Wet Read(s) to document.

## 2021-12-16 ENCOUNTER — OFFICE VISIT (OUTPATIENT)
Dept: INTERNAL MEDICINE | Facility: CLINIC | Age: 46
End: 2021-12-16
Payer: MEDICAID

## 2021-12-16 VITALS
HEART RATE: 76 BPM | OXYGEN SATURATION: 98 % | TEMPERATURE: 98 F | HEIGHT: 62 IN | DIASTOLIC BLOOD PRESSURE: 70 MMHG | SYSTOLIC BLOOD PRESSURE: 100 MMHG | BODY MASS INDEX: 38.59 KG/M2 | WEIGHT: 209.69 LBS

## 2021-12-16 DIAGNOSIS — E66.09 CLASS 2 OBESITY DUE TO EXCESS CALORIES WITHOUT SERIOUS COMORBIDITY WITH BODY MASS INDEX (BMI) OF 38.0 TO 38.9 IN ADULT: ICD-10-CM

## 2021-12-16 DIAGNOSIS — Z00.01 ENCOUNTER FOR WELL ADULT EXAM WITH ABNORMAL FINDINGS: Primary | ICD-10-CM

## 2021-12-16 DIAGNOSIS — D75.839 THROMBOCYTOSIS: ICD-10-CM

## 2021-12-16 DIAGNOSIS — G47.33 OSA ON CPAP: ICD-10-CM

## 2021-12-16 DIAGNOSIS — R73.03 PREDIABETES: ICD-10-CM

## 2021-12-16 DIAGNOSIS — F51.04 PSYCHOPHYSIOLOGIC INSOMNIA: Chronic | ICD-10-CM

## 2021-12-16 DIAGNOSIS — F33.41 RECURRENT MAJOR DEPRESSION IN PARTIAL REMISSION: Chronic | ICD-10-CM

## 2021-12-16 PROBLEM — Z79.899 LONG-TERM CURRENT USE OF BENZODIAZEPINE: Status: ACTIVE | Noted: 2019-10-18

## 2021-12-16 PROBLEM — H21.01 HYPHEMA OF RIGHT EYE: Status: RESOLVED | Noted: 2018-08-05 | Resolved: 2021-12-16

## 2021-12-16 PROBLEM — S82.92XD: Status: RESOLVED | Noted: 2019-03-28 | Resolved: 2021-12-16

## 2021-12-16 PROCEDURE — 99999 PR PBB SHADOW E&M-EST. PATIENT-LVL IV: ICD-10-PCS | Mod: PBBFAC,,, | Performed by: FAMILY MEDICINE

## 2021-12-16 PROCEDURE — 99999 PR PBB SHADOW E&M-EST. PATIENT-LVL IV: CPT | Mod: PBBFAC,,, | Performed by: FAMILY MEDICINE

## 2021-12-16 PROCEDURE — 99396 PREV VISIT EST AGE 40-64: CPT | Mod: S$GLB,,, | Performed by: FAMILY MEDICINE

## 2021-12-16 PROCEDURE — 99396 PR PREVENTIVE VISIT,EST,40-64: ICD-10-PCS | Mod: S$GLB,,, | Performed by: FAMILY MEDICINE

## 2021-12-16 PROCEDURE — 90471 IMMUNIZATION ADMIN: CPT | Mod: PBBFAC

## 2021-12-16 PROCEDURE — 99214 OFFICE O/P EST MOD 30 MIN: CPT | Mod: PBBFAC | Performed by: FAMILY MEDICINE

## 2021-12-16 RX ORDER — BUPROPION HYDROCHLORIDE 150 MG/1
150 TABLET ORAL DAILY
COMMUNITY
End: 2021-12-16 | Stop reason: SDUPTHER

## 2021-12-16 RX ORDER — TEMAZEPAM 22.5 MG/1
22.5 CAPSULE ORAL NIGHTLY PRN
Qty: 30 CAPSULE | Refills: 5 | Status: SHIPPED | OUTPATIENT
Start: 2021-12-16 | End: 2022-02-11 | Stop reason: SDUPTHER

## 2021-12-16 RX ORDER — BUPROPION HYDROCHLORIDE 300 MG/1
300 TABLET ORAL DAILY
Qty: 30 TABLET | Refills: 11 | Status: SHIPPED | OUTPATIENT
Start: 2021-12-16 | End: 2022-04-01 | Stop reason: SDUPTHER

## 2022-02-11 DIAGNOSIS — F51.04 PSYCHOPHYSIOLOGIC INSOMNIA: Chronic | ICD-10-CM

## 2022-02-11 RX ORDER — TEMAZEPAM 22.5 MG/1
22.5 CAPSULE ORAL NIGHTLY PRN
Qty: 30 CAPSULE | Refills: 5 | Status: SHIPPED | OUTPATIENT
Start: 2022-02-11 | End: 2022-02-21 | Stop reason: SDUPTHER

## 2022-02-11 NOTE — TELEPHONE ENCOUNTER
REFILL REQUEST APPROVED.  Requested Prescriptions   Pending Prescriptions Disp Refills    temazepam (RESTORIL) 22.5 MG capsule 30 capsule 5     Sig: Take 1 capsule (22.5 mg total) by mouth nightly as needed for Insomnia.

## 2022-02-11 NOTE — TELEPHONE ENCOUNTER
No new care gaps identified.  Powered by [employer]. Reference number: [id number].   2/11/2022 12:09:59 PM CST

## 2022-02-11 NOTE — TELEPHONE ENCOUNTER
----- Message from Lory Summers sent at 2/11/2022 11:41 AM CST -----  States she would like to     Type:  RX Refill Request    Who Called: pt  Refill or New Rx:New Rx  RX Name and Strength:temazepam  How is the patient currently taking it? (ex. 1XDay):?  Is this a 30 day or 90 day RX:?  Preferred Pharmacy with phone number:# New Pharmacy #  [facility]  [phone]  [address] [facility] [address]  Local or Mail Order:local  Ordering Provider:Dr Chery  Would the patient rather a call back or a response via [facility]? call  Best Call Back Number:[phone]  Additional Information: #    Thank you

## 2022-02-21 DIAGNOSIS — F51.04 PSYCHOPHYSIOLOGIC INSOMNIA: Chronic | ICD-10-CM

## 2022-02-21 RX ORDER — TEMAZEPAM 22.5 MG/1
22.5 CAPSULE ORAL NIGHTLY PRN
Qty: 30 CAPSULE | Refills: 4 | Status: SHIPPED | OUTPATIENT
Start: 2022-02-21 | End: 2022-04-01 | Stop reason: SDUPTHER

## 2022-02-21 NOTE — TELEPHONE ENCOUNTER
No new care gaps identified.  Powered by [employer] Reference number: [id number].   2/21/2022 4:04:16 PM CST

## 2022-02-21 NOTE — TELEPHONE ENCOUNTER
----- Message from Kellie Burrell sent at 2/21/2022  3:35 PM CST -----  Pt is calling in regards temazepam (RESTORIL) 22.5 MG capsule. Pt stated that she changed her pharmacy and would need another script sent in. Pt stated that she received the refill that was sent in to [facility]. Please call [phone].    [facility]  [address] [place] [id number]  Phone: [phone]

## 2022-02-21 NOTE — TELEPHONE ENCOUNTER
Spoke with patient and she stated that she needed Dr. Chery to send her Temazepam prescription to [facility].

## 2022-02-22 ENCOUNTER — OFFICE VISIT (OUTPATIENT)
Dept: URGENT CARE | Facility: CLINIC | Age: 47
End: 2022-02-22
Payer: COMMERCIAL

## 2022-02-22 VITALS
BODY MASS INDEX: 38.46 KG/M2 | WEIGHT: 209 LBS | OXYGEN SATURATION: 100 % | SYSTOLIC BLOOD PRESSURE: 133 MMHG | DIASTOLIC BLOOD PRESSURE: 77 MMHG | RESPIRATION RATE: 18 BRPM | TEMPERATURE: 97 F | HEART RATE: 64 BPM | HEIGHT: 62 IN

## 2022-02-22 DIAGNOSIS — L02.91 ABSCESS: Primary | ICD-10-CM

## 2022-02-22 PROCEDURE — 3008F PR BODY MASS INDEX (BMI) DOCUMENTED: ICD-10-PCS | Mod: CPTII,S$GLB,, | Performed by: NURSE PRACTITIONER

## 2022-02-22 PROCEDURE — 3008F BODY MASS INDEX DOCD: CPT | Mod: CPTII,S$GLB,, | Performed by: NURSE PRACTITIONER

## 2022-02-22 PROCEDURE — 3078F PR MOST RECENT DIASTOLIC BLOOD PRESSURE < 80 MM HG: ICD-10-PCS | Mod: CPTII,S$GLB,, | Performed by: NURSE PRACTITIONER

## 2022-02-22 PROCEDURE — 1160F RVW MEDS BY RX/DR IN RCRD: CPT | Mod: CPTII,S$GLB,, | Performed by: NURSE PRACTITIONER

## 2022-02-22 PROCEDURE — 3075F SYST BP GE 130 - 139MM HG: CPT | Mod: CPTII,S$GLB,, | Performed by: NURSE PRACTITIONER

## 2022-02-22 PROCEDURE — 99213 PR OFFICE/OUTPT VISIT, EST, LEVL III, 20-29 MIN: ICD-10-PCS | Mod: S$GLB,,, | Performed by: NURSE PRACTITIONER

## 2022-02-22 PROCEDURE — 3078F DIAST BP <80 MM HG: CPT | Mod: CPTII,S$GLB,, | Performed by: NURSE PRACTITIONER

## 2022-02-22 PROCEDURE — 1159F MED LIST DOCD IN RCRD: CPT | Mod: CPTII,S$GLB,, | Performed by: NURSE PRACTITIONER

## 2022-02-22 PROCEDURE — 1160F PR REVIEW ALL MEDS BY PRESCRIBER/CLIN PHARMACIST DOCUMENTED: ICD-10-PCS | Mod: CPTII,S$GLB,, | Performed by: NURSE PRACTITIONER

## 2022-02-22 PROCEDURE — 1159F PR MEDICATION LIST DOCUMENTED IN MEDICAL RECORD: ICD-10-PCS | Mod: CPTII,S$GLB,, | Performed by: NURSE PRACTITIONER

## 2022-02-22 PROCEDURE — 99213 OFFICE O/P EST LOW 20 MIN: CPT | Mod: S$GLB,,, | Performed by: NURSE PRACTITIONER

## 2022-02-22 PROCEDURE — 3075F PR MOST RECENT SYSTOLIC BLOOD PRESS GE 130-139MM HG: ICD-10-PCS | Mod: CPTII,S$GLB,, | Performed by: NURSE PRACTITIONER

## 2022-02-22 RX ORDER — CLINDAMYCIN HYDROCHLORIDE 300 MG/1
300 CAPSULE ORAL 3 TIMES DAILY
Qty: 21 CAPSULE | Refills: 0 | Status: SHIPPED | OUTPATIENT
Start: 2022-02-22 | End: 2022-03-01

## 2022-02-22 RX ORDER — METHOCARBAMOL 500 MG/1
500 TABLET, FILM COATED ORAL 2 TIMES DAILY PRN
COMMUNITY
Start: 2022-01-03 | End: 2022-11-14

## 2022-02-22 NOTE — PROGRESS NOTES
"Subjective:       Patient ID: Teresa Cazares is a 46 y.o. female.    Vitals:  height is 5' 2" (1.575 m) and weight is 94.8 kg (209 lb). Her temperature is 97.3 °F (36.3 °C). Her blood pressure is 133/77 and her pulse is 64. Her respiration is 18 and oxygen saturation is 100%.     Chief Complaint: Abscess    Pt states she noticed a boil on her buttock three days ago. Pt states she knows it has been draining but states it still hurts. Pt unsure if she needs antibiotics.    Abscess  Chronicity:  New  Onset:  3-5 days ago  Progression Since Onset: gradually improving  Associated Symptoms: no fever, no chills, no sweats  Characteristics: draining and painful    Characteristics: no itching, no redness, no dryness, no scaling, no peeling, no swelling, no bruising and no blistering    Pain Scale:  8/10  Treatments Tried:  Warm water soaks and draining/squeezing  Relieved by:  Warm water soaks      Constitution: Negative for chills and fever.   HENT: Negative for congestion.    Cardiovascular: Negative for chest pain.   Eyes: Negative for eye pain.   Gastrointestinal: Negative for nausea and vomiting.   Genitourinary: Negative for dysuria.   Skin: Positive for abscess.   Allergic/Immunologic: Negative for immunocompromised state.   Neurological: Negative for headaches.   Hematologic/Lymphatic: Negative for easy bruising/bleeding. Does not bruise/bleed easily.   Psychiatric/Behavioral: Negative for nervous/anxious. The patient is not nervous/anxious.        Objective:      Physical Exam   Constitutional: She is oriented to person, place, and time.   HENT:   Head: Normocephalic and atraumatic.   Nose: Nose normal.   Eyes: Conjunctivae are normal.   Cardiovascular: Normal rate.   Pulmonary/Chest: Effort normal. No respiratory distress.   Abdominal: Normal appearance.   Musculoskeletal: Normal range of motion.         General: Normal range of motion.   Neurological: She is alert and oriented to person, place, and time. "   Skin:        Psychiatric: Her behavior is normal. Mood normal.         Assessment:       1. Abscess          Plan:         Abscess  -     clindamycin (CLEOCIN) 300 MG capsule; Take 1 capsule (300 mg total) by mouth 3 (three) times daily. for 7 days  Dispense: 21 capsule; Refill: 0           Does not want a I&D at this time ad it is draining.  Discussed if worsening she will need to come back.     - Apply warm compresses to the abscess 3-4 times a day for 15-20 minutes at a time.     - Observe for worsening  symptoms to include, increase in redness, increase in swelling, red streaking, fever, increase in pain, etc. If any of these should occur, call your physician or return to the emergency department.   - Complete full course of antibiotic  -     Diagnosis, treatment, AVS reviewed with patient  -     Patient understands and agrees with plan

## 2022-02-22 NOTE — TELEPHONE ENCOUNTER
REFILL APPROVED. Will address further refills at upcoming appointment with me listed below.  Requested Prescriptions   Pending Prescriptions Disp Refills    temazepam (RESTORIL) 22.5 MG capsule 30 capsule 4     Sig: Take 1 capsule (22.5 mg total) by mouth nightly as needed for Insomnia.    #LMRX   --------------------------------  Future Appointments   Date Time Provider Department Center   6/23/2022  2:00 PM JIM Chery MD [facility]

## 2022-02-22 NOTE — LETTER
February 22, 2022      [facility]  [address], [address]  [place] [address]  Phone: [phone]  Fax: [phone]       Patient: Teresa Cazares   YOB: 1975  Date of Visit: 02/22/2022    To Whom It May Concern:    Mayra Cazares  was at [facility] on 02/22/2022. The patient may return to work/school on 02/23/2022with no restrictions. If you have any questions or concerns, or if I can be of further assistance, please do not hesitate to contact me.    Sincerely,    TATO Denis

## 2022-02-25 ENCOUNTER — TELEPHONE (OUTPATIENT)
Dept: URGENT CARE | Facility: CLINIC | Age: 47
End: 2022-02-25
Payer: COMMERCIAL

## 2022-03-13 ENCOUNTER — PATIENT OUTREACH (OUTPATIENT)
Dept: ADMINISTRATIVE | Facility: OTHER | Age: 47
End: 2022-03-13
Payer: COMMERCIAL

## 2022-03-13 DIAGNOSIS — Z12.11 ENCOUNTER FOR FIT (FECAL IMMUNOCHEMICAL TEST) SCREENING: Primary | ICD-10-CM

## 2022-03-14 ENCOUNTER — OFFICE VISIT (OUTPATIENT)
Dept: DERMATOLOGY | Facility: CLINIC | Age: 47
End: 2022-03-14
Payer: COMMERCIAL

## 2022-03-14 DIAGNOSIS — L84 CALLUS: Primary | ICD-10-CM

## 2022-03-14 PROCEDURE — 1159F MED LIST DOCD IN RCRD: CPT | Mod: CPTII,S$GLB,, | Performed by: STUDENT IN AN ORGANIZED HEALTH CARE EDUCATION/TRAINING PROGRAM

## 2022-03-14 PROCEDURE — 99999 PR PBB SHADOW E&M-EST. PATIENT-LVL II: ICD-10-PCS | Mod: PBBFAC,,, | Performed by: STUDENT IN AN ORGANIZED HEALTH CARE EDUCATION/TRAINING PROGRAM

## 2022-03-14 PROCEDURE — 99212 OFFICE O/P EST SF 10 MIN: CPT | Mod: PBBFAC | Performed by: STUDENT IN AN ORGANIZED HEALTH CARE EDUCATION/TRAINING PROGRAM

## 2022-03-14 PROCEDURE — 99213 PR OFFICE/OUTPT VISIT, EST, LEVL III, 20-29 MIN: ICD-10-PCS | Mod: S$GLB,,, | Performed by: STUDENT IN AN ORGANIZED HEALTH CARE EDUCATION/TRAINING PROGRAM

## 2022-03-14 PROCEDURE — 1160F PR REVIEW ALL MEDS BY PRESCRIBER/CLIN PHARMACIST DOCUMENTED: ICD-10-PCS | Mod: CPTII,S$GLB,, | Performed by: STUDENT IN AN ORGANIZED HEALTH CARE EDUCATION/TRAINING PROGRAM

## 2022-03-14 PROCEDURE — 1160F RVW MEDS BY RX/DR IN RCRD: CPT | Mod: CPTII,S$GLB,, | Performed by: STUDENT IN AN ORGANIZED HEALTH CARE EDUCATION/TRAINING PROGRAM

## 2022-03-14 PROCEDURE — 99999 PR PBB SHADOW E&M-EST. PATIENT-LVL II: CPT | Mod: PBBFAC,,, | Performed by: STUDENT IN AN ORGANIZED HEALTH CARE EDUCATION/TRAINING PROGRAM

## 2022-03-14 PROCEDURE — 1159F PR MEDICATION LIST DOCUMENTED IN MEDICAL RECORD: ICD-10-PCS | Mod: CPTII,S$GLB,, | Performed by: STUDENT IN AN ORGANIZED HEALTH CARE EDUCATION/TRAINING PROGRAM

## 2022-03-14 PROCEDURE — 99213 OFFICE O/P EST LOW 20 MIN: CPT | Mod: S$GLB,,, | Performed by: STUDENT IN AN ORGANIZED HEALTH CARE EDUCATION/TRAINING PROGRAM

## 2022-03-14 RX ORDER — AMMONIUM LACTATE 12 G/100G
1 CREAM TOPICAL 2 TIMES DAILY
Qty: 385 G | Refills: 1 | Status: SHIPPED | OUTPATIENT
Start: 2022-03-14 | End: 2022-07-27

## 2022-03-21 ENCOUNTER — PATIENT MESSAGE (OUTPATIENT)
Dept: ADMINISTRATIVE | Facility: HOSPITAL | Age: 47
End: 2022-03-21
Payer: COMMERCIAL

## 2022-04-01 ENCOUNTER — OFFICE VISIT (OUTPATIENT)
Dept: INTERNAL MEDICINE | Facility: CLINIC | Age: 47
End: 2022-04-01
Payer: COMMERCIAL

## 2022-04-01 ENCOUNTER — LAB VISIT (OUTPATIENT)
Dept: LAB | Facility: HOSPITAL | Age: 47
End: 2022-04-01
Attending: FAMILY MEDICINE
Payer: COMMERCIAL

## 2022-04-01 VITALS
SYSTOLIC BLOOD PRESSURE: 120 MMHG | BODY MASS INDEX: 36.87 KG/M2 | TEMPERATURE: 98 F | HEIGHT: 62 IN | OXYGEN SATURATION: 99 % | DIASTOLIC BLOOD PRESSURE: 80 MMHG | WEIGHT: 200.38 LBS | HEART RATE: 93 BPM

## 2022-04-01 DIAGNOSIS — F33.41 RECURRENT MAJOR DEPRESSION IN PARTIAL REMISSION: Chronic | ICD-10-CM

## 2022-04-01 DIAGNOSIS — E66.01 CLASS 2 SEVERE OBESITY DUE TO EXCESS CALORIES WITH SERIOUS COMORBIDITY AND BODY MASS INDEX (BMI) OF 36.0 TO 36.9 IN ADULT: ICD-10-CM

## 2022-04-01 DIAGNOSIS — Z02.0 SCHOOL HEALTH EXAMINATION: ICD-10-CM

## 2022-04-01 DIAGNOSIS — F51.04 PSYCHOPHYSIOLOGIC INSOMNIA: Chronic | ICD-10-CM

## 2022-04-01 DIAGNOSIS — Z01.84 ANTIBODY RESPONSE EXAM: ICD-10-CM

## 2022-04-01 DIAGNOSIS — G47.33 OSA ON CPAP: Chronic | ICD-10-CM

## 2022-04-01 DIAGNOSIS — Z23 NEED FOR MENINGOCOCCAL VACCINATION: ICD-10-CM

## 2022-04-01 DIAGNOSIS — Z12.12 SCREENING FOR COLORECTAL CANCER: ICD-10-CM

## 2022-04-01 DIAGNOSIS — Z12.11 SCREENING FOR COLORECTAL CANCER: ICD-10-CM

## 2022-04-01 DIAGNOSIS — R73.03 PREDIABETES: Chronic | ICD-10-CM

## 2022-04-01 DIAGNOSIS — F41.1 GENERALIZED ANXIETY DISORDER: Primary | Chronic | ICD-10-CM

## 2022-04-01 PROBLEM — E66.812 CLASS 2 OBESITY DUE TO EXCESS CALORIES WITHOUT SERIOUS COMORBIDITY WITH BODY MASS INDEX (BMI) OF 38.0 TO 38.9 IN ADULT: Chronic | Status: ACTIVE | Noted: 2017-03-13

## 2022-04-01 PROBLEM — E66.09 CLASS 2 OBESITY DUE TO EXCESS CALORIES WITHOUT SERIOUS COMORBIDITY WITH BODY MASS INDEX (BMI) OF 38.0 TO 38.9 IN ADULT: Chronic | Status: ACTIVE | Noted: 2017-03-13

## 2022-04-01 PROCEDURE — 90734 MENACWYD/MENACWYCRM VACC IM: CPT | Mod: S$GLB,,, | Performed by: FAMILY MEDICINE

## 2022-04-01 PROCEDURE — 86762 RUBELLA ANTIBODY: CPT | Performed by: FAMILY MEDICINE

## 2022-04-01 PROCEDURE — 86706 HEP B SURFACE ANTIBODY: CPT | Performed by: FAMILY MEDICINE

## 2022-04-01 PROCEDURE — 3008F BODY MASS INDEX DOCD: CPT | Mod: CPTII,S$GLB,, | Performed by: FAMILY MEDICINE

## 2022-04-01 PROCEDURE — 90471 MENINGOCOCCAL CONJUGATE VACCINE 4-VALENT IM (MENVEO): ICD-10-PCS | Mod: S$GLB,,, | Performed by: FAMILY MEDICINE

## 2022-04-01 PROCEDURE — 86765 RUBEOLA ANTIBODY: CPT | Performed by: FAMILY MEDICINE

## 2022-04-01 PROCEDURE — 90734 MENINGOCOCCAL CONJUGATE VACCINE 4-VALENT IM (MENVEO): ICD-10-PCS | Mod: S$GLB,,, | Performed by: FAMILY MEDICINE

## 2022-04-01 PROCEDURE — 90471 IMMUNIZATION ADMIN: CPT | Mod: S$GLB,,, | Performed by: FAMILY MEDICINE

## 2022-04-01 PROCEDURE — 99214 PR OFFICE/OUTPT VISIT, EST, LEVL IV, 30-39 MIN: ICD-10-PCS | Mod: 25,S$GLB,, | Performed by: FAMILY MEDICINE

## 2022-04-01 PROCEDURE — 3074F SYST BP LT 130 MM HG: CPT | Mod: CPTII,S$GLB,, | Performed by: FAMILY MEDICINE

## 2022-04-01 PROCEDURE — 3079F PR MOST RECENT DIASTOLIC BLOOD PRESSURE 80-89 MM HG: ICD-10-PCS | Mod: CPTII,S$GLB,, | Performed by: FAMILY MEDICINE

## 2022-04-01 PROCEDURE — 99999 PR PBB SHADOW E&M-EST. PATIENT-LVL V: ICD-10-PCS | Mod: PBBFAC,,, | Performed by: FAMILY MEDICINE

## 2022-04-01 PROCEDURE — 99214 OFFICE O/P EST MOD 30 MIN: CPT | Mod: 25,S$GLB,, | Performed by: FAMILY MEDICINE

## 2022-04-01 PROCEDURE — 3079F DIAST BP 80-89 MM HG: CPT | Mod: CPTII,S$GLB,, | Performed by: FAMILY MEDICINE

## 2022-04-01 PROCEDURE — 86787 VARICELLA-ZOSTER ANTIBODY: CPT | Performed by: FAMILY MEDICINE

## 2022-04-01 PROCEDURE — 99999 PR PBB SHADOW E&M-EST. PATIENT-LVL V: CPT | Mod: PBBFAC,,, | Performed by: FAMILY MEDICINE

## 2022-04-01 PROCEDURE — 3008F PR BODY MASS INDEX (BMI) DOCUMENTED: ICD-10-PCS | Mod: CPTII,S$GLB,, | Performed by: FAMILY MEDICINE

## 2022-04-01 PROCEDURE — 3074F PR MOST RECENT SYSTOLIC BLOOD PRESSURE < 130 MM HG: ICD-10-PCS | Mod: CPTII,S$GLB,, | Performed by: FAMILY MEDICINE

## 2022-04-01 PROCEDURE — 86735 MUMPS ANTIBODY: CPT | Performed by: FAMILY MEDICINE

## 2022-04-01 RX ORDER — BUPROPION HYDROCHLORIDE 300 MG/1
300 TABLET ORAL DAILY
Qty: 90 TABLET | Refills: 3 | Status: SHIPPED | OUTPATIENT
Start: 2022-04-01 | End: 2022-07-27

## 2022-04-01 RX ORDER — TEMAZEPAM 22.5 MG/1
22.5 CAPSULE ORAL NIGHTLY PRN
Qty: 30 CAPSULE | Refills: 4 | Status: SHIPPED | OUTPATIENT
Start: 2022-04-01 | End: 2022-07-27 | Stop reason: SDUPTHER

## 2022-04-01 NOTE — ASSESSMENT & PLAN NOTE
"Waiting on Brianne replacement. Additional education/instructions were reviewed with her. Refer to "Patient Instructions" section of after visit summary.  "

## 2022-04-01 NOTE — PATIENT INSTRUCTIONS
"I have referred you to see one of our excellent mental health specialists for counseling services.    You must act SOON and call [phone] to schedule your appointment.     Mental health specialists are in high demand, so here are some tips for scheduling a timely appointment.  I have seen appointments delayed because departmental staff and the patient fail to connect by phone, with the record showing numerous missed calls and missed call-backs. If you call and get the departmental voicemail, in your message ask them to contact you by [employer] message to schedule your appointment (assuming you check your [employer] messages regularly). This will prevent the "phone-tag" that sometimes happens.  I encourage you to ask to be placed on the waiting list for canceled appointments because new appointments often open up a few days in advance when someone cancels or reschedules their appointment.    IMPORANT: If you experience an emotional crisis, go to the nearest emergency room or call the crisis intervention center at [phone].    MORE RESOURCES FOR HELP:    Substance Abuse and Mental Health Services Administration  www.samhsa.gov    [facility] Area Human Services  www.cahsd.org    Crisis Text Line  Text HOME to 403309       [employer] has identified a problem with a breakdown of the foam material used for sound reduction in their CPAP and BiPAP devices. Very small particles from the foam could break loose and come through the air hose. Swallowing or inhaling the foam presents a potential health risk. The foam degradation can be made worse by high heat and high humidity environments and unapproved cleaning methods, such as ozone or ultraviolet light. To date, [employer] reports no known incidents of serious harm caused by this issue.    You can learn more about the recall at http://www.[employer].com/src-update.    IMPORTANT: If you haven't already done so, you need to register your device at " "https://www.philipssrcupdate.[employer] or by phone at [phone].    It's difficult to give you clear direction for what to do right now, because the real risk from this problem is unknown. While there is a potential risk from the deterioration of the sound reduction foam, those risks must be balanced against the known health risks of untreated sleep apnea. We are looking to the FDA and the  for help understanding those risks. Brianne recommends that you stop using your device until they can get you a replacement device.     Here is what I would do if I were you:    Assuming that I had not used ozone or ultraviolet light to clean my device and I had not found black particles in my tubing, I would get a good quality "in-line filter" for my PAP tubing ASAP and continue using my device until I was able to install the filter. I've included a link to such a filter I found on [employer].com. You can find others online if you search for "CPAP in-line filter." You can probably also get one of these filters from the vendor you get your PAP supplies from. The vendor's contact information is usually on a sticker on the device.    If I had used an ozone-based chemical to clean my device or if I had found black particles in my tubing, I would STOP using my device until I had a good quality in-line filter installed.    I wish I had more information I could give you. I hope this helps.    Thanks for letting me care for you, and thanks for trusting [facility] with your healthcare needs.    Sincerely,    JIM Chery MD    CPAP/BiPAP IN-LINE FILTER EXAMPLE: https://[employer]/cvo7c49e   "

## 2022-04-01 NOTE — ASSESSMENT & PLAN NOTE
Lab Results   Component Value Date    HGBA1C 5.7 (H) 12/10/2021    HGBA1C 5.7 (H) 10/08/2020    GLU 92 12/10/2021    GLU 97 10/08/2020    GLU 91 04/08/2019     01/22/2019    GLU 96 01/21/2019     No results found for: GLUCFAST, NPLX0ZN, NQDA7GE  No results found for: LABINSU

## 2022-04-01 NOTE — PROGRESS NOTES
OFFICE VISIT 4/1/22  7:30 AM CDT  LAST ENCOUNTER WITH ME:  12/16/2021   CHIEF COMPLAINT: Follow-up    Teresa appears to be doing well on her current medicines. Teresa reports preceiving no adverse side-effects and wants to continue current treatment. She is requesting referral for mental health counseling services. She brings school forms indicating need for vaccine titers and MCV vaccination.    DIAGNOSES SPECIFICALLY EVALUATED AND TREATED THIS ENCOUNTER  1. Generalized anxiety disorder  - Ambulatory referral/consult to Psychology; Future  - buPROPion (WELLBUTRIN XL) 300 MG 24 hr tablet; Take 1 tablet (300 mg total) by mouth once daily.  Dispense: 90 tablet; Refill: 3    2. Recurrent major depression in partial remission  - Ambulatory referral/consult to Psychology; Future  - buPROPion (WELLBUTRIN XL) 300 MG 24 hr tablet; Take 1 tablet (300 mg total) by mouth once daily.  Dispense: 90 tablet; Refill: 3    3. Psychophysiologic insomnia  - temazepam (RESTORIL) 22.5 MG capsule; Take 1 capsule (22.5 mg total) by mouth nightly as needed for Insomnia.  Dispense: 30 capsule; Refill: 4    4. Class 2 severe obesity due to excess calories with serious comorbidity and body mass index (BMI) of 36.0 to 36.9 in adult    5. Screening for colorectal cancer  - Ambulatory referral/consult to Endo Procedure ; Future    6. Prediabetes    7. DEYA on CPAP    8. Antibody response exam  - Rubella antibody, IgG; Future  - Rubeola antibody IgG; Future  - Mumps, IgG Screen; Future  - Hepatitis B Surface Antibody, Qual/Quant; Future  - Varicella zoster antibody, IgG; Future    9. School health examination  - Rubella antibody, IgG; Future  - Rubeola antibody IgG; Future  - Mumps, IgG Screen; Future  - Hepatitis B Surface Antibody, Qual/Quant; Future  - Varicella zoster antibody, IgG; Future    10. Need for meningococcal vaccination  - (In Office Administered) Meningococcal Conjugate - MCV4O (MENVEO)     Follow up in about 5 months  "(around 9/1/2022) for re-evaluate problem(s) discussed today.    Problem List Items Addressed This Visit     Generalized anxiety disorder - Primary (Chronic)    Relevant Medications    buPROPion (WELLBUTRIN XL) 300 MG 24 hr tablet    Other Relevant Orders    Ambulatory referral/consult to Psychology    Recurrent major depression in partial remission (Chronic)    Relevant Medications    buPROPion (WELLBUTRIN XL) 300 MG 24 hr tablet    Other Relevant Orders    Ambulatory referral/consult to Psychology    Psychophysiologic insomnia (Chronic)    Relevant Medications    temazepam (RESTORIL) 22.5 MG capsule    Class 2 severe obesity due to excess calories with serious comorbidity and body mass index (BMI) of 36.0 to 36.9 in adult (Chronic)    Current Assessment & Plan     Exercising regularly and making significant therapeutic lifestyle changes.           DEYA on CPAP (Chronic)    Overview     12/31/2019, 1/1/2020  2 night Home Sleep Study    MILD/BORDERLINE OBSTRUCTIVE SLEEP APNEA with overall AHI 6.1/hr ( 37 events):  Oxygen desaturation: 78%. SpO2 between 70% to 79% for <1 min.  Patient snored 94% time above 50 .  Heart rate range: 57 bpm - 127 bpm  On Auto CPAP 7-10 cm   Nasal Pillows Mask   HME: [facility]            Current Assessment & Plan     Waiting on Brianne replacement. Additional education/instructions were reviewed with her. Refer to "Patient Instructions" section of after visit summary.           Prediabetes (Chronic)    Current Assessment & Plan     Lab Results   Component Value Date    HGBA1C 5.7 (H) 12/10/2021    HGBA1C 5.7 (H) 10/08/2020    GLU 92 12/10/2021    GLU 97 10/08/2020    GLU 91 04/08/2019     01/22/2019    GLU 96 01/21/2019     No results found for: GLUCFAST, WXYG4PE, TTMD3DY  No results found for: LABINSU              Other Visit Diagnoses     Screening for colorectal cancer        Relevant Orders    Ambulatory referral/consult to Endo Procedure     Antibody response exam     "    Relevant Orders    Rubella antibody, IgG (Completed)    Rubeola antibody IgG (Completed)    Mumps, IgG Screen (Completed)    Hepatitis B Surface Antibody, Qual/Quant (Completed)    Varicella zoster antibody, IgG (Completed)    School health examination        Relevant Orders    Rubella antibody, IgG (Completed)    Rubeola antibody IgG (Completed)    Mumps, IgG Screen (Completed)    Hepatitis B Surface Antibody, Qual/Quant (Completed)    Varicella zoster antibody, IgG (Completed)    Need for meningococcal vaccination        Relevant Orders    (In Office Administered) Meningococcal Conjugate - MCV4O (MENVEO) (Completed)      Unless noted herein, any chronic conditions are represented as and appear compensated/controlled and stable, and no other significant complaints or concerns were reported.    PRESCRIPTION DRUG MANAGEMENT  Outpatient Medications Prior to Visit   Medication Sig Dispense Refill    ammonium lactate 12 % Crea Apply 1 application topically 2 (two) times a day. 385 g 1    gabapentin (NEURONTIN) 300 MG capsule TAKE 1 CAPSULE BY MOUTH THREE TIMES DAILY 120 capsule 0    methocarbamoL (ROBAXIN) 500 MG Tab Take 500 mg by mouth 2 (two) times daily as needed.      buPROPion (WELLBUTRIN XL) 300 MG 24 hr tablet Take 1 tablet (300 mg total) by mouth once daily. 30 tablet 11    temazepam (RESTORIL) 22.5 MG capsule Take 1 capsule (22.5 mg total) by mouth nightly as needed for Insomnia. 30 capsule 4     No facility-administered medications prior to visit.     Medications Discontinued During This Encounter   Medication Reason    temazepam (RESTORIL) 22.5 MG capsule Reorder    buPROPion (WELLBUTRIN XL) 300 MG 24 hr tablet Reorder     Medications Ordered This Encounter   Medications    buPROPion (WELLBUTRIN XL) 300 MG 24 hr tablet     Sig: Take 1 tablet (300 mg total) by mouth once daily.     Dispense:  90 tablet     Refill:  3     -    temazepam (RESTORIL) 22.5 MG capsule     Sig: Take 1 capsule (22.5 mg  "total) by mouth nightly as needed for Insomnia.     Dispense:  30 capsule     Refill:  4     -     Review of Systems   Constitutional: Negative for activity change and unexpected weight change.   HENT: Negative for hearing loss, rhinorrhea and trouble swallowing.    Eyes: Negative for discharge and visual disturbance.   Respiratory: Negative for chest tightness and wheezing.    Cardiovascular: Negative for chest pain and palpitations.   Gastrointestinal: Negative for blood in stool, constipation, diarrhea and vomiting.   Endocrine: Negative for polydipsia and polyuria.   Genitourinary: Negative for difficulty urinating, dysuria, hematuria and menstrual problem.   Musculoskeletal: Negative for arthralgias, joint swelling and neck pain.   Neurological: Negative for weakness and headaches.   Psychiatric/Behavioral: Negative for confusion, dysphoric mood and suicidal ideas. The patient is nervous/anxious.       PHYSICAL EXAM  Vitals:    04/01/22 0726   BP: 120/80   BP Location: Left arm   Patient Position: Sitting   BP Method: Large (Manual)   Pulse: 93   Temp: 97.9 °F (36.6 °C)   TempSrc: Tympanic   SpO2: 99%   Weight: 90.9 kg (200 lb 6.4 oz)   Height: 5' 2" (1.575 m)   CONSTITUTIONAL: Vital signs noted. No apparent distress. Does not appear acutely ill or septic. Appears adequately hydrated.  PULM: Lungs clear. Breathing unlabored.  HEART: Regular.  DERM: Skin warm and moist with normal turgor.  PSYCHIATRIC: Alert and conversant. Mood grossly neutral. Judgment and insight grossly intact.      Documentation entered by me for this encounter may have been done in part using speech-recognition technology. Although I have made an effort to ensure accuracy, "sound like" errors may exist and should be interpreted in context.   "

## 2022-04-04 LAB
HBV SURFACE AB SER QL IA: POSITIVE
HBV SURFACE AB SERPL IA-ACNC: 50 MIU/ML
RUBV IGG SER-ACNC: 106 IU/ML
RUBV IGG SER-IMP: REACTIVE

## 2022-04-06 LAB
MUMPS IGG INTERPRETATION: POSITIVE
MUMPS IGG SCREEN: 2.38 ISR (ref 0–0.9)
RUBEOLA IGG ANTIBODY: 2.41 ISR (ref 0–0.9)
RUBEOLA INTERPRETATION: POSITIVE
VARICELLA INTERPRETATION: POSITIVE
VARICELLA ZOSTER IGG: 2.15 ISR (ref 0–0.9)

## 2022-04-08 ENCOUNTER — PATIENT MESSAGE (OUTPATIENT)
Dept: INTERNAL MEDICINE | Facility: CLINIC | Age: 47
End: 2022-04-08
Payer: COMMERCIAL

## 2022-04-08 NOTE — TELEPHONE ENCOUNTER
Spoke with patient and informed her that her lab results show that she has antibodies against the chicken pox, hepatitis B and measles, mumps and rubella. She verbalized understanding.

## 2022-04-27 ENCOUNTER — OFFICE VISIT (OUTPATIENT)
Dept: FAMILY MEDICINE | Facility: CLINIC | Age: 47
End: 2022-04-27
Payer: COMMERCIAL

## 2022-04-27 DIAGNOSIS — L02.31 ABSCESS OF BUTTOCK: Primary | ICD-10-CM

## 2022-04-27 DIAGNOSIS — B35.1 FUNGAL NAIL INFECTION: ICD-10-CM

## 2022-04-27 PROCEDURE — 1160F RVW MEDS BY RX/DR IN RCRD: CPT | Mod: CPTII,95,, | Performed by: NURSE PRACTITIONER

## 2022-04-27 PROCEDURE — 1159F MED LIST DOCD IN RCRD: CPT | Mod: CPTII,95,, | Performed by: NURSE PRACTITIONER

## 2022-04-27 PROCEDURE — 99213 OFFICE O/P EST LOW 20 MIN: CPT | Mod: 95,,, | Performed by: NURSE PRACTITIONER

## 2022-04-27 PROCEDURE — 99213 PR OFFICE/OUTPT VISIT, EST, LEVL III, 20-29 MIN: ICD-10-PCS | Mod: 95,,, | Performed by: NURSE PRACTITIONER

## 2022-04-27 PROCEDURE — 1160F PR REVIEW ALL MEDS BY PRESCRIBER/CLIN PHARMACIST DOCUMENTED: ICD-10-PCS | Mod: CPTII,95,, | Performed by: NURSE PRACTITIONER

## 2022-04-27 PROCEDURE — 1159F PR MEDICATION LIST DOCUMENTED IN MEDICAL RECORD: ICD-10-PCS | Mod: CPTII,95,, | Performed by: NURSE PRACTITIONER

## 2022-04-27 RX ORDER — MUPIROCIN 20 MG/G
OINTMENT TOPICAL 3 TIMES DAILY
Qty: 22 G | Refills: 0 | Status: SHIPPED | OUTPATIENT
Start: 2022-04-27 | End: 2022-07-27

## 2022-04-27 RX ORDER — DOXYCYCLINE 100 MG/1
100 CAPSULE ORAL EVERY 12 HOURS
Qty: 20 CAPSULE | Refills: 0 | Status: SHIPPED | OUTPATIENT
Start: 2022-04-27 | End: 2022-07-27

## 2022-04-27 RX ORDER — CICLOPIROX 80 MG/ML
SOLUTION TOPICAL DAILY
Qty: 6.6 ML | Refills: 0 | Status: SHIPPED | OUTPATIENT
Start: 2022-04-27 | End: 2022-07-27

## 2022-04-27 RX ORDER — CICLOPIROX 7.7 MG/G
GEL TOPICAL 2 TIMES DAILY
Qty: 45 G | Refills: 0 | Status: SHIPPED | OUTPATIENT
Start: 2022-04-27 | End: 2022-04-27 | Stop reason: ALTCHOICE

## 2022-04-27 NOTE — PROGRESS NOTES
Primary Care Telemedicine Note  The patient location is:  Patient's Home - [place]  The chief complaint leading to consultation is:   Chief Complaint   Patient presents with    Abscess      Total time: 16 minutes see MDM below. The total time spent on the encounter, which includes face to face time and non-face to face time preparing to see the patient (eg, review of previous medical records, tests), Obtaining and/or reviewing separately obtained history, documenting clinical information in the electronic or other health record, independently interpreting results (not separately reported)/communicating results to the patient/family/caregiver, and/or care coordination (not separately reported).    Visit type: Virtual visit with synchronous audio only and video  Each patient to whom he or she provides medical services by telemedicine is:  (1) informed of the relationship between the physician and patient and the respective role of any other health care provider with respect to management of the patient; and (2) notified that he or she may decline to receive medical services by telemedicine and may withdraw from such care at any time.  =================================================================    Assessment/Plan:  Problem List Items Addressed This Visit    None     Visit Diagnoses     Abscess of buttock    -  Primary    Relevant Medications    doxycycline (VIBRAMYCIN) 100 MG Cap    mupirocin (BACTROBAN) 2 % ointment    Fungal nail infection        Relevant Medications    ciclopirox (PENLAC) 8 % Soln      Start oral and topical ABX as prescribed  Recommend warm compress and keep area clean and dry  Follow up with dermatology and/or PCP for in-person exam if no improvement or worsening of symptoms.      Follow up in about 1 week (around 5/4/2022), or if symptoms worsen or fail to improve, for Follow up with PCP.    Yumi Akhtar  NP  _____________________________________________________________________________________________________________________________________________________    CC: abscess     HPI: Patient is a 46-year-old female who presents today as an established patient here for boil to buttock. She reports onset x2 days ago. Described as dime-sized lesion. Erythematous. Tender to palpation. She denies frequent abscess or boils. She reports that she has been working out/sweating more and believes this is an illiiciting factor. She has not tried putting anything on the area. There has been no fever, chills, sweats, arthralgias, drainage. Patient is established with dermatologist, Dr. Atkinson.     Patient also complains of discoloration to toenails bilaterally. Ongoing for over a week. She reports having gel nail polish on toenails removed. There is no surrounding redness, warmth, swelling, or drainage.     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    Nicotine dependence, cigarettes, in remission 3/26/2019    Obesity     Recurrent major depression in partial remission 3/26/2019    Surgical wound infection (MRSA) 12/2019     Past Surgical History:   Procedure Laterality Date    APPLICATION OF LARGE EXTERNAL FIXATION DEVICE TO TIBIA Left 1/10/2019    Procedure: APPLICATION, EXTERNAL FIXATION DEVICE, LARGE, TIBIA;  Surgeon: Domenic Galvan MD;  Location: [facility] OR;  Service: Orthopedics;  Laterality: Left;    APPLICATION OF WOUND VACUUM-ASSISTED CLOSURE DEVICE Left 1/17/2019    Procedure: APPLICATION, WOUND VAC;  Surgeon: Barry Guzman MD;  Location: [facility] OR;  Service: Orthopedics;  Laterality: Left;    FRACTURE SURGERY      C1 neck- halo    HERNIA REPAIR      HYSTERECTOMY  2009    tahbso    OOPHORECTOMY      OPEN REDUCTION AND  INTERNAL FIXATION (ORIF) OF FRACTURE OF TIBIAL PLATEAU Left 2019    Procedure: ORIF, FRACTURE, TIBIA, PLATEAU;  Surgeon: Barry Guzman MD;  Location: [facility] OR;  Service: Orthopedics;  Laterality: Left;    REMOVAL OF EXTERNAL FIXATION DEVICE Left 2019    Procedure: REMOVAL, EXTERNAL FIXATION DEVICE;  Surgeon: Barry Guzman MD;  Location: [facility] OR;  Service: Orthopedics;  Laterality: Left;    UMBILICAL HERNIA REPAIR       Review of patient's allergies indicates:   Allergen Reactions    Macrobid [nitrofurantoin monohyd/m-cryst] Hives    Bactrim [sulfamethoxazole-trimethoprim]     Flagyl [metronidazole hcl] Hives     Social History     Tobacco Use    Smoking status: Former Smoker     Packs/day: 1.00     Years: 27.00     Pack years: 27.00     Types: Cigarettes     Start date: 2019     Quit date: 2019     Years since quittin.4    Smokeless tobacco: Never Used   Substance Use Topics    Alcohol use: Yes     Comment: occasionally    Drug use: No     Family History   Problem Relation Age of Onset    Breast cancer Mother 58    Breast cancer Sister 46    No Known Problems Father     Breast cancer Paternal Grandmother      Current Outpatient Medications on File Prior to Visit   Medication Sig Dispense Refill    ammonium lactate 12 % Crea Apply 1 application topically 2 (two) times a day. 385 g 1    buPROPion (WELLBUTRIN XL) 300 MG 24 hr tablet Take 1 tablet (300 mg total) by mouth once daily. 90 tablet 3    gabapentin (NEURONTIN) 300 MG capsule TAKE 1 CAPSULE BY MOUTH THREE TIMES DAILY 120 capsule 0    methocarbamoL (ROBAXIN) 500 MG Tab Take 500 mg by mouth 2 (two) times daily as needed.      temazepam (RESTORIL) 22.5 MG capsule Take 1 capsule (22.5 mg total) by mouth nightly as needed for Insomnia. 30 capsule 4     No current facility-administered medications on file prior to visit.     ROS completed by patient   Review of Systems   Constitutional: Negative for activity change and  unexpected weight change.   HENT: Negative for hearing loss, rhinorrhea and trouble swallowing.    Eyes: Negative for discharge and visual disturbance.   Respiratory: Negative for chest tightness and wheezing.    Cardiovascular: Negative for chest pain and palpitations.   Gastrointestinal: Negative for blood in stool, constipation, diarrhea and vomiting.   Endocrine: Negative for polydipsia and polyuria.   Genitourinary: Negative for difficulty urinating, dysuria, hematuria and menstrual problem.   Musculoskeletal: Negative for arthralgias, joint swelling and neck pain.   Skin:        Abscess to buttock; nail infection   Neurological: Negative for weakness and headaches.   Psychiatric/Behavioral: Negative for confusion and dysphoric mood.     There were no vitals filed for this visit.    Wt Readings from Last 3 Encounters:   04/01/22 90.9 kg (200 lb 6.4 oz)   02/22/22 94.8 kg (209 lb)   12/16/21 95.1 kg (209 lb 10.5 oz)     Physical Exam  Vitals reviewed.   Constitutional:       General: She is not in acute distress.     Appearance: Normal appearance. She is normal weight.   HENT:      Head: Normocephalic.      Right Ear: External ear normal.      Left Ear: External ear normal.      Mouth/Throat:      Mouth: Mucous membranes are moist.   Eyes:      Conjunctiva/sclera: Conjunctivae normal.   Pulmonary:      Effort: Pulmonary effort is normal. No respiratory distress.   Musculoskeletal:         General: No swelling. Normal range of motion.      Cervical back: Normal range of motion.   Skin:     Comments: MIREYA to assess boil on buttock with virtual visit. There is yellow/white discoloration to toenails bilaterally    Neurological:      Mental Status: She is alert and oriented to person, place, and time. Mental status is at baseline.   Psychiatric:         Mood and Affect: Mood normal.         Behavior: Behavior normal.         Judgment: Judgment normal.       Health Maintenance   Topic Date Due    Mammogram  11/04/2022     TETANUS VACCINE  06/28/2026    Lipid Panel  12/10/2026    Hepatitis C Screening  Completed

## 2022-05-23 ENCOUNTER — PATIENT MESSAGE (OUTPATIENT)
Dept: PSYCHIATRY | Facility: CLINIC | Age: 47
End: 2022-05-23
Payer: COMMERCIAL

## 2022-06-06 NOTE — TELEPHONE ENCOUNTER
----- Message from Rosangela Bui sent at 10/17/2019  8:44 AM CDT -----  Contact: pt   She's calling in regards to Rx; prior Authorization     Pt stated nurse needs to call insurance company    pls call pt back at [phone] (home)       [facility] [employer] [id number] [address]  [address] [address]  Phone: [phone] Fax: [phone]         2

## 2022-06-14 ENCOUNTER — TELEPHONE (OUTPATIENT)
Dept: INTERNAL MEDICINE | Facility: CLINIC | Age: 47
End: 2022-06-14
Payer: COMMERCIAL

## 2022-06-14 NOTE — TELEPHONE ENCOUNTER
----- Message from Michealyah Ketan sent at 6/14/2022 10:49 AM CDT -----  Contact: Teresa  Type:  RX Refill Request    Who Called: Thisia  Refill or New Rx:Refill  RX Name and Strength:gabapentin  How is the patient currently taking it? (ex. 1XDay):  Is this a 30 day or 90 day RX:  Preferred Pharmacy with phone number:  [facility]  [address], [place]  [phone]  Local or Mail Order:Local  Ordering Provider:   Would the patient rather a call back or a response via [facility]? Call back   Best Call Back Number:[phone]  Additional Information: Pt stated that they are completely out of the medication

## 2022-06-14 NOTE — TELEPHONE ENCOUNTER
Spoke with patient and informed her that the last time she had gabapentin was in 2020 by Dr. Callahan and she would need to either discuss it with Dr. Chery at her appointment in September or see another provider sooner. She verbalized understanding.

## 2022-07-26 ENCOUNTER — TELEPHONE (OUTPATIENT)
Dept: INTERNAL MEDICINE | Facility: CLINIC | Age: 47
End: 2022-07-26
Payer: COMMERCIAL

## 2022-07-26 NOTE — TELEPHONE ENCOUNTER
Spoke with patient and informed her that she is not due for a mammogram until November. She stated that she is nervous about a spot on her chest and wanted to do a test earlier. I informed her that she can speak with Dr. Chery tomorrow regarding that and her colonoscopy. She verbalized understanding.

## 2022-07-26 NOTE — TELEPHONE ENCOUNTER
----- Message from Judie Jacobson sent at 7/26/2022  9:15 AM CDT -----  Contact: Patient  Patient called to consult with nurse or staff regarding a mammogram. She would like to scheduled a mammogram but there are no orders listed on the chart. She also had questions regarding a colon screening and would like a call back. Patient can be reached at [phone]. Thanks/MR

## 2022-07-27 ENCOUNTER — OFFICE VISIT (OUTPATIENT)
Dept: INTERNAL MEDICINE | Facility: CLINIC | Age: 47
End: 2022-07-27
Payer: COMMERCIAL

## 2022-07-27 VITALS
TEMPERATURE: 99 F | WEIGHT: 189.63 LBS | DIASTOLIC BLOOD PRESSURE: 68 MMHG | HEART RATE: 77 BPM | SYSTOLIC BLOOD PRESSURE: 104 MMHG | OXYGEN SATURATION: 98 % | BODY MASS INDEX: 34.68 KG/M2

## 2022-07-27 DIAGNOSIS — F41.1 GENERALIZED ANXIETY DISORDER: Chronic | ICD-10-CM

## 2022-07-27 DIAGNOSIS — Z80.3 FAMILY HISTORY OF BREAST CANCER: Chronic | ICD-10-CM

## 2022-07-27 DIAGNOSIS — Z86.16 HISTORY OF COVID-19: ICD-10-CM

## 2022-07-27 DIAGNOSIS — Z00.00 PREVENTATIVE HEALTH CARE: Primary | ICD-10-CM

## 2022-07-27 DIAGNOSIS — Z12.12 SCREENING FOR COLORECTAL CANCER: ICD-10-CM

## 2022-07-27 DIAGNOSIS — Z12.11 SCREENING FOR COLORECTAL CANCER: ICD-10-CM

## 2022-07-27 DIAGNOSIS — F33.41 RECURRENT MAJOR DEPRESSION IN PARTIAL REMISSION: Chronic | ICD-10-CM

## 2022-07-27 DIAGNOSIS — F51.04 PSYCHOPHYSIOLOGIC INSOMNIA: Chronic | ICD-10-CM

## 2022-07-27 DIAGNOSIS — Z12.39 BREAST CANCER SCREENING, HIGH RISK PATIENT: Chronic | ICD-10-CM

## 2022-07-27 PROCEDURE — 3078F DIAST BP <80 MM HG: CPT | Mod: CPTII,S$GLB,, | Performed by: FAMILY MEDICINE

## 2022-07-27 PROCEDURE — 99214 OFFICE O/P EST MOD 30 MIN: CPT | Mod: S$GLB,,, | Performed by: FAMILY MEDICINE

## 2022-07-27 PROCEDURE — 99999 PR PBB SHADOW E&M-EST. PATIENT-LVL V: CPT | Mod: PBBFAC,,, | Performed by: FAMILY MEDICINE

## 2022-07-27 PROCEDURE — 99999 PR PBB SHADOW E&M-EST. PATIENT-LVL V: ICD-10-PCS | Mod: PBBFAC,,, | Performed by: FAMILY MEDICINE

## 2022-07-27 PROCEDURE — 99214 PR OFFICE/OUTPT VISIT, EST, LEVL IV, 30-39 MIN: ICD-10-PCS | Mod: S$GLB,,, | Performed by: FAMILY MEDICINE

## 2022-07-27 PROCEDURE — 3078F PR MOST RECENT DIASTOLIC BLOOD PRESSURE < 80 MM HG: ICD-10-PCS | Mod: CPTII,S$GLB,, | Performed by: FAMILY MEDICINE

## 2022-07-27 PROCEDURE — 3074F PR MOST RECENT SYSTOLIC BLOOD PRESSURE < 130 MM HG: ICD-10-PCS | Mod: CPTII,S$GLB,, | Performed by: FAMILY MEDICINE

## 2022-07-27 PROCEDURE — 3008F BODY MASS INDEX DOCD: CPT | Mod: CPTII,S$GLB,, | Performed by: FAMILY MEDICINE

## 2022-07-27 PROCEDURE — 3074F SYST BP LT 130 MM HG: CPT | Mod: CPTII,S$GLB,, | Performed by: FAMILY MEDICINE

## 2022-07-27 PROCEDURE — 3008F PR BODY MASS INDEX (BMI) DOCUMENTED: ICD-10-PCS | Mod: CPTII,S$GLB,, | Performed by: FAMILY MEDICINE

## 2022-07-27 RX ORDER — TEMAZEPAM 22.5 MG/1
22.5 CAPSULE ORAL NIGHTLY PRN
Qty: 30 CAPSULE | Refills: 4 | Status: SHIPPED | OUTPATIENT
Start: 2022-07-27 | End: 2022-11-14 | Stop reason: DRUGHIGH

## 2022-07-27 RX ORDER — NITROFURANTOIN (MACROCRYSTALS) 100 MG/1
100 CAPSULE ORAL EVERY 12 HOURS
COMMUNITY
End: 2022-11-14

## 2022-07-27 RX ORDER — BUPROPION HYDROCHLORIDE 150 MG/1
150 TABLET ORAL DAILY
Qty: 90 TABLET | Refills: 0 | Status: SHIPPED | OUTPATIENT
Start: 2022-07-27 | End: 2022-11-14 | Stop reason: SDUPTHER

## 2022-07-27 NOTE — PATIENT INSTRUCTIONS
"I have given you two referrals to see our excellent mental health specialists. One referral is for mental health counseling services from licensed clinical social workers or mental health therapists. The other referral is to manage your mental health medications by one of our prescribing mental health specialists (psychiatrist, psychiatric nurse practitioner, or clinical psychologist).  I know them personally, and they will take good care of you.    You must act SOON and call [phone] to schedule your TWO appointments.    Mental health specialists are in high demand, so here are some tips for scheduling a timely appointment.  I have seen appointments delayed because departmental staff and the patient fail to connect by phone, with the record showing numerous missed calls and missed call-backs. If you call and get the departmental voicemail, in your message ask them to contact you by [facility] message to schedule your appointment (assuming you check your [facility] messages regularly). This will prevent the "phone-tag" that sometimes happens.  I encourage you to ask to be placed on the waiting list for canceled appointments because new appointments often open up a few days in advance when someone cancels or reschedules their appointment.  Contact your insurance company for a list of other reputable mental health specialists to see if you are able to get a more timely appointment with one of them.  Check PsychologyToday.com for listings and ratings for local mental health specialists.    Need Support Now?  If you or someone you know is struggling or in crisis, help is available. Call or text 721 or chat [employer]  988 offers 24/7 access to trained crisis counselors who can help people experiencing mental health-related distress. That could be:  Thoughts of suicide  Mental health or substance use crisis, or  Any other kind of emotion distress.           Colon cancer screening saves lives, so " "I've entered an order for you to have a colonoscopy.    Before you can have your colonoscopy, you must schedule a telephone appointment for Pre-Admit Testing ("P.A.T."). During the P.A.T. phone appointment, one of our endoscopy nurses will review your medical history with you and tell you if any additional steps need to be taken to help your test go smoothly. The nurse will give you instructions on preparing for the colonoscopy and let you know what to expect. They will also answer any questions you may have about the test.    Someone from [facility] should be calling you soon to help schedule your P.A.T. phone appointment. If you haven't been contacted within the next 3 business days, you can schedule your P.A.T. phone appointment from your [facility] account or by calling our appointment desk at [phone].    You can learn more about cancer screening by colonoscopy at: [email or web address]      Thank you for your interest in the EUNICE [facility] Multi Cancer Early Detection test. We are very excited about this clinical trial and the future of early cancer detection.  We have been overwhelmed by the number of people interested in receiving this test, and there is currently a waiting list. Please email [email or web address] for more information and to be added to the list of interested patients.       For the most up-to-date information about monkeypox, please visit the following links.    CDC.gov  https://www.cdc.gov/poxvirus/monkeypox/index.html    WHO.int  https://www.who.int/news-room/fact-sheets/detail/monkeypox  "

## 2022-07-27 NOTE — PROGRESS NOTES
WELLNESS VISIT (PREVENTIVE SERVICES)  7/27/22 11:00 AM CDT    HEALTH MAINTENANCE INTERVENTIONS - UP TO DATE  Health Maintenance Topics with due status: Not Due       Topic Last Completion Date    TETANUS VACCINE 06/28/2016    Lipid Panel 12/10/2021    Colorectal Cancer Screening 09/02/2022       HEALTH MAINTENANCE INTERVENTIONS - DUE OR DUE SOON  Health Maintenance Due   Topic Date Due    COVID-19 Vaccine (4 - Booster for Pfizer series) 02/15/2022    Influenza Vaccine (1) 09/01/2022    Mammogram  11/04/2022       REASON FOR VISIT: Annual Wellness Visit (Preventive Services)  CHIEF COMPLAINT: Annual Exam    DIAGNOSES SPECIFICALLY EVALUATED AND TREATED THIS ENCOUNTER  1. Preventative health care    2. History of COVID-19 (June 2022)    3. Breast cancer screening, high risk patient  - Ambulatory referral/consult to Hematology / Oncology; Future    4. Family history of breast cancer (mother and sister)  - Ambulatory referral/consult to Hematology / Oncology; Future    5. Screening for colorectal cancer  - Ambulatory referral/consult to Endo Procedure ; Future    6. Generalized anxiety disorder  - buPROPion (WELLBUTRIN XL) 150 MG TB24 tablet; Take 1 tablet (150 mg total) by mouth once daily.  Dispense: 90 tablet; Refill: 0  - Ambulatory referral/consult to Psychology; Future  - Ambulatory referral/consult to Psychiatry; Future    7. Recurrent major depression in partial remission  - buPROPion (WELLBUTRIN XL) 150 MG TB24 tablet; Take 1 tablet (150 mg total) by mouth once daily.  Dispense: 90 tablet; Refill: 0  - Ambulatory referral/consult to Psychology; Future  - Ambulatory referral/consult to Psychiatry; Future    8. Psychophysiologic insomnia  - temazepam (RESTORIL) 22.5 MG capsule; Take 1 capsule (22.5 mg total) by mouth nightly as needed for Insomnia.  Dispense: 30 capsule; Refill: 4  - Ambulatory referral/consult to Psychology; Future  - Ambulatory referral/consult to Psychiatry; Future     Follow up in  about 3 months (around 10/27/2022) for re-evaluate problem(s) discussed today.     Problem List Items Addressed This Visit       Breast cancer screening, high risk patient (Chronic)    Overview     Lifetime breast cancer risk = 21.2% (as of 07/27/2022) based on Paz Model for Breast Cancer Risk.         Current Assessment & Plan     We discussed risks and benefits of treatment options.         Relevant Orders    Ambulatory referral/consult to Hematology / Oncology    Family history of breast cancer (mother and sister) (Chronic)    Relevant Orders    Ambulatory referral/consult to Hematology / Oncology    Generalized anxiety disorder (Chronic)    Relevant Medications    buPROPion (WELLBUTRIN XL) 150 MG TB24 tablet    Other Relevant Orders    Ambulatory referral/consult to Psychology    Ambulatory referral/consult to Psychiatry    Psychophysiologic insomnia (Chronic)    Relevant Medications    temazepam (RESTORIL) 22.5 MG capsule    Other Relevant Orders    Ambulatory referral/consult to Psychology    Ambulatory referral/consult to Psychiatry    Recurrent major depression in partial remission (Chronic)    Relevant Medications    buPROPion (WELLBUTRIN XL) 150 MG TB24 tablet    Other Relevant Orders    Ambulatory referral/consult to Psychology    Ambulatory referral/consult to Psychiatry    History of COVID-19 (June 2022)    Current Assessment & Plan     Fully recovered.          Other Visit Diagnoses       Preventative health care    -  Primary    Screening for colorectal cancer        Relevant Orders    Ambulatory referral/consult to Endo Procedure         Unless noted herein, any chronic conditions are represented as and appear compensated/controlled and stable, and no other significant complaints or concerns were reported.    PRESCRIPTION DRUG MANAGEMENT  Outpatient Medications Prior to Visit   Medication Sig Dispense Refill    gabapentin (NEURONTIN) 300 MG capsule TAKE 1 CAPSULE BY MOUTH THREE TIMES DAILY  120 capsule 0    nitrofurantoin (MACRODANTIN) 100 MG capsule Take 100 mg by mouth every 12 (twelve) hours.      temazepam (RESTORIL) 22.5 MG capsule Take 1 capsule (22.5 mg total) by mouth nightly as needed for Insomnia. 30 capsule 4    methocarbamoL (ROBAXIN) 500 MG Tab Take 500 mg by mouth 2 (two) times daily as needed.      ammonium lactate 12 % Crea Apply 1 application topically 2 (two) times a day. 385 g 1    buPROPion (WELLBUTRIN XL) 300 MG 24 hr tablet Take 1 tablet (300 mg total) by mouth once daily. 90 tablet 3    ciclopirox (PENLAC) 8 % Soln Apply topically once daily. Apply to adjacent skin and affected nails daily.  Remove with alcohol every 7 days. 6.6 mL 0    doxycycline (VIBRAMYCIN) 100 MG Cap Take 1 capsule (100 mg total) by mouth every 12 (twelve) hours. 20 capsule 0    mupirocin (BACTROBAN) 2 % ointment Apply topically 3 (three) times daily. 22 g 0     No facility-administered medications prior to visit.     Medications Discontinued During This Encounter   Medication Reason    ciclopirox (PENLAC) 8 % Soln     mupirocin (BACTROBAN) 2 % ointment     doxycycline (VIBRAMYCIN) 100 MG Cap     ammonium lactate 12 % Crea     buPROPion (WELLBUTRIN XL) 300 MG 24 hr tablet Patient no longer taking    temazepam (RESTORIL) 22.5 MG capsule Reorder     Medications Ordered This Encounter   Medications    buPROPion (WELLBUTRIN XL) 150 MG TB24 tablet     Sig: Take 1 tablet (150 mg total) by mouth once daily.     Dispense:  90 tablet     Refill:  0    temazepam (RESTORIL) 22.5 MG capsule     Sig: Take 1 capsule (22.5 mg total) by mouth nightly as needed for Insomnia.     Dispense:  30 capsule     Refill:  4     PHYSICAL EXAM  Vitals:    07/27/22 1116   BP: 104/68   BP Location: Left arm   Patient Position: Sitting   BP Method: Large (Manual)   Pulse: 77   Temp: 98.7 °F (37.1 °C)   TempSrc: Tympanic   SpO2: 98%   Weight: 86 kg (189 lb 9.5 oz)   CONSTITUTIONAL: Vital signs noted. No apparent distress. Does not appear  acutely ill or septic. Appears adequately hydrated.  EYE: Sclerae anicteric. Lids and conjunctiva unremarkable.  ENT: External ENT unremarkable. Hearing grossly intact.  NECK: Trachea midline. Thyroid nontender.  PULM: Lungs clear. Breathing unlabored.  CV: Auscultation reveals regular rate and rhythm. Normal heart sounds. No carotid bruit.  GI: Soft and nontender. Bowel sounds normal.  DERM: Skin warm and moist with normal turgor.  NEURO: There are no gross focal motor deficits or gross deficits of cranial nerves III-XII.  PSYCH: Alert and oriented x 3. Mood is grossly neutral. Affect appropriate. Judgment and insight grossly intact.     PAST MEDICAL HISTORY  Teresa has a past medical history of Anxiety, Closed fracture of left lower leg with routine healing, Depression, Hyphema of right eye, Nicotine dependence, cigarettes, in remission, Obesity, Personal history of COVID-19, Recurrent major depression in partial remission, and Surgical wound infection (MRSA).    SURGICAL HISTORY  Teresa has a past surgical history that includes Umbilical hernia repair; Hysterectomy (2009); Fracture surgery; Oophorectomy; Application of large external fixation device to tibia (Left, 1/10/2019); Open reduction and internal fixation (ORIF) of fracture of tibial plateau (Left, 1/17/2019); Removal of external fixation device (Left, 1/17/2019); Application of wound vacuum-assisted closure device (Left, 1/17/2019); Hernia repair; and Colonoscopy (N/A, 9/2/2022).    FAMILY HISTORY  Teresa family history includes Breast cancer in her paternal grandmother; Breast cancer (age of onset: 46) in her sister; Breast cancer (age of onset: 58) in her mother; No Known Problems in her father.     ALLERGIES  Review of patient's allergies indicates:   Allergen Reactions    Macrobid [nitrofurantoin monohyd/m-cryst] Hives    Bactrim [sulfamethoxazole-trimethoprim]     Flagyl [metronidazole hcl] Hives       SOCIAL HISTORY  Teresa  reports that she quit  "smoking about 2 years ago. Her smoking use included cigarettes. She started smoking about 3 years ago. She has a 27.00 pack-year smoking history. She has never used smokeless tobacco. She reports current alcohol use. She reports that she does not use drugs.     Documentation entered by me for this encounter may have been done in part using speech-recognition technology. Although I have made an effort to ensure accuracy, "sound like" errors may exist and should be interpreted in context.   "

## 2022-07-28 ENCOUNTER — HOSPITAL ENCOUNTER (OUTPATIENT)
Dept: PREADMISSION TESTING | Facility: HOSPITAL | Age: 47
Discharge: HOME | End: 2022-07-28
Attending: FAMILY MEDICINE
Payer: COMMERCIAL

## 2022-07-28 DIAGNOSIS — Z12.11 SCREENING FOR COLORECTAL CANCER: Primary | ICD-10-CM

## 2022-07-28 DIAGNOSIS — Z12.12 SCREENING FOR COLORECTAL CANCER: Primary | ICD-10-CM

## 2022-07-28 RX ORDER — POLYETHYLENE GLYCOL 3350, SODIUM SULFATE ANHYDROUS, SODIUM BICARBONATE, SODIUM CHLORIDE, POTASSIUM CHLORIDE 236; 22.74; 6.74; 5.86; 2.97 G/4L; G/4L; G/4L; G/4L; G/4L
4 POWDER, FOR SOLUTION ORAL ONCE
Qty: 4000 ML | Refills: 0 | Status: SHIPPED | OUTPATIENT
Start: 2022-07-28 | End: 2022-07-28

## 2022-08-02 ENCOUNTER — TELEPHONE (OUTPATIENT)
Dept: PULMONOLOGY | Facility: CLINIC | Age: 47
End: 2022-08-02
Payer: COMMERCIAL

## 2022-08-02 NOTE — TELEPHONE ENCOUNTER
----- Message from Castillo Kim sent at 8/1/2022  4:52 PM CDT -----  Pt would like to know if she can get the new upgraded c pap machine. Please call .[phone]

## 2022-08-02 NOTE — TELEPHONE ENCOUNTER
Returned call to pt advised that I reached out to our [employer] company and was advised that she does not qualify for a new machine through her insurance. Pt. Verbalized understanding and we scheduled her 1 yr follow up with Dr. Baer to discuss.

## 2022-08-02 NOTE — TELEPHONE ENCOUNTER
Good morning,   I Returned call to pt., pt would like to know if she can get a cpap machine without the hose and mask. I informed pt. That I would find out if we carry these types of machines.  Concha is this something you can help me with?

## 2022-08-09 ENCOUNTER — DOCUMENTATION ONLY (OUTPATIENT)
Dept: HEMATOLOGY/ONCOLOGY | Facility: CLINIC | Age: 47
End: 2022-08-09
Payer: COMMERCIAL

## 2022-08-09 NOTE — PROGRESS NOTES
[facility]/ [facility]    Nurse reached out to pt to get apt scheduled from referral, pt was aware, wanted to scheduled apt at time of call, she stated with tori scheduled being full this month she requested nov due to her job. Apt scheduled and mailed to to pt. Pt knows is she need to r/s apt please call clinic office for latasha.

## 2022-08-18 ENCOUNTER — TELEPHONE (OUTPATIENT)
Dept: PSYCHIATRY | Facility: CLINIC | Age: 47
End: 2022-08-18
Payer: COMMERCIAL

## 2022-09-01 ENCOUNTER — ANESTHESIA EVENT (OUTPATIENT)
Dept: ENDOSCOPY | Facility: HOSPITAL | Age: 47
End: 2022-09-01
Payer: COMMERCIAL

## 2022-09-01 NOTE — ANESTHESIA PREPROCEDURE EVALUATION
09/01/2022  Teresa Cazares is a 46 y.o., female.  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    Nicotine dependence, cigarettes, in remission 3/26/2019    Obesity     Personal history of COVID-19     Recurrent major depression in partial remission 3/26/2019    Surgical wound infection (MRSA) 12/2019     Past Surgical History:   Procedure Laterality Date    APPLICATION OF LARGE EXTERNAL FIXATION DEVICE TO TIBIA Left 1/10/2019    Procedure: APPLICATION, EXTERNAL FIXATION DEVICE, LARGE, TIBIA;  Surgeon: Domenic Galvan MD;  Location: [facility] OR;  Service: Orthopedics;  Laterality: Left;    APPLICATION OF WOUND VACUUM-ASSISTED CLOSURE DEVICE Left 1/17/2019    Procedure: APPLICATION, WOUND VAC;  Surgeon: Barry Guzman MD;  Location: [facility] OR;  Service: Orthopedics;  Laterality: Left;    FRACTURE SURGERY      C1 neck- halo    HERNIA REPAIR      HYSTERECTOMY  2009    tahbso    OOPHORECTOMY      OPEN REDUCTION AND INTERNAL FIXATION (ORIF) OF FRACTURE OF TIBIAL PLATEAU Left 1/17/2019    Procedure: ORIF, FRACTURE, TIBIA, PLATEAU;  Surgeon: Barry Guzman MD;  Location: [facility] OR;  Service: Orthopedics;  Laterality: Left;    REMOVAL OF EXTERNAL FIXATION DEVICE Left 1/17/2019    Procedure: REMOVAL, EXTERNAL FIXATION DEVICE;  Surgeon: Barry Guzman MD;  Location: [facility] OR;  Service: Orthopedics;  Laterality: Left;    UMBILICAL HERNIA REPAIR           Pre-op Assessment    I have reviewed the Patient Summary Reports.     I have reviewed the Nursing Notes. I have reviewed the NPO Status.   I have reviewed the Medications.     Review of Systems  Anesthesia Hx:  No problems with previous Anesthesia    Social:  Smoker    Cardiovascular:    ECG has been reviewed. Normal sinus rhythm   Normal ECG   When compared with ECG of 13-MAR-2017 15:53,   No significant change was found   Confirmed by ESSENCE INFANTE, ERICKA ([id number]) on    Pulmonary:   Sleep Apnea, CPAP    Musculoskeletal:   Arthritis     Endocrine:  Obesity / BMI > 30  Psych:   Psychiatric History anxiety depression          Physical Exam  General: Well nourished, Cooperative, Alert and Oriented    Airway:  Mallampati: II / I  Mouth Opening: Normal  TM Distance: Normal  Tongue: Normal  Neck ROM: Normal ROM    Dental:  Intact        Anesthesia Plan  Type of Anesthesia, risks & benefits discussed:    Anesthesia Type: Gen Natural Airway  Intra-op Monitoring Plan: Standard ASA Monitors  Induction:  IV  Informed Consent: Informed consent signed with the Patient and all parties understand the risks and agree with anesthesia plan.  All questions answered.   ASA Score: 2  Day of Surgery Review of History & Physical: H&P Update referred to the surgeon/provider.    Ready For Surgery From Anesthesia Perspective.     .

## 2022-09-02 ENCOUNTER — ANESTHESIA (OUTPATIENT)
Dept: ENDOSCOPY | Facility: HOSPITAL | Age: 47
End: 2022-09-02
Payer: COMMERCIAL

## 2022-09-02 ENCOUNTER — HOSPITAL ENCOUNTER (OUTPATIENT)
Facility: HOSPITAL | Age: 47
Discharge: HOME | End: 2022-09-02
Attending: INTERNAL MEDICINE | Admitting: INTERNAL MEDICINE
Payer: COMMERCIAL

## 2022-09-02 VITALS
SYSTOLIC BLOOD PRESSURE: 125 MMHG | RESPIRATION RATE: 16 BRPM | WEIGHT: 188.5 LBS | TEMPERATURE: 98 F | HEIGHT: 62 IN | BODY MASS INDEX: 34.69 KG/M2 | OXYGEN SATURATION: 100 % | HEART RATE: 65 BPM | DIASTOLIC BLOOD PRESSURE: 58 MMHG

## 2022-09-02 DIAGNOSIS — Z12.11 COLON CANCER SCREENING: Primary | ICD-10-CM

## 2022-09-02 PROCEDURE — D9220A PRA ANESTHESIA: Mod: ANES,,, | Performed by: ANESTHESIOLOGY

## 2022-09-02 PROCEDURE — 25000003 PHARM REV CODE 250: Performed by: NURSE ANESTHETIST, CERTIFIED REGISTERED

## 2022-09-02 PROCEDURE — D9220A PRA ANESTHESIA: ICD-10-PCS | Mod: CRNA,,, | Performed by: NURSE ANESTHETIST, CERTIFIED REGISTERED

## 2022-09-02 PROCEDURE — D9220A PRA ANESTHESIA: ICD-10-PCS | Mod: ANES,,, | Performed by: ANESTHESIOLOGY

## 2022-09-02 PROCEDURE — G0121 COLON CA SCRN NOT HI RSK IND: ICD-10-PCS | Mod: ,,, | Performed by: INTERNAL MEDICINE

## 2022-09-02 PROCEDURE — 63600175 PHARM REV CODE 636 W HCPCS: Performed by: NURSE ANESTHETIST, CERTIFIED REGISTERED

## 2022-09-02 PROCEDURE — G0121 COLON CA SCRN NOT HI RSK IND: HCPCS | Performed by: INTERNAL MEDICINE

## 2022-09-02 PROCEDURE — 63600175 PHARM REV CODE 636 W HCPCS: Performed by: INTERNAL MEDICINE

## 2022-09-02 PROCEDURE — G0121 COLON CA SCRN NOT HI RSK IND: HCPCS | Mod: ,,, | Performed by: INTERNAL MEDICINE

## 2022-09-02 PROCEDURE — D9220A PRA ANESTHESIA: Mod: CRNA,,, | Performed by: NURSE ANESTHETIST, CERTIFIED REGISTERED

## 2022-09-02 PROCEDURE — 37000009 HC ANESTHESIA EA ADD 15 MINS: Performed by: INTERNAL MEDICINE

## 2022-09-02 PROCEDURE — 37000008 HC ANESTHESIA 1ST 15 MINUTES: Performed by: INTERNAL MEDICINE

## 2022-09-02 RX ORDER — PROPOFOL 10 MG/ML
VIAL (ML) INTRAVENOUS
Status: DISCONTINUED | OUTPATIENT
Start: 2022-09-02 | End: 2022-09-02

## 2022-09-02 RX ORDER — LIDOCAINE HYDROCHLORIDE 10 MG/ML
INJECTION, SOLUTION EPIDURAL; INFILTRATION; INTRACAUDAL; PERINEURAL
Status: DISCONTINUED | OUTPATIENT
Start: 2022-09-02 | End: 2022-09-02

## 2022-09-02 RX ORDER — SODIUM CHLORIDE, SODIUM LACTATE, POTASSIUM CHLORIDE, CALCIUM CHLORIDE 600; 310; 30; 20 MG/100ML; MG/100ML; MG/100ML; MG/100ML
INJECTION, SOLUTION INTRAVENOUS CONTINUOUS
Status: DISCONTINUED | OUTPATIENT
Start: 2022-09-02 | End: 2022-09-02 | Stop reason: HOSPADM

## 2022-09-02 RX ADMIN — PROPOFOL 100 MG: 10 INJECTION, EMULSION INTRAVENOUS at 12:09

## 2022-09-02 RX ADMIN — SODIUM CHLORIDE, SODIUM LACTATE, POTASSIUM CHLORIDE, AND CALCIUM CHLORIDE: .6; .31; .03; .02 INJECTION, SOLUTION INTRAVENOUS at 11:09

## 2022-09-02 RX ADMIN — LIDOCAINE HYDROCHLORIDE 40 MG: 10 INJECTION, SOLUTION EPIDURAL; INFILTRATION; INTRACAUDAL; PERINEURAL at 12:09

## 2022-09-02 NOTE — TRANSFER OF CARE
"Anesthesia Transfer of Care Note    Patient: Teresa Cazares    Procedure(s) Performed: Procedure(s) (LRB):  COLONOSCOPY (N/A)    Patient location: PACU    Anesthesia Type: MAC    Transport from OR: Transported from OR on room air with adequate spontaneous ventilation    Post pain: adequate analgesia    Post assessment: no apparent anesthetic complications    Post vital signs: stable    Level of consciousness: awake    Nausea/Vomiting: no nausea/vomiting    Complications: none    Transfer of care protocol was followed      Last vitals:   Visit Vitals  /65 (BP Location: Right arm, Patient Position: Sitting)   Pulse 76   Temp 36.6 °C (97.8 °F) (Temporal)   Resp 18   Ht 5' 2" (1.575 m)   Wt 85.5 kg (188 lb 7.9 oz)   SpO2 97%   Breastfeeding No   BMI 34.48 kg/m²     "
[Nl] : Integumentary

## 2022-09-02 NOTE — H&P
PRE PROCEDURE H&P    Patient Name: Teresa Cazares  MRN: [id number]  : 1975  Date of Procedure:  2022  Referring Physician: Yon Ridley MD  Primary Physician: EDGAR Chery MD  Procedure Physician: Yon Ridley MD       Planned Procedure: Colonoscopy  Diagnosis: screening for colon cancer  Chief Complaint: Same as above    HPI: Patient is an 46 y.o. female is here for the above.     Last colonoscopy: Index colonoscopy   Family history: None   Anticoagulation: None     Past Medical History:   Past Medical History:   Diagnosis Date    Anxiety     Closed fracture of left lower leg with routine healing 3/28/2019    Depression     denies hospitalization or bipolar disorder    Hyphema of right eye 2018    Nicotine dependence, cigarettes, in remission 3/26/2019    Obesity     Personal history of COVID-19     Recurrent major depression in partial remission 3/26/2019    Surgical wound infection (MRSA) 2019        Past Surgical History:  Past Surgical History:   Procedure Laterality Date    APPLICATION OF LARGE EXTERNAL FIXATION DEVICE TO TIBIA Left 1/10/2019    Procedure: APPLICATION, EXTERNAL FIXATION DEVICE, LARGE, TIBIA;  Surgeon: Domenic Galvan MD;  Location: [facility] OR;  Service: Orthopedics;  Laterality: Left;    APPLICATION OF WOUND VACUUM-ASSISTED CLOSURE DEVICE Left 2019    Procedure: APPLICATION, WOUND VAC;  Surgeon: Barry Guzman MD;  Location: [facility] OR;  Service: Orthopedics;  Laterality: Left;    FRACTURE SURGERY      C1 neck- halo    HERNIA REPAIR      HYSTERECTOMY  2009    tahbso    OOPHORECTOMY      OPEN REDUCTION AND INTERNAL FIXATION (ORIF) OF FRACTURE OF TIBIAL PLATEAU Left 2019    Procedure: ORIF, FRACTURE, TIBIA, PLATEAU;  Surgeon: Barry Guzman MD;  Location: [facility] OR;  Service: Orthopedics;  Laterality: Left;    REMOVAL OF EXTERNAL FIXATION DEVICE Left 2019    Procedure: REMOVAL, EXTERNAL FIXATION DEVICE;  Surgeon: Barry Guzman MD;  Location:  [facility] OR;  Service: Orthopedics;  Laterality: Left;    UMBILICAL HERNIA REPAIR          Home Medications:  Prior to Admission medications    Medication Sig Start Date End Date Taking? Authorizing Provider   buPROPion (WELLBUTRIN XL) 150 MG TB24 tablet Take 1 tablet (150 mg total) by mouth once daily. 22 Yes JIM Chery MD   gabapentin (NEURONTIN) 300 MG capsule TAKE 1 CAPSULE BY MOUTH THREE TIMES DAILY 20  Yes Bebo Callahan MD   methocarbamoL (ROBAXIN) 500 MG Tab Take 500 mg by mouth 2 (two) times daily as needed. 1/3/22  Yes Historical Provider   temazepam (RESTORIL) 22.5 MG capsule Take 1 capsule (22.5 mg total) by mouth nightly as needed for Insomnia. 22  Yes JIM Chery MD   nitrofurantoin (MACRODANTIN) 100 MG capsule Take 100 mg by mouth every 12 (twelve) hours.    Historical Provider        Allergies:  Review of patient's allergies indicates:   Allergen Reactions    Macrobid [nitrofurantoin monohyd/m-cryst] Hives    Bactrim [sulfamethoxazole-trimethoprim]     Flagyl [metronidazole hcl] Hives        Social History:   Social History     Socioeconomic History    Marital status: Single    Number of children: 5   Occupational History    Occupation:    Tobacco Use    Smoking status: Former     Packs/day: 1.00     Years: 27.00     Pack years: 27.00     Types: Cigarettes     Start date: 2019     Quit date: 2019     Years since quittin.7    Smokeless tobacco: Never   Substance and Sexual Activity    Alcohol use: Yes     Comment: occasionally    Drug use: No    Sexual activity: Not Currently     Partners: Male       Family History:  Family History   Problem Relation Age of Onset    Breast cancer Mother 58    Breast cancer Sister 46    No Known Problems Father     Breast cancer Paternal Grandmother        ROS: No acute cardiac events, no acute respiratory complaints.     Physical Exam (all patients):    /65 (BP Location: Right arm, Patient Position:  "Sitting)   Pulse 76   Temp 97.8 °F (36.6 °C) (Temporal)   Resp 18   Ht 5' 2" (1.575 m)   Wt 85.5 kg (188 lb 7.9 oz)   SpO2 97%   Breastfeeding No   BMI 34.48 kg/m²   Lungs: Clear to auscultation bilaterally, respirations unlabored  Heart: Regular rate and rhythm, S1 and S2 normal, no obvious murmurs  Abdomen:         Soft, non-tender, bowel sounds normal, no masses, no organomegaly    Lab Results   Component Value Date    WBC 9.48 12/10/2021    MCV 89 12/10/2021    RDW 13.3 12/10/2021     12/10/2021    INR 0.9 01/10/2019    GLU 92 12/10/2021    HGBA1C 5.7 (H) 12/10/2021    BUN 11 12/10/2021     12/10/2021    K 4.2 12/10/2021     12/10/2021        SEDATION PLAN: per anesthesia      History reviewed, vital signs satisfactory, cardiopulmonary status satisfactory, sedation options, risks and plans have been discussed with the patient  All their questions were answered and the patient agrees to the sedation procedures as planned and the patient is deemed an appropriate candidate for the sedation as planned.    Procedure explained to patient, informed consent obtained and placed in chart.    Yon Ridley  9/2/2022  11:59 AM     "

## 2022-09-02 NOTE — PROVATION PATIENT INSTRUCTIONS
Discharge Summary/Instructions after an Endoscopic Procedure  Patient Name: Teresa Cazares  Patient MRN: [id number]  Patient YOB: 1975 Friday, September 2, 2022  Yon Ridley MD  Dear patient,  As a result of recent federal legislation (The Federal Cures Act), you may   receive lab or pathology results from your procedure in your [facility]   account before your physician is able to contact you. Your physician or   their representative will relay the results to you with their   recommendations at their soonest availability.  Thank you,  RESTRICTIONS:  During your procedure today, you received medications for sedation.  These   medications may affect your judgment, balance and coordination.  Therefore,   for 24 hours, you have the following restrictions:   - DO NOT drive a car, operate machinery, make legal/financial decisions,   sign important papers or drink alcohol.    ACTIVITY:  Today: no heavy lifting, straining or running due to procedural   sedation/anesthesia.  The following day: return to full activity including work.  DIET:  Eat and drink normally unless instructed otherwise.     TREATMENT FOR COMMON SIDE EFFECTS:  - Mild abdominal pain, nausea, belching, bloating or excessive gas:  rest,   eat lightly and use a heating pad.  - Sore Throat: treat with throat lozenges and/or gargle with warm salt   water.  - Because air was used during the procedure, expelling large amounts of air   from your rectum or belching is normal.  - If a bowel prep was taken, you may not have a bowel movement for 1-3 days.    This is normal.  SYMPTOMS TO WATCH FOR AND REPORT TO YOUR PHYSICIAN:  1. Abdominal pain or bloating, other than gas cramps.  2. Chest pain.  3. Back pain.  4. Signs of infection such as: chills or fever occurring within 24 hours   after the procedure.  5. Rectal bleeding, which would show as bright red, maroon, or black stools.   (A tablespoon of blood from the rectum is not serious,  especially if   hemorrhoids are present.)  6. Vomiting.  7. Weakness or dizziness.  GO DIRECTLY TO THE NEAREST EMERGENCY ROOM IF YOU HAVE ANY OF THE FOLLOWING:      Difficulty breathing              Chills and/or fever over 101 F   Persistent vomiting and/or vomiting blood   Severe abdominal pain   Severe chest pain   Black, tarry stools   Bleeding- more than one tablespoon   Any other symptom or condition that you feel may need urgent attention  Your doctor recommends these additional instructions:  If any biopsies were taken, your doctors clinic will contact you in 1 to 2   weeks with any results.  - Discharge patient to home.   - Resume previous diet.   - Continue present medications.   - Repeat colonoscopy in 10 years for screening purposes.   - Return to referring physician.   - Patient has a contact number available for emergencies.  The signs and   symptoms of potential delayed complications were discussed with the   patient.  Return to normal activities tomorrow.  Written discharge   instructions were provided to the patient.  For questions, problems or results please call your physician Yon Ridley MD at Work:  [phone]  If you have any questions about the above instructions, call the GI   department at [phone] or call the endoscopy unit at [phone]   from 7am until 3 pm.  [facility] [place], EMERGENCY ROOM PHONE NUMBER:   [phone]  IF A COMPLICATION OR EMERGENCY SITUATION ARISES AND YOU ARE UNABLE TO REACH   YOUR PHYSICIAN - GO DIRECTLY TO THE EMERGENCY ROOM.  I have read or have had read to me these discharge instructions for my   procedure and have received a written copy.  I understand these   instructions and will follow-up with my physician if I have any questions.     __________________________________       _____________________________________  Nurse Signature                                          Patient/Designated   Responsible Party  Signature  Yon Ridley MD  9/2/2022 12:43:02 PM  This report has been verified and signed electronically.  Dear patient,  As a result of recent federal legislation (The Federal Cures Act), you may   receive lab or pathology results from your procedure in your [facility]   account before your physician is able to contact you. Your physician or   their representative will relay the results to you with their   recommendations at their soonest availability.  Thank you,  PROVATION

## 2022-09-09 ENCOUNTER — OFFICE VISIT (OUTPATIENT)
Dept: PSYCHIATRY | Facility: CLINIC | Age: 47
End: 2022-09-09
Payer: COMMERCIAL

## 2022-09-09 ENCOUNTER — PATIENT MESSAGE (OUTPATIENT)
Dept: PSYCHIATRY | Facility: CLINIC | Age: 47
End: 2022-09-09
Payer: COMMERCIAL

## 2022-09-09 DIAGNOSIS — F33.2 SEVERE EPISODE OF RECURRENT MAJOR DEPRESSIVE DISORDER, WITHOUT PSYCHOTIC FEATURES: ICD-10-CM

## 2022-09-09 DIAGNOSIS — F43.10 PTSD (POST-TRAUMATIC STRESS DISORDER): Primary | ICD-10-CM

## 2022-09-09 PROCEDURE — 90791 PSYCH DIAGNOSTIC EVALUATION: CPT | Mod: 95,,, | Performed by: SOCIAL WORKER

## 2022-09-09 PROCEDURE — 90791 PR PSYCHIATRIC DIAGNOSTIC EVALUATION: ICD-10-PCS | Mod: 95,,, | Performed by: SOCIAL WORKER

## 2022-11-09 NOTE — PROGRESS NOTES
Breast Surgical Oncology  [place]  [facility]        PCP:  EDGAR Chery MD  Date of Service: 2022    CHIEF COMPLAINT:   At high-risk for breast cancer    DIAGNOSIS:   Teresa Cazares is a 47 y.o. female who is kindly referred by Dr. Chery for evaluation of increased risk of breast cancer based on family history.       Her breast cancer risk factor profile is as follows:   Menarche at 8,   Menopause at 33.    She is .   Age at first live birth was 10.     LMP- 33- tahbso    HRT- 2 years    Breast feeding- none    Dense breasts- no    ETOH- 1 time a week     Genetic mutation- mother- negative    radiation to neck or chest wall- none    previous breast biopsy or breast surgery- atypical ductal hyperplasia or lobular hyperplasia- none    FH:  Mother breast cancer at 54 y/o- had genetic testing and was negative for mutation  Sister breast cancer at 43 y/o- different fathers- ? Genetic testing  Thinks sisters other sister with diff parents had genetic mutation  Paternal grandmother - breast cancer at 79 y/o    Pt relates she is mainly interested in a bilateral breast lift- has lost breast volume due to intentional wt loss    FAMILY HISTORY:     Family History   Problem Relation Age of Onset    Breast cancer Mother 58    Breast cancer Sister 46    No Known Problems Father     Breast cancer Paternal Grandmother         PAST MEDICAL HISTORY:     Past Medical History:   Diagnosis Date    Anxiety     Closed fracture of left lower leg with routine healing 3/28/2019    Depression     denies hospitalization or bipolar disorder    Hyphema of right eye 2018    Nicotine dependence, cigarettes, in remission 3/26/2019    Obesity     Personal history of COVID-19     Recurrent major depression in partial remission 3/26/2019    Surgical wound infection (MRSA) 2019       SURGICAL HISTORY:     Past Surgical History:   Procedure Laterality Date    APPLICATION OF LARGE EXTERNAL FIXATION  DEVICE TO TIBIA Left 1/10/2019    Procedure: APPLICATION, EXTERNAL FIXATION DEVICE, LARGE, TIBIA;  Surgeon: Domenic Galvan MD;  Location: [facility] OR;  Service: Orthopedics;  Laterality: Left;    APPLICATION OF WOUND VACUUM-ASSISTED CLOSURE DEVICE Left 2019    Procedure: APPLICATION, WOUND VAC;  Surgeon: Barry Guzman MD;  Location: [facility] OR;  Service: Orthopedics;  Laterality: Left;    COLONOSCOPY N/A 2022    Procedure: COLONOSCOPY;  Surgeon: Yon Ridley MD;  Location: [facility] ENDO;  Service: Endoscopy;  Laterality: N/A;    FRACTURE SURGERY      C1 neck- halo    HERNIA REPAIR      HYSTERECTOMY  2009    tahbso    OOPHORECTOMY      OPEN REDUCTION AND INTERNAL FIXATION (ORIF) OF FRACTURE OF TIBIAL PLATEAU Left 2019    Procedure: ORIF, FRACTURE, TIBIA, PLATEAU;  Surgeon: Barry Guzman MD;  Location: [facility] OR;  Service: Orthopedics;  Laterality: Left;    REMOVAL OF EXTERNAL FIXATION DEVICE Left 2019    Procedure: REMOVAL, EXTERNAL FIXATION DEVICE;  Surgeon: Barry Guzman MD;  Location: [facility] OR;  Service: Orthopedics;  Laterality: Left;    UMBILICAL HERNIA REPAIR         SOCIAL HISTORY:     Social History     Tobacco Use    Smoking status: Former     Packs/day: 1.00     Years: 27.00     Pack years: 27.00     Types: Cigarettes     Start date: 2019     Quit date: 2019     Years since quittin.9    Smokeless tobacco: Never   Substance Use Topics    Alcohol use: Yes     Comment: occasionally    Drug use: No        MEDICATIONS/ALLERGIES:     Current Outpatient Medications   Medication Instructions    buPROPion (WELLBUTRIN XL) 150 mg, Oral, Daily    gabapentin (NEURONTIN) 300 MG capsule TAKE 1 CAPSULE BY MOUTH THREE TIMES DAILY    methocarbamoL (ROBAXIN) 500 mg, Oral, 2 times daily PRN    nitrofurantoin (MACRODANTIN) 100 mg, Oral, Every 12 hours    temazepam (RESTORIL) 22.5 mg, Oral, Nightly PRN     Review of patient's allergies indicates:   Allergen Reactions    Macrobid [nitrofurantoin  monohyd/m-cryst] Hives    Bactrim [sulfamethoxazole-trimethoprim]     Flagyl [metronidazole hcl] Hives       REVIEW OF SYSTEMS:   Review of Systems   Constitutional: Negative.    HENT: Negative.    Eyes: Negative.    Respiratory: Negative.    Cardiovascular: Negative.    Gastrointestinal: Negative.         No reflux   Endocrine: Negative.    Genitourinary: Negative.    Musculoskeletal: Negative.    Integumentary:  Negative.   Allergic/Immunologic: Negative.    Neurological: Negative.    Hematological: Negative.  Negative for adenopathy.   Psychiatric/Behavioral: Negative.    Breast: denies breast mass, pain or discharge    PHYSICAL EXAM:   General: The patient appears well and is in no acute distress.   Physical Exam  Constitutional:       Appearance: She is well-developed.   HENT:      Head: Normocephalic and atraumatic.      Right Ear: External ear normal.      Left Ear: External ear normal.      Mouth/Throat:      Pharynx: No oropharyngeal exudate.   Eyes:      General: No scleral icterus.        Right eye: No discharge.         Left eye: No discharge.      Conjunctiva/sclera: Conjunctivae normal.      Pupils: Pupils are equal, round, and reactive to light.   Neck:      Thyroid: No thyromegaly.   Cardiovascular:      Rate and Rhythm: Normal rate and regular rhythm.      Heart sounds: Normal heart sounds.   Pulmonary:      Effort: Pulmonary effort is normal.      Breath sounds: Normal breath sounds.   Chest:      Breasts:         Right: No inverted nipple, mass, nipple discharge, skin change or tenderness.         Left: No inverted nipple, mass, nipple discharge, skin change or tenderness.   Abdominal:      General: Bowel sounds are normal.      Palpations: Abdomen is soft.   Musculoskeletal:         General: Normal range of motion.      Right shoulder: No crepitus. Normal strength.      Cervical back: Normal range of motion and neck supple.   Lymphadenopathy:      Head:      Right side of head: No submental,  submandibular, tonsillar, preauricular, posterior auricular or occipital adenopathy.      Left side of head: No submental, submandibular, tonsillar, preauricular, posterior auricular or occipital adenopathy.      Cervical: No cervical adenopathy.      Right cervical: No superficial or posterior cervical adenopathy.     Left cervical: No superficial or posterior cervical adenopathy.      Upper Body:      Right upper body: No supraclavicular or axillary adenopathy.      Left upper body: No supraclavicular or axillary adenopathy.   Skin:     General: Skin is warm and dry.      Coloration: Skin is not pale.      Findings: No erythema or rash.   Neurological:      Mental Status: She is alert and oriented to person, place, and time.      Deep Tendon Reflexes: Reflexes are normal and symmetric.   Psychiatric:         Behavior: Behavior normal.         Thought Content: Thought content normal.         Judgment: Judgment normal.      BREAST EXAM  No Asymmetry  Right:  - Mass: No  - Skin change: No  - Nipple Discharge: No  - Nipple retraction: No  - Axillary LAD: No  Left:   - Mass: No  - Skin change: No  - Nipple Discharge: No  - Nipple retraction: No  - Axillary LAD: No    IMAGIN2021  Wnl risk score 16.21%    PATHOLOGY:       ASSESSMENT:     1. Family history of breast cancer    2. At high risk for breast cancer    3. Counseling and coordination of care    4. Counseling on health promotion and disease prevention    5. Health education/counseling    6. Encounter for breast cancer screening using non-mammogram modality          PLAN:   Teresa Cazares is a 47 y.o. female who presents for evaluation in the high risk program.  She was identified for the program by her PCP due to having a family history of breast or ovarian cancer.  Her lifetime risk for the development of breast cancer by the Tyrer Cuzick v8 model is 16.41%.  Therefore, she meets criteria to be followed and screened as a high risk patient.      We  reviewed the NCCN guidelines for high risk women to be the following:   Twice annual clinical breast exam  Screening mammogram beginning 10 years earlier than the youngest affected family member  Consideration of supplemental annual imaging alternating with her mammogram on the 6 month interval. We discussed that the guidelines currently include breast MRI as the supplemental imaging option.   Adopting risk-reduction strategies and   Consideration of chemoprevention.      Her individualized plan is the following:  She will see me each November for a clinical breast exam and again each May for her second annual clinical breast exam.    She will have her mammogram each November.  Needs mammo now- will schedule  Risk score is below 20% so will not offer MRI of breast unless indicated by abnormal clinical exam or imaging.    Regarding risk reduction, she is recommended to maintain a healthy weight (BMI 18-25), regular aerobic exercise (at least 150 minutes/week), balanced healthy diet (high in vegetables, fruits, and whole grains) and avoidance of red meats, processed foods, & refined sugars. , and limit alcohol consumption .    We discussed the findings of the NSABP Prevention One trial and the potential side effects of tamoxifen. She has been given the [employer] FACTSHEET on Tamoxifen to review.   We discussed risk and benefits of genetic testing due to her family history. Mother had genetic testing and was negative and will provide a copy to pt and pt will send via [employer]  Discussed process of getting appt for breast lift- needs to check with insurance regarding approved plastic surgeons for surgery    Teresa Cazares has a normal breast exam today. We discussed the possible signs and symptoms of breast cancer as lump, masses, new asymmetries, skin changes and nipple changes. She is encouraged to contact me if any new breast concerns arise.  She has been provided a handout that details today's discussion and her  plan.

## 2022-11-12 NOTE — ANESTHESIA POSTPROCEDURE EVALUATION
Anesthesia Post Evaluation    Patient: Teresa Cazares    Procedure(s) Performed: Procedure(s) (LRB):  APPLICATION, EXTERNAL FIXATION DEVICE, LARGE, TIBIA (Left)    Final Anesthesia Type: general  Patient location during evaluation: PACU  Patient participation: Yes- Able to Participate  Level of consciousness: awake and alert  Post-procedure vital signs: reviewed and stable  Pain management: adequate  Airway patency: patent  PONV status at discharge: No PONV  Anesthetic complications: no      Cardiovascular status: blood pressure returned to baseline  Respiratory status: unassisted  Hydration status: euvolemic  Follow-up not needed.        Visit Vitals  /60   Pulse 91   Temp 36.9 °C (98.5 °F) (Oral)   Resp 15   Wt 114.3 kg (251 lb 14.4 oz)   SpO2 96%   BMI 44.62 kg/m²       Pain/Mike Score: Pain Rating Prior to Med Admin: 8 (1/10/2019  5:42 PM)        
12-Nov-2022 13:47

## 2022-11-14 ENCOUNTER — PATIENT MESSAGE (OUTPATIENT)
Dept: INTERNAL MEDICINE | Facility: CLINIC | Age: 47
End: 2022-11-14

## 2022-11-14 ENCOUNTER — OFFICE VISIT (OUTPATIENT)
Dept: INTERNAL MEDICINE | Facility: CLINIC | Age: 47
End: 2022-11-14
Payer: COMMERCIAL

## 2022-11-14 VITALS — SYSTOLIC BLOOD PRESSURE: 125 MMHG | DIASTOLIC BLOOD PRESSURE: 58 MMHG

## 2022-11-14 DIAGNOSIS — G89.28 OTHER CHRONIC POSTPROCEDURAL PAIN: Chronic | ICD-10-CM

## 2022-11-14 DIAGNOSIS — F51.04 PSYCHOPHYSIOLOGIC INSOMNIA: ICD-10-CM

## 2022-11-14 DIAGNOSIS — F17.210 NICOTINE DEPENDENCE, CIGARETTES, UNCOMPLICATED: Chronic | ICD-10-CM

## 2022-11-14 DIAGNOSIS — F41.1 GENERALIZED ANXIETY DISORDER: Chronic | ICD-10-CM

## 2022-11-14 DIAGNOSIS — F33.1 RECURRENT MODERATE MAJOR DEPRESSIVE DISORDER WITH ANXIETY: Primary | Chronic | ICD-10-CM

## 2022-11-14 DIAGNOSIS — F41.9 RECURRENT MODERATE MAJOR DEPRESSIVE DISORDER WITH ANXIETY: Primary | Chronic | ICD-10-CM

## 2022-11-14 PROCEDURE — 3074F SYST BP LT 130 MM HG: CPT | Mod: CPTII,95,, | Performed by: FAMILY MEDICINE

## 2022-11-14 PROCEDURE — 1160F PR REVIEW ALL MEDS BY PRESCRIBER/CLIN PHARMACIST DOCUMENTED: ICD-10-PCS | Mod: CPTII,95,, | Performed by: FAMILY MEDICINE

## 2022-11-14 PROCEDURE — 3078F PR MOST RECENT DIASTOLIC BLOOD PRESSURE < 80 MM HG: ICD-10-PCS | Mod: CPTII,95,, | Performed by: FAMILY MEDICINE

## 2022-11-14 PROCEDURE — 99215 PR OFFICE/OUTPT VISIT, EST, LEVL V, 40-54 MIN: ICD-10-PCS | Mod: 95,,, | Performed by: FAMILY MEDICINE

## 2022-11-14 PROCEDURE — 99215 OFFICE O/P EST HI 40 MIN: CPT | Mod: 95,,, | Performed by: FAMILY MEDICINE

## 2022-11-14 PROCEDURE — 3078F DIAST BP <80 MM HG: CPT | Mod: CPTII,95,, | Performed by: FAMILY MEDICINE

## 2022-11-14 PROCEDURE — 1160F RVW MEDS BY RX/DR IN RCRD: CPT | Mod: CPTII,95,, | Performed by: FAMILY MEDICINE

## 2022-11-14 PROCEDURE — 1159F MED LIST DOCD IN RCRD: CPT | Mod: CPTII,95,, | Performed by: FAMILY MEDICINE

## 2022-11-14 PROCEDURE — 1159F PR MEDICATION LIST DOCUMENTED IN MEDICAL RECORD: ICD-10-PCS | Mod: CPTII,95,, | Performed by: FAMILY MEDICINE

## 2022-11-14 PROCEDURE — 3074F PR MOST RECENT SYSTOLIC BLOOD PRESSURE < 130 MM HG: ICD-10-PCS | Mod: CPTII,95,, | Performed by: FAMILY MEDICINE

## 2022-11-14 RX ORDER — BUPROPION HYDROCHLORIDE 150 MG/1
150 TABLET ORAL EVERY MORNING
Qty: 30 TABLET | Refills: 2 | Status: SHIPPED | OUTPATIENT
Start: 2022-11-14 | End: 2023-04-12

## 2022-11-14 RX ORDER — MICONAZOLE NITRATE 2 %
2 CREAM (GRAM) TOPICAL
Qty: 100 EACH | Refills: 5 | Status: SHIPPED | OUTPATIENT
Start: 2022-11-14 | End: 2023-06-29 | Stop reason: SDUPTHER

## 2022-11-14 RX ORDER — GABAPENTIN 300 MG/1
900 CAPSULE ORAL NIGHTLY
COMMUNITY
End: 2022-11-14 | Stop reason: SDUPTHER

## 2022-11-14 RX ORDER — TEMAZEPAM 30 MG/1
30 CAPSULE ORAL NIGHTLY PRN
Qty: 30 CAPSULE | Refills: 2 | Status: SHIPPED | OUTPATIENT
Start: 2022-11-14 | End: 2023-02-12

## 2022-11-14 RX ORDER — FLUOXETINE HYDROCHLORIDE 20 MG/1
20 CAPSULE ORAL DAILY
Qty: 30 CAPSULE | Refills: 1 | Status: SHIPPED | OUTPATIENT
Start: 2022-11-14 | End: 2023-03-24 | Stop reason: SDUPTHER

## 2022-11-14 RX ORDER — GABAPENTIN 300 MG/1
900 CAPSULE ORAL NIGHTLY
Qty: 90 CAPSULE | Refills: 2 | Status: SHIPPED | OUTPATIENT
Start: 2022-11-14 | End: 2023-05-24 | Stop reason: SDUPTHER

## 2022-11-14 NOTE — ASSESSMENT & PLAN NOTE
This is a chronic problem, with exacerbation or progression.    Worse due to loss of job, holidays. She is seeing MH counselors. She says that she was previously on fluoxetine, and she felt that the fluoxetine helped her anxiety and depression better than the bupropion. We discussed risks and benefits of treatment options. It was agreed to continue bupropion for an hour, and augment with fluoxetine, and reevaluate in about one month.  Future Appointments   Date Time Provider Department Center   12/19/2022  9:00 AM Vanessa Cárdenas, PhD [facility]

## 2022-11-14 NOTE — ASSESSMENT & PLAN NOTE
"This is a chronic problem, with exacerbation or progression.  Quit smoking in November, 2019.Started back smoking in September, 2022 "due to anxiety." She feels that in the past Chantix had helped better than bupropion with smoking cessation, but given her current depression and anxiety exacerbation, it was agreed to continue bupropion for now.  "

## 2022-11-14 NOTE — PROGRESS NOTES
"TELEMEDICINE VIRTUAL VISIT  11/14/22  3:00 PM CST    Visit Details: This visit was a telemedicine virtual visit with synchronous audio and video. Teresa represented that her location at the time of this visit was in the [facility]. Teresa chose and consented to receive these medical services by telemedicine.    CHIEF COMPLAINT: Follow-up, Depression, and Anxiety    ASSESSMENT & PLAN  1. Recurrent moderate major depressive disorder with anxiety  Assessment & Plan:  This is a chronic problem, with exacerbation or progression.    Worse due to loss of job, holidays. She is seeing  counselors. She says that she was previously on fluoxetine, and she felt that the fluoxetine helped her anxiety and depression better than the bupropion. We discussed risks and benefits of treatment options. It was agreed to continue bupropion for an hour, and augment with fluoxetine, and reevaluate in about one month.  Future Appointments   Date Time Provider Department Center   12/19/2022  9:00 AM Vanessa Cárdenas, PhD [facility]        Orders:  -     buPROPion (WELLBUTRIN XL) 150 MG TB24 tablet; Take 1 tablet (150 mg total) by mouth every morning.  Dispense: 30 tablet; Refill: 2  -     FLUoxetine 20 MG capsule; Take 1 capsule (20 mg total) by mouth once daily.  Dispense: 30 capsule; Refill: 1    2. Nicotine dependence, cigarettes, uncomplicated  Assessment & Plan:  This is a chronic problem, with exacerbation or progression.  Quit smoking in November, 2019.Started back smoking in September, 2022 "due to anxiety." She feels that in the past Chantix had helped better than bupropion with smoking cessation, but given her current depression and anxiety exacerbation, it was agreed to continue bupropion for now.    Orders:  -     buPROPion (WELLBUTRIN XL) 150 MG TB24 tablet; Take 1 tablet (150 mg total) by mouth every morning.  Dispense: 30 tablet; Refill: 2  -     nicotine, polacrilex, (NICORETTE) 2 mg Gum; Take 1 each (2 mg " total) by mouth as needed (for cigarette cravings). Use as directed on packaging.  Dispense: 100 each; Refill: 5    3. Chronic post-op pain  Overview:  After 12/2019 LLE ORIF complicated by post-op MRSA wound infection requiring hardware removal and revision, incision and drainage of abscess, completed treatment with IV vancomycin    Assessment & Plan:  Has been taking sister's Gabapentin 900 mg QHS, which helps without sedation or adverse effects.    Orders:  -     gabapentin (NEURONTIN) 300 MG capsule; Take 3 capsules (900 mg total) by mouth every evening.  Dispense: 90 capsule; Refill: 2    4. Generalized anxiety disorder  -     buPROPion (WELLBUTRIN XL) 150 MG TB24 tablet; Take 1 tablet (150 mg total) by mouth every morning.  Dispense: 30 tablet; Refill: 2  -     FLUoxetine 20 MG capsule; Take 1 capsule (20 mg total) by mouth once daily.  Dispense: 30 capsule; Refill: 1    5. Psychophysiologic insomnia  -     temazepam (RESTORIL) 30 mg capsule; Take 1 capsule (30 mg total) by mouth nightly as needed for Insomnia.  Dispense: 30 capsule; Refill: 2    Other orders  -     Cancel: Mammo Digital Screening Bilat w/ Alberto; Future; Expected date: 11/14/2022    Unless noted herein, any chronic conditions are represented as and appear compensated/controlled and stable, and no other significant complaints or concerns were reported.    Follow up in about 5 weeks (around 12/19/2022) for re-evaluate problem(s) discussed today.   Future Appointments   Date Time Provider Department Center   11/23/2022  8:00 AM Sarah Max NP [facility]   12/19/2022  9:00 AM Vanessa Cárdenas, PhD [facility]       PRESCRIPTION DRUG MANAGEMENT  Outpatient Medications Prior to Visit   Medication Sig Dispense Refill    gabapentin (NEURONTIN) 300 MG capsule Take 900 mg by mouth every evening.      buPROPion (WELLBUTRIN XL) 150 MG TB24 tablet Take 1 tablet (150 mg total) by mouth once daily. 90 tablet 0    gabapentin  (NEURONTIN) 300 MG capsule TAKE 1 CAPSULE BY MOUTH THREE TIMES DAILY 120 capsule 0    methocarbamoL (ROBAXIN) 500 MG Tab Take 500 mg by mouth 2 (two) times daily as needed.      nitrofurantoin (MACRODANTIN) 100 MG capsule Take 100 mg by mouth every 12 (twelve) hours.      temazepam (RESTORIL) 22.5 MG capsule Take 1 capsule (22.5 mg total) by mouth nightly as needed for Insomnia. 30 capsule 4     No facility-administered medications prior to visit.     Medications Discontinued During This Encounter   Medication Reason    methocarbamoL (ROBAXIN) 500 MG Tab Patient no longer taking    nitrofurantoin (MACRODANTIN) 100 MG capsule Patient no longer taking    gabapentin (NEURONTIN) 300 MG capsule Patient no longer taking    temazepam (RESTORIL) 22.5 MG capsule Dose adjustment    gabapentin (NEURONTIN) 300 MG capsule Other - Commment Required    buPROPion (WELLBUTRIN XL) 150 MG TB24 tablet Reorder    gabapentin (NEURONTIN) 300 MG capsule Reorder     Medications Ordered This Encounter   Medications    buPROPion (WELLBUTRIN XL) 150 MG TB24 tablet     Sig: Take 1 tablet (150 mg total) by mouth every morning.     Dispense:  30 tablet     Refill:  2    FLUoxetine 20 MG capsule     Sig: Take 1 capsule (20 mg total) by mouth once daily.     Dispense:  30 capsule     Refill:  1    gabapentin (NEURONTIN) 300 MG capsule     Sig: Take 3 capsules (900 mg total) by mouth every evening.     Dispense:  90 capsule     Refill:  2    nicotine, polacrilex, (NICORETTE) 2 mg Gum     Sig: Take 1 each (2 mg total) by mouth as needed (for cigarette cravings). Use as directed on packaging.     Dispense:  100 each     Refill:  5     OK to vary dispense quantity to correspond with in-stock unit quantities.    temazepam (RESTORIL) 30 mg capsule     Sig: Take 1 capsule (30 mg total) by mouth nightly as needed for Insomnia.     Dispense:  30 capsule     Refill:  2     NOTE DOSE CHANGE. DISCONTINUE any prescription for this drug issued prior to  11/14/2022.     Review of Systems   Constitutional:  Negative for activity change and unexpected weight change.   HENT:  Negative for hearing loss, rhinorrhea and trouble swallowing.    Eyes:  Negative for discharge and visual disturbance.   Respiratory:  Negative for chest tightness and wheezing.    Cardiovascular:  Negative for chest pain and palpitations.   Gastrointestinal:  Negative for blood in stool, constipation, diarrhea and vomiting.   Endocrine: Negative for polydipsia and polyuria.   Genitourinary:  Negative for difficulty urinating, dysuria, hematuria and menstrual problem.   Musculoskeletal:  Negative for arthralgias, joint swelling and neck pain.   Neurological:  Negative for weakness and headaches.   Psychiatric/Behavioral:  Positive for dysphoric mood and sleep disturbance. Negative for confusion and suicidal ideas. The patient is nervous/anxious. The patient is not hyperactive.    PHYSICAL EXAM  CONSTITUTIONAL: No apparent distress. Appears comfortable. Does not appear acutely ill or septic. Appears adequately hydrated.  PULMONARY: Breathing unlabored. No audible wheezes. Easily speaks in full sentences.  PSYCHIATRIC: Alert and conversant and grossly oriented. Mood is grossly neutral. Affect appropriate. Judgment and insight grossly intact.    Orders Placed This Encounter    buPROPion (WELLBUTRIN XL) 150 MG TB24 tablet    gabapentin (NEURONTIN) 300 MG capsule    temazepam (RESTORIL) 30 mg capsule    FLUoxetine 20 MG capsule    nicotine, polacrilex, (NICORETTE) 2 mg Gum     TOTAL TIME evaluating and managing this patient for this encounter was greater than or equal to 42 minutes. This time was spent personally by me on the following activities: review of patient's past medical history, assessing age-appropriate health maintenance needs, review of any interval history, medication reconciliation, reconciling/updating problem list, obtaining history from the patient, examination of the patient, review  "and interpretation of lab results, reviewing consulting specialist notes, evaluation of the patient's response to treatment, review of [facility] Controlled Prescription Drug Monitoring database records for the patient, managing and/or ordering prescription medications, explaining rationale for medication change(s) and answering patient's questions, medication counseling, educating patient and answering their questions about treatment plan, goals of treatment, and follow-up, educating patient about needed cancer screenings, counseling on appropriate therapeutic lifestyle changes, communicating with collaborating provider(s), and final documentation of the visit. This time was exclusive of any separately billable procedures for this patient and exclusive of time spent treating any other patients.    Documentation entered by me for this encounter may have been done in part using speech-recognition technology. Although I have made an effort to ensure accuracy, "sound like" errors may exist and should be interpreted in context.    Each patient to whom medical services are provided by telemedicine is: (1) informed of the relationship between the physician and patient and the respective role of any other health care provider with respect to management of the patient; and (2) notified that he or she may decline to receive medical services by telemedicine and may withdraw from such care at any time.   "

## 2022-11-14 NOTE — PATIENT INSTRUCTIONS
"Hi, Teresa.    I was glad to be able to spend time with you during your virtual visit today.    I've sent your prescriptions (listed below).    Please take the time now to click on the accompany links to schedule:  A virtual visit appointment with Nathalia Wilson PA-C in mid-late December to re-evaluate how you are doing with your medication changes.  A follow-up virtual visit with me in late January or early February.    I look forward to seeing you at your next appointment.    Thanks for letting me care for you, and thanks for trusting [facility] with your healthcare needs.    All the best,    JIM Chery MD   P.S. Don't forget to review your After Visit Summary from your [employer] erika. (Just click on "Appointments," choose the appropriate past appointment, and click on "View After Visit Summary.")    Orders Placed This Encounter   Procedures    Mammo Digital Screening Bilat w/ Alberto      Medications Ordered This Encounter   Medications    buPROPion (WELLBUTRIN XL) 150 MG TB24 tablet     Sig: Take 1 tablet (150 mg total) by mouth every morning.     Dispense:  30 tablet     Refill:  2    FLUoxetine 20 MG capsule     Sig: Take 1 capsule (20 mg total) by mouth once daily.     Dispense:  30 capsule     Refill:  1    gabapentin (NEURONTIN) 300 MG capsule     Sig: Take 3 capsules (900 mg total) by mouth every evening.     Dispense:  90 capsule     Refill:  2    nicotine, polacrilex, (NICORETTE) 2 mg Gum     Sig: Take 1 each (2 mg total) by mouth as needed (for cigarette cravings). Use as directed on packaging.     Dispense:  100 each     Refill:  5     OK to vary dispense quantity to correspond with in-stock unit quantities.    temazepam (RESTORIL) 30 mg capsule     Sig: Take 1 capsule (30 mg total) by mouth nightly as needed for Insomnia.     Dispense:  30 capsule     Refill:  2     NOTE DOSE CHANGE. DISCONTINUE any prescription for this drug issued prior to 11/14/2022.     "

## 2022-11-17 ENCOUNTER — OFFICE VISIT (OUTPATIENT)
Dept: DERMATOLOGY | Facility: CLINIC | Age: 47
End: 2022-11-17
Payer: COMMERCIAL

## 2022-11-17 ENCOUNTER — TELEPHONE (OUTPATIENT)
Dept: DERMATOLOGY | Facility: CLINIC | Age: 47
End: 2022-11-17
Payer: COMMERCIAL

## 2022-11-17 ENCOUNTER — PATIENT MESSAGE (OUTPATIENT)
Dept: DERMATOLOGY | Facility: CLINIC | Age: 47
End: 2022-11-17

## 2022-11-17 DIAGNOSIS — Z79.899 ENCOUNTER FOR LONG-TERM (CURRENT) USE OF HIGH-RISK MEDICATION: ICD-10-CM

## 2022-11-17 DIAGNOSIS — L85.1 ACQUIRED PLANTAR KERATODERMA: Primary | ICD-10-CM

## 2022-11-17 PROCEDURE — 99203 PR OFFICE/OUTPT VISIT, NEW, LEVL III, 30-44 MIN: ICD-10-PCS | Mod: DERM,95,, | Performed by: PATHOLOGY

## 2022-11-17 PROCEDURE — 1160F RVW MEDS BY RX/DR IN RCRD: CPT | Mod: CPTII,95,, | Performed by: PATHOLOGY

## 2022-11-17 PROCEDURE — 1159F PR MEDICATION LIST DOCUMENTED IN MEDICAL RECORD: ICD-10-PCS | Mod: CPTII,95,, | Performed by: PATHOLOGY

## 2022-11-17 PROCEDURE — 1160F PR REVIEW ALL MEDS BY PRESCRIBER/CLIN PHARMACIST DOCUMENTED: ICD-10-PCS | Mod: CPTII,95,, | Performed by: PATHOLOGY

## 2022-11-17 PROCEDURE — 1159F MED LIST DOCD IN RCRD: CPT | Mod: CPTII,95,, | Performed by: PATHOLOGY

## 2022-11-17 PROCEDURE — 99203 OFFICE O/P NEW LOW 30 MIN: CPT | Mod: DERM,95,, | Performed by: PATHOLOGY

## 2022-11-17 RX ORDER — TAZAROTENE 1 MG/G
GEL TOPICAL
Qty: 100 G | Refills: 3 | Status: SHIPPED | OUTPATIENT
Start: 2022-11-17 | End: 2023-04-27 | Stop reason: ALTCHOICE

## 2022-11-17 RX ORDER — ACITRETIN 10 MG/1
10 CAPSULE ORAL DAILY
Qty: 30 CAPSULE | Refills: 3 | Status: SHIPPED | OUTPATIENT
Start: 2022-11-17 | End: 2023-10-17

## 2022-11-17 NOTE — PROGRESS NOTES
[facility]  Tele-Consult    Consultation started: 11/17/2022 at 3:37 PM   The patient location is home  Consult requested by: No ref. provider found     Each patient to whom he or she provides medical services by telemedicine is: (1) informed of the relationship between the physician and patient and the respective role of any other health care provider with respect to management of the patient; and (2) notified that he or she may decline to receive medical services by telemedicine and may withdraw from such care at any time.        Subjective:       Patient ID:  Teresa Cazares is a 47 y.o. female who presents for No chief complaint on file.    HPI  Pt with slowly progressive plantar and medial foot keratoderma bilaterally.  Worse with frequent workouts.  Failed 40% sal acid paste, 40% urea cream, am-lactin creams.  Non-painful and non-pruritic, except makes wearing shoes difficult due to pressure to thick plaques.    Review of Systems   Constitutional:  Negative for fever, chills, fatigue and malaise.   Skin:  Negative for itching and rash.      Objective:    Physical Exam   Constitutional: She appears well-developed and well-nourished. No distress.   Neurological: She is alert and oriented to person, place, and time. She is not disoriented.   Psychiatric: She has a normal mood and affect.   Skin:   Areas Examined (abnormalities noted in diagram):   Nails and Digits Inspection Performed           Diagram Legend     Erythematous scaling macule/papule c/w actinic keratosis       Vascular papule c/w angioma      Pigmented verrucoid papule/plaque c/w seborrheic keratosis      Yellow umbilicated papule c/w sebaceous hyperplasia      Irregularly shaped tan macule c/w lentigo     1-2 mm smooth white papules consistent with Milia      Movable subcutaneous cyst with punctum c/w epidermal inclusion cyst      Subcutaneous movable cyst c/w pilar cyst      Firm pink to brown papule c/w dermatofibroma       Pedunculated fleshy papule(s) c/w skin tag(s)      Evenly pigmented macule c/w junctional nevus     Mildly variegated pigmented, slightly irregular-bordered macule c/w mildly atypical nevus      Flesh colored to evenly pigmented papule c/w intradermal nevus       Pink pearly papule/plaque c/w basal cell carcinoma      Erythematous hyperkeratotic cursted plaque c/w SCC      Surgical scar with no sign of skin cancer recurrence      Open and closed comedones      Inflammatory papules and pustules      Verrucoid papule consistent consistent with wart     Erythematous eczematous patches and plaques     Dystrophic onycholytic nail with subungual debris c/w onychomycosis     Umbilicated papule    Erythematous-base heme-crusted tan verrucoid plaque consistent with inflamed seborrheic keratosis     Erythematous Silvery Scaling Plaque c/w Psoriasis     See annotation        Assessment / Plan:        Acquired plantar keratoderma  -     tazarotene (TAZORAC) 0.1 % Gel gel; Apply to thick plaques on feet every morning  Dispense: 100 g; Refill: 3  -     acitretin (SORIATANE) 10 MG capsule; Take 1 capsule (10 mg total) by mouth once daily.  Dispense: 30 capsule; Refill: 3    To thick plaques on feet:  Tazarotene gel every morning.  Fluorouracil: 5%, Salicylic Acid: 70%, Vehicle: Paste at bedtime under occlusion.  Acitretin - 10 mg by mouth once daily    Encounter for long-term (current) use of high-risk medication  -     Hepatic Function Panel; Future; Expected date: 02/17/2023  -     Lipid Panel; Future; Expected date: 02/17/2023             No follow-ups on file.  Patient provided medical information necessary for recommendation.   Consultation ended: 11/17/2022 at

## 2022-11-17 NOTE — PATIENT INSTRUCTIONS
To thick plaques on feet:  Tazarotene gel every morning.  Fluorouracil: 5%, Salicylic Acid: 70%, Vehicle: Paste at bedtime under occlusion.  Acitretin - 10 mg by mouth once daily

## 2022-11-21 NOTE — TELEPHONE ENCOUNTER
Orders Placed This Encounter   Procedures    CPAP/BIPAP SUPPLIES     Benefits   90 day supply. 4 refills  [facility]: [facility]   Patient is requesting nasal mask, please arrange appointment to obtain.     Order Specific Question:   Type of mask:     Answer:   Nasal     Order Specific Question:   Headgear?     Answer:   Yes     Order Specific Question:   Tubing?     Answer:   Yes     Order Specific Question:   Humidifier chamber?     Answer:   Yes     Order Specific Question:   Chin strap?     Answer:   Yes     Order Specific Question:   Filters?     Answer:   Yes     Order Specific Question:   Cushions?     Answer:   Yes     Order Specific Question:   Length of need (1-99 months):     Answer:   99     1. DEYA on CPAP  CPAP/BIPAP SUPPLIES       
Implemented All Fall with Harm Risk Interventions:  [place] to call system. Call bell, personal items and telephone within reach. Instruct patient to call for assistance. Room bathroom lighting operational. Non-slip footwear when patient is off stretcher. Physically safe environment: no spills, clutter or unnecessary equipment. Stretcher in lowest position, wheels locked, appropriate side rails in place. Provide visual cue, wrist band, yellow gown, etc. Monitor gait and stability. Monitor for mental status changes and reorient to person, place, and time. Review medications for side effects contributing to fall risk. Reinforce activity limits and safety measures with patient and family. Provide visual clues: red socks.

## 2022-11-22 ENCOUNTER — TELEPHONE (OUTPATIENT)
Dept: DERMATOLOGY | Facility: CLINIC | Age: 47
End: 2022-11-22
Payer: COMMERCIAL

## 2022-11-22 NOTE — TELEPHONE ENCOUNTER
Spoke with pt - pt stated she called a few times regarding her prescription being too costly. Informed pt a message will be sent to Dr. Barnes, and I will call her back once a new prescription is called in. Pt voiced understanding and thanked me for call.    ----- Message from Diana Kendall MA sent at 11/21/2022  4:54 PM CST -----  Contact: pt    ----- Message -----  From: Ly Noble  Sent: 11/21/2022   4:50 PM CST  To: [facility] Clinical Staff    Pt states she wants to speak to a supervisor/manager. When trying to reach out to the office, the pt disconnected the call. She was very upset and stated she wanted to speak to someone who could give her answers.     Confirmed contact below:  Contact Name:Teresa Cazares  Phone Number: [phone]

## 2022-11-23 ENCOUNTER — OFFICE VISIT (OUTPATIENT)
Dept: SURGERY | Facility: CLINIC | Age: 47
End: 2022-11-23
Payer: COMMERCIAL

## 2022-11-23 VITALS
TEMPERATURE: 97 F | WEIGHT: 198.63 LBS | DIASTOLIC BLOOD PRESSURE: 73 MMHG | SYSTOLIC BLOOD PRESSURE: 132 MMHG | HEIGHT: 62 IN | HEART RATE: 65 BPM | OXYGEN SATURATION: 97 % | BODY MASS INDEX: 36.55 KG/M2

## 2022-11-23 DIAGNOSIS — Z71.89 COUNSELING AND COORDINATION OF CARE: ICD-10-CM

## 2022-11-23 DIAGNOSIS — Z80.3 FAMILY HISTORY OF BREAST CANCER: Primary | ICD-10-CM

## 2022-11-23 DIAGNOSIS — Z71.89 COUNSELING ON HEALTH PROMOTION AND DISEASE PREVENTION: ICD-10-CM

## 2022-11-23 DIAGNOSIS — Z71.9 HEALTH EDUCATION/COUNSELING: ICD-10-CM

## 2022-11-23 DIAGNOSIS — Z12.31 ENCOUNTER FOR SCREENING MAMMOGRAM FOR MALIGNANT NEOPLASM OF BREAST: ICD-10-CM

## 2022-11-23 DIAGNOSIS — Z91.89 AT HIGH RISK FOR BREAST CANCER: ICD-10-CM

## 2022-11-23 DIAGNOSIS — Z12.39 ENCOUNTER FOR BREAST CANCER SCREENING USING NON-MAMMOGRAM MODALITY: ICD-10-CM

## 2022-11-23 DIAGNOSIS — Z80.3 FAMILY HISTORY OF BREAST CANCER: Chronic | ICD-10-CM

## 2022-11-23 DIAGNOSIS — Z12.39 BREAST CANCER SCREENING, HIGH RISK PATIENT: Chronic | ICD-10-CM

## 2022-11-23 PROCEDURE — 99214 OFFICE O/P EST MOD 30 MIN: CPT | Mod: PBBFAC | Performed by: NURSE PRACTITIONER

## 2022-11-23 PROCEDURE — 3078F DIAST BP <80 MM HG: CPT | Mod: CPTII,S$GLB,, | Performed by: NURSE PRACTITIONER

## 2022-11-23 PROCEDURE — 3008F PR BODY MASS INDEX (BMI) DOCUMENTED: ICD-10-PCS | Mod: CPTII,S$GLB,, | Performed by: NURSE PRACTITIONER

## 2022-11-23 PROCEDURE — 1160F RVW MEDS BY RX/DR IN RCRD: CPT | Mod: CPTII,S$GLB,, | Performed by: NURSE PRACTITIONER

## 2022-11-23 PROCEDURE — 1159F PR MEDICATION LIST DOCUMENTED IN MEDICAL RECORD: ICD-10-PCS | Mod: CPTII,S$GLB,, | Performed by: NURSE PRACTITIONER

## 2022-11-23 PROCEDURE — 3075F SYST BP GE 130 - 139MM HG: CPT | Mod: CPTII,S$GLB,, | Performed by: NURSE PRACTITIONER

## 2022-11-23 PROCEDURE — 1159F MED LIST DOCD IN RCRD: CPT | Mod: CPTII,S$GLB,, | Performed by: NURSE PRACTITIONER

## 2022-11-23 PROCEDURE — 99205 PR OFFICE/OUTPT VISIT, NEW, LEVL V, 60-74 MIN: ICD-10-PCS | Mod: S$GLB,,, | Performed by: NURSE PRACTITIONER

## 2022-11-23 PROCEDURE — 3078F PR MOST RECENT DIASTOLIC BLOOD PRESSURE < 80 MM HG: ICD-10-PCS | Mod: CPTII,S$GLB,, | Performed by: NURSE PRACTITIONER

## 2022-11-23 PROCEDURE — 99205 OFFICE O/P NEW HI 60 MIN: CPT | Mod: S$GLB,,, | Performed by: NURSE PRACTITIONER

## 2022-11-23 PROCEDURE — 99999 PR PBB SHADOW E&M-EST. PATIENT-LVL IV: ICD-10-PCS | Mod: PBBFAC,,, | Performed by: NURSE PRACTITIONER

## 2022-11-23 PROCEDURE — 3075F PR MOST RECENT SYSTOLIC BLOOD PRESS GE 130-139MM HG: ICD-10-PCS | Mod: CPTII,S$GLB,, | Performed by: NURSE PRACTITIONER

## 2022-11-23 PROCEDURE — 3008F BODY MASS INDEX DOCD: CPT | Mod: CPTII,S$GLB,, | Performed by: NURSE PRACTITIONER

## 2022-11-23 PROCEDURE — 1160F PR REVIEW ALL MEDS BY PRESCRIBER/CLIN PHARMACIST DOCUMENTED: ICD-10-PCS | Mod: CPTII,S$GLB,, | Performed by: NURSE PRACTITIONER

## 2022-11-23 PROCEDURE — 99999 PR PBB SHADOW E&M-EST. PATIENT-LVL IV: CPT | Mod: PBBFAC,,, | Performed by: NURSE PRACTITIONER

## 2023-02-01 ENCOUNTER — OFFICE VISIT (OUTPATIENT)
Dept: PRIMARY CARE CLINIC | Facility: CLINIC | Age: 48
End: 2023-02-01
Payer: COMMERCIAL

## 2023-02-01 DIAGNOSIS — J00 ACUTE NASOPHARYNGITIS: Primary | ICD-10-CM

## 2023-02-01 PROCEDURE — 99213 PR OFFICE/OUTPT VISIT, EST, LEVL III, 20-29 MIN: ICD-10-PCS | Mod: 95,,, | Performed by: FAMILY MEDICINE

## 2023-02-01 PROCEDURE — 99213 OFFICE O/P EST LOW 20 MIN: CPT | Mod: 95,,, | Performed by: FAMILY MEDICINE

## 2023-02-01 RX ORDER — PROMETHAZINE HYDROCHLORIDE AND DEXTROMETHORPHAN HYDROBROMIDE 6.25; 15 MG/5ML; MG/5ML
5 SYRUP ORAL EVERY 6 HOURS PRN
Qty: 180 ML | Refills: 0 | Status: SHIPPED | OUTPATIENT
Start: 2023-02-01 | End: 2023-02-11

## 2023-02-01 NOTE — PROGRESS NOTES
Subjective:    The patient location is: [place] at home  Visit type: Virtual visit with synchronous audio and video  Total time spent with patient: 20 mins  Each patient to whom he or she provides medical services by telemedicine is:  (1) informed of the relationship between the physician and patient and the respective role of any other health care provider with respect to management of the patient; and (2) notified that he or she may decline to receive medical services by telemedicine and may withdraw from such care at any time.     Patient ID: Teresa Cazares is a 47 y.o. female.    Chief Complaint: URI        History of Present Illness:   Teresa Cazares 47 y.o. female presents today with   Cold sxs: x one day    Asso with cough -prod, and voice change, nasal congestion.  Worsening.  Tx so far:Benadryl   Denies fever, sinus ache, asthma, copd, and smoking.   Answers submitted by the patient for this visit:  Sore Throat Questionnaire (Submitted on 2/1/2023)  Chief Complaint: Sore throat  Chronicity: new  Onset: yesterday  Progression since onset: unchanged  Pain worse on: neither  Fever: no fever  hoarse voice: No  plugged ear sensation: Yes  swollen glands: No  strep: No  mono: No  Treatments tried: nothing, NSAIDs, oral narcotic analgesics  Improvement on treatment: mild  Pain severity: mild    Past Medical History:   Diagnosis Date    Anxiety     Closed fracture of left lower leg with routine healing 3/28/2019    Depression     denies hospitalization or bipolar disorder    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    Nicotine dependence, cigarettes, in remission 3/26/2019    Obesity     Personal history of COVID-19     Recurrent major depression in partial remission 3/26/2019    Surgical wound infection (MRSA) 12/2019     Family History   Problem Relation Age of Onset    Breast cancer Mother 58    Breast cancer Sister 46    No Known Problems Father     Breast cancer Paternal Grandmother      Social History      Socioeconomic History    Marital status: Single    Number of children: 5   Occupational History    Occupation:    Tobacco Use    Smoking status: Former     Packs/day: 1.00     Years: 27.00     Pack years: 27.00     Types: Cigarettes     Start date: 9/2/2019     Quit date: 11/18/2019     Years since quitting: 3.2    Smokeless tobacco: Never   Substance and Sexual Activity    Alcohol use: Yes     Comment: occasionally    Drug use: No    Sexual activity: Not Currently     Partners: Male     Outpatient Encounter Medications as of 2/1/2023   Medication Sig Dispense Refill    acitretin (SORIATANE) 10 MG capsule Take 1 capsule (10 mg total) by mouth once daily. 30 capsule 3    buPROPion (WELLBUTRIN XL) 150 MG TB24 tablet Take 1 tablet (150 mg total) by mouth every morning. 30 tablet 2    FLUoxetine 20 MG capsule Take 1 capsule (20 mg total) by mouth once daily. 30 capsule 1    gabapentin (NEURONTIN) 300 MG capsule Take 3 capsules (900 mg total) by mouth every evening. 90 capsule 2    nicotine, polacrilex, (NICORETTE) 2 mg Gum Take 1 each (2 mg total) by mouth as needed (for cigarette cravings). Use as directed on packaging. 100 each 5    promethazine-dextromethorphan (PROMETHAZINE-DM) 6.25-15 mg/5 mL Syrp Take 5 mLs by mouth every 6 (six) hours as needed (URI). 180 mL 0    tazarotene (TAZORAC) 0.1 % Gel gel Apply to thick plaques on feet every morning 100 g 3    temazepam (RESTORIL) 30 mg capsule Take 1 capsule (30 mg total) by mouth nightly as needed for Insomnia. 30 capsule 2     No facility-administered encounter medications on file as of 2/1/2023.       Review of Systems   HENT:  Positive for congestion and sore throat. Negative for drooling, ear discharge, ear pain and trouble swallowing.    Respiratory:  Positive for cough. Negative for shortness of breath.    Gastrointestinal:  Negative for abdominal pain, diarrhea and vomiting.   Musculoskeletal:  Negative for neck pain.   Neurological:  Positive  for headaches.     Review of Systems      A complete 10 point ROS was completed and are positive as per above HPI.    Otherwise negative for fever, diplopia, chest pain, shortness of breath, vomiting, blood in urine, joint pain, skin rash, seizures and unusual bleeding.     Objective:      There were no vitals taken for this visit.  Physical Exam    CONSTITUTIONAL: No apparent distress. Nasal tone noted  CARDIOVASCULAR: No perioral cyanosis  PULMONARY: Breathing unlabored. No retractions Chest expansion grossly normal.  PSYCHIATRIC: Alert and conversant and grossly oriented. Mood is grossly neutral. Affect appropriate. Judgment and insight grossly intact.  NEUROLOGIC: No focal sensory deficits reported.   Results for orders placed or performed in visit on 04/01/22   Rubella antibody, IgG   Result Value Ref Range    Rubella IgG Antibodies 106.0 >=10.0 IU/mL    Rubella Immune Status Reactive    Rubeola antibody IgG   Result Value Ref Range    Rubeola IgG 2.41 (H) 0.00 - 0.90 ISR    Rubeola Interpretation Positive (A) Negative   Mumps, IgG Screen   Result Value Ref Range    Mumps IgG Screen 2.38 (H) 0.00 - 0.90 ISR    Mumps IgG Interpretation Positive (A) Negative   Hepatitis B Surface Antibody, Qual/Quant   Result Value Ref Range    Hep. B Surf Ab, Qual POSITIVE     Hep. B Surf Ab, Quant. 50 mIU/mL   Varicella zoster antibody, IgG   Result Value Ref Range    Varicella Zoster IgG 2.15 (H) 0.00 - 0.90 ISR    Varicella Interpretation Positive (A) Negative     Assessment:       1. Acute nasopharyngitis          Plan:   Acute nasopharyngitis  -     promethazine-dextromethorphan (PROMETHAZINE-DM) 6.25-15 mg/5 mL Syrp; Take 5 mLs by mouth every 6 (six) hours as needed (URI).  Dispense: 180 mL; Refill: 0                    Likely viral               -  Recommended supportive care (rest, increase fluids, cough med, gargle with warm salt water, Alternate Tylenol and Motrin every 8 hours as needed for fever, aches, or pain)               -   Wash hands frequently              -  Discussed return or see PCP if symptoms greater than 7 days, change in mucous color, fever)                  Christal Juarez MD

## 2023-02-02 ENCOUNTER — OFFICE VISIT (OUTPATIENT)
Dept: INTERNAL MEDICINE | Facility: CLINIC | Age: 48
End: 2023-02-02
Payer: COMMERCIAL

## 2023-02-02 DIAGNOSIS — J06.9 VIRAL URI WITH COUGH: Primary | ICD-10-CM

## 2023-02-02 PROCEDURE — 99214 OFFICE O/P EST MOD 30 MIN: CPT | Mod: 95,,, | Performed by: FAMILY MEDICINE

## 2023-02-02 PROCEDURE — 99214 PR OFFICE/OUTPT VISIT, EST, LEVL IV, 30-39 MIN: ICD-10-PCS | Mod: 95,,, | Performed by: FAMILY MEDICINE

## 2023-02-02 PROCEDURE — 1160F PR REVIEW ALL MEDS BY PRESCRIBER/CLIN PHARMACIST DOCUMENTED: ICD-10-PCS | Mod: CPTII,95,, | Performed by: FAMILY MEDICINE

## 2023-02-02 PROCEDURE — 1159F MED LIST DOCD IN RCRD: CPT | Mod: CPTII,95,, | Performed by: FAMILY MEDICINE

## 2023-02-02 PROCEDURE — 1160F RVW MEDS BY RX/DR IN RCRD: CPT | Mod: CPTII,95,, | Performed by: FAMILY MEDICINE

## 2023-02-02 PROCEDURE — 1159F PR MEDICATION LIST DOCUMENTED IN MEDICAL RECORD: ICD-10-PCS | Mod: CPTII,95,, | Performed by: FAMILY MEDICINE

## 2023-02-02 RX ORDER — BENZONATATE 100 MG/1
100-200 CAPSULE ORAL 3 TIMES DAILY PRN
Qty: 30 CAPSULE | Refills: 1 | Status: SHIPPED | OUTPATIENT
Start: 2023-02-02 | End: 2023-02-12

## 2023-02-02 RX ORDER — GUAIFENESIN 600 MG/1
600 TABLET, EXTENDED RELEASE ORAL 2 TIMES DAILY
COMMUNITY
Start: 2023-02-02 | End: 2023-04-12

## 2023-02-02 NOTE — PROGRESS NOTES
Subjective:       Patient ID: Teresa Cazares is a 47 y.o. female.    Chief Complaint: URI    The patient location is: parked car  The chief complaint leading to consultation is: URI/cough    Visit type: audiovisual    Face to Face time with patient: 6 minutes (chart review started 12:46 pm; video started 12:49 pm; video ended 12:55 pm)  20 minutes of total time spent on the encounter, which includes face to face time and non-face to face time preparing to see the patient (eg, review of tests), Obtaining and/or reviewing separately obtained history, Documenting clinical information in the electronic or other health record, Independently interpreting results (not separately reported) and communicating results to the patient/family/caregiver, or Care coordination (not separately reported).     Each patient to whom he or she provides medical services by telemedicine is:  (1) informed of the relationship between the physician and patient and the respective role of any other health care provider with respect to management of the patient; and (2) notified that he or she may decline to receive medical services by telemedicine and may withdraw from such care at any time.    PCP: Dr. Chery    Patient is new to me. Virtual visit for URI with cough. Patient reports started with sore throat 3 days ago. Reports cough productive of yellow sputum. She did virtual visit yesterday and given promethazine DM, still coughing. Reports today started with headache and bodyaches. Denies fever. Reports some chills. Has not had covid test. Patient is otherwise without concerns today.        Cough  This is a new problem. The current episode started in the past 7 days. The problem has been gradually worsening. The problem occurs hourly. The cough is Productive of sputum and productive of purulent sputum. Associated symptoms include chest pain, chills, ear congestion, ear pain, headaches, myalgias, nasal congestion, postnasal drip,  rhinorrhea and a sore throat. Pertinent negatives include no fever, heartburn, hemoptysis, rash, shortness of breath, sweats, weight loss or wheezing. Nothing aggravates the symptoms. The treatment provided mild relief. There is no history of asthma, bronchiectasis, bronchitis, COPD, emphysema, environmental allergies or pneumonia.   URI   Associated symptoms include chest pain, coughing, ear pain, headaches, rhinorrhea and a sore throat. Pertinent negatives include no rash or wheezing.   Review of Systems   Constitutional:  Positive for chills. Negative for fever and weight loss.   HENT:  Positive for ear pain, postnasal drip, rhinorrhea and sore throat.    Respiratory:  Positive for cough. Negative for hemoptysis, shortness of breath and wheezing.    Cardiovascular:  Positive for chest pain.   Gastrointestinal:  Negative for heartburn.   Musculoskeletal:  Positive for myalgias.   Skin:  Negative for rash.   Allergic/Immunologic: Negative for environmental allergies.   Neurological:  Positive for headaches.       Objective:      Physical Exam  Constitutional:       General: She is not in acute distress.     Appearance: She is well-developed.   HENT:      Head: Normocephalic and atraumatic.   Eyes:      General: Lids are normal. No scleral icterus.     Extraocular Movements: Extraocular movements intact.      Conjunctiva/sclera: Conjunctivae normal.   Pulmonary:      Effort: Pulmonary effort is normal.      Comments: Able to speak in complete sentences without difficulty.  Neurological:      Mental Status: She is alert and oriented to person, place, and time.   Psychiatric:         Mood and Affect: Mood and affect normal.       Assessment:       1. Viral URI with cough        Plan:   1. Viral URI with cough  -     benzonatate (TESSALON) 100 MG capsule; Take 1-2 capsules (100-200 mg total) by mouth 3 (three) times daily as needed for Cough.  Dispense: 30 capsule; Refill: 1  -     guaiFENesin (MUCINEX) 600 mg 12 hr  tablet; Take 1 tablet (600 mg total) by mouth 2 (two) times daily.    Advised covid test. If positive, isolate per CDC guidelines. Covid risk score is 1, so do not advise Rx medications, just supportive care.  Meds as above, rest and fluids. Advised follow up in person if worse/persistent. ER if in distress.

## 2023-02-09 NOTE — PROCEDURES
Home Sleep Studies  Date/Time: 1/2/2020 8:00 AM  Performed by: Joseph Avendaño MD  Authorized by: JIM Chery MD       Assessment and Recommendations  Based on the [employer] practice parameter of CPAP would be the guideline  recommendation of choice in symptomatic individuals and also relevant comorbidities. Other therapies  may include ENT procedures were appropriate, significant weight loss.  Inspire hypoglossal nerve stimulator and nasal PROVENT or mandibular advancement device may also  be considered.  Close follow up to ensure resolution of symptoms.  2 night study  MILD/BORDERLINE OBSTRUCTIVE SLEEP APNEA with overall AHI 6.1/hr ( 37 events):  Night #2  Oxygen desaturation: 78%. SpO2 between 70% to 79% for <1 min.  Patient snored 94% time above 50 .  Heart rate range: 57 bpm - 127 bpm  REC's:  Therapy with APAP at 4-20 cm WP using mask of choice with heated humidification is an option.  Weight loss/management. with regular exercise per direction of physician.  Avoid drowsy driving.  Referal to sleep clinic ( ) .  
Opt out

## 2023-02-20 ENCOUNTER — OFFICE VISIT (OUTPATIENT)
Dept: INTERNAL MEDICINE | Facility: CLINIC | Age: 48
End: 2023-02-20
Payer: COMMERCIAL

## 2023-02-20 ENCOUNTER — TELEPHONE (OUTPATIENT)
Dept: PRIMARY CARE CLINIC | Facility: CLINIC | Age: 48
End: 2023-02-20
Payer: COMMERCIAL

## 2023-02-20 DIAGNOSIS — R05.9 COUGH, UNSPECIFIED TYPE: ICD-10-CM

## 2023-02-20 DIAGNOSIS — K08.89 PAIN, DENTAL: Primary | ICD-10-CM

## 2023-02-20 PROCEDURE — 1160F RVW MEDS BY RX/DR IN RCRD: CPT | Mod: CPTII,95,, | Performed by: NURSE PRACTITIONER

## 2023-02-20 PROCEDURE — 1160F PR REVIEW ALL MEDS BY PRESCRIBER/CLIN PHARMACIST DOCUMENTED: ICD-10-PCS | Mod: CPTII,95,, | Performed by: NURSE PRACTITIONER

## 2023-02-20 PROCEDURE — 99212 OFFICE O/P EST SF 10 MIN: CPT | Mod: 95,,, | Performed by: NURSE PRACTITIONER

## 2023-02-20 PROCEDURE — 1159F PR MEDICATION LIST DOCUMENTED IN MEDICAL RECORD: ICD-10-PCS | Mod: CPTII,95,, | Performed by: NURSE PRACTITIONER

## 2023-02-20 PROCEDURE — 1159F MED LIST DOCD IN RCRD: CPT | Mod: CPTII,95,, | Performed by: NURSE PRACTITIONER

## 2023-02-20 PROCEDURE — 99212 PR OFFICE/OUTPT VISIT, EST, LEVL II, 10-19 MIN: ICD-10-PCS | Mod: 95,,, | Performed by: NURSE PRACTITIONER

## 2023-02-20 RX ORDER — IBUPROFEN 800 MG/1
800 TABLET ORAL 3 TIMES DAILY
Qty: 30 TABLET | Refills: 0 | Status: SHIPPED | OUTPATIENT
Start: 2023-02-20 | End: 2023-03-27 | Stop reason: ALTCHOICE

## 2023-02-20 RX ORDER — AMOXICILLIN 875 MG/1
875 TABLET, FILM COATED ORAL 2 TIMES DAILY
Qty: 20 TABLET | Refills: 0 | Status: SHIPPED | OUTPATIENT
Start: 2023-02-20 | End: 2023-03-02

## 2023-02-20 RX ORDER — PROMETHAZINE HYDROCHLORIDE AND DEXTROMETHORPHAN HYDROBROMIDE 6.25; 15 MG/5ML; MG/5ML
10 SYRUP ORAL EVERY 6 HOURS PRN
Qty: 240 ML | Refills: 0 | Status: SHIPPED | OUTPATIENT
Start: 2023-02-20 | End: 2023-03-02

## 2023-02-20 NOTE — TELEPHONE ENCOUNTER
Patient called regarding appointment for tooth ache, patient stated she was able to do another appointment with another provider and no longer needed an appointment.

## 2023-02-20 NOTE — LETTER
February 20, 2023      [facility]  [id number] [place] [id number]  Phone: [phone]  Fax: [phone]       Patient: Teresa Cazares   YOB: 1975  Date of Visit: 02/20/2023    To Whom It May Concern:    Mayra Cazares  was at [facility] on 02/20/2023. The patient may return to work/school on 2/23/23 with no restrictions. If you have any questions or concerns, or if I can be of further assistance, please do not hesitate to contact me.    Sincerely,    Adalgisa Varela, NP

## 2023-02-20 NOTE — PROGRESS NOTES
Teresa Cazares  02/20/2023  [id number]      The patient location is: home  The chief complaint leading to consultation is: dental pain  Visit type: Virtual visit with synchronous audio and video  Total time spent with patient: 8 min  Each patient to whom he or she provides medical services by telemedicine is:  (1) informed of the relationship between the physician and patient and the respective role of any other health care provider with respect to management of the patient; and (2) notified that he or she may decline to receive medical services by telemedicine and may withdraw from such care at any time.        Chief Complaint: Dental pain      History of Present Illness:    46 yo female presents with dental pain upper right wisdom tooth pain for a week ad reports its getting worse. Pt states she has a dentist appt Friday because they are closed for [facility] [place]. Denies facial swelling or fever      Review of Systems   HENT:  Negative for hearing loss.         Dental pain   Eyes:  Negative for discharge.   Respiratory:  Negative for wheezing.    Cardiovascular:  Negative for chest pain and palpitations.   Gastrointestinal:  Negative for blood in stool, constipation, diarrhea and vomiting.   Genitourinary:  Negative for dysuria and hematuria.   Musculoskeletal:  Negative for neck pain.   Neurological:  Negative for weakness and headaches.   Endo/Heme/Allergies:  Negative for polydipsia.   All other systems reviewed and are negative.      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 OR;  Service: Orthopedics;  Laterality: Left;    APPLICATION OF WOUND VACUUM-ASSISTED CLOSURE DEVICE Left 01/17/2019    Procedure: APPLICATION, WOUND VAC;  Surgeon: Barry Guzman MD;  Location: [facility] OR;  Service: Orthopedics;  Laterality: Left;    COLONOSCOPY N/A 09/02/2022    Procedure: COLONOSCOPY;  Surgeon: Yon Ridley MD;  Location: [facility] ENDO;  Service: Endoscopy;  Laterality: N/A;    FRACTURE SURGERY      C1 neck- halo    HERNIA REPAIR      HYSTERECTOMY  2009    tahbso    OOPHORECTOMY      OPEN REDUCTION AND INTERNAL FIXATION (ORIF) OF FRACTURE OF TIBIAL PLATEAU Left 01/17/2019    Procedure: ORIF, FRACTURE, TIBIA, PLATEAU;  Surgeon: Barry Guzman MD;  Location: [facility] OR;  Service: Orthopedics;  Laterality: Left;    REMOVAL OF EXTERNAL FIXATION DEVICE Left 01/17/2019    Procedure: REMOVAL, EXTERNAL FIXATION DEVICE;  Surgeon: Barry Guzman MD;  Location: [facility] OR;  Service: Orthopedics;  Laterality: Left;    TUBAL LIGATION  05/22/1997    UMBILICAL HERNIA REPAIR         Family History   Problem Relation Age of Onset    Breast cancer Mother 58    Cancer Mother         Breast cancer    Depression Mother         Depression    Diabetes Mother     Breast cancer Sister 46    Cancer Sister         Breast cancer    No Known Problems Father     Breast cancer Paternal Grandmother      Social History     Socioeconomic History    Marital status: Single    Number of children: 5   Occupational History    Occupation:    Tobacco Use    Smoking status: Former     Packs/day: 1.00     Years: 27.00     Pack years: 27.00     Types: Cigarettes     Start date: 9/2/2019     Quit date: 11/18/2019     Years since quitting: 3.2    Smokeless tobacco: Never   Substance and Sexual Activity    Alcohol use: Yes     Alcohol/week: 2.0  standard drinks     Types: 2 Glasses of wine per week     Comment: occasionally    Drug use: No    Sexual activity: Not Currently     Partners: Male     Birth control/protection: None     Patient Active Problem List   Diagnosis    Class 2 severe obesity due to excess calories with serious comorbidity and body mass index (BMI) of 36.0 to 36.9 in adult    Bilateral primary osteoarthritis of knee    Major depressive disorder, recurrent, moderate, with anxiety    Nicotine dependence, cigarettes, uncomplicated    Psychophysiologic insomnia    Thrombocytosis    Long-term current use of benzodiazepine    DEYA on CPAP    Chronic post-op pain    Generalized anxiety disorder    Prediabetes    History of COVID-19 (June 2022)    Breast cancer screening, high risk patient    Family history of breast cancer (mother and sister)     Review of patient's allergies indicates:   Allergen Reactions    Macrobid [nitrofurantoin monohyd/m-cryst] Hives    Bactrim [sulfamethoxazole-trimethoprim]     Flagyl [metronidazole hcl] Hives       The following were reviewed at this visit: active problem list, medication list, allergies, family history, social history, and health maintenance.    Medications:  Current Outpatient Medications on File Prior to Visit   Medication Sig Dispense Refill    acitretin (SORIATANE) 10 MG capsule Take 1 capsule (10 mg total) by mouth once daily. 30 capsule 3    buPROPion (WELLBUTRIN XL) 150 MG TB24 tablet Take 1 tablet (150 mg total) by mouth every morning. (Patient not taking: Reported on 2/2/2023) 30 tablet 2    FLUoxetine 20 MG capsule Take 1 capsule (20 mg total) by mouth once daily. 30 capsule 1    gabapentin (NEURONTIN) 300 MG capsule Take 3 capsules (900 mg total) by mouth every evening. 90 capsule 2    guaiFENesin (MUCINEX) 600 mg 12 hr tablet Take 1 tablet (600 mg total) by mouth 2 (two) times daily.      nicotine, polacrilex, (NICORETTE) 2 mg Gum Take 1 each (2 mg total) by mouth as needed (for cigarette  cravings). Use as directed on packaging. 100 each 5    tazarotene (TAZORAC) 0.1 % Gel gel Apply to thick plaques on feet every morning 100 g 3     No current facility-administered medications on file prior to visit.       Exam:  Wt Readings from Last 3 Encounters:   11/23/22 90.1 kg (198 lb 10.2 oz)   09/02/22 85.5 kg (188 lb 7.9 oz)   07/27/22 86 kg (189 lb 9.5 oz)     Temp Readings from Last 3 Encounters:   11/23/22 97.2 °F (36.2 °C)   09/02/22 98 °F (36.7 °C) (Temporal)   07/27/22 98.7 °F (37.1 °C) (Tympanic)     BP Readings from Last 3 Encounters:   11/23/22 132/73   11/14/22 (!) 125/58   09/02/22 (!) 125/58     Pulse Readings from Last 3 Encounters:   11/23/22 65   09/02/22 65   07/27/22 77     There is no height or weight on file to calculate BMI.      @ROS@    Physical Exam  Vitals and nursing note reviewed.   Constitutional:       Appearance: Normal appearance.   Eyes:      Conjunctiva/sclera: Conjunctivae normal.   Pulmonary:      Effort: Pulmonary effort is normal.   Neurological:      General: No focal deficit present.      Mental Status: She is alert and oriented to person, place, and time.   Psychiatric:         Mood and Affect: Mood normal.         Behavior: Behavior normal.         Thought Content: Thought content normal.         Judgment: Judgment normal.       Laboratory Reviewed ({Yes)  Lab Results   Component Value Date    WBC 9.48 12/10/2021    HGB 13.7 12/10/2021    HCT 40.9 12/10/2021     12/10/2021    CHOL 159 12/10/2021    TRIG 39 12/10/2021    HDL 57 12/10/2021    ALT 22 12/10/2021    AST 21 12/10/2021     12/10/2021    K 4.2 12/10/2021     12/10/2021    CREATININE 0.8 12/10/2021    BUN 11 12/10/2021    CO2 27 12/10/2021    TSH 2.191 10/08/2020    INR 0.9 01/10/2019    HGBA1C 5.7 (H) 12/10/2021       Diagnoses and all orders for this visit:    Pain, dental  -     ibuprofen (ADVIL,MOTRIN) 800 MG tablet; Take 1 tablet (800 mg total) by mouth 3 (three) times daily.  -      amoxicillin (AMOXIL) 875 MG tablet; Take 1 tablet (875 mg total) by mouth 2 (two) times daily. for 10 days    Cough, unspecified type  -     promethazine-dextromethorphan (PROMETHAZINE-DM) 6.25-15 mg/5 mL Syrp; Take 10 mLs by mouth every 6 (six) hours as needed.

## 2023-02-21 ENCOUNTER — TELEPHONE (OUTPATIENT)
Dept: INTERNAL MEDICINE | Facility: CLINIC | Age: 48
End: 2023-02-21
Payer: COMMERCIAL

## 2023-02-21 NOTE — LETTER
February 21, 2023      [facility]  [address] [place] [address]  Phone: [phone]  Fax: [phone]       Patient: Teresa Cazares   YOB: 1975  Date of Visit: 02/21/2023    To Whom It May Concern:    Mayra Cazares  was at [facility] on 02/20/2023. Please excuse her from being late on her assignments that were due on 02/18/2023 and 02/19/2023.     If you have any questions or concerns, or if I can be of further assistance, please do not hesitate to contact me.    Sincerely,    Adalgisa Varela NP

## 2023-02-21 NOTE — TELEPHONE ENCOUNTER
----- Message from Man Rodríguez sent at 2/21/2023 10:55 AM CST -----  Contact: Adilson Moore is calling in regards to her doctor excuse. Patient states you can put it in her [facility] .         Thanks  CF

## 2023-03-24 DIAGNOSIS — F33.1 RECURRENT MODERATE MAJOR DEPRESSIVE DISORDER WITH ANXIETY: Chronic | ICD-10-CM

## 2023-03-24 DIAGNOSIS — F41.1 GENERALIZED ANXIETY DISORDER: Chronic | ICD-10-CM

## 2023-03-24 DIAGNOSIS — F41.9 RECURRENT MODERATE MAJOR DEPRESSIVE DISORDER WITH ANXIETY: Chronic | ICD-10-CM

## 2023-03-24 RX ORDER — FLUOXETINE HYDROCHLORIDE 20 MG/1
20 CAPSULE ORAL DAILY
Qty: 90 CAPSULE | Refills: 2 | Status: SHIPPED | OUTPATIENT
Start: 2023-03-24 | End: 2023-06-29 | Stop reason: SDUPTHER

## 2023-03-24 NOTE — TELEPHONE ENCOUNTER
----- Message from Shanon Garzon sent at 3/24/2023 10:11 AM CDT -----  Contact: pt  Type:  RX Refill Request    Who Called:  pt  Refill or New Rx: refill  RX Name and Strength: FLUoxetine 20 MG capsule  How is the patient currently taking it? (ex. 1XDay):  Is this a 30 day or 90 day RX:  Preferred Pharmacy with phone number: [phone]  Local or Mail Order:local  Ordering Provider: rhianna  Would the patient rather a call back or a response via [facility]?  chart  Best Call Back Number:  Additional Information:     [facility] [address] [place] [address]  Phone: [phone] Fax: [phone]

## 2023-03-24 NOTE — TELEPHONE ENCOUNTER
No new care gaps identified.  [facility] Embedded Care Gaps. Reference number: [id number]. 3/24/2023   10:34:23 AM LEAHT

## 2023-03-27 ENCOUNTER — OFFICE VISIT (OUTPATIENT)
Dept: INTERNAL MEDICINE | Facility: CLINIC | Age: 48
End: 2023-03-27
Payer: COMMERCIAL

## 2023-03-27 DIAGNOSIS — M65.311 TRIGGER FINGER OF RIGHT THUMB: Primary | ICD-10-CM

## 2023-03-27 DIAGNOSIS — M79.644 THUMB PAIN, RIGHT: ICD-10-CM

## 2023-03-27 PROCEDURE — 1160F PR REVIEW ALL MEDS BY PRESCRIBER/CLIN PHARMACIST DOCUMENTED: ICD-10-PCS | Mod: CPTII,95,, | Performed by: PHYSICIAN ASSISTANT

## 2023-03-27 PROCEDURE — 1160F RVW MEDS BY RX/DR IN RCRD: CPT | Mod: CPTII,95,, | Performed by: PHYSICIAN ASSISTANT

## 2023-03-27 PROCEDURE — 99213 PR OFFICE/OUTPT VISIT, EST, LEVL III, 20-29 MIN: ICD-10-PCS | Mod: 95,,, | Performed by: PHYSICIAN ASSISTANT

## 2023-03-27 PROCEDURE — 1159F MED LIST DOCD IN RCRD: CPT | Mod: CPTII,95,, | Performed by: PHYSICIAN ASSISTANT

## 2023-03-27 PROCEDURE — 99213 OFFICE O/P EST LOW 20 MIN: CPT | Mod: 95,,, | Performed by: PHYSICIAN ASSISTANT

## 2023-03-27 PROCEDURE — 1159F PR MEDICATION LIST DOCUMENTED IN MEDICAL RECORD: ICD-10-PCS | Mod: CPTII,95,, | Performed by: PHYSICIAN ASSISTANT

## 2023-03-27 RX ORDER — INDOMETHACIN 50 MG/1
50 CAPSULE ORAL
Qty: 21 CAPSULE | Refills: 0 | Status: SHIPPED | OUTPATIENT
Start: 2023-03-27 | End: 2023-04-03

## 2023-03-27 NOTE — PROGRESS NOTES
The patient location is: [place], [place]  The chief complaint leading to consultation is: Trigger finger    Visit type: audiovisual    Face to Face time with patient: 11 min  14  minutes of total time spent on the encounter, which includes face to face time and non-face to face time preparing to see the patient (eg, review of tests), Obtaining and/or reviewing separately obtained history, Documenting clinical information in the electronic or other health record, Independently interpreting results (not separately reported) and communicating results to the patient/family/caregiver, or Care coordination (not separately reported).         Each patient to whom he or she provides medical services by telemedicine is:  (1) informed of the relationship between the physician and patient and the respective role of any other health care provider with respect to management of the patient; and (2) notified that he or she may decline to receive medical services by telemedicine and may withdraw from such care at any time.    Notes:    Subjective:      Patient ID: Teresa Cazares is a 47 y.o. female.    Chief Complaint: No chief complaint on file.    HPI  Apt scheduled today to discuss. Thumb of right hand is painful and locks up. The problem has been going on for about a month. No history of trauma or injury to that thumb. Right handed. Pain wakes her up at night. Intermittent numbness/tingling.   Has tried iburofen 800, aleve, gabapentin without any improvement.   Reports that it clicks and locks up. Intermittent swelling.   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     Patient Active Problem List   Diagnosis    Class 2 severe obesity due to excess calories with serious comorbidity and body mass index (BMI) of 36.0 to 36.9 in adult    Bilateral primary osteoarthritis of knee    Major depressive disorder, recurrent, moderate, with anxiety    Nicotine dependence, cigarettes, uncomplicated    Psychophysiologic insomnia    Thrombocytosis     Long-term current use of benzodiazepine    DEYA on CPAP    Chronic post-op pain    Generalized anxiety disorder    Prediabetes    History of COVID-19 (June 2022)    Breast cancer screening, high risk patient    Family history of breast cancer (mother and sister)         Current Outpatient Medications:     acitretin (SORIATANE) 10 MG capsule, Take 1 capsule (10 mg total) by mouth once daily., Disp: 30 capsule, Rfl: 3    buPROPion (WELLBUTRIN XL) 150 MG TB24 tablet, Take 1 tablet (150 mg total) by mouth every morning. (Patient not taking: Reported on 2/2/2023), Disp: 30 tablet, Rfl: 2    FLUoxetine 20 MG capsule, Take 1 capsule (20 mg total) by mouth once daily., Disp: 90 capsule, Rfl: 2    gabapentin (NEURONTIN) 300 MG capsule, Take 3 capsules (900 mg total) by mouth every evening., Disp: 90 capsule, Rfl: 2    guaiFENesin (MUCINEX) 600 mg 12 hr tablet, Take 1 tablet (600 mg total) by mouth 2 (two) times daily., Disp: , Rfl:     indomethacin (INDOCIN) 50 MG capsule, Take 1 capsule (50 mg total) by mouth 3 (three) times daily with meals. for 7 days, Disp: 21 capsule, Rfl: 0    nicotine, polacrilex, (NICORETTE) 2 mg Gum, Take 1 each (2 mg total) by mouth as needed (for cigarette cravings). Use as directed on packaging., Disp: 100 each, Rfl: 5    tazarotene (TAZORAC) 0.1 % Gel gel, Apply to thick plaques on feet every morning, Disp: 100 g, Rfl: 3    Review of Systems   Constitutional:  Negative for activity change, appetite change, chills, diaphoresis, fatigue, fever and unexpected weight change.   HENT: Negative.  Negative for congestion, hearing loss, postnasal drip, rhinorrhea, sore throat, trouble swallowing and voice change.    Eyes: Negative.  Negative for discharge and visual disturbance.   Respiratory: Negative.  Negative for cough, choking, chest tightness, shortness of breath and wheezing.    Cardiovascular:  Negative for chest pain, palpitations and leg swelling.   Gastrointestinal:  Negative for abdominal  distention, abdominal pain, blood in stool, constipation, diarrhea, nausea and vomiting.   Endocrine: Negative for cold intolerance, heat intolerance, polydipsia and polyuria.   Genitourinary: Negative.  Negative for difficulty urinating, dysuria, frequency, hematuria and menstrual problem.   Musculoskeletal:  Positive for arthralgias and joint swelling. Negative for back pain, gait problem, myalgias and neck pain.   Skin:  Negative for color change, pallor, rash and wound.   Neurological:  Negative for dizziness, tremors, weakness, light-headedness, numbness and headaches.   Hematological:  Negative for adenopathy.   Psychiatric/Behavioral:  Negative for behavioral problems, confusion, dysphoric mood, self-injury, sleep disturbance and suicidal ideas. The patient is not nervous/anxious.    Objective:   There were no vitals taken for this visit.    Physical Exam  Constitutional:       General: She is not in acute distress.     Appearance: Normal appearance. She is not ill-appearing, toxic-appearing or diaphoretic.   HENT:      Head: Normocephalic and atraumatic.   Pulmonary:      Effort: Pulmonary effort is normal. No respiratory distress.      Breath sounds: Normal breath sounds.   Musculoskeletal:      Right hand: Tenderness present. No swelling, deformity or lacerations. Normal range of motion. Normal strength.        Hands:    Skin:     Coloration: Skin is not pale.      Findings: No bruising, erythema or rash.   Neurological:      Mental Status: She is alert and oriented to person, place, and time.   Psychiatric:         Mood and Affect: Mood normal.         Behavior: Behavior normal.         Thought Content: Thought content normal.         Judgment: Judgment normal.       Assessment:     1. Trigger finger of right thumb    2. Thumb pain, right      Plan:   Trigger finger of right thumb  -     Ambulatory referral/consult to Orthopedics; Future; Expected date: 04/03/2023  -     X-Ray Hand 3 View Right; Future;  Expected date: 03/27/2023    Thumb pain, right  -     X-Ray Hand 3 View Right; Future; Expected date: 03/27/2023  -     indomethacin (INDOCIN) 50 MG capsule; Take 1 capsule (50 mg total) by mouth 3 (three) times daily with meals. for 7 days  Dispense: 21 capsule; Refill: 0  -     Uric Acid; Future; Expected date: 03/27/2023    -RICE    Follow up if symptoms worsen or fail to improve.

## 2023-03-29 ENCOUNTER — OFFICE VISIT (OUTPATIENT)
Dept: FAMILY MEDICINE | Facility: CLINIC | Age: 48
End: 2023-03-29
Payer: COMMERCIAL

## 2023-03-29 ENCOUNTER — TELEPHONE (OUTPATIENT)
Dept: INTERNAL MEDICINE | Facility: CLINIC | Age: 48
End: 2023-03-29
Payer: COMMERCIAL

## 2023-03-29 DIAGNOSIS — M65.311 TRIGGER FINGER OF RIGHT THUMB: Primary | ICD-10-CM

## 2023-03-29 PROCEDURE — 99213 PR OFFICE/OUTPT VISIT, EST, LEVL III, 20-29 MIN: ICD-10-PCS | Mod: 95,,, | Performed by: NURSE PRACTITIONER

## 2023-03-29 PROCEDURE — 99213 OFFICE O/P EST LOW 20 MIN: CPT | Mod: 95,,, | Performed by: NURSE PRACTITIONER

## 2023-03-29 NOTE — TELEPHONE ENCOUNTER
----- Message from Lencho Pena sent at 3/29/2023  9:27 AM CDT -----  Contact: NEAL  .Type:  RX Refill Request    Who Called:  NEAL  Refill or New Rx:REFILL   RX Name and Strength: FLUoxetine 20 MG capsule  How is the patient currently taking it? (ex. 1XDay):AS PRESCRIBED   Is this a 30 day or 90 day RX:30  Preferred Pharmacy with phone number:.  [facility] [address] [place] [address]  Phone: [phone] Fax: [phone]    Local or Mail Order:Local   Ordering Provider:Dr. Chery   Would the patient rather a call back or a response via [facility]? Call   Best Call Back Number: [phone]

## 2023-03-29 NOTE — LETTER
March 29, 2023      [facility]  [address]  [facility] [address]  Phone: [phone]  Fax: [phone]       Patient: Teresa Cazares   YOB: 1975  Date of Visit: 03/29/2023    To Whom It May Concern:    Mayra Cazares  was at [facility] on 03/29/2023. The patient may return to work on 3/30/23 with no restrictions. If you have any questions or concerns, or if I can be of further assistance, please do not hesitate to contact me.    Sincerely,    Adalgisa Quinteros MA

## 2023-03-29 NOTE — PROGRESS NOTES
"Subjective:       Patient ID: Teresa Cazares is a 47 y.o. female.    Chief Complaint: No chief complaint on file.  The patient location is: [place]   The chief complaint leading to consultation is: finger pain and swelling    Visit type: audiovisual    Face to Face time with patient: 10  10 minutes of total time spent on the encounter, which includes face to face time and non-face to face time preparing to see the patient (eg, review of tests), Obtaining and/or reviewing separately obtained history, Documenting clinical information in the electronic or other health record, Independently interpreting results (not separately reported) and communicating results to the patient/family/caregiver, or Care coordination (not separately reported).         Each patient to whom he or she provides medical services by telemedicine is:  (1) informed of the relationship between the physician and patient and the respective role of any other health care provider with respect to management of the patient; and (2) notified that he or she may decline to receive medical services by telemedicine and may withdraw from such care at any time.    Notes:  pt reports to tele medicine with chief complaint of right thumb pain.  Present for days.  Notes swelling.  No recent injury or trauma.  Notes "catching" with movement.  Was seen virtually 2 days ago and given indocin for trigger finger, but hasn't started.  Pt is concerned about side effects.      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  No fracture is seen.     Impression:     No acute abnormality.    Uric Acid 2.4 - 5.7 mg/dL 4.7     Hand Pain   The incident occurred 5 to 7 days ago. There was no injury mechanism. The pain is present in the right fingers. The quality of the pain is described as aching. The pain does not radiate. Pertinent negatives include no chest pain, muscle weakness, numbness or tingling. The symptoms are aggravated by movement.   Review of Systems   Constitutional:  Negative for activity change and unexpected weight change.   HENT:  Negative for hearing loss, rhinorrhea and trouble swallowing.    Eyes:  Negative for discharge and visual disturbance.   Respiratory:  Negative for chest tightness and wheezing.    Cardiovascular:  Negative for chest pain and palpitations.   Gastrointestinal:  Negative for blood in stool, constipation, diarrhea and vomiting.   Endocrine: Negative for polydipsia and polyuria.   Genitourinary:  Negative for difficulty urinating, dysuria, hematuria and menstrual problem.   Musculoskeletal:  Positive for arthralgias and joint swelling. Negative for neck pain.   Neurological:  Positive for weakness. Negative for tingling, numbness and headaches.   Psychiatric/Behavioral:  Negative for confusion and dysphoric mood.      Objective:      Physical Exam  HENT:      Head: Normocephalic.      Right Ear: External ear normal.      Left Ear: External ear normal.   Eyes:      Extraocular Movements: Extraocular movements intact.   Pulmonary:      Effort: Pulmonary effort is normal. No respiratory distress.   Musculoskeletal:      Right hand: Swelling and bony tenderness present. Decreased range of motion.   Skin:     Coloration: Skin is not jaundiced.   Neurological:      Mental Status: She is alert and oriented to person, place, and time.   Psychiatric:         Behavior: Behavior normal.       Assessment:       1. Trigger finger of right thumb          Plan:   Trigger  finger of right thumb  -begin indocin, reassurance given. Verbalized understanding     No follow-ups on file.

## 2023-04-12 ENCOUNTER — OFFICE VISIT (OUTPATIENT)
Dept: ORTHOPEDICS | Facility: CLINIC | Age: 48
End: 2023-04-12
Payer: COMMERCIAL

## 2023-04-12 VITALS — WEIGHT: 198 LBS | HEIGHT: 62 IN | BODY MASS INDEX: 36.44 KG/M2

## 2023-04-12 DIAGNOSIS — M65.30 TRIGGER FINGER, ACQUIRED: Primary | ICD-10-CM

## 2023-04-12 PROCEDURE — 99999 PR PBB SHADOW E&M-EST. PATIENT-LVL III: CPT | Mod: PBBFAC,,, | Performed by: ORTHOPAEDIC SURGERY

## 2023-04-12 PROCEDURE — 1160F PR REVIEW ALL MEDS BY PRESCRIBER/CLIN PHARMACIST DOCUMENTED: ICD-10-PCS | Mod: CPTII,S$GLB,, | Performed by: ORTHOPAEDIC SURGERY

## 2023-04-12 PROCEDURE — 99203 PR OFFICE/OUTPT VISIT, NEW, LEVL III, 30-44 MIN: ICD-10-PCS | Mod: 25,S$GLB,, | Performed by: ORTHOPAEDIC SURGERY

## 2023-04-12 PROCEDURE — 1160F RVW MEDS BY RX/DR IN RCRD: CPT | Mod: CPTII,S$GLB,, | Performed by: ORTHOPAEDIC SURGERY

## 2023-04-12 PROCEDURE — 99999 PR PBB SHADOW E&M-EST. PATIENT-LVL III: ICD-10-PCS | Mod: PBBFAC,,, | Performed by: ORTHOPAEDIC SURGERY

## 2023-04-12 PROCEDURE — 20550 TENDON SHEATH: ICD-10-PCS | Mod: RT,S$GLB,, | Performed by: ORTHOPAEDIC SURGERY

## 2023-04-12 PROCEDURE — 1159F MED LIST DOCD IN RCRD: CPT | Mod: CPTII,S$GLB,, | Performed by: ORTHOPAEDIC SURGERY

## 2023-04-12 PROCEDURE — 20550 NJX 1 TENDON SHEATH/LIGAMENT: CPT | Mod: RT,S$GLB,, | Performed by: ORTHOPAEDIC SURGERY

## 2023-04-12 PROCEDURE — 3008F BODY MASS INDEX DOCD: CPT | Mod: CPTII,S$GLB,, | Performed by: ORTHOPAEDIC SURGERY

## 2023-04-12 PROCEDURE — 1159F PR MEDICATION LIST DOCUMENTED IN MEDICAL RECORD: ICD-10-PCS | Mod: CPTII,S$GLB,, | Performed by: ORTHOPAEDIC SURGERY

## 2023-04-12 PROCEDURE — 3008F PR BODY MASS INDEX (BMI) DOCUMENTED: ICD-10-PCS | Mod: CPTII,S$GLB,, | Performed by: ORTHOPAEDIC SURGERY

## 2023-04-12 PROCEDURE — 99203 OFFICE O/P NEW LOW 30 MIN: CPT | Mod: 25,S$GLB,, | Performed by: ORTHOPAEDIC SURGERY

## 2023-04-12 RX ORDER — TRIAMCINOLONE ACETONIDE 40 MG/ML
40 INJECTION, SUSPENSION INTRA-ARTICULAR; INTRAMUSCULAR
Status: DISCONTINUED | OUTPATIENT
Start: 2023-04-12 | End: 2023-04-12

## 2023-04-12 RX ORDER — TRIAMCINOLONE ACETONIDE 40 MG/ML
40 INJECTION, SUSPENSION INTRA-ARTICULAR; INTRAMUSCULAR
Status: DISCONTINUED | OUTPATIENT
Start: 2023-04-12 | End: 2023-04-12 | Stop reason: HOSPADM

## 2023-04-12 RX ORDER — TEMAZEPAM 30 MG/1
30 CAPSULE ORAL NIGHTLY PRN
COMMUNITY
Start: 2023-03-13 | End: 2023-06-30 | Stop reason: SDUPTHER

## 2023-04-12 RX ADMIN — TRIAMCINOLONE ACETONIDE 40 MG: 40 INJECTION, SUSPENSION INTRA-ARTICULAR; INTRAMUSCULAR at 08:04

## 2023-04-12 NOTE — PROCEDURES
Tendon Sheath    Date/Time: 4/12/2023 8:15 AM  Performed by: Sandeep Bang MD  Authorized by: Sandeep Bang MD     Consent Done?:  Yes (Verbal)  Indications:  Pain  Timeout: prior to procedure the correct patient, procedure, and site was verified    Prep: patient was prepped and draped in usual sterile fashion      Local anesthesia used?: Yes    Local anesthetic:  Lidocaine 2% without epinephrine  Anesthetic total (ml):  0.5    Location:  Ring finger  Site:  L ring flexor tendon sheath  Ultrasonic guidance for needle placement?: No    Needle size:  25 G  Approach:  Volar  Medications:  40 mg triamcinolone acetonide 40 mg/mL

## 2023-04-12 NOTE — PROCEDURES
Tendon Sheath    Date/Time: 4/12/2023 8:15 AM  Performed by: Sandeep Bang MD  Authorized by: Sandeep Bang MD     Consent Done?:  Yes (Verbal)  Indications:  Pain  Timeout: prior to procedure the correct patient, procedure, and site was verified    Prep: patient was prepped and draped in usual sterile fashion      Local anesthesia used?: Yes    Local anesthetic:  Lidocaine 2% without epinephrine  Anesthetic total (ml):  0.5    Location:  Long finger  Site:  L long flexor tendon sheath  Ultrasonic guidance for needle placement?: No    Needle size:  25 G  Medications:  40 mg triamcinolone acetonide 40 mg/mL

## 2023-04-12 NOTE — PROCEDURES
Tendon Sheath    Date/Time: 4/12/2023 8:15 AM  Performed by: Sandeep Bang MD  Authorized by: Sandeep Bang MD     Consent Done?:  Yes (Verbal)  Indications:  Pain  Timeout: prior to procedure the correct patient, procedure, and site was verified    Prep: patient was prepped and draped in usual sterile fashion      Local anesthesia used?: Yes    Local anesthetic:  Lidocaine 2% without epinephrine  Anesthetic total (ml):  0.5    Location:  Thumb  Site:  R thumb flexor tendon sheath  Needle size:  25 G  Medications:  40 mg triamcinolone acetonide 40 mg/mL

## 2023-04-12 NOTE — PROGRESS NOTES
Subjective:     Patient ID: Teresa Cazares is a 47 y.o. female.    Chief Complaint: Pain of the Right Hand and Pain of the Left Hand      HPI:  The patient is a 47-year-old female with trigger finger right thumb.  She requests injection today.    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    Nicotine dependence, cigarettes, in remission 3/26/2019    Obesity     Personal history of COVID-19     Recurrent major depression in partial remission 3/26/2019    Surgical wound infection (MRSA) 12/2019     Past Surgical History:   Procedure Laterality Date    APPLICATION OF LARGE EXTERNAL FIXATION DEVICE TO TIBIA Left 01/10/2019    Procedure: APPLICATION, EXTERNAL FIXATION DEVICE, LARGE, TIBIA;  Surgeon: Domenic Galvan MD;  Location: [facility] OR;  Service: Orthopedics;  Laterality: Left;    APPLICATION OF WOUND VACUUM-ASSISTED CLOSURE DEVICE Left 01/17/2019    Procedure: APPLICATION, WOUND VAC;  Surgeon: Barry Guzman MD;  Location: [facility] OR;  Service: Orthopedics;  Laterality: Left;    COLONOSCOPY N/A 09/02/2022    Procedure: COLONOSCOPY;  Surgeon: Yon Ridley MD;  Location: [facility] ENDO;  Service: Endoscopy;  Laterality: N/A;    FRACTURE SURGERY      C1 neck- halo    HERNIA REPAIR      HYSTERECTOMY  2009    tahbso    OOPHORECTOMY      OPEN REDUCTION AND INTERNAL FIXATION (ORIF) OF FRACTURE OF TIBIAL PLATEAU Left 01/17/2019    Procedure: ORIF, FRACTURE, TIBIA, PLATEAU;  Surgeon: Barry Guzman MD;  Location: [facility] OR;  Service: Orthopedics;  Laterality: Left;    REMOVAL OF EXTERNAL FIXATION DEVICE Left 01/17/2019    Procedure: REMOVAL, EXTERNAL FIXATION DEVICE;  Surgeon: Barry Guzman MD;  Location: [facility] OR;  Service: Orthopedics;  Laterality: Left;    TUBAL LIGATION  05/22/1997    UMBILICAL HERNIA REPAIR       Family History    Problem Relation Age of Onset    Breast cancer Mother 58    Cancer Mother         Breast cancer    Depression Mother         Depression    Diabetes Mother     Breast cancer Sister 46    Cancer Sister         Breast cancer    No Known Problems Father     Breast cancer Paternal Grandmother      Social History     Socioeconomic History    Marital status: Single    Number of children: 5   Occupational History    Occupation:    Tobacco Use    Smoking status: Former     Packs/day: 1.00     Years: 27.00     Pack years: 27.00     Types: Cigarettes     Start date: 9/2/2019     Quit date: 11/18/2019     Years since quitting: 3.4    Smokeless tobacco: Never   Substance and Sexual Activity    Alcohol use: Yes     Alcohol/week: 2.0 standard drinks     Types: 2 Glasses of wine per week     Comment: occasionally    Drug use: No    Sexual activity: Not Currently     Partners: Male     Birth control/protection: None     Medication List with Changes/Refills   Current Medications    ACITRETIN (SORIATANE) 10 MG CAPSULE    Take 1 capsule (10 mg total) by mouth once daily.    FLUOXETINE 20 MG CAPSULE    Take 1 capsule (20 mg total) by mouth once daily.    GABAPENTIN (NEURONTIN) 300 MG CAPSULE    Take 3 capsules (900 mg total) by mouth every evening.    NICOTINE, POLACRILEX, (NICORETTE) 2 MG GUM    Take 1 each (2 mg total) by mouth as needed (for cigarette cravings). Use as directed on packaging.    TAZAROTENE (TAZORAC) 0.1 % GEL GEL    Apply to thick plaques on feet every morning    TEMAZEPAM (RESTORIL) 30 MG CAPSULE    Take 30 mg by mouth nightly as needed.   Discontinued Medications    BUPROPION (WELLBUTRIN XL) 150 MG TB24 TABLET    Take 1 tablet (150 mg total) by mouth every morning.    GUAIFENESIN (MUCINEX) 600 MG 12 HR TABLET    Take 1 tablet (600 mg total) by mouth 2 (two) times daily.     Review of patient's allergies indicates:   Allergen Reactions    Macrobid [nitrofurantoin monohyd/m-cryst] Hives     Bactrim [sulfamethoxazole-trimethoprim]     Flagyl [metronidazole hcl] Hives     Review of Systems   Constitutional: Negative for malaise/fatigue.   HENT:  Negative for hearing loss.    Eyes:  Negative for double vision and visual disturbance.   Cardiovascular:  Negative for chest pain.   Respiratory:  Positive for shortness of breath and sleep disturbances due to breathing.    Endocrine: Negative for cold intolerance.   Hematologic/Lymphatic: Does not bruise/bleed easily.   Skin:  Negative for poor wound healing and suspicious lesions.   Musculoskeletal:  Positive for arthritis, joint pain and joint swelling. Negative for gout.   Gastrointestinal:  Negative for nausea and vomiting.   Genitourinary:  Negative for dysuria.   Neurological:  Negative for numbness, paresthesias and sensory change.   Psychiatric/Behavioral:  Positive for depression and substance abuse. Negative for memory loss. The patient has insomnia and is nervous/anxious.    Allergic/Immunologic: Negative for persistent infections.     Objective:   Body mass index is 36.21 kg/m².  There were no vitals filed for this visit.             General    Constitutional: She is oriented to person, place, and time. She appears well-developed and well-nourished. No distress.   HENT:   Head: Normocephalic.   Eyes: EOM are normal.   Pulmonary/Chest: Effort normal.   Neurological: She is oriented to person, place, and time.   Psychiatric: She has a normal mood and affect.             Right Hand/Wrist Exam     Inspection   Scars: Wrist - absent Hand -  absent  Effusion: Wrist - absent Hand -  absent    Pain   Hand - The patient exhibits pain of the thumb MCP.    Other     Neuorologic Exam    Median Distribution: normal  Ulnar Distribution: normal  Radial Distribution: normal    Comments:  The patient has active triggering right thumb with palpable nodule that move tendon excursion.          Vascular Exam       Capillary Refill  Right Hand: normal capillary  refill        Relevant imaging results reviewed and interpreted by me, discussed with the patient and / or family today radiographs right hand showed basal joint arthritis  Assessment:     Encounter Diagnosis   Name Primary?    Trigger finger, acquired Yes    Right trigger thumb   Left long and ring trigger fingers  Plan:     The patient injected right trigger thumb left long and ring trigger fingers each with 0.5 cc of Kenalog and 0.5 cc of 2% plain lidocaine under sterile technique.  She will wait at least 3 months between injections                Disclaimer: This note was prepared using a voice recognition system and is likely to have sound alike errors within the text.

## 2023-04-27 ENCOUNTER — OFFICE VISIT (OUTPATIENT)
Dept: INTERNAL MEDICINE | Facility: CLINIC | Age: 48
End: 2023-04-27
Payer: COMMERCIAL

## 2023-04-27 DIAGNOSIS — J30.2 SEASONAL ALLERGIES: Primary | ICD-10-CM

## 2023-04-27 PROCEDURE — 1159F MED LIST DOCD IN RCRD: CPT | Mod: CPTII,95,, | Performed by: FAMILY MEDICINE

## 2023-04-27 PROCEDURE — 1159F PR MEDICATION LIST DOCUMENTED IN MEDICAL RECORD: ICD-10-PCS | Mod: CPTII,95,, | Performed by: FAMILY MEDICINE

## 2023-04-27 PROCEDURE — 99213 PR OFFICE/OUTPT VISIT, EST, LEVL III, 20-29 MIN: ICD-10-PCS | Mod: 95,,, | Performed by: FAMILY MEDICINE

## 2023-04-27 PROCEDURE — 1160F RVW MEDS BY RX/DR IN RCRD: CPT | Mod: CPTII,95,, | Performed by: FAMILY MEDICINE

## 2023-04-27 PROCEDURE — 1160F PR REVIEW ALL MEDS BY PRESCRIBER/CLIN PHARMACIST DOCUMENTED: ICD-10-PCS | Mod: CPTII,95,, | Performed by: FAMILY MEDICINE

## 2023-04-27 PROCEDURE — 99213 OFFICE O/P EST LOW 20 MIN: CPT | Mod: 95,,, | Performed by: FAMILY MEDICINE

## 2023-04-27 RX ORDER — ASCORBIC ACID 500 MG
500 TABLET ORAL DAILY
COMMUNITY

## 2023-04-27 RX ORDER — LORATADINE 10 MG/1
10 TABLET ORAL DAILY
Refills: 0 | COMMUNITY
Start: 2023-04-27 | End: 2023-06-29

## 2023-04-27 RX ORDER — FLUTICASONE PROPIONATE 50 MCG
2 SPRAY, SUSPENSION (ML) NASAL DAILY
COMMUNITY
Start: 2023-04-27 | End: 2023-06-29

## 2023-04-27 NOTE — PROGRESS NOTES
Subjective:       Patient ID: Teresa Cazares is a 47 y.o. female.    Chief Complaint: URI (Started with post nasal drip and head congestion. Slight sore throat poss d/t drip. It started after working in the yard. They have been having extreme weather changes from hot to freezing and wet to dry. //She has not tested for COVID, but she is vaccinated. //Meds updated - she still uses nicorette to keep from smoking. //)    The patient location is: home  The chief complaint leading to consultation is: sore throat, PND, congestion    Visit type: audiovisual    Face to Face time with patient: 6 minutes (chart review started 11:56 am; video started 11:58 am; video ended 12:04 pm)  12 minutes of total time spent on the encounter, which includes face to face time and non-face to face time preparing to see the patient (eg, review of tests), Obtaining and/or reviewing separately obtained history, Documenting clinical information in the electronic or other health record, Independently interpreting results (not separately reported) and communicating results to the patient/family/caregiver, or Care coordination (not separately reported).     Each patient to whom he or she provides medical services by telemedicine is:  (1) informed of the relationship between the physician and patient and the respective role of any other health care provider with respect to management of the patient; and (2) notified that he or she may decline to receive medical services by telemedicine and may withdraw from such care at any time.    Virtual visit for URI symptoms. Reports dust exposure in the yard over the weekend. Reports weather hot and cold. Symptoms since yesterday morning.    Sore Throat   This is a new problem. The current episode started yesterday. The problem has been unchanged. Neither side of throat is experiencing more pain than the other. There has been no fever. The pain is at a severity of 4/10. The pain is mild. Associated  symptoms include congestion, coughing and a plugged ear sensation. Pertinent negatives include no abdominal pain, diarrhea, drooling, ear discharge, ear pain, headaches, hoarse voice, neck pain, shortness of breath, stridor, swollen glands, trouble swallowing or vomiting. She has had no exposure to strep or mono. She has tried NSAIDs and gargles for the symptoms. The treatment provided mild relief.   Review of Systems   Constitutional:  Negative for chills and fever.   HENT:  Positive for congestion and sore throat. Negative for drooling, ear discharge, ear pain, hoarse voice and trouble swallowing.    Respiratory:  Positive for cough. Negative for shortness of breath and stridor.    Gastrointestinal:  Negative for abdominal pain, diarrhea and vomiting.   Musculoskeletal:  Negative for neck pain.   Neurological:  Negative for headaches.       Objective:      Physical Exam  Constitutional:       General: She is not in acute distress.     Appearance: She is well-developed.   HENT:      Head: Normocephalic and atraumatic.   Eyes:      General: Lids are normal. No scleral icterus.     Extraocular Movements: Extraocular movements intact.      Conjunctiva/sclera: Conjunctivae normal.   Pulmonary:      Effort: Pulmonary effort is normal.      Comments: Able to speak in complete sentences without difficulty.  Neurological:      Mental Status: She is alert and oriented to person, place, and time.   Psychiatric:         Mood and Affect: Mood and affect normal.       Assessment:       1. Seasonal allergies        Plan:   1. Seasonal allergies  -     loratadine (CLARITIN) 10 mg tablet; Take 1 tablet (10 mg total) by mouth once daily.; Refill: 0  -     fluticasone propionate (FLONASE) 50 mcg/actuation nasal spray; 2 sprays (100 mcg total) by Each Nostril route once daily.      Likely seasonal allergies, but cannot rule out URI. Advised home covid test and isolate per CDC guidelines if positive.  Otherwise above allergy  medications, rest, fluids and follow up for in person evaluation if symptoms worsen/persist.  Patient expressed understanding and agreement with plan.

## 2023-05-10 DIAGNOSIS — G89.28 OTHER CHRONIC POSTPROCEDURAL PAIN: Chronic | ICD-10-CM

## 2023-05-10 NOTE — TELEPHONE ENCOUNTER
Refill Routing Note   Medication(s) are not appropriate for processing by [facility] for the following reason(s):      Medication outside of protocol    [facility] action(s):  Route None identified            Appointments  past 12m or future 3m with PCP    Date Provider   Last Visit   11/14/2022 EDGAR Chery MD   Next Visit   Visit date not found EDGAR Chery MD   ED visits in past 90 days: 0        Note composed:5:09 PM 05/10/2023

## 2023-05-10 NOTE — TELEPHONE ENCOUNTER
No care due was identified.  [facility] Embedded Care Due Messages. Reference number: [id number].   5/10/2023 3:57:51 PM CDT

## 2023-05-17 DIAGNOSIS — G89.28 OTHER CHRONIC POSTPROCEDURAL PAIN: Chronic | ICD-10-CM

## 2023-05-17 NOTE — TELEPHONE ENCOUNTER
No care due was identified.  [facility] Embedded Care Due Messages. Reference number: [id number].   5/17/2023 3:06:00 PM CDT

## 2023-05-17 NOTE — TELEPHONE ENCOUNTER
No care due was identified.  [facility] Embedded Care Due Messages. Reference number: [id number].   5/17/2023 3:05:54 PM CDT

## 2023-05-17 NOTE — TELEPHONE ENCOUNTER
Refill Routing Note   Medication(s) are not appropriate for processing by [facility] for the following reason(s):      Medication outside of protocol    [facility] action(s):  Route None identified            Appointments  past 12m or future 3m with PCP    Date Provider   Last Visit   11/14/2022 EDGAR Chery MD   Next Visit   Visit date not found EDGAR Chery MD   ED visits in past 90 days: 0        Note composed:3:25 PM 05/17/2023

## 2023-05-24 DIAGNOSIS — G89.28 OTHER CHRONIC POSTPROCEDURAL PAIN: Chronic | ICD-10-CM

## 2023-05-24 RX ORDER — TEMAZEPAM 30 MG/1
30 CAPSULE ORAL NIGHTLY PRN
OUTPATIENT
Start: 2023-05-24

## 2023-05-24 RX ORDER — GABAPENTIN 300 MG/1
900 CAPSULE ORAL NIGHTLY
Qty: 90 CAPSULE | Refills: 0 | Status: SHIPPED | OUTPATIENT
Start: 2023-05-24 | End: 2023-06-29 | Stop reason: SDUPTHER

## 2023-05-24 RX ORDER — GABAPENTIN 300 MG/1
CAPSULE ORAL
Qty: 90 CAPSULE | Refills: 0 | OUTPATIENT
Start: 2023-05-24

## 2023-05-24 RX ORDER — GABAPENTIN 300 MG/1
900 CAPSULE ORAL NIGHTLY
Qty: 90 CAPSULE | Refills: 2 | OUTPATIENT
Start: 2023-05-24 | End: 2023-08-22

## 2023-05-24 RX ORDER — TEMAZEPAM 30 MG/1
CAPSULE ORAL
Qty: 30 CAPSULE | Refills: 0 | OUTPATIENT
Start: 2023-05-24

## 2023-05-24 NOTE — TELEPHONE ENCOUNTER
REFILL REQUEST APPROVED  Requested Prescriptions   Pending Prescriptions Disp Refills    gabapentin (NEURONTIN) 300 MG capsule 90 capsule 2     Sig: Take 3 capsules (900 mg total) by mouth every evening.

## 2023-05-24 NOTE — TELEPHONE ENCOUNTER
No care due was identified.  [facility] Embedded Care Due Messages. Reference number: [id number].   5/24/2023 10:31:49 AM CDT

## 2023-05-24 NOTE — TELEPHONE ENCOUNTER
Refill not approved. REASON: She doesn't appear to be on temazepam. Prescription monitoring program shows the last time she filled this medicine was in July of last year. Because this is a controlled substance, reinitiating it will require an office visit.     TO MY TEAM:  She's due for her annual wellness visit in July. Schedule her for an annual wellness visit at her earliest convenience. If she wants to discuss the temazepam before then, she can schedule a virtual visit.

## 2023-05-24 NOTE — TELEPHONE ENCOUNTER
Refill Routing Note   Medication(s) are not appropriate for processing by [facility] for the following reason(s):      Medication outside of protocol    [facility] action(s):  Route None identified          Appointments  past 12m or future 3m with PCP    Date Provider   Last Visit   11/14/2022 EDGAR Chery MD   Next Visit   Visit date not found EDGAR Chery MD   ED visits in past 90 days: 0        Note composed:10:39 AM 05/24/2023

## 2023-05-25 NOTE — TELEPHONE ENCOUNTER
Response to request for refill of this medicine handled separately.  Requested Prescriptions   Pending Prescriptions Disp Refills    temazepam (RESTORIL) 30 mg capsule [Pharmacy Med Name: Temazepam 30 MG Oral Capsule] 30 capsule 0     Sig: TAKE 1 CAPSULE BY MOUTH NIGHTLY AS NEEDED FOR INSOMNIA    gabapentin (NEURONTIN) 300 MG capsule [Pharmacy Med Name: Gabapentin 300 MG Oral Capsule] 90 capsule 0     Sig: TAKE 3 CAPSULES BY MOUTH IN THE EVENING

## 2023-06-22 DIAGNOSIS — G89.28 OTHER CHRONIC POSTPROCEDURAL PAIN: Chronic | ICD-10-CM

## 2023-06-22 NOTE — TELEPHONE ENCOUNTER
No care due was identified.  [facility] Embedded Care Due Messages. Reference number: [id number].   6/22/2023 1:30:18 PM CDT

## 2023-06-22 NOTE — TELEPHONE ENCOUNTER
Refill Routing Note   Medication(s) are not appropriate for processing by [facility] for the following reason(s):      Medication outside of protocol    [facility] action(s):  Route None identified          Appointments  past 12m or future 3m with PCP    Date Provider   Last Visit   11/14/2022 EDGAR Chery MD   Next Visit   7/14/2023 EDGAR Chery MD   ED visits in past 90 days: 0        Note composed:1:44 PM 06/22/2023

## 2023-06-29 ENCOUNTER — OFFICE VISIT (OUTPATIENT)
Dept: INTERNAL MEDICINE | Facility: CLINIC | Age: 48
End: 2023-06-29
Payer: COMMERCIAL

## 2023-06-29 VITALS
DIASTOLIC BLOOD PRESSURE: 90 MMHG | HEART RATE: 88 BPM | WEIGHT: 203.06 LBS | BODY MASS INDEX: 37.37 KG/M2 | TEMPERATURE: 97 F | SYSTOLIC BLOOD PRESSURE: 150 MMHG | OXYGEN SATURATION: 99 % | HEIGHT: 62 IN

## 2023-06-29 DIAGNOSIS — Z00.00 PREVENTATIVE HEALTH CARE: Primary | ICD-10-CM

## 2023-06-29 DIAGNOSIS — F33.0 RECURRENT MILD MAJOR DEPRESSIVE DISORDER WITH ANXIETY: Chronic | ICD-10-CM

## 2023-06-29 DIAGNOSIS — F41.9 RECURRENT MILD MAJOR DEPRESSIVE DISORDER WITH ANXIETY: Chronic | ICD-10-CM

## 2023-06-29 DIAGNOSIS — E66.01 CLASS 2 SEVERE OBESITY DUE TO EXCESS CALORIES WITH SERIOUS COMORBIDITY AND BODY MASS INDEX (BMI) OF 37.0 TO 37.9 IN ADULT: Chronic | ICD-10-CM

## 2023-06-29 DIAGNOSIS — R03.0 ELEVATED BLOOD PRESSURE READING WITHOUT DIAGNOSIS OF HYPERTENSION: Chronic | ICD-10-CM

## 2023-06-29 DIAGNOSIS — F17.210 NICOTINE DEPENDENCE, CIGARETTES, UNCOMPLICATED: Chronic | ICD-10-CM

## 2023-06-29 DIAGNOSIS — M17.0 BILATERAL PRIMARY OSTEOARTHRITIS OF KNEE: Chronic | ICD-10-CM

## 2023-06-29 DIAGNOSIS — Z13.220 ENCOUNTER FOR LIPID SCREENING FOR CARDIOVASCULAR DISEASE: ICD-10-CM

## 2023-06-29 DIAGNOSIS — Z12.31 BREAST CANCER SCREENING BY MAMMOGRAM: ICD-10-CM

## 2023-06-29 DIAGNOSIS — F41.1 GENERALIZED ANXIETY DISORDER: Chronic | ICD-10-CM

## 2023-06-29 DIAGNOSIS — Z13.6 ENCOUNTER FOR LIPID SCREENING FOR CARDIOVASCULAR DISEASE: ICD-10-CM

## 2023-06-29 DIAGNOSIS — G89.28 OTHER CHRONIC POSTPROCEDURAL PAIN: Chronic | ICD-10-CM

## 2023-06-29 DIAGNOSIS — Z13.1 SCREENING FOR DIABETES MELLITUS: ICD-10-CM

## 2023-06-29 PROBLEM — I10 PRIMARY HYPERTENSION: Chronic | Status: ACTIVE | Noted: 2023-06-29

## 2023-06-29 PROCEDURE — 99396 PR PREVENTIVE VISIT,EST,40-64: ICD-10-PCS | Mod: S$GLB,,, | Performed by: FAMILY MEDICINE

## 2023-06-29 PROCEDURE — 99999 PR PBB SHADOW E&M-EST. PATIENT-LVL V: ICD-10-PCS | Mod: PBBFAC,,, | Performed by: FAMILY MEDICINE

## 2023-06-29 PROCEDURE — 3080F PR MOST RECENT DIASTOLIC BLOOD PRESSURE >= 90 MM HG: ICD-10-PCS | Mod: CPTII,S$GLB,, | Performed by: FAMILY MEDICINE

## 2023-06-29 PROCEDURE — 3044F HG A1C LEVEL LT 7.0%: CPT | Mod: CPTII,S$GLB,, | Performed by: FAMILY MEDICINE

## 2023-06-29 PROCEDURE — 99214 OFFICE O/P EST MOD 30 MIN: CPT | Mod: 25,S$GLB,, | Performed by: FAMILY MEDICINE

## 2023-06-29 PROCEDURE — 3008F BODY MASS INDEX DOCD: CPT | Mod: CPTII,S$GLB,, | Performed by: FAMILY MEDICINE

## 2023-06-29 PROCEDURE — 3008F PR BODY MASS INDEX (BMI) DOCUMENTED: ICD-10-PCS | Mod: CPTII,S$GLB,, | Performed by: FAMILY MEDICINE

## 2023-06-29 PROCEDURE — 99999 PR PBB SHADOW E&M-EST. PATIENT-LVL V: CPT | Mod: PBBFAC,,, | Performed by: FAMILY MEDICINE

## 2023-06-29 PROCEDURE — 99396 PREV VISIT EST AGE 40-64: CPT | Mod: S$GLB,,, | Performed by: FAMILY MEDICINE

## 2023-06-29 PROCEDURE — 3080F DIAST BP >= 90 MM HG: CPT | Mod: CPTII,S$GLB,, | Performed by: FAMILY MEDICINE

## 2023-06-29 PROCEDURE — 99214 PR OFFICE/OUTPT VISIT, EST, LEVL IV, 30-39 MIN: ICD-10-PCS | Mod: 25,S$GLB,, | Performed by: FAMILY MEDICINE

## 2023-06-29 PROCEDURE — 3077F PR MOST RECENT SYSTOLIC BLOOD PRESSURE >= 140 MM HG: ICD-10-PCS | Mod: CPTII,S$GLB,, | Performed by: FAMILY MEDICINE

## 2023-06-29 PROCEDURE — 3077F SYST BP >= 140 MM HG: CPT | Mod: CPTII,S$GLB,, | Performed by: FAMILY MEDICINE

## 2023-06-29 PROCEDURE — 3044F PR MOST RECENT HEMOGLOBIN A1C LEVEL <7.0%: ICD-10-PCS | Mod: CPTII,S$GLB,, | Performed by: FAMILY MEDICINE

## 2023-06-29 RX ORDER — VARENICLINE TARTRATE 1 MG/1
1 TABLET, FILM COATED ORAL 2 TIMES DAILY
Qty: 30 TABLET | Refills: 1 | Status: SHIPPED | OUTPATIENT
Start: 2023-06-29 | End: 2023-07-29

## 2023-06-29 RX ORDER — GABAPENTIN 300 MG/1
900 CAPSULE ORAL NIGHTLY
Qty: 90 CAPSULE | Refills: 1 | Status: SHIPPED | OUTPATIENT
Start: 2023-06-29 | End: 2023-10-09 | Stop reason: SDUPTHER

## 2023-06-29 RX ORDER — GABAPENTIN 300 MG/1
CAPSULE ORAL
Qty: 90 CAPSULE | Refills: 0 | OUTPATIENT
Start: 2023-06-29

## 2023-06-29 RX ORDER — MICONAZOLE NITRATE 2 %
2 CREAM (GRAM) TOPICAL
Qty: 100 EACH | Refills: 5 | Status: SHIPPED | OUTPATIENT
Start: 2023-06-29 | End: 2023-08-25 | Stop reason: SDUPTHER

## 2023-06-29 RX ORDER — NAPROXEN 500 MG/1
500 TABLET ORAL 2 TIMES DAILY PRN
Qty: 60 TABLET | Refills: 1 | Status: SHIPPED | OUTPATIENT
Start: 2023-06-29 | End: 2023-08-21 | Stop reason: ALTCHOICE

## 2023-06-29 RX ORDER — VARENICLINE TARTRATE 0.5 (11)-1
KIT ORAL
Qty: 42 TABLET | Refills: 0 | Status: SHIPPED | OUTPATIENT
Start: 2023-06-29 | End: 2023-08-15 | Stop reason: SDUPTHER

## 2023-06-29 RX ORDER — FLUOXETINE HYDROCHLORIDE 20 MG/1
20 CAPSULE ORAL DAILY
Qty: 90 CAPSULE | Refills: 3 | Status: SHIPPED | OUTPATIENT
Start: 2023-06-29 | End: 2023-08-15 | Stop reason: SDUPTHER

## 2023-06-29 NOTE — ASSESSMENT & PLAN NOTE
BP Readings from Last 6 Encounters:   06/29/23 (!) 150/90   11/23/22 132/73   11/14/22 (!) 125/58   09/02/22 (!) 125/58   07/27/22 104/68   04/01/22 120/80

## 2023-06-29 NOTE — PROGRESS NOTES
"ANNUAL WELLNESS VISIT (PREVENTIVE SERVICES)  6/29/23  8:40 AM CDT    Subjective   CHIEF COMPLAINT: Annual Exam      HEALTH MAINTENANCE INTERVENTIONS - UP TO DATE  Health Maintenance Topics with due status: Not Due       Topic Last Completion Date    TETANUS VACCINE 06/28/2016    Influenza Vaccine 12/16/2021    Colorectal Cancer Screening 09/02/2022    Hemoglobin A1c (Prediabetes) 06/30/2023    Lipid Panel 06/30/2023       HEALTH MAINTENANCE INTERVENTIONS - DUE OR DUE SOON  Health Maintenance Due   Topic Date Due    COVID-19 Vaccine (4 - Pfizer series) 02/15/2022    Mammogram  11/04/2022        She came in in for an annual wellness visit and preventive services. She is due for breast cancer screening by mammogram. She is due for diabetes screening and screening for dyslipidemia. Screening for obesity is positive. This problem is addressed elsewhere. Screening for high blood pressure is positive. This problem is addressed elsewhere. Screening for depression is positive. This problem is addressed elsewhere. She also came in for the evaluation and management of unrelated conditions, reported separately.      Review of Systems   Respiratory:  Negative for chest tightness and shortness of breath.    Cardiovascular:  Negative for chest pain.   Endocrine: Negative for polydipsia and polyuria.       Objective   Vitals:    06/29/23 0832 06/29/23 0839   BP: (!) 140/82 (!) 150/90   BP Location: Right arm Right arm   Patient Position: Sitting Sitting   BP Method: Large (Manual) Large (Manual)   Pulse: 88    Temp: 97.3 °F (36.3 °C)    SpO2: 99%    Weight: 92.1 kg (203 lb 0.7 oz)    Height: 5' 2" (1.575 m)    Physical Exam  Vitals reviewed.   Constitutional:       General: She is not in acute distress.     Appearance: Normal appearance. She is not ill-appearing or diaphoretic.   Cardiovascular:      Rate and Rhythm: Normal rate and regular rhythm.   Pulmonary:      Effort: Pulmonary effort is normal.      Breath sounds: Normal " "breath sounds.   Skin:     General: Skin is warm and dry.   Neurological:      Mental Status: She is alert and oriented to person, place, and time. Mental status is at baseline.   Psychiatric:         Mood and Affect: Mood normal.         Behavior: Behavior normal.         Judgment: Judgment normal.        Assessment and Plan   1. Preventative health care  -     Hemoglobin A1C; Future; Expected date: 06/29/2023  -     Comprehensive Metabolic Panel; Future; Expected date: 06/29/2023  -     Lipid Panel; Future; Expected date: 06/29/2023  -     Mammo Digital Screening Bilat w/ Alberto; Future; Expected date: 06/29/2023    2. Breast cancer screening by mammogram  -     Mammo Digital Screening Bilat w/ Alberto; Future; Expected date: 06/29/2023    3. Screening for diabetes mellitus  -     Hemoglobin A1C; Future; Expected date: 06/29/2023  -     Comprehensive Metabolic Panel; Future; Expected date: 06/29/2023    4. Encounter for lipid screening for cardiovascular disease  -     Lipid Panel; Future; Expected date: 06/29/2023       Documentation entered by me for this encounter may have been done in part using speech-recognition technology. Although I have made an effort to ensure accuracy, "sound like" errors may exist and should be interpreted in context.    ADDITIONAL E&M SERVICES PROVIDED - UNRELATED TO PREVENTIVE SERVICES  6/29/23  8:40 AM CDT    In addition to and unrelated to the preventive services provided this date, the following condition(s) were also evaluated and managed.    CHIEF COMPLAINT: Arthritis    HPI: She says that Gabapentin is working well for the treatment of her chronic post-operative pain, but she is experiencing worsening arthritic pain in her knees, exacerbated by a recent increase in the use of a treadmill for exercise. No injury was identified. She says that Naproxen seems to help. We discussed the risks and benefits of treatment options. She reports that she is struggling with nicotine dependence, " "smoking about half a pack of cigarettes per day. She mentioned that she previously did well on Chantix and wants to reinitiate that therapy. I also referred her to our smoking cessation program. She reports mild depression symptoms and moderate anxiety symptoms, exacerbated by insufficient sleep and financial stressors. She declined a referral to mental health counseling. She says that she is satisfied with fluooxetine for the treatment of her depression and anxiety and did not want to consider adding or changing medications.    PHYSICAL EXAM  Vitals:    06/29/23 0832 06/29/23 0839   BP: (!) 140/82 (!) 150/90   BP Location: Right arm Right arm   Patient Position: Sitting Sitting   BP Method: Large (Manual) Large (Manual)   Pulse: 88    Temp: 97.3 °F (36.3 °C)    SpO2: 99%    Weight: 92.1 kg (203 lb 0.7 oz)    Height: 5' 2" (1.575 m)    CONSTITUTIONAL: Vital signs noted. No apparent distress. Does not appear acutely ill or septic. Appears adequately hydrated.  PULM: Lungs clear. Breathing unlabored.  HEART: Regular.  DERM: Skin warm and moist with normal turgor.  NEURO: There are no gross focal motor deficits.  PSYCHIATRIC: Alert and conversant. Mood grossly neutral. Judgment and insight grossly intact.     Problem List Items Addressed This Visit       Generalized anxiety disorder (Chronic)    Relevant Medications    FLUoxetine 20 MG capsule    Nicotine dependence, cigarettes, uncomplicated (Chronic)    Relevant Medications    varenicline (CHANTIX) 1 mg Tab    varenicline (CHANTIX STARTING MONTH BOX) 0.5 mg (11)- 1 mg (42) tablet    nicotine, polacrilex, (NICORETTE) 2 mg Gum    Other Relevant Orders    Ambulatory referral/consult to Smoking Cessation Program    Chronic post-op pain (Chronic)    Overview     After 12/2019 LLE ORIF complicated by post-op MRSA wound infection requiring hardware removal and revision, incision and drainage of abscess, completed treatment with IV vancomycin         Relevant Medications    " "gabapentin (NEURONTIN) 300 MG capsule    Class 2 severe obesity due to excess calories with serious comorbidity and body mass index (BMI) of 37.0 to 37.9 in adult (Chronic)    Current Assessment & Plan     Wt Readings from Last 6 Encounters:   06/29/23 92.1 kg (203 lb 0.7 oz)   04/12/23 89.8 kg (198 lb)   11/23/22 90.1 kg (198 lb 10.2 oz)   09/02/22 85.5 kg (188 lb 7.9 oz)   07/27/22 86 kg (189 lb 9.5 oz)   04/01/22 90.9 kg (200 lb 6.4 oz)   Estimated body mass index is 37.14 kg/m² as calculated from the following:    Height as of this encounter: 5' 2" (1.575 m).    Weight as of this encounter: 92.1 kg (203 lb 0.7 oz).    Therapeutic lifestyle changes encouraged. Recommended referral to Lifestyle & Wellness clinic. Additional education/instructions were reviewed and shared with [facility]. Refer to After Visit Summary.          Relevant Orders    Ambulatory referral/consult to [facility]    Bilateral primary osteoarthritis of knee (Chronic)    Relevant Medications    naproxen (NAPROSYN) 500 MG tablet    Recurrent mild major depressive disorder with anxiety (Chronic)    Relevant Medications    FLUoxetine 20 MG capsule    Elevated blood pressure reading without diagnosis of hypertension (Chronic)    Current Assessment & Plan     BP Readings from Last 6 Encounters:   06/29/23 (!) 150/90   11/23/22 132/73   11/14/22 (!) 125/58   09/02/22 (!) 125/58   07/27/22 104/68   04/01/22 120/80              Unless noted herein, any chronic conditions are represented as and appear compensated/controlled and stable, and no other significant complaints or concerns were reported.    Documentation entered by me for this encounter may have been done in part using speech-recognition technology. Although I have made an effort to ensure accuracy, "sound like" errors may exist and should be interpreted in context.    "

## 2023-06-29 NOTE — PATIENT INSTRUCTIONS
2023        TO WHOM IT MAY CONCERN:     RE:  Teresa Grayson Debora ,  1975     Teresa was treated by me in the office today, 23.    Teresa reports that her symptoms started 2 days ago.    Please extend to Teresa all due courtesy and consideration and excuse her absence.    Sincerely,     JIM Chery MD

## 2023-06-29 NOTE — ASSESSMENT & PLAN NOTE
"Wt Readings from Last 6 Encounters:   06/29/23 92.1 kg (203 lb 0.7 oz)   04/12/23 89.8 kg (198 lb)   11/23/22 90.1 kg (198 lb 10.2 oz)   09/02/22 85.5 kg (188 lb 7.9 oz)   07/27/22 86 kg (189 lb 9.5 oz)   04/01/22 90.9 kg (200 lb 6.4 oz)     Estimated body mass index is 37.14 kg/m² as calculated from the following:    Height as of this encounter: 5' 2" (1.575 m).    Weight as of this encounter: 92.1 kg (203 lb 0.7 oz).    Therapeutic lifestyle changes encouraged. Recommended referral to Lifestyle & Wellness clinic. Additional education/instructions were reviewed and shared with [employer]. Refer to After Visit Summary.   "

## 2023-06-30 DIAGNOSIS — F51.04 PSYCHOPHYSIOLOGIC INSOMNIA: Primary | Chronic | ICD-10-CM

## 2023-06-30 NOTE — TELEPHONE ENCOUNTER
No care due was identified.  [facility] Embedded Care Due Messages. Reference number: [id number].   6/30/2023 3:56:25 PM CDT

## 2023-06-30 NOTE — TELEPHONE ENCOUNTER
Refill Routing Note   Medication(s) are not appropriate for processing by [facility] for the following reason(s):      Medication outside of protocol    [facility] action(s):  Route None identified          Appointments  past 12m or future 3m with PCP    Date Provider   Last Visit   6/29/2023 EDGAR Chery MD   Next Visit   Visit date not found EDGAR Chery MD   ED visits in past 90 days: 0        Note composed:4:51 PM 06/30/2023

## 2023-06-30 NOTE — TELEPHONE ENCOUNTER
----- Message from Minoo Parkinson sent at 6/30/2023  3:41 PM CDT -----  Contact: Teresa  .Type:  RX Refill Request    Who Called: Thisia   Refill or New Rx:new   RX Name and Strength:temazepam (RESTORIL) 30 mg capsule  How is the patient currently taking it? (ex. 1XDay):as prescribed   Is this a 30 day or 90 day RX:30  Preferred Pharmacy with phone number:.  [facility] [address] [place] [id number]  Phone: [phone] Fax: [phone]  Local or Mail Order:local  Ordering Provider:Dr. Chery   Would the patient rather a call back or a response via [facility]? call  Best Call Back Number:.[phone]   Additional Information:   Thanks  LR

## 2023-07-03 ENCOUNTER — PATIENT MESSAGE (OUTPATIENT)
Dept: INTERNAL MEDICINE | Facility: CLINIC | Age: 48
End: 2023-07-03
Payer: COMMERCIAL

## 2023-07-03 RX ORDER — TEMAZEPAM 30 MG/1
30 CAPSULE ORAL NIGHTLY PRN
Qty: 30 CAPSULE | Refills: 0 | Status: SHIPPED | OUTPATIENT
Start: 2023-07-03 | End: 2023-08-15 | Stop reason: ALTCHOICE

## 2023-07-03 NOTE — TELEPHONE ENCOUNTER
Teresa Damico.    I approved a refill for your temazepam and sent it to your requested pharmacy ([facility]).    At your last appointment I was under the impression that you were no longer taking temazepam, so I ordered you a quantity of only 30 doses. If you need more, I can order more.    I first need you to respond to this message with at least a couple of home blood pressure readings.    Please also include if it is your intent to continue taking the temazepam so I can take care of any needed refills.    Thanks for letting me care for you, and thanks for trusting [facility] with your healthcare needs.    All the best,    JIM Chery MD    Medications Ordered This Encounter   Medications    temazepam (RESTORIL) 30 mg capsule     Sig: Take 1 capsule (30 mg total) by mouth nightly as needed for Insomnia.     Dispense:  30 capsule     Refill:  0     Awaiting BP check for refills.

## 2023-07-11 ENCOUNTER — PATIENT MESSAGE (OUTPATIENT)
Dept: PRIMARY CARE CLINIC | Facility: CLINIC | Age: 48
End: 2023-07-11
Payer: COMMERCIAL

## 2023-07-11 ENCOUNTER — TELEPHONE (OUTPATIENT)
Dept: PRIMARY CARE CLINIC | Facility: CLINIC | Age: 48
End: 2023-07-11
Payer: COMMERCIAL

## 2023-07-17 ENCOUNTER — TELEPHONE (OUTPATIENT)
Dept: SMOKING CESSATION | Facility: CLINIC | Age: 48
End: 2023-07-17
Payer: COMMERCIAL

## 2023-07-17 NOTE — TELEPHONE ENCOUNTER
Called for Smoking Cessation first visit (intake). Pt did not answer phone. Pt's voicemail box is not set up.

## 2023-07-21 ENCOUNTER — OFFICE VISIT (OUTPATIENT)
Dept: PRIMARY CARE CLINIC | Facility: CLINIC | Age: 48
End: 2023-07-21
Payer: COMMERCIAL

## 2023-07-21 DIAGNOSIS — M79.644 THUMB PAIN, RIGHT: Primary | ICD-10-CM

## 2023-07-21 PROCEDURE — 1160F PR REVIEW ALL MEDS BY PRESCRIBER/CLIN PHARMACIST DOCUMENTED: ICD-10-PCS | Mod: CPTII,95,, | Performed by: NURSE PRACTITIONER

## 2023-07-21 PROCEDURE — 1159F PR MEDICATION LIST DOCUMENTED IN MEDICAL RECORD: ICD-10-PCS | Mod: CPTII,95,, | Performed by: NURSE PRACTITIONER

## 2023-07-21 PROCEDURE — 99213 PR OFFICE/OUTPT VISIT, EST, LEVL III, 20-29 MIN: ICD-10-PCS | Mod: 95,,, | Performed by: NURSE PRACTITIONER

## 2023-07-21 PROCEDURE — 1159F MED LIST DOCD IN RCRD: CPT | Mod: CPTII,95,, | Performed by: NURSE PRACTITIONER

## 2023-07-21 PROCEDURE — 3044F HG A1C LEVEL LT 7.0%: CPT | Mod: CPTII,95,, | Performed by: NURSE PRACTITIONER

## 2023-07-21 PROCEDURE — 99213 OFFICE O/P EST LOW 20 MIN: CPT | Mod: 95,,, | Performed by: NURSE PRACTITIONER

## 2023-07-21 PROCEDURE — 1160F RVW MEDS BY RX/DR IN RCRD: CPT | Mod: CPTII,95,, | Performed by: NURSE PRACTITIONER

## 2023-07-21 PROCEDURE — 3044F PR MOST RECENT HEMOGLOBIN A1C LEVEL <7.0%: ICD-10-PCS | Mod: CPTII,95,, | Performed by: NURSE PRACTITIONER

## 2023-07-21 RX ORDER — TRAMADOL HYDROCHLORIDE 50 MG/1
50 TABLET ORAL EVERY 6 HOURS
Qty: 12 TABLET | Refills: 0 | Status: SHIPPED | OUTPATIENT
Start: 2023-07-21 | End: 2023-08-21 | Stop reason: ALTCHOICE

## 2023-07-21 NOTE — PROGRESS NOTES
Assessment & Plan  Problem List Items Addressed This Visit    None  Visit Diagnoses       Thumb pain, right    -  Primary  -[place]  Reviewed   -We discussed effective administration of Tramadol, adverse effects and side effects  - Encouraged to follow-up with Orthopedic upon returning to [place]    Relevant Medications    traMADoL (ULTRAM) 50 mg tablet              Health Maintenance reviewed: Deferred per patient     The patient location is:  Appears to be at home  The chief complaint leading to consultation is: noted below  Visit type: Virtual visit with synchronous audio and video  Total time spent with patient: 20 minutes  Each patient to whom he or she provides medical services by telemedicine is:  (1) informed of the relationship between the physician and patient and the respective role of any other health care provider with respect to management of the patient; and (2) notified that he or she may decline to receive medical services by telemedicine and may withdraw from such care at any time.    20+ minutes of total time spent on the encounter, which includes face to face time and non-face to face time preparing to see the patient (eg, review of tests), Obtaining and/or reviewing separately obtained history, documenting clinical information in the electronic or other health record, independently interpreting results (not separately reported) and communicating results to the patient/family/caregiver, or Care coordination (not separately reported).      Follow-up: No follow-ups on file.      Chief Complaint  Chief Complaint   Patient presents with    Pain     Thumb on right hand in pain radiating to shoulder for x 2 weeks.       HPI  Janeneia Renuka Cazares is a 47 y.o. female with multiple medical diagnoses as listed in the medical history and problem list that presents for Right Thumb Pain via virtual visit.  This patient is new to me.    Right Thumb Pain: Hx of trigger finger at the right thumb. Received  Kenalog injection approximately three months ago for trigger finger. She reports her thumb locks up. Rates pain 10/10 No recently trauma or injuries to the thumb. Writes and uses her right hand often that may be inducing current exacerbation. She is originally from [place] however, she is currently located in [place], [place] for school and will not be back to [place] for the next three days. Ibuprofen previously prescribed is not providing relief.     Pertinent negatives are chest pain/palpitations/tightness/discomfort, SOB, GI upset, urinary/bowel changes, unexplained weight loss/gain, dizziness/headaches/syncope.          ALLERGIES AND MEDICATIONS: updated and reviewed.  Review of patient's allergies indicates:   Allergen Reactions    Macrobid [nitrofurantoin monohyd/m-cryst] Hives    Bactrim [sulfamethoxazole-trimethoprim]     Flagyl [metronidazole hcl] Hives     Current Outpatient Medications   Medication Sig Dispense Refill    acitretin (SORIATANE) 10 MG capsule Take 1 capsule (10 mg total) by mouth once daily. 30 capsule 3    ascorbic acid, vitamin C, (VITAMIN C) 500 MG tablet Take 500 mg by mouth once daily.      ELDERBERRY FRUIT ORAL Take 1 capsule by mouth once daily.      FLUoxetine 20 MG capsule Take 1 capsule (20 mg total) by mouth once daily. 90 capsule 3    gabapentin (NEURONTIN) 300 MG capsule Take 3 capsules (900 mg total) by mouth every evening. 90 capsule 1    naproxen (NAPROSYN) 500 MG tablet Take 1 tablet (500 mg total) by mouth 2 (two) times daily as needed (for pain from arthritis of knees). 60 tablet 1    nicotine, polacrilex, (NICORETTE) 2 mg Gum Take 1 each (2 mg total) by mouth as needed (for cigarette cravings). Use as directed on packaging. 100 each 5    temazepam (RESTORIL) 30 mg capsule Take 1 capsule (30 mg total) by mouth nightly as needed for Insomnia. 30 capsule 0    varenicline (CHANTIX STARTING MONTH BOX) 0.5 mg (11)- 1 mg (42) tablet Take one 0.5 mg tab by mouth  once daily x 3 days,then increase to one 0.5 mg tab twice daily x 4 days,then increase to one 1 mg tab twice daily. 42 tablet 0    varenicline (CHANTIX) 1 mg Tab Take 1 tablet (1 mg total) by mouth 2 (two) times daily. 30 tablet 1    traMADoL (ULTRAM) 50 mg tablet Take 1 tablet (50 mg total) by mouth every 6 (six) hours. 12 tablet 0     No current facility-administered medications for this visit.         ROS  Review of Systems   Constitutional:  Negative for activity change, appetite change, chills, fatigue, fever and unexpected weight change.   HENT:  Negative for congestion, ear pain, hearing loss, postnasal drip, rhinorrhea, sinus pressure, sneezing, sore throat and trouble swallowing.    Eyes:  Negative for discharge and visual disturbance.   Respiratory:  Negative for chest tightness, shortness of breath and wheezing.    Cardiovascular:  Negative for chest pain and palpitations.   Gastrointestinal:  Negative for abdominal pain, blood in stool, constipation, diarrhea, nausea and vomiting.   Endocrine: Negative for polydipsia and polyuria.   Genitourinary:  Negative for difficulty urinating, dysuria, hematuria and menstrual problem.   Musculoskeletal:  Positive for arthralgias and joint swelling. Negative for neck pain.   Neurological:  Negative for weakness and headaches.   Psychiatric/Behavioral:  Negative for confusion and dysphoric mood.          Physical Exam  Physical Exam  Constitutional:       General: She is not in acute distress.  HENT:      Head: Normocephalic and atraumatic.      Right Ear: External ear normal.      Left Ear: External ear normal.      Nose: Nose normal.   Eyes:      Pupils: Pupils are equal, round, and reactive to light.   Cardiovascular:      Rate and Rhythm: Normal rate and regular rhythm.   Pulmonary:      Effort: Pulmonary effort is normal. No respiratory distress.      Breath sounds: Normal breath sounds. No wheezing.   Abdominal:      General: Bowel sounds are normal.       Palpations: Abdomen is soft.   Musculoskeletal:      Right hand: Tenderness present.        Arms:       Cervical back: Neck supple.   Lymphadenopathy:      Cervical: No cervical adenopathy.   Skin:     General: Skin is warm and dry.   Neurological:      Mental Status: She is alert and oriented to person, place, and time.

## 2023-07-26 ENCOUNTER — NURSE TRIAGE (OUTPATIENT)
Dept: ADMINISTRATIVE | Facility: CLINIC | Age: 48
End: 2023-07-26
Payer: COMMERCIAL

## 2023-07-26 ENCOUNTER — PATIENT MESSAGE (OUTPATIENT)
Dept: INTERNAL MEDICINE | Facility: CLINIC | Age: 48
End: 2023-07-26
Payer: COMMERCIAL

## 2023-07-26 NOTE — TELEPHONE ENCOUNTER
Patient Call transferred to [facility] Triage Services due to patient's extreme inability to be consoled at this time. Patient is crying, very upset and states she has received bad news from the office staff of Dr. JIM Chery. Patient states she was in the middle of a final exam when the call came causing her to fail her test. Patient states she was told by the clinic staff to check her portal for Dr. Chery's prior response. Patient states frustration and inability to access Dr. Chery's responses to her.     Triage RN sent a Secure Message to Dr. JIM Chery who responded and stated he is currently on vacation but sent a message to his office staff to assist with helping patient.     Patient advised that message has been sent to Dr. Chery. Patient continues to cry and is inconsolable. Patient requested Triage RN hold while outreach to Dr. Chery's staff is completed. Patient hung up call and Triage RN unsuccessful with return outreach to patient.     Office Staff, Lizy Puente MA responded in the Secure Chat that she has the patient on the line at this time and has activated EMS/911 for a wellness check and EMS/911 has been dispatched. Case encounter closed at this time.                 Reason for Disposition   [1] [place] worried caller AND [2] can't be reassured by triager    Additional Information   Negative: Violent behavior, or threatening to physically hurt or kill someone   Negative: [1] Caller is very confused AND [2] no other adult (e.g., friend or family member) available   Negative: Sounds like a life-threatening emergency to the triager   Negative: Child abuse suspected   Negative: Suicide concerns   Negative: Patient sounds very sick or weak to the triager   Negative: [1] [place] worried caller AND [2] second call within 4 hours about the same medical problem   Negative: [1] [place] worried caller AND [2] third call within 48 hours about the same medical problem    Protocols  used: [facility] Caller-[facility]

## 2023-07-26 NOTE — TELEPHONE ENCOUNTER
contacted pt to inform her about a pt portal message from PCP. pt started venting about a exam that she has been up all night studying for. pt states she is abouit to be evicted from her home, she about lose her car. pt is stating she is lonely and she does not have anyone. Contacted 911 to do well check on pt. Asked the pt to remain on the line until ems arrived. Pt hung up the call and thanked me for my help.

## 2023-08-01 ENCOUNTER — HOSPITAL ENCOUNTER (OUTPATIENT)
Dept: RADIOLOGY | Facility: HOSPITAL | Age: 48
Discharge: HOME | End: 2023-08-01
Attending: FAMILY MEDICINE
Payer: COMMERCIAL

## 2023-08-01 DIAGNOSIS — Z12.31 BREAST CANCER SCREENING BY MAMMOGRAM: ICD-10-CM

## 2023-08-01 DIAGNOSIS — Z00.00 PREVENTATIVE HEALTH CARE: ICD-10-CM

## 2023-08-01 PROCEDURE — 77063 BREAST TOMOSYNTHESIS BI: CPT | Mod: 26,,, | Performed by: RADIOLOGY

## 2023-08-01 PROCEDURE — 77067 MAMMO DIGITAL SCREENING BILAT WITH TOMO: ICD-10-PCS | Mod: 26,,, | Performed by: RADIOLOGY

## 2023-08-01 PROCEDURE — 77067 SCR MAMMO BI INCL CAD: CPT | Mod: TC

## 2023-08-01 PROCEDURE — 77063 MAMMO DIGITAL SCREENING BILAT WITH TOMO: ICD-10-PCS | Mod: 26,,, | Performed by: RADIOLOGY

## 2023-08-01 PROCEDURE — 77067 SCR MAMMO BI INCL CAD: CPT | Mod: 26,,, | Performed by: RADIOLOGY

## 2023-08-04 ENCOUNTER — OFFICE VISIT (OUTPATIENT)
Dept: ORTHOPEDICS | Facility: CLINIC | Age: 48
End: 2023-08-04
Payer: COMMERCIAL

## 2023-08-04 VITALS — HEIGHT: 62 IN | BODY MASS INDEX: 37.36 KG/M2 | WEIGHT: 203 LBS

## 2023-08-04 DIAGNOSIS — M65.30 TRIGGER FINGER, ACQUIRED: Primary | ICD-10-CM

## 2023-08-04 DIAGNOSIS — Z01.818 PREOP TESTING: Primary | ICD-10-CM

## 2023-08-04 PROCEDURE — 1159F MED LIST DOCD IN RCRD: CPT | Mod: CPTII,S$GLB,, | Performed by: ORTHOPAEDIC SURGERY

## 2023-08-04 PROCEDURE — 3044F HG A1C LEVEL LT 7.0%: CPT | Mod: CPTII,S$GLB,, | Performed by: ORTHOPAEDIC SURGERY

## 2023-08-04 PROCEDURE — 99214 OFFICE O/P EST MOD 30 MIN: CPT | Mod: 25,S$GLB,, | Performed by: ORTHOPAEDIC SURGERY

## 2023-08-04 PROCEDURE — 99999 PR PBB SHADOW E&M-EST. PATIENT-LVL III: CPT | Mod: PBBFAC,,, | Performed by: ORTHOPAEDIC SURGERY

## 2023-08-04 PROCEDURE — 20550 NJX 1 TENDON SHEATH/LIGAMENT: CPT | Mod: 50,S$GLB,, | Performed by: ORTHOPAEDIC SURGERY

## 2023-08-04 PROCEDURE — 3044F PR MOST RECENT HEMOGLOBIN A1C LEVEL <7.0%: ICD-10-PCS | Mod: CPTII,S$GLB,, | Performed by: ORTHOPAEDIC SURGERY

## 2023-08-04 PROCEDURE — 1160F PR REVIEW ALL MEDS BY PRESCRIBER/CLIN PHARMACIST DOCUMENTED: ICD-10-PCS | Mod: CPTII,S$GLB,, | Performed by: ORTHOPAEDIC SURGERY

## 2023-08-04 PROCEDURE — 99999 PR PBB SHADOW E&M-EST. PATIENT-LVL III: ICD-10-PCS | Mod: PBBFAC,,, | Performed by: ORTHOPAEDIC SURGERY

## 2023-08-04 PROCEDURE — 99214 PR OFFICE/OUTPT VISIT, EST, LEVL IV, 30-39 MIN: ICD-10-PCS | Mod: 25,S$GLB,, | Performed by: ORTHOPAEDIC SURGERY

## 2023-08-04 PROCEDURE — 3008F BODY MASS INDEX DOCD: CPT | Mod: CPTII,S$GLB,, | Performed by: ORTHOPAEDIC SURGERY

## 2023-08-04 PROCEDURE — 1160F RVW MEDS BY RX/DR IN RCRD: CPT | Mod: CPTII,S$GLB,, | Performed by: ORTHOPAEDIC SURGERY

## 2023-08-04 PROCEDURE — 3008F PR BODY MASS INDEX (BMI) DOCUMENTED: ICD-10-PCS | Mod: CPTII,S$GLB,, | Performed by: ORTHOPAEDIC SURGERY

## 2023-08-04 PROCEDURE — 20550 TENDON SHEATH: ICD-10-PCS | Mod: 50,S$GLB,, | Performed by: ORTHOPAEDIC SURGERY

## 2023-08-04 PROCEDURE — 1159F PR MEDICATION LIST DOCUMENTED IN MEDICAL RECORD: ICD-10-PCS | Mod: CPTII,S$GLB,, | Performed by: ORTHOPAEDIC SURGERY

## 2023-08-04 RX ORDER — TRIAMCINOLONE ACETONIDE 40 MG/ML
40 INJECTION, SUSPENSION INTRA-ARTICULAR; INTRAMUSCULAR
Status: DISCONTINUED | OUTPATIENT
Start: 2023-08-04 | End: 2023-08-04 | Stop reason: HOSPADM

## 2023-08-04 RX ADMIN — TRIAMCINOLONE ACETONIDE 40 MG: 40 INJECTION, SUSPENSION INTRA-ARTICULAR; INTRAMUSCULAR at 07:08

## 2023-08-04 NOTE — H&P (VIEW-ONLY)
Subjective:     Patient ID: Teresa Cazares is a 47 y.o. female.    Chief Complaint: Pain of the Right Hand and Pain of the Left Hand      HPI:  The patient is a 47-year-old female status post right thumb and left long and ring finger injection for trigger finger 04/12/2023.  At this point she has a left trigger thumb and left ring trigger finger and a right trigger thumb.  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    Nicotine dependence, cigarettes, in remission 3/26/2019    Obesity     Personal history of COVID-19     Primary hypertension 6/29/2023    Recurrent major depression in partial remission 3/26/2019    Surgical wound infection (MRSA) 12/2019     Past Surgical History:   Procedure Laterality Date    APPLICATION OF LARGE EXTERNAL FIXATION DEVICE TO TIBIA Left 01/10/2019    Procedure: APPLICATION, EXTERNAL FIXATION DEVICE, LARGE, TIBIA;  Surgeon: Domenic Galvan MD;  Location: [facility] OR;  Service: Orthopedics;  Laterality: Left;    APPLICATION OF WOUND VACUUM-ASSISTED CLOSURE DEVICE Left 01/17/2019    Procedure: APPLICATION, WOUND VAC;  Surgeon: Barry Guzman MD;  Location: [facility] OR;  Service: Orthopedics;  Laterality: Left;    COLONOSCOPY N/A 09/02/2022    Procedure: COLONOSCOPY;  Surgeon: Yon Ridley MD;  Location: [facility] ENDO;  Service: Endoscopy;  Laterality: N/A;    FRACTURE SURGERY      C1 neck- halo    HERNIA REPAIR      HYSTERECTOMY  2009    tahbso    OOPHORECTOMY Bilateral     OPEN REDUCTION AND INTERNAL FIXATION (ORIF) OF FRACTURE OF TIBIAL PLATEAU Left 01/17/2019    Procedure: ORIF, FRACTURE, TIBIA, PLATEAU;  Surgeon: Barry Guzman MD;  Location: [facility] OR;  Service: Orthopedics;  Laterality: Left;    REMOVAL OF EXTERNAL FIXATION  DEVICE Left 01/17/2019    Procedure: REMOVAL, EXTERNAL FIXATION DEVICE;  Surgeon: Barry Guzman MD;  Location: [facility] OR;  Service: Orthopedics;  Laterality: Left;    TUBAL LIGATION  05/22/1997    UMBILICAL HERNIA REPAIR       Family History   Problem Relation Age of Onset    Breast cancer Mother 58    Cancer Mother         Breast cancer    Depression Mother         Depression    Diabetes Mother     Breast cancer Sister 46    Cancer Sister         Breast cancer    No Known Problems Father     Breast cancer Paternal Grandmother      Social History     Socioeconomic History    Marital status: Single    Number of children: 5   Occupational History    Occupation:    Tobacco Use    Smoking status: Former     Current packs/day: 0.00     Average packs/day: 1 pack/day for 27.0 years (27.0 ttl pk-yrs)     Types: Cigarettes     Start date: 9/2/2019     Quit date: 11/18/2019     Years since quitting: 3.7     Passive exposure: Past    Smokeless tobacco: Never   Substance and Sexual Activity    Alcohol use: Yes     Alcohol/week: 2.0 standard drinks of alcohol     Types: 2 Glasses of wine per week     Comment: occasionally    Drug use: No    Sexual activity: Not Currently     Partners: Male     Birth control/protection: None     Medication List with Changes/Refills   Current Medications    ACITRETIN (SORIATANE) 10 MG CAPSULE    Take 1 capsule (10 mg total) by mouth once daily.    ASCORBIC ACID, VITAMIN C, (VITAMIN C) 500 MG TABLET    Take 500 mg by mouth once daily.    ELDERBERRY FRUIT ORAL    Take 1 capsule by mouth once daily.    FLUOXETINE 20 MG CAPSULE    Take 1 capsule (20 mg total) by mouth once daily.    GABAPENTIN (NEURONTIN) 300 MG CAPSULE    Take 3 capsules (900 mg total) by mouth every evening.    NAPROXEN (NAPROSYN) 500 MG TABLET    Take 1 tablet (500 mg total) by mouth 2 (two) times daily as needed (for pain from arthritis of knees).    NICOTINE, POLACRILEX, (NICORETTE) 2 MG GUM    Take 1 each (2 mg  total) by mouth as needed (for cigarette cravings). Use as directed on packaging.    TEMAZEPAM (RESTORIL) 30 MG CAPSULE    Take 1 capsule (30 mg total) by mouth nightly as needed for Insomnia.    TRAMADOL (ULTRAM) 50 MG TABLET    Take 1 tablet (50 mg total) by mouth every 6 (six) hours.    VARENICLINE (CHANTIX STARTING MONTH BOX) 0.5 MG (11)- 1 MG (42) TABLET    Take one 0.5 mg tab by mouth once daily x 3 days,then increase to one 0.5 mg tab twice daily x 4 days,then increase to one 1 mg tab twice daily.     Review of patient's allergies indicates:   Allergen Reactions    Macrobid [nitrofurantoin monohyd/m-cryst] Hives    Bactrim [sulfamethoxazole-trimethoprim]     Flagyl [metronidazole hcl] Hives     Review of Systems   Constitutional: Negative for malaise/fatigue.   HENT:  Negative for hearing loss.    Eyes:  Negative for double vision and visual disturbance.   Cardiovascular:  Negative for chest pain.   Respiratory:  Positive for sleep disturbances due to breathing. Negative for shortness of breath.    Endocrine: Negative for cold intolerance.   Hematologic/Lymphatic: Does not bruise/bleed easily.   Skin:  Negative for poor wound healing and suspicious lesions.   Musculoskeletal:  Positive for arthritis, joint pain and joint swelling. Negative for gout.   Gastrointestinal:  Negative for nausea and vomiting.   Genitourinary:  Negative for dysuria.   Neurological:  Negative for numbness, paresthesias and sensory change.   Psychiatric/Behavioral:  Positive for depression and substance abuse. Negative for memory loss. The patient has insomnia and is nervous/anxious.    Allergic/Immunologic: Negative for persistent infections.       Objective:   Body mass index is 37.13 kg/m².  There were no vitals filed for this visit.             General    Constitutional: She is oriented to person, place, and time. She appears well-developed and well-nourished. No distress.   HENT:   Head: Normocephalic.   Mouth/Throat: Oropharynx  is clear and moist.   Eyes: EOM are normal.   Cardiovascular:  Normal rate.            Pulmonary/Chest: Effort normal.   Abdominal: Soft.   Neurological: She is alert and oriented to person, place, and time. No cranial nerve deficit.   Psychiatric: She has a normal mood and affect.             Right Hand/Wrist Exam     Inspection   Scars: Wrist - absent Hand -  absent  Effusion: Wrist - absent Hand -  absent    Pain   Hand - The patient exhibits pain of the thumb MCP.    Other     Neuorologic Exam    Median Distribution: normal  Ulnar Distribution: normal  Radial Distribution: normal    Comments:  The patient has active triggering right thumb with a palpable nodule that moves with tendon excursion.      Left Hand/Wrist Exam     Inspection   Scars: Wrist - absent Hand -  absent  Effusion: Wrist - absent Hand -  absent    Pain   Hand - The patient exhibits pain of the thumb MCP and ring MCP.    Other     Sensory Exam  Median Distribution: normal  Ulnar Distribution: normal  Radial Distribution: normal    Comments:  The patient has active triggering left thumb and left ring finger with palpable nodules that move with tendon excursion.          Vascular Exam       Capillary Refill  Right Hand: normal capillary refill  Left Hand: normal capillary refill          Relevant imaging results reviewed and interpreted by me, discussed with the patient and / or family today radiographs right hand were normal other than osteoarthritic change  Assessment:     Encounter Diagnosis   Name Primary?    Trigger finger, acquired Yes    Left thumb and ring finger and right thumb    Plan:     The patient injected left thumb and ring fingers trigger fingers and right thumb each with 0.5 cc of Kenalog and 0.5 cc of 2% plain lidocaine.  She was counseled regarding a right trigger thumb release.  Risk complications and alternatives were discussed including the risk of infection, anesthetic risk, injury to nerves and vessels, loss of motion,  and possible need for additional surgeries were discussed.  She seems to understand and agree to that surgery.  All questions were answered.                Disclaimer: This note was prepared using a voice recognition system and is likely to have sound alike errors within the text.

## 2023-08-04 NOTE — PROGRESS NOTES
Subjective:     Patient ID: Teresa Cazares is a 47 y.o. female.    Chief Complaint: Pain of the Right Hand and Pain of the Left Hand      HPI:  The patient is a 47-year-old female status post right thumb and left long and ring finger injection for trigger finger 04/12/2023.  At this point she has a left trigger thumb and left ring trigger finger and a right trigger thumb.  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    Nicotine dependence, cigarettes, in remission 3/26/2019    Obesity     Personal history of COVID-19     Primary hypertension 6/29/2023    Recurrent major depression in partial remission 3/26/2019    Surgical wound infection (MRSA) 12/2019     Past Surgical History:   Procedure Laterality Date    APPLICATION OF LARGE EXTERNAL FIXATION DEVICE TO TIBIA Left 01/10/2019    Procedure: APPLICATION, EXTERNAL FIXATION DEVICE, LARGE, TIBIA;  Surgeon: Domenic Galvan MD;  Location: [facility] [place];  Service: Orthopedics;  Laterality: Left;    APPLICATION OF WOUND VACUUM-ASSISTED CLOSURE DEVICE Left 01/17/2019    Procedure: APPLICATION, WOUND VAC;  Surgeon: Barry Guzman MD;  Location: [facility];  Service: Orthopedics;  Laterality: Left;    COLONOSCOPY N/A 09/02/2022    Procedure: COLONOSCOPY;  Surgeon: Yon Ridley MD;  Location: [facility] ENDO;  Service: Endoscopy;  Laterality: N/A;    FRACTURE SURGERY      C1 neck- halo    HERNIA REPAIR      HYSTERECTOMY  2009    tahbso    OOPHORECTOMY Bilateral     OPEN REDUCTION AND INTERNAL FIXATION (ORIF) OF FRACTURE OF TIBIAL PLATEAU Left 01/17/2019    Procedure: ORIF, FRACTURE, TIBIA, PLATEAU;  Surgeon: Barry Guzman MD;  Location: [facility];  Service: Orthopedics;  Laterality: Left;    REMOVAL OF EXTERNAL FIXATION  DEVICE Left 01/17/2019    Procedure: REMOVAL, EXTERNAL FIXATION DEVICE;  Surgeon: Barry Guzman MD;  Location: [facility] OR;  Service: Orthopedics;  Laterality: Left;    TUBAL LIGATION  05/22/1997    UMBILICAL HERNIA REPAIR       Family History   Problem Relation Age of Onset    Breast cancer Mother 58    Cancer Mother         Breast cancer    Depression Mother         Depression    Diabetes Mother     Breast cancer Sister 46    Cancer Sister         Breast cancer    No Known Problems Father     Breast cancer Paternal Grandmother      Social History     Socioeconomic History    Marital status: Single    Number of children: 5   Occupational History    Occupation:    Tobacco Use    Smoking status: Former     Current packs/day: 0.00     Average packs/day: 1 pack/day for 27.0 years (27.0 ttl pk-yrs)     Types: Cigarettes     Start date: 9/2/2019     Quit date: 11/18/2019     Years since quitting: 3.7     Passive exposure: Past    Smokeless tobacco: Never   Substance and Sexual Activity    Alcohol use: Yes     Alcohol/week: 2.0 standard drinks of alcohol     Types: 2 Glasses of wine per week     Comment: occasionally    Drug use: No    Sexual activity: Not Currently     Partners: Male     Birth control/protection: None     Medication List with Changes/Refills   Current Medications    ACITRETIN (SORIATANE) 10 MG CAPSULE    Take 1 capsule (10 mg total) by mouth once daily.    ASCORBIC ACID, VITAMIN C, (VITAMIN C) 500 MG TABLET    Take 500 mg by mouth once daily.    ELDERBERRY FRUIT ORAL    Take 1 capsule by mouth once daily.    FLUOXETINE 20 MG CAPSULE    Take 1 capsule (20 mg total) by mouth once daily.    GABAPENTIN (NEURONTIN) 300 MG CAPSULE    Take 3 capsules (900 mg total) by mouth every evening.    NAPROXEN (NAPROSYN) 500 MG TABLET    Take 1 tablet (500 mg total) by mouth 2 (two) times daily as needed (for pain from arthritis of knees).    NICOTINE, POLACRILEX, (NICORETTE) 2 MG GUM    Take 1 each (2 mg  total) by mouth as needed (for cigarette cravings). Use as directed on packaging.    TEMAZEPAM (RESTORIL) 30 MG CAPSULE    Take 1 capsule (30 mg total) by mouth nightly as needed for Insomnia.    TRAMADOL (ULTRAM) 50 MG TABLET    Take 1 tablet (50 mg total) by mouth every 6 (six) hours.    VARENICLINE (CHANTIX STARTING MONTH BOX) 0.5 MG (11)- 1 MG (42) TABLET    Take one 0.5 mg tab by mouth once daily x 3 days,then increase to one 0.5 mg tab twice daily x 4 days,then increase to one 1 mg tab twice daily.     Review of patient's allergies indicates:   Allergen Reactions    Macrobid [nitrofurantoin monohyd/m-cryst] Hives    Bactrim [sulfamethoxazole-trimethoprim]     Flagyl [metronidazole hcl] Hives     Review of Systems   Constitutional: Negative for malaise/fatigue.   HENT:  Negative for hearing loss.    Eyes:  Negative for double vision and visual disturbance.   Cardiovascular:  Negative for chest pain.   Respiratory:  Positive for sleep disturbances due to breathing. Negative for shortness of breath.    Endocrine: Negative for cold intolerance.   Hematologic/Lymphatic: Does not bruise/bleed easily.   Skin:  Negative for poor wound healing and suspicious lesions.   Musculoskeletal:  Positive for arthritis, joint pain and joint swelling. Negative for gout.   Gastrointestinal:  Negative for nausea and vomiting.   Genitourinary:  Negative for dysuria.   Neurological:  Negative for numbness, paresthesias and sensory change.   Psychiatric/Behavioral:  Positive for depression and substance abuse. Negative for memory loss. The patient has insomnia and is nervous/anxious.    Allergic/Immunologic: Negative for persistent infections.       Objective:   Body mass index is 37.13 kg/m².  There were no vitals filed for this visit.             General    Constitutional: She is oriented to person, place, and time. She appears well-developed and well-nourished. No distress.   HENT:   Head: Normocephalic.   Mouth/Throat: Oropharynx  is clear and moist.   Eyes: EOM are normal.   Cardiovascular:  Normal rate.            Pulmonary/Chest: Effort normal.   Abdominal: Soft.   Neurological: She is alert and oriented to person, place, and time. No cranial nerve deficit.   Psychiatric: She has a normal mood and affect.             Right Hand/Wrist Exam     Inspection   Scars: Wrist - absent Hand -  absent  Effusion: Wrist - absent Hand -  absent    Pain   Hand - The patient exhibits pain of the thumb MCP.    Other     Neuorologic Exam    Median Distribution: normal  Ulnar Distribution: normal  Radial Distribution: normal    Comments:  The patient has active triggering right thumb with a palpable nodule that moves with tendon excursion.      Left Hand/Wrist Exam     Inspection   Scars: Wrist - absent Hand -  absent  Effusion: Wrist - absent Hand -  absent    Pain   Hand - The patient exhibits pain of the thumb MCP and ring MCP.    Other     Sensory Exam  Median Distribution: normal  Ulnar Distribution: normal  Radial Distribution: normal    Comments:  The patient has active triggering left thumb and left ring finger with palpable nodules that move with tendon excursion.          Vascular Exam       Capillary Refill  Right Hand: normal capillary refill  Left Hand: normal capillary refill          Relevant imaging results reviewed and interpreted by me, discussed with the patient and / or family today radiographs right hand were normal other than osteoarthritic change  Assessment:     Encounter Diagnosis   Name Primary?    Trigger finger, acquired Yes    Left thumb and ring finger and right thumb    Plan:     The patient injected left thumb and ring fingers trigger fingers and right thumb each with 0.5 cc of Kenalog and 0.5 cc of 2% plain lidocaine.  She was counseled regarding a right trigger thumb release.  Risk complications and alternatives were discussed including the risk of infection, anesthetic risk, injury to nerves and vessels, loss of motion,  and possible need for additional surgeries were discussed.  She seems to understand and agree to that surgery.  All questions were answered.                Disclaimer: This note was prepared using a voice recognition system and is likely to have sound alike errors within the text.

## 2023-08-04 NOTE — PROCEDURES
Tendon Sheath    Date/Time: 8/4/2023 7:15 AM    Performed by: Sandeep Bang MD  Authorized by: Sandeep Bang MD    Consent Done?:  Yes (Verbal)  Indications:  Pain  Timeout: prior to procedure the correct patient, procedure, and site was verified    Prep: patient was prepped and draped in usual sterile fashion      Local anesthesia used?: Yes    Local anesthetic:  Lidocaine 2% without epinephrine  Anesthetic total (ml):  0.5    Location:  Ring finger  Site:  L ring flexor tendon sheath  Ultrasonic guidance for needle placement?: No    Needle size:  25 G  Approach:  Volar  Medications:  40 mg triamcinolone acetonide 40 mg/mL

## 2023-08-04 NOTE — PROCEDURES
Tendon Sheath    Date/Time: 8/4/2023 7:15 AM    Performed by: Sandeep Bang MD  Authorized by: Sandeep Bang MD    Consent Done?:  Yes (Verbal)  Indications:  Pain  Timeout: prior to procedure the correct patient, procedure, and site was verified    Prep: patient was prepped and draped in usual sterile fashion      Local anesthesia used?: Yes    Local anesthetic:  Lidocaine 2% without epinephrine  Anesthetic total (ml):  1    Location:  Thumb  Site:  R thumb flexor tendon sheath and L thumb flexor tendon sheath  Ultrasonic guidance for needle placement?: No    Needle size:  25 G  Approach:  Volar  Medications:  40 mg triamcinolone acetonide 40 mg/mL; 40 mg triamcinolone acetonide 40 mg/mL

## 2023-08-11 ENCOUNTER — HOSPITAL ENCOUNTER (OUTPATIENT)
Dept: CARDIOLOGY | Facility: HOSPITAL | Age: 48
Discharge: HOME | End: 2023-08-11
Attending: ORTHOPAEDIC SURGERY
Payer: COMMERCIAL

## 2023-08-11 ENCOUNTER — HOSPITAL ENCOUNTER (OUTPATIENT)
Dept: RADIOLOGY | Facility: HOSPITAL | Age: 48
Discharge: HOME | End: 2023-08-11
Attending: ORTHOPAEDIC SURGERY
Payer: COMMERCIAL

## 2023-08-11 ENCOUNTER — PATIENT MESSAGE (OUTPATIENT)
Dept: ORTHOPEDICS | Facility: CLINIC | Age: 48
End: 2023-08-11
Payer: COMMERCIAL

## 2023-08-11 DIAGNOSIS — Z01.818 PREOP TESTING: ICD-10-CM

## 2023-08-11 DIAGNOSIS — R93.89 ABNORMAL CHEST X-RAY: Primary | ICD-10-CM

## 2023-08-11 DIAGNOSIS — R93.89 ABNORMAL CHEST X-RAY: ICD-10-CM

## 2023-08-11 DIAGNOSIS — R91.1 SOLITARY PULMONARY NODULE: ICD-10-CM

## 2023-08-11 PROCEDURE — 71260 CT THORAX DX C+: CPT | Mod: 26,,, | Performed by: RADIOLOGY

## 2023-08-11 PROCEDURE — 71046 X-RAY EXAM CHEST 2 VIEWS: CPT | Mod: TC

## 2023-08-11 PROCEDURE — 93005 ELECTROCARDIOGRAM TRACING: CPT

## 2023-08-11 PROCEDURE — 93010 EKG 12-LEAD: ICD-10-PCS | Mod: ,,, | Performed by: INTERNAL MEDICINE

## 2023-08-11 PROCEDURE — 93010 ELECTROCARDIOGRAM REPORT: CPT | Mod: ,,, | Performed by: INTERNAL MEDICINE

## 2023-08-11 PROCEDURE — 25500020 PHARM REV CODE 255: Performed by: ORTHOPAEDIC SURGERY

## 2023-08-11 PROCEDURE — 71046 XR CHEST PA AND LATERAL PRE-OP: ICD-10-PCS | Mod: 26,,, | Performed by: RADIOLOGY

## 2023-08-11 PROCEDURE — 71260 CT THORAX DX C+: CPT | Mod: TC

## 2023-08-11 PROCEDURE — 71046 X-RAY EXAM CHEST 2 VIEWS: CPT | Mod: 26,,, | Performed by: RADIOLOGY

## 2023-08-11 PROCEDURE — 71260 CT CHEST WITH CONTRAST: ICD-10-PCS | Mod: 26,,, | Performed by: RADIOLOGY

## 2023-08-11 RX ADMIN — IOHEXOL 100 ML: 350 INJECTION, SOLUTION INTRAVENOUS at 11:08

## 2023-08-14 ENCOUNTER — PATIENT MESSAGE (OUTPATIENT)
Dept: PULMONOLOGY | Facility: CLINIC | Age: 48
End: 2023-08-14
Payer: COMMERCIAL

## 2023-08-14 ENCOUNTER — TELEPHONE (OUTPATIENT)
Dept: INTERNAL MEDICINE | Facility: CLINIC | Age: 48
End: 2023-08-14
Payer: COMMERCIAL

## 2023-08-14 ENCOUNTER — TELEPHONE (OUTPATIENT)
Dept: ORTHOPEDICS | Facility: CLINIC | Age: 48
End: 2023-08-14
Payer: COMMERCIAL

## 2023-08-14 ENCOUNTER — PATIENT MESSAGE (OUTPATIENT)
Dept: SURGERY | Facility: HOSPITAL | Age: 48
End: 2023-08-14
Payer: COMMERCIAL

## 2023-08-14 ENCOUNTER — TELEPHONE (OUTPATIENT)
Dept: PULMONOLOGY | Facility: CLINIC | Age: 48
End: 2023-08-14
Payer: COMMERCIAL

## 2023-08-14 ENCOUNTER — PATIENT MESSAGE (OUTPATIENT)
Dept: INTERNAL MEDICINE | Facility: CLINIC | Age: 48
End: 2023-08-14
Payer: COMMERCIAL

## 2023-08-14 NOTE — TELEPHONE ENCOUNTER
Called and spoke with patient on Friday 8/11/23   Patient had abnormal chest xray (which was ordered as part of her preop testing before surgery with dr. Bang)   Per radiologist recommendations and Dr. Bang - ordered a CT of chest  Patient notified and verbalized understanding  Patient did express that with this recent abnormal studies that she was very anxious - I did inform patient that we may not have the CT results by the end of the clinic day and that the results may show up in her Mychart before our office had a chance to review them and get back to her - she verbalized understanding     ----     Patient called today 8/14/23 - and sent [employer] messages expressing concerns of the CT results    I did speak with patient in regards to her messages that were sent in [employer] - patient states that she denies wanting to harm herself but that she is very stressed and overwhelmed with the recent results - patient was instructed that if she felt she wanted to harm self or others to call 911 or proceed to the nearest ER. Patient once again expressed that she denies these feelings but that she is just very emotional, overwhelmed and anxious with having abnormal chest chest xr and seeing the results of her CT scan - spoke with patient in detail in regards to her recent feelings and was able to calm her and reassure her - she was very thankful for call - patient reports that once she sees both PCP and pulm that she will reach out to our office - I also informed patient that our office will be following up with them as well      Patient does have an appointment on 8/15 with her PCP and has an appt with pulm on 8/16 to go over the results in detail     I did inform the patient of the date and time and location of these appointments

## 2023-08-14 NOTE — TELEPHONE ENCOUNTER
----- Message from Taina Gonzalez LPN sent at 8/14/2023  4:10 PM CDT -----  Regarding: FW: new patient appt    ----- Message -----  From: Tracy Mandujano LPN  Sent: 8/14/2023   8:12 AM CDT  To: [facility] Clinical Staff  Subject: new patient appt                                 Good morning,     Ms. Cazares is a patient of Dr. Bang -- we have her scheduled for surgery on 8/25/23     We did a chest xray as part of the preop testing which was abnormal and CT was recommended     CT results are in the chart     Patient will need to see someone in Pul asap in regards to the results     Can someone please reach out to patient and get her set up for an appointment    Thank you    Tracy       
2

## 2023-08-14 NOTE — TELEPHONE ENCOUNTER
Please see previous telephone message     Called and spoke with patient in detail in regards to mychart messages

## 2023-08-14 NOTE — TELEPHONE ENCOUNTER
----- Message from Tracy Mandujano LPN sent at 8/14/2023 11:33 AM CDT -----    ----- Message -----  From: Dalila Tapia  Sent: 8/14/2023  11:07 AM CDT  To: [facility] Staff    Patient is requesting the nurse give her a call back at [phone]  Thx jm

## 2023-08-14 NOTE — TELEPHONE ENCOUNTER
----- Message from Kandy Johnson sent at 8/14/2023 11:34 AM CDT -----  Contact: Teresa  Pt requests a call back regarding her test results. Please call her back at [phone].    Thanks  TS

## 2023-08-15 ENCOUNTER — PATIENT MESSAGE (OUTPATIENT)
Dept: INTERNAL MEDICINE | Facility: CLINIC | Age: 48
End: 2023-08-15

## 2023-08-15 ENCOUNTER — OFFICE VISIT (OUTPATIENT)
Dept: INTERNAL MEDICINE | Facility: CLINIC | Age: 48
End: 2023-08-15
Payer: COMMERCIAL

## 2023-08-15 ENCOUNTER — CLINICAL SUPPORT (OUTPATIENT)
Dept: PULMONOLOGY | Facility: CLINIC | Age: 48
End: 2023-08-15
Payer: COMMERCIAL

## 2023-08-15 ENCOUNTER — OFFICE VISIT (OUTPATIENT)
Dept: PULMONOLOGY | Facility: CLINIC | Age: 48
End: 2023-08-15
Payer: COMMERCIAL

## 2023-08-15 ENCOUNTER — LAB VISIT (OUTPATIENT)
Dept: LAB | Facility: HOSPITAL | Age: 48
End: 2023-08-15
Attending: NURSE PRACTITIONER
Payer: COMMERCIAL

## 2023-08-15 VITALS
DIASTOLIC BLOOD PRESSURE: 68 MMHG | WEIGHT: 192.44 LBS | SYSTOLIC BLOOD PRESSURE: 120 MMHG | RESPIRATION RATE: 17 BRPM | BODY MASS INDEX: 35.41 KG/M2 | HEART RATE: 80 BPM | OXYGEN SATURATION: 99 % | HEIGHT: 62 IN

## 2023-08-15 VITALS — SYSTOLIC BLOOD PRESSURE: 135 MMHG | DIASTOLIC BLOOD PRESSURE: 88 MMHG

## 2023-08-15 DIAGNOSIS — F41.1 GENERALIZED ANXIETY DISORDER: Chronic | ICD-10-CM

## 2023-08-15 DIAGNOSIS — F17.211 CIGARETTE NICOTINE DEPENDENCE IN REMISSION: ICD-10-CM

## 2023-08-15 DIAGNOSIS — R91.1 PULMONARY NODULE 1 CM OR GREATER IN DIAMETER: Primary | ICD-10-CM

## 2023-08-15 DIAGNOSIS — F41.1 GENERALIZED ANXIETY DISORDER: Primary | Chronic | ICD-10-CM

## 2023-08-15 DIAGNOSIS — E66.01 CLASS 2 SEVERE OBESITY DUE TO EXCESS CALORIES WITH SERIOUS COMORBIDITY AND BODY MASS INDEX (BMI) OF 35.0 TO 35.9 IN ADULT: ICD-10-CM

## 2023-08-15 DIAGNOSIS — F17.210 NICOTINE DEPENDENCE, CIGARETTES, UNCOMPLICATED: ICD-10-CM

## 2023-08-15 DIAGNOSIS — F33.0 RECURRENT MILD MAJOR DEPRESSIVE DISORDER WITH ANXIETY: Chronic | ICD-10-CM

## 2023-08-15 DIAGNOSIS — F41.9 RECURRENT MILD MAJOR DEPRESSIVE DISORDER WITH ANXIETY: Chronic | ICD-10-CM

## 2023-08-15 DIAGNOSIS — R91.1 LUNG NODULE: ICD-10-CM

## 2023-08-15 DIAGNOSIS — R91.1 PULMONARY NODULE 1 CM OR GREATER IN DIAMETER: ICD-10-CM

## 2023-08-15 DIAGNOSIS — I10 PRIMARY HYPERTENSION: ICD-10-CM

## 2023-08-15 DIAGNOSIS — R91.1 LUNG NODULE: Primary | ICD-10-CM

## 2023-08-15 DIAGNOSIS — F51.04 PSYCHOPHYSIOLOGIC INSOMNIA: Chronic | ICD-10-CM

## 2023-08-15 LAB
BRPFT: NORMAL
DLCO ADJ PRE: 18.4 ML/(MIN*MMHG)
DLCO SINGLE BREATH LLN: 17.26
DLCO SINGLE BREATH PRE REF: 78 %
DLCO SINGLE BREATH REF: 23
DLCOC SBVA LLN: 3.37
DLCOC SBVA PRE REF: 91.7 %
DLCOC SBVA REF: 5.03
DLCOC SINGLE BREATH LLN: 17.26
DLCOC SINGLE BREATH PRE REF: 80 %
DLCOC SINGLE BREATH REF: 23
DLCOVA LLN: 3.37
DLCOVA PRE REF: 89.3 %
DLCOVA PRE: 4.49 ML/(MIN*MMHG*L)
DLCOVA REF: 5.03
DLVAADJ PRE: 4.61 ML/(MIN*MMHG*L)
ERV LLN: -16449.06
ERV PRE REF: 71.6 %
ERV REF: 0.94
FEF 25 75 LLN: 1.12
FEF 25 75 PRE REF: 82.6 %
FEF 25 75 REF: 2.3
FEV1 FVC LLN: 70
FEV1 FVC PRE REF: 96.7 %
FEV1 FVC REF: 81
FEV1 LLN: 1.68
FEV1 PRE REF: 96.8 %
FEV1 REF: 2.22
FRCPLETH LLN: 1.74
FRCPLETH PREREF: 86.1 %
FRCPLETH REF: 2.57
FVC LLN: 2.09
FVC PRE REF: 99.9 %
FVC REF: 2.73
IVC PRE: 2.58 L
IVC SINGLE BREATH LLN: 2.09
IVC SINGLE BREATH PRE REF: 94.5 %
IVC SINGLE BREATH REF: 2.73
MVV LLN: 81
MVV PRE REF: 101 %
MVV REF: 95
PEF LLN: 4.02
PEF PRE REF: 108.3 %
PEF REF: 5.89
PRE DLCO: 17.93 ML/(MIN*MMHG)
PRE ERV: 0.67 L
PRE FEF 25 75: 1.9 L/S
PRE FET 100: 4.75 SEC
PRE FEV1 FVC: 78.58 %
PRE FEV1: 2.15 L
PRE FRC PL: 2.21 L
PRE FVC: 2.73 L
PRE MVV: 96 L/MIN
PRE PEF: 6.38 L/S
PRE RV: 1.64 L
PRE TLC: 4.45 L
RAW LLN: 3.06
RAW PRE REF: 167.5 %
RAW PRE: 5.12 CMH2O*S/L
RAW REF: 3.06
RV LLN: 1.05
RV PRE REF: 100.8 %
RV REF: 1.63
RVTLC LLN: 26
RVTLC PRE REF: 103.4 %
RVTLC PRE: 36.83 %
RVTLC REF: 36
TLC LLN: 3.58
TLC PRE REF: 97.3 %
TLC REF: 4.57
VA PRE: 3.99 L
VA SINGLE BREATH LLN: 4.42
VA SINGLE BREATH PRE REF: 90.3 %
VA SINGLE BREATH REF: 4.42
VC LLN: 2.09
VC PRE REF: 102.8 %
VC PRE: 2.81 L
VC REF: 2.73
VTGRAWPRE: 2.57 L

## 2023-08-15 PROCEDURE — 99215 PR OFFICE/OUTPT VISIT, EST, LEVL V, 40-54 MIN: ICD-10-PCS | Mod: 25,S$GLB,, | Performed by: NURSE PRACTITIONER

## 2023-08-15 PROCEDURE — 87449 NOS EACH ORGANISM AG IA: CPT | Performed by: NURSE PRACTITIONER

## 2023-08-15 PROCEDURE — 3074F SYST BP LT 130 MM HG: CPT | Mod: CPTII,S$GLB,, | Performed by: NURSE PRACTITIONER

## 2023-08-15 PROCEDURE — 99215 OFFICE O/P EST HI 40 MIN: CPT | Mod: 25,S$GLB,, | Performed by: NURSE PRACTITIONER

## 2023-08-15 PROCEDURE — 1160F PR REVIEW ALL MEDS BY PRESCRIBER/CLIN PHARMACIST DOCUMENTED: ICD-10-PCS | Mod: CPTII,S$GLB,, | Performed by: NURSE PRACTITIONER

## 2023-08-15 PROCEDURE — 3079F PR MOST RECENT DIASTOLIC BLOOD PRESSURE 80-89 MM HG: ICD-10-PCS | Mod: CPTII,95,, | Performed by: FAMILY MEDICINE

## 2023-08-15 PROCEDURE — 3008F BODY MASS INDEX DOCD: CPT | Mod: CPTII,S$GLB,, | Performed by: NURSE PRACTITIONER

## 2023-08-15 PROCEDURE — 86480 TB TEST CELL IMMUN MEASURE: CPT | Performed by: NURSE PRACTITIONER

## 2023-08-15 PROCEDURE — 3078F DIAST BP <80 MM HG: CPT | Mod: CPTII,S$GLB,, | Performed by: NURSE PRACTITIONER

## 2023-08-15 PROCEDURE — 94726 PULM FUNCT TST PLETHYSMOGRAP: ICD-10-PCS | Mod: S$GLB,,, | Performed by: INTERNAL MEDICINE

## 2023-08-15 PROCEDURE — 99214 OFFICE O/P EST MOD 30 MIN: CPT | Mod: 95,,, | Performed by: FAMILY MEDICINE

## 2023-08-15 PROCEDURE — 94010 BREATHING CAPACITY TEST: ICD-10-PCS | Mod: S$GLB,,, | Performed by: INTERNAL MEDICINE

## 2023-08-15 PROCEDURE — 3044F HG A1C LEVEL LT 7.0%: CPT | Mod: CPTII,95,, | Performed by: FAMILY MEDICINE

## 2023-08-15 PROCEDURE — 94726 PLETHYSMOGRAPHY LUNG VOLUMES: CPT | Mod: S$GLB,,, | Performed by: INTERNAL MEDICINE

## 2023-08-15 PROCEDURE — 3008F PR BODY MASS INDEX (BMI) DOCUMENTED: ICD-10-PCS | Mod: CPTII,S$GLB,, | Performed by: NURSE PRACTITIONER

## 2023-08-15 PROCEDURE — 3074F PR MOST RECENT SYSTOLIC BLOOD PRESSURE < 130 MM HG: ICD-10-PCS | Mod: CPTII,S$GLB,, | Performed by: NURSE PRACTITIONER

## 2023-08-15 PROCEDURE — 1159F MED LIST DOCD IN RCRD: CPT | Mod: CPTII,S$GLB,, | Performed by: NURSE PRACTITIONER

## 2023-08-15 PROCEDURE — 3044F HG A1C LEVEL LT 7.0%: CPT | Mod: CPTII,S$GLB,, | Performed by: NURSE PRACTITIONER

## 2023-08-15 PROCEDURE — 99214 PR OFFICE/OUTPT VISIT, EST, LEVL IV, 30-39 MIN: ICD-10-PCS | Mod: 95,,, | Performed by: FAMILY MEDICINE

## 2023-08-15 PROCEDURE — 99999 PR PBB SHADOW E&M-EST. PATIENT-LVL I: CPT | Mod: PBBFAC,,,

## 2023-08-15 PROCEDURE — 94729 DIFFUSING CAPACITY: CPT | Mod: S$GLB,,, | Performed by: INTERNAL MEDICINE

## 2023-08-15 PROCEDURE — 3044F PR MOST RECENT HEMOGLOBIN A1C LEVEL <7.0%: ICD-10-PCS | Mod: CPTII,S$GLB,, | Performed by: NURSE PRACTITIONER

## 2023-08-15 PROCEDURE — 1159F MED LIST DOCD IN RCRD: CPT | Mod: CPTII,95,, | Performed by: FAMILY MEDICINE

## 2023-08-15 PROCEDURE — 3075F SYST BP GE 130 - 139MM HG: CPT | Mod: CPTII,95,, | Performed by: FAMILY MEDICINE

## 2023-08-15 PROCEDURE — 3075F PR MOST RECENT SYSTOLIC BLOOD PRESS GE 130-139MM HG: ICD-10-PCS | Mod: CPTII,95,, | Performed by: FAMILY MEDICINE

## 2023-08-15 PROCEDURE — 99999 PR PBB SHADOW E&M-EST. PATIENT-LVL V: ICD-10-PCS | Mod: PBBFAC,,, | Performed by: NURSE PRACTITIONER

## 2023-08-15 PROCEDURE — 99999 PR PBB SHADOW E&M-EST. PATIENT-LVL V: CPT | Mod: PBBFAC,,, | Performed by: NURSE PRACTITIONER

## 2023-08-15 PROCEDURE — 3078F PR MOST RECENT DIASTOLIC BLOOD PRESSURE < 80 MM HG: ICD-10-PCS | Mod: CPTII,S$GLB,, | Performed by: NURSE PRACTITIONER

## 2023-08-15 PROCEDURE — 94010 BREATHING CAPACITY TEST: CPT | Mod: S$GLB,,, | Performed by: INTERNAL MEDICINE

## 2023-08-15 PROCEDURE — 99999 PR PBB SHADOW E&M-EST. PATIENT-LVL I: ICD-10-PCS | Mod: PBBFAC,,,

## 2023-08-15 PROCEDURE — 1160F PR REVIEW ALL MEDS BY PRESCRIBER/CLIN PHARMACIST DOCUMENTED: ICD-10-PCS | Mod: CPTII,95,, | Performed by: FAMILY MEDICINE

## 2023-08-15 PROCEDURE — 1159F PR MEDICATION LIST DOCUMENTED IN MEDICAL RECORD: ICD-10-PCS | Mod: CPTII,S$GLB,, | Performed by: NURSE PRACTITIONER

## 2023-08-15 PROCEDURE — 3044F PR MOST RECENT HEMOGLOBIN A1C LEVEL <7.0%: ICD-10-PCS | Mod: CPTII,95,, | Performed by: FAMILY MEDICINE

## 2023-08-15 PROCEDURE — 94729 PR C02/MEMBANE DIFFUSE CAPACITY: ICD-10-PCS | Mod: S$GLB,,, | Performed by: INTERNAL MEDICINE

## 2023-08-15 PROCEDURE — 36415 COLL VENOUS BLD VENIPUNCTURE: CPT | Performed by: NURSE PRACTITIONER

## 2023-08-15 PROCEDURE — 1160F RVW MEDS BY RX/DR IN RCRD: CPT | Mod: CPTII,S$GLB,, | Performed by: NURSE PRACTITIONER

## 2023-08-15 PROCEDURE — 3079F DIAST BP 80-89 MM HG: CPT | Mod: CPTII,95,, | Performed by: FAMILY MEDICINE

## 2023-08-15 PROCEDURE — 1159F PR MEDICATION LIST DOCUMENTED IN MEDICAL RECORD: ICD-10-PCS | Mod: CPTII,95,, | Performed by: FAMILY MEDICINE

## 2023-08-15 PROCEDURE — 1160F RVW MEDS BY RX/DR IN RCRD: CPT | Mod: CPTII,95,, | Performed by: FAMILY MEDICINE

## 2023-08-15 RX ORDER — HYDROCHLOROTHIAZIDE 25 MG/1
25 TABLET ORAL DAILY
Qty: 90 TABLET | Refills: 3 | Status: SHIPPED | OUTPATIENT
Start: 2023-08-15 | End: 2023-08-21 | Stop reason: ALTCHOICE

## 2023-08-15 RX ORDER — VARENICLINE TARTRATE 1 MG/1
1 TABLET, FILM COATED ORAL 2 TIMES DAILY
Qty: 60 TABLET | Refills: 3 | Status: SHIPPED | OUTPATIENT
Start: 2023-08-15 | End: 2023-10-17

## 2023-08-15 RX ORDER — FLUOXETINE HYDROCHLORIDE 20 MG/1
20 CAPSULE ORAL DAILY
Qty: 90 CAPSULE | Refills: 3 | Status: SHIPPED | OUTPATIENT
Start: 2023-08-15 | End: 2023-08-25 | Stop reason: SDUPTHER

## 2023-08-15 RX ORDER — CLONAZEPAM 0.5 MG/1
.25-.5 TABLET ORAL 2 TIMES DAILY PRN
Qty: 60 TABLET | Refills: 3 | Status: SHIPPED | OUTPATIENT
Start: 2023-08-15 | End: 2023-11-14 | Stop reason: SDUPTHER

## 2023-08-15 NOTE — ASSESSMENT & PLAN NOTE
BP Readings from Last 6 Encounters:   08/15/23 135/88   06/29/23 (!) 150/90   11/23/22 132/73   11/14/22 (!) 125/58   09/02/22 (!) 125/58   07/27/22 104/68     Lab Results   Component Value Date    EGFRNORACEVR >60.0 08/11/2023    CREATININE 0.7 08/11/2023    BUN 16 08/11/2023    K 3.7 08/11/2023     08/11/2023     08/11/2023     Results for orders placed or performed during the hospital encounter of 08/11/23   SCHEDULED EKG 12-LEAD (to Muse)    Collection Time: 08/11/23  8:09 AM    Narrative    Test Reason : Z01.818,    Vent. Rate : 058 BPM     Atrial Rate : 058 BPM     P-R Int : 150 ms          QRS Dur : 092 ms      QT Int : 436 ms       P-R-T Axes : 000 168 177 degrees     QTc Int : 428 ms     Suspect arm lead reversal, interpretation assumes no reversal  Sinus bradycardia  Right axis deviation  ST and T wave abnormality, consider inferior ischemia  Abnormal ECG  When compared with ECG of 10-BRANDON-2019 17:21,  Vent. rate has decreased BY  28 BPM  Non-specific change in ST segment in Lateral leads  T wave inversion now evident in Lateral leads  Confirmed by MD TESSY, LISA ([id number]) on 8/13/2023 6:01:38 PM    Referred By: ELSIE MEEK           Confirmed By:LISA STILL MD

## 2023-08-15 NOTE — ASSESSMENT & PLAN NOTE
33 pack year former smoker. Quit 2023 8/11/2023 CT chest Lungs/Pleura: 14 mm nodule in the medial aspect left upper lobe with central high density suggesting noncalcified or early centrally calcifying granuloma.  This nodule is posterior to the sternum and located 9 mm from the aortic arch rendering it very high risk for percutaneous biopsy.  Consider further evaluation with PET-CT.  8/15/2023 Proceed with PET CT review with pulmonologist  Lab: tb gold, fungal serology, genetic testing with pulmonary nodule risk for cancer Nodify lung.   Calculated risk of malignancy with biodesix 9%.     8/15/2023 CPFT Spirometry is normal. Lung volume determination is normal. Airway mechanics are abnormal showing increased airway resistance and decreased conductance. DLCO is normal. MVV is normal.

## 2023-08-15 NOTE — PROGRESS NOTES
She has appointment with you this morning to get your recommendations for her lung nodule.    Thank you for your help caring for my patients!    -REG

## 2023-08-15 NOTE — PROGRESS NOTES
08/15/2023 7:22 AM - I joined Virtual Visit to find Teresa driving a motor vehicle in traffic. I quickly explained that this practice was both unsafe and unlawful, and I told her that I would exit the virtual visit and return after giving her time to safely park. I then exited the virtual visit.     TELEMEDICINE VIRTUAL VISIT  8/15/23  7:20 AM CDT    Visit Type: Audiovisual    Patient's Location: Teresa represents that they are located within the [facility].    Subjective   CHIEF COMPLAINT: Follow-up, Results, Anxiety    Chief Complaint: Concerns regarding recent test results and difficulty focusing due to various stressors.    History & Personal Details:  1. The patient is currently in her graduating semester and is experiencing significant stress. Her mother is also concerned about her health.  2. She recently underwent a mammogram, which came back normal.  3. CT scan revealed a lung nodule. We discussed differential diagnosis.  4. She has a history of smoking. She quit about a month ago and had previously quit for two years before restarting for a span of six months.  5. She started on Chantix, which assisted her in quitting smoking and she reports no recent use of tobacco or marijuana.  6. She has been exercising and had been feeling well until her recent test results.  7. There have been recent issues with sleep and she has been taking Tamazepam (Restoril). Her last dose was consumed the night before the previous consultation and she mentioned a need for a refill.  8. She also consumes a potassium supplement with her morning vitamins.    Lifestyle:  1. 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  2. 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  3. She mentioned occasional alcohol consumption, including having a few  shots of Crown the night before visiting [place].    Vitals & Measurements:  1. Blood pressure readings taken at [facility] range are in the high [id number].    Plan:  1. She has pulmonology appointment this morning to further discuss pulmonary nodule.  2. Based on the patient's blood pressure readings, a low dose of hydrochlorothiazide is recommended for blood pressure control.  3. Switch from temazepam to clonazepam.      Review of Systems   Constitutional:  Negative for activity change and unexpected weight change.   HENT:  Negative for hearing loss, rhinorrhea and trouble swallowing.    Eyes:  Negative for discharge and visual disturbance.   Respiratory:  Negative for chest tightness and wheezing.    Cardiovascular:  Negative for chest pain and palpitations.   Gastrointestinal:  Negative for blood in stool, constipation, diarrhea and vomiting.   Endocrine: Negative for polydipsia and polyuria.   Genitourinary:  Negative for difficulty urinating, dysuria, hematuria and menstrual problem.   Musculoskeletal:  Negative for arthralgias, joint swelling and neck pain.   Neurological:  Negative for weakness and headaches.   Psychiatric/Behavioral:  Positive for dysphoric mood. Negative for confusion.          Objective   Physical Exam  Constitutional:       General: She is not in acute distress.     Appearance: Normal appearance. She is not ill-appearing.   Pulmonary:      Effort: Pulmonary effort is normal. No respiratory distress.   Skin:     Coloration: Skin is not jaundiced.   Neurological:      Mental Status: She is alert. Mental status is at baseline.   Psychiatric:         Mood and Affect: Mood normal.         Behavior: Behavior normal.         Thought Content: Thought content normal.          Assessment and Plan   1. Generalized anxiety disorder  -     clonazePAM (KLONOPIN) 0.5 MG tablet; Take 0.5-1 tablets (0.25-0.5 mg total) by mouth 2 (two) times daily as needed for Anxiety.  Dispense: 60 tablet; Refill:  3  -     FLUoxetine 20 MG capsule; Take 1 capsule (20 mg total) by mouth once daily.  Dispense: 90 capsule; Refill: 3    2. Recurrent mild major depressive disorder with anxiety  -     FLUoxetine 20 MG capsule; Take 1 capsule (20 mg total) by mouth once daily.  Dispense: 90 capsule; Refill: 3    3. Psychophysiologic insomnia  -     clonazePAM (KLONOPIN) 0.5 MG tablet; Take 0.5-1 tablets (0.25-0.5 mg total) by mouth 2 (two) times daily as needed for Anxiety.  Dispense: 60 tablet; Refill: 3    4. Primary hypertension  Assessment & Plan:  BP Readings from Last 6 Encounters:   08/15/23 135/88   06/29/23 (!) 150/90   11/23/22 132/73   11/14/22 (!) 125/58   09/02/22 (!) 125/58   07/27/22 104/68     Lab Results   Component Value Date    EGFRNORACEVR >60.0 08/11/2023    CREATININE 0.7 08/11/2023    BUN 16 08/11/2023    K 3.7 08/11/2023     08/11/2023     08/11/2023     Results for orders placed or performed during the hospital encounter of 08/11/23   SCHEDULED EKG 12-LEAD (to Muse)    Collection Time: 08/11/23  8:09 AM    Narrative    Test Reason : Z01.818,    Vent. Rate : 058 BPM     Atrial Rate : 058 BPM     P-R Int : 150 ms          QRS Dur : 092 ms      QT Int : 436 ms       P-R-T Axes : 000 168 177 degrees     QTc Int : 428 ms     Suspect arm lead reversal, interpretation assumes no reversal  Sinus bradycardia  Right axis deviation  ST and T wave abnormality, consider inferior ischemia  Abnormal ECG  When compared with ECG of 10-BRANDON-2019 17:21,  Vent. rate has decreased BY  28 BPM  Non-specific change in ST segment in Lateral leads  T wave inversion now evident in Lateral leads  Confirmed by MD TESSY, LISA ([id number]) on 8/13/2023 6:01:38 PM    Referred By: ELSIE MEEK           Confirmed By:LISA STILL MD        Orders:  -     hydroCHLOROthiazide (HYDRODIURIL) 25 MG tablet; Take 1 tablet (25 mg total) by mouth once daily.  Dispense: 90 tablet; Refill: 3    5. Nicotine dependence, cigarettes, uncomplicated  -   "   varenicline (CHANTIX) 1 mg Tab; Take 1 tablet (1 mg total) by mouth 2 (two) times daily.  Dispense: 60 tablet; Refill: 3    6. Lung nodule    Unless noted herein, any chronic conditions are represented as and appear stable, and no other significant complaints or concerns were reported.    Follow up in about 3 months (around 11/15/2023) for re-evaluation.   Future Appointments   Date Time Provider Department Center   8/15/2023  9:40 AM Pauline Morgan, NP [facility] [place]   8/16/2023 10:00 AM Pauline Morgan, NP [facility]   8/29/2023  9:00 AM Sandeep Bang MD [facility]   9/8/2023  9:00 AM Xenia Mancilla PA-C [facility]          Documentation entered by me for this encounter may have been done in part using speech-recognition technology. Although I have made an effort to ensure accuracy, "sound like" errors may exist and should be interpreted in context.   TOTAL TIME evaluating and managing this patient for this encounter was greater than or equal to 32 minutes. This time was exclusive of any separately billable procedures for this patient and exclusive of time spent treating any other patient.    Documentation entered by me for this encounter may have been done in part using speech-recognition technology. Although I have made an effort to ensure accuracy, "sound like" errors may exist and should be interpreted in context.    Each patient to whom medical services are provided by telemedicine is: (1) informed of the relationship between the physician and patient and the respective role of any other health care provider with respect to management of the patient; and (2) notified that he or she may decline to receive medical services by telemedicine and may withdraw from such care at any time.   "

## 2023-08-15 NOTE — ASSESSMENT & PLAN NOTE
Increased stress and anxiety with new finding 1.4 cm lung nodule  Beginning klonopin, continued management with primary care provider and psychiatry

## 2023-08-15 NOTE — ASSESSMENT & PLAN NOTE
33 pack year former smoker. Quit 2023 8/11/2023 CT chest Lungs/Pleura: 14 mm nodule in the medial aspect left upper lobe with central high density suggesting noncalcified or early centrally calcifying granuloma.  This nodule is posterior to the sternum and located 9 mm from the aortic arch rendering it very high risk for percutaneous biopsy.  Consider further evaluation with PET-CT.  8/15/2023 Proceed with PET CT review with pulmonologist  Lab: tb gold, fungal serology, genetic testing with pulmonary nodule risk for cancer Nodify lung.   Calculated risk of malignancy with biodesix 9%.

## 2023-08-15 NOTE — PROGRESS NOTES
Hi, Thisia.    I am happy to report that your recent breast imaging did NOT show evidence of cancer.    An annual mammogram is the best test to screen for breast cancer, but it is not perfect, and it can miss some cancers. So, even though your mammogram was normal, if you notice any lump or change in one of your breasts, please schedule an appointment with me for a proper evaluation.    Thanks for letting me care for you, and thanks for trusting [facility] with your healthcare needs.    Sincerely,    JIM Chery MD

## 2023-08-15 NOTE — PROGRESS NOTES
Subjective:      Patient ID: Teresa Cazares is a 47 y.o. female.    Chief Complaint: Sleep Apnea    HPI: Teresa Cazares presents to clinic for follow up for DEYA with yearly CPAP complaince assessment and abnormal 8/11/2023 chest xray that was taken as pre op testing before trigger finger release. and 8/11/2023 CT chest 14 mm MARCELA pulmonary nodule.    33 pack year former smoker. Quit 2023.     Presents stable. There is no cough, no wheezing, no shortness of breath, no hemoptysis, no sputum production, no weight loss, noted TB exposure, no asbestos exposure, no chest pain.     8/15/2023 CPFT Spirometry is normal. FEV1 96.8%. Lung volume determination is normal. Airway mechanics are abnormal showing increased airway resistance and decreased conductance. DLCO is normal. MVV is normal.       She is on Auto CPAP of 7-10 cmH2O pressure which is optimally controlling sleep apnea with apneic index (AHI) 4.8 events an hour.    off and on use of CPAP long standing.   Received [employer] 2.   Nasal pillows mask is used, with chin strap.   Finds benefit.     12/31/2019, 1/1/2020  2 night Home Sleep Study    MILD/BORDERLINE OBSTRUCTIVE SLEEP APNEA with overall AHI 6.1/hr ( 37 events):  Oxygen desaturation: 78%. SpO2 between 70% to 79% for <1 min.  Patient snored 94% time above 50 .  Heart rate range: 57 bpm - 127 bpm    Compliance Summary  3/18/2023 - 6/15/2023 (90 days)  Days with Device Usage 5 days  Days without Device Usage 85 days  Percent Days with Device Usage 5.6%  Cumulative Usage 20 hrs. 34 mins. 55 secs.  Maximum Usage (1 Day) 7 hrs. 15 mins. 20 secs.  Average Usage (All Days) 13 mins. 43 secs.  Average Usage (Days Used) 4 hrs. 6 mins. 59 secs.  Minimum Usage (1 Day) 1 hrs. 46 mins. 43 secs.  Percent of Days with Usage >= 4 Hours 2.2%  Percent of Days with Usage < 4 Hours 97.8%  Date Range  Average AHI 4.8  Auto-CPAP Summary  Auto-CPAP Mean Pressure 8.2 cmH2O  Auto-CPAP Peak Average Pressure  8.5 cmH2O  Device Pressure <= 90% of Time 10.0 cmH2O  Average Time in Large Leak Per Day 36 secs.    Previous Report Reviewed: lab reports and office notes     Past Medical History: The following portions of the patient's history were reviewed and updated as appropriate:   She  has a past surgical history that includes Umbilical hernia repair; Hysterectomy (2009); Fracture surgery; Oophorectomy (Bilateral); Application of large external fixation device to tibia (Left, 01/10/2019); Open reduction and internal fixation (ORIF) of fracture of tibial plateau (Left, 01/17/2019); Removal of external fixation device (Left, 01/17/2019); Application of wound vacuum-assisted closure device (Left, 01/17/2019); Hernia repair; Colonoscopy (N/A, 09/02/2022); and Tubal ligation (05/22/1997).  Her family history includes Breast cancer in her paternal grandmother; Breast cancer (age of onset: 46) in her sister; Breast cancer (age of onset: 58) in her mother; Cancer in her mother and sister; Depression in her mother; Diabetes in her mother; No Known Problems in her father.  She  reports that she quit smoking about 7 months ago. Her smoking use included cigarettes. She started smoking about 33 years ago. She has a 33.0 pack-year smoking history. She has been exposed to tobacco smoke. She has never used smokeless tobacco. She reports current alcohol use of about 2.0 standard drinks of alcohol per week. She reports that she does not use drugs.  She has a current medication list which includes the following prescription(s): acitretin, ascorbic acid (vitamin c), clonazepam, elderberry fruit, fluoxetine, gabapentin, hydrochlorothiazide, naproxen, nicotine (polacrilex), tramadol, and varenicline.  She is allergic to macrobid [nitrofurantoin monohyd/m-cryst], bactrim [sulfamethoxazole-trimethoprim], and flagyl [metronidazole hcl]..    The following portions of the patient's history were reviewed and updated as appropriate: allergies, current  "medications, past family history, past medical history, past social history, past surgical history and problem list.    Review of Systems   Constitutional:  Negative for fever, chills, weight loss, weight gain, activity change, appetite change, fatigue and night sweats.   HENT:  Negative for postnasal drip, rhinorrhea, sinus pressure, voice change and congestion.    Eyes:  Negative for redness and itching.   Respiratory:  Negative for apnea, snoring, cough, sputum production, chest tightness, shortness of breath, wheezing, orthopnea, asthma nighttime symptoms, dyspnea on extertion, use of rescue inhaler and somnolence.    Cardiovascular: Negative.  Negative for chest pain, palpitations and leg swelling.   Genitourinary:  Negative for difficulty urinating and hematuria.   Endocrine:  Negative for cold intolerance and heat intolerance.    Musculoskeletal:  Negative for arthralgias, gait problem, joint swelling and myalgias.   Skin: Negative.    Gastrointestinal:  Negative for nausea, vomiting, abdominal pain and acid reflux.   Neurological:  Negative for dizziness, weakness, light-headedness and headaches.   Hematological:  Negative for adenopathy. No excessive bruising.   All other systems reviewed and are negative.     Objective:   /68   Pulse 80   Resp 17   Ht 5' 2" (1.575 m)   Wt 87.3 kg (192 lb 7.4 oz)   SpO2 99%   BMI 35.20 kg/m²   Physical Exam  Vitals and nursing note reviewed.   Constitutional:       General: She is not in acute distress.     Appearance: She is well-developed. She is not ill-appearing or toxic-appearing.   HENT:      Head: Normocephalic.      Right Ear: External ear normal.      Left Ear: External ear normal.      Nose: Nose normal.      Mouth/Throat:      Pharynx: No oropharyngeal exudate.   Eyes:      Conjunctiva/sclera: Conjunctivae normal.   Cardiovascular:      Rate and Rhythm: Normal rate and regular rhythm.      Heart sounds: Normal heart sounds.   Pulmonary:      Effort: " Pulmonary effort is normal.      Breath sounds: Normal breath sounds. No stridor.   Abdominal:      Palpations: Abdomen is soft.   Musculoskeletal:         General: Normal range of motion.      Cervical back: Normal range of motion and neck supple.   Lymphadenopathy:      Cervical: No cervical adenopathy.   Skin:     General: Skin is warm and dry.   Neurological:      Mental Status: She is alert and oriented to person, place, and time.   Psychiatric:         Behavior: Behavior normal. Behavior is cooperative.         Thought Content: Thought content normal.         Judgment: Judgment normal.         Personal Diagnostic Review  CPAP download    Home Sleep Test 12/31/2019  MILD/BORDERLINE OBSTRUCTIVE SLEEP APNEA with overall AHI 6.1/hr ( 37 events):  Oxygen desaturation: 78%. SpO2 between 70% to 79% for <1 min.  Patient snored 94% time above 50 .  Heart rate range: 57 bpm - 127 bpm    EXAMINATION:  CT CHEST WITH CONTRAST     CLINICAL HISTORY:  Abnormal xray - lung nodule, < 1 cm, low risk; Abnormal findings on diagnostic imaging of other specified body structures     TECHNIQUE:  Low dose axial images, sagittal and coronal reformations were obtained from the thoracic inlet to the lung bases after IV contrast 100 ml Omnipaque 350.     COMPARISON:  Chest radiograph 08/11/2023     FINDINGS:  Base of Neck: No significant abnormality.     Thoracic soft tissues: Normal.     Aorta: Left-sided aortic arch.  No aneurysm.     Heart: Mild cardiomegaly.  Mild aortic and coronary artery atherosclerotic calcification.     Pulmonary vasculature: Pulmonary arteries distribute normally.     Cristina/Mediastinum: No pathologic murali enlargement.     Esophagus: Normal.     Upper Abdomen: No abnormality of the partially imaged upper abdomen.     Airways: Patent.     Lungs/Pleura: 14 mm nodule in the medial aspect left upper lobe with central high density suggesting noncalcified or early centrally calcifying granuloma.  This nodule is  posterior to the sternum and located 9 mm from the aortic arch rendering it very high risk for percutaneous biopsy.  Consider further evaluation with PET-CT.     Bones: No acute fracture. No suspicious lytic or sclerotic lesions.     Impression:     14 mm nodule in the medial aspect left upper lobe with central high density suggesting noncalcified or early centrally calcifying granuloma. This nodule is posterior to the sternum and located 9 mm from the aortic arch rendering it very high risk for percutaneous biopsy. Consider further evaluation with PET-CT.  Alternatively, follow-up chest CT without IV contrast in 2-3 months could be performed.     All CT scans at this facility use dose modulation, iterative reconstruction and/or weight based dosing when appropriate to reduce radiation dose to as low as reasonably achievable.        Electronically signed by: Ean Ann  Date:                                            08/11/2023    EXAMINATION:  XR CHEST PA AND LATERAL PRE-OP     CLINICAL HISTORY:  Encounter for other preprocedural examination     TECHNIQUE:  PA and lateral views of the chest were performed.     COMPARISON:  01/16/2020     FINDINGS:  Cardiac and mediastinal silhouettes are within normal limits.  There is an indeterminate nodular opacity projecting over the anterior mediastinal clear space on the lateral projection measuring roughly 18 mm in diameter with no definite correlate seen on the PA projection.  No correlate seen on prior exams.  No parenchymal consolidations or pleural effusions demonstrated.  No acute osseous findings demonstrated.     Impression:     As above.  Recommend further evaluation with contrast enhanced CT of the chest.     This report was flagged in Epic as abnormal.        Electronically signed by: Archie Dougherty MD  Date:                                            08/11/2023  Time:                                           08:19    Assessment:     1. Pulmonary nodule 1 cm or  greater in diameter    2. Cigarette nicotine dependence in remission    3. Class 2 severe obesity due to excess calories with serious comorbidity and body mass index (BMI) of 35.0 to 35.9 in adult    4. Psychophysiologic insomnia    5. Generalized anxiety disorder        Orders Placed This Encounter   Procedures    NM PET CT Routine Skull to Mid Thigh     Standing Status:   Future     Standing Expiration Date:   8/15/2024     Order Specific Question:   May the Radiologist modify the order per protocol to meet the clinical needs of the patient?     Answer:   Yes    QUANTIFERON GOLD TB     Standing Status:   Future     Number of Occurrences:   1     Standing Expiration Date:   10/13/2024    Fungitell Assay For (1.3)-B-D-Glucans     Standing Status:   Future     Number of Occurrences:   1     Standing Expiration Date:   10/13/2024    Complete PFT with bronchodilator     Standing Status:   Future     Number of Occurrences:   1     Standing Expiration Date:   8/15/2024     Order Specific Question:   Release to patient     Answer:   Immediate     Plan:     Problem List Items Addressed This Visit       Psychophysiologic insomnia (Chronic)     Increased stress and anxiety with new finding 1.4 cm lung nodule  Beginning klonopin, continued management with primary care provider          Generalized anxiety disorder (Chronic)     Increased stress and anxiety with new finding 1.4 cm lung nodule  Beginning klonopin, continued management with primary care provider and psychiatry           Pulmonary nodule 1 cm or greater in diameter - Primary     33 pack year former smoker. Quit 2023 8/11/2023 CT chest Lungs/Pleura: 14 mm nodule in the medial aspect left upper lobe with central high density suggesting noncalcified or early centrally calcifying granuloma.  This nodule is posterior to the sternum and located 9 mm from the aortic arch rendering it very high risk for percutaneous biopsy.  Consider further evaluation with  PET-CT.  8/15/2023 Proceed with PET CT review with pulmonologist  Lab: tb gold, fungal serology, genetic testing with pulmonary nodule risk for cancer Nodify lung.   Calculated risk of malignancy with biodesix 9%.     8/15/2023 CPFT Spirometry is normal. Lung volume determination is normal. Airway mechanics are abnormal showing increased airway resistance and decreased conductance. DLCO is normal. MVV is normal.           Relevant Orders    NM PET CT Routine Skull to Mid Thigh    Complete PFT with bronchodilator    QUANTIFERON GOLD TB    Fungitell Assay For (1.3)-B-D-Glucans    Class 2 severe obesity due to excess calories with serious comorbidity and body mass index (BMI) of 35.0 to 35.9 in adult     Wt Readings from Last 9 Encounters:   08/15/23 87.3 kg (192 lb 7.4 oz)   08/04/23 92.1 kg (203 lb)   06/29/23 92.1 kg (203 lb 0.7 oz)   04/12/23 89.8 kg (198 lb)   11/23/22 90.1 kg (198 lb 10.2 oz)   09/02/22 85.5 kg (188 lb 7.9 oz)   07/27/22 86 kg (189 lb 9.5 oz)   04/01/22 90.9 kg (200 lb 6.4 oz)   02/22/22 94.8 kg (209 lb)   Body mass index is 35.2 kg/m².           Cigarette nicotine dependence in remission     33 pack year former smoker. Quit 2023 8/11/2023 CT chest Lungs/Pleura: 14 mm nodule in the medial aspect left upper lobe with central high density suggesting noncalcified or early centrally calcifying granuloma.  This nodule is posterior to the sternum and located 9 mm from the aortic arch rendering it very high risk for percutaneous biopsy.  Consider further evaluation with PET-CT.  8/15/2023 Proceed with PET CT review with pulmonologist  Lab: tb gold, fungal serology, genetic testing with pulmonary nodule risk for cancer Nodify lung.   Calculated risk of malignancy with biodesix 9%.            Relevant Orders    NM PET CT Routine Skull to Mid Thigh       I spent a total of 57 minutes on the day of the visit.  This includes face to face time and non-face to face time preparing to see the patient (eg,  review of tests), obtaining and/or reviewing separately obtained history, documenting clinical information in the electronic or other health record, independently interpreting results and communicating results to the patient/family/caregiver, or care coordinator.     Follow up for Pulmonary nodule w/review PETCT with pulmonologist .

## 2023-08-15 NOTE — MR AVS SNAPSHOT
[facility]  [address] [address]  Phone: [phone]                  Teresa Cazares   2017 3:20 PM   Office Visit    Description:  Female : 1975   Provider:  Alyce Alvarez MD   Department:  [facility]           Reason for Visit     Cystitis           Diagnoses this Visit        Comments    Yeast infection    -  Primary     Dysuria                To Do List           Future Appointments        Provider Department Dept Phone    2017 3:20 PM Anastasia Maurice DPM [facility] - Podiatry [phone]    2017 12:40 PM Taylor Harrison MD [facility] Medicine [phone]      Goals (5 Years of Data)     None       These Medications        Disp Refills Start End    fluconazole (DIFLUCAN) 150 MG Tab 2 tablet 0 2017    Take 1 tablet (150 mg total) by mouth once daily. - Oral    Pharmacy: [facility] [address] AT [facility] Ph #: [phone]       ciprofloxacin HCl (CIPRO) 250 MG tablet 6 tablet 0 2017    Take 1 tablet (250 mg total) by mouth 2 (two) times daily. - Oral    Pharmacy: [facility] [address] [place] [address] AT [facility] Ph #: [phone]         [facility]     [facility] Nurse Care Line -  Assistance  Unless otherwise directed by your provider, please contact [facility], our nurse care line that is available for  assistance.     Registered nurses in the [facility] Center provide: appointment scheduling, clinical advisement, health education, and other advisory services.  Call: [phone] (toll free)               Medications           Message regarding Medications     Verify the changes and/or additions to your medication regime listed below are the same as discussed with your clinician today.  If any of these changes or additions are  "incorrect, please notify your healthcare provider.        START taking these NEW medications        Refills    fluconazole (DIFLUCAN) 150 MG Tab 0    Sig: Take 1 tablet (150 mg total) by mouth once daily.    Class: Normal    Route: Oral    ciprofloxacin HCl (CIPRO) 250 MG tablet 0    Sig: Take 1 tablet (250 mg total) by mouth 2 (two) times daily.    Class: Normal    Route: Oral      STOP taking these medications     efinaconazole 10 % Nisha Apply 1 application topically once daily.           Verify that the below list of medications is an accurate representation of the medications you are currently taking.  If none reported, the list may be blank. If incorrect, please contact your healthcare provider. Carry this list with you in case of emergency.           Current Medications     BIOTIN ORAL Take by mouth.    ibuprofen (ADVIL,MOTRIN) 800 MG tablet Take 1 tablet (800 mg total) by mouth 2 (two) times daily.    oxycodone-acetaminophen (PERCOCET) 5-325 mg per tablet Take 1 tablet by mouth every 4 (four) hours as needed for Pain.    ciprofloxacin HCl (CIPRO) 250 MG tablet Take 1 tablet (250 mg total) by mouth 2 (two) times daily.    fluconazole (DIFLUCAN) 150 MG Tab Take 1 tablet (150 mg total) by mouth once daily.           Clinical Reference Information           Your Vitals Were     BP Pulse Temp Resp Height Weight    120/80 80 97.4 °F (36.3 °C) 16 5' 3" (1.6 m) 116.9 kg (257 lb 11.5 oz)    SpO2 BMI             95% 45.65 kg/m2         Blood Pressure          Most Recent Value    BP  120/80      Allergies as of 5/16/2017     Flagyl [Metronidazole Hcl]      Immunizations Administered on Date of Encounter - 5/16/2017     None      Instructions      Dysuria     Painful urination (dysuria) is often caused by a problem in the urinary tract.   Dysuria is pain felt during urination. It is often described as a burning. Learn more about this problem and how it can be treated.  What causes dysuria?  Possible causes " "include:  · Infection with a bacteria or virus. This can be a urinary tract infection (UTI). Or it may be a sexually transmitted infection (STI).  · Sensitivity or allergy to chemicals. These chemicals are found in lotions and other products.  · Prostate or bladder problems  · Radiation therapy to the pelvic area  How is dysuria diagnosed?  Your healthcare provider will examine you. He or she will ask about your symptoms and health. After talking with you and doing a physical exam, your healthcare provider may know what is causing your dysuria. He or she will usually request  a sample of your urine. Tests of your urine, or a "urinalysis," are done. A urinalysis may include:  · Looking at the urine sample (visual exam)  · Checking for substances (chemical exam)  · Checking a small amount under a microscope (microscopic exam)  Some parts of the urinalysis may be done in the provider's office and some in a lab. And, the urine sample may be checked for bacteria and yeast (urine culture). Your healthcare provider will tell you more about these tests if they are needed.  How is dysuria treated?  Treatment depends on the cause. If you have a bacterial infection, you may need antibiotics. You may be given medications to make it easier for you to urinate and help relieve pain. Your healthcare provider can tell you more about your treatment options. Untreated, symptoms may get worse.  Call the healthcare provider right away if you have any of the following:  · Fever of 100.4°F (38°C) or higher   · No improvement after three days of treatment  · Trouble urinating because of pain  · New or increased discharge from the vagina or penis  · Rash or joint pain  · Increased back or abdominal pain  · Enlarged painful lymph nodes (lumps) in the groin   Date Last Reviewed: 9/24/2014  © 4851-4600 [employer]. [address] [place] [address], [place] [address]. All rights reserved. This information is not intended as a substitute for " professional medical care. Always follow your healthcare professional's instructions.             Language Assistance Services     ATTENTION: Language assistance services are available, free of charge. Please call [phone].      ATENCIÓN: Si habla blank, tiene a heard disposición servicios gratuitos de asistencia lingüística. Llame al [phone].     CHÚ Ý: N?u b?n nói Ti?ng Vi?t, có các d?ch v? h? tr? ngôn ng? mi?n phí dành cho b?n. G?i s? [phone].         [facility] complies with applicable Federal civil rights laws and does not discriminate on the basis of race, color, national origin, age, disability, or sex.         15

## 2023-08-15 NOTE — ASSESSMENT & PLAN NOTE
Increased stress and anxiety with new finding 1.4 cm lung nodule  Beginning klonopin, continued management with primary care provider

## 2023-08-15 NOTE — ASSESSMENT & PLAN NOTE
Wt Readings from Last 9 Encounters:   08/15/23 87.3 kg (192 lb 7.4 oz)   08/04/23 92.1 kg (203 lb)   06/29/23 92.1 kg (203 lb 0.7 oz)   04/12/23 89.8 kg (198 lb)   11/23/22 90.1 kg (198 lb 10.2 oz)   09/02/22 85.5 kg (188 lb 7.9 oz)   07/27/22 86 kg (189 lb 9.5 oz)   04/01/22 90.9 kg (200 lb 6.4 oz)   02/22/22 94.8 kg (209 lb)   Body mass index is 35.2 kg/m².

## 2023-08-15 NOTE — PRE ADMISSION SCREENING
Reached out to Pre Admit Surgery Nurse Practitioner, Miladys Ryan regarding pt upcoming procedure on 8/25/23. Nurse Practitioner reviewed pt chart/medical history to address whether [facility] appointment was needed before proceeding. NP response: schedule [facility] appt before surgery.

## 2023-08-17 ENCOUNTER — PATIENT MESSAGE (OUTPATIENT)
Dept: INTERNAL MEDICINE | Facility: CLINIC | Age: 48
End: 2023-08-17
Payer: COMMERCIAL

## 2023-08-17 ENCOUNTER — TELEPHONE (OUTPATIENT)
Dept: PULMONOLOGY | Facility: CLINIC | Age: 48
End: 2023-08-17
Payer: COMMERCIAL

## 2023-08-17 LAB
1,3 BETA GLUCAN SER-MCNC: <31 PG/ML
FUNGITELL COMMENTS: NEGATIVE
GAMMA INTERFERON BACKGROUND BLD IA-ACNC: 0.06 IU/ML
M TB IFN-G CD4+ BCKGRND COR BLD-ACNC: -0.02 IU/ML
M TB IFN-G CD4+ BCKGRND COR BLD-ACNC: 0.02 IU/ML
MITOGEN IGNF BCKGRD COR BLD-ACNC: 1.13 IU/ML
TB GOLD PLUS: NEGATIVE

## 2023-08-17 NOTE — TELEPHONE ENCOUNTER
----- Message from Zeinab Rodriguez sent at 8/17/2023 12:04 PM CDT -----  Contact: Jermaine from [employer] @[phone]  [email or web address]     Patient is calling for Medical Advice regarding:    Additional information for Nodify Lung test need to be fax to [phone]    Would like response via [employer]:  call back     Comments:   Please call Jermaine from [employer] @[phone] if any questions or concerns.

## 2023-08-17 NOTE — TELEPHONE ENCOUNTER
Returned call to Jermaine she had question about patient nodule . Needed to speak with Pauline Morgan NP

## 2023-08-17 NOTE — TELEPHONE ENCOUNTER
Spoke with kailey, provided additional info needed for nodifylung assessment/testing  Solitary pulmonary nodule.  The nodule is located in the left upper lobe.  Former smoker.  Nodule not spiculated.  No history of cancer.

## 2023-08-18 ENCOUNTER — TELEPHONE (OUTPATIENT)
Dept: INTERNAL MEDICINE | Facility: CLINIC | Age: 48
End: 2023-08-18
Payer: COMMERCIAL

## 2023-08-18 NOTE — TELEPHONE ENCOUNTER
----- Message from Lencho Pena sent at 8/18/2023  9:54 AM CDT -----  Contact: Teresa  .Type:  Patient Returning Call    Who Called: Teresa  Who Left Message for Patient: Nurse  Does the patient know what this is regarding?: Yes  Would the patient rather a call back or a response via [facility]? Call   Best Call Back Number: [phone]        Thanks

## 2023-08-18 NOTE — TELEPHONE ENCOUNTER
----- Message from Zaheer Yadav sent at 8/18/2023  9:28 AM CDT -----  Contact: Teresa  Pt is calling in regards to getting a call back about the side effects of the medication that was prescribed. Please call back at [phone]                                  Thanks  KT

## 2023-08-21 ENCOUNTER — OFFICE VISIT (OUTPATIENT)
Dept: PULMONOLOGY | Facility: CLINIC | Age: 48
End: 2023-08-21
Payer: COMMERCIAL

## 2023-08-21 ENCOUNTER — OFFICE VISIT (OUTPATIENT)
Dept: INTERNAL MEDICINE | Facility: CLINIC | Age: 48
End: 2023-08-21
Payer: COMMERCIAL

## 2023-08-21 ENCOUNTER — HOSPITAL ENCOUNTER (OUTPATIENT)
Dept: RADIOLOGY | Facility: HOSPITAL | Age: 48
Discharge: HOME | End: 2023-08-21
Attending: NURSE PRACTITIONER
Payer: COMMERCIAL

## 2023-08-21 ENCOUNTER — HOSPITAL ENCOUNTER (OUTPATIENT)
Dept: CARDIOLOGY | Facility: HOSPITAL | Age: 48
Discharge: HOME | End: 2023-08-21
Attending: NURSE PRACTITIONER
Payer: COMMERCIAL

## 2023-08-21 VITALS
WEIGHT: 187.25 LBS | OXYGEN SATURATION: 98 % | BODY MASS INDEX: 34.46 KG/M2 | HEIGHT: 62 IN | DIASTOLIC BLOOD PRESSURE: 70 MMHG | HEART RATE: 61 BPM | SYSTOLIC BLOOD PRESSURE: 122 MMHG | RESPIRATION RATE: 18 BRPM

## 2023-08-21 VITALS
SYSTOLIC BLOOD PRESSURE: 128 MMHG | HEART RATE: 71 BPM | DIASTOLIC BLOOD PRESSURE: 89 MMHG | RESPIRATION RATE: 18 BRPM | OXYGEN SATURATION: 98 % | TEMPERATURE: 98 F

## 2023-08-21 DIAGNOSIS — E66.01 CLASS 2 SEVERE OBESITY DUE TO EXCESS CALORIES WITH SERIOUS COMORBIDITY AND BODY MASS INDEX (BMI) OF 35.0 TO 35.9 IN ADULT: ICD-10-CM

## 2023-08-21 DIAGNOSIS — Z01.818 PREOP EXAMINATION: ICD-10-CM

## 2023-08-21 DIAGNOSIS — M65.311 TRIGGER FINGER OF RIGHT THUMB: Primary | ICD-10-CM

## 2023-08-21 DIAGNOSIS — F51.04 PSYCHOPHYSIOLOGIC INSOMNIA: Chronic | ICD-10-CM

## 2023-08-21 DIAGNOSIS — R91.1 PULMONARY NODULE 1 CM OR GREATER IN DIAMETER: Primary | ICD-10-CM

## 2023-08-21 DIAGNOSIS — R73.03 PREDIABETES: Chronic | ICD-10-CM

## 2023-08-21 DIAGNOSIS — F17.211 CIGARETTE NICOTINE DEPENDENCE IN REMISSION: ICD-10-CM

## 2023-08-21 DIAGNOSIS — G47.33 OSA ON CPAP: Chronic | ICD-10-CM

## 2023-08-21 DIAGNOSIS — R91.1 PULMONARY NODULE 1 CM OR GREATER IN DIAMETER: ICD-10-CM

## 2023-08-21 DIAGNOSIS — R03.0 ELEVATED BLOOD PRESSURE READING WITHOUT DIAGNOSIS OF HYPERTENSION: Chronic | ICD-10-CM

## 2023-08-21 DIAGNOSIS — F17.210 NICOTINE DEPENDENCE, CIGARETTES, UNCOMPLICATED: Chronic | ICD-10-CM

## 2023-08-21 PROBLEM — S12.000A C1 CERVICAL FRACTURE: Status: RESOLVED | Noted: 2023-08-21 | Resolved: 2023-08-21

## 2023-08-21 PROBLEM — S12.000A C1 CERVICAL FRACTURE: Status: ACTIVE | Noted: 2023-08-21

## 2023-08-21 PROCEDURE — 1160F RVW MEDS BY RX/DR IN RCRD: CPT | Mod: CPTII,S$GLB,, | Performed by: INTERNAL MEDICINE

## 2023-08-21 PROCEDURE — 3044F PR MOST RECENT HEMOGLOBIN A1C LEVEL <7.0%: ICD-10-PCS | Mod: CPTII,S$GLB,, | Performed by: INTERNAL MEDICINE

## 2023-08-21 PROCEDURE — 3078F DIAST BP <80 MM HG: CPT | Mod: CPTII,S$GLB,, | Performed by: INTERNAL MEDICINE

## 2023-08-21 PROCEDURE — 3074F SYST BP LT 130 MM HG: CPT | Mod: CPTII,S$GLB,, | Performed by: INTERNAL MEDICINE

## 2023-08-21 PROCEDURE — 3078F PR MOST RECENT DIASTOLIC BLOOD PRESSURE < 80 MM HG: ICD-10-PCS | Mod: CPTII,S$GLB,, | Performed by: INTERNAL MEDICINE

## 2023-08-21 PROCEDURE — A9552 F18 FDG: HCPCS

## 2023-08-21 PROCEDURE — 99999 PR PBB SHADOW E&M-EST. PATIENT-LVL IV: CPT | Mod: PBBFAC,,, | Performed by: INTERNAL MEDICINE

## 2023-08-21 PROCEDURE — 99999 PR PBB SHADOW E&M-EST. PATIENT-LVL III: CPT | Mod: PBBFAC,,,

## 2023-08-21 PROCEDURE — 3074F PR MOST RECENT SYSTOLIC BLOOD PRESSURE < 130 MM HG: ICD-10-PCS | Mod: CPTII,S$GLB,, | Performed by: INTERNAL MEDICINE

## 2023-08-21 PROCEDURE — 99214 PR OFFICE/OUTPT VISIT, EST, LEVL IV, 30-39 MIN: ICD-10-PCS | Mod: S$GLB,,, | Performed by: INTERNAL MEDICINE

## 2023-08-21 PROCEDURE — 1159F PR MEDICATION LIST DOCUMENTED IN MEDICAL RECORD: ICD-10-PCS | Mod: CPTII,S$GLB,, | Performed by: INTERNAL MEDICINE

## 2023-08-21 PROCEDURE — 3008F PR BODY MASS INDEX (BMI) DOCUMENTED: ICD-10-PCS | Mod: CPTII,S$GLB,, | Performed by: INTERNAL MEDICINE

## 2023-08-21 PROCEDURE — 78815 PET IMAGE W/CT SKULL-THIGH: CPT | Mod: 26,PI,, | Performed by: RADIOLOGY

## 2023-08-21 PROCEDURE — 3044F HG A1C LEVEL LT 7.0%: CPT | Mod: CPTII,S$GLB,, | Performed by: INTERNAL MEDICINE

## 2023-08-21 PROCEDURE — 93010 ELECTROCARDIOGRAM REPORT: CPT | Mod: ,,, | Performed by: INTERNAL MEDICINE

## 2023-08-21 PROCEDURE — 3008F BODY MASS INDEX DOCD: CPT | Mod: CPTII,S$GLB,, | Performed by: INTERNAL MEDICINE

## 2023-08-21 PROCEDURE — 93005 ELECTROCARDIOGRAM TRACING: CPT

## 2023-08-21 PROCEDURE — 93010 EKG 12-LEAD: ICD-10-PCS | Mod: ,,, | Performed by: INTERNAL MEDICINE

## 2023-08-21 PROCEDURE — 1160F PR REVIEW ALL MEDS BY PRESCRIBER/CLIN PHARMACIST DOCUMENTED: ICD-10-PCS | Mod: CPTII,S$GLB,, | Performed by: INTERNAL MEDICINE

## 2023-08-21 PROCEDURE — 99999 PR PBB SHADOW E&M-EST. PATIENT-LVL III: ICD-10-PCS | Mod: PBBFAC,,,

## 2023-08-21 PROCEDURE — 1159F MED LIST DOCD IN RCRD: CPT | Mod: CPTII,S$GLB,, | Performed by: INTERNAL MEDICINE

## 2023-08-21 PROCEDURE — 78815 NM PET CT ROUTINE: ICD-10-PCS | Mod: 26,PI,, | Performed by: RADIOLOGY

## 2023-08-21 PROCEDURE — 99999 PR PBB SHADOW E&M-EST. PATIENT-LVL IV: ICD-10-PCS | Mod: PBBFAC,,, | Performed by: INTERNAL MEDICINE

## 2023-08-21 PROCEDURE — 99214 OFFICE O/P EST MOD 30 MIN: CPT | Mod: S$GLB,,, | Performed by: INTERNAL MEDICINE

## 2023-08-21 NOTE — ASSESSMENT & PLAN NOTE
Hx of elevated BP reading, not currently on medication.   BP in office: 128/89    --See medication recommendations as above

## 2023-08-21 NOTE — ASSESSMENT & PLAN NOTE
Planned for trigger finger release, right thumb with Dr. Sandeep Bang on 8/25/23.     Known risk factors for perioperative complications: None    Difficulty with intubation is not anticipated.    Cardiac Risk Estimation: Based on the Revised Cardiac Risk index, patient is a Class 1 risk with a 3.9% risk of a major cardiac event in a low risk procedure.    1.) Preoperative workup as follows: ECG, hemoglobin, hematocrit, creatinine, liver function studies.  2.) Change in medication regimen before surgery: discontinue ASA, NSAIDs 7 days before surgery, hold Metformin 24 hours prior to surgery.  3.) Prophylaxis for cardiac events with perioperative beta-blockers: not indicated.  4.) Invasive hemodynamic monitoring perioperatively: at the discretion of anesthesiologist.  5.) Deep vein thrombosis prophylaxis postoperatively: intermittent pneumatic compression boots and regimen to be chosen by surgical team.  6.) Surveillance for postoperative MI with ECG immediately postoperatively and on postoperati ve days 1 and 2 AND troponin levels 24 hours postoperatively and on day 4 or hospital discharge (whichever comes first): not indicated.  7.) Current medications which may produce withdrawal symptoms if withheld perioperatively: None  8.) Other measures: None     --Morning of Procedure: None  --Resume all medications post-operatively  --Hold ASA, NSAIDs and all vitamins/supplements 7 days prior to procedure

## 2023-08-21 NOTE — DISCHARGE INSTRUCTIONS
Pre op instructions reviewed with patient during Clinic Visit with Provider, verbalized understanding.    To confirm, Surgery is scheduled on 8/25/23. We will call you late afternoon the business day prior to surgery with your arrival time.    *Please report to the [facility] (1st Floor) located off of [facility] (2nd Entrance/Building on the left, in front of the flag pole).  Address: [address] [place] [address] [place]. [address]      INSTRUCTIONS IMPORTANT!!!  Do Not Eat, Drink, or Smoke after 12 midnight unless instructed otherwise by your Surgeon. OK to brush teeth, no gum, candy or mints!    *MEDICATION INSTRUCTIONS as instructed by Corinne Rajan NP: Morning of Surgery, please ONLY take:  none    Diabetic Patients: If you take diabetic medication, Do NOT take morning of surgery unless instructed by Doctor. Metformin to be stopped 24 hrs prior to surgery. Ozempic/ Mounjaro/ Wegovy injections to be stopped 7 days prior to surgery. DO NOT take long-acting insulin the evening before surgery. Blood sugars will be checked in pre-op by Nurse.    Patients should HOLD all vitamins, herbal supplements, aspirin products & NSAIDS 7 days prior to surgery, as these can thin the blood.    ____  Avoid Alcoholic beverages 3 days prior to surgery, as it can thin the blood.  ____  NO Acrylic/fake nails or nail polish worn day of surgery (specifically hand/arm & foot surgeries).  ____  NO powder, lotions, deodorants, oils or cream on body.  ____  Remove all jewelry, piercings, & foreign objects prior to arrival and leave at home.  ____  Remove Dentures, Hearing Aids & Contact Lens prior to surgery.  ____  Bring photo ID and insurance information to hospital (Leave Valuables at Home).  ____  If going home the same day, arrange for a ride home. You will not be able to drive for 24 hrs if Anesthesia was used.   ____  Females (ages 11-60): may need to give a urine sample the morning of surgery; please see Pre op Nurse  prior to using the restroom.  ____  Males: Stop ED medications (Viagra, Cialis) 24 hrs prior to surgery.  ____  Wear clean, loose fitting clothing to allow for dressings/ bandages.      Bathing Instructions:    -Shower with anti-bacterial Soap (Hibiclens or Dial) the night before surgery and the morning of.   -Do not use Hibiclens on your face or genitals.   -Apply clean clothes after shower.  -Do not shave your face or body 2 days prior to surgery unless instructed otherwise by your Surgeon.  -Do not shave pubic hair 7 days prior to surgery (gyn pt's).    [facility]Ride Policy:  Only 2 adults allowed in pre op/recovery area during your procedure. You MUST HAVE A RIDE HOME from a responsible adult that you know and trust. Medical Transport, Uber or Lyft can ONLY be used if patient has a responsible adult to accompany them during ride home.    Discharge Instructions: You will receive Post-op/Discharge instructions by your Discharge Nurse prior to going home.   *Prevention of surgical site infections:   -Keep incisions clean and dry.   -Do not soak/submerge incisions in water until completely healed.   -Do not apply lotions, powders, creams, or deodorants to site.   -Always make sure hands are cleaned with antibacterial soap/ alcohol-based  prior to touching the surgical site.        *Signs and symptoms of Infection:               -Redness and pain around the area where you had surgery               -Drainage of cloudy fluid from your surgical wound               -Fever, chills or any flu-like symptoms     >>>Call Surgeon office/on-call Surgeon if you experience any of these signs & symptoms post-surgery @ [phone]<<<       *If you are running late day of surgery, please call the Surgery Dept @ [phone].       *Billing question, please call  [phone]       Thank you,  -[facility] Pre [facility].  [phone] or [phone]  M-F 7:30 am-4:00 pm (Closed Major  Holidays)

## 2023-08-21 NOTE — PROGRESS NOTES
Pulmonary Outpatient  Visit     Subjective:       Patient ID: Teresa Cazares is a 47 y.o. female.    Social History     Tobacco Use   Smoking Status Former    Current packs/day: 0.00    Average packs/day: 1 pack/day for 33.0 years (33.0 ttl pk-yrs)    Types: Cigarettes    Start date:     Quit date:     Years since quittin.6    Passive exposure: Past   Smokeless Tobacco Never            Chief Complaint: Sleep Apnea and Pulmonary Nodules      Teresa Cazares is 47 y.o.  New to me   Review of PET CT  Nodule seen on CT Chest  Biodex proteonomics was low risk  Former smoker recently quit  No personal Hx of cancer  Using CPAP   Adherence has been poor  Abn CXR was detected as part of preop for trigger finger surgery by Dr Bang  Previous CXR reviewed: nodule was obscured on lateral view by position of arm on priors              Review of Systems   All other systems reviewed and are negative.      Outpatient Encounter Medications as of 2023   Medication Sig Dispense Refill    acitretin (SORIATANE) 10 MG capsule Take 1 capsule (10 mg total) by mouth once daily. 30 capsule 3    ascorbic acid, vitamin C, (VITAMIN C) 500 MG tablet Take 500 mg by mouth once daily.      clonazePAM (KLONOPIN) 0.5 MG tablet Take 0.5-1 tablets (0.25-0.5 mg total) by mouth 2 (two) times daily as needed for Anxiety. 60 tablet 3    ELDERBERRY FRUIT ORAL Take 1 capsule by mouth once daily.      FLUoxetine 20 MG capsule Take 1 capsule (20 mg total) by mouth once daily. 90 capsule 3    gabapentin (NEURONTIN) 300 MG capsule Take 3 capsules (900 mg total) by mouth every evening. 90 capsule 1    nicotine, polacrilex, (NICORETTE) 2 mg Gum Take 1 each (2 mg total) by mouth as needed (for cigarette cravings). Use as directed on packaging. 100 each 5    varenicline (CHANTIX) 1 mg Tab Take 1 tablet (1 mg total) by mouth 2 (two) times daily. 60 tablet 3    [DISCONTINUED] hydroCHLOROthiazide  (HYDRODIURIL) 25 MG tablet Take 1 tablet (25 mg total) by mouth once daily. 90 tablet 3    [DISCONTINUED] naproxen (NAPROSYN) 500 MG tablet Take 1 tablet (500 mg total) by mouth 2 (two) times daily as needed (for pain from arthritis of knees). 60 tablet 1    [DISCONTINUED] traMADoL (ULTRAM) 50 mg tablet Take 1 tablet (50 mg total) by mouth every 6 (six) hours. 12 tablet 0     No facility-administered encounter medications on file as of 8/21/2023.       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  She does not have any pertinent problems on file.  She  has a past surgical history that includes Umbilical hernia repair; Hysterectomy (2009); Fracture surgery; Oophorectomy (Bilateral); Application of large external fixation device to tibia (Left, 01/10/2019); Open reduction and internal fixation (ORIF) of fracture of tibial plateau (Left, 01/17/2019); Removal of external fixation device (Left, 01/17/2019); Application of wound vacuum-assisted closure device (Left, 01/17/2019); Hernia repair; Colonoscopy (N/A, 09/02/2022); and Tubal ligation (05/22/1997).  Her family history includes Breast cancer in her paternal grandmother; Breast cancer (age of onset: 46) in her sister; Breast cancer (age of onset: 58) in her mother; Cancer in her mother and sister; Depression in her mother; Diabetes in her mother; No Known Problems in her father.  She  reports that she quit smoking about 7 months ago. Her smoking use included cigarettes. She started smoking about 33 years ago. She has a 33.0  pack-year smoking history. She has been exposed to tobacco smoke. She has never used smokeless tobacco. She reports that she does not currently use alcohol after a past usage of about 2.0 standard drinks of alcohol per week. She reports that she does not use drugs.  She has a current medication list which includes the following prescription(s): acitretin, ascorbic acid (vitamin c), clonazepam, elderberry fruit, fluoxetine, gabapentin, nicotine (polacrilex), and varenicline.  Current Outpatient Medications on File Prior to Visit   Medication Sig Dispense Refill    acitretin (SORIATANE) 10 MG capsule Take 1 capsule (10 mg total) by mouth once daily. 30 capsule 3    ascorbic acid, vitamin C, (VITAMIN C) 500 MG tablet Take 500 mg by mouth once daily.      clonazePAM (KLONOPIN) 0.5 MG tablet Take 0.5-1 tablets (0.25-0.5 mg total) by mouth 2 (two) times daily as needed for Anxiety. 60 tablet 3    ELDERBERRY FRUIT ORAL Take 1 capsule by mouth once daily.      FLUoxetine 20 MG capsule Take 1 capsule (20 mg total) by mouth once daily. 90 capsule 3    gabapentin (NEURONTIN) 300 MG capsule Take 3 capsules (900 mg total) by mouth every evening. 90 capsule 1    nicotine, polacrilex, (NICORETTE) 2 mg Gum Take 1 each (2 mg total) by mouth as needed (for cigarette cravings). Use as directed on packaging. 100 each 5    varenicline (CHANTIX) 1 mg Tab Take 1 tablet (1 mg total) by mouth 2 (two) times daily. 60 tablet 3    [DISCONTINUED] hydroCHLOROthiazide (HYDRODIURIL) 25 MG tablet Take 1 tablet (25 mg total) by mouth once daily. 90 tablet 3    [DISCONTINUED] naproxen (NAPROSYN) 500 MG tablet Take 1 tablet (500 mg total) by mouth 2 (two) times daily as needed (for pain from arthritis of knees). 60 tablet 1    [DISCONTINUED] traMADoL (ULTRAM) 50 mg tablet Take 1 tablet (50 mg total) by mouth every 6 (six) hours. 12 tablet 0     No current facility-administered medications on file prior to visit.     She is allergic to macrobid  "[nitrofurantoin monohyd/m-cryst], bactrim [sulfamethoxazole-trimethoprim], and flagyl [metronidazole hcl]..      BP Readings from Last 3 Encounters:   08/21/23 122/70   08/21/23 128/89   08/15/23 120/68             MMRC Dyspnea Scale (4 is worst)     [] MMRC 0: Dyspneic on strenuous excercise (0 points)    [] MMRC 1: Dyspneic on walking a slight hill (0 points)    [] MMRC 2: Dyspneic on walking level ground; must stop occasionally due to breathlessness (1 point)    [] MMRC 3: Must stop for breathlessness after walking 100 yards or after a few minutes (2 points)    [] MMRC 4: Cannot leave house; breathless on dressing/undressing (3 points)                     EPWORTH SLEEPINESS SCALE 8/15/2023   Sitting and reading 0   Watching TV 0   Sitting, inactive in a public place (e.g. a theatre or a meeting) 0   As a passenger in a car for an hour without a break 0   Lying down to rest in the afternoon when circumstances permit 3   Sitting and talking to someone 0   Sitting quietly after a lunch without alcohol 0   In a car, while stopped for a few minutes in traffic 0   Total score 3                 Objective:     Vital Signs (Most Recent)  Vital Signs  Pulse: 61  Resp: 18  SpO2: 98 %  BP: 122/70  Height and Weight  Height: 5' 2" (157.5 cm)  Weight: 84.9 kg (187 lb 4.5 oz)  BSA (Calculated - sq m): 1.93 sq meters  BMI (Calculated): 34.2  Weight in (lb) to have BMI = 25: 136.4]  Wt Readings from Last 2 Encounters:   08/21/23 84.9 kg (187 lb 4.5 oz)   08/15/23 87.3 kg (192 lb 7.4 oz)       Physical Exam  Vitals and nursing note reviewed.   Constitutional:       Appearance: She is normal weight.       HENT:      Head: Normocephalic and atraumatic.      Nose: Nose normal.   Eyes:      Pupils: Pupils are equal, round, and reactive to light.   Cardiovascular:      Rate and Rhythm: Normal rate and regular rhythm.      Pulses: Normal pulses.      Heart sounds: Normal heart sounds.   Pulmonary:      Effort: Pulmonary effort is " "normal.      Breath sounds: Normal breath sounds.   Abdominal:      General: Bowel sounds are normal.      Palpations: Abdomen is soft.   Musculoskeletal:      Cervical back: Normal range of motion.   Skin:     General: Skin is warm.   Neurological:      General: No focal deficit present.      Mental Status: She is alert and oriented to person, place, and time.   Psychiatric:         Mood and Affect: Mood normal.          Laboratory  Lab Results   Component Value Date    WBC 12.39 08/11/2023    RBC 4.22 08/11/2023    HGB 12.6 08/11/2023    HCT 38.8 08/11/2023    MCV 92 08/11/2023    MCH 29.9 08/11/2023    MCHC 32.5 08/11/2023    RDW 13.1 08/11/2023     08/11/2023    MPV 9.4 08/11/2023    GRAN 7.2 08/11/2023    GRAN 57.7 08/11/2023    LYMPH 3.6 08/11/2023    LYMPH 28.9 08/11/2023    MONO 1.3 (H) 08/11/2023    MONO 10.7 08/11/2023    EOS 0.2 08/11/2023    BASO 0.07 08/11/2023    EOSINOPHIL 1.9 08/11/2023    BASOPHIL 0.6 08/11/2023       BMP  Lab Results   Component Value Date     08/11/2023    K 3.7 08/11/2023     08/11/2023    CO2 27 08/11/2023    BUN 16 08/11/2023    CREATININE 0.7 08/11/2023    CALCIUM 9.0 08/11/2023    ANIONGAP 9 08/11/2023    ESTGFRAFRICA >60 12/10/2021    EGFRNONAA >60 12/10/2021    AST 17 08/11/2023    ALT 14 08/11/2023    PROT 6.7 08/11/2023          No results found for: "IGE"     No results found for: "ASPERGILLUS"  No results found for: "AFUMIGATUSCL"     No results found for: "ACE"     Diagnostic Results:  I have personally reviewed today the following studies:    NM PET CT Routine Skull to Mid Thigh  Narrative: EXAMINATION:  NM PET CT ROUTINE    CLINICAL HISTORY:  Lung nodule, > 8mm;14 mm nodule in the medial aspect left upper lobe with central high density suggesting noncalcified or early centrally calcifying granuloma.; Solitary pulmonary nodule    TECHNIQUE:  9.50 mCi of F18-FDG was administered intravenously.  After an approximately 60 min distribution time, PET/CT " images were acquired from the skull base to the mid thigh. Transmission images were acquired to correct for attenuation using a whole body low-dose CT scan without contrast.    COMPARISON:  CT chest 08/11/2023    FINDINGS:  Head/neck: Normal physiologic activity present.    Chest: No FDG avid axillary, mediastinal or hilar lymph nodes.    Left upper lobe no nodule demonstrates no pathologic FDG avidity.  No concerning FDG avid pulmonary focus.    Abdomen/pelvis: Physiologic activity present without concerning FDG avid focus.  Colonic diverticulosis noted.    Skeletal/subcutaneous structures: No concerning FDG avid osseous lesion.  Impression: No pathologic FDG avidity within the left upper lobe nodule.  Continued CT chest follow-up recommended.    Electronically signed by: Cong Mcbride MD  Date:    08/21/2023  Time:    09:47                 Compliance Information 3/18/2023 - 6/15/2023  Compliance Summary  3/18/2023 - 6/15/2023 (90 days)  Days with Device Usage 5 days  Days without Device Usage 85 days  Percent Days with Device Usage 5.6%  Cumulative Usage 20 hrs. 34 mins. 55 secs.  Maximum Usage (1 Day) 7 hrs. 15 mins. 20 secs.  Average Usage (All Days) 13 mins. 43 secs.  Average Usage (Days Used) 4 hrs. 6 mins. 59 secs.  Minimum Usage (1 Day) 1 hrs. 46 mins. 43 secs.  Percent of Days with Usage >= 4 Hours 2.2%  Percent of Days with Usage < 4 Hours 97.8%  Date Range  Average AHI 4.8  Auto-CPAP Summary  Auto-CPAP Mean Pressure 8.2 cmH2O  Auto-CPAP Peak Average Pressure 8.5 cmH2O  Device Pressure <= 90% of Time 10.0 cmH2O  Average Time in Large Leak Per Day 36 secs.  Assessment/Plan:     Problem List Items Addressed This Visit       Prediabetes (Chronic)    Nicotine dependence, cigarettes, uncomplicated (Chronic)    Psychophysiologic insomnia (Chronic)     On Klonopin         DEYA on CPAP (Chronic)     CPAP use has been suboptimal  Work on increasing use         Class 2 severe obesity due to excess calories with  serious comorbidity and body mass index (BMI) of 35.0 to 35.9 in adult     Patient would benefit from weight loss and has tried to set realistic goals to achieve success. Lifestyle changes were discussed on eating healthy, exercising at least 150 minutes weekly, and reducing sedentary behavior.   Discussed the risk factors associated with obesity: Arthritis/DEYA/Diabetes/Fatty Liver/Cardiovascular disease/GERD/HTN/HLP.          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    This note was prepared using voice recognition system and is likely to have sound alike errors that may have been overlooked even after proof reading.  Please call me with any questions    Discussed diagnosis, its evaluation, treatment and usual course. All questions answered.    Thank you for the courtesy of participating in the care of this patient    Joseph Avendaño MD      Personal Diagnostic Review  []  CXR    []  ECHO    []  ONSAT    []  6MWD    []  LABS    []  CHEST CT    []  PET CT    []  Biopsy results

## 2023-08-21 NOTE — ASSESSMENT & PLAN NOTE
Hx of C1 fracture, treated with fusion, Halo.   LROM of neck    --Careful attention to flexion of neck

## 2023-08-21 NOTE — ASSESSMENT & PLAN NOTE
Patient would benefit from weight loss and has tried to set realistic goals to achieve success. Lifestyle changes were discussed on eating healthy, exercising at least 150 minutes weekly, and reducing sedentary behavior.   Discussed the risk factors associated with obesity: Arthritis/DEYA/Diabetes/Fatty Liver/Cardiovascular disease/GERD/HTN/HLP.

## 2023-08-21 NOTE — LETTER
August 21, 2023    Teresa Cazares  [address] [place] [address]         [facility] - Pre Op Consult  [address] [facility] [place] [address]  Phone: [phone]  Fax: [phone] August 21, 2023     Patient: Teresa Cazares   YOB: 1975   Date of Visit: 8/21/2023       To Whom It May Concern:    It is my medical opinion that Teresa Cazares may return to full duty immediately with no restrictions. Seen in clinic on 8/21/23 and completed necessary pre-operative medical testing.     If you have any questions or concerns, please don't hesitate to call.    Sincerely,        Corinne Rajan, NP

## 2023-08-21 NOTE — PRE-PROCEDURE INSTRUCTIONS
Pre op instructions reviewed with patient during Clinic Visit with Provider, verbalized understanding.    To confirm, Surgery is scheduled on 8/25/23. We will call you late afternoon the business day prior to surgery with your arrival time.    *Please report to the [facility] (1st Floor) located off of [facility] (2nd Entrance/Building on the left, in front of the flag pole).  Address: [address] [place] [address] [place]. [address]      INSTRUCTIONS IMPORTANT!!!  Do Not Eat, Drink, or Smoke after 12 midnight unless instructed otherwise by your Surgeon. OK to brush teeth, no gum, candy or mints!    *MEDICATION INSTRUCTIONS as instructed by Corinne Rajan NP: Morning of Surgery, please ONLY take:  none    Diabetic Patients: If you take diabetic medication, Do NOT take morning of surgery unless instructed by Doctor. Metformin to be stopped 24 hrs prior to surgery. Ozempic/ Mounjaro/ Wegovy injections to be stopped 7 days prior to surgery. DO NOT take long-acting insulin the evening before surgery. Blood sugars will be checked in pre-op by Nurse.    Patients should HOLD all vitamins, herbal supplements, aspirin products & NSAIDS 7 days prior to surgery, as these can thin the blood.    ____  Avoid Alcoholic beverages 3 days prior to surgery, as it can thin the blood.  ____  NO Acrylic/fake nails or nail polish worn day of surgery (specifically hand/arm & foot surgeries).  ____  NO powder, lotions, deodorants, oils or cream on body.  ____  Remove all jewelry, piercings, & foreign objects prior to arrival and leave at home.  ____  Remove Dentures, Hearing Aids & Contact Lens prior to surgery.  ____  Bring photo ID and insurance information to hospital (Leave Valuables at Home).  ____  If going home the same day, arrange for a ride home. You will not be able to drive for 24 hrs if Anesthesia was used.   ____  Females (ages 11-60): may need to give a urine sample the morning of surgery; please see Pre op Nurse  prior to using the restroom.  ____  Males: Stop ED medications (Viagra, Cialis) 24 hrs prior to surgery.  ____  Wear clean, loose fitting clothing to allow for dressings/ bandages.      Bathing Instructions:    -Shower with anti-bacterial Soap (Hibiclens or Dial) the night before surgery and the morning of.   -Do not use Hibiclens on your face or genitals.   -Apply clean clothes after shower.  -Do not shave your face or body 2 days prior to surgery unless instructed otherwise by your Surgeon.  -Do not shave pubic hair 7 days prior to surgery (gyn pt's).    [facility] Policy:  Only 2 adults allowed in pre op/recovery area during your procedure. You MUST HAVE A RIDE HOME from a responsible adult that you know and trust. Medical Transport, Uber or Lyft can ONLY be used if patient has a responsible adult to accompany them during ride home.    Discharge Instructions: You will receive Post-op/Discharge instructions by your Discharge Nurse prior to going home.   *Prevention of surgical site infections:   -Keep incisions clean and dry.   -Do not soak/submerge incisions in water until completely healed.   -Do not apply lotions, powders, creams, or deodorants to site.   -Always make sure hands are cleaned with antibacterial soap/ alcohol-based  prior to touching the surgical site.        *Signs and symptoms of Infection:               -Redness and pain around the area where you had surgery               -Drainage of cloudy fluid from your surgical wound               -Fever, chills or any flu-like symptoms     >>>Call Surgeon office/on-call Surgeon if you experience any of these signs & symptoms post-surgery @ [phone]<<<       *If you are running late day of surgery, please call the Surgery Dept @ [phone].       *Billing question, please call  [phone]       Thank you,  -[facility] Pre [facility].  [phone] or [phone]  M-F 7:30 am-4:00 pm (Closed Major  Holidays)

## 2023-08-21 NOTE — ASSESSMENT & PLAN NOTE
Former smoker  Left Upper lobe lesion  Non PET AVID  Low risk based on BIODESIX proteonomic  May be aspergiloma  Serologies sent  Close f/u

## 2023-08-21 NOTE — ASSESSMENT & PLAN NOTE
August 10, 2017      Emiliano Cam MD  [address] [place] [address]           [facility]  [address]  [facility] [address]  Phone: [phone]  Fax: [phone]          Patient: Domonique Hernandez Jr.   MR Number: [id number]   YOB: 1944   Date of Visit: 8/10/2017       Dear Dr. Emiliano Cam:    Thank you for referring Domonique Hernandez to me for evaluation. Attached you will find relevant portions of my assessment and plan of care.    If you have questions, please do not hesitate to call me. I look forward to following Domonique Hernandez along with you.    Sincerely,    Deb Greene MD    Enclosure  CC:  No Recipients    If you would like to receive this communication electronically, please contact [email or web address] or [phone] to request more information on [employer] access.    For providers and/or their staff who would like to refer a patient to [facility], please contact us through our one-stop-shop provider referral line, [facility], at [phone].    If you feel you have received this communication in error or would no longer like to receive these types of communications, please e-mail [email or web address]          Patient currently undergoing treatment for smoking cessation, managed with Chantix, Nicotine gum

## 2023-08-21 NOTE — PROGRESS NOTES
Preoperative History and Physical                                                              Hospital Medicine                                                                      Chief Complaint: Preoperative evaluation     History of Present Illness:      Teresa Cazares is a 47 y.o. female who presents to the office today for a preoperative consultation at the request of Dr. Sandeep Bang who plans on performing RELEASE, TRIGGER FINGER, Right Thumb on August 25.     Functional Status:      The patient is able to climb a flight of stairs. The patient is able to ambulate without difficulty. The patient's functional status is affected by the surgical problem. The patient's functional status is not affected by shortness of breath, chest pain, dyspnea on exertion and fatigue.    MET score greater than 4    Past Medical History:      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    Nicotine dependence, cigarettes, in remission 03/26/2019    Obesity     Personal history of COVID-19     Primary hypertension 06/29/2023    Recurrent major depression in partial remission 03/26/2019    Surgical wound infection (MRSA) 12/2019        Past Surgical History:      Past Surgical History:   Procedure Laterality Date    APPLICATION OF LARGE EXTERNAL FIXATION DEVICE TO TIBIA Left 01/10/2019    Procedure: APPLICATION, EXTERNAL FIXATION DEVICE, LARGE, TIBIA;  Surgeon: Domenic Galvan MD;  Location: [facility] OR;  Service: Orthopedics;  Laterality: Left;    APPLICATION OF WOUND VACUUM-ASSISTED CLOSURE DEVICE Left 01/17/2019    Procedure: APPLICATION, WOUND VAC;  Surgeon: Barry Guzman MD;  Location: [facility] OR;  Service: Orthopedics;  Laterality: Left;    COLONOSCOPY N/A 09/02/2022    Procedure: COLONOSCOPY;  Surgeon:  Yon Ridley MD;  Location: [facility] ENDO;  Service: Endoscopy;  Laterality: N/A;    FRACTURE SURGERY      C1 neck- halo    HERNIA REPAIR      HYSTERECTOMY  2009    tahbso    OOPHORECTOMY Bilateral     OPEN REDUCTION AND INTERNAL FIXATION (ORIF) OF FRACTURE OF TIBIAL PLATEAU Left 2019    Procedure: ORIF, FRACTURE, TIBIA, PLATEAU;  Surgeon: Barry Guzman MD;  Location: [facility] OR;  Service: Orthopedics;  Laterality: Left;    REMOVAL OF EXTERNAL FIXATION DEVICE Left 2019    Procedure: REMOVAL, EXTERNAL FIXATION DEVICE;  Surgeon: Barry Guzman MD;  Location: [facility] OR;  Service: Orthopedics;  Laterality: Left;    TUBAL LIGATION  1997    UMBILICAL HERNIA REPAIR          Social History:      Social History     Socioeconomic History    Marital status:     Number of children: 5   Occupational History    Occupation:    Tobacco Use    Smoking status: Former     Current packs/day: 0.00     Average packs/day: 1 pack/day for 33.0 years (33.0 ttl pk-yrs)     Types: Cigarettes     Start date:      Quit date:      Years since quittin.6     Passive exposure: Past    Smokeless tobacco: Never   Substance and Sexual Activity    Alcohol use: Not Currently     Alcohol/week: 2.0 standard drinks of alcohol     Types: 2 Glasses of wine per week     Comment: Stopped    Drug use: No    Sexual activity: Not Currently     Partners: Male     Birth control/protection: None        Family History:      Family History   Problem Relation Age of Onset    Breast cancer Mother 58    Cancer Mother         Breast cancer    Depression Mother         Depression    Diabetes Mother     Breast cancer Sister 46    Cancer Sister         Breast cancer    No Known Problems Father     Breast cancer Paternal Grandmother        Allergies:      Review of patient's allergies indicates:   Allergen Reactions    Macrobid [nitrofurantoin monohyd/m-cryst] Hives    Bactrim [sulfamethoxazole-trimethoprim]     Flagyl  [metronidazole hcl] Hives       Medications:      Current Outpatient Medications   Medication Sig    acitretin (SORIATANE) 10 MG capsule Take 1 capsule (10 mg total) by mouth once daily.    ascorbic acid, vitamin C, (VITAMIN C) 500 MG tablet Take 500 mg by mouth once daily.    clonazePAM (KLONOPIN) 0.5 MG tablet Take 0.5-1 tablets (0.25-0.5 mg total) by mouth 2 (two) times daily as needed for Anxiety.    ELDERBERRY FRUIT ORAL Take 1 capsule by mouth once daily.    FLUoxetine 20 MG capsule Take 1 capsule (20 mg total) by mouth once daily.    gabapentin (NEURONTIN) 300 MG capsule Take 3 capsules (900 mg total) by mouth every evening.    nicotine, polacrilex, (NICORETTE) 2 mg Gum Take 1 each (2 mg total) by mouth as needed (for cigarette cravings). Use as directed on packaging.    varenicline (CHANTIX) 1 mg Tab Take 1 tablet (1 mg total) by mouth 2 (two) times daily.     No current facility-administered medications for this visit.       Vitals:      Vitals:    08/21/23 1022   BP: 128/89   Pulse: 71   Resp: 18   Temp: 97.6 °F (36.4 °C)       Review of Systems:        Constitutional: Negative for fever, chills, weight loss, malaise/fatigue and diaphoresis.   HENT: Negative for hearing loss, ear pain, nosebleeds, congestion, sore throat, neck pain, tinnitus and ear discharge.    Eyes: Negative for blurred vision, double vision, photophobia, pain, discharge and redness.   Respiratory: Negative for cough, hemoptysis, sputum production, shortness of breath, wheezing and stridor.    Cardiovascular: Negative for chest pain, palpitations, orthopnea, claudication, leg swelling and PND.   Gastrointestinal: Negative for heartburn, nausea, vomiting, abdominal pain, diarrhea, constipation, blood in stool and melena.   Genitourinary: Negative for dysuria, urgency, frequency, hematuria and flank pain.   Musculoskeletal: Negative for myalgias, back pain, joint pain and falls.   Skin: Negative for itching and rash.   Neurological:  Negative for dizziness, tingling, tremors, sensory change, speech change, focal weakness, seizures, loss of consciousness, weakness and headaches.   Endo/Heme/Allergies: Negative for environmental allergies and polydipsia. Does not bruise/bleed easily.   Psychiatric/Behavioral: Negative for depression, suicidal ideas, hallucinations, memory loss and substance abuse. The patient is not nervous/anxious and does not have insomnia.    All 14 systems reviewed and negative except as noted above.    Physical Exam:      Constitutional: Appears well-developed, well-nourished and in no acute distress.  Patient is oriented to person, place, and time.   Head: Normocephalic and atraumatic. Mucous membranes moist.  Neck: Neck supple no mass.   Cardiovascular: Normal rate and regular rhythm.  S1 S2 appreciated by ascultation.  Pulmonary/Chest: Effort normal and clear to auscultation bilaterally. No respiratory distress.   Abdomen: Soft. Non-tender and non-distended. Bowel sounds are normal.   Neurological: Patient is alert and oriented to person, place and time. Moves all extremities.  Skin: Warm and dry. No lesions.  Extremities: No clubbing, cyanosis or edema.    Laboratory data:      Reviewed and noted in plan where applicable. Please see chart for full laboratory data.    @NYZNWMFMB54(cpk,cpkmb,troponini,mb)@ @YXFRTHQNB97(poctglucose)@     Lab Results   Component Value Date    INR 0.9 01/10/2019       Lab Results   Component Value Date    WBC 12.39 08/11/2023    HGB 12.6 08/11/2023    HCT 38.8 08/11/2023    MCV 92 08/11/2023     08/11/2023       @UXFFCCDHR99(GLU,NA,K,Cl,CO2,BUN,Creatinine,Calcium,MG)@    Predictors of intubation difficulty:       Morbid obesity? no   Anatomically abnormal facies? no   Prominent incisors? no   Receding mandible? no   Short, thick neck? no   Neck range of motion: Hx of C1 fusion. Limited ROM   Dentition: No chipped, loose, or missing teeth.  Based on the Modified Mallampati, patient is a  mallampati score: II (hard and soft palate, upper portion of tonsils anduvula visible)    Cardiographics:      EC23  Sinus bradycardia   Otherwise normal ECG   When compared with ECG of 11-AUG-2023 08:09,   The axis Shifted left   Non-specific change in ST segment in Lateral leads   Confirmed by George Oviedo MD ([id number]) on 2023 6:38:04 PM     Echocardiogram: not indicated    Imaging:      Chest x-ray: not indicated    Assessment and Plan:      Trigger finger of right thumb  Planned for trigger finger release, right thumb with Dr. Sandeep Bang on 23.     Known risk factors for perioperative complications: None    Difficulty with intubation is not anticipated.    Cardiac Risk Estimation: Based on the Revised Cardiac Risk index, patient is a Class 1 risk with a 3.9% risk of a major cardiac event in a low risk procedure.    1.) Preoperative workup as follows: ECG, hemoglobin, hematocrit, creatinine, liver function studies.  2.) Change in medication regimen before surgery: discontinue ASA, NSAIDs 7 days before surgery, hold Metformin 24 hours prior to surgery.  3.) Prophylaxis for cardiac events with perioperative beta-blockers: not indicated.  4.) Invasive hemodynamic monitoring perioperatively: at the discretion of anesthesiologist.  5.) Deep vein thrombosis prophylaxis postoperatively: intermittent pneumatic compression boots and regimen to be chosen by surgical team.  6.) Surveillance for postoperative MI with ECG immediately postoperatively and on postoperati ve days 1 and 2 AND troponin levels 24 hours postoperatively and on day 4 or hospital discharge (whichever comes first): not indicated.  7.) Current medications which may produce withdrawal symptoms if withheld perioperatively: None  8.) Other measures: None     --Morning of Procedure: None  --Resume all medications post-operatively  --Hold ASA, NSAIDs and all vitamins/supplements 7 days prior to procedure    Elevated blood pressure  reading without diagnosis of hypertension  Hx of elevated BP reading, not currently on medication.   BP in office: 128/89    --See medication recommendations as above    C1 cervical fracture  Hx of C1 fracture, treated with fusion, Halo.   LROM of neck    --Careful attention to flexion of neck    Nicotine dependence, cigarettes, uncomplicated  Patient currently undergoing treatment for smoking cessation, managed with Chantix, Nicotine gum    DEYA on CPAP  Chronic, uses CPAP at night    --Advised to bring CPAP to hospital for surgery          Electronically signed by Corinne Rajan NP on 8/23/2023 at 10:40 AM.

## 2023-08-24 ENCOUNTER — TELEPHONE (OUTPATIENT)
Dept: PREADMISSION TESTING | Facility: HOSPITAL | Age: 48
End: 2023-08-24
Payer: COMMERCIAL

## 2023-08-24 ENCOUNTER — TELEPHONE (OUTPATIENT)
Dept: INTERNAL MEDICINE | Facility: CLINIC | Age: 48
End: 2023-08-24
Payer: COMMERCIAL

## 2023-08-24 ENCOUNTER — TELEPHONE (OUTPATIENT)
Dept: PRIMARY CARE CLINIC | Facility: CLINIC | Age: 48
End: 2023-08-24
Payer: COMMERCIAL

## 2023-08-24 NOTE — TELEPHONE ENCOUNTER
Called and spoke with pt about the following:     Please arrive to [facility] ([facility] at 0730am on 8/25/23 for your scheduled procedure.  Address: [address] [place] [address] [place] [address] ([address] on left, 1st Floor Lobby)  >>>NO eating or drinking after midnight unless instructed otherwise by your Surgeon<<<    Thank you,  -[facility] Pre Admit Testing [facility].  Mon-Fri 7:30 am - 4 pm [phone]

## 2023-08-24 NOTE — TELEPHONE ENCOUNTER
----- Message from Marimar Pang sent at 8/24/2023 10:55 AM CDT -----  Contact: Teresa danna [phone]  1MEDICALADVICE     Patient is calling for Medical Advice regarding:    How long has patient had these symptoms:    Pharmacy name and phone#:    Would like response via [employer]: call back    CommentsPt is requesting a call back form the nurse regarding a medication

## 2023-08-24 NOTE — TELEPHONE ENCOUNTER
----- Message from Brit Roa sent at 8/24/2023 12:28 PM CDT -----  Contact: [phone]  Type:  Patient Returning Call    Who Called:Teresa   Who Left Message for Patient:ELMA  Does the patient know what this is regarding?:yes / medication   Would the patient rather a call back or a response via [facility]? Call back   Best Call Back Number:[phone]  Additional Information:

## 2023-08-25 ENCOUNTER — ANESTHESIA EVENT (OUTPATIENT)
Dept: SURGERY | Facility: HOSPITAL | Age: 48
End: 2023-08-25
Payer: COMMERCIAL

## 2023-08-25 ENCOUNTER — ANESTHESIA (OUTPATIENT)
Dept: SURGERY | Facility: HOSPITAL | Age: 48
End: 2023-08-25
Payer: COMMERCIAL

## 2023-08-25 ENCOUNTER — OFFICE VISIT (OUTPATIENT)
Dept: INTERNAL MEDICINE | Facility: CLINIC | Age: 48
End: 2023-08-25
Payer: COMMERCIAL

## 2023-08-25 ENCOUNTER — PATIENT MESSAGE (OUTPATIENT)
Dept: INTERNAL MEDICINE | Facility: CLINIC | Age: 48
End: 2023-08-25
Payer: COMMERCIAL

## 2023-08-25 ENCOUNTER — HOSPITAL ENCOUNTER (OUTPATIENT)
Facility: HOSPITAL | Age: 48
Discharge: HOME | End: 2023-08-25
Attending: ORTHOPAEDIC SURGERY | Admitting: ORTHOPAEDIC SURGERY
Payer: COMMERCIAL

## 2023-08-25 DIAGNOSIS — M65.30 TRIGGER FINGER, ACQUIRED: Primary | ICD-10-CM

## 2023-08-25 DIAGNOSIS — F41.1 GENERALIZED ANXIETY DISORDER: Chronic | ICD-10-CM

## 2023-08-25 DIAGNOSIS — E66.01 CLASS 2 SEVERE OBESITY DUE TO EXCESS CALORIES WITH SERIOUS COMORBIDITY AND BODY MASS INDEX (BMI) OF 35.0 TO 35.9 IN ADULT: Chronic | ICD-10-CM

## 2023-08-25 DIAGNOSIS — E66.01 CLASS 2 SEVERE OBESITY DUE TO EXCESS CALORIES WITH SERIOUS COMORBIDITY AND BODY MASS INDEX (BMI) OF 35.0 TO 35.9 IN ADULT: Primary | ICD-10-CM

## 2023-08-25 DIAGNOSIS — I10 PRIMARY HYPERTENSION: Primary | ICD-10-CM

## 2023-08-25 DIAGNOSIS — F41.9 RECURRENT MILD MAJOR DEPRESSIVE DISORDER WITH ANXIETY: Chronic | ICD-10-CM

## 2023-08-25 DIAGNOSIS — F33.0 RECURRENT MILD MAJOR DEPRESSIVE DISORDER WITH ANXIETY: Chronic | ICD-10-CM

## 2023-08-25 PROCEDURE — 1159F MED LIST DOCD IN RCRD: CPT | Mod: CPTII,95,, | Performed by: FAMILY MEDICINE

## 2023-08-25 PROCEDURE — 3044F PR MOST RECENT HEMOGLOBIN A1C LEVEL <7.0%: ICD-10-PCS | Mod: CPTII,95,, | Performed by: FAMILY MEDICINE

## 2023-08-25 PROCEDURE — 25000003 PHARM REV CODE 250: Performed by: NURSE ANESTHETIST, CERTIFIED REGISTERED

## 2023-08-25 PROCEDURE — 25000003 PHARM REV CODE 250

## 2023-08-25 PROCEDURE — 71000033 HC RECOVERY, INTIAL HOUR: Performed by: ORTHOPAEDIC SURGERY

## 2023-08-25 PROCEDURE — 71000015 HC POSTOP RECOV 1ST HR: Performed by: ORTHOPAEDIC SURGERY

## 2023-08-25 PROCEDURE — 25000003 PHARM REV CODE 250: Performed by: ORTHOPAEDIC SURGERY

## 2023-08-25 PROCEDURE — 99214 OFFICE O/P EST MOD 30 MIN: CPT | Mod: 95,,, | Performed by: FAMILY MEDICINE

## 2023-08-25 PROCEDURE — 1160F RVW MEDS BY RX/DR IN RCRD: CPT | Mod: CPTII,95,, | Performed by: FAMILY MEDICINE

## 2023-08-25 PROCEDURE — 26055 PR INCISE FINGER TENDON SHEATH: ICD-10-PCS | Mod: F5,,, | Performed by: ORTHOPAEDIC SURGERY

## 2023-08-25 PROCEDURE — 1160F PR REVIEW ALL MEDS BY PRESCRIBER/CLIN PHARMACIST DOCUMENTED: ICD-10-PCS | Mod: CPTII,95,, | Performed by: FAMILY MEDICINE

## 2023-08-25 PROCEDURE — 1159F PR MEDICATION LIST DOCUMENTED IN MEDICAL RECORD: ICD-10-PCS | Mod: CPTII,95,, | Performed by: FAMILY MEDICINE

## 2023-08-25 PROCEDURE — 3044F HG A1C LEVEL LT 7.0%: CPT | Mod: CPTII,95,, | Performed by: FAMILY MEDICINE

## 2023-08-25 PROCEDURE — 63600175 PHARM REV CODE 636 W HCPCS: Performed by: NURSE ANESTHETIST, CERTIFIED REGISTERED

## 2023-08-25 PROCEDURE — 37000008 HC ANESTHESIA 1ST 15 MINUTES: Performed by: ORTHOPAEDIC SURGERY

## 2023-08-25 PROCEDURE — 36000706: Performed by: ORTHOPAEDIC SURGERY

## 2023-08-25 PROCEDURE — 36000707: Performed by: ORTHOPAEDIC SURGERY

## 2023-08-25 PROCEDURE — 26055 INCISE FINGER TENDON SHEATH: CPT | Mod: F5,,, | Performed by: ORTHOPAEDIC SURGERY

## 2023-08-25 PROCEDURE — 63600175 PHARM REV CODE 636 W HCPCS

## 2023-08-25 PROCEDURE — 37000009 HC ANESTHESIA EA ADD 15 MINS: Performed by: ORTHOPAEDIC SURGERY

## 2023-08-25 PROCEDURE — 99214 PR OFFICE/OUTPT VISIT, EST, LEVL IV, 30-39 MIN: ICD-10-PCS | Mod: 95,,, | Performed by: FAMILY MEDICINE

## 2023-08-25 RX ORDER — CHLORHEXIDINE GLUCONATE ORAL RINSE 1.2 MG/ML
10 SOLUTION DENTAL 2 TIMES DAILY
Status: DISCONTINUED | OUTPATIENT
Start: 2023-08-25 | End: 2023-08-25 | Stop reason: HOSPADM

## 2023-08-25 RX ORDER — KETOROLAC TROMETHAMINE 30 MG/ML
15 INJECTION, SOLUTION INTRAMUSCULAR; INTRAVENOUS EVERY 8 HOURS PRN
Status: DISCONTINUED | OUTPATIENT
Start: 2023-08-25 | End: 2023-08-25 | Stop reason: HOSPADM

## 2023-08-25 RX ORDER — LIDOCAINE HYDROCHLORIDE 10 MG/ML
INJECTION, SOLUTION EPIDURAL; INFILTRATION; INTRACAUDAL; PERINEURAL
Status: DISCONTINUED | OUTPATIENT
Start: 2023-08-25 | End: 2023-08-25

## 2023-08-25 RX ORDER — SODIUM CHLORIDE 0.9 % (FLUSH) 0.9 %
3 SYRINGE (ML) INJECTION
Status: DISCONTINUED | OUTPATIENT
Start: 2023-08-25 | End: 2023-08-25 | Stop reason: HOSPADM

## 2023-08-25 RX ORDER — PROPOFOL 10 MG/ML
VIAL (ML) INTRAVENOUS
Status: DISCONTINUED | OUTPATIENT
Start: 2023-08-25 | End: 2023-08-25

## 2023-08-25 RX ORDER — HYDROCODONE BITARTRATE AND ACETAMINOPHEN 5; 325 MG/1; MG/1
1 TABLET ORAL EVERY 6 HOURS PRN
Qty: 15 TABLET | Refills: 0 | Status: SHIPPED | OUTPATIENT
Start: 2023-08-25 | End: 2023-10-09

## 2023-08-25 RX ORDER — ONDANSETRON 2 MG/ML
4 INJECTION INTRAMUSCULAR; INTRAVENOUS DAILY PRN
Status: DISCONTINUED | OUTPATIENT
Start: 2023-08-25 | End: 2023-08-25 | Stop reason: HOSPADM

## 2023-08-25 RX ORDER — LIDOCAINE HYDROCHLORIDE 20 MG/ML
INJECTION, SOLUTION EPIDURAL; INFILTRATION; INTRACAUDAL; PERINEURAL
Status: DISCONTINUED | OUTPATIENT
Start: 2023-08-25 | End: 2023-08-25 | Stop reason: HOSPADM

## 2023-08-25 RX ORDER — CEFAZOLIN SODIUM 2 G/50ML
2 SOLUTION INTRAVENOUS
Status: COMPLETED | OUTPATIENT
Start: 2023-08-25 | End: 2023-08-25

## 2023-08-25 RX ORDER — OXYCODONE HYDROCHLORIDE 5 MG/1
5 TABLET ORAL
Status: DISCONTINUED | OUTPATIENT
Start: 2023-08-25 | End: 2023-08-25 | Stop reason: HOSPADM

## 2023-08-25 RX ORDER — HYDROMORPHONE HYDROCHLORIDE 2 MG/ML
0.2 INJECTION, SOLUTION INTRAMUSCULAR; INTRAVENOUS; SUBCUTANEOUS EVERY 5 MIN PRN
Status: DISCONTINUED | OUTPATIENT
Start: 2023-08-25 | End: 2023-08-25 | Stop reason: HOSPADM

## 2023-08-25 RX ORDER — FLUOXETINE HYDROCHLORIDE 20 MG/1
20 CAPSULE ORAL DAILY
Qty: 90 CAPSULE | Refills: 3 | Status: SHIPPED | OUTPATIENT
Start: 2023-08-25 | End: 2023-10-17 | Stop reason: SDUPTHER

## 2023-08-25 RX ORDER — CHLORHEXIDINE GLUCONATE ORAL RINSE 1.2 MG/ML
10 SOLUTION DENTAL
Status: DISPENSED | OUTPATIENT
Start: 2023-08-25

## 2023-08-25 RX ORDER — MEPERIDINE HYDROCHLORIDE 25 MG/ML
12.5 INJECTION INTRAMUSCULAR; INTRAVENOUS; SUBCUTANEOUS ONCE
Status: DISCONTINUED | OUTPATIENT
Start: 2023-08-25 | End: 2023-08-25 | Stop reason: HOSPADM

## 2023-08-25 RX ORDER — HYDROCODONE BITARTRATE AND ACETAMINOPHEN 5; 325 MG/1; MG/1
1 TABLET ORAL EVERY 4 HOURS PRN
Status: DISCONTINUED | OUTPATIENT
Start: 2023-08-25 | End: 2023-08-25 | Stop reason: HOSPADM

## 2023-08-25 RX ORDER — ALBUTEROL SULFATE 0.83 MG/ML
2.5 SOLUTION RESPIRATORY (INHALATION) EVERY 4 HOURS PRN
Status: DISCONTINUED | OUTPATIENT
Start: 2023-08-25 | End: 2023-08-25 | Stop reason: HOSPADM

## 2023-08-25 RX ORDER — DIPHENHYDRAMINE HYDROCHLORIDE 50 MG/ML
25 INJECTION INTRAMUSCULAR; INTRAVENOUS EVERY 6 HOURS PRN
Status: DISCONTINUED | OUTPATIENT
Start: 2023-08-25 | End: 2023-08-25 | Stop reason: HOSPADM

## 2023-08-25 RX ORDER — SODIUM CHLORIDE 0.9 % (FLUSH) 0.9 %
10 SYRINGE (ML) INJECTION
Status: DISCONTINUED | OUTPATIENT
Start: 2023-08-25 | End: 2023-08-25 | Stop reason: HOSPADM

## 2023-08-25 RX ORDER — PROCHLORPERAZINE EDISYLATE 5 MG/ML
5 INJECTION INTRAMUSCULAR; INTRAVENOUS EVERY 30 MIN PRN
Status: DISCONTINUED | OUTPATIENT
Start: 2023-08-25 | End: 2023-08-25 | Stop reason: HOSPADM

## 2023-08-25 RX ORDER — MIDAZOLAM HYDROCHLORIDE 1 MG/ML
INJECTION, SOLUTION INTRAMUSCULAR; INTRAVENOUS
Status: DISCONTINUED | OUTPATIENT
Start: 2023-08-25 | End: 2023-08-25

## 2023-08-25 RX ADMIN — LIDOCAINE HYDROCHLORIDE 50 MG: 10 SOLUTION INTRAVENOUS at 08:08

## 2023-08-25 RX ADMIN — PROPOFOL 50 MG: 10 INJECTION, EMULSION INTRAVENOUS at 08:08

## 2023-08-25 RX ADMIN — CEFAZOLIN SODIUM 2 G: 2 SOLUTION INTRAVENOUS at 08:08

## 2023-08-25 RX ADMIN — MIDAZOLAM 2 MG: 1 INJECTION INTRAMUSCULAR; INTRAVENOUS at 08:08

## 2023-08-25 RX ADMIN — SODIUM CHLORIDE, SODIUM LACTATE, POTASSIUM CHLORIDE, AND CALCIUM CHLORIDE: .6; .31; .03; .02 INJECTION, SOLUTION INTRAVENOUS at 08:08

## 2023-08-25 RX ADMIN — 0.12% CHLORHEXIDINE GLUCONATE 10 ML: 1.2 RINSE ORAL at 08:08

## 2023-08-25 NOTE — OP NOTE
[facility] - Surgery ([facility])  Orthopedic Surgery  Operative Note    SUMMARY     Date of Procedure: 8/25/2023   Assistant: None    Procedure: Procedure(s) (LRB):  RELEASE, TRIGGER FINGER (Right)   thumb    Surgeon(s) and Role:     * Sandeep Bang MD - Primary    Assisting Surgeon: None    Pre-Operative Diagnosis: Trigger finger, acquired [M65.30] right thumb    Post-Operative Diagnosis: Post-Op Diagnosis Codes:     * Trigger finger, acquired [M65.30] right thumb    Anesthesia:  Local with sedation    Technical Procedures Used:  Right thumb trigger finger release    Description of the Findings of the Procedure:  The patient taken the operative room where 10 cc of 2% plain lidocaine were used for local anesthetic about the base of the right thumb trigger finger.  The patient received 2 g of Ancef intravenously preoperatively.  Satisfactory anesthesia had been achieved the the arm was prepped and draped in the usual sterile fashion.  After exsanguination of the extremity a proximal tourniquet was inflated to 250 mm of mercury.  At this time and oblique incision was made over the A1 pulley.  The incision extended using blunt sharp dissection using 3.5 loupe magnification.  The radial and ulnar neurovascular bundles were identified and carefully retracted.  The A1 pulley was sharply incised under direct vision.  The patient was asked to flex and extend the digit and no additional triggering was noted.  Skin was closed using horizontal mattress 4-0 nylon suture. Xeroform gauze 4 by 4s and 2 ABD pads were over wrapped with 3 in gauze dressing.  The patient tolerated the procedure well and was transferred to the recovery room in satisfactory condition.      Complications: No    Estimated Blood Loss (EBL): 5cc                   Condition: Good    Disposition: PACU - hemodynamically stable.    Attestation: I was present and scrubbed for the entire procedure.

## 2023-08-25 NOTE — ANESTHESIA POSTPROCEDURE EVALUATION
Anesthesia Post Evaluation    Patient: Teresa Cazares    Procedure(s) Performed: Procedure(s) (LRB):  RELEASE, TRIGGER FINGER (Right)    Final Anesthesia Type: MAC      Patient location during evaluation: PACU  Patient participation: Yes- Able to Participate  Level of consciousness: awake and alert and oriented  Post-procedure vital signs: reviewed and stable  Pain management: adequate  Airway patency: patent  DEYA mitigation strategies: Multimodal analgesia  PONV status at discharge: No PONV  Anesthetic complications: no      Cardiovascular status: blood pressure returned to baseline and hemodynamically stable  Respiratory status: unassisted  Hydration status: euvolemic  Follow-up not needed.          Vitals Value Taken Time   /70 08/25/23 0935   Temp 36.1 °C (97 °F) 08/25/23 0911   Pulse 54 08/25/23 0945   Resp 16 08/25/23 0945   SpO2 98 % 08/25/23 0945         Event Time   Out of Recovery 09:48:02         Pain/Mike Score: Mike Score: 10 (8/25/2023  9:50 AM)

## 2023-08-25 NOTE — ASSESSMENT & PLAN NOTE
"Wt Readings from Last 6 Encounters:   08/25/23 87.5 kg (192 lb 14.4 oz)   08/21/23 84.9 kg (187 lb 4.5 oz)   08/15/23 87.3 kg (192 lb 7.4 oz)   08/04/23 92.1 kg (203 lb)   06/29/23 92.1 kg (203 lb 0.7 oz)   04/12/23 89.8 kg (198 lb)     Estimated body mass index is 35.28 kg/m² as calculated from the following:    Height as of an earlier encounter on 8/25/23: 5' 2" (1.575 m).    Weight as of an earlier encounter on 8/25/23: 87.5 kg (192 lb 14.4 oz).    Future Appointments   Date Time Provider Department Center   8/29/2023  9:00 AM Sandeep Bang MD [facility]   9/8/2023  9:00 AM Xenia Mancilla PA-C [facility]   2/19/2024 10:30 AM [facility] CT1 LIMIT 500 LBS [facility] CT SCAN [place]   2/19/2024 11:20 AM Joseph Avendaño MD ON [facility]     "

## 2023-08-25 NOTE — ASSESSMENT & PLAN NOTE
BP Readings from Last 6 Encounters:   08/25/23 135/70   08/21/23 122/70   08/21/23 128/89   08/15/23 120/68   08/15/23 135/88   06/29/23 (!) 150/90     Lab Results   Component Value Date    EGFRNORACEVR >60.0 08/11/2023    CREATININE 0.7 08/11/2023    BUN 16 08/11/2023    K 3.7 08/11/2023     08/11/2023     08/11/2023     Results for orders placed or performed during the hospital encounter of 08/21/23   EKG 12-lead    Collection Time: 08/21/23 10:52 AM    Narrative    Test Reason : Z01.818,    Vent. Rate : 056 BPM     Atrial Rate : 056 BPM     P-R Int : 146 ms          QRS Dur : 092 ms      QT Int : 430 ms       P-R-T Axes : -14 031 022 degrees     QTc Int : 414 ms    Sinus bradycardia  Otherwise normal ECG  When compared with ECG of 11-AUG-2023 08:09,  The axis Shifted left  Non-specific change in ST segment in Lateral leads  Confirmed by George Oviedo MD ([id number]) on 8/21/2023 6:38:04 PM    Referred By: KRISTINE JOHNSTON           Confirmed By:George Oviedo MD

## 2023-08-25 NOTE — TRANSFER OF CARE
"Anesthesia Transfer of Care Note    Patient: Teresa Cazares    Procedure(s) Performed: Procedure(s) (LRB):  RELEASE, TRIGGER FINGER (Right)    Patient location: PACU    Anesthesia Type: MAC    Transport from OR: Transported from OR on room air with adequate spontaneous ventilation    Post pain: adequate analgesia    Post assessment: no apparent anesthetic complications    Post vital signs: stable    Level of consciousness: sedated    Nausea/Vomiting: no nausea/vomiting    Complications: none    Transfer of care protocol was followed      Last vitals:   Visit Vitals  /63   Pulse (!) 54   Temp 36.6 °C (97.9 °F) (Temporal)   Resp 16   Ht 5' 2" (1.575 m)   Wt 87.5 kg (192 lb 14.4 oz)   SpO2 97%   Breastfeeding No   BMI 35.28 kg/m²     "

## 2023-08-25 NOTE — ANESTHESIA PREPROCEDURE EVALUATION
08/25/2023  Teresa Cazares is a 47 y.o., female     Patient Active Problem List   Diagnosis    Class 2 severe obesity due to excess calories with serious comorbidity and body mass index (BMI) of 35.0 to 35.9 in adult    Bilateral primary osteoarthritis of knee    Recurrent mild major depressive disorder with anxiety    Nicotine dependence, cigarettes, uncomplicated    Psychophysiologic insomnia    Thrombocytosis    Long-term current use of benzodiazepine    DEYA on CPAP    Chronic post-op pain    Generalized anxiety disorder    Prediabetes    History of COVID-19 (June 2022)    Breast cancer screening, high risk patient    Family history of breast cancer (mother and sister)    Elevated blood pressure reading without diagnosis of hypertension    Anxiety    Pulmonary nodule 1 cm or greater in diameter    Cigarette nicotine dependence in remission    Trigger finger of right thumb     Past Medical History:   Diagnosis Date    Anxiety     Closed fracture of left lower leg with routine healing 03/28/2019    Depression     denies hospitalization or bipolar disorder    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    Nicotine dependence, cigarettes, in remission 03/26/2019    Obesity     Personal history of COVID-19     Primary hypertension 06/29/2023    Recurrent major depression in partial remission 03/26/2019    Surgical wound infection (MRSA) 12/2019     Past Surgical History:   Procedure Laterality Date    APPLICATION OF LARGE EXTERNAL FIXATION DEVICE TO TIBIA Left 01/10/2019    Procedure: APPLICATION, EXTERNAL FIXATION DEVICE, LARGE, TIBIA;  Surgeon: Domenic Galvan MD;  Location: [facility] OR;  Service: Orthopedics;  Laterality: Left;    APPLICATION OF WOUND VACUUM-ASSISTED CLOSURE DEVICE Left 01/17/2019    Procedure: APPLICATION, WOUND VAC;  Surgeon: Barry Guzman MD;  Location: [facility] OR;   Service: Orthopedics;  Laterality: Left;    COLONOSCOPY N/A 09/02/2022    Procedure: COLONOSCOPY;  Surgeon: Yon Ridley MD;  Location: [facility];  Service: Endoscopy;  Laterality: N/A;    FRACTURE SURGERY      C1 neck- halo    HERNIA REPAIR      HYSTERECTOMY  2009    tahbso    OOPHORECTOMY Bilateral     OPEN REDUCTION AND INTERNAL FIXATION (ORIF) OF FRACTURE OF TIBIAL PLATEAU Left 01/17/2019    Procedure: ORIF, FRACTURE, TIBIA, PLATEAU;  Surgeon: Barry Guzman MD;  Location: [facility] OR;  Service: Orthopedics;  Laterality: Left;    REMOVAL OF EXTERNAL FIXATION DEVICE Left 01/17/2019    Procedure: REMOVAL, EXTERNAL FIXATION DEVICE;  Surgeon: Barry Guzman MD;  Location: [facility] OR;  Service: Orthopedics;  Laterality: Left;    TUBAL LIGATION  05/22/1997    UMBILICAL HERNIA REPAIR         Chemistry        Component Value Date/Time     08/11/2023 0740    K 3.7 08/11/2023 0740     08/11/2023 0740    CO2 27 08/11/2023 0740    BUN 16 08/11/2023 0740    CREATININE 0.7 08/11/2023 0740    GLU 81 08/11/2023 0740        Component Value Date/Time    CALCIUM 9.0 08/11/2023 0740    ALKPHOS 61 08/11/2023 0740    AST 17 08/11/2023 0740    ALT 14 08/11/2023 0740    BILITOT 0.5 08/11/2023 0740    ESTGFRAFRICA >60 12/10/2021 1010    EGFRNONAA >60 12/10/2021 1010        Lab Results   Component Value Date    WBC 12.39 08/11/2023    HGB 12.6 08/11/2023    HCT 38.8 08/11/2023    MCV 92 08/11/2023     08/11/2023           Pre-op Assessment    I have reviewed the Patient Summary Reports.     I have reviewed the Nursing Notes. I have reviewed the NPO Status.   I have reviewed the Medications.     Review of Systems  Anesthesia Hx:  No problems with previous Anesthesia  History of prior surgery of interest to airway management or planning: Previous anesthesia: General, MAC Denies Family Hx of Anesthesia complications.  Personal Hx of Anesthesia complications  Unintended Unpleasent Intraoperative Awareness    Social:  Former Smoker Former 33 pack/yr smoker    Hematology/Oncology:  Hematology Normal   Oncology Normal     Cardiovascular:   Hypertension ECG has been reviewed. Sinus bradycardia   Otherwise normal ECG   When compared with ECG of 11-AUG-2023 08:09,   The axis Shifted left   Non-specific change in ST segment in Lateral leads   Confirmed by George Oviedo MD ([id number]) on 8/21/2023 6:38:04 PM    Pulmonary:   Sleep Apnea, CPAP Severe sleep apnea. Needed oral airway during propofol sedation.   Renal/:  Renal/ Normal     Musculoskeletal:   Arthritis  Hx of C-1 fx - s/p fixation  Spine Disorders: cervical    Neurological:  Neurology Normal    Endocrine:  Endocrine Normal BMI 34 Obesity / BMI > 30  Psych:   Psychiatric History anxiety depression ADRIAN         Physical Exam  General: Alert and Oriented    Airway:  Mallampati: II   Mouth Opening: Normal  TM Distance: Normal  Tongue: Normal  Neck ROM: Normal ROM        Anesthesia Plan  Type of Anesthesia, risks & benefits discussed:    Anesthesia Type: MAC  Intra-op Monitoring Plan: Standard ASA Monitors  Post Op Pain Control Plan: multimodal analgesia and IV/PO Opioids PRN  Induction:  IV  Informed Consent: Informed consent signed with the Patient and all parties understand the risks and agree with anesthesia plan.  All questions answered.   ASA Score: 2  Day of Surgery Review of History & Physical: H&P Update referred to the surgeon/provider.I have interviewed and examined the patient. I have reviewed the patient's H&P dated:     Ready For Surgery From Anesthesia Perspective.     .

## 2023-08-25 NOTE — DISCHARGE SUMMARY
[facility] - [facility] (Hospital)  Discharge Note  Short Stay    Procedure(s) (LRB):  RELEASE, TRIGGER FINGER (Right) thumb      OUTCOME: Patient tolerated treatment/procedure well without complication and is now ready for discharge.    DISPOSITION: Home or Self Care    FINAL DIAGNOSIS:  Right trigger thumb    FOLLOWUP: In clinic    DISCHARGE INSTRUCTIONS:    Discharge Procedure Orders   Diet general     Call MD for:  temperature >100.4     Call MD for:  persistent nausea and vomiting     Call MD for:  severe uncontrolled pain     Call MD for:  difficulty breathing, headache or visual disturbances     Call MD for:  redness, tenderness, or signs of infection (pain, swelling, redness, odor or green/yellow discharge around incision site)     Call MD for:  hives     Call MD for:  persistent dizziness or light-headedness     Call MD for:  extreme fatigue         Clinical Reference Documents Added to Patient Instructions         Document    HYDROCODONE AND ACETAMINOPHEN, ADULT (ENGLISH)    TRIGGER FINGER DISCHARGE INSTRUCTIONS (ENGLISH)    TRIGGER FINGER RELEASE DISCHARGE INSTRUCTIONS (ENGLISH)            TIME SPENT ON DISCHARGE:  20 minutes

## 2023-08-25 NOTE — PROGRESS NOTES
"TELEMEDICINE VIRTUAL VIDEO VISIT  8/25/23  3:40 PM CDT    Visit Type: Audiovisual    Patient's Location: [place] represents that they are located within the state of [place].    CHIEF COMPLAINT: Follow-up    HPI    1. Primary hypertension  Overview:  Says she didn't tolerate hydrochlorothiazide due to "cramping."    Assessment & Plan:  Doing OK off BP meds.  BP Readings from Last 6 Encounters:   08/25/23 135/70   08/21/23 122/70   08/21/23 128/89   08/15/23 120/68   08/15/23 135/88   06/29/23 (!) 150/90     Lab Results   Component Value Date    EGFRNORACEVR >60.0 08/11/2023    CREATININE 0.7 08/11/2023    BUN 16 08/11/2023    K 3.7 08/11/2023     08/11/2023     08/11/2023     Results for orders placed or performed during the hospital encounter of 08/21/23   EKG 12-lead    Collection Time: 08/21/23 10:52 AM    Narrative    Test Reason : Z01.818,    Vent. Rate : 056 BPM     Atrial Rate : 056 BPM     P-R Int : 146 ms          QRS Dur : 092 ms      QT Int : 430 ms       P-R-T Axes : -14 031 022 degrees     QTc Int : 414 ms    Sinus bradycardia  Otherwise normal ECG  When compared with ECG of 11-AUG-2023 08:09,  The axis Shifted left  Non-specific change in ST segment in Lateral leads  Confirmed by George Oviedo MD ([id number]) on 8/21/2023 6:38:04 PM    Referred By: KRISTINE JOHNSTON           Confirmed By:George Oviedo MD         2. Class 2 severe obesity due to excess calories with serious comorbidity and body mass index (BMI) of 35.0 to 35.9 in adult  Assessment & Plan:  Wt Readings from Last 6 Encounters:   08/25/23 87.5 kg (192 lb 14.4 oz)   08/21/23 84.9 kg (187 lb 4.5 oz)   08/15/23 87.3 kg (192 lb 7.4 oz)   08/04/23 92.1 kg (203 lb)   06/29/23 92.1 kg (203 lb 0.7 oz)   04/12/23 89.8 kg (198 lb)     Estimated body mass index is 35.28 kg/m² as calculated from the following:    Height as of an earlier encounter on 8/25/23: 5' 2" (1.575 m).    Weight as of an earlier encounter on 8/25/23: 87.5 kg (192 lb 14.4 " oz).    Future Appointments   Date Time Provider Department Center   8/29/2023  9:00 AM Sandeep Bang MD [facility] ORTHO [facility]   9/8/2023  9:00 AM Xenia Mancilla PA-C ON ORTHO [facility]   2/19/2024 10:30 AM [facility] CT1 LIMIT 500 LBS [facility] CT SCAN Tamara Hughes   2/19/2024 11:20 AM Joseph Avendaño MD [facility] PULMSVC [facility]       Orders:  -     Ambulatory referral/consult to [facility]; Future; Expected date: 09/01/2023    3. Recurrent mild major depressive disorder with anxiety  This is a chronic problem that appears compensated/controlled and stable.  -     FLUoxetine 20 MG capsule; Take 1 capsule (20 mg total) by mouth once daily.  Dispense: 90 capsule; Refill: 3    4. Generalized anxiety disorder  This is a chronic problem that appears compensated/controlled and stable.   -     FLUoxetine 20 MG capsule; Take 1 capsule (20 mg total) by mouth once daily.  Dispense: 90 capsule; Refill: 3    Unless noted herein, any chronic conditions are represented as and appear stable, and no other significant complaints or concerns were reported.    Review of Systems   Constitutional:  Negative for activity change and unexpected weight change.   HENT:  Negative for hearing loss, rhinorrhea and trouble swallowing.    Eyes:  Negative for discharge and visual disturbance.   Respiratory:  Negative for chest tightness and wheezing.    Cardiovascular:  Negative for chest pain and palpitations.   Gastrointestinal:  Negative for blood in stool, constipation, diarrhea and vomiting.   Endocrine: Negative for polydipsia and polyuria.   Genitourinary:  Negative for difficulty urinating, dysuria, hematuria and menstrual problem.   Musculoskeletal:  Negative for arthralgias, joint swelling and neck pain.   Neurological:  Negative for weakness and headaches.   Psychiatric/Behavioral:  Negative for confusion.        Physical Exam  Constitutional:       General: She is not in acute distress.     Appearance:  "Normal appearance. She is not ill-appearing.   Pulmonary:      Effort: Pulmonary effort is normal. No respiratory distress.   Skin:     Coloration: Skin is not jaundiced.   Neurological:      Mental Status: She is alert. Mental status is at baseline.   Psychiatric:         Mood and Affect: Mood normal.         Behavior: Behavior normal.         Thought Content: Thought content normal.       Follow up in about 12 weeks (around 11/17/2023).     Documentation entered by me for this encounter may have been done in part using speech-recognition technology. Although I have made an effort to ensure accuracy, "sound like" errors may exist and should be interpreted in context.   TOTAL TIME evaluating and managing this patient for this encounter was greater than or equal to 25 minutes. This time was exclusive of any separately billable procedures for this patient and exclusive of time spent treating any other patient.    Documentation entered by me for this encounter may have been done in part using speech-recognition technology. Although I have made an effort to ensure accuracy, "sound like" errors may exist and should be interpreted in context.    Each patient to whom medical services are provided by telemedicine is: (1) informed of the relationship between the physician and patient and the respective role of any other health care provider with respect to management of the patient; and (2) notified that he or she may decline to receive medical services by telemedicine and may withdraw from such care at any time.  "

## 2023-08-28 ENCOUNTER — TELEPHONE (OUTPATIENT)
Dept: PRIMARY CARE CLINIC | Facility: CLINIC | Age: 48
End: 2023-08-28
Payer: COMMERCIAL

## 2023-08-28 VITALS
TEMPERATURE: 97 F | SYSTOLIC BLOOD PRESSURE: 135 MMHG | HEART RATE: 54 BPM | BODY MASS INDEX: 35.49 KG/M2 | OXYGEN SATURATION: 98 % | RESPIRATION RATE: 16 BRPM | WEIGHT: 192.88 LBS | DIASTOLIC BLOOD PRESSURE: 70 MMHG | HEIGHT: 62 IN

## 2023-08-28 RX ORDER — PHENTERMINE HYDROCHLORIDE 37.5 MG/1
37.5 TABLET ORAL
Qty: 30 TABLET | Refills: 2 | Status: SHIPPED | OUTPATIENT
Start: 2023-08-28 | End: 2023-10-17

## 2023-08-28 NOTE — TELEPHONE ENCOUNTER
Hi, Teresa.    I sent your eRx for Adipex to your pharmacy.    I look forward to seeing you at your next appointment on Tuesday, November 14th at 8:20 AM.    All the best,    JIM Chery MD    Medications Ordered This Encounter   Medications    phentermine (ADIPEX-P) 37.5 mg tablet     Sig: Take 1 tablet (37.5 mg total) by mouth before breakfast.     Dispense:  30 tablet     Refill:  2        Future Appointments   Date Time Provider Department Center   11/14/2023  8:20 AM EDGAR Chery MD [facility]   2/13/2024  8:20 AM Jennifer Briones MD [facility]

## 2023-08-29 ENCOUNTER — OFFICE VISIT (OUTPATIENT)
Dept: ORTHOPEDICS | Facility: CLINIC | Age: 48
End: 2023-08-29
Payer: COMMERCIAL

## 2023-08-29 VITALS — BODY MASS INDEX: 35.33 KG/M2 | WEIGHT: 192 LBS | HEIGHT: 62 IN

## 2023-08-29 DIAGNOSIS — M65.30 TRIGGER FINGER, ACQUIRED: Primary | ICD-10-CM

## 2023-08-29 PROCEDURE — 3008F BODY MASS INDEX DOCD: CPT | Mod: CPTII,S$GLB,, | Performed by: ORTHOPAEDIC SURGERY

## 2023-08-29 PROCEDURE — 99024 PR POST-OP FOLLOW-UP VISIT: ICD-10-PCS | Mod: S$GLB,,, | Performed by: ORTHOPAEDIC SURGERY

## 2023-08-29 PROCEDURE — 99999 PR PBB SHADOW E&M-EST. PATIENT-LVL III: CPT | Mod: PBBFAC,,, | Performed by: ORTHOPAEDIC SURGERY

## 2023-08-29 PROCEDURE — 1159F PR MEDICATION LIST DOCUMENTED IN MEDICAL RECORD: ICD-10-PCS | Mod: CPTII,S$GLB,, | Performed by: ORTHOPAEDIC SURGERY

## 2023-08-29 PROCEDURE — 99999 PR PBB SHADOW E&M-EST. PATIENT-LVL III: ICD-10-PCS | Mod: PBBFAC,,, | Performed by: ORTHOPAEDIC SURGERY

## 2023-08-29 PROCEDURE — 3044F HG A1C LEVEL LT 7.0%: CPT | Mod: CPTII,S$GLB,, | Performed by: ORTHOPAEDIC SURGERY

## 2023-08-29 PROCEDURE — 99024 POSTOP FOLLOW-UP VISIT: CPT | Mod: S$GLB,,, | Performed by: ORTHOPAEDIC SURGERY

## 2023-08-29 PROCEDURE — 1160F RVW MEDS BY RX/DR IN RCRD: CPT | Mod: CPTII,S$GLB,, | Performed by: ORTHOPAEDIC SURGERY

## 2023-08-29 PROCEDURE — 3044F PR MOST RECENT HEMOGLOBIN A1C LEVEL <7.0%: ICD-10-PCS | Mod: CPTII,S$GLB,, | Performed by: ORTHOPAEDIC SURGERY

## 2023-08-29 PROCEDURE — 1159F MED LIST DOCD IN RCRD: CPT | Mod: CPTII,S$GLB,, | Performed by: ORTHOPAEDIC SURGERY

## 2023-08-29 PROCEDURE — 3008F PR BODY MASS INDEX (BMI) DOCUMENTED: ICD-10-PCS | Mod: CPTII,S$GLB,, | Performed by: ORTHOPAEDIC SURGERY

## 2023-08-29 PROCEDURE — 1160F PR REVIEW ALL MEDS BY PRESCRIBER/CLIN PHARMACIST DOCUMENTED: ICD-10-PCS | Mod: CPTII,S$GLB,, | Performed by: ORTHOPAEDIC SURGERY

## 2023-08-29 NOTE — PROGRESS NOTES
Subjective:     Patient ID: Teresa Cazares is a 47 y.o. female.    Chief Complaint: Pain and Post-op Evaluation of the Right Hand      HPI:  The patient is a 47-year-old female after a right trigger thumb release 08/25/2023.  She seems to be doing well.  She is neurologically intact.    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    Nicotine dependence, cigarettes, in remission 03/26/2019    Obesity     Personal history of COVID-19     Primary hypertension 06/29/2023    Recurrent major depression in partial remission 03/26/2019    Surgical wound infection (MRSA) 12/2019     Past Surgical History:   Procedure Laterality Date    APPLICATION OF LARGE EXTERNAL FIXATION DEVICE TO TIBIA Left 01/10/2019    Procedure: APPLICATION, EXTERNAL FIXATION DEVICE, LARGE, TIBIA;  Surgeon: Domenic Galvan MD;  Location: [facility] OR;  Service: Orthopedics;  Laterality: Left;    APPLICATION OF WOUND VACUUM-ASSISTED CLOSURE DEVICE Left 01/17/2019    Procedure: APPLICATION, WOUND VAC;  Surgeon: Barry Guzman MD;  Location: [facility] OR;  Service: Orthopedics;  Laterality: Left;    COLONOSCOPY N/A 09/02/2022    Procedure: COLONOSCOPY;  Surgeon: Yon Ridley MD;  Location: [facility];  Service: Endoscopy;  Laterality: N/A;    FRACTURE SURGERY      C1 neck- halo    HERNIA REPAIR      HYSTERECTOMY  2009    tahbso    OOPHORECTOMY Bilateral     OPEN REDUCTION AND INTERNAL FIXATION (ORIF) OF FRACTURE OF TIBIAL PLATEAU Left 01/17/2019    Procedure: ORIF, FRACTURE, TIBIA, PLATEAU;  Surgeon: Barry Guzman MD;  Location: [facility] OR;  Service: Orthopedics;  Laterality: Left;    REMOVAL OF EXTERNAL FIXATION DEVICE Left 01/17/2019    Procedure: REMOVAL, EXTERNAL FIXATION DEVICE;  Surgeon: Barry Guzman MD;  Location: [facility] OR;  Service: Orthopedics;  Laterality: Left;     TRIGGER FINGER RELEASE Right 2023    Procedure: RELEASE, TRIGGER FINGER;  Surgeon: Sandeep Bang MD;  Location: [facility] OR;  Service: Orthopedics;  Laterality: Right;  right trigger thumb release    TUBAL LIGATION  1997    UMBILICAL HERNIA REPAIR       Family History   Problem Relation Age of Onset    Breast cancer Mother 58    Cancer Mother         Breast cancer    Depression Mother         Depression    Diabetes Mother     Breast cancer Sister 46    Cancer Sister         Breast cancer    No Known Problems Father     Breast cancer Paternal Grandmother      Social History     Socioeconomic History    Marital status:     Number of children: 5   Occupational History    Occupation:    Tobacco Use    Smoking status: Former     Current packs/day: 0.00     Average packs/day: 1 pack/day for 33.0 years (33.0 ttl pk-yrs)     Types: Cigarettes     Start date:      Quit date:      Years since quittin.6     Passive exposure: Past    Smokeless tobacco: Never   Substance and Sexual Activity    Alcohol use: Not Currently     Alcohol/week: 2.0 standard drinks of alcohol     Types: 2 Glasses of wine per week     Comment: Stopped    Drug use: No    Sexual activity: Not Currently     Partners: Male     Birth control/protection: None     Medication List with Changes/Refills   Current Medications    ACITRETIN (SORIATANE) 10 MG CAPSULE    Take 1 capsule (10 mg total) by mouth once daily.    ASCORBIC ACID, VITAMIN C, (VITAMIN C) 500 MG TABLET    Take 500 mg by mouth once daily.    CLONAZEPAM (KLONOPIN) 0.5 MG TABLET    Take 0.5-1 tablets (0.25-0.5 mg total) by mouth 2 (two) times daily as needed for Anxiety.    ELDERBERRY FRUIT ORAL    Take 1 capsule by mouth once daily.    FLUOXETINE 20 MG CAPSULE    Take 1 capsule (20 mg total) by mouth once daily.    GABAPENTIN (NEURONTIN) 300 MG CAPSULE    Take 3 capsules (900 mg total) by mouth every evening.    HYDROCODONE-ACETAMINOPHEN (NORCO) 5-325  MG PER TABLET    Take 1 tablet by mouth every 6 (six) hours as needed for Pain.    NICOTINE, POLACRILEX, (NICORETTE) 2 MG GUM    Take 1 each (2 mg total) by mouth as needed (for cigarette cravings). Use as directed on packaging.    PHENTERMINE (ADIPEX-P) 37.5 MG TABLET    Take 1 tablet (37.5 mg total) by mouth before breakfast.    VARENICLINE (CHANTIX) 1 MG TAB    Take 1 tablet (1 mg total) by mouth 2 (two) times daily.     Review of patient's allergies indicates:   Allergen Reactions    Macrobid [nitrofurantoin monohyd/m-cryst] Hives    Bactrim [sulfamethoxazole-trimethoprim]     Flagyl [metronidazole hcl] Hives     Review of Systems   Constitutional: Negative for malaise/fatigue.   HENT:  Negative for hearing loss.    Eyes:  Negative for double vision and visual disturbance.   Cardiovascular:  Negative for chest pain.   Respiratory:  Positive for sleep disturbances due to breathing. Negative for shortness of breath.    Endocrine: Negative for cold intolerance.   Hematologic/Lymphatic: Does not bruise/bleed easily.   Skin:  Negative for poor wound healing and suspicious lesions.   Musculoskeletal:  Positive for arthritis, joint pain and joint swelling. Negative for gout.   Gastrointestinal:  Negative for nausea and vomiting.   Genitourinary:  Negative for dysuria.   Neurological:  Negative for numbness, paresthesias and sensory change.   Psychiatric/Behavioral:  Positive for depression and substance abuse. Negative for memory loss. The patient has insomnia and is nervous/anxious.    Allergic/Immunologic: Negative for persistent infections.       Objective:   Body mass index is 35.12 kg/m².  There were no vitals filed for this visit.             General    Constitutional: She is oriented to person, place, and time. She appears well-developed and well-nourished. No distress.   HENT:   Head: Normocephalic.   Eyes: EOM are normal.   Pulmonary/Chest: Effort normal.   Neurological: She is oriented to person, place, and  time.   Psychiatric: She has a normal mood and affect.             Right Hand/Wrist Exam     Inspection   Scars: Wrist - absent Hand -  present  Effusion: Wrist - absent Hand -  absent    Other     Neuorologic Exam    Median Distribution: normal  Ulnar Distribution: normal  Radial Distribution: normal    Comments:  The suture line is intact right trigger thumb.  There is no sign of infection.  There is no triggering.  There are no motor or sensory deficits.          Vascular Exam       Capillary Refill  Right Hand: normal capillary refill         radiographs were not obtained today  Assessment:     Encounter Diagnosis   Name Primary?    Trigger finger, acquired Yes   Status post right trigger thumb release     Plan:     The patient seems to be doing well.  The wound looks good.  Activity limitations were discussed.  Wound care was discussed.  She will return in 1 week for suture removal    She was given a thumb spica splint            Disclaimer: This note was prepared using a voice recognition system and is likely to have sound alike errors within the text.

## 2023-08-30 ENCOUNTER — PATIENT MESSAGE (OUTPATIENT)
Dept: PULMONOLOGY | Facility: CLINIC | Age: 48
End: 2023-08-30
Payer: COMMERCIAL

## 2023-08-30 ENCOUNTER — TELEPHONE (OUTPATIENT)
Dept: PULMONOLOGY | Facility: CLINIC | Age: 48
End: 2023-08-30
Payer: COMMERCIAL

## 2023-08-30 DIAGNOSIS — G47.33 OSA (OBSTRUCTIVE SLEEP APNEA): Primary | ICD-10-CM

## 2023-08-30 NOTE — TELEPHONE ENCOUNTER
Orders Placed This Encounter   Procedures    CPAP/BIPAP SUPPLIES     Order Specific Question:   Length of need (1-99 months):     Answer:   99     Order Specific Question:   Choose ONE mask type and its corresponding cushions and/or pillows:     Answer:    Full Face Mask, 1 per 90 days:  Full Face Cushion, (3 per 90 days)     Order Specific Question:   Choose EITHER Heated or Non-Heated Tubjing     Answer:    Non-Heated Tubing, 1 per 90 days     Order Specific Question:   All other supplies as needed as listed below:     Answer:    Headgear, 1 per 180 days     Order Specific Question:   All other supplies as needed as listed below:     Answer:    Chin Strap, 1 per 180 days     Order Specific Question:   All other supplies as needed as listed below:     Answer:    Disposable Filter, 6 per 90 days     Order Specific Question:   All other supplies as needed as listed below:     Answer:    Non-Disposable Filter, 1 per 180 days     Order Specific Question:   All other supplies as needed as listed below:     Answer:    Humidifier Chamber, 1 per 180 days

## 2023-09-01 ENCOUNTER — PATIENT MESSAGE (OUTPATIENT)
Dept: PULMONOLOGY | Facility: CLINIC | Age: 48
End: 2023-09-01
Payer: COMMERCIAL

## 2023-09-05 NOTE — TELEPHONE ENCOUNTER
Telephoned advised of low likelihood of pulmonary nodule being malignant. Appears benign on PET CT and nodify genetic testing at 1% risk of malignancy.  Results scanned into media. Also requested staff email copy to will be better clarity/image.

## 2023-09-06 ENCOUNTER — PATIENT MESSAGE (OUTPATIENT)
Dept: INTERNAL MEDICINE | Facility: CLINIC | Age: 48
End: 2023-09-06
Payer: COMMERCIAL

## 2023-09-06 ENCOUNTER — TELEPHONE (OUTPATIENT)
Dept: INTERNAL MEDICINE | Facility: CLINIC | Age: 48
End: 2023-09-06
Payer: COMMERCIAL

## 2023-09-06 NOTE — TELEPHONE ENCOUNTER
DUPLICATE MESSAGE.  Informed pt to contact scheduling desk [phone] to get assistance scheduling a erika for med request or reach out to Eddie YOUSIF if provider thought she needed requested meds after labs with Eddie YOUSIF.

## 2023-09-06 NOTE — TELEPHONE ENCOUNTER
Pt upset beacause i informed her the provider will not prescribe antibiotics without her being assessed. Pt states Pauline Morgan, NP  told her she needs antibiotics possibly but to get them from her Primary care provider. Pt request a call from Dr. Chery, informed pt provider will only call pts over the phone if absolutely medically necessary. Pt stated she will not call Dr. Chery's office anymore and called me a B word. Call ended.

## 2023-09-06 NOTE — TELEPHONE ENCOUNTER
----- Message from aMrilia Ferraro sent at 9/6/2023 12:30 PM CDT -----  Regarding: pt results   Test Result: Lab Work       Who Called: n/a       Name of Test:  labs       Would the patient rather a call back or a response via [facility]? Call       Best Call Back Number: Telephone Information:  [employer]          [phone] l

## 2023-09-07 NOTE — PROGRESS NOTES
SUBJECTIVE:      Patient ID: Teresa Cazares is a 48 y.o. female.    HPI: Ms. Cazares is here today for post-operative visit #2.  She is 14 days status post right trigger thumb release by Dr. Bang on 08/25/2023.  She reports that she is doing well.  Pain is 3/10, states that the thumb is sore from activity.  She is taking no medication for pain.  She has been compliant with postop instructions and keeping the extremity dry. She denies fever, chills, and sweats since the time of the surgery.     Interval hx 8/29/23: The patient is a 47-year-old female after a right trigger thumb release 08/25/2023.  She seems to be doing well.  She is neurologically intact.    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    Nicotine dependence, cigarettes, in remission 03/26/2019    Obesity     Personal history of COVID-19     Primary hypertension 06/29/2023    Recurrent major depression in partial remission 03/26/2019    Surgical wound infection (MRSA) 12/2019     Past Surgical History:   Procedure Laterality Date    APPLICATION OF LARGE EXTERNAL FIXATION DEVICE TO TIBIA Left 01/10/2019    Procedure: APPLICATION, EXTERNAL FIXATION DEVICE, LARGE, TIBIA;  Surgeon: Domenic Galvan MD;  Location: [facility] OR;  Service: Orthopedics;  Laterality: Left;    APPLICATION OF WOUND VACUUM-ASSISTED CLOSURE DEVICE Left 01/17/2019    Procedure: APPLICATION, WOUND VAC;  Surgeon: Barry Guzman MD;  Location: [facility] OR;  Service: Orthopedics;  Laterality: Left;    COLONOSCOPY N/A 09/02/2022    Procedure: COLONOSCOPY;  Surgeon: Yon Ridley MD;  Location: [facility] ENDO;  Service: Endoscopy;  Laterality: N/A;    FRACTURE SURGERY      C1 neck- halo    HERNIA REPAIR      HYSTERECTOMY  2009    tahbso    OOPHORECTOMY Bilateral     OPEN REDUCTION AND INTERNAL FIXATION (ORIF) OF  FRACTURE OF TIBIAL PLATEAU Left 2019    Procedure: ORIF, FRACTURE, TIBIA, PLATEAU;  Surgeon: Barry Guzman MD;  Location: [facility] OR;  Service: Orthopedics;  Laterality: Left;    REMOVAL OF EXTERNAL FIXATION DEVICE Left 2019    Procedure: REMOVAL, EXTERNAL FIXATION DEVICE;  Surgeon: Barry Guzman MD;  Location: [facility] OR;  Service: Orthopedics;  Laterality: Left;    TRIGGER FINGER RELEASE Right 2023    Procedure: RELEASE, TRIGGER FINGER;  Surgeon: Sandeep Bang MD;  Location: [facility] OR;  Service: Orthopedics;  Laterality: Right;  right trigger thumb release    TUBAL LIGATION  1997    UMBILICAL HERNIA REPAIR       Family History   Problem Relation Age of Onset    Breast cancer Mother 58    Cancer Mother         Breast cancer    Depression Mother         Depression    Diabetes Mother     Breast cancer Sister 46    Cancer Sister         Breast cancer    No Known Problems Father     Breast cancer Paternal Grandmother      Social History     Socioeconomic History    Marital status:     Number of children: 5   Occupational History    Occupation:    Tobacco Use    Smoking status: Former     Current packs/day: 0.00     Average packs/day: 1 pack/day for 33.0 years (33.0 ttl pk-yrs)     Types: Cigarettes     Start date:      Quit date:      Years since quittin.6     Passive exposure: Past    Smokeless tobacco: Never   Substance and Sexual Activity    Alcohol use: Not Currently     Alcohol/week: 2.0 standard drinks of alcohol     Types: 2 Glasses of wine per week     Comment: Stopped    Drug use: No    Sexual activity: Not Currently     Partners: Male     Birth control/protection: None     Medication List with Changes/Refills   Current Medications    ACITRETIN (SORIATANE) 10 MG CAPSULE    Take 1 capsule (10 mg total) by mouth once daily.    ASCORBIC ACID, VITAMIN C, (VITAMIN C) 500 MG TABLET    Take 500 mg by mouth once daily.    CLONAZEPAM (KLONOPIN) 0.5 MG TABLET    " Take 0.5-1 tablets (0.25-0.5 mg total) by mouth 2 (two) times daily as needed for Anxiety.    ELDERBERRY FRUIT ORAL    Take 1 capsule by mouth once daily.    FLUOXETINE 20 MG CAPSULE    Take 1 capsule (20 mg total) by mouth once daily.    GABAPENTIN (NEURONTIN) 300 MG CAPSULE    Take 3 capsules (900 mg total) by mouth every evening.    HYDROCODONE-ACETAMINOPHEN (NORCO) 5-325 MG PER TABLET    Take 1 tablet by mouth every 6 (six) hours as needed for Pain.    NICOTINE, POLACRILEX, (NICORETTE) 2 MG GUM    Take 1 each (2 mg total) by mouth as needed (for cigarette cravings). Use as directed on packaging.    PHENTERMINE (ADIPEX-P) 37.5 MG TABLET    Take 1 tablet (37.5 mg total) by mouth before breakfast.    VARENICLINE (CHANTIX) 1 MG TAB    Take 1 tablet (1 mg total) by mouth 2 (two) times daily.     Review of patient's allergies indicates:   Allergen Reactions    Macrobid [nitrofurantoin monohyd/m-cryst] Hives    Bactrim [sulfamethoxazole-trimethoprim]     Flagyl [metronidazole hcl] Hives       OBJECTIVE:     Physical exam:    Vitals:    09/08/23 0831   Weight: 87.1 kg (192 lb 0.3 oz)   Height: 5' 2" (1.575 m)   PainSc:   3   PainLoc: Finger     Vital signs are stable, patient is afebrile.  Patient is well dressed and well groomed, no acute distress.  Alert and oriented to person, place, and time.    Right UE:  Incision is clean, dry, and intact.   There is no erythema or exudate. There is no sign of any infection.   Mild edema to thumb  She is NVI.   Sutures in place.   2+ pulses noted.  Cap refill <2 seconds  Motor intact to hand    ASSESSMENT         Encounter Diagnosis   Name Primary?    S/P trigger finger release Yes            14 days status post right trigger thumb release     PLAN:           Thisia was seen today for post-op evaluation.    Diagnoses and all orders for this visit:    S/P trigger finger release        - PO instruction reviewed and provided to patient  - The incision was cleaned with hydrogen " peroxide. Sutures removed with no difficulty. Steri-Strips applied.   - Patient may use brace as needed for symptomatic relief.    - work excuse provided today  - Patient should notify the office of any signs or symptoms of infection including fevers, erythema, purulent drainage, increasing pain.    - Follow up PRN     POST OPERATIVE VISIT INSTRUCTIONS - Visit #2    1. No soaking the incision for at least 7-10 days. You may get it wet in the shower and use regular soap.    2. To avoid a hard, painful scar, we recommend you use Mederma and/or Silicone Scar patches. You may also use Cocoa Butter, Vitamin E oil, Coconut oil, etc.    You may start this 5-7 days after stitches are removed and when wound is completely closed.    - Mederma scar cream: scar massage over incision 1-2 times per day. You may use topical lotion or Vitamin E oil. Massage an additional few times a day to help the scar become soft and less sensitive.    - Silicone Scar Patches: cut and place over the incision, then massage over the patch to help with scarring and sensitivity. Can be reused up to 10 days if you rinse it clean, let it dry out, then reapply.    Brands: Mepiform Silicone Scar Sheets (recommended), Scar Away, [employer] brand or generic pharmacy brand ([employer], PicPrizes, [employer] NeurOp)    3. Continue range of motion exercises as instructed at todays visit.    4. You may take Tylenol 500mg and/or Ibuprofen 400mg every 4-6 hours with food for pain as tolerated as long as you do not have an allergy or medical condition that prevents you from taking them.    5. Therapy recommended: none at this time    6. Immobilization: brace as needed    7. Lifting restrictions: start light at about 10lb and increase gradually as tolerated    8. Follow up: PRN         Xenia Mancilla PA-C   [facility]

## 2023-09-08 ENCOUNTER — OFFICE VISIT (OUTPATIENT)
Dept: ORTHOPEDICS | Facility: CLINIC | Age: 48
End: 2023-09-08
Payer: COMMERCIAL

## 2023-09-08 VITALS — WEIGHT: 192 LBS | HEIGHT: 62 IN | BODY MASS INDEX: 35.33 KG/M2

## 2023-09-08 DIAGNOSIS — Z98.890 S/P TRIGGER FINGER RELEASE: Primary | ICD-10-CM

## 2023-09-08 PROCEDURE — 99024 POSTOP FOLLOW-UP VISIT: CPT | Mod: S$GLB,,,

## 2023-09-08 PROCEDURE — 3044F PR MOST RECENT HEMOGLOBIN A1C LEVEL <7.0%: ICD-10-PCS | Mod: CPTII,S$GLB,,

## 2023-09-08 PROCEDURE — 99999 PR PBB SHADOW E&M-EST. PATIENT-LVL III: CPT | Mod: PBBFAC,,,

## 2023-09-08 PROCEDURE — 99999 PR PBB SHADOW E&M-EST. PATIENT-LVL III: ICD-10-PCS | Mod: PBBFAC,,,

## 2023-09-08 PROCEDURE — 99024 PR POST-OP FOLLOW-UP VISIT: ICD-10-PCS | Mod: S$GLB,,,

## 2023-09-08 PROCEDURE — 3008F PR BODY MASS INDEX (BMI) DOCUMENTED: ICD-10-PCS | Mod: CPTII,S$GLB,,

## 2023-09-08 PROCEDURE — 3044F HG A1C LEVEL LT 7.0%: CPT | Mod: CPTII,S$GLB,,

## 2023-09-08 PROCEDURE — 3008F BODY MASS INDEX DOCD: CPT | Mod: CPTII,S$GLB,,

## 2023-09-08 PROCEDURE — 1159F PR MEDICATION LIST DOCUMENTED IN MEDICAL RECORD: ICD-10-PCS | Mod: CPTII,S$GLB,,

## 2023-09-08 PROCEDURE — 1159F MED LIST DOCD IN RCRD: CPT | Mod: CPTII,S$GLB,,

## 2023-10-09 ENCOUNTER — OFFICE VISIT (OUTPATIENT)
Dept: INTERNAL MEDICINE | Facility: CLINIC | Age: 48
End: 2023-10-09
Payer: COMMERCIAL

## 2023-10-09 DIAGNOSIS — G89.28 OTHER CHRONIC POSTPROCEDURAL PAIN: Chronic | ICD-10-CM

## 2023-10-09 DIAGNOSIS — F41.9 RECURRENT MILD MAJOR DEPRESSIVE DISORDER WITH ANXIETY: Chronic | ICD-10-CM

## 2023-10-09 DIAGNOSIS — R68.82 LOW LIBIDO: Primary | Chronic | ICD-10-CM

## 2023-10-09 DIAGNOSIS — F33.0 RECURRENT MILD MAJOR DEPRESSIVE DISORDER WITH ANXIETY: Chronic | ICD-10-CM

## 2023-10-09 DIAGNOSIS — E66.01 CLASS 2 SEVERE OBESITY DUE TO EXCESS CALORIES WITH SERIOUS COMORBIDITY AND BODY MASS INDEX (BMI) OF 35.0 TO 35.9 IN ADULT: Chronic | ICD-10-CM

## 2023-10-09 PROCEDURE — 1159F PR MEDICATION LIST DOCUMENTED IN MEDICAL RECORD: ICD-10-PCS | Mod: CPTII,95,, | Performed by: FAMILY MEDICINE

## 2023-10-09 PROCEDURE — 1160F PR REVIEW ALL MEDS BY PRESCRIBER/CLIN PHARMACIST DOCUMENTED: ICD-10-PCS | Mod: CPTII,95,, | Performed by: FAMILY MEDICINE

## 2023-10-09 PROCEDURE — 1159F MED LIST DOCD IN RCRD: CPT | Mod: CPTII,95,, | Performed by: FAMILY MEDICINE

## 2023-10-09 PROCEDURE — 3044F PR MOST RECENT HEMOGLOBIN A1C LEVEL <7.0%: ICD-10-PCS | Mod: CPTII,95,, | Performed by: FAMILY MEDICINE

## 2023-10-09 PROCEDURE — 1160F RVW MEDS BY RX/DR IN RCRD: CPT | Mod: CPTII,95,, | Performed by: FAMILY MEDICINE

## 2023-10-09 PROCEDURE — 3044F HG A1C LEVEL LT 7.0%: CPT | Mod: CPTII,95,, | Performed by: FAMILY MEDICINE

## 2023-10-09 PROCEDURE — 99214 PR OFFICE/OUTPT VISIT, EST, LEVL IV, 30-39 MIN: ICD-10-PCS | Mod: 95,,, | Performed by: FAMILY MEDICINE

## 2023-10-09 PROCEDURE — 99214 OFFICE O/P EST MOD 30 MIN: CPT | Mod: 95,,, | Performed by: FAMILY MEDICINE

## 2023-10-09 RX ORDER — GABAPENTIN 300 MG/1
900 CAPSULE ORAL NIGHTLY
Qty: 90 CAPSULE | Refills: 1 | Status: SHIPPED | OUTPATIENT
Start: 2023-10-09 | End: 2023-11-14 | Stop reason: SDUPTHER

## 2023-10-09 NOTE — PROGRESS NOTES
TELEMEDICINE VIRTUAL VIDEO VISIT  10/9/23  9:20 AM CDT    Visit Type: Audiovisual    Patient's Location: Teresa represents that they are located within the state [facility].    CHIEF COMPLAINT: Low Libido, Follow-up    1. Low libido  Assessment & Plan:  Chronic problem, worse recently. She is unsure if the fluoxetine is making it worse, but she has been on this medication for quite some time and does not want to discontinue, at least not at this point.  She thinks the Adipex may be making the libido a little worse.  We discussed risks and benefits of treatment options.  She is going to work on therapeutic lifestyle changes, and we will readdress this at her next appointment.      2. Recurrent mild major depressive disorder with anxiety  This is a chronic problem that appears compensated/controlled and stable on fluoxetine daily and clonazepam PRN.    3. Class 2 severe obesity due to excess calories with serious comorbidity and body mass index (BMI) of 35.0 to 35.9 in adult  She reports she is already having significant weight loss with phentermine.  No tachycardia / palpitations or other side effects reported.    4. Chronic post-op pain  This is a chronic problem that appears compensated/controlled and stable on gabapentin. Thisia reports that they are doing well on gabapentin, they perceive no adverse medication side-effects, and they want to continue current treatment.   Overview:  After 12/2019 LLE ORIF complicated by post-op MRSA wound infection requiring hardware removal and revision, incision and drainage of abscess, completed treatment with IV vancomycin    Orders:  -     gabapentin (NEURONTIN) 300 MG capsule; Take 3 capsules (900 mg total) by mouth every evening.  Dispense: 90 capsule; Refill: 1Unless noted herein, any chronic conditions are represented as and appear stable, and no other significant complaints or concerns were reported.    Review of Systems    Physical Exam  Constitutional:       General:  "She is not in acute distress.     Appearance: Normal appearance. She is not ill-appearing.   Pulmonary:      Effort: Pulmonary effort is normal. No respiratory distress.   Skin:     Coloration: Skin is not jaundiced.   Neurological:      Mental Status: She is alert. Mental status is at baseline.   Psychiatric:         Mood and Affect: Mood normal.         Behavior: Behavior normal.         Thought Content: Thought content normal.         Future Appointments   Date Time Provider Department [place]   11/14/2023  8:20 AM EDGAR Chery MD [facility]   2/13/2024  8:20 AM Jennifer Briones MD [facility]       Documentation entered by me for this encounter may have been done in part using speech-recognition technology. Although I have made an effort to ensure accuracy, "sound like" errors may exist and should be interpreted in context.   TOTAL TIME evaluating and managing this patient for this encounter was greater than or equal to 23 minutes. This time was exclusive of any separately billable procedures for this patient and exclusive of time spent treating any other patient.    Documentation entered by me for this encounter may have been done in part using speech-recognition technology. Although I have made an effort to ensure accuracy, "sound like" errors may exist and should be interpreted in context.    Each patient to whom medical services are provided by telemedicine is: (1) informed of the relationship between the physician and patient and the respective role of any other health care provider with respect to management of the patient; and (2) notified that he or she may decline to receive medical services by telemedicine and may withdraw from such care at any time.  "

## 2023-10-12 ENCOUNTER — OFFICE VISIT (OUTPATIENT)
Dept: FAMILY MEDICINE | Facility: CLINIC | Age: 48
End: 2023-10-12
Payer: COMMERCIAL

## 2023-10-12 DIAGNOSIS — L73.9 FOLLICULITIS: Primary | ICD-10-CM

## 2023-10-12 PROCEDURE — 99213 OFFICE O/P EST LOW 20 MIN: CPT | Mod: 95,,, | Performed by: NURSE PRACTITIONER

## 2023-10-12 PROCEDURE — 1160F RVW MEDS BY RX/DR IN RCRD: CPT | Mod: CPTII,95,, | Performed by: NURSE PRACTITIONER

## 2023-10-12 PROCEDURE — 1160F PR REVIEW ALL MEDS BY PRESCRIBER/CLIN PHARMACIST DOCUMENTED: ICD-10-PCS | Mod: CPTII,95,, | Performed by: NURSE PRACTITIONER

## 2023-10-12 PROCEDURE — 1159F MED LIST DOCD IN RCRD: CPT | Mod: CPTII,95,, | Performed by: NURSE PRACTITIONER

## 2023-10-12 PROCEDURE — 3044F PR MOST RECENT HEMOGLOBIN A1C LEVEL <7.0%: ICD-10-PCS | Mod: CPTII,95,, | Performed by: NURSE PRACTITIONER

## 2023-10-12 PROCEDURE — 3044F HG A1C LEVEL LT 7.0%: CPT | Mod: CPTII,95,, | Performed by: NURSE PRACTITIONER

## 2023-10-12 PROCEDURE — 1159F PR MEDICATION LIST DOCUMENTED IN MEDICAL RECORD: ICD-10-PCS | Mod: CPTII,95,, | Performed by: NURSE PRACTITIONER

## 2023-10-12 PROCEDURE — 99213 PR OFFICE/OUTPT VISIT, EST, LEVL III, 20-29 MIN: ICD-10-PCS | Mod: 95,,, | Performed by: NURSE PRACTITIONER

## 2023-10-12 RX ORDER — DOXYCYCLINE HYCLATE 100 MG
100 TABLET ORAL 2 TIMES DAILY
Qty: 20 TABLET | Refills: 0 | Status: SHIPPED | OUTPATIENT
Start: 2023-10-12 | End: 2023-11-14

## 2023-10-12 RX ORDER — MUPIROCIN 20 MG/G
OINTMENT TOPICAL 3 TIMES DAILY
Qty: 22 G | Refills: 0 | Status: SHIPPED | OUTPATIENT
Start: 2023-10-12 | End: 2023-11-14

## 2023-10-12 NOTE — PROGRESS NOTES
"Subjective     Patient ID: Teresa Cazares is a 48 y.o. female.    Chief Complaint: No chief complaint on file.  The patient location is: [place]  The chief complaint leading to consultation is: pubic bump    Visit type: audiovisual    Face to Face time with patient: 15 min   minutes of total time spent on the encounter, which includes face to face time and non-face to face time preparing to see the patient (eg, review of tests), Obtaining and/or reviewing separately obtained history, Documenting clinical information in the electronic or other health record, Independently interpreting results (not separately reported) and communicating results to the patient/family/caregiver, or Care coordination (not separately reported).         Each patient to whom he or she provides medical services by telemedicine is:  (1) informed of the relationship between the physician and patient and the respective role of any other health care provider with respect to management of the patient; and (2) notified that he or she may decline to receive medical services by telemedicine and may withdraw from such care at any time.    Notes:     Cyst  This is a new ("bump" x 1 to pubic area; states pulled hair out of it; shaved prior to occurence; tender) problem. The current episode started in the past 7 days. The problem occurs daily. The problem has been unchanged. Pertinent negatives include no abdominal pain, anorexia, arthralgias, change in bowel habit, chest pain, chills, congestion, coughing, diaphoresis, fatigue, fever, headaches, joint swelling, myalgias, nausea, neck pain, numbness, rash, sore throat, swollen glands, urinary symptoms, vertigo, visual change, vomiting or weakness. Nothing aggravates the symptoms. She has tried nothing for the symptoms. The treatment provided no relief.     Past Medical History:   Diagnosis Date    Anxiety     Closed fracture of left lower leg with routine healing 03/28/2019    Depression     " denies hospitalization or bipolar disorder    Hyphema of right eye 2018    Nicotine dependence, cigarettes, in remission 2019    Obesity     Personal history of COVID-19     Primary hypertension 2023    Recurrent major depression in partial remission 2019    Surgical wound infection (MRSA) 2019     Social History     Socioeconomic History    Marital status:     Number of children: 5   Occupational History    Occupation:    Tobacco Use    Smoking status: Former     Current packs/day: 0.00     Average packs/day: 1 pack/day for 33.0 years (33.0 ttl pk-yrs)     Types: Cigarettes     Start date:      Quit date:      Years since quittin.7     Passive exposure: Past    Smokeless tobacco: Never   Substance and Sexual Activity    Alcohol use: Not Currently     Alcohol/week: 2.0 standard drinks of alcohol     Types: 2 Glasses of wine per week     Comment: Stopped    Drug use: No    Sexual activity: Not Currently     Partners: Male     Birth control/protection: None     Past Surgical History:   Procedure Laterality Date    APPLICATION OF LARGE EXTERNAL FIXATION DEVICE TO TIBIA Left 01/10/2019    Procedure: APPLICATION, EXTERNAL FIXATION DEVICE, LARGE, TIBIA;  Surgeon: Domenic Galvan MD;  Location: [facility] OR;  Service: Orthopedics;  Laterality: Left;    APPLICATION OF WOUND VACUUM-ASSISTED CLOSURE DEVICE Left 2019    Procedure: APPLICATION, WOUND VAC;  Surgeon: Barry Guzman MD;  Location: [facility] OR;  Service: Orthopedics;  Laterality: Left;    COLONOSCOPY N/A 2022    Procedure: COLONOSCOPY;  Surgeon: Yon Ridley MD;  Location: [facility] ENDO;  Service: Endoscopy;  Laterality: N/A;    FRACTURE SURGERY      C1 neck- halo    HERNIA REPAIR      HYSTERECTOMY  2009    tahbso    OOPHORECTOMY Bilateral     OPEN REDUCTION AND INTERNAL FIXATION (ORIF) OF FRACTURE OF TIBIAL PLATEAU Left 2019    Procedure: ORIF, FRACTURE, TIBIA, PLATEAU;  Surgeon: Barry DEMPSEY  "MD Thomas;  Location: [facility] OR;  Service: Orthopedics;  Laterality: Left;    REMOVAL OF EXTERNAL FIXATION DEVICE Left 01/17/2019    Procedure: REMOVAL, EXTERNAL FIXATION DEVICE;  Surgeon: Barry Guzman MD;  Location: [facility] OR;  Service: Orthopedics;  Laterality: Left;    TRIGGER FINGER RELEASE Right 8/25/2023    Procedure: RELEASE, TRIGGER FINGER;  Surgeon: Sandeep Bang MD;  Location: [facility] OR;  Service: Orthopedics;  Laterality: Right;  right trigger thumb release    TUBAL LIGATION  05/22/1997    UMBILICAL HERNIA REPAIR         Review of Systems   Constitutional: Negative.  Negative for chills, diaphoresis, fatigue and fever.   HENT: Negative.  Negative for nasal congestion and sore throat.    Eyes: Negative.    Respiratory: Negative.  Negative for cough.    Cardiovascular: Negative.  Negative for chest pain.   Gastrointestinal: Negative.  Negative for abdominal pain, anorexia, change in bowel habit, nausea and vomiting.   Endocrine: Negative.    Genitourinary: Negative.         "Bump" x 1 to pubic area   Musculoskeletal: Negative.  Negative for arthralgias, joint swelling, myalgias and neck pain.   Integumentary:  Negative for rash. Negative.   Allergic/Immunologic: Negative.    Neurological: Negative.  Negative for vertigo, weakness, numbness and headaches.   Psychiatric/Behavioral: Negative.            Objective     Physical Exam  Constitutional:       Appearance: Normal appearance.   Neurological:      Mental Status: She is alert.            Assessment and Plan     1. Folliculitis  -     doxycycline (VIBRA-TABS) 100 MG tablet; Take 1 tablet (100 mg total) by mouth 2 (two) times daily. for 10 days  Dispense: 20 tablet; Refill: 0  -     mupirocin (BACTROBAN) 2 % ointment; Apply topically 3 (three) times daily. for 10 days  Dispense: 22 g; Refill: 0  Keep area clean and dry  Hydrate well  Rest  F/U with PCP ASAP  Report to ER immediately if symptoms worsen or persist               No follow-ups on " file.

## 2023-10-12 NOTE — PATIENT INSTRUCTIONS
Hydrate well  Rest  Avoid prolonged sun exposure while taking doxycycline  F/U with PCP ASAP  Report to ER immediately if symptoms worsen or persist

## 2023-10-17 ENCOUNTER — OFFICE VISIT (OUTPATIENT)
Dept: INTERNAL MEDICINE | Facility: CLINIC | Age: 48
End: 2023-10-17
Payer: COMMERCIAL

## 2023-10-17 ENCOUNTER — PATIENT MESSAGE (OUTPATIENT)
Dept: INTERNAL MEDICINE | Facility: CLINIC | Age: 48
End: 2023-10-17

## 2023-10-17 ENCOUNTER — OFFICE VISIT (OUTPATIENT)
Dept: FAMILY MEDICINE | Facility: CLINIC | Age: 48
End: 2023-10-17
Payer: COMMERCIAL

## 2023-10-17 VITALS — WEIGHT: 186 LBS | BODY MASS INDEX: 34.23 KG/M2 | HEIGHT: 62 IN

## 2023-10-17 DIAGNOSIS — T50.905A DRUG-INDUCED XEROSTOMIA: Primary | ICD-10-CM

## 2023-10-17 DIAGNOSIS — R04.0 EPISTAXIS: ICD-10-CM

## 2023-10-17 DIAGNOSIS — R04.0 NOSEBLEED: Primary | ICD-10-CM

## 2023-10-17 DIAGNOSIS — E66.09 CLASS 1 OBESITY DUE TO EXCESS CALORIES WITH SERIOUS COMORBIDITY AND BODY MASS INDEX (BMI) OF 34.0 TO 34.9 IN ADULT: Chronic | ICD-10-CM

## 2023-10-17 DIAGNOSIS — K11.7 DRUG-INDUCED XEROSTOMIA: Primary | ICD-10-CM

## 2023-10-17 DIAGNOSIS — Z76.0 MEDICATION REFILL: ICD-10-CM

## 2023-10-17 DIAGNOSIS — F41.1 GENERALIZED ANXIETY DISORDER: Chronic | ICD-10-CM

## 2023-10-17 DIAGNOSIS — F33.0 RECURRENT MILD MAJOR DEPRESSIVE DISORDER WITH ANXIETY: Chronic | ICD-10-CM

## 2023-10-17 DIAGNOSIS — F41.9 RECURRENT MILD MAJOR DEPRESSIVE DISORDER WITH ANXIETY: Chronic | ICD-10-CM

## 2023-10-17 PROBLEM — E66.811 CLASS 1 OBESITY DUE TO EXCESS CALORIES WITH SERIOUS COMORBIDITY AND BODY MASS INDEX (BMI) OF 34.0 TO 34.9 IN ADULT: Chronic | Status: ACTIVE | Noted: 2017-03-13

## 2023-10-17 PROCEDURE — 99214 PR OFFICE/OUTPT VISIT, EST, LEVL IV, 30-39 MIN: ICD-10-PCS | Mod: 95,,, | Performed by: FAMILY MEDICINE

## 2023-10-17 PROCEDURE — 1160F PR REVIEW ALL MEDS BY PRESCRIBER/CLIN PHARMACIST DOCUMENTED: ICD-10-PCS | Mod: CPTII,95,, | Performed by: NURSE PRACTITIONER

## 2023-10-17 PROCEDURE — 3044F HG A1C LEVEL LT 7.0%: CPT | Mod: CPTII,95,, | Performed by: NURSE PRACTITIONER

## 2023-10-17 PROCEDURE — 3044F PR MOST RECENT HEMOGLOBIN A1C LEVEL <7.0%: ICD-10-PCS | Mod: CPTII,95,, | Performed by: FAMILY MEDICINE

## 2023-10-17 PROCEDURE — 1159F MED LIST DOCD IN RCRD: CPT | Mod: CPTII,95,, | Performed by: FAMILY MEDICINE

## 2023-10-17 PROCEDURE — 1160F RVW MEDS BY RX/DR IN RCRD: CPT | Mod: CPTII,95,, | Performed by: FAMILY MEDICINE

## 2023-10-17 PROCEDURE — 99214 OFFICE O/P EST MOD 30 MIN: CPT | Mod: 95,,, | Performed by: FAMILY MEDICINE

## 2023-10-17 PROCEDURE — 1159F PR MEDICATION LIST DOCUMENTED IN MEDICAL RECORD: ICD-10-PCS | Mod: CPTII,95,, | Performed by: NURSE PRACTITIONER

## 2023-10-17 PROCEDURE — 99213 OFFICE O/P EST LOW 20 MIN: CPT | Mod: 95,,, | Performed by: NURSE PRACTITIONER

## 2023-10-17 PROCEDURE — 3044F PR MOST RECENT HEMOGLOBIN A1C LEVEL <7.0%: ICD-10-PCS | Mod: CPTII,95,, | Performed by: NURSE PRACTITIONER

## 2023-10-17 PROCEDURE — 1159F MED LIST DOCD IN RCRD: CPT | Mod: CPTII,95,, | Performed by: NURSE PRACTITIONER

## 2023-10-17 PROCEDURE — 1159F PR MEDICATION LIST DOCUMENTED IN MEDICAL RECORD: ICD-10-PCS | Mod: CPTII,95,, | Performed by: FAMILY MEDICINE

## 2023-10-17 PROCEDURE — 1160F RVW MEDS BY RX/DR IN RCRD: CPT | Mod: CPTII,95,, | Performed by: NURSE PRACTITIONER

## 2023-10-17 PROCEDURE — 3008F PR BODY MASS INDEX (BMI) DOCUMENTED: ICD-10-PCS | Mod: CPTII,95,, | Performed by: FAMILY MEDICINE

## 2023-10-17 PROCEDURE — 99213 PR OFFICE/OUTPT VISIT, EST, LEVL III, 20-29 MIN: ICD-10-PCS | Mod: 95,,, | Performed by: NURSE PRACTITIONER

## 2023-10-17 PROCEDURE — 1160F PR REVIEW ALL MEDS BY PRESCRIBER/CLIN PHARMACIST DOCUMENTED: ICD-10-PCS | Mod: CPTII,95,, | Performed by: FAMILY MEDICINE

## 2023-10-17 PROCEDURE — 3008F BODY MASS INDEX DOCD: CPT | Mod: CPTII,95,, | Performed by: FAMILY MEDICINE

## 2023-10-17 PROCEDURE — 3044F HG A1C LEVEL LT 7.0%: CPT | Mod: CPTII,95,, | Performed by: FAMILY MEDICINE

## 2023-10-17 RX ORDER — PHENTERMINE AND TOPIRAMATE 3.75; 23 MG/1; MG/1
1 CAPSULE, EXTENDED RELEASE ORAL EVERY MORNING
Qty: 14 CAPSULE | Refills: 0 | Status: SHIPPED | OUTPATIENT
Start: 2023-10-17 | End: 2023-11-14

## 2023-10-17 RX ORDER — VITAMIN A 3000 MCG
CAPSULE ORAL
Qty: 60 ML | Refills: 5 | Status: SHIPPED | OUTPATIENT
Start: 2023-10-17

## 2023-10-17 RX ORDER — PHENTERMINE AND TOPIRAMATE 7.5; 46 MG/1; MG/1
1 CAPSULE, EXTENDED RELEASE ORAL EVERY MORNING
Qty: 30 CAPSULE | Refills: 0 | Status: SHIPPED | OUTPATIENT
Start: 2023-10-30 | End: 2023-11-09

## 2023-10-17 RX ORDER — FLUTICASONE PROPIONATE 50 MCG
1 SPRAY, SUSPENSION (ML) NASAL DAILY
Qty: 16 G | Refills: 0 | Status: SHIPPED | OUTPATIENT
Start: 2023-10-17

## 2023-10-17 RX ORDER — FLUOXETINE HYDROCHLORIDE 20 MG/1
20 CAPSULE ORAL DAILY
Qty: 90 CAPSULE | Refills: 3 | Status: SHIPPED | OUTPATIENT
Start: 2023-10-17 | End: 2023-11-14 | Stop reason: SDUPTHER

## 2023-10-17 NOTE — PROGRESS NOTES
"Subjective     Patient ID: Teresa Cazares is a 48 y.o. female.    Chief Complaint: No chief complaint on file.  The patient location is: [place]  The chief complaint leading to consultation is: Nosebleed, "dry sinuses"    Visit type: audiovisual    Face to Face time with patient: 20 min  minutes of total time spent on the encounter, which includes face to face time and non-face to face time preparing to see the patient (eg, review of tests), Obtaining and/or reviewing separately obtained history, Documenting clinical information in the electronic or other health record, Independently interpreting results (not separately reported) and communicating results to the patient/family/caregiver, or Care coordination (not separately reported).         Each patient to whom he or she provides medical services by telemedicine is:  (1) informed of the relationship between the physician and patient and the respective role of any other health care provider with respect to management of the patient; and (2) notified that he or she may decline to receive medical services by telemedicine and may withdraw from such care at any time.    Notes:     Epistaxis   The bleeding has been from both nares. This is a new (Pt states "nose dried out" since starting adipex; states, "I stopped the adipex and my nose stopped bleeding. It didn't start until I started taking the adipex"; states PCP aware) problem. The current episode started 1 to 4 weeks ago. She has experienced no nasal trauma. The problem occurs every several days. The problem has been waxing and waning. Associated with: Adipes use per pt report. Treatments tried: Pt states improved with flonase nasal spray; requests refill. Her past medical history is significant for allergies, colds and sinus problems. There is no history of a bleeding disorder or frequent nosebleeds.     Past Medical History:   Diagnosis Date    Anxiety     Closed fracture of left lower leg with routine " healing 2019    Depression     denies hospitalization or bipolar disorder    Hyphema of right eye 2018    Nicotine dependence, cigarettes, in remission 2019    Obesity     Personal history of COVID-19     Primary hypertension 2023    Recurrent major depression in partial remission 2019    Surgical wound infection (MRSA) 2019     Social History     Socioeconomic History    Marital status:     Number of children: 5   Occupational History    Occupation:    Tobacco Use    Smoking status: Former     Current packs/day: 0.00     Average packs/day: 1 pack/day for 33.0 years (33.0 ttl pk-yrs)     Types: Cigarettes     Start date:      Quit date:      Years since quittin.7     Passive exposure: Past    Smokeless tobacco: Never   Substance and Sexual Activity    Alcohol use: Not Currently     Alcohol/week: 2.0 standard drinks of alcohol     Types: 2 Glasses of wine per week     Comment: Stopped    Drug use: No    Sexual activity: Not Currently     Partners: Male     Birth control/protection: None     Past Surgical History:   Procedure Laterality Date    APPLICATION OF LARGE EXTERNAL FIXATION DEVICE TO TIBIA Left 01/10/2019    Procedure: APPLICATION, EXTERNAL FIXATION DEVICE, LARGE, TIBIA;  Surgeon: Domenic Galvan MD;  Location: [facility] OR;  Service: Orthopedics;  Laterality: Left;    APPLICATION OF WOUND VACUUM-ASSISTED CLOSURE DEVICE Left 2019    Procedure: APPLICATION, WOUND VAC;  Surgeon: Barry Guzman MD;  Location: [facility] OR;  Service: Orthopedics;  Laterality: Left;    COLONOSCOPY N/A 2022    Procedure: COLONOSCOPY;  Surgeon: Yon Ridley MD;  Location: [facility] ENDO;  Service: Endoscopy;  Laterality: N/A;    FRACTURE SURGERY      C1 neck- halo    HERNIA REPAIR      HYSTERECTOMY  2009    tahbso    OOPHORECTOMY Bilateral     OPEN REDUCTION AND INTERNAL FIXATION (ORIF) OF FRACTURE OF TIBIAL PLATEAU Left 2019    Procedure: ORIF, FRACTURE,  TIBIA, PLATEAU;  Surgeon: Barry Guzman MD;  Location: [facility] OR;  Service: Orthopedics;  Laterality: Left;    REMOVAL OF EXTERNAL FIXATION DEVICE Left 01/17/2019    Procedure: REMOVAL, EXTERNAL FIXATION DEVICE;  Surgeon: Barry Guzman MD;  Location: [facility] OR;  Service: Orthopedics;  Laterality: Left;    TRIGGER FINGER RELEASE Right 8/25/2023    Procedure: RELEASE, TRIGGER FINGER;  Surgeon: Sandeep Bang MD;  Location: [facility] OR;  Service: Orthopedics;  Laterality: Right;  right trigger thumb release    TUBAL LIGATION  05/22/1997    UMBILICAL HERNIA REPAIR         Review of Systems   Constitutional: Negative.    HENT:  Positive for nosebleeds.    Eyes: Negative.    Respiratory: Negative.     Cardiovascular: Negative.    Gastrointestinal: Negative.    Endocrine: Negative.    Genitourinary: Negative.    Musculoskeletal: Negative.    Integumentary:  Negative.   Allergic/Immunologic: Negative.    Neurological: Negative.    Psychiatric/Behavioral: Negative.            Objective     Physical Exam  Constitutional:       Appearance: Normal appearance.   Neurological:      Mental Status: She is alert.            Assessment and Plan     1. Nosebleed  2. Medication refill  -     Ambulatory referral/consult to ENT; Future; Expected date: 10/24/2023        -     fluticasone propionate (FLONASE) 50 mcg/actuation nasal spray; 1 spray (50 mcg total) by Each Nostril route once daily.  Dispense: 16 g; Refill: 0  Hydrate well  Rest   F/U with PCP ASAP  Report to ER immediately if symptoms worsen or persist               No follow-ups on file.

## 2023-10-17 NOTE — ASSESSMENT & PLAN NOTE
"Wt Readings from Last 6 Encounters:   10/17/23 84.4 kg (186 lb)   09/08/23 87.1 kg (192 lb 0.3 oz)   08/29/23 87.1 kg (192 lb)   08/25/23 87.5 kg (192 lb 14.4 oz)   08/21/23 84.9 kg (187 lb 4.5 oz)   08/15/23 87.3 kg (192 lb 7.4 oz)     Estimated body mass index is 34.02 kg/m² as calculated from the following:    Height as of this encounter: 5' 2" (1.575 m).    Weight as of this encounter: 84.4 kg (186 lb).    "

## 2023-10-17 NOTE — LETTER
October 17, 2023      [facility]  [address] [address]  Phone: [phone]  Fax: [phone]       Patient: Teresa Cazares   YOB: 1975  Date of Visit: 10/17/2023    To Whom It May Concern:    Mayra Cazares  was at [facility] on 10/17/2023. The patient may return to work on 10/18/2023 with no restrictions. If you have any questions or concerns, or if I can be of further assistance, please do not hesitate to contact me.    Sincerely,    Pati Lubin LPN

## 2023-10-17 NOTE — PROGRESS NOTES
"TELEMEDICINE VIRTUAL VIDEO VISIT  10/17/23  2:20 PM CDT    Visit Type: Audiovisual    Patient's Location: Thisia represents that they are located within the [facility].    CHIEF COMPLAINT: Follow-up    Phentermin had side effect of intolerable dry mouth & nasal passages with secondary epistaxis. The problem resolved when she stopped the phentermine. She was achieving significant weight loss with the phentermine, and otherwise tolerated the medication well. We discussed risks and benefits of treatment options.    Chronic conditions are otherwise represented as and appear to be compensated/controlled and stable. No other new complaints or concerns reported.         1. Drug-induced xerostomia (This problem is NEW - a medication side-effect.)  -     sodium chloride (SALINE NASAL) 0.65 % nasal spray; Use as directed on product packaging  Dispense: 60 mL; Refill: 5    2. Epistaxis (This problem is NEW - a medication side-effect.)  -     sodium chloride (SALINE NASAL) 0.65 % nasal spray; Use as directed on product packaging  Dispense: 60 mL; Refill: 5    3. Class 1 obesity with serious comorbidity and BMI of 34.0 to 34.9  (This is a chronic problem, stable/improved.)  Assessment & Plan:  Wt Readings from Last 6 Encounters:   10/17/23 84.4 kg (186 lb)   09/08/23 87.1 kg (192 lb 0.3 oz)   08/29/23 87.1 kg (192 lb)   08/25/23 87.5 kg (192 lb 14.4 oz)   08/21/23 84.9 kg (187 lb 4.5 oz)   08/15/23 87.3 kg (192 lb 7.4 oz)     Estimated body mass index is 34.02 kg/m² as calculated from the following:    Height as of this encounter: 5' 2" (1.575 m).    Weight as of this encounter: 84.4 kg (186 lb).      Orders:  -     phentermine-topiramate (QSYMIA) 7.5-46 mg CM24; Take 1 capsule by mouth every morning.  Dispense: 30 capsule; Refill: 0  -     phentermine-topiramate (QSYMIA) 3.75-23 mg CM24; Take 1 capsule by mouth every morning. for 14 days  Dispense: 14 capsule; Refill: 0    4. Recurrent mild major depressive disorder " with anxiety (This is a chronic problem, stable/improved.)  -     FLUoxetine 20 MG capsule; Take 1 capsule (20 mg total) by mouth once daily.  Dispense: 90 capsule; Refill: 3    5. Generalized anxiety disorder (This is a chronic problem, stable/improved.)  -     FLUoxetine 20 MG capsule; Take 1 capsule (20 mg total) by mouth once daily.  Dispense: 90 capsule; Refill: 3    Unless noted herein, any chronic conditions are represented as and appear stable, and no other significant complaints or concerns were reported.    Review of Systems   Constitutional:  Negative for activity change and unexpected weight change.   HENT:  Negative for hearing loss, rhinorrhea and trouble swallowing.    Eyes:  Negative for discharge and visual disturbance.   Respiratory:  Negative for chest tightness and wheezing.    Cardiovascular:  Negative for chest pain and palpitations.   Gastrointestinal:  Negative for blood in stool, constipation, diarrhea and vomiting.   Endocrine: Negative for polydipsia and polyuria.   Genitourinary:  Negative for difficulty urinating, dysuria, hematuria and menstrual problem.   Musculoskeletal:  Negative for arthralgias, joint swelling and neck pain.   Neurological:  Negative for weakness and headaches.   Psychiatric/Behavioral:  Negative for confusion and dysphoric mood.        Physical Exam  Constitutional:       General: She is not in acute distress.     Appearance: Normal appearance. She is not ill-appearing.   Pulmonary:      Effort: Pulmonary effort is normal. No respiratory distress.   Skin:     Coloration: Skin is not jaundiced.   Neurological:      Mental Status: She is alert. Mental status is at baseline.   Psychiatric:         Mood and Affect: Mood normal.         Behavior: Behavior normal.         Thought Content: Thought content normal.       Future Appointments   Date Time Provider Department [place]   11/14/2023  8:20 AM EDGAR Chery MD [facility]   2/13/2024  8:20 AM Jennifer Brinoes  "MD [facility] LIFE [place]   2/19/2024 10:30 AM [facility] CT1 LIMIT 500 LBS [facility] CT SCAN Tamara Hughes   2/19/2024 11:20 AM Joseph Avendaño MD ON [facility]       Documentation entered by me for this encounter may have been done in part using speech-recognition technology. Although I have made an effort to ensure accuracy, "sound like" errors may exist and should be interpreted in context.   TOTAL TIME evaluating and managing this patient for this encounter was greater than or equal to 27 minutes. This time was exclusive of any separately billable procedures for this patient and exclusive of time spent treating any other patient.    Documentation entered by me for this encounter may have been done in part using speech-recognition technology. Although I have made an effort to ensure accuracy, "sound like" errors may exist and should be interpreted in context.    Each patient to whom medical services are provided by telemedicine is: (1) informed of the relationship between the physician and patient and the respective role of any other health care provider with respect to management of the patient; and (2) notified that he or she may decline to receive medical services by telemedicine and may withdraw from such care at any time.  "

## 2023-10-23 ENCOUNTER — TELEPHONE (OUTPATIENT)
Dept: INTERNAL MEDICINE | Facility: CLINIC | Age: 48
End: 2023-10-23
Payer: COMMERCIAL

## 2023-10-23 NOTE — TELEPHONE ENCOUNTER
----- Message from Vivi Akhtar sent at 10/23/2023  3:25 PM CDT -----  Contact: NEAL FIELDS [[id number]]  ..Type:  Patient Requesting Call    Who Called: NEAL FIELDS [[id number]]  Does the patient know what this is regarding?: Medication needing PA, pt cant name medication  Would the patient rather a call back or a response via [facility]?  call  Best Call Back Number: .[phone] (home)   Additional Information:

## 2023-10-26 ENCOUNTER — OFFICE VISIT (OUTPATIENT)
Dept: PRIMARY CARE CLINIC | Facility: CLINIC | Age: 48
End: 2023-10-26
Payer: COMMERCIAL

## 2023-10-26 ENCOUNTER — OFFICE VISIT (OUTPATIENT)
Dept: OTOLARYNGOLOGY | Facility: CLINIC | Age: 48
End: 2023-10-26
Payer: COMMERCIAL

## 2023-10-26 VITALS — HEIGHT: 62 IN | BODY MASS INDEX: 35.44 KG/M2 | TEMPERATURE: 98 F

## 2023-10-26 VITALS
HEIGHT: 62 IN | SYSTOLIC BLOOD PRESSURE: 126 MMHG | BODY MASS INDEX: 35.66 KG/M2 | DIASTOLIC BLOOD PRESSURE: 72 MMHG | HEART RATE: 64 BPM | WEIGHT: 193.81 LBS | OXYGEN SATURATION: 99 % | TEMPERATURE: 97 F

## 2023-10-26 DIAGNOSIS — F41.9 RECURRENT MILD MAJOR DEPRESSIVE DISORDER WITH ANXIETY: Chronic | ICD-10-CM

## 2023-10-26 DIAGNOSIS — G47.33 OSA ON CPAP: Chronic | ICD-10-CM

## 2023-10-26 DIAGNOSIS — I10 PRIMARY HYPERTENSION: Chronic | ICD-10-CM

## 2023-10-26 DIAGNOSIS — R04.0 EPISTAXIS: Primary | ICD-10-CM

## 2023-10-26 DIAGNOSIS — J30.89 NON-SEASONAL ALLERGIC RHINITIS, UNSPECIFIED TRIGGER: ICD-10-CM

## 2023-10-26 DIAGNOSIS — F41.9 ANXIETY: ICD-10-CM

## 2023-10-26 DIAGNOSIS — E66.01 CLASS 2 SEVERE OBESITY DUE TO EXCESS CALORIES WITH SERIOUS COMORBIDITY AND BODY MASS INDEX (BMI) OF 35.0 TO 35.9 IN ADULT: Chronic | ICD-10-CM

## 2023-10-26 DIAGNOSIS — R73.03 PREDIABETES: Primary | Chronic | ICD-10-CM

## 2023-10-26 DIAGNOSIS — F41.1 GENERALIZED ANXIETY DISORDER: Chronic | ICD-10-CM

## 2023-10-26 DIAGNOSIS — F33.0 RECURRENT MILD MAJOR DEPRESSIVE DISORDER WITH ANXIETY: Chronic | ICD-10-CM

## 2023-10-26 PROCEDURE — 99214 PR OFFICE/OUTPT VISIT, EST, LEVL IV, 30-39 MIN: ICD-10-PCS | Mod: S$GLB,,, | Performed by: FAMILY MEDICINE

## 2023-10-26 PROCEDURE — 3008F BODY MASS INDEX DOCD: CPT | Mod: CPTII,S$GLB,, | Performed by: OTOLARYNGOLOGY

## 2023-10-26 PROCEDURE — 3008F PR BODY MASS INDEX (BMI) DOCUMENTED: ICD-10-PCS | Mod: CPTII,S$GLB,, | Performed by: OTOLARYNGOLOGY

## 2023-10-26 PROCEDURE — 1160F RVW MEDS BY RX/DR IN RCRD: CPT | Mod: CPTII,S$GLB,, | Performed by: OTOLARYNGOLOGY

## 2023-10-26 PROCEDURE — 99999 PR PBB SHADOW E&M-EST. PATIENT-LVL V: CPT | Mod: PBBFAC,,, | Performed by: FAMILY MEDICINE

## 2023-10-26 PROCEDURE — 30903 CONTROL OF NOSEBLEED: CPT | Mod: LT,S$GLB,, | Performed by: OTOLARYNGOLOGY

## 2023-10-26 PROCEDURE — 30903 PR CTRL NOSEBLEED,ANTER,COMPLEX: ICD-10-PCS | Mod: LT,S$GLB,, | Performed by: OTOLARYNGOLOGY

## 2023-10-26 PROCEDURE — 99999 PR PBB SHADOW E&M-EST. PATIENT-LVL V: ICD-10-PCS | Mod: PBBFAC,,, | Performed by: FAMILY MEDICINE

## 2023-10-26 PROCEDURE — 3044F PR MOST RECENT HEMOGLOBIN A1C LEVEL <7.0%: ICD-10-PCS | Mod: CPTII,S$GLB,, | Performed by: FAMILY MEDICINE

## 2023-10-26 PROCEDURE — 3044F PR MOST RECENT HEMOGLOBIN A1C LEVEL <7.0%: ICD-10-PCS | Mod: CPTII,S$GLB,, | Performed by: OTOLARYNGOLOGY

## 2023-10-26 PROCEDURE — 3044F HG A1C LEVEL LT 7.0%: CPT | Mod: CPTII,S$GLB,, | Performed by: FAMILY MEDICINE

## 2023-10-26 PROCEDURE — 99214 OFFICE O/P EST MOD 30 MIN: CPT | Mod: S$GLB,,, | Performed by: FAMILY MEDICINE

## 2023-10-26 PROCEDURE — 1159F PR MEDICATION LIST DOCUMENTED IN MEDICAL RECORD: ICD-10-PCS | Mod: CPTII,S$GLB,, | Performed by: OTOLARYNGOLOGY

## 2023-10-26 PROCEDURE — 99999 PR PBB SHADOW E&M-EST. PATIENT-LVL III: CPT | Mod: PBBFAC,,, | Performed by: OTOLARYNGOLOGY

## 2023-10-26 PROCEDURE — 3078F PR MOST RECENT DIASTOLIC BLOOD PRESSURE < 80 MM HG: ICD-10-PCS | Mod: CPTII,S$GLB,, | Performed by: FAMILY MEDICINE

## 2023-10-26 PROCEDURE — 1159F MED LIST DOCD IN RCRD: CPT | Mod: CPTII,S$GLB,, | Performed by: OTOLARYNGOLOGY

## 2023-10-26 PROCEDURE — 3008F BODY MASS INDEX DOCD: CPT | Mod: CPTII,S$GLB,, | Performed by: FAMILY MEDICINE

## 2023-10-26 PROCEDURE — 3074F SYST BP LT 130 MM HG: CPT | Mod: CPTII,S$GLB,, | Performed by: FAMILY MEDICINE

## 2023-10-26 PROCEDURE — 3078F DIAST BP <80 MM HG: CPT | Mod: CPTII,S$GLB,, | Performed by: FAMILY MEDICINE

## 2023-10-26 PROCEDURE — 1160F PR REVIEW ALL MEDS BY PRESCRIBER/CLIN PHARMACIST DOCUMENTED: ICD-10-PCS | Mod: CPTII,S$GLB,, | Performed by: OTOLARYNGOLOGY

## 2023-10-26 PROCEDURE — 3074F PR MOST RECENT SYSTOLIC BLOOD PRESSURE < 130 MM HG: ICD-10-PCS | Mod: CPTII,S$GLB,, | Performed by: FAMILY MEDICINE

## 2023-10-26 PROCEDURE — 3008F PR BODY MASS INDEX (BMI) DOCUMENTED: ICD-10-PCS | Mod: CPTII,S$GLB,, | Performed by: FAMILY MEDICINE

## 2023-10-26 PROCEDURE — 3044F HG A1C LEVEL LT 7.0%: CPT | Mod: CPTII,S$GLB,, | Performed by: OTOLARYNGOLOGY

## 2023-10-26 PROCEDURE — 99204 OFFICE O/P NEW MOD 45 MIN: CPT | Mod: 25,S$GLB,, | Performed by: OTOLARYNGOLOGY

## 2023-10-26 PROCEDURE — 99999 PR PBB SHADOW E&M-EST. PATIENT-LVL III: ICD-10-PCS | Mod: PBBFAC,,, | Performed by: OTOLARYNGOLOGY

## 2023-10-26 PROCEDURE — 99204 PR OFFICE/OUTPT VISIT, NEW, LEVL IV, 45-59 MIN: ICD-10-PCS | Mod: 25,S$GLB,, | Performed by: OTOLARYNGOLOGY

## 2023-10-26 RX ORDER — SEMAGLUTIDE 0.25 MG/.5ML
0.25 INJECTION, SOLUTION SUBCUTANEOUS
Qty: 2 ML | Refills: 1 | Status: SHIPPED | OUTPATIENT
Start: 2023-10-26 | End: 2023-10-27 | Stop reason: SDUPTHER

## 2023-10-26 RX ORDER — SEMAGLUTIDE 0.25 MG/.5ML
0.25 INJECTION, SOLUTION SUBCUTANEOUS
Qty: 2 ML | Refills: 1 | Status: SHIPPED | OUTPATIENT
Start: 2023-10-26 | End: 2023-10-26 | Stop reason: SDUPTHER

## 2023-10-26 NOTE — PROGRESS NOTES
Subjective       Referring MD: Vik  PCP: same  BMI noted 35  Diet: variable   Exercise/Activity: light/moderate  Walks 3x/week and rides bike  Sleep: good  Stressors: manageable; graduates with Bachelor Degree in Dec  Plans to get Masters in Education for STEM  Anxiety/Depression Screen/PHQ-2: stable on fluoxetine  Owns catering truck     In school; [employer]  Working     Labs reviewed: hx of prediabetes; last one was improved with lifestyle changes     Patient ID: Teresa Cazares is a 48 y.o. female.    Chief Complaint: prediabetes, weight gain     HPI    Pt had hx of fluctuant weights and overall gain since has been in school. She has a hx of  and sedentary lifestyle with that. Some stress eating while doing homework. Does tend to have higher appetite while in school.     She was given rx for qsymia has not gotten it due to several issues but predominately was waiting for PA. (Reviewed with pt she likely does not have coverage so would need to pay out of pocket regardless).    She denies hx of pregnancy concerns; has had hysterectomy.   No hx of bipolar/uncontrolled htn.     Food recall:  Bfast: tea  Lunch: meat, baked sweet potato  Dinner: meat, vegetable,  Snack: nuts or meat  Water: adequate  Sugar Sweetened beverages: none   No soda  Etoh: 2 glasses red wine/week  She has cut back on alcohol  No alcohol at all    Pt previously declined metformin due to concern for GI side effects    Hx of C1 fracture; had halo placement with Dr. Kilgore in 2003.       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    Nicotine dependence, cigarettes, in remission 03/26/2019    Obesity     Personal history of COVID-19     Primary hypertension 06/29/2023    Recurrent major depression in partial  remission 03/26/2019    Surgical wound infection (MRSA) 12/2019         PAST SURGICAL HISTORY:  Past Surgical History:   Procedure Laterality Date    APPLICATION OF LARGE EXTERNAL FIXATION DEVICE TO TIBIA Left 01/10/2019    Procedure: APPLICATION, EXTERNAL FIXATION DEVICE, LARGE, TIBIA;  Surgeon: Domenic Galvan MD;  Location: [facility] OR;  Service: Orthopedics;  Laterality: Left;    APPLICATION OF WOUND VACUUM-ASSISTED CLOSURE DEVICE Left 01/17/2019    Procedure: APPLICATION, WOUND VAC;  Surgeon: Barry Guzman MD;  Location: [facility] OR;  Service: Orthopedics;  Laterality: Left;    COLONOSCOPY N/A 09/02/2022    Procedure: COLONOSCOPY;  Surgeon: Yon Ridley MD;  Location: [facility] ENDO;  Service: Endoscopy;  Laterality: N/A;    FRACTURE SURGERY      C1 neck- halo    HERNIA REPAIR      HYSTERECTOMY  2009    tahbso    OOPHORECTOMY Bilateral     OPEN REDUCTION AND INTERNAL FIXATION (ORIF) OF FRACTURE OF TIBIAL PLATEAU Left 01/17/2019    Procedure: ORIF, FRACTURE, TIBIA, PLATEAU;  Surgeon: Barry Guzman MD;  Location: [facility] OR;  Service: Orthopedics;  Laterality: Left;    REMOVAL OF EXTERNAL FIXATION DEVICE Left 01/17/2019    Procedure: REMOVAL, EXTERNAL FIXATION DEVICE;  Surgeon: Barry Guzman MD;  Location: [facility] OR;  Service: Orthopedics;  Laterality: Left;    TRIGGER FINGER RELEASE Right 8/25/2023    Procedure: RELEASE, TRIGGER FINGER;  Surgeon: Sandeep Bang MD;  Location: [facility];  Service: Orthopedics;  Laterality: Right;  right trigger thumb release    TUBAL LIGATION  05/22/1997    UMBILICAL HERNIA REPAIR         FAMILY HISTORY:  Family History   Problem Relation Age of Onset    Breast cancer Mother 58    Cancer Mother         Breast cancer    Depression Mother         Depression    Diabetes Mother     Breast cancer Sister 46    Cancer Sister         Breast cancer    No Known Problems Father     Breast cancer Paternal Grandmother           SOCIAL HISTORY:  Social History     Social History Narrative     Not on file       MEDICATIONS:  Medications have been reviewed.    ALLERGIES:  Allergies have been reviewed.    Vitals:    10/26/23 1000   BP: 126/72   Pulse: 64   Temp: 97.4 °F (36.3 °C)     Wt Readings from Last 10 Encounters:   10/26/23 87.9 kg (193 lb 12.6 oz)   10/17/23 84.4 kg (186 lb)   09/08/23 87.1 kg (192 lb 0.3 oz)   08/29/23 87.1 kg (192 lb)   08/25/23 87.5 kg (192 lb 14.4 oz)   08/21/23 84.9 kg (187 lb 4.5 oz)   08/15/23 87.3 kg (192 lb 7.4 oz)   08/04/23 92.1 kg (203 lb)   06/29/23 92.1 kg (203 lb 0.7 oz)   04/12/23 89.8 kg (198 lb)       Lab Results   Component Value Date    WBC 12.39 08/11/2023    HGB 12.6 08/11/2023    HCT 38.8 08/11/2023     08/11/2023    CHOL 167 06/30/2023    TRIG 27 (L) 06/30/2023    HDL 76 (H) 06/30/2023    ALT 14 08/11/2023    AST 17 08/11/2023     08/11/2023    K 3.7 08/11/2023     08/11/2023    CREATININE 0.7 08/11/2023    BUN 16 08/11/2023    CO2 27 08/11/2023    TSH 2.191 10/08/2020    INR 0.9 01/10/2019    HGBA1C 5.5 06/30/2023       Review of Systems   Constitutional:  Negative for activity change, appetite change, fatigue and fever.   HENT:  Negative for mouth dryness and goiter.    Eyes:  Negative for visual disturbance.   Respiratory:  Negative for apnea, cough, chest tightness and shortness of breath.    Cardiovascular:  Negative for chest pain, palpitations and leg swelling.   Gastrointestinal:  Negative for abdominal pain, constipation, diarrhea, nausea, vomiting and reflux.   Endocrine: Negative for cold intolerance, heat intolerance, polydipsia, polyphagia and polyuria.   Genitourinary:  Negative for frequency and menstrual problem.   Musculoskeletal:  Negative for arthralgias and myalgias.   Integumentary:  Negative for color change and rash.   Neurological:  Negative for dizziness, vertigo, tremors, syncope and weakness.   Psychiatric/Behavioral:  Negative for self-injury, sleep disturbance and suicidal ideas. The patient is not  nervous/anxious.        Physical Exam  Vitals and nursing note reviewed.   Constitutional:       General: She is not in acute distress.  HENT:      Head: Normocephalic and atraumatic.      Mouth/Throat:      Pharynx: Oropharynx is clear.   Eyes:      General: No scleral icterus.     Pupils: Pupils are equal, round, and reactive to light.   Neck:      Comments: No TM  Cardiovascular:      Rate and Rhythm: Normal rate and regular rhythm.      Pulses: Normal pulses.      Heart sounds: Normal heart sounds. No murmur heard.     No friction rub. No gallop.   Pulmonary:      Effort: Pulmonary effort is normal. No respiratory distress.      Breath sounds: Normal breath sounds. No wheezing, rhonchi or rales.   Abdominal:      General: Bowel sounds are normal. There is no distension.      Palpations: Abdomen is soft.      Tenderness: There is no abdominal tenderness.   Musculoskeletal:         General: No swelling.      Cervical back: Normal range of motion and neck supple. No tenderness.   Lymphadenopathy:      Cervical: No cervical adenopathy.   Skin:     General: Skin is warm.      Capillary Refill: Capillary refill takes less than 2 seconds.      Findings: No erythema or rash.   Neurological:      Mental Status: She is alert and oriented to person, place, and time.   Psychiatric:         Mood and Affect: Mood normal.         Behavior: Behavior normal.              Assessment and Plan       1. Prediabetes  Reviewed with pt lifestyle interventions     2. Class 2 severe obesity due to excess calories with serious comorbidity and body mass index (BMI) of 35.0 to 35.9 in adult  -     Ambulatory referral/consult to [facility] Lif[facility] Wellness    3. DEYA on CPAP  Overview:  12/31/2019, 1/1/2020  2 night Home Sleep Study    MILD/BORDERLINE OBSTRUCTIVE SLEEP APNEA with overall AHI 6.1/hr ( 37 events):  Oxygen desaturation: 78%. SpO2 between 70% to 79% for <1 min.  Patient snored 94% time above 50 .  Heart rate range: 57 bpm -  "127 bpm  On Auto CPAP 7-10 cm   Nasal Pillows Mask   HME: [facility]       4. Primary hypertension  Overview:  Says she didn't tolerate hydrochlorothiazide due to "cramping."      5. Generalized anxiety disorder  6. Recurrent mild major depressive disorder with anxiety  Chronic/stable; continue fluoxetine          Prediabetes  -     Ambulatory Referral/Consult to Nutrition Services - [facility]; Future; Expected date: 11/02/2023  -     Discontinue: semaglutide, weight loss, (WEGOVY) 0.25 mg/0.5 mL PnIj; Inject 0.25 mg into the skin every 7 days.  Dispense: 2 mL; Refill: 1  -     semaglutide, weight loss, (WEGOVY) 0.25 mg/0.5 mL PnIj; Inject 0.25 mg into the skin every 7 days.  Dispense: 2 mL; Refill: 1    Class 2 severe obesity due to excess calories with serious comorbidity and body mass index (BMI) of 35.0 to 35.9 in adult  -     Ambulatory referral/consult to [facility] Lifestyle and Wellness  -     Ambulatory Referral/Consult to Nutrition Services - [facility]; Future; Expected date: 11/02/2023  -     Discontinue: semaglutide, weight loss, (WEGOVY) 0.25 mg/0.5 mL PnIj; Inject 0.25 mg into the skin every 7 days.  Dispense: 2 mL; Refill: 1  -     semaglutide, weight loss, (WEGOVY) 0.25 mg/0.5 mL PnIj; Inject 0.25 mg into the skin every 7 days.  Dispense: 2 mL; Refill: 1    DEYA on CPAP  Chronic/stable continue cpap    Primary hypertension  -     Ambulatory Referral/Consult to Nutrition Services - [facility]; Future; Expected date: 11/02/2023  -     Discontinue: semaglutide, weight loss, (WEGOVY) 0.25 mg/0.5 mL PnIj; Inject 0.25 mg into the skin every 7 days.  Dispense: 2 mL; Refill: 1  -     semaglutide, weight loss, (WEGOVY) 0.25 mg/0.5 mL PnIj; Inject 0.25 mg into the skin every 7 days.  Dispense: 2 mL; Refill: 1    Generalized anxiety disorder  Recurrent mild major depressive disorder with anxiety  Anxiety    Chronic constipation: on miralax          reviewed    Follow up in about 8 " weeks (around 12/21/2023), or if symptoms worsen or fail to improve.    Extension dw pt about AOMs including qysmia and wegovy. Pt is aware of wegovy back order but desires to start that one.     Risks/benefits/common side effects of medication discussed with patient at length. UTD patient handout given. (monseanup msg)  D/W pt at length potential pharmacologic options; she desires consideration of ozempic. Risks/benefits/common side effects of ozempic d/w pt including nausea and pain at injection site; she is aware should not get pregnant while taking and not approved while breastfeeding. NO personal/fam hx of MEN or medullary thyroid cancer. No hx of pancreatitis. Instructed pt how to use with demo pen; pt practiced in office. Pt advised if approved will get pt handout from pharmacy. Pt has no hx of thyroid nodules or biliary disease/gallstones. DW pt with substantial or rapid weight loss gallstones could develop. Also dw pt glp-1 MOA and common side effects. Also pt has no hx of thyroid nodules.   Reviewed with pt gastroparesis and notify provider any change in MH.       Patient was informed that topiramate is used for migraine prevention and seizures. Weight loss is a common side effect that is well documented. S/he understands this. S/he was informed of the potential side effects such as serious and possibly fatal rash in which case the medication should be discontinued immediately. Paresthesias, forgetfulness, fatigue, kidney stones, GI symptoms, and changes in lab values such as electrolytes, blood counts and kidney function.Also informed patient medication is Preg Cat X and should be on birth control and have home pregnancy test monthly.      Denies hx of mh or mood changes, tachycardia, palpitations, bipolar, or cv disease.    This medication (although the reduced dosage and long acting nature limit it) may cause dry mouth, temporarily increased blood pressure and/or heart rate. If this occurs please hold the  medication and let me know. You also want to make sure you do not experience any anxiety.

## 2023-10-26 NOTE — PATIENT INSTRUCTIONS
"Goal: < 25 grams added sugar daily  Protein: 90 grams/day       Access [employer] for additional drug information, tools, and databases.  Copyright 5482-3548 [employer] All rights reserved.  Contributor Disclosures  (For additional information see "Semaglutide: Drug information" and see "Semaglutide: Pediatric drug information")    You must carefully read the "Consumer Information Use and Disclaimer" below in order to understand and correctly use this information.  Brand Names: US  Ozempic (0.25 or 0.5 MG/DOSE);     Ozempic (1 MG/DOSE);     Ozempic (2 MG/DOSE);     Rybelsus;     Wegovy    Brand Names: Donald  Ozempic (0.25 or 0.5 MG/DOSE);     Ozempic (1 MG/DOSE);     Rybelsus    Warning  This drug has been shown to cause thyroid cancer in some animals. It is not known if this happens in humans. If thyroid cancer happens, it may be deadly if not found and treated early. Call your doctor right away if you have a neck mass, trouble breathing, trouble swallowing, or have hoarseness that will not go away.  Do not use this drug if you have a health problem called Multiple Endocrine Neoplasia syndrome type 2 (MEN 2), or if you or a family member have had thyroid cancer.    What is this drug used for?  Ozempic prefilled pens and Rybelsus tablets:  It is used to lower blood sugar in people with type 2 diabetes.  Ozempic prefilled pens:  It is used to lower the chance of heart attack, stroke, and death in people with type 2 diabetes and heart disease.  Wegovy prefilled pens:  It is used to help with weight loss in certain people.    What do I need to tell my doctor BEFORE I take this drug?  For all uses of this drug:  If you are allergic to this drug; any part of this drug; or any other drugs, foods, or substances. Tell your doctor about the allergy and what signs you had.  If you have ever had pancreatitis.  If you have or have ever had depression or thoughts of suicide.  If you are using another drug that has the " same drug in it.  If you are using another drug like this one. If you are not sure, ask your doctor or pharmacist.  If using for high blood sugar:  If you have type 1 diabetes. Do not use this drug to treat type 1 diabetes.  This is not a list of all drugs or health problems that interact with this drug.  Tell your doctor and pharmacist about all of your drugs (prescription or OTC, natural products, vitamins) and health problems. You must check to make sure that it is safe for you to take this drug with all of your drugs and health problems. Do not start, stop, or change the dose of any drug without checking with your doctor.    What are some things I need to know or do while I take this drug?  For all uses of this drug:  Tell all of your health care providers that you take this drug. This includes your doctors, nurses, pharmacists, and dentists.  Follow the diet and workout plan that your doctor told you about.  Have blood work checked as you have been told by the doctor. Talk with the doctor.  Talk with your doctor before you drink alcohol.  Kidney problems have happened. Sometimes, these may need to be treated in the hospital or with dialysis.  If you cannot drink liquids by mouth or if you have upset stomach, throwing up, or diarrhea that does not go away; you need to avoid getting dehydrated. Contact your doctor to find out what to do. Dehydration may lead to low blood pressure or to new or worse kidney problems.  Do not share pen or cartridge devices with another person even if the needle has been changed. Sharing these devices may pass infections from one person to another. This includes infections you may not know you have.  If you are planning on getting pregnant, talk with your doctor. You may need to stop taking this drug at least 2 months before getting pregnant.  If using for high blood sugar:  Wear disease medical alert ID (identification).  Check your blood sugar as you have been told by your  doctor.  Do not drive if your blood sugar has been low. There is a greater chance of you having a crash.  It may be harder to control blood sugar during times of stress such as fever, infection, injury, or surgery. A change in physical activity, exercise, or diet may also affect blood sugar.  Tell your doctor if you are pregnant, plan on getting pregnant, or are breast-feeding. You will need to talk about the benefits and risks to you and the baby.  If using for weight loss:  If you have high blood sugar (diabetes), you will need to watch your blood sugar closely.  Weight loss during pregnancy may cause harm to the unborn baby. If you get pregnant while taking this drug or if you want to get pregnant, call your doctor right away.  Tell your doctor if you are breast-feeding. You will need to talk about any risks to your baby.    What are some side effects that I need to call my doctor about right away?  WARNING/CAUTION: Even though it may be rare, some people may have very bad and sometimes deadly side effects when taking a drug. Tell your doctor or get medical help right away if you have any of the following signs or symptoms that may be related to a very bad side effect:  For all uses of this drug:  Signs of an allergic reaction, like rash; hives; itching; red, swollen, blistered, or peeling skin with or without fever; wheezing; tightness in the chest or throat; trouble breathing, swallowing, or talking; unusual hoarseness; or swelling of the mouth, face, lips, tongue, or throat.  Signs of kidney problems like unable to pass urine, change in how much urine is passed, blood in the urine, or a big weight gain.  Signs of gallbladder problems like pain in the upper right belly area, right shoulder area, or between the shoulder blades; change in stools; dark urine or yellow skin or eyes; or fever with chills.  Severe dizziness or passing out.  A fast heartbeat.  Change in eyesight.  Low blood sugar can happen. The  chance may be raised when this drug is used with other drugs for diabetes. Signs may be dizziness, headache, feeling sleepy or weak, shaking, fast heartbeat, confusion, hunger, or sweating. Call your doctor right away if you have any of these signs. Follow what you have been told to do for low blood sugar. This may include taking glucose tablets, liquid glucose, or some fruit juices.  Severe and sometimes deadly pancreas problems (pancreatitis) have happened with this drug. Call your doctor right away if you have severe stomach pain, severe back pain, or severe upset stomach or throwing up.  If using for weight loss:  New or worse behavior or mood changes like depression or thoughts of suicide.    What are some other side effects of this drug?  All drugs may cause side effects. However, many people have no side effects or only have minor side effects. Call your doctor or get medical help if any of these side effects or any other side effects bother you or do not go away:  If using for high blood sugar:  Constipation, diarrhea, stomach pain, upset stomach, or throwing up.  Tablets:  Decreased appetite.  If using for weight loss:  Constipation, diarrhea, stomach pain, upset stomach, or throwing up.  Headache.  Feeling dizzy, tired, or weak.  Nose or throat irritation.  Runny nose.  Bloating.  Burping.  Gas.  Heartburn.  These are not all of the side effects that may occur. If you have questions about side effects, call your doctor. Call your doctor for medical advice about side effects.  You may report side effects to your national health agency.    How is this drug best taken?  Use this drug as ordered by your doctor. Read all information given to you. Follow all instructions closely.  Tablets:  Take at least 30 minutes before the first food, drink, or drugs of the day.  Take with plain water only. Do not take with more than 4 ounces (120 mL) of water.  Swallow whole. Do not chew, break, or crush.  Keep taking this  drug as you have been told by your doctor or other health care provider, even if you feel well.  Prefilled syringes or pen:  It is given as a shot into the fatty part of the skin on the top of the thigh, belly area, or upper arm.  If you will be giving yourself the shot, your doctor or nurse will teach you how to give the shot.  Take with or without food.  Take the same day each week.  Move the site where you give the shot with each shot.  Do not use if the solution is cloudy, leaking, or has particles.  Do not use if solution changes color.  Wash your hands before and after use.  Keep taking this drug as you have been told by your doctor or other health care provider, even if you feel well.  Throw away needles in a needle/sharp disposal box. Do not reuse needles or other items. When the box is full, follow all local rules for getting rid of it. Talk with a doctor or pharmacist if you have any questions.  If using for high blood sugar:  Attach new needle before each dose.  Take off the needle after each shot. Do not store this device with the needle on it.  Put the cap back on after you are done using your dose.  If you are also using insulin, you may inject this drug and the insulin in the same area of the body but not right next to each other.  Do not mix this drug in the same syringe with insulin.  This product may make a clicking sound as you prepare the dose. Do not prepare the dose by counting the clicks. Doing so could lead to using the wrong dose.  If using for weight loss:  Each container is for one use only. Use right after opening. Throw away any part of the opened container after the dose is given.  Do not use this drug if it has been dropped or if it is broken.  Do not get this product wet.    What do I do if I miss a dose?  Tablets:  Skip the missed dose and go back to your normal time.  Do not take 2 doses at the same time or extra doses.  Prefilled syringes or pen:  Take a missed dose as soon as you  think about it and go back to your normal time.  If it is less than 48 hours until your next dose, skip the missed and go back to your normal time.  Do not take 2 doses within 48 hours of each other.  If you miss 2 doses, call your doctor.    How do I store and/or throw out this drug?  Tablets:  Store in the original container at room temperature.  Store in a dry place. Do not store in a bathroom.  Prefilled syringes or pen:  Store unopened pens in a refrigerator. Do not freeze.  Do not use if it has been frozen.  Ozempic prefilled pens:  After opening, store in the refrigerator or at room temperature. Throw away any part not used after 56 days.  Protect from heat and light.  Wegovy prefilled pens:  You may store unopened containers at room temperature. If you store at room temperature, throw away any part not used after 28 days.  Store in the outer carton to protect from light.  Protect from heat.  All products:  Keep all drugs in a safe place. Keep all drugs out of the reach of children and pets.  Throw away unused or  drugs. Do not flush down a toilet or pour down a drain unless you are told to do so. Check with your pharmacist if you have questions about the best way to throw out drugs. There may be drug take-back programs in your area.    General drug facts  If your symptoms or health problems do not get better or if they become worse, call your doctor.  Do not share your drugs with others and do not take anyone else's drugs.  Some drugs may have another patient information leaflet. If you have any questions about this drug, please talk with your doctor, nurse, pharmacist, or other health care provider.  If you think there has been an overdose, call your poison control center or get medical care right away. Be ready to tell or show what was taken, how much, and when it happened.    Last Reviewed Date  2023  Consumer Information Use and Disclaimer  This generalized information is a limited summary  of diagnosis, treatment, and/or medication information. It is not meant to be comprehensive and should be used as a tool to help the user understand and/or assess potential diagnostic and treatment options. It does NOT include all information about conditions, treatments, medications, side effects, or risks that may apply to a specific patient. It is not intended to be medical advice or a substitute for the medical advice, diagnosis, or treatment of a health care provider based on the health care provider's examination and assessment of a patient's specific and unique circumstances. Patients must speak with a health care provider for complete information about their health, medical questions, and treatment options, including any risks or benefits regarding use of medications. This information does not endorse any treatments or medications as safe, effective, or approved for treating a specific patient. UpToDate, Inc. and its affiliates disclaim any warranty or liability relating to this information or the use thereof. The use of this information is governed by the Terms of Use, available at https://www.MobilePaks.com/en/know/clinical-effectiveness-terms.    © 2023 UpToDate, Inc. and its affiliates and/or licensors. All rights reserved.  Use of [employer] [place] is subject to the Terms of Use.          PRODUCE  [] All fresh fruit   [] All fresh vegetables   [] All fresh herbs  [] All herb purees + pastes  [] Pre-spiralized vegetable noodles   [] Steam-In-The-Bag begetables  [] Riced cauliflower  [] Jicama sticks  [] Love Beets  all varieties  [] Wholly Guacamole  all varieties  [] Hummus  all varieties, chickpea + vegetable  [] Tofu Shirataki noodles    [] Tofu  all varieties  [] Tempeh  all varieties    PROTEIN  CHICKEN   [] Boneless, skinless breasts  [] Boneless, skinless thighs  [] Ground chicken breast, at least 93% lean  [] Chicken breast cutlet  [] Aidell's  Chicken Sausage + Chicken Meatballs    TURKEY   []  "Turkey breast tenderloin   [] Ground turkey breast, at least 93% lean  [] Raiza Naturals  Turkey Sausage    BEEF  [] Tenderloin  [] Sirloin  [] Top Loin  [] Flank Steak  [] Round Steak  [] Filet  [] Lean ground beef, at least 93% lean + grass-fed preferable    PORK  [] Tenderloin  [] Pork Chop  [] Center Cut  [] Raiza Naturals  No-Sugar Villagomez    BISON  [] Palo  90 - 95% lean    SEAFOOD  [] All fresh fish + seafood; locally sourced when possible  [] Smoked salmon    HEAT + EAT ENTREES   [] Warren's Natural Foods  Chicken, Pork, Beef  [] Antoni  "All Natural" Grilled Chicken Breast + Strips, all varieties    SAUCES SPREADS + DIPS  [] Bitchin Sauce  Original, Chipotle, Cilantro [place]  [] Rangel's Kitchen  Tzatziki Yogurt Dip, Babaganoush, Hummus  [] Wholly Guacamole  all varieties  [] Hummus  all varieties  [] [place] Gringo Salsa  all varieties  [] Mrs. Vee's Salsa  all varieties  [] Stubb's All Natural BBQ Sauce  [] Primal Kitchen  [place], Ketchup, BBQ Sauce  [] Primal Kitchen Pasta Sauce  Roasted Garlic, Tomato Basil, no-dairy Vodka Sauce  [] Sal & Amanda's  HeartSmart Pasta Sauce    DAIRY/DAIRY SUBSTITUTES/EGGS  EGGS   [] All eggs  cage-free, pasture-raise preferable  [] Crepini  egg wraps  [] Vital Farms  Pasture-Raised Egg Bites  [] JUST Egg [vegan]     CREAMERS   [] [place]  Better Half, original + vanilla unsweetened  [] NutPods  all varieties    MILK   [] Horizon Organic  all varieties except chocolate  [] Organic Valley  all varieties except chocolate  [] Organic Valley  ultra-filtered, reduced fat milk     PLANT_BASED MILK ALTERNATIVES  [] All unsweetened almond milks  original, vanilla + chocolate  [] Ripple  unsweetened   [] Milkadamia  original +_ vanilla, unsweetened   [] Forager  original + vanilla, unsweetened   [] Silk Organic  soy milk, unsweetened  [] Oatly  unsweetened  [] [place]  regular + protein-fortified oat milk, unsweetened     CHEESES  [] Regular or " reduced fat cheeses  [] BelGioso  Fresh Mozzarella Snack Packs, Parmesan Power-full Snack   [] Goat cheese  [] Fresh mozzarella  [] String cheese  all varieties  [] Beatriz Cottage Cheese  [] Ceci's Cultured Cottage Cheese  [] Carla Life 'Just Like Mozzarella'  plant-based shreds and other varieties  [] Parmela Creamery  plant-based shredded cheese    YOGURT  [] Fage  2% low-fat, plain  [] Siggi's  plain, vanilla  [] Chobani Greek  nonfat + whole milk yogurt, plain   [] Chobani Less Sugar  all flavored varieties   [] Oikos Greek  nonfat, plain  [] Two Good  all varieties   [] Turkish Provisions  plain  [] [place] Organic  low-fat + nonfat, plain  [] [place] Farm  goat milk yogurt, plain  [] Kefit  unsweetened, plain  [] Forager  Greek style unsweetened, plain [dairy-free]  [] [place]  unsweetened Greek style, plain [dairy-free]  [] [facility]  almond milk yogurt, vanilla or plain, unsweetened [dairy-free]    FREEZER SECTION  FROZEN VEGGIES  [] All plain frozen veggies + greens [e.g. broccoli, brussels, carrots, okra, mushrooms, zucchini, yellow squash, butternut squash, kale, spinach, michela greens]  [] Riced veggies [e.g. cauliflower, broccoli, butternut squash]  [] Edamame  all varieties  [] Green Giant  [] Veggie Spirals  [] Marinated Veggies [e.g. eggplant, peppers, zucchini]  [] Simply Steam Meally Sprouts  [] Birds Eye  [] Power Blend Italian Style  lentils, broccoli, kale, zucchini  []  Meally Sprouts & Carrots  [] Oven Roasters Broccoli & Cauliflower  [] [place] Blend  [] Tattooed   [] Green Bean Blend  [] Farmer's Market Ratatouille  [] Butter Balsamic Glazed Vegetables  [] Riced Cauliflower & Quinoa Mediterranean Style  [] Loni's Good Life  Southern Style Greens [sauteed kale + onion]    FROZEN FRUITS  [] All unsweetened frozen fruits  all varieties  [] Dole Fruits & Veggie Blends  Berries 'n Kale  [] Dole Mix-ins  Triple Berry     FROZEN ENTREES  [] The  Good Kitchen meals  all varieties [ e.g. Chili Lime Chicken Over Riced Cauliflower]  [] Premium Paleo  Not Trent Momma's Meatloaf  [] Primal Kitchen  Chicken Pesto + Steak Fajitas w/ Peppers & Onions  [] Eating Well Frozen Entrees  Butter Chicken Masala, Steak Carne Asada, Creamy Pesto Chicken, Chicken + Wild Rice Stroganoff, Yellow [place] Chicken, Sun-dried Tomato Chicken, Chicken Lo Mein  [] Realgood Entree Bowls  Turk[place] Inspired Beef Bowl over Riced Cauliflower, Chicken Burrito Bowl   [] Great Karma Coconut [place]  [] Vanessa's  Tamale Aliya with Black Beans, Vegetable Lasagna  [] Kashi Mayan Maple Bake  [] Healthy Choice  Simply Steamers Chicken Fried Rice  [] Basil Pesto Chicken & Zambian Style Pork Power Bowls  [] Tattooed   Enchilada Bowl  [] Jessica Farms  Spicy Black Bean Burgers    FROZEN PIZZAS  [] Cauli'flour Foods  Cauliflower Pizza Crusts  [] Outer Aisle  Cauliflower Crust  [] Vanessa's  Veggie Crust Cheese Pizza  [] Quest Pizza     VEGETARIAN PRODUCTS  [] Beyond Meat  ground 'meat' + grilled 'chicken' strips  [] Tofurkey  Original Italian Sausage + Original Tempeh  [] Gardein  Beefless Ground + Meatless Meatballs  [] Jessica Farms Grillers  Original Burger, Crumbles, Meatballs    ICE CREAMS + FROZEN DESSERTS  [] Halo Top  regular + keto series, pops  [] Rebel  ice cream + dessert sandwiches  [] Enlightened  ice cream + bars  [] Nightfood  ice cream  [] Realgood  ice cream  [] Arctic Zero Fit  frozen pint  [] The Frozen Farmer  sorbets  [] Wholly Rollies  Protein Balls, all varieties  [] Dream Pops  Coconut Latte    FROZEN BREAKFASTS  [] Realgood  Breakfast Sandwiches on Cauliflower Cheesy Bread  [] Rebel  ice cream + dessert sandwiches  [] Enlightened  ice cream + bars  [] Nightfood  ice cream  [] Realgood  ice cream  [] Arctic Zero Fit  frozen pint  [] The Frozen Farmer  sorbets  [] Wholly Rollies  Protein Balls, all varieties  [] Dream Pops  Coconut  Latte    BREADS/BUNS/WRAPS  [] Randy Bread: All Types - In Freezer Section   [] Flat Out Light Wraps - All Varieties   [] Flat Out Protein Up Carb Down Flat Bread   [] Kontos Whole Wheat Pocket Larissa   [] Agustín JOSEPH 100% Whole Wheat Tortillas   [] LaTortilla Factory Tortillas - Smart & Delicious; 50 or 80-calorie   [] Nature's Own 100% Whole Wheat Bread   [] Orowheat Healthful - 100% Whole Wheat Slice Bread and Galt Thins   [] Orowheat Healthful - Whole Wheat Nuts & Grain Bread; Flax & Seed Bread   [] Pepperidge Farm Natural Whole Grain 15 Bread   [] Pepperidge Farm Natural Whole Grain English Muffin - 100% Whole Wheat   [] Pepperidge Farm Very Thin 100% Whole Wheat   [] Alie Sullivan 45 Calories and Delightful   [] Antonio' 100% Whole Wheat Thin-Sliced Bagels and English Muffins   [] Western Bagel: Perfect 10     GLUTEN FREE  [] Tristan's Gluten Free Bread   [] Gates Bakehouse 7-Grain Gluten Free Bread     LEGUME PASTA   [] Explore Asian Organic Black Bean Spaghetti   [] Modern Table   [] Tolerant Foods       NUT BUTTERS & JELLIES    NUT BUTTERS   [] Better'n Chocolate: Coconut Chocolate Peanut Butter Spread   [] Better'n Peanut Butter - All Types   [] Earth Balance Coconut and Peanut Spread   [] Ady's Nut [place]   [] MaraNatha: All Natural Roasted Cashew Butter - [place] or Creamy   [] MaraNatha: Roasted Peanut Butter   [] Nuts 'N More Peanut [place] - All Flavors   [] PB2 Powder - Original or Chocolate   [] Skippy Natural - Creamy, Super Chunk   [] Smart Balance Peanut Butter - [place] or Creamy   [] Peanut Butter & Company:   [] Smooth , Crunch Time, The Heat Is On, Old Fashioned Smooth, Mighty Nut- Powdered Peanut Butter, Squeeze Pack   [] Smucker's Natural Peanut Butter - [place] or Creamy   [] Sunbutter Nut Butter   [] Wild Friends Protein Peanut Butter/[place] o Butter - Vanilla or Chocolate     JELLIES  o Polaner's All Fruit   o Clearly Organic Best Choice: Strawberry Fruit Spread        SNACKS    BARS  [] Kashi Bars - Chewy or Crunchy; Honey [place] o Flax or Peanut Butter   [] KIND Bars - 5 Grams of Sugar or Less   [] KIND Protein Bars - Strong and KIND   [] [facility] Protein Bar - All Varieties   [] Nature Valley Roasted Nut Crunch - [place] Crunch; Peanut Crunch   [] [facility] Simple Nut Bar - Roasted Peanut & Honey   [] [facility] Simple Nut Bar - [place], Cashew & Sea Salt   [] [facility] Nut [place] Bar - Salted Caramel Peanut   [] Think Thin Protein Bars   [] Quest Bars, Power Crunch Bars, Pure Protein Bars     BEEF JERKY - NITRATE FREE   [] Game On   [] Grass Run Farms   [] Krave   [] Ostrim   [] Perky Jerky   [] Primal Strips Meatless Vegan Jerky   [] [place]     CHIPS   [] Beanitos Chips   [] Fruit Crisps - e.g. Brother's-All-Natural, Bare Fruit, Yoga Chips   [] Altagracia's Soy Crisps: 1.3 ounce bag   [] Quest Protein Chips   [] Wasa Whole Wheat Crisp Bread     CRACKERS  [] Shanon's Gone Crackers   [] Nabisco Triscuit: Regular and Thin Crisp Crackers   [] Vans Say Cheese Crackers (G-F)     POPCORN/NUTS   [] Manoj Sweet's Smart Pop Popcorn - Single Serving   [] 100-Calorie Pack of Nuts - All Varieties     PROTEIN POWDERS & DRINKS  []  Protein -  Whey Protein Powder   [] Garden of Life Raw Protein Powder   [] Iconic Ready-To-Drink Protein Drink   [] Cooley One Protein Powder   [] VegaSport Protein Powder     SALSA/HUMMUS/DIPS   [] Eat Well Embrace Life: Zesty Sriragopi Carrot o Hummus   [] Pre-Portioned Guacamole Packs   [] Catracho's   [] Tostitos Restaurant Style Salsa       SOUPS   [] Vanessa's Organic Soups - Lentil, Vegetable, Split Pea, Low-Sodium     CANNED GOODS   [] 100% Pure Pumpkin   [] BlueRunner Creole Cream-Style Red Beans or Navy Beans   [] Cajun Power Chicken Gumbo Base   [] Chicken of the Sea [place]   [] Jacklyn Fresh Cut Sliced Beets   [] Hormel Breast of Chicken in Water   [] Rosamaria Tender Baby Whole Carrots   [] [place] Tabasco Chili  Starter   [] StarKist: Chunk Lite Tuna in Water, Gourmet Select Pouches   [] StarKist: Yellowfin Tuna Fillets   [] Trappey's: Kidney, Butter, [place], Black Eye, Field, and Black Beans   [] INGRID Ragland's Turnip Greens or Valeriy Spinach     CONDIMENTS/ SAUCES/SPREADS/ SPICES  [] Jc Graham's Magic Seasonings - Regular or Salt Free   [] Maricruz Gill's Sauces - All Flavors   [] Laughing Cow Light - All Flavors   [] Dash Salt-Free Marinade - All Flavors   [] Melchor & Amanda's: Heart Smart Pasta Sauces   [] Tabasco     SALAD DRESSINGS  [] Carmela's Naturals: Lite Honey Mustard   [] Harley's Own: Lighten Up Salad Dressing - All Varieties   [] OPA Greek Yogurt Dressings - Ranch, Blue o Cheese, Caesar, Feta Dill     SWEETENERS  [] Sweet Porum Sweetener   [] Swerve   [] Truvia     BEVERAGES  [] Coconut Water   [] Crystal Light PURE - All Flavors   [] Honest Tea: Just Green Tea, Unsweetened   [] Kombucha Tea   [] [place]   [] [facility] Bloody Shanon Mix   [] Metromint - Zero-Sugar; All Natural Flavored   [] Nessa - Plain or Flavored   [] Spindriff [place]   [] Steaz - Zero-Sugar, All-Natural, Sparkling Tea   [] Tea Bags: Any Brand - e.g. Tristen, Yogi, Tazzo, Celestial   [] V8 100% Vegetable Juice   [] Vitamin Water Zero   [] Water   [] Zevia - Stevia Sweetened Soft Drink     BEER/AUGUSTO/LIQUORS  []Bess's Premier Light 64 Calories   [] Bud Select - 55 Calories   [] [place] Sisters Bloody Shanon Mix   [] Melgar Genuine Draft - 64 Calories   [] Red or White Wine - All Varieties     CEREALS: HOT/COLD   [] Danitza's Puffin's Original Cereal  [] Bobbi's Mill Oat Bran Hot Cereal - Cracked Wheat, Multi-Grain  [] Kashi GoLean Cereal  [] Kashi GoLean Hot Cereal packets - Vanilla; Honey Cinnamon  [] Bhavna's Special K Protein Cereal  [] Archnaa's Steel Cut Namibian Oatmeal  [] Nature's Path Smart Bran  [] Gnosticism Instant Oatmeal packet, Original  [] Gnosticism Old Fashioned Gnosticism Oats  [] Uncle Won's Whole Wheat & Flaxseed Original Cereal

## 2023-10-27 DIAGNOSIS — E66.01 CLASS 2 SEVERE OBESITY DUE TO EXCESS CALORIES WITH SERIOUS COMORBIDITY AND BODY MASS INDEX (BMI) OF 35.0 TO 35.9 IN ADULT: Chronic | ICD-10-CM

## 2023-10-27 DIAGNOSIS — R73.03 PREDIABETES: Chronic | ICD-10-CM

## 2023-10-27 DIAGNOSIS — I10 PRIMARY HYPERTENSION: Chronic | ICD-10-CM

## 2023-10-27 RX ORDER — SEMAGLUTIDE 0.25 MG/.5ML
0.25 INJECTION, SOLUTION SUBCUTANEOUS
Qty: 2 ML | Refills: 1 | Status: SHIPPED | OUTPATIENT
Start: 2023-10-27 | End: 2023-10-30 | Stop reason: SDUPTHER

## 2023-10-30 ENCOUNTER — TELEPHONE (OUTPATIENT)
Dept: PRIMARY CARE CLINIC | Facility: CLINIC | Age: 48
End: 2023-10-30
Payer: COMMERCIAL

## 2023-10-30 ENCOUNTER — PATIENT MESSAGE (OUTPATIENT)
Dept: PRIMARY CARE CLINIC | Facility: CLINIC | Age: 48
End: 2023-10-30
Payer: COMMERCIAL

## 2023-10-30 DIAGNOSIS — E66.01 CLASS 2 SEVERE OBESITY DUE TO EXCESS CALORIES WITH SERIOUS COMORBIDITY AND BODY MASS INDEX (BMI) OF 35.0 TO 35.9 IN ADULT: Chronic | ICD-10-CM

## 2023-10-30 DIAGNOSIS — I10 PRIMARY HYPERTENSION: Chronic | ICD-10-CM

## 2023-10-30 DIAGNOSIS — R73.03 PREDIABETES: Chronic | ICD-10-CM

## 2023-10-30 RX ORDER — SEMAGLUTIDE 0.25 MG/.5ML
0.25 INJECTION, SOLUTION SUBCUTANEOUS
Qty: 2 ML | Refills: 1 | Status: SHIPPED | OUTPATIENT
Start: 2023-10-30 | End: 2023-10-31 | Stop reason: SDUPTHER

## 2023-10-30 NOTE — PROGRESS NOTES
"Referring Provider:    Self, Aaareferral  No address on file  Subjective:   Patient: Teresa Cazares [id number], :1975   Visit date:10/26/2023 8:22 AM    Chief Complaint:  Epistaxis (Patient has been having nose bleeds ongoing X 1 month on and off. She stated she started taking vitamins and that's when the nose bleeds started. When she doesn't take the vitamins she doesn't have the nose bleeds.)    HPI:    Prior notes reviewed by myself.  Clinical documentation obtained by nursing staff reviewed.     48-year-old female presents for evaluation of recurrent left-sided anterior epistaxis.  She has been having intermittent nosebleeds for the last month.  She did start taking large doses of vitamins including a, D, E and K which she associates with bleeding.  She is discontinued the vitamins at this point.  She denies any trauma, anticoagulation or poorly controlled hypertension      Objective:     Physical Exam:  Vitals:  Temp 98.2 °F (36.8 °C) (Temporal)   Ht 5' 2" (1.575 m)   BMI 35.44 kg/m²   General appearance:  Well developed, well nourished    Ears:  Otoscopy of external auditory canals and tympanic membranes was normal, clinical speech reception thresholds grossly intact, no mass/lesion of auricle.    Nose:  No masses/lesions of external nose, congested nasal mucosa with prominent arterioles bilaterally, septum, and turbinates were within normal limits. Clear rhinorrhea    Mouth:  No mass/lesion of lips, teeth, gums, hard/soft palate, tongue, tonsils, or oropharynx.    Neck & Lymphatics:  No cervical lymphadenopathy, no neck mass/crepitus/ asymmetry, trachea is midline, no thyroid enlargement/tenderness/mass.        [x]  Data Reviewed:    Lab Results   Component Value Date    WBC 12.39 2023    HGB 12.6 2023    HCT 38.8 2023    MCV 92 2023    EOSINOPHIL 1.9 2023     PROCEDURE NOTE:  Control of Anterior Epistaxis, complex  Preprocedure diagnosis:  Recurrent " epistaxis  Postprocedure diangosis:  Same  Complications:  None  Blood Loss:  Minimal    Procedure in detail:  After verbal consent was obtained, the patient's nasal cavity was anesthesized using topical Ponticaine.  Upon examination, the patient had ectatic vessels on their anterior septum, on both the right and the left side.  Under direct visualization with a head lamp and a nasal speculum, a silver nitrate stick was appiled to first their left anterior and mid septum.  A minimal amount of bleeding was noted.  The patient tolerated the procedure well, and there were no significant complications.      Assessment & Plan:   Epistaxis    Non-seasonal allergic rhinitis, unspecified trigger        Nose Bleed Instructions:  We had a long discussion regarding the importance of nasal moisture, and the use of a nasal saline spray or gel into nose four times daily to keep moist.    Bactroban ointment in nostril twice daily if ordered.  Do not sleep or sit for long periods of time under a ceiling fan or other source of aggressive airflow.  Use a humidifier in bedroom or any room in your home you spend long periods of time.  Engage in only light activity. No strenuous activity. No heavy lifting or straining.   Use a stool softener to avoid straining.   No bending over at the hips. Keep nose above your heart at all times.  Sneeze with an open mouth to reduce pressure from nose.   Avoid foods or drinks hot in temperature for at least 48 hours then progress slowly.  Avoid hot steamy showers or baths for one week.

## 2023-10-30 NOTE — TELEPHONE ENCOUNTER
----- Message from Schuyler Lamar MA sent at 10/30/2023  1:34 PM CDT -----  Contact: self [phone]  Hi Dr. Briones,    You sent a prescription for the Wegovy for Ms. Cazares. When attempting to complete her PA, we discovered her name on her insurance card did not match her name in the system and that was the reason for the delayed PA completion. Could you complete another prescription to Ms. Cazares with her newly updated name change when you have time. Once submitted I can restart her PA request.     Thank you !  Beatriz Lamar MA  ----- Message -----  From: Lacey Reeves  Sent: 10/30/2023  10:30 AM CDT  To: Anali Rodriguez Staff    Patient called in this morning stating she needs a PA for the semaglutide, weight loss, (WEGOVY) 0.25 mg/0.5 mL PnIj. Please call back [phone]. Thanks tpw

## 2023-10-31 RX ORDER — SEMAGLUTIDE 0.25 MG/.5ML
0.25 INJECTION, SOLUTION SUBCUTANEOUS
Qty: 2 ML | Refills: 1 | Status: SHIPPED | OUTPATIENT
Start: 2023-10-31 | End: 2023-11-09

## 2023-11-02 ENCOUNTER — OFFICE VISIT (OUTPATIENT)
Dept: INTERNAL MEDICINE | Facility: CLINIC | Age: 48
End: 2023-11-02
Payer: COMMERCIAL

## 2023-11-02 VITALS
TEMPERATURE: 98 F | HEART RATE: 72 BPM | WEIGHT: 194.44 LBS | OXYGEN SATURATION: 99 % | HEIGHT: 62 IN | BODY MASS INDEX: 35.78 KG/M2 | SYSTOLIC BLOOD PRESSURE: 124 MMHG | DIASTOLIC BLOOD PRESSURE: 70 MMHG

## 2023-11-02 DIAGNOSIS — L30.9 ECZEMA, UNSPECIFIED TYPE: ICD-10-CM

## 2023-11-02 DIAGNOSIS — J30.9 ALLERGIC RHINITIS, UNSPECIFIED SEASONALITY, UNSPECIFIED TRIGGER: Primary | ICD-10-CM

## 2023-11-02 DIAGNOSIS — Z23 NEED FOR PNEUMOCOCCAL 20-VALENT CONJUGATE VACCINATION: ICD-10-CM

## 2023-11-02 DIAGNOSIS — Z23 NEED FOR INFLUENZA VACCINATION: ICD-10-CM

## 2023-11-02 PROCEDURE — 1159F PR MEDICATION LIST DOCUMENTED IN MEDICAL RECORD: ICD-10-PCS | Mod: CPTII,S$GLB,, | Performed by: PHYSICIAN ASSISTANT

## 2023-11-02 PROCEDURE — 90471 IMMUNIZATION ADMIN: CPT | Mod: S$GLB,,, | Performed by: PHYSICIAN ASSISTANT

## 2023-11-02 PROCEDURE — 3074F PR MOST RECENT SYSTOLIC BLOOD PRESSURE < 130 MM HG: ICD-10-PCS | Mod: CPTII,S$GLB,, | Performed by: PHYSICIAN ASSISTANT

## 2023-11-02 PROCEDURE — 99999 PR PBB SHADOW E&M-EST. PATIENT-LVL V: ICD-10-PCS | Mod: PBBFAC,,, | Performed by: PHYSICIAN ASSISTANT

## 2023-11-02 PROCEDURE — 3078F DIAST BP <80 MM HG: CPT | Mod: CPTII,S$GLB,, | Performed by: PHYSICIAN ASSISTANT

## 2023-11-02 PROCEDURE — 90686 FLU VACCINE (QUAD) GREATER THAN OR EQUAL TO 3YO PRESERVATIVE FREE IM: ICD-10-PCS | Mod: S$GLB,,, | Performed by: PHYSICIAN ASSISTANT

## 2023-11-02 PROCEDURE — 3044F PR MOST RECENT HEMOGLOBIN A1C LEVEL <7.0%: ICD-10-PCS | Mod: CPTII,S$GLB,, | Performed by: PHYSICIAN ASSISTANT

## 2023-11-02 PROCEDURE — 90677 PNEUMOCOCCAL CONJUGATE VACCINE 20-VALENT: ICD-10-PCS | Mod: S$GLB,,, | Performed by: PHYSICIAN ASSISTANT

## 2023-11-02 PROCEDURE — 90677 PCV20 VACCINE IM: CPT | Mod: S$GLB,,, | Performed by: PHYSICIAN ASSISTANT

## 2023-11-02 PROCEDURE — 1159F MED LIST DOCD IN RCRD: CPT | Mod: CPTII,S$GLB,, | Performed by: PHYSICIAN ASSISTANT

## 2023-11-02 PROCEDURE — 90472 IMMUNIZATION ADMIN EACH ADD: CPT | Mod: S$GLB,,, | Performed by: PHYSICIAN ASSISTANT

## 2023-11-02 PROCEDURE — 99999 PR PBB SHADOW E&M-EST. PATIENT-LVL V: CPT | Mod: PBBFAC,,, | Performed by: PHYSICIAN ASSISTANT

## 2023-11-02 PROCEDURE — 90472 PNEUMOCOCCAL CONJUGATE VACCINE 20-VALENT: ICD-10-PCS | Mod: S$GLB,,, | Performed by: PHYSICIAN ASSISTANT

## 2023-11-02 PROCEDURE — 3008F PR BODY MASS INDEX (BMI) DOCUMENTED: ICD-10-PCS | Mod: CPTII,S$GLB,, | Performed by: PHYSICIAN ASSISTANT

## 2023-11-02 PROCEDURE — 3008F BODY MASS INDEX DOCD: CPT | Mod: CPTII,S$GLB,, | Performed by: PHYSICIAN ASSISTANT

## 2023-11-02 PROCEDURE — 1160F RVW MEDS BY RX/DR IN RCRD: CPT | Mod: CPTII,S$GLB,, | Performed by: PHYSICIAN ASSISTANT

## 2023-11-02 PROCEDURE — 99213 PR OFFICE/OUTPT VISIT, EST, LEVL III, 20-29 MIN: ICD-10-PCS | Mod: 25,S$GLB,, | Performed by: PHYSICIAN ASSISTANT

## 2023-11-02 PROCEDURE — 3044F HG A1C LEVEL LT 7.0%: CPT | Mod: CPTII,S$GLB,, | Performed by: PHYSICIAN ASSISTANT

## 2023-11-02 PROCEDURE — 3078F PR MOST RECENT DIASTOLIC BLOOD PRESSURE < 80 MM HG: ICD-10-PCS | Mod: CPTII,S$GLB,, | Performed by: PHYSICIAN ASSISTANT

## 2023-11-02 PROCEDURE — 3074F SYST BP LT 130 MM HG: CPT | Mod: CPTII,S$GLB,, | Performed by: PHYSICIAN ASSISTANT

## 2023-11-02 PROCEDURE — 90686 IIV4 VACC NO PRSV 0.5 ML IM: CPT | Mod: S$GLB,,, | Performed by: PHYSICIAN ASSISTANT

## 2023-11-02 PROCEDURE — 1160F PR REVIEW ALL MEDS BY PRESCRIBER/CLIN PHARMACIST DOCUMENTED: ICD-10-PCS | Mod: CPTII,S$GLB,, | Performed by: PHYSICIAN ASSISTANT

## 2023-11-02 PROCEDURE — 90471 FLU VACCINE (QUAD) GREATER THAN OR EQUAL TO 3YO PRESERVATIVE FREE IM: ICD-10-PCS | Mod: S$GLB,,, | Performed by: PHYSICIAN ASSISTANT

## 2023-11-02 PROCEDURE — 99213 OFFICE O/P EST LOW 20 MIN: CPT | Mod: 25,S$GLB,, | Performed by: PHYSICIAN ASSISTANT

## 2023-11-02 RX ORDER — TRIAMCINOLONE ACETONIDE 1 MG/G
CREAM TOPICAL 2 TIMES DAILY
Qty: 28.4 G | Refills: 0 | Status: SHIPPED | OUTPATIENT
Start: 2023-11-02 | End: 2023-11-12

## 2023-11-02 RX ORDER — AZELASTINE 1 MG/ML
1 SPRAY, METERED NASAL 2 TIMES DAILY
Qty: 30 ML | Refills: 0 | Status: SHIPPED | OUTPATIENT
Start: 2023-11-02 | End: 2023-12-02

## 2023-11-02 NOTE — PROGRESS NOTES
Subjective:      Patient ID: Teresa Hwang is a 48 y.o. female.    Chief Complaint: Rash    Rash  This is a new problem. The current episode started in the past 7 days. The problem is unchanged. The affected locations include the back. The rash is characterized by itchiness and scaling. She was exposed to nothing. Associated symptoms include rhinorrhea. Pertinent negatives include no anorexia, congestion, cough, diarrhea, eye pain, facial edema, fatigue, fever, joint pain, nail changes, shortness of breath, sore throat or vomiting. Past treatments include nothing. Her past medical history is significant for allergies and eczema. There is no history of asthma or varicella.   Irritated with sweating.   Occasional rhinitis with epistaxis. Has seen ent for cauterization.     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 Mood is good.     Patient Active Problem List   Diagnosis    Obesity    Class 1 obesity due to excess calories with serious comorbidity and body mass index (BMI) of 34.0 to 34.9 in adult    Bilateral primary osteoarthritis of knee    Recurrent mild major depressive disorder with anxiety    Nicotine dependence, cigarettes, uncomplicated    Psychophysiologic insomnia    Thrombocytosis    Long-term current use of benzodiazepine    DEYA on CPAP    Chronic post-op pain    Generalized anxiety disorder    Prediabetes    History of COVID-19 (June 2022)    Breast cancer screening, high risk patient    Family history of breast cancer (mother and sister)    Primary hypertension    Anxiety    Pulmonary nodule 1 cm or greater in diameter    Cigarette nicotine dependence in remission    Trigger finger of right thumb    Low libido         Current Outpatient Medications:     ascorbic acid, vitamin C, (VITAMIN C) 500 MG tablet, Take 500 mg by mouth once daily., Disp: , Rfl:     clonazePAM (KLONOPIN) 0.5 MG tablet, Take 0.5-1 tablets (0.25-0.5 mg total) by mouth 2 (two) times daily as needed for Anxiety., Disp: 60  tablet, Rfl: 3    ELDERBERRY FRUIT ORAL, Take 1 capsule by mouth once daily., Disp: , Rfl:     FLUoxetine 20 MG capsule, Take 1 capsule (20 mg total) by mouth once daily., Disp: 90 capsule, Rfl: 3    fluticasone propionate (FLONASE) 50 mcg/actuation nasal spray, 1 spray (50 mcg total) by Each Nostril route once daily., Disp: 16 g, Rfl: 0    gabapentin (NEURONTIN) 300 MG capsule, Take 3 capsules (900 mg total) by mouth every evening., Disp: 90 capsule, Rfl: 1    nicotine, polacrilex, (NICORETTE) 2 mg Gum, Take 1 each (2 mg total) by mouth as needed (for cigarette cravings). Use as directed on packaging., Disp: 100 each, Rfl: 5    phentermine-topiramate (QSYMIA) 7.5-46 mg CM24, Take 1 capsule by mouth every morning., Disp: 30 capsule, Rfl: 0    semaglutide, weight loss, (WEGOVY) 0.25 mg/0.5 mL PnIj, Inject 0.25 mg into the skin every 7 days., Disp: 2 mL, Rfl: 1    sodium chloride (SALINE NASAL) 0.65 % nasal spray, Use as directed on product packaging, Disp: 60 mL, Rfl: 5    azelastine (ASTELIN) 137 mcg (0.1 %) nasal spray, 1 spray (137 mcg total) by Nasal route 2 (two) times daily., Disp: 30 mL, Rfl: 0    triamcinolone acetonide 0.1% (KENALOG) 0.1 % cream, Apply topically 2 (two) times daily. for 10 days, Disp: 28.4 g, Rfl: 0  No current facility-administered medications for this visit.    Facility-Administered Medications Ordered in Other Visits:     chlorhexidine 0.12 % solution 10 mL, 10 mL, Mouth/Throat, On Call Yogi, Xenia Mancilla PA-C, 10 mL at 08/25/23 0821    Review of Systems   Constitutional:  Negative for activity change, appetite change, chills, diaphoresis, fatigue, fever and unexpected weight change.   HENT:  Positive for postnasal drip and rhinorrhea. Negative for congestion, hearing loss, sore throat, trouble swallowing and voice change.    Eyes: Negative.  Negative for pain and visual disturbance.   Respiratory: Negative.  Negative for cough, choking, chest tightness and shortness of breath.   "  Cardiovascular:  Negative for chest pain, palpitations and leg swelling.   Gastrointestinal:  Negative for abdominal distention, abdominal pain, anorexia, blood in stool, constipation, diarrhea, nausea and vomiting.   Endocrine: Negative for cold intolerance, heat intolerance, polydipsia and polyuria.   Genitourinary: Negative.  Negative for difficulty urinating and frequency.   Musculoskeletal:  Negative for arthralgias, back pain, gait problem, joint pain, joint swelling and myalgias.   Skin:  Positive for rash. Negative for color change, nail changes, pallor and wound.   Neurological:  Negative for dizziness, tremors, weakness, light-headedness, numbness and headaches.   Hematological:  Negative for adenopathy.   Psychiatric/Behavioral:  Negative for behavioral problems, confusion, self-injury, sleep disturbance and suicidal ideas. The patient is not nervous/anxious.      Objective:   /70 (BP Location: Left arm, Patient Position: Sitting, BP Method: Large (Manual))   Pulse 72   Temp 98.1 °F (36.7 °C) (Tympanic)   Ht 5' 2.01" (1.575 m)   Wt 88.2 kg (194 lb 7.1 oz)   SpO2 99%   BMI 35.56 kg/m²     Physical Exam  Vitals and nursing note reviewed.   Constitutional:       General: She is not in acute distress.     Appearance: Normal appearance. She is well-developed. She is not ill-appearing, toxic-appearing or diaphoretic.   HENT:      Head: Normocephalic and atraumatic.   Cardiovascular:      Rate and Rhythm: Normal rate and regular rhythm.      Heart sounds: Normal heart sounds. No murmur heard.     No friction rub. No gallop.   Pulmonary:      Effort: Pulmonary effort is normal. No respiratory distress.      Breath sounds: Normal breath sounds. No wheezing or rales.   Musculoskeletal:         General: Normal range of motion.   Skin:     General: Skin is warm.      Capillary Refill: Capillary refill takes less than 2 seconds.      Findings: Rash present. Rash is scaling.          Neurological:      " Mental Status: She is alert and oriented to person, place, and time.      Motor: No weakness.      Gait: Gait normal.   Psychiatric:         Mood and Affect: Mood normal.         Behavior: Behavior normal.         Thought Content: Thought content normal.         Judgment: Judgment normal.           Assessment:     1. Allergic rhinitis, unspecified seasonality, unspecified trigger    2. Eczema, unspecified type    3. Need for pneumococcal 20-valent conjugate vaccination    4. Need for influenza vaccination      Plan:   Allergic rhinitis, unspecified seasonality, unspecified trigger  -     azelastine (ASTELIN) 137 mcg (0.1 %) nasal spray; 1 spray (137 mcg total) by Nasal route 2 (two) times daily.  Dispense: 30 mL; Refill: 0  -simple saline nasal spray to keep lubricated  -aquaphor or vasaline applied to nose with qtip for dryness. STop astelin in nose bleed. Ok to continue simple saline.     Eczema, unspecified type  -     triamcinolone acetonide 0.1% (KENALOG) 0.1 % cream; Apply topically 2 (two) times daily. for 10 days  Dispense: 28.4 g; Refill: 0    Need for pneumococcal 20-valent conjugate vaccination  -     (In Office Administered) Pneumococcal Conjugate Vaccine (20 Valent) (IM) (Preferred)    Need for influenza vaccination  -     Influenza - Quadrivalent *Preferred* (6 months+) (PF)    -shingles vaccine at pharmacy  -covid booster at pharmacy      Follow up if symptoms worsen or fail to improve.

## 2023-11-03 ENCOUNTER — OFFICE VISIT (OUTPATIENT)
Dept: NEUROSURGERY | Facility: CLINIC | Age: 48
End: 2023-11-03
Payer: COMMERCIAL

## 2023-11-03 VITALS
HEART RATE: 58 BPM | DIASTOLIC BLOOD PRESSURE: 74 MMHG | BODY MASS INDEX: 35.64 KG/M2 | HEIGHT: 62 IN | WEIGHT: 193.69 LBS | SYSTOLIC BLOOD PRESSURE: 124 MMHG

## 2023-11-03 DIAGNOSIS — M24.80 CREPITUS OF CERVICAL SPINE: Primary | ICD-10-CM

## 2023-11-03 PROCEDURE — 3078F DIAST BP <80 MM HG: CPT | Mod: CPTII,S$GLB,, | Performed by: HEALTH CARE PROVIDER

## 2023-11-03 PROCEDURE — 99203 PR OFFICE/OUTPT VISIT, NEW, LEVL III, 30-44 MIN: ICD-10-PCS | Mod: S$GLB,,, | Performed by: HEALTH CARE PROVIDER

## 2023-11-03 PROCEDURE — 3074F SYST BP LT 130 MM HG: CPT | Mod: CPTII,S$GLB,, | Performed by: HEALTH CARE PROVIDER

## 2023-11-03 PROCEDURE — 3078F PR MOST RECENT DIASTOLIC BLOOD PRESSURE < 80 MM HG: ICD-10-PCS | Mod: CPTII,S$GLB,, | Performed by: HEALTH CARE PROVIDER

## 2023-11-03 PROCEDURE — 3008F PR BODY MASS INDEX (BMI) DOCUMENTED: ICD-10-PCS | Mod: CPTII,S$GLB,, | Performed by: HEALTH CARE PROVIDER

## 2023-11-03 PROCEDURE — 1159F PR MEDICATION LIST DOCUMENTED IN MEDICAL RECORD: ICD-10-PCS | Mod: CPTII,S$GLB,, | Performed by: HEALTH CARE PROVIDER

## 2023-11-03 PROCEDURE — 1159F MED LIST DOCD IN RCRD: CPT | Mod: CPTII,S$GLB,, | Performed by: HEALTH CARE PROVIDER

## 2023-11-03 PROCEDURE — 3044F PR MOST RECENT HEMOGLOBIN A1C LEVEL <7.0%: ICD-10-PCS | Mod: CPTII,S$GLB,, | Performed by: HEALTH CARE PROVIDER

## 2023-11-03 PROCEDURE — 3074F PR MOST RECENT SYSTOLIC BLOOD PRESSURE < 130 MM HG: ICD-10-PCS | Mod: CPTII,S$GLB,, | Performed by: HEALTH CARE PROVIDER

## 2023-11-03 PROCEDURE — 3008F BODY MASS INDEX DOCD: CPT | Mod: CPTII,S$GLB,, | Performed by: HEALTH CARE PROVIDER

## 2023-11-03 PROCEDURE — 99203 OFFICE O/P NEW LOW 30 MIN: CPT | Mod: S$GLB,,, | Performed by: HEALTH CARE PROVIDER

## 2023-11-03 PROCEDURE — 3044F HG A1C LEVEL LT 7.0%: CPT | Mod: CPTII,S$GLB,, | Performed by: HEALTH CARE PROVIDER
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
## 2023-11-08 DIAGNOSIS — M54.2 CERVICALGIA: ICD-10-CM

## 2023-11-08 DIAGNOSIS — Q76.49 OTHER CONGENITAL MALFORMATIONS OF SPINE, NOT ASSOCIATED WITH SCOLIOSIS: Primary | ICD-10-CM

## 2023-11-09 ENCOUNTER — OFFICE VISIT (OUTPATIENT)
Dept: PRIMARY CARE CLINIC | Facility: CLINIC | Age: 48
End: 2023-11-09
Payer: COMMERCIAL

## 2023-11-09 DIAGNOSIS — E66.09 CLASS 1 OBESITY DUE TO EXCESS CALORIES WITH SERIOUS COMORBIDITY AND BODY MASS INDEX (BMI) OF 34.0 TO 34.9 IN ADULT: Chronic | ICD-10-CM

## 2023-11-09 DIAGNOSIS — R73.03 PREDIABETES: Primary | Chronic | ICD-10-CM

## 2023-11-09 PROCEDURE — 3044F HG A1C LEVEL LT 7.0%: CPT | Mod: CPTII,95,, | Performed by: FAMILY MEDICINE

## 2023-11-09 PROCEDURE — 99213 OFFICE O/P EST LOW 20 MIN: CPT | Mod: 95,,, | Performed by: FAMILY MEDICINE

## 2023-11-09 PROCEDURE — 99213 PR OFFICE/OUTPT VISIT, EST, LEVL III, 20-29 MIN: ICD-10-PCS | Mod: 95,,, | Performed by: FAMILY MEDICINE

## 2023-11-09 PROCEDURE — 3044F PR MOST RECENT HEMOGLOBIN A1C LEVEL <7.0%: ICD-10-PCS | Mod: CPTII,95,, | Performed by: FAMILY MEDICINE

## 2023-11-09 RX ORDER — TOPIRAMATE 25 MG/1
25 TABLET ORAL NIGHTLY
Qty: 30 TABLET | Refills: 1 | Status: SHIPPED | OUTPATIENT
Start: 2023-11-09 | End: 2024-02-28

## 2023-11-09 NOTE — PROGRESS NOTES
Subjective     The patient location is: home/[place]  The chief complaint leading to consultation is: follow up    Visit type: audiovisual    Face to Face time with patient: 22  25 minutes of total time spent on the encounter, which includes face to face time and non-face to face time preparing to see the patient (eg, review of tests), Obtaining and/or reviewing separately obtained history, Documenting clinical information in the electronic or other health record, Independently interpreting results (not separately reported) and communicating results to the patient/family/caregiver, or Care coordination (not separately reported).         Each patient to whom he or she provides medical services by telemedicine is:  (1) informed of the relationship between the physician and patient and the respective role of any other health care provider with respect to management of the patient; and (2) notified that he or she may decline to receive medical services by telemedicine and may withdraw from such care at any time.    Notes:     Patient ID: Teresa Hwang is a 48 y.o. female.    Chief Complaint: discuss medications  Pt with recent np visit. No wegovy coverage through insurance.   She would now like to consider other options.  Previously got rx for qsymia but insurance did not cover so she did not get.     S/p hys; no hx of glaucoma, bipolar, nephrolithiasis/uncontrolled HTN.    She has not tried metformin; previously declined in the past due to concern for gi se's.     Hx of A/D: is taking fluoxetine.        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    Nicotine dependence, cigarettes, in remission 03/26/2019    Obesity     Personal history of COVID-19     Primary hypertension 06/29/2023     Recurrent major depression in partial remission 03/26/2019    Surgical wound infection (MRSA) 12/2019         PAST SURGICAL HISTORY:  Past Surgical History:   Procedure Laterality Date    APPLICATION OF LARGE EXTERNAL FIXATION DEVICE TO TIBIA Left 01/10/2019    Procedure: APPLICATION, EXTERNAL FIXATION DEVICE, LARGE, TIBIA;  Surgeon: Domenic Galvan MD;  Location: [facility] OR;  Service: Orthopedics;  Laterality: Left;    APPLICATION OF WOUND VACUUM-ASSISTED CLOSURE DEVICE Left 01/17/2019    Procedure: APPLICATION, WOUND VAC;  Surgeon: Barry Guzman MD;  Location: [facility] OR;  Service: Orthopedics;  Laterality: Left;    COLONOSCOPY N/A 09/02/2022    Procedure: COLONOSCOPY;  Surgeon: Yon Ridley MD;  Location: [facility] ENDO;  Service: Endoscopy;  Laterality: N/A;    FRACTURE SURGERY      C1 neck- halo    HERNIA REPAIR      HYSTERECTOMY  2009    tahbso    OOPHORECTOMY Bilateral     OPEN REDUCTION AND INTERNAL FIXATION (ORIF) OF FRACTURE OF TIBIAL PLATEAU Left 01/17/2019    Procedure: ORIF, FRACTURE, TIBIA, PLATEAU;  Surgeon: Barry Guzman MD;  Location: [facility] OR;  Service: Orthopedics;  Laterality: Left;    REMOVAL OF EXTERNAL FIXATION DEVICE Left 01/17/2019    Procedure: REMOVAL, EXTERNAL FIXATION DEVICE;  Surgeon: Barry Guzman MD;  Location: [facility] OR;  Service: Orthopedics;  Laterality: Left;    TRIGGER FINGER RELEASE Right 8/25/2023    Procedure: RELEASE, TRIGGER FINGER;  Surgeon: Sandeep Bang MD;  Location: [facility] OR;  Service: Orthopedics;  Laterality: Right;  right trigger thumb release    TUBAL LIGATION  05/22/1997    UMBILICAL HERNIA REPAIR         FAMILY HISTORY:  Family History   Problem Relation Age of Onset    Breast cancer Mother 58    Cancer Mother         Breast cancer    Depression Mother         Depression    Diabetes Mother     Breast cancer Sister 46    Cancer Sister         Breast cancer    No Known Problems Father     Breast cancer Paternal Grandmother           SOCIAL HISTORY:  Social  History     Social History Narrative    Not on file       MEDICATIONS:  Medications have been reviewed.    ALLERGIES:  Allergies have been reviewed.    There were no vitals filed for this visit.  Wt Readings from Last 10 Encounters:   11/03/23 87.9 kg (193 lb 10.8 oz)   11/02/23 88.2 kg (194 lb 7.1 oz)   10/26/23 87.9 kg (193 lb 12.6 oz)   10/17/23 84.4 kg (186 lb)   09/08/23 87.1 kg (192 lb 0.3 oz)   08/29/23 87.1 kg (192 lb)   08/25/23 87.5 kg (192 lb 14.4 oz)   08/21/23 84.9 kg (187 lb 4.5 oz)   08/15/23 87.3 kg (192 lb 7.4 oz)   08/04/23 92.1 kg (203 lb)       Lab Results   Component Value Date    WBC 12.39 08/11/2023    HGB 12.6 08/11/2023    HCT 38.8 08/11/2023     08/11/2023    CHOL 167 06/30/2023    TRIG 27 (L) 06/30/2023    HDL 76 (H) 06/30/2023    ALT 14 08/11/2023    AST 17 08/11/2023     08/11/2023    K 3.7 08/11/2023     08/11/2023    CREATININE 0.7 08/11/2023    BUN 16 08/11/2023    CO2 27 08/11/2023    TSH 2.191 10/08/2020    INR 0.9 01/10/2019    HGBA1C 5.5 06/30/2023       Review of Systems   Constitutional:  Negative for activity change, appetite change, fatigue and fever.   HENT:  Negative for mouth dryness and goiter.    Eyes:  Negative for visual disturbance.   Respiratory:  Negative for apnea, cough, chest tightness and shortness of breath.    Cardiovascular:  Negative for chest pain, palpitations and leg swelling.   Gastrointestinal:  Negative for abdominal pain, constipation, diarrhea, nausea, vomiting and reflux.   Endocrine: Negative for cold intolerance, heat intolerance, polydipsia, polyphagia and polyuria.   Genitourinary:  Negative for frequency and menstrual problem.   Musculoskeletal:  Negative for arthralgias and myalgias.   Integumentary:  Negative for color change and rash.   Neurological:  Negative for dizziness, vertigo, tremors, seizures, syncope, speech difficulty, weakness, numbness and memory loss.   Psychiatric/Behavioral:  Negative for self-injury, sleep  disturbance and suicidal ideas. The patient is not nervous/anxious and is not hyperactive.        Physical Exam  Constitutional:       General: She is not in acute distress.     Appearance: Normal appearance.   HENT:      Head: Normocephalic and atraumatic.   Eyes:      General: No scleral icterus.  Pulmonary:      Effort: Pulmonary effort is normal. No respiratory distress.   Neurological:      Mental Status: She is alert and oriented to person, place, and time.   Psychiatric:         Mood and Affect: Mood normal.         Behavior: Behavior normal.              Assessment and Plan     1. Prediabetes  -     topiramate (TOPAMAX) 25 MG tablet; Take 1 tablet (25 mg total) by mouth every evening.  Dispense: 30 tablet; Refill: 1    2. Class 1 obesity due to excess calories with serious comorbidity and body mass index (BMI) of 34.0 to 34.9 in adult  -     topiramate (TOPAMAX) 25 MG tablet; Take 1 tablet (25 mg total) by mouth every evening.  Dispense: 30 tablet; Refill: 1         Patient was informed that topiramate is used for migraine prevention and seizures. Weight loss is a common side effect that is well documented. S/he understands this. S/he was informed of the potential side effects such as serious and possibly fatal rash in which case the medication should be discontinued immediately. Paresthesias, forgetfulness, fatigue, kidney stones, GI symptoms, and changes in lab values such as electrolytes, blood counts and kidney function.Also informed patient medication is Preg Cat X and should be on birth control and have home pregnancy test monthly.      Pt has had hys    Follow up in about 6 weeks (around 12/21/2023), or if symptoms worsen or fail to improve.  Pt has appt; asked to send [facility] f/u   Bp is controlled; no absolute ci to trying med  Will try topamax component first then see how is doing;   She is also more willing to consider metfomrin xr at a low dosage  Return precautions given; stop med if any issues  or changes in mood, fatigue etc  No sleepy driving; she will take at night  She has also begun to implement lifestyle changes.

## 2023-11-09 NOTE — PATIENT INSTRUCTIONS
"    Please send me a [employer] message soon with an update.         Access [employer] for additional drug information, tools, and databases.  Copyright 0391-4254 [employer] All rights reserved.  Contributor Disclosures  (For additional information see "Topiramate: Drug information" and see "Topiramate: Pediatric drug information")    You must carefully read the "Consumer Information Use and Disclaimer" below in order to understand and correctly use this information.  Brand Names: [place]  Eprontia;     Qudexy XR;     Topamax;     Topamax Sprinkle;     Trokendi XR    Brand Names: Donald  ACH-Topiramate;     AG-Topiramate;     APO-Topiramate;     Auro-Topiramate;     DOM-Topiramate;     GLN-Topiramate;     JAMP-Topiramate;     Mar-Topiramate [DSC];     MINT-Topiramate;     MYLAN-Topiramate;     PMS-Topiramate;     PRO-Topiramate;     RAN-Topiramate [DSC];     SANDOZ Topiramate [DSC];     TEVA-Topiramate;     Topamax;     Topamax Sprinkle    What is this drug used for?  It is used to treat seizures.  It is used to prevent migraine headaches.  It may be given to you for other reasons. Talk with the doctor.    What do I need to tell my doctor BEFORE I take this drug?  If you are allergic to this drug; any part of this drug; or any other drugs, foods, or substances. Tell your doctor about the allergy and what signs you had.  This drug may interact with other drugs or health problems.  Tell your doctor and pharmacist about all of your drugs (prescription or OTC, natural products, vitamins) and health problems. You must check to make sure that it is safe for you to take this drug with all of your drugs and health problems. Do not start, stop, or change the dose of any drug without checking with your doctor.    What are some things I need to know or do while I take this drug?  Avoid driving and doing other tasks or actions that call for you to be alert until you see how this drug affects you.  Sweating less and high body " temperatures have happened with this drug. Sometimes, this has led to the need for treatment in a hospital. Be careful in hot weather and while being active. Call your doctor right away if you have a fever or you do not sweat during activities or in warm temperatures.  Like other drugs that may be used for seizures, this drug may rarely raise the risk of suicidal thoughts or actions. The risk may be higher in people who have had suicidal thoughts or actions in the past. Call the doctor right away about any new or worse signs like depression; feeling nervous, restless, or grouchy; panic attacks; or other changes in mood or behavior. Call the doctor right away if any suicidal thoughts or actions occur.  This drug may cause an acid blood problem (metabolic acidosis). The chance may be higher in children and in people with kidney problems, breathing problems, or diarrhea. The chance may also be higher if you take certain other drugs, if you have surgery, or if you are on a ketogenic diet. Over time, metabolic acidosis can cause kidney stones, bone problems, or growth problems in children.  This drug may raise the chance of bleeding. Sometimes, bleeding can be life-threatening. Talk with the doctor.  This drug may cause very bad eye problems. If left untreated, this can lead to lasting eyesight loss. Call your doctor right away if you have new eye signs like blurred eyesight or other changes in eyesight, eye pain, or eye redness.  A severe skin reaction (Trevizo-Kwame syndrome/toxic epidermal necrolysis) may happen. It can cause severe health problems that may not go away, and sometimes death. Get medical help right away if you have signs like red, swollen, blistered, or peeling skin (with or without fever); red or irritated eyes; or sores in your mouth, throat, nose, or eyes.  If the patient is a child, use this drug with care. The risk of some side effects may be higher in children.  This drug may affect growth in  children and teens in some cases. They may need regular growth checks. Talk with the doctor.  Birth control pills and other hormone-based birth control may not work as well to prevent pregnancy. Use some other kind of birth control also like a condom when taking this drug.  If you are taking hormone-based birth control and you have any change in your bleeding pattern, talk with your doctor.  This drug may cause harm to the unborn baby if you take it while you are pregnant. If you are pregnant or you get pregnant while taking this drug, call your doctor right away.  If you are able to get pregnant but do not want to get pregnant, use birth control that you can trust to prevent pregnancy while taking this drug.  Tell your doctor if you are breast-feeding. You will need to talk about any risks to your baby.    What are some side effects that I need to call my doctor about right away?  WARNING/CAUTION: Even though it may be rare, some people may have very bad and sometimes deadly side effects when taking a drug. Tell your doctor or get medical help right away if you have any of the following signs or symptoms that may be related to a very bad side effect:  Signs of an allergic reaction, like rash; hives; itching; red, swollen, blistered, or peeling skin with or without fever; wheezing; tightness in the chest or throat; trouble breathing, swallowing, or talking; unusual hoarseness; or swelling of the mouth, face, lips, tongue, or throat.  Signs of too much acid in the blood (acidosis) like confusion; fast breathing; fast heartbeat; a heartbeat that does not feel normal; very bad stomach pain, upset stomach, or throwing up; feeling very sleepy; shortness of breath; or feeling very tired or weak.  Signs of infection like fever, chills, very bad sore throat, ear or sinus pain, cough, more sputum or change in color of sputum, pain with passing urine, mouth sores, or wound that will not heal.  Signs of high ammonia levels  like a heartbeat that does not feel normal, breathing that is not normal, feeling confused, pale skin, slow heartbeat, seizures, sweating, throwing up, or twitching.  Any unexplained bruising or bleeding.  Feeling confused, not able to focus, or change in behavior.  Memory problems or loss.  Trouble speaking.  Trouble sleeping.  Change in balance.  Very bad dizziness or passing out.  Not able to eat.  Back pain, belly pain, or blood in the urine. May be signs of a kidney stone.  A burning, numbness, or tingling feeling that is not normal.  Bone pain.  Chest pain.  Muscle pain or weakness.  Shakiness.  Trouble walking.  Not able to control eye movements.  Liver problems have rarely happened with this drug. Sometimes, this has been deadly. Call your doctor right away if you have signs of liver problems like dark urine, tiredness, decreased appetite, upset stomach or stomach pain, light-colored stools, throwing up, or yellow skin or eyes.    What are some other side effects of this drug?  All drugs may cause side effects. However, many people have no side effects or only have minor side effects. Call your doctor or get medical help if any of these side effects or any other side effects bother you or do not go away:  Constipation, diarrhea, stomach pain, upset stomach, throwing up, or decreased appetite.  Change in taste.  Weight loss.  Feeling nervous and excitable.  Feeling dizzy, sleepy, tired, or weak.  Headache.  Flushing.  Signs of a common cold.  Joint pain.  These are not all of the side effects that may occur. If you have questions about side effects, call your doctor. Call your doctor for medical advice about side effects.  You may report side effects to your national health agency.    How is this drug best taken?  Use this drug as ordered by your doctor. Read all information given to you. Follow all instructions closely.  All products:  Take with or without food.  Keep taking this drug as you have been told  by your doctor or other health care provider, even if you feel well.  Do not stop taking this drug all of a sudden without calling your doctor. You may have a greater risk of seizures. If you need to stop this drug, you will want to slowly stop it as ordered by your doctor.  Drink lots of noncaffeine liquids unless told to drink less liquid by your doctor.  Tell all of your health care providers that you take this drug. This includes your doctors, nurses, pharmacists, and dentists.  Have blood work checked as you have been told by the doctor. Talk with the doctor.  Talk with your doctor before you use marijuana, other forms of cannabis, or prescription or OTC drugs that may slow your actions.  Taking this drug with valproic acid can cause low body temperature. This can also cause tiredness, confusion, or coma. Talk with the doctor.  Tablets:  Swallow whole. Do not chew, break, or crush.  Avoid drinking alcohol while taking this drug.  Regular-release sprinkle capsules and extended-release sprinkle capsules:  You may swallow whole or sprinkle the contents on a spoonful of soft food like applesauce. Do not crush or chew before you swallow.  If mixed, swallow the mixed drug right away. Do not store for use at a later time.  Drink fluids right after eating the food and drug mixture to make sure the drug is swallowed.  Avoid drinking alcohol while taking this drug.  Extended-release capsules:  Swallow whole. Do not chew, open, or crush.  Do not sprinkle this drug on food.  Avoid drinking alcohol while taking this drug. This is most important within 6 hours before or 6 hours after taking this drug.  Oral solution:  Measure liquid doses carefully. Use the measuring device that comes with this drug. If there is none, ask the pharmacist for a device to measure this drug.  Do not use a household teaspoon or tablespoon to measure this drug. Doing so could lead to the dose being too high.  Avoid drinking alcohol while taking  this drug.    What do I do if I miss a dose?  Extended-release capsules:  Call your doctor to find out what to do.  Extended-release sprinkle capsules:  Take a missed dose as soon as you think about it.  If it is close to the time for your next dose, skip the missed dose and go back to your normal time.  Do not take 2 doses at the same time or extra doses.  If you miss 2 doses, call your doctor.  All other products:  Take a missed dose as soon as you think about it.  If it is less than 6 hours until the next dose, skip the missed dose and go back to the normal time.  Do not take 2 doses at the same time or extra doses.  If you miss 2 doses, call your doctor.    How do I store and/or throw out this drug?  All products:  Store at room temperature in a dry place. Do not store in a bathroom.  Keep lid tightly closed.  Keep all drugs in a safe place. Keep all drugs out of the reach of children and pets.  Throw away unused or  drugs. Do not flush down a toilet or pour down a drain unless you are told to do so. Check with your pharmacist if you have questions about the best way to throw out drugs. There may be drug take-back programs in your area.  Oral solution:  Throw away any part not used 90 days after opening.  Extended-release capsules:  Protect from light.    General drug facts  If your symptoms or health problems do not get better or if they become worse, call your doctor.  Do not share your drugs with others and do not take anyone else's drugs.  Some drugs may have another patient information leaflet. If you have any questions about this drug, please talk with your doctor, nurse, pharmacist, or other health care provider.  If you think there has been an overdose, call your poison control center or get medical care right away. Be ready to tell or show what was taken, how much, and when it happened.    Last Reviewed Date  2023  Consumer Information Use and Disclaimer  This generalized information is a  limited summary of diagnosis, treatment, and/or medication information. It is not meant to be comprehensive and should be used as a tool to help the user understand and/or assess potential diagnostic and treatment options. It does NOT include all information about conditions, treatments, medications, side effects, or risks that may apply to a specific patient. It is not intended to be medical advice or a substitute for the medical advice, diagnosis, or treatment of a health care provider based on the health care provider's examination and assessment of a patient's specific and unique circumstances. Patients must speak with a health care provider for complete information about their health, medical questions, and treatment options, including any risks or benefits regarding use of medications. This information does not endorse any treatments or medications as safe, effective, or approved for treating a specific patient. [employer] and its affiliates disclaim any warranty or liability relating to this information or the use thereof. The use of this information is governed by the Terms of Use, available at https://www.wolterskluwer.com/en/know/clinical-effectiveness-terms.    © 2023 [employer] and its affiliates and/or licensors. All rights reserved.

## 2023-11-14 ENCOUNTER — OFFICE VISIT (OUTPATIENT)
Dept: INTERNAL MEDICINE | Facility: CLINIC | Age: 48
End: 2023-11-14
Payer: COMMERCIAL

## 2023-11-14 ENCOUNTER — PATIENT MESSAGE (OUTPATIENT)
Dept: INTERNAL MEDICINE | Facility: CLINIC | Age: 48
End: 2023-11-14

## 2023-11-14 VITALS — WEIGHT: 193 LBS | BODY MASS INDEX: 35.51 KG/M2 | HEIGHT: 62 IN

## 2023-11-14 DIAGNOSIS — F41.9 RECURRENT MILD MAJOR DEPRESSIVE DISORDER WITH ANXIETY: Primary | Chronic | ICD-10-CM

## 2023-11-14 DIAGNOSIS — F51.04 PSYCHOPHYSIOLOGIC INSOMNIA: Chronic | ICD-10-CM

## 2023-11-14 DIAGNOSIS — E66.01 CLASS 2 SEVERE OBESITY DUE TO EXCESS CALORIES WITH SERIOUS COMORBIDITY AND BODY MASS INDEX (BMI) OF 35.0 TO 35.9 IN ADULT: ICD-10-CM

## 2023-11-14 DIAGNOSIS — G89.28 OTHER CHRONIC POSTPROCEDURAL PAIN: Chronic | ICD-10-CM

## 2023-11-14 DIAGNOSIS — F33.0 RECURRENT MILD MAJOR DEPRESSIVE DISORDER WITH ANXIETY: Primary | Chronic | ICD-10-CM

## 2023-11-14 DIAGNOSIS — F41.1 GENERALIZED ANXIETY DISORDER: Chronic | ICD-10-CM

## 2023-11-14 PROCEDURE — 99214 PR OFFICE/OUTPT VISIT, EST, LEVL IV, 30-39 MIN: ICD-10-PCS | Mod: 95,,, | Performed by: FAMILY MEDICINE

## 2023-11-14 PROCEDURE — 99214 OFFICE O/P EST MOD 30 MIN: CPT | Mod: 95,,, | Performed by: FAMILY MEDICINE

## 2023-11-14 PROCEDURE — 3044F PR MOST RECENT HEMOGLOBIN A1C LEVEL <7.0%: ICD-10-PCS | Mod: CPTII,95,, | Performed by: FAMILY MEDICINE

## 2023-11-14 PROCEDURE — 3008F PR BODY MASS INDEX (BMI) DOCUMENTED: ICD-10-PCS | Mod: CPTII,95,, | Performed by: FAMILY MEDICINE

## 2023-11-14 PROCEDURE — 3008F BODY MASS INDEX DOCD: CPT | Mod: CPTII,95,, | Performed by: FAMILY MEDICINE

## 2023-11-14 PROCEDURE — 3044F HG A1C LEVEL LT 7.0%: CPT | Mod: CPTII,95,, | Performed by: FAMILY MEDICINE

## 2023-11-14 RX ORDER — CLONAZEPAM 0.5 MG/1
.25-.5 TABLET ORAL 2 TIMES DAILY PRN
Qty: 60 TABLET | Refills: 5 | Status: SHIPPED | OUTPATIENT
Start: 2023-11-14 | End: 2024-02-28 | Stop reason: SDUPTHER

## 2023-11-14 RX ORDER — GABAPENTIN 300 MG/1
900 CAPSULE ORAL NIGHTLY
Qty: 90 CAPSULE | Refills: 5 | Status: SHIPPED | OUTPATIENT
Start: 2023-11-14

## 2023-11-14 RX ORDER — FLUOXETINE HYDROCHLORIDE 20 MG/1
20 CAPSULE ORAL DAILY
Qty: 90 CAPSULE | Refills: 3 | Status: SHIPPED | OUTPATIENT
Start: 2023-11-14 | End: 2024-02-28 | Stop reason: SDUPTHER

## 2023-11-14 NOTE — PROGRESS NOTES
TELEMEDICINE VIRTUAL VIDEO VISIT  11/14/23  8:20 AM CST    Visit Type: Audiovisual    Patient's Location: Thisia represents that they are located within the state [facility].    CHIEF COMPLAINT: Follow-up    She has recent increased stressors, specifically final exams in anticipation of her graduation from college in December. Her degree will be in psychology. She says that in spite of this, her depression and anxiety are compensated/controlled and stable on the current regimen, which she wants to continue. She says the Clonazepam is helping her sleep well.     She says that the Gabapentin helps significantly with her chronic postoperative pain, and it does not cause excessive sedation, and she wants to continue that medication as well.     She has attended our lifestyle wellness and weight loss program and has follow-up appointments. She lost several pounds, but she admits dietary indiscretion during the time of stress studying for final exams and her weight is back up. She says that she is committed to reinstituting therapeutic lifestyle changes after she has finished exams.    1. Recurrent mild major depressive disorder with anxiety  -     FLUoxetine 20 MG capsule; Take 1 capsule (20 mg total) by mouth once daily.  Dispense: 90 capsule; Refill: 3    2. Generalized anxiety disorder  -     FLUoxetine 20 MG capsule; Take 1 capsule (20 mg total) by mouth once daily.  Dispense: 90 capsule; Refill: 3  -     clonazePAM (KLONOPIN) 0.5 MG tablet; Take 0.5-1 tablets (0.25-0.5 mg total) by mouth 2 (two) times daily as needed for Anxiety.  Dispense: 60 tablet; Refill: 5    3. Psychophysiologic insomnia  -     clonazePAM (KLONOPIN) 0.5 MG tablet; Take 0.5-1 tablets (0.25-0.5 mg total) by mouth 2 (two) times daily as needed for Anxiety.  Dispense: 60 tablet; Refill: 5    4. Chronic post-op pain  Overview:  After 12/2019 LLE ORIF complicated by post-op MRSA wound infection requiring hardware removal and revision, incision and  drainage of abscess, completed treatment with IV vancomycin    Orders:  -     gabapentin (NEURONTIN) 300 MG capsule; Take 3 capsules (900 mg total) by mouth every evening.  Dispense: 90 capsule; Refill: 5    5. Class 2 severe obesity due to excess calories with serious comorbidity and body mass index (BMI) of 35.0 to 35.9 in adult    Unless noted herein, any chronic conditions are represented as and appear stable, and no other significant complaints or concerns were reported.    Review of Systems   Constitutional:  Negative for activity change and unexpected weight change.   HENT:  Negative for hearing loss, rhinorrhea and trouble swallowing.    Eyes:  Negative for discharge and visual disturbance.   Respiratory:  Negative for chest tightness and wheezing.    Cardiovascular:  Negative for chest pain and palpitations.   Gastrointestinal:  Negative for blood in stool, constipation, diarrhea and vomiting.   Endocrine: Negative for polydipsia and polyuria.   Genitourinary:  Negative for difficulty urinating, dysuria, hematuria and menstrual problem.   Musculoskeletal:  Negative for arthralgias, joint swelling and neck pain.   Neurological:  Negative for weakness and headaches.   Psychiatric/Behavioral:  Negative for confusion and dysphoric mood.        Physical Exam  Constitutional:       General: She is not in acute distress.     Appearance: Normal appearance. She is not ill-appearing.   Pulmonary:      Effort: Pulmonary effort is normal. No respiratory distress.   Skin:     Coloration: Skin is not jaundiced.   Neurological:      Mental Status: She is alert. Mental status is at baseline.   Psychiatric:         Mood and Affect: Mood normal.         Behavior: Behavior normal.         Thought Content: Thought content normal.     Follow up in about 6 months (around 5/3/2024) for office visit, annual exam.     Documentation entered by me for this encounter may have been done in part using speech-recognition technology.  "Although I have made an effort to ensure accuracy, "sound like" errors may exist and should be interpreted in context.   TOTAL TIME evaluating and managing this patient for this encounter was greater than or equal to 20 minutes.  This time was exclusive of any separately billable procedures for this patient and exclusive of time spent treating any other patient.    Documentation entered by me for this encounter may have been done in part using speech-recognition technology. Although I have made an effort to ensure accuracy, "sound like" errors may exist and should be interpreted in context.    Each patient to whom medical services are provided by telemedicine is: (1) informed of the relationship between the physician and patient and the respective role of any other health care provider with respect to management of the patient; and (2) notified that he or she may decline to receive medical services by telemedicine and may withdraw from such care at any time.  "

## 2023-11-14 NOTE — Clinical Note
Schedule annual wellness visit in early May. She may want a sooner appointment to discuss low libido. If so, help her schedule that at her convenience. Thanks.

## 2023-11-15 ENCOUNTER — DOCUMENTATION ONLY (OUTPATIENT)
Dept: INTERNAL MEDICINE | Facility: CLINIC | Age: 48
End: 2023-11-15
Payer: COMMERCIAL

## 2023-11-15 ENCOUNTER — OFFICE VISIT (OUTPATIENT)
Dept: INTERNAL MEDICINE | Facility: CLINIC | Age: 48
End: 2023-11-15
Payer: COMMERCIAL

## 2023-11-15 DIAGNOSIS — J06.9 UPPER RESPIRATORY TRACT INFECTION, UNSPECIFIED TYPE: Primary | ICD-10-CM

## 2023-11-15 DIAGNOSIS — R73.03 PREDIABETES: Chronic | ICD-10-CM

## 2023-11-15 DIAGNOSIS — I10 PRIMARY HYPERTENSION: Chronic | ICD-10-CM

## 2023-11-15 PROCEDURE — 1159F MED LIST DOCD IN RCRD: CPT | Mod: CPTII,95,, | Performed by: PEDIATRICS

## 2023-11-15 PROCEDURE — 1160F PR REVIEW ALL MEDS BY PRESCRIBER/CLIN PHARMACIST DOCUMENTED: ICD-10-PCS | Mod: CPTII,95,, | Performed by: PEDIATRICS

## 2023-11-15 PROCEDURE — 3044F PR MOST RECENT HEMOGLOBIN A1C LEVEL <7.0%: ICD-10-PCS | Mod: CPTII,95,, | Performed by: PEDIATRICS

## 2023-11-15 PROCEDURE — 1160F RVW MEDS BY RX/DR IN RCRD: CPT | Mod: CPTII,95,, | Performed by: PEDIATRICS

## 2023-11-15 PROCEDURE — 99213 OFFICE O/P EST LOW 20 MIN: CPT | Mod: 95,,, | Performed by: PEDIATRICS

## 2023-11-15 PROCEDURE — 1159F PR MEDICATION LIST DOCUMENTED IN MEDICAL RECORD: ICD-10-PCS | Mod: CPTII,95,, | Performed by: PEDIATRICS

## 2023-11-15 PROCEDURE — 99213 PR OFFICE/OUTPT VISIT, EST, LEVL III, 20-29 MIN: ICD-10-PCS | Mod: 95,,, | Performed by: PEDIATRICS

## 2023-11-15 PROCEDURE — 3044F HG A1C LEVEL LT 7.0%: CPT | Mod: CPTII,95,, | Performed by: PEDIATRICS

## 2023-11-15 NOTE — PROGRESS NOTES
TELEMEDICINE VIRTUAL VISIT    Subjective:       Patient ID: Teresa Hwang is a 48 y.o. female.    Chief Complaint: URI    Televisit for cold.    Symptoms started last night. Symptoms are as she outlined below. No SOB or fever or GI symptoms. Had flu vaccine not new covid vaccine. Has hx preDM and HTN.    URI   Associated symptoms include chest pain, coughing, headaches, rhinorrhea and a sore throat. Pertinent negatives include no congestion, diarrhea, ear pain, rash, vomiting or wheezing.   Cough  The current episode started yesterday. The problem has been unchanged. The problem occurs every few minutes. The cough is Productive of sputum. Associated symptoms include chest pain, ear congestion, headaches, nasal congestion, postnasal drip, rhinorrhea and a sore throat. Pertinent negatives include no chills, ear pain, eye redness, fever, heartburn, hemoptysis, myalgias, rash, shortness of breath, sweats or wheezing. Nothing aggravates the symptoms. She has tried OTC cough suppressant for the symptoms. The treatment provided mild relief.     Review of Systems   Constitutional:  Negative for chills, fever and unexpected weight change.   HENT:  Positive for postnasal drip, rhinorrhea and sore throat. Negative for congestion and ear pain.    Eyes:  Negative for discharge and redness.   Respiratory:  Positive for cough. Negative for hemoptysis, shortness of breath and wheezing.    Cardiovascular:  Positive for chest pain.   Gastrointestinal:  Negative for constipation, diarrhea, heartburn and vomiting.   Genitourinary:  Negative for decreased urine volume, difficulty urinating and menstrual problem.   Musculoskeletal:  Negative for arthralgias, joint swelling and myalgias.   Skin:  Negative for rash and wound.   Neurological:  Positive for headaches. Negative for syncope.   Psychiatric/Behavioral:  Negative for behavioral problems and sleep disturbance.        Objective:      CONSTITUTIONAL: No apparent  "distress. Does not appear acutely ill or septic. Appears adequately hydrated.  PULM: Breathing unlabored.  PSYCHIATRIC: Alert and conversant and grossly oriented. Mood is grossly neutral. Affect appropriate. Judgment and insight grossly intact.      Assessment:       1. Upper respiratory tract infection, unspecified type    2. Prediabetes    3. Primary hypertension        Plan:       Upper respiratory tract infection, unspecified type    Prediabetes    Primary hypertension       Due to general nature of symptoms and recent onset, I have not recommended antibiotics. I have recommended she get tested for flu and covid as she is in the window for appropriate antiviral. For symptom control try coracidin HPD, tylenol, salt water gargles which won't affect preDM or HTN. F/U as needed. About 10-15 minutes.  Documentation entered by me for this encounter may have been done in part using speech-recognition technology. Although I have made an effort to ensure accuracy, "sound like" errors may exist and should be interpreted in context. HOLLEY Garduno MD    Visit Details: This visit was a telemedicine virtual visit with synchronous audio and video. Teresa reported that her location at the time of this visit was in the [facility]. Teresa had the choice to come into office to receive these medical services. Teresa chose and consented to receive these medical services by telemedicine.  "

## 2023-11-17 ENCOUNTER — TELEPHONE (OUTPATIENT)
Dept: PLASTIC SURGERY | Facility: CLINIC | Age: 48
End: 2023-11-17
Payer: COMMERCIAL

## 2023-11-17 NOTE — TELEPHONE ENCOUNTER
Attempt to contact patient.  Appt 11/22/23 is cancelled per MD request.  Unable to leave voice message.

## 2024-01-04 ENCOUNTER — TELEPHONE (OUTPATIENT)
Dept: PRIMARY CARE CLINIC | Facility: CLINIC | Age: 49
End: 2024-01-04
Payer: COMMERCIAL

## 2024-01-12 ENCOUNTER — OFFICE VISIT (OUTPATIENT)
Dept: INTERNAL MEDICINE | Facility: CLINIC | Age: 49
End: 2024-01-12
Payer: COMMERCIAL

## 2024-01-12 DIAGNOSIS — J06.9 UPPER RESPIRATORY TRACT INFECTION, UNSPECIFIED TYPE: Primary | ICD-10-CM

## 2024-01-12 PROCEDURE — 99211 OFF/OP EST MAY X REQ PHY/QHP: CPT | Mod: 95,,, | Performed by: NURSE PRACTITIONER

## 2024-01-12 NOTE — PROGRESS NOTES
TELEMEDICINE VIRTUAL VISIT      Visit Details: This visit was a telemedicine virtual visit with synchronous audio and video. Teresa reported that her location at the time of this visit was in the [facility]. Teresa had the choice to come into office to receive these medical services. Teresa chose and consented to receive these medical services by telemedicine.   Subjective:       Patient ID: Teresa Hwang is a 48 y.o. female.    Chief Complaint: No chief complaint on file.    Cough  This is a new problem. The current episode started yesterday. The problem has been unchanged. The problem occurs hourly. The cough is Productive of sputum and productive of purulent sputum. Associated symptoms include chest pain, chills, ear congestion, nasal congestion, postnasal drip, rhinorrhea and a sore throat. Pertinent negatives include no ear pain, fever, headaches, heartburn, hemoptysis, myalgias, rash, shortness of breath, sweats, weight loss or wheezing. The symptoms are aggravated by cold air. She has tried OTC cough suppressant for the symptoms. The treatment provided mild relief.       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    Nicotine dependence, cigarettes, in remission 03/26/2019    Obesity     Personal history of COVID-19     Primary hypertension 06/29/2023    Recurrent major depression in partial remission 03/26/2019    Surgical wound infection (MRSA) 12/2019     Past Surgical History:   Procedure Laterality Date    APPLICATION OF LARGE EXTERNAL FIXATION DEVICE TO TIBIA Left 01/10/2019    Procedure: APPLICATION, EXTERNAL FIXATION DEVICE, LARGE, TIBIA;  Surgeon: Domenic Galvan MD;  Location: [facility];  Service: Orthopedics;  Laterality: Left;    APPLICATION OF WOUND VACUUM-ASSISTED CLOSURE  DEVICE Left 2019    Procedure: APPLICATION, WOUND VAC;  Surgeon: Barry Guzman MD;  Location: [facility] OR;  Service: Orthopedics;  Laterality: Left;    COLONOSCOPY N/A 2022    Procedure: COLONOSCOPY;  Surgeon: Yon Ridley MD;  Location: [facility] ENDO;  Service: Endoscopy;  Laterality: N/A;    FRACTURE SURGERY      C1 neck- halo    HERNIA REPAIR      HYSTERECTOMY  2009    tahbso    OOPHORECTOMY Bilateral     OPEN REDUCTION AND INTERNAL FIXATION (ORIF) OF FRACTURE OF TIBIAL PLATEAU Left 2019    Procedure: ORIF, FRACTURE, TIBIA, PLATEAU;  Surgeon: Barry Guzman MD;  Location: [facility] OR;  Service: Orthopedics;  Laterality: Left;    REMOVAL OF EXTERNAL FIXATION DEVICE Left 2019    Procedure: REMOVAL, EXTERNAL FIXATION DEVICE;  Surgeon: Barry Guzman MD;  Location: [facility] OR;  Service: Orthopedics;  Laterality: Left;    TRIGGER FINGER RELEASE Right 2023    Procedure: RELEASE, TRIGGER FINGER;  Surgeon: Sandeep Bang MD;  Location: [facility] OR;  Service: Orthopedics;  Laterality: Right;  right trigger thumb release    TUBAL LIGATION  1997    UMBILICAL HERNIA REPAIR       Social History     Socioeconomic History    Marital status:     Number of children: 5   Occupational History    Occupation:    Tobacco Use    Smoking status: Former     Current packs/day: 0.00     Average packs/day: 1 pack/day for 33.0 years (33.0 ttl pk-yrs)     Types: Cigarettes     Start date:      Quit date:      Years since quittin.0     Passive exposure: Past    Smokeless tobacco: Never   Substance and Sexual Activity    Alcohol use: Yes     Alcohol/week: 2.0 standard drinks of alcohol     Types: 2 Glasses of wine per week     Comment: Stopped    Drug use: No    Sexual activity: Not Currently     Partners: Male     Birth control/protection: None     Social Determinants of Health     Financial Resource Strain: Low Risk  (11/15/2023)    Overall Financial Resource Strain (CARDIA)      Difficulty of Paying Living Expenses: Not hard at all   Food Insecurity: Patient Declined (11/15/2023)    Hunger Vital Sign     Worried About Running Out of Food in the Last Year: Patient declined     Ran Out of Food in the Last Year: Patient declined   Transportation Needs: Patient Declined (11/15/2023)    PRAPARE - Transportation     Lack of Transportation (Medical): Patient declined     Lack of Transportation (Non-Medical): Patient declined   Physical Activity: Insufficiently Active (11/15/2023)    Exercise Vital Sign     Days of Exercise per Week: 3 days     Minutes of Exercise per Session: 30 min   Stress: No Stress Concern Present (11/15/2023)    [employer] - Occupational Stress Questionnaire     Feeling of Stress : Only a little   Social Connections: Unknown (11/15/2023)    Social Connection and Isolation Panel [NHANES]     Frequency of Communication with Friends and Family: More than three times a week     Frequency of Social Gatherings with Friends and Family: Twice a week     Active Member of Clubs or Organizations: No     Attends Club or Organization Meetings: Never     Marital Status: Patient declined   Housing Stability: Unknown (11/15/2023)    Housing Stability Vital Sign     Unable to Pay for Housing in the Last Year: Patient refused     Number of Places Lived in the Last Year: 0     Unstable Housing in the Last Year: Patient refused     Review of patient's allergies indicates:   Allergen Reactions    Macrobid [nitrofurantoin monohyd/m-cryst] Hives    Bactrim [sulfamethoxazole-trimethoprim]     Flagyl [metronidazole hcl] Hives     Current Outpatient Medications   Medication Sig    ascorbic acid, vitamin C, (VITAMIN C) 500 MG tablet Take 500 mg by mouth once daily.    azelastine (ASTELIN) 137 mcg (0.1 %) nasal spray 1 spray (137 mcg total) by Nasal route 2 (two) times daily.    clonazePAM (KLONOPIN) 0.5 MG tablet Take 0.5-1 tablets (0.25-0.5 mg total) by mouth 2 (two)  times daily as needed for Anxiety.    ELDERBERRY FRUIT ORAL Take 1 capsule by mouth once daily.    FLUoxetine 20 MG capsule Take 1 capsule (20 mg total) by mouth once daily.    fluticasone propionate (FLONASE) 50 mcg/actuation nasal spray 1 spray (50 mcg total) by Each Nostril route once daily.    gabapentin (NEURONTIN) 300 MG capsule Take 3 capsules (900 mg total) by mouth every evening.    nicotine, polacrilex, (NICORETTE) 2 mg Gum Take 1 each (2 mg total) by mouth as needed (for cigarette cravings). Use as directed on packaging.    sodium chloride (SALINE NASAL) 0.65 % nasal spray Use as directed on product packaging    topiramate (TOPAMAX) 25 MG tablet Take 1 tablet (25 mg total) by mouth every evening.    triamcinolone acetonide 0.1% (KENALOG) 0.1 % cream Apply topically 2 (two) times daily. for 10 days     No current facility-administered medications for this visit.     Facility-Administered Medications Ordered in Other Visits   Medication    chlorhexidine 0.12 % solution 10 mL           Review of Systems   Constitutional:  Positive for chills. Negative for activity change, appetite change, diaphoresis, fatigue, fever, unexpected weight change and weight loss.   HENT:  Positive for postnasal drip, rhinorrhea and sore throat. Negative for congestion, ear pain, sinus pressure, sinus pain, sneezing, tinnitus, trouble swallowing and voice change.    Eyes:  Negative for photophobia, pain and visual disturbance.   Respiratory:  Positive for cough. Negative for hemoptysis, chest tightness, shortness of breath and wheezing.    Cardiovascular:  Positive for chest pain. Negative for palpitations and leg swelling.   Gastrointestinal:  Negative for abdominal distention, abdominal pain, constipation, diarrhea, heartburn, nausea and vomiting.   Genitourinary:  Negative for decreased urine volume, difficulty urinating, dysuria, flank pain, frequency, hematuria and urgency.   Musculoskeletal:  Negative for arthralgias, back  pain, joint swelling, myalgias, neck pain and neck stiffness.   Skin:  Negative for rash.   Allergic/Immunologic: Negative for immunocompromised state.   Neurological:  Negative for dizziness, tremors, seizures, syncope, facial asymmetry, speech difficulty, weakness, light-headedness, numbness and headaches.   Hematological:  Negative for adenopathy. Does not bruise/bleed easily.   Psychiatric/Behavioral:  Negative for confusion and sleep disturbance.        Objective:      Physical Exam      CONSTITUTIONAL: No apparent distress. Does not appear acutely ill or septic. Appears adequately hydrated.  PULM: Breathing unlabored.  PSYCHIATRIC: Alert and conversant and grossly oriented. Mood is grossly neutral. Affect appropriate. Judgment and insight grossly intact.  Assessment:   There were no vitals filed for this visit.      1. Upper respiratory tract infection, unspecified type        Plan:   Upper respiratory tract infection, unspecified type      In person visit advised for covid and flu testing  Recommendations per that provider

## 2024-02-23 ENCOUNTER — TELEPHONE (OUTPATIENT)
Dept: INTERNAL MEDICINE | Facility: CLINIC | Age: 49
End: 2024-02-23
Payer: COMMERCIAL

## 2024-02-23 ENCOUNTER — TELEPHONE (OUTPATIENT)
Dept: PRIMARY CARE CLINIC | Facility: CLINIC | Age: 49
End: 2024-02-23
Payer: COMMERCIAL

## 2024-02-23 DIAGNOSIS — F41.1 GENERALIZED ANXIETY DISORDER: Chronic | ICD-10-CM

## 2024-02-23 DIAGNOSIS — F41.9 RECURRENT MILD MAJOR DEPRESSIVE DISORDER WITH ANXIETY: Chronic | ICD-10-CM

## 2024-02-23 DIAGNOSIS — F51.04 PSYCHOPHYSIOLOGIC INSOMNIA: Chronic | ICD-10-CM

## 2024-02-23 DIAGNOSIS — F33.0 RECURRENT MILD MAJOR DEPRESSIVE DISORDER WITH ANXIETY: Chronic | ICD-10-CM

## 2024-02-23 RX ORDER — VARENICLINE TARTRATE 1 MG/1
1 TABLET, FILM COATED ORAL 2 TIMES DAILY
Status: CANCELLED | OUTPATIENT
Start: 2024-02-23

## 2024-02-23 RX ORDER — VARENICLINE TARTRATE 1 MG/1
1 TABLET, FILM COATED ORAL 2 TIMES DAILY
COMMUNITY
Start: 2023-11-08 | End: 2024-02-28 | Stop reason: SDUPTHER

## 2024-02-23 RX ORDER — FLUOXETINE HYDROCHLORIDE 20 MG/1
20 CAPSULE ORAL DAILY
Qty: 90 CAPSULE | Refills: 3 | Status: CANCELLED | OUTPATIENT
Start: 2024-02-23

## 2024-02-23 NOTE — TELEPHONE ENCOUNTER
Pt due for f/u appt; has not scheduled f/u since Nov when med was initiated  Pt reports she has been on topamax but not chantix. She has been off since early Jan.  Initially felt like was helping some. No se's while taking.  Reviewed with pt will need to have work in visit soon; [place] (reviewed with pt if located in [place] we can do a virtual visit)    Pt advised will need to call and schedule.   Keep virtual visit with Dr Chery and let me know for f/u.     Wt Readings from Last 3 Encounters:   11/14/23 0840 87.5 kg (193 lb)   11/03/23 0931 87.9 kg (193 lb 10.8 oz)   11/02/23 0802 88.2 kg (194 lb 7.1 oz)         ----- Message from Schuyler Lamar MA sent at 2/23/2024  1:24 PM CST -----  Contact: Teresa  Pt is requesting a refill, does she need a f/u appointment to be made first?  ----- Message -----  From: Celina Puente  Sent: 2/23/2024   1:23 PM CST  To: Anali Rodriguez Staff    Teresa is calling to speak to the nurse regarding a refill request on the medication topiramate (TOPAMAX) 25 MG tablet, please send to [facility] on [address], [place], Please call if any questions at [phone]    Thanks  NATE

## 2024-02-23 NOTE — TELEPHONE ENCOUNTER
----- Message from Celina Puente sent at 2/23/2024  1:23 PM CST -----  Contact: Teresa Moore is calling to request a refill on the following medication to be sent to [facility] [address], [facility], please give her a call if any questions at  [phone], patient is requesting this today    Thanks  NATE    Varenicline 1mg    clonazePAM (KLONOPIN) 0.5 MG tablet

## 2024-02-23 NOTE — TELEPHONE ENCOUNTER
No care due was identified.  [facility] Embedded Care Due Messages. Reference number: [id number].   2/23/2024 2:24:32 PM CST

## 2024-02-23 NOTE — TELEPHONE ENCOUNTER
----- Message from Jeny Mehta sent at 2/23/2024  2:03 PM CST -----  .Type:  Patient Returning Call    Who Called:Desiree Hwang   Who Left Message for Patient:ELMA  Does the patient know what this is regarding?:refills  Would the patient rather a call back or a response via [facility]? Call back  Best Call Back Number:.[phone]  Additional Information:

## 2024-02-28 ENCOUNTER — TELEPHONE (OUTPATIENT)
Dept: INTERNAL MEDICINE | Facility: CLINIC | Age: 49
End: 2024-02-28
Payer: COMMERCIAL

## 2024-02-28 ENCOUNTER — OFFICE VISIT (OUTPATIENT)
Dept: INTERNAL MEDICINE | Facility: CLINIC | Age: 49
End: 2024-02-28
Payer: COMMERCIAL

## 2024-02-28 DIAGNOSIS — F51.04 PSYCHOPHYSIOLOGIC INSOMNIA: Chronic | ICD-10-CM

## 2024-02-28 DIAGNOSIS — F41.1 GENERALIZED ANXIETY DISORDER: Chronic | ICD-10-CM

## 2024-02-28 DIAGNOSIS — E66.01 CLASS 2 SEVERE OBESITY DUE TO EXCESS CALORIES WITH SERIOUS COMORBIDITY AND BODY MASS INDEX (BMI) OF 35.0 TO 35.9 IN ADULT: ICD-10-CM

## 2024-02-28 DIAGNOSIS — F41.9 RECURRENT MILD MAJOR DEPRESSIVE DISORDER WITH ANXIETY: Primary | Chronic | ICD-10-CM

## 2024-02-28 DIAGNOSIS — F17.210 NICOTINE DEPENDENCE, CIGARETTES, UNCOMPLICATED: Chronic | ICD-10-CM

## 2024-02-28 DIAGNOSIS — F33.0 RECURRENT MILD MAJOR DEPRESSIVE DISORDER WITH ANXIETY: Primary | Chronic | ICD-10-CM

## 2024-02-28 PROCEDURE — 1159F MED LIST DOCD IN RCRD: CPT | Mod: CPTII,95,, | Performed by: FAMILY MEDICINE

## 2024-02-28 PROCEDURE — 99214 OFFICE O/P EST MOD 30 MIN: CPT | Mod: 95,,, | Performed by: FAMILY MEDICINE

## 2024-02-28 PROCEDURE — 1160F RVW MEDS BY RX/DR IN RCRD: CPT | Mod: CPTII,95,, | Performed by: FAMILY MEDICINE

## 2024-02-28 RX ORDER — VARENICLINE TARTRATE 1 MG/1
1 TABLET, FILM COATED ORAL 2 TIMES DAILY
Qty: 60 TABLET | Refills: 2 | Status: SHIPPED | OUTPATIENT
Start: 2024-02-28 | End: 2024-06-11

## 2024-02-28 RX ORDER — FLUOXETINE HYDROCHLORIDE 20 MG/1
20 CAPSULE ORAL DAILY
Qty: 90 CAPSULE | Refills: 3 | Status: SHIPPED | OUTPATIENT
Start: 2024-02-28

## 2024-02-28 RX ORDER — CLONAZEPAM 0.5 MG/1
.25-.5 TABLET ORAL 2 TIMES DAILY PRN
Qty: 60 TABLET | Refills: 2 | Status: SHIPPED | OUTPATIENT
Start: 2024-02-28 | End: 2025-02-27

## 2024-02-28 NOTE — ASSESSMENT & PLAN NOTE
"Estimated body mass index is 35.3 kg/m² as calculated from the following:    Height as of 11/14/23: 5' 2" (1.575 m).    Weight as of 11/14/23: 87.5 kg (193 lb).   "

## 2024-02-28 NOTE — PROGRESS NOTES
TELEMEDICINE VIRTUAL VIDEO VISIT  2/28/24 12:20 PM CST    Visit Type: Audiovisual    Patient's Location: Teresa represents that they are located within the state of [place].    History of Present Illness    Teresa presents today for medication refills.    Her current dose of Fluoxetine (Prozac) 20 mg is well-tolerated, and she mentions noticeable agitation when she misses doses. She ran out of Chantix, her smoking cessation medication, about four weeks ago. She suggests Chantix substantially reduced her cravings and wants to restart the medication. She requested a refill for Clonazepam (Klonopin). It was last filled in November for 60 tablets, which lasted three months as it was not taken while she was preparing for job application drug tests.    She expressed dissatisfaction with her current weight and is exploring recommendations for weight loss. Despite trials with exercise and weight lifting, she had limited success. Topiramate was previously prescribed for weight loss but did not provide any benefit.    Returning to work recently has been beneficial for her overall well-being. The relief from financial stress and being able to pay her bills has led to a positive change in her emotional state.  ROS:  ROS is negative unless otherwise indicated in HPI.       Review of Systems   Constitutional:  Negative for activity change and unexpected weight change.   HENT:  Negative for hearing loss, rhinorrhea and trouble swallowing.    Eyes:  Negative for discharge and visual disturbance.   Respiratory:  Negative for chest tightness and wheezing.    Cardiovascular:  Negative for chest pain and palpitations.   Gastrointestinal:  Negative for blood in stool, constipation, diarrhea and vomiting.   Endocrine: Negative for polydipsia and polyuria.   Genitourinary:  Negative for difficulty urinating, dysuria, hematuria and menstrual problem.   Musculoskeletal:  Negative for arthralgias, joint swelling and neck pain.    Neurological:  Negative for weakness and headaches.   Psychiatric/Behavioral:  Negative for confusion and dysphoric mood.        Assessment & Plan    DEPRESSION AND ANXIETY:   Refilled Fluoxetine (Prozac) 20 mg daily and Clonazepam with a quantity of 60 tablets.  SMOKING CESSATION:   Reordered Chantix with an adjusted dosage, starting with 1 mg daily for the first week and then 1 mg twice daily.   Encouraged the patient to persist in their efforts to quit smoking.   Instructed the patient on the proper dosage and frequency for Chantix.  WEIGHT MANAGEMENT:   Removed Topamax from medication list due to its ineffectiveness for weight loss.   Advised the patient to consult with Dr. Kruse regarding alternative weight loss medication options.  GENERAL HEALTH AND WELLNESS:   Scheduled a follow-up visit on May 14th to assess progress and discuss potential refills.   Commended the patient for their return to the workforce.       1. Recurrent mild major depressive disorder with anxiety  -     FLUoxetine 20 MG capsule; Take 1 capsule (20 mg total) by mouth once daily.  Dispense: 90 capsule; Refill: 3    2. Generalized anxiety disorder  -     FLUoxetine 20 MG capsule; Take 1 capsule (20 mg total) by mouth once daily.  Dispense: 90 capsule; Refill: 3  -     clonazePAM (KLONOPIN) 0.5 MG tablet; Take 0.5-1 tablets (0.25-0.5 mg total) by mouth 2 (two) times daily as needed for Anxiety.  Dispense: 60 tablet; Refill: 2    3. Psychophysiologic insomnia  -     clonazePAM (KLONOPIN) 0.5 MG tablet; Take 0.5-1 tablets (0.25-0.5 mg total) by mouth 2 (two) times daily as needed for Anxiety.  Dispense: 60 tablet; Refill: 2    4. Nicotine dependence, cigarettes, uncomplicated  -     varenicline (CHANTIX) 1 mg Tab; Take 1 tablet (1 mg total) by mouth 2 (two) times daily.  Dispense: 60 tablet; Refill: 2    5. Class 2 severe obesity due to excess calories with serious comorbidity and body mass index (BMI) of 35.0 to 35.9 in adult  Assessment  "& Plan:  Estimated body mass index is 35.3 kg/m² as calculated from the following:    Height as of 11/14/23: 5' 2" (1.575 m).    Weight as of 11/14/23: 87.5 kg (193 lb).          Unless noted herein, any chronic conditions are represented as and appear stable, and no other significant complaints or concerns were reported.    There were no vitals filed for this visit.  Physical Exam    Constitutional: No acute distress. Normal appearance. Not ill-appearing.  Pulmonary: Pulmonary effort is normal. No respiratory distress.  Skin: Skin is not jaundiced.  Neurological: Alert. Mental status is at baseline.  Psychiatric: Mood normal. Behavior normal. Thought content normal.         This note was generated with the assistance of ambient listening technology. Verbal consent was obtained by the patient and accompanying visitor(s) for the recording of patient appointment to facilitate this note. I attest to having reviewed and edited the generated note for accuracy, though some syntax or spelling errors may persist. Please contact the author of this note for any clarification.      TOTAL TIME evaluating and managing this patient for this encounter was greater than or equal to 20 minutes.  This time was exclusive of any separately billable procedures for this patient and exclusive of time spent treating any other patient.    Documentation entered by me for this encounter may have been done in part using speech-recognition technology. Although I have made an effort to ensure accuracy, "sound like" errors may exist and should be interpreted in context.    Each patient to whom medical services are provided by telemedicine is: (1) informed of the relationship between the physician and patient and the respective role of any other health care provider with respect to management of the patient; and (2) notified that he or she may decline to receive medical services by telemedicine and may withdraw from such care at any time.   "

## 2024-04-03 ENCOUNTER — PATIENT MESSAGE (OUTPATIENT)
Dept: PULMONOLOGY | Facility: CLINIC | Age: 49
End: 2024-04-03
Payer: COMMERCIAL

## 2024-05-06 DIAGNOSIS — G89.28 OTHER CHRONIC POSTPROCEDURAL PAIN: Chronic | ICD-10-CM

## 2024-05-06 NOTE — TELEPHONE ENCOUNTER
No care due was identified.  [facility] Embedded Care Due Messages. Reference number: [id number].   5/06/2024 10:38:08 AM CDT

## 2024-05-07 ENCOUNTER — OFFICE VISIT (OUTPATIENT)
Dept: PRIMARY CARE CLINIC | Facility: CLINIC | Age: 49
End: 2024-05-07
Payer: COMMERCIAL

## 2024-05-07 DIAGNOSIS — E66.01 CLASS 2 SEVERE OBESITY DUE TO EXCESS CALORIES WITH SERIOUS COMORBIDITY AND BODY MASS INDEX (BMI) OF 35.0 TO 35.9 IN ADULT: ICD-10-CM

## 2024-05-07 DIAGNOSIS — F41.1 GENERALIZED ANXIETY DISORDER: Chronic | ICD-10-CM

## 2024-05-07 DIAGNOSIS — I10 PRIMARY HYPERTENSION: Chronic | ICD-10-CM

## 2024-05-07 DIAGNOSIS — F33.0 RECURRENT MILD MAJOR DEPRESSIVE DISORDER WITH ANXIETY: Chronic | ICD-10-CM

## 2024-05-07 DIAGNOSIS — F41.9 RECURRENT MILD MAJOR DEPRESSIVE DISORDER WITH ANXIETY: Chronic | ICD-10-CM

## 2024-05-07 DIAGNOSIS — R73.03 PREDIABETES: ICD-10-CM

## 2024-05-07 PROCEDURE — 1159F MED LIST DOCD IN RCRD: CPT | Mod: CPTII,95,, | Performed by: FAMILY MEDICINE

## 2024-05-07 PROCEDURE — 1160F RVW MEDS BY RX/DR IN RCRD: CPT | Mod: CPTII,95,, | Performed by: FAMILY MEDICINE

## 2024-05-07 PROCEDURE — 99214 OFFICE O/P EST MOD 30 MIN: CPT | Mod: 95,,, | Performed by: FAMILY MEDICINE

## 2024-05-07 RX ORDER — SEMAGLUTIDE 0.25 MG/.5ML
0.25 INJECTION, SOLUTION SUBCUTANEOUS
Qty: 2 ML | Refills: 0 | Status: SHIPPED | OUTPATIENT
Start: 2024-05-07

## 2024-05-07 NOTE — PROGRESS NOTES
Subjective     The patient location is: home/[place]  The chief complaint leading to consultation is:  follow up    Visit type: audiovisual    Face to Face time with patient: 16  18 minutes of total time spent on the encounter, which includes face to face time and non-face to face time preparing to see the patient (eg, review of tests), Obtaining and/or reviewing separately obtained history, Documenting clinical information in the electronic or other health record, Independently interpreting results (not separately reported) and communicating results to the patient/family/caregiver, or Care coordination (not separately reported).         Each patient to whom he or she provides medical services by telemedicine is:  (1) informed of the relationship between the physician and patient and the respective role of any other health care provider with respect to management of the patient; and (2) notified that he or she may decline to receive medical services by telemedicine and may withdraw from such care at any time.    Notes:     Patient ID: Teresa Hwang is a 48 y.o. female.    Chief Complaint: follow up    LOV 11/23 (virtual)  PCP visit 2/24    Pt graduates Fri at [employer]    Pt failed topamax in the past, has been on chantix  Insurance did not pay for qsymia.   Declined metformin in past due to concern for gi side effects.     Reports A/D stable.     BMI at that visit was 35. Weight stable from fall but pt states has gained since ov here. States now at 225.     Pt is a -- needs nurse visit    On chantix now; she feels has done well. She reports is still taking.       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    Nicotine dependence, cigarettes, in remission 03/26/2019    Obesity      Personal history of COVID-19     Primary hypertension 06/29/2023    Recurrent major depression in partial remission 03/26/2019    Surgical wound infection (MRSA) 12/2019         PAST SURGICAL HISTORY:  Past Surgical History:   Procedure Laterality Date    APPLICATION OF LARGE EXTERNAL FIXATION DEVICE TO TIBIA Left 01/10/2019    Procedure: APPLICATION, EXTERNAL FIXATION DEVICE, LARGE, TIBIA;  Surgeon: Domenic Galvan MD;  Location: [facility] OR;  Service: Orthopedics;  Laterality: Left;    APPLICATION OF WOUND VACUUM-ASSISTED CLOSURE DEVICE Left 01/17/2019    Procedure: APPLICATION, WOUND VAC;  Surgeon: Barry Guzman MD;  Location: [facility] OR;  Service: Orthopedics;  Laterality: Left;    COLONOSCOPY N/A 09/02/2022    Procedure: COLONOSCOPY;  Surgeon: Yon Ridley MD;  Location: [facility];  Service: Endoscopy;  Laterality: N/A;    FRACTURE SURGERY      C1 neck- halo    HERNIA REPAIR      HYSTERECTOMY  2009    tahbso    OOPHORECTOMY Bilateral     OPEN REDUCTION AND INTERNAL FIXATION (ORIF) OF FRACTURE OF TIBIAL PLATEAU Left 01/17/2019    Procedure: ORIF, FRACTURE, TIBIA, PLATEAU;  Surgeon: Barry Guzman MD;  Location: [facility] OR;  Service: Orthopedics;  Laterality: Left;    REMOVAL OF EXTERNAL FIXATION DEVICE Left 01/17/2019    Procedure: REMOVAL, EXTERNAL FIXATION DEVICE;  Surgeon: Barry Guzman MD;  Location: [facility] OR;  Service: Orthopedics;  Laterality: Left;    TRIGGER FINGER RELEASE Right 8/25/2023    Procedure: RELEASE, TRIGGER FINGER;  Surgeon: Sandeep Bang MD;  Location: [facility] OR;  Service: Orthopedics;  Laterality: Right;  right trigger thumb release    TUBAL LIGATION  05/22/1997    UMBILICAL HERNIA REPAIR         FAMILY HISTORY:  Family History   Problem Relation Name Age of Onset    Breast cancer Mother Arminda Maldonado 58    Cancer Mother Arminda Maldonado         Breast cancer    Depression Mother Arminda Maldonado         Depression    Diabetes Mother Arminda Maldonado     Breast cancer Sister Lizzy Cazares 46     Cancer Sister Lizzy Cazares         Breast cancer    No Known Problems Father      Breast cancer Paternal Grandmother            SOCIAL HISTORY:  Social History     Social History Narrative    Not on file       MEDICATIONS:  Medications have been reviewed.    ALLERGIES:  Allergies have been reviewed.    There were no vitals filed for this visit.  Wt Readings from Last 10 Encounters:   11/14/23 87.5 kg (193 lb)   11/03/23 87.9 kg (193 lb 10.8 oz)   11/02/23 88.2 kg (194 lb 7.1 oz)   10/26/23 87.9 kg (193 lb 12.6 oz)   10/17/23 84.4 kg (186 lb)   09/08/23 87.1 kg (192 lb 0.3 oz)   08/29/23 87.1 kg (192 lb)   08/25/23 87.5 kg (192 lb 14.4 oz)   08/21/23 84.9 kg (187 lb 4.5 oz)   08/15/23 87.3 kg (192 lb 7.4 oz)       Lab Results   Component Value Date    WBC 12.39 08/11/2023    HGB 12.6 08/11/2023    HCT 38.8 08/11/2023     08/11/2023    CHOL 167 06/30/2023    TRIG 27 (L) 06/30/2023    HDL 76 (H) 06/30/2023    ALT 14 08/11/2023    AST 17 08/11/2023     08/11/2023    K 3.7 08/11/2023     08/11/2023    CREATININE 0.7 08/11/2023    BUN 16 08/11/2023    CO2 27 08/11/2023    TSH 2.191 10/08/2020    INR 0.9 01/10/2019    HGBA1C 5.5 06/30/2023       Review of Systems   Constitutional:  Negative for activity change, appetite change, fatigue and fever.   HENT:  Negative for mouth dryness and goiter.    Eyes:  Negative for visual disturbance.   Respiratory:  Negative for apnea, cough, chest tightness and shortness of breath.    Cardiovascular:  Negative for chest pain, palpitations and leg swelling.   Gastrointestinal:  Negative for abdominal pain, constipation, diarrhea, nausea, vomiting and reflux.   Endocrine: Negative for cold intolerance, heat intolerance, polydipsia, polyphagia and polyuria.   Genitourinary:  Negative for frequency and menstrual problem.   Musculoskeletal:  Negative for arthralgias and myalgias.   Integumentary:  Negative for color change and rash.   Neurological:  Negative for  dizziness, tremors, seizures, syncope, weakness, numbness, headaches and memory loss.   Psychiatric/Behavioral:  Negative for self-injury, sleep disturbance and suicidal ideas. The patient is not nervous/anxious.        Physical Exam  Vitals and nursing note reviewed.   Constitutional:       General: She is not in acute distress.  HENT:      Head: Normocephalic and atraumatic.   Eyes:      General: No scleral icterus.  Neck:      Comments: No TM  Cardiovascular:      Heart sounds:      No friction rub.   Pulmonary:      Effort: Pulmonary effort is normal. No respiratory distress.      Breath sounds: Normal breath sounds.   Lymphadenopathy:      Cervical: No cervical adenopathy.   Neurological:      Mental Status: She is alert and oriented to person, place, and time.      Comments: Speech within normal limits    Psychiatric:         Mood and Affect: Mood normal.         Behavior: Behavior normal.              Assessment and Plan     1. Prediabetes    2. Class 2 severe obesity due to excess calories with serious comorbidity and body mass index (BMI) of 35.0 to 35.9 in adult    3. Primary hypertension    4. Generalized anxiety disorder    5. Recurrent mild major depressive disorder with anxiety      Prediabetes  Comments:  declines metformin since she is on road a good b it  Orders:  -     semaglutide, weight loss, (WEGOVY) 0.25 mg/0.5 mL PnIj; Inject 0.25 mg into the skin every 7 days.  Dispense: 2 mL; Refill: 0    Class 2 severe obesity due to excess calories with serious comorbidity and body mass index (BMI) of 35.0 to 35.9 in adult  Comments:  trial Wegovy (pt is aware dependent on insurance coverage)  failed topamax, would not rec'd phentermine  declines Wellbutrin  Orders:  -     semaglutide, weight loss, (WEGOVY) 0.25 mg/0.5 mL PnIj; Inject 0.25 mg into the skin every 7 days.  Dispense: 2 mL; Refill: 0    Primary hypertension  Comments:  chronic/stable    Generalized anxiety  disorder  Comments:  chronic/stable    Recurrent mild major depressive disorder with anxiety  Comments:  chronic/stable               A/D stable; failed topamax  Contraception: previous hyx  Wegovy not covered    Pt advised to keep follow up with primary care MD  A/d stable; tolerating chantix without any mental health changes  Pt counseled to notify provider, stop medication if any se's  Also advised will need appt in March.    Pt made aware I will be leaving [facility] in June. She has established pcp so have advised her f/u with them this summer.   Also reviewed potential external community options.   Reviewed pen with pt    Risks/benefits/common side effects of medication discussed with patient at length. UTD patient handout given. (mychart msg)  D/W pt at length potential pharmacologic options; she desires consideration of wegovy. Risks/benefits/common side effects of wegovy d/w pt including nausea and pain at injection site; she is aware should not get pregnant while taking and not approved while breastfeeding. NO personal/fam hx of MEN or medullary thyroid cancer. No hx of pancreatitis. Instructed pt how to use with demo pen; pt practiced in office. Pt advised if approved will get pt handout from pharmacy. Pt has no hx of thyroid nodules or biliary disease/gallstones. DW pt with substantial or rapid weight loss gallstones could develop. Also dw pt glp-1 MOA and common side effects.   Reviewed with pt constipation.     Also dw pt glp-1 MOA and common side effects.     Also reviewed with pt gastroparesis and potential for mental health changes. Notify provider immediately if any significant side effects or issues.      Also dw pt fiber, water, adequate protein  Reviwed otc ways to alleviate constipation  Follow up if symptoms worsen or fail to improve.

## 2024-05-08 RX ORDER — GABAPENTIN 300 MG/1
CAPSULE ORAL
Qty: 90 CAPSULE | Refills: 0 | Status: SHIPPED | OUTPATIENT
Start: 2024-05-08

## 2024-05-08 NOTE — TELEPHONE ENCOUNTER
REFILL APPROVED. Will address further refills at upcoming appointment with me listed below.  #LMRX   --------------------------------  Future Appointments   Date Time Provider Department Center   5/9/2024  8:00 AM Jennifer Briones MD [facility]   5/14/2024  8:00 AM EDGAR Chery MD [facility]

## 2024-05-15 ENCOUNTER — PATIENT MESSAGE (OUTPATIENT)
Dept: PRIMARY CARE CLINIC | Facility: CLINIC | Age: 49
End: 2024-05-15
Payer: COMMERCIAL

## 2024-05-16 ENCOUNTER — PATIENT MESSAGE (OUTPATIENT)
Dept: PRIMARY CARE CLINIC | Facility: CLINIC | Age: 49
End: 2024-05-16
Payer: COMMERCIAL

## 2024-05-22 NOTE — TELEPHONE ENCOUNTER
Called pt at listed number.     Pt made aware I will be leaving [facility] in June. She has established pcp so have advised her f/u with them this summer. Also reviewed potential external community options.  Pt congratulated on recent graduation.     She will schedule with Dr Louis murdock

## 2024-06-10 DIAGNOSIS — F17.210 NICOTINE DEPENDENCE, CIGARETTES, UNCOMPLICATED: Chronic | ICD-10-CM

## 2024-06-10 NOTE — TELEPHONE ENCOUNTER
Care Due:                  Date            Visit Type   Department     Provider  --------------------------------------------------------------------------------                                ESTABLISHED                              PATIENT -    [facility] INTER[facility]  Last Visit: 02-      VIRTUAL      MEDICINE       Chun Chery                              MYCHART                              FOLLOWUP/OF  [facility]  Next Visit: 09-      FICE VISIT   MEDICINE       Chun Chery                                                            Last  Test          Frequency    Reason                     Performed    Due Date  --------------------------------------------------------------------------------    Cr..........  12 months..  varenicline..............  08- 08-    HBA1C.......  6 months...  semaglutide,.............  06- 12-    [facility] Embedded Care Due Messages. Reference number: [id number].   6/10/2024 6:49:25 PM CDT

## 2024-06-11 RX ORDER — VARENICLINE TARTRATE 1 MG/1
1 TABLET, FILM COATED ORAL 2 TIMES DAILY
Qty: 60 TABLET | Refills: 3 | Status: SHIPPED | OUTPATIENT
Start: 2024-06-11

## 2024-06-11 NOTE — TELEPHONE ENCOUNTER
Refill Routing Note   Medication(s) are not appropriate for processing by [facility] for the following reason(s):        Outside of protocol    [facility] action(s):  Route     Requires labs : Yes             Appointments  past 12m or future 3m with PCP    Date Provider   Last Visit   2/28/2024 EDGAR Chery MD   Next Visit   9/10/2024 EDGAR Chery MD   ED visits in past 90 days: 0        Note composed:7:46 AM 06/11/2024

## 2024-06-11 NOTE — TELEPHONE ENCOUNTER
REFILL APPROVED. Will address further refills at upcoming appointment with me listed below.  Requested Prescriptions   Pending Prescriptions Disp Refills    varenicline (CHANTIX) 1 mg Tab [Pharmacy Med Name: Varenicline Tartrate 1 MG Oral Tablet] 60 tablet 3     Sig: Take 1 tablet by mouth twice daily    #LMRX   --------------------------------  Future Appointments   Date Time Provider Department Center   9/10/2024  8:20 AM EDGAR Chery MD [facility]

## 2024-06-20 DIAGNOSIS — E66.01 CLASS 2 SEVERE OBESITY DUE TO EXCESS CALORIES WITH SERIOUS COMORBIDITY AND BODY MASS INDEX (BMI) OF 35.0 TO 35.9 IN ADULT: ICD-10-CM

## 2024-06-20 DIAGNOSIS — Z12.31 ENCOUNTER FOR SCREENING MAMMOGRAM FOR MALIGNANT NEOPLASM OF BREAST: Primary | ICD-10-CM

## 2024-06-20 DIAGNOSIS — R73.03 PREDIABETES: ICD-10-CM

## 2024-06-20 NOTE — TELEPHONE ENCOUNTER
Pt states she will be out of her semaglutide, weight loss, (WEGOVY) 0.25 mg/0.5 mL PnIj in two weeks. Dr. Briones will no longer be able to manage her medication since the provider is leaving [facility]). Pt is inquiring if Dr. Chery will manage her medication until she gets set back up with lifestyle and wellness. Pt confirmed Mammo 09/10/2024 at 7:30 am.

## 2024-06-20 NOTE — TELEPHONE ENCOUNTER
Care Due:                  Date            Visit Type   Department     Provider  --------------------------------------------------------------------------------                                ESTABLISHED                              PATIENT -    [facility]  Last Visit: 02-      [facility]       Chun Chery  Next Visit: None Scheduled  None         None Found                                                            Last  Test          Frequency    Reason                     Performed    Due Date  --------------------------------------------------------------------------------    Office Visit  6 months...  varenicline..............  02- 08-    [facility] Embedded Care Due Messages. Reference number: [id number].   6/20/2024 4:08:03 PM CDT

## 2024-06-20 NOTE — TELEPHONE ENCOUNTER
----- Message from Mundo Valladares sent at 6/20/2024  3:30 PM CDT -----  Contact: SELF   .Type: Patient Call Back        Who called:   PATIENT      What is the request in detail:    PATIENT CALLING TO SEE IF SHE CAN BE SCHEDULE SOONER THAN THE SEPTEMBER APPT . PT WILL BE HOME FROM [place] JULY SECOND IF POSSIBLE CAN SHE BE SEEN JULY 2ND .   PATIENT ALSO NEEDS ORDERS PUT IN FOR A MAMMO GRAM PATIENT WOULD ALSO LIKE THIS APPT SCHEDULED FOR JULY 2ND AS WELL IF POSSIBLE IF NOT CAN SHE GET SCHEDULED FOR MAMMO 9/10   Can the clinic reply by [facility]?      N/A      Would the patient rather a call back or a response via [facility]?      CALL BACK   Best call back number:  [phone]

## 2024-06-24 RX ORDER — SEMAGLUTIDE 0.25 MG/.5ML
0.25 INJECTION, SOLUTION SUBCUTANEOUS
Qty: 2 ML | Refills: 0 | OUTPATIENT
Start: 2024-06-24

## 2024-07-01 DIAGNOSIS — E66.01 CLASS 2 SEVERE OBESITY DUE TO EXCESS CALORIES WITH SERIOUS COMORBIDITY AND BODY MASS INDEX (BMI) OF 35.0 TO 35.9 IN ADULT: ICD-10-CM

## 2024-07-01 DIAGNOSIS — R73.03 PREDIABETES: ICD-10-CM

## 2024-07-01 NOTE — TELEPHONE ENCOUNTER
Refill Routing Note   Medication(s) are not appropriate for processing by [facility] for the following reason(s):        New or recently adjusted medication  Required labs outdated    [facility] action(s):  Defer             Appointments  past 12m or future 3m with PCP    Date Provider   Last Visit   2/28/2024 EDGAR Chery MD   Next Visit   9/10/2024 EDGAR Chery MD   ED visits in past 90 days: 0        Note composed:10:06 AM 07/01/2024

## 2024-07-01 NOTE — TELEPHONE ENCOUNTER
No care due was identified.  [facility] Embedded Care Due Messages. Reference number: [id number].   7/01/2024 8:17:43 AM CDT

## 2024-07-02 DIAGNOSIS — E66.01 CLASS 2 SEVERE OBESITY DUE TO EXCESS CALORIES WITH SERIOUS COMORBIDITY AND BODY MASS INDEX (BMI) OF 35.0 TO 35.9 IN ADULT: ICD-10-CM

## 2024-07-02 DIAGNOSIS — R73.03 PREDIABETES: ICD-10-CM

## 2024-07-02 RX ORDER — SEMAGLUTIDE 0.25 MG/.5ML
0.25 INJECTION, SOLUTION SUBCUTANEOUS
Qty: 6 ML | Refills: 0 | OUTPATIENT
Start: 2024-07-02

## 2024-07-02 RX ORDER — SEMAGLUTIDE 0.25 MG/.5ML
0.25 INJECTION, SOLUTION SUBCUTANEOUS
Qty: 2 ML | Refills: 0 | OUTPATIENT
Start: 2024-07-02

## 2024-07-02 NOTE — TELEPHONE ENCOUNTER
Refill Decision Note   Teresa CazaresJaspreet  is requesting a refill authorization.  Brief Assessment and Rationale for Refill:  Quick Discontinue     Medication Therapy Plan:  PCP notified staff, Refused Today (7/2/2024): Patient needs an appointment      Comments:     Note composed:6:45 AM 07/02/2024

## 2024-07-02 NOTE — TELEPHONE ENCOUNTER
No care due was identified.  [facility] Embedded Care Due Messages. Reference number: [id number].   7/02/2024 6:31:59 AM CDT

## 2024-07-23 ENCOUNTER — OFFICE VISIT (OUTPATIENT)
Dept: INTERNAL MEDICINE | Facility: CLINIC | Age: 49
End: 2024-07-23
Payer: COMMERCIAL

## 2024-07-23 ENCOUNTER — PATIENT MESSAGE (OUTPATIENT)
Dept: INTERNAL MEDICINE | Facility: CLINIC | Age: 49
End: 2024-07-23

## 2024-07-23 DIAGNOSIS — E66.01 CLASS 2 SEVERE OBESITY DUE TO EXCESS CALORIES WITH SERIOUS COMORBIDITY AND BODY MASS INDEX (BMI) OF 35.0 TO 35.9 IN ADULT: ICD-10-CM

## 2024-07-23 DIAGNOSIS — F41.1 GENERALIZED ANXIETY DISORDER: Chronic | ICD-10-CM

## 2024-07-23 DIAGNOSIS — E66.01 CLASS 2 SEVERE OBESITY DUE TO EXCESS CALORIES WITH SERIOUS COMORBIDITY AND BODY MASS INDEX (BMI) OF 35.0 TO 35.9 IN ADULT: Primary | Chronic | ICD-10-CM

## 2024-07-23 DIAGNOSIS — R73.03 PREDIABETES: Chronic | ICD-10-CM

## 2024-07-23 DIAGNOSIS — R73.03 PREDIABETES: ICD-10-CM

## 2024-07-23 DIAGNOSIS — G89.28 OTHER CHRONIC POSTPROCEDURAL PAIN: Chronic | ICD-10-CM

## 2024-07-23 DIAGNOSIS — F41.9 RECURRENT MILD MAJOR DEPRESSIVE DISORDER WITH ANXIETY: Primary | Chronic | ICD-10-CM

## 2024-07-23 DIAGNOSIS — R63.5 WEIGHT GAIN: ICD-10-CM

## 2024-07-23 DIAGNOSIS — F33.0 RECURRENT MILD MAJOR DEPRESSIVE DISORDER WITH ANXIETY: Primary | Chronic | ICD-10-CM

## 2024-07-23 DIAGNOSIS — E66.01 CLASS 2 SEVERE OBESITY DUE TO EXCESS CALORIES WITH SERIOUS COMORBIDITY IN ADULT, UNSPECIFIED BMI: ICD-10-CM

## 2024-07-23 DIAGNOSIS — F51.04 PSYCHOPHYSIOLOGIC INSOMNIA: Chronic | ICD-10-CM

## 2024-07-23 PROCEDURE — 99215 OFFICE O/P EST HI 40 MIN: CPT | Mod: 95,,, | Performed by: FAMILY MEDICINE

## 2024-07-23 PROCEDURE — 1159F MED LIST DOCD IN RCRD: CPT | Mod: CPTII,95,, | Performed by: FAMILY MEDICINE

## 2024-07-23 PROCEDURE — 1160F RVW MEDS BY RX/DR IN RCRD: CPT | Mod: CPTII,95,, | Performed by: FAMILY MEDICINE

## 2024-07-23 PROCEDURE — G2211 COMPLEX E/M VISIT ADD ON: HCPCS | Mod: 95,,, | Performed by: FAMILY MEDICINE

## 2024-07-23 RX ORDER — GABAPENTIN 300 MG/1
900 CAPSULE ORAL NIGHTLY
Qty: 90 CAPSULE | Refills: 5 | Status: SHIPPED | OUTPATIENT
Start: 2024-07-23

## 2024-07-23 RX ORDER — CLONAZEPAM 0.5 MG/1
.25-.5 TABLET ORAL 2 TIMES DAILY PRN
Qty: 60 TABLET | Refills: 5 | Status: SHIPPED | OUTPATIENT
Start: 2024-07-23 | End: 2025-07-23

## 2024-07-23 RX ORDER — FLUOXETINE HYDROCHLORIDE 20 MG/1
20 CAPSULE ORAL DAILY
Qty: 90 CAPSULE | Refills: 3 | Status: SHIPPED | OUTPATIENT
Start: 2024-07-23

## 2024-07-23 NOTE — Clinical Note
Schedule her for fasting labs. Schedule CT Chest Without Contrast previously ordered by Dr. Avendaño. Schedule next regularly available OV with me after she has completed these tests.

## 2024-07-23 NOTE — PROGRESS NOTES
TELEMEDICINE VIRTUAL VIDEO VISIT  7/23/24  7:00 AM CDT    Visit Type: Audiovisual    Patient's Location: Teresa represents that they are located within the state of [place].    History of Present Illness    Teresa presents today to discuss current medications.    She was previously taking Wegovy for weight loss with good results, but ran out of her prescription 4 weeks ago. Since stopping Wegovy, she has gained weight back which has negatively impacted her mood. She reports her depression was well controlled until stopping Wegovy. She is currently taking fluoxetine 20 mg daily for depression management.    She takes clonazepam 0.5 mg for anxiety, currently taking 1 tablet daily although the prescription indicates 0.5 to 1 tablet twice daily. She is uncertain about increasing to twice daily dosing. She has approximately 10 tablets remaining and requires a refill.    She reports constant nerve pain in her left leg, describing it as shooting up to her hip. She relies heavily on gabapentin for pain management, taking 3 capsules at bedtime. She is concerned about her ability to continue working due to the severity of her pain and states there is significant nerve damage in her left leg.    She reports nail damage after getting her nails done with nail polish. Her nails are lifted and discolored at the edges with some black discoloration. Her nails were completely healthy before the nail treatment. She denies any prior nail issues.       Additional Narrative HPI: She reports her weight is 275 pounds, which is a significant increase of 42% compared to her weight of 193 pounds eight months ago. She describes having a large appetite and it takes her a long time to feel full when eating. Her recent dietary intake includes breakfast consisting of 6 pan sausages, 2 pieces of toast, and 12 oz of orange juice; lunch typically includes a burger, fries, and a drink; dinner consists of steak, potato, and vegetables; snacks include  chips, candy bars, and burritos; and her drinks include coffee, water, and juice. She has been exercising by walking for 39 minutes, 3 days a week. She has no personal or family history of thyroid cancer, kidney stones, seizures, or pancreatitis.    She is requesting a refill of Wegovy 0.25 mg weekly for weight loss, but she has not taken the medication for the past month as she has run out. She is uncertain if the medication has been effective and reports no side effects. Her vital signs include a systolic blood pressure of 128 mmHg, diastolic blood pressure of 72 mmHg, pulse of 62 bpm, temperature of 96.8°F, respiration rate of 17 breaths per minute, and pulse oxygen of 98%.    Her chart review reveals that she is due for a follow-up CT chest to evaluate pulmonary nodules, which was ordered by her pulmonologist and is scheduled to be done within the next month. She is also overdue for labs to monitor her metabolism, including hemoglobin A1c, comprehensive metabolic panel, and liver enzymes.    Dear Thisia,    I wanted to follow-up with you regarding your recent appointment and share some important information about your health and our next steps.    I understand that you have requested a refill of Wegovy for weight loss. However, you reported your weight is 275 pounds, which is an increase of 42% compared to your weight of 193 pounds eight months ago. Given your significant weight increase over the past eight months and your current dietary habits, I believe it might be more beneficial to explore other options and support systems to help you achieve your weight loss goals.    I have reviewed your chart and noticed that you are due for a follow-up CT scan of your chest to evaluate the pulmonary nodules. This scan should be scheduled within the next month as ordered by your pulmonologist. Additionally, you are overdue for some important lab tests to monitor your metabolism, including hemoglobin A1c, comprehensive  metabolic panel, and liver enzymes. These tests will give us a better understanding of your overall health and help us tailor a more effective treatment plan.    To support you further, I am entering a referral for you to see one of our dietitians. I'm not certain if your insurance will cover this, but it's definitely worth trying. A dietitian can work with you to develop a healthier eating plan and provide guidance on making sustainable lifestyle changes.    My staff will be reaching out to you soon to schedule the necessary lab tests and a follow-up appointment with me. Together, we will review the results and discuss the best path forward for your health and well-being.    Please don't hesitate to reach out if you have any questions or concerns. We are here to support you every step of the way.    Take care and talk to you soon.    Dr. SVEN Elliott    Review of Systems   Constitutional:  Negative for activity change and unexpected weight change.   HENT:  Negative for hearing loss, rhinorrhea and trouble swallowing.    Eyes:  Negative for discharge and visual disturbance.   Respiratory:  Negative for chest tightness and wheezing.    Cardiovascular:  Negative for chest pain and palpitations.   Gastrointestinal:  Negative for blood in stool, constipation, diarrhea and vomiting.   Endocrine: Negative for polydipsia and polyuria.   Genitourinary:  Negative for difficulty urinating, dysuria, hematuria and menstrual problem.   Musculoskeletal:  Negative for arthralgias, joint swelling and neck pain.   Neurological:  Negative for weakness and headaches.   Psychiatric/Behavioral:  Negative for confusion.        Assessment & Plan     Patient was previously started on Wegovy 0.25 mg for weight loss by Dr. Kruse, which was effective, but patient ran out of medication 4 weeks ago and has since gained weight back, contributing to worsening depression   Continued Wegovy and other medications at current doses to manage  depression, anxiety, insomnia, and chronic pain  F41.1 GENERALIZED ANXIETY DISORDER:   Discussed the importance of staying on the lowest effective dose of clonazepam to avoid dependency issues.   Continued clonazepam 0.5 mg, 0.5 to 1 tablet twice daily as needed for anxiety.   Advised the patient to stay on the lowest effective dose.   Reviewed and discussed the patient's current use of clonazepam for anxiety.  F33.0 MAJOR DEPRESSIVE DISORDER, RECURRENT, MILD:   The patient reports feeling more depressed after stopping weight loss medication and regaining weight.   Depression was well controlled until the patient had a disappointing event related to weight gain.   Continued fluoxetine 20 mg daily for depression.   Provided refills for fluoxetine prescription.  G89.22 CHRONIC POST-THORACOTOMY PAIN:   The patient reports constant pain in left leg with nerve damage, affecting ability to work.   Continued gabapentin, 3 capsules at bedtime for nerve pain.   Provided refills for gabapentin prescription.  L60.8 OTHER NAIL DISORDERS:   The patient reports nail damage after getting nails done, with lifting and discoloration.   Visually examined the patient's nails.   Assessed that the nail damage will heal on its own.   Advised the patient to let healthy new nail grow out, no medication prescribed.  M79.605 PAIN IN LEFT LEG:   The patient reports constant pain in left leg with nerve damage, affecting ability to work.   Continued gabapentin, 3 capsules at bedtime for pain management.   Provided refills for gabapentin prescription.  E66.9 OBESITY, UNSPECIFIED:   The patient reports weight loss while on medication, followed by weight gain after stopping.   Acknowledged the need for a separate process to manage obesity medicine.   The patient was previously prescribed weight loss medication (Wegovy) by Dr. Kruse.   Started Wegovy 0.25 mg daily for weight loss.  Z79.899 OTHER LONG TERM (CURRENT) DRUG THERAPY:   The patient is on  a long-term medication regimen including fluoxetine, clonazepam, and gabapentin.   The patient prefers to fill prescriptions at [facility] for better verification process.       1. Recurrent mild major depressive disorder with anxiety  -     FLUoxetine 20 MG capsule; Take 1 capsule (20 mg total) by mouth once daily.  Dispense: 90 capsule; Refill: 3    2. Generalized anxiety disorder  -     clonazePAM (KLONOPIN) 0.5 MG tablet; Take 0.5-1 tablets (0.25-0.5 mg total) by mouth 2 (two) times daily as needed for Anxiety.  Dispense: 60 tablet; Refill: 5  -     FLUoxetine 20 MG capsule; Take 1 capsule (20 mg total) by mouth once daily.  Dispense: 90 capsule; Refill: 3    3. Psychophysiologic insomnia  -     clonazePAM (KLONOPIN) 0.5 MG tablet; Take 0.5-1 tablets (0.25-0.5 mg total) by mouth 2 (two) times daily as needed for Anxiety.  Dispense: 60 tablet; Refill: 5    4. Chronic post-op pain  Overview:  After 12/2019 LLE ORIF complicated by post-op MRSA wound infection requiring hardware removal and revision, incision and drainage of abscess, completed treatment with IV vancomycin    Orders:  -     gabapentin (NEURONTIN) 300 MG capsule; Take 3 capsules (900 mg total) by mouth every evening.  Dispense: 90 capsule; Refill: 5    No other significant complaints or concerns were reported.   Today's visit involved the intricate management of episodic problem(s) and the ongoing care for the patient's serious or complex condition(s) listed above, reflecting the inherent complexity of providing longitudinal, comprehensive evaluation and management as the central hub for the patient's primary care services.  There were no vitals filed for this visit.  Physical Exam    Constitutional: No acute distress. Normal appearance. Not ill-appearing.  Pulmonary: Pulmonary effort is normal. No respiratory distress.  Skin: Skin is not jaundiced.  Neurological: Alert. Mental status is at baseline.  Psychiatric: Mood normal. Behavior normal.  "Thought content normal.         This note was generated with the assistance of ambient listening technology. Verbal consent was obtained by the patient and accompanying visitor(s) for the recording of patient appointment to facilitate this note. I attest to having reviewed and edited the generated note for accuracy, though some syntax or spelling errors may persist. Please contact the author of this note for any clarification.      I spent a total of 50 minutes today evaluating and managing this patient for this encounter.  This includes face to face time and non-face to face time preparing to see the patient (eg, review of tests), obtaining and/or reviewing separately obtained history, documenting clinical information in the electronic or other health record, independently interpreting results and communicating results to the patient/family/caregiver, or care coordinator. This time was exclusive of any separately billable procedures for this patient and exclusive of time spent treating any other patient.    Documentation entered by me for this encounter may have been done in part using speech-recognition technology. Although I have made an effort to ensure accuracy, "sound like" errors may exist and should be interpreted in context.    Each patient to whom medical services are provided by telemedicine is: (1) informed of the relationship between the physician and patient and the respective role of any other health care provider with respect to management of the patient; and (2) notified that he or she may decline to receive medical services by telemedicine and may withdraw from such care at any time.   "

## 2024-07-24 ENCOUNTER — TELEPHONE (OUTPATIENT)
Dept: INTERNAL MEDICINE | Facility: CLINIC | Age: 49
End: 2024-07-24
Payer: COMMERCIAL

## 2024-07-24 NOTE — TELEPHONE ENCOUNTER
No care due was identified.  Health [facility] Embedded Care Due Messages. Reference number: [id number].   7/24/2024 11:04:56 AM CDT

## 2024-07-24 NOTE — TELEPHONE ENCOUNTER
TO MY TEAM:   Schedule her for the things I mentioned in my message to her.  Let her know she can re-submit an E-Visit for Weight Management after she has completed all her labs and has scheduled her F/U OV with me.  Thanks.

## 2024-07-24 NOTE — TELEPHONE ENCOUNTER
----- Message from EDGAR Chery MD sent at 7/23/2024  5:35 PM CDT -----  Schedule her for fasting labs.  Schedule CT Chest Without Contrast previously ordered by Dr. Avendaño.  Schedule next regularly available OV with me after she has completed these tests.

## 2024-07-25 RX ORDER — SEMAGLUTIDE 0.25 MG/.5ML
0.25 INJECTION, SOLUTION SUBCUTANEOUS
Qty: 2 ML | Refills: 0 | OUTPATIENT
Start: 2024-07-25

## 2024-07-25 NOTE — TELEPHONE ENCOUNTER
If she wants refills, she must submit an E-Visit for Weight Management after she has completed all her labs and has scheduled her F/U OV with me. (E-Visit; not Refill Request)    Her labs are scheduled for same day as her appointment with me (9/10), which means I won't be able to act on her test results. Please have her do her labs at least 24 hours before her appointment with me.    Future Appointments   Date Time Provider Department Center   9/10/2024  7:15 AM [facility] CT1 LIMIT 500 LBS [facility] CT SCAN [facility]   9/10/2024  7:30 AM [facility] [id number] [facility] MAMMO [facility]   9/10/2024  8:05 AM LABORATORY, [facility] LAB [facility]   9/10/2024  8:20 AM EDGAR Chery MD [facility]

## 2024-07-30 ENCOUNTER — PATIENT MESSAGE (OUTPATIENT)
Dept: INTERNAL MEDICINE | Facility: CLINIC | Age: 49
End: 2024-07-30

## 2024-07-30 ENCOUNTER — CLINICAL SUPPORT (OUTPATIENT)
Dept: INTERNAL MEDICINE | Facility: CLINIC | Age: 49
End: 2024-07-30
Payer: COMMERCIAL

## 2024-07-30 DIAGNOSIS — R73.03 PREDIABETES: Chronic | ICD-10-CM

## 2024-07-30 DIAGNOSIS — E66.01 CLASS 2 SEVERE OBESITY DUE TO EXCESS CALORIES WITH SERIOUS COMORBIDITY IN ADULT, UNSPECIFIED BMI: ICD-10-CM

## 2024-07-30 DIAGNOSIS — R63.5 WEIGHT GAIN: ICD-10-CM

## 2024-07-30 PROCEDURE — 97802 MEDICAL NUTRITION INDIV IN: CPT | Mod: 95,,, | Performed by: DIETITIAN, REGISTERED

## 2024-07-30 NOTE — PROGRESS NOTES
The patient location is: [place]  The chief complaint leading to consultation is: nutrition referral    Visit type: audio only    Face to Face time with patient: 20 minutes  30 minutes of total time spent on the encounter, which includes face to face time and non-face to face time preparing to see the patient (eg, review of tests), Obtaining and/or reviewing separately obtained history, Documenting clinical information in the electronic or other health record, Independently interpreting results (not separately reported) and communicating results to the patient/family/caregiver, or Care coordination (not separately reported).         Each patient to whom he or she provides medical services by telemedicine is:  (1) informed of the relationship between the physician and patient and the respective role of any other health care provider with respect to management of the patient; and (2) notified that he or she may decline to receive medical services by telemedicine and may withdraw from such care at any time.    Notes:   Nutrition Assessment        Client name:  Tereas Hwang  :  1975  Age:  48 y.o.  Gender:  female    Client states:  referred by IJM Chery MD with a  diagnosis of:   -Weight gain  -Class 2 severe obesity due to excess calories with serious comorbidity in adult, unspecified BMI  -Prediabetes     Short appointment time due to patient's late arrival.    Reports taking 1 dose of WeGovy in the past but was unable to receive refills.  Reports physician left.     Unable to lose weight.  Reports eating out of depression and isolation with current job which has led to weight gain.  Quitting job today and starting a new job.  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 Now in size 18/20.     When at home: baked meats + vegetables// seafood  Boiled egg + toast  Salad  Limited rice  Lots of potatoes, sweet potatoes    Anthropometrics  Height:  62"     Weight:  200 lbs (self reported)  BMI:  36.7  % Body Fat:  n/a     RECENT WEIGHTS      Weight (lb) Weight (kg) BMI (Calculated)   4/12/2023 198  89.812  36.2    6/29/2023 203.04  92.1  37.1    8/4/2023 203  92.08  37.1    8/15/2023 192.46  87.3  35.2    8/21/2023 187.28  84.95  34.2    8/25/2023 192.9  87.5  35.3    8/29/2023 192  87.091  35.1    9/8/2023 192.02  87.1  35.1    10/17/2023 186  84.369  34    10/26/2023 193.78  87.9  35.4    11/2/2023 194.45  88.2  35.6    11/3/2023 193.67  87.85  35.4    11/14/2023 193  87.544  35.3          Clinical Signs/Symptoms  N/V/D:  none noted  Appetite:  good       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    Nicotine dependence, cigarettes, in remission 03/26/2019    Obesity     Personal history of COVID-19     Primary hypertension 06/29/2023    Recurrent major depression in partial remission 03/26/2019    Surgical wound infection (MRSA) 12/2019       Past Surgical History:   Procedure Laterality Date    APPLICATION OF LARGE EXTERNAL FIXATION DEVICE TO TIBIA Left 01/10/2019    Procedure: APPLICATION, EXTERNAL FIXATION DEVICE, LARGE, TIBIA;  Surgeon: Domenic Galvan MD;  Location: [facility] OR;  " Service: Orthopedics;  Laterality: Left;    APPLICATION OF WOUND VACUUM-ASSISTED CLOSURE DEVICE Left 01/17/2019    Procedure: APPLICATION, WOUND VAC;  Surgeon: Barry Guzman MD;  Location: [facility] OR;  Service: Orthopedics;  Laterality: Left;    COLONOSCOPY N/A 09/02/2022    Procedure: COLONOSCOPY;  Surgeon: Yon Ridley MD;  Location: [facility];  Service: Endoscopy;  Laterality: N/A;    FRACTURE SURGERY      C1 neck- halo    HERNIA REPAIR      HYSTERECTOMY  2009    tahbso    OOPHORECTOMY Bilateral     OPEN REDUCTION AND INTERNAL FIXATION (ORIF) OF FRACTURE OF TIBIAL PLATEAU Left 01/17/2019    Procedure: ORIF, FRACTURE, TIBIA, PLATEAU;  Surgeon: Barry Guzman MD;  Location: [facility] OR;  Service: Orthopedics;  Laterality: Left;    REMOVAL OF EXTERNAL FIXATION DEVICE Left 01/17/2019    Procedure: REMOVAL, EXTERNAL FIXATION DEVICE;  Surgeon: Barry Guzman MD;  Location: [facility] OR;  Service: Orthopedics;  Laterality: Left;    TRIGGER FINGER RELEASE Right 8/25/2023    Procedure: RELEASE, TRIGGER FINGER;  Surgeon: Sandeep Bang MD;  Location: [facility] OR;  Service: Orthopedics;  Laterality: Right;  right trigger thumb release    TUBAL LIGATION  05/22/1997    UMBILICAL HERNIA REPAIR         Medications    has a current medication list which includes the following prescription(s): ascorbic acid (vitamin c), azelastine, clonazepam, elderberry fruit, fluoxetine, fluticasone propionate, gabapentin, nicotine (polacrilex), wegovy, saline nasal, triamcinolone acetonide 0.1%, and varenicline, and the following Facility-Administered Medications: chlorhexidine.    Vitamins, Minerals, and/or Supplements:  not discussed     Food/Medication Interactions:  Reviewed     Food Allergies or Intolerances:  NKFA noted in chart     Social History    Marital status:    Employment:  yes    Social History     Tobacco Use    Smoking status: Former     Current packs/day: 0.00     Average packs/day: 1 pack/day for 33.0 years (33.0  ttl pk-yrs)     Types: Cigarettes     Start date:      Quit date:      Years since quittin.5     Passive exposure: Past    Smokeless tobacco: Never   Substance Use Topics    Alcohol use: Yes     Alcohol/week: 2.0 standard drinks of alcohol     Types: 2 Glasses of wine per week     Comment: Stopped        Lab Reports   Sodium   Date Value Ref Range Status   2023 140 136 - 145 mmol/L Final     Potassium   Date Value Ref Range Status   2023 3.7 3.5 - 5.1 mmol/L Final     Chloride   Date Value Ref Range Status   2023 104 95 - 110 mmol/L Final     CO2   Date Value Ref Range Status   2023 27 23 - 29 mmol/L Final     Glucose   Date Value Ref Range Status   2023 81 70 - 110 mg/dL Final     BUN   Date Value Ref Range Status   2023 16 6 - 20 mg/dL Final     Creatinine   Date Value Ref Range Status   2023 0.7 0.5 - 1.4 mg/dL Final     Calcium   Date Value Ref Range Status   2023 9.0 8.7 - 10.5 mg/dL Final     Total Protein   Date Value Ref Range Status   2023 6.7 6.0 - 8.4 g/dL Final     Albumin   Date Value Ref Range Status   2023 4.2 3.5 - 5.2 g/dL Final     Total Bilirubin   Date Value Ref Range Status   2023 0.5 0.1 - 1.0 mg/dL Final     Comment:     For infants and newborns, interpretation of results should be based  on gestational age, weight and in agreement with clinical  observations.    Premature Infant recommended reference ranges:  Up to 24 hours.............<8.0 mg/dL  Up to 48 hours............<12.0 mg/dL  3-5 days..................<15.0 mg/dL  6-29 days.................<15.0 mg/dL       Alkaline Phosphatase   Date Value Ref Range Status   2023 61 55 - 135 U/L Final     AST   Date Value Ref Range Status   2023 17 10 - 40 U/L Final     ALT   Date Value Ref Range Status   2023 14 10 - 44 U/L Final     Anion Gap   Date Value Ref Range Status   2023 9 8 - 16 mmol/L Final     eGFR if    Date Value Ref  Range Status   12/10/2021 >60 >60 mL/min/1.73 m^2 Final     eGFR if non    Date Value Ref Range Status   12/10/2021 >60 >60 mL/min/1.73 m^2 Final     Comment:     Calculation used to obtain the estimated glomerular filtration  rate (eGFR) is the CKD-EPI equation.         Lab Results   Component Value Date    WBC 12.39 08/11/2023    HGB 12.6 08/11/2023    HCT 38.8 08/11/2023    MCV 92 08/11/2023     08/11/2023        Lab Results   Component Value Date    CHOL 167 06/30/2023     Lab Results   Component Value Date    HDL 76 (H) 06/30/2023     Lab Results   Component Value Date    LDLCALC 79 06/30/2023     Lab Results   Component Value Date    TRIG 27 (L) 06/30/2023     Lab Results   Component Value Date    CHOLHDL 45.5 06/30/2023     Lab Results   Component Value Date    HGBA1C 5.5 06/30/2023     BP Readings from Last 1 Encounters:   11/03/23 124/74       Food History  Provided above    Exercise History:  provided above    Cultural/Spiritual/Personal Preferences:  None identified    Support System:  none mentioned    State of Change:  Contemplation    Barriers to Change:  none    Diagnosis    obesity related to excess energy intake as evidenced by BMI 36.    Intervention    RMR (Method:  [place] St. Jeor):  1485 kcal  Activity Factor:  1.2    JANEE:  1782 - 300 = 1482 kcal    Goals:  1.  Gradually reduce intake of caffeine.  2.  100 oz water per day  3.  Switch to body armor lyte instead of current choice  4. Adequate daily protein intake. Aim for minimum 20 grams with each meal    Nutrition Education  The following education was provided to the patient:  Discussed weight management.  Suggested dietary modifications based on current dietary behaviors and individual food preferences.  Discussed importance of small behavior/habit changes in improving long-term adherence and sustainability.    Patient verbalized understanding of nutrition education and recommendations received.    Handouts Provided,  sent via message  Meal/Snack Ideas  Eat Fit Shopping List  Fueling Well On-The-Go    Monitoring/Evaluation    Monitor the following:  Weight  BMI  Caloric intake    Follow Up Plan:  as needed

## 2024-08-21 ENCOUNTER — ON-DEMAND VIRTUAL (OUTPATIENT)
Dept: URGENT CARE | Facility: CLINIC | Age: 49
End: 2024-08-21
Payer: COMMERCIAL

## 2024-08-21 DIAGNOSIS — J06.9 UPPER RESPIRATORY TRACT INFECTION, UNSPECIFIED TYPE: Primary | ICD-10-CM

## 2024-08-21 PROCEDURE — 99213 OFFICE O/P EST LOW 20 MIN: CPT | Mod: 95,,, | Performed by: NURSE PRACTITIONER

## 2024-08-21 RX ORDER — BENZONATATE 100 MG/1
100 CAPSULE ORAL 3 TIMES DAILY PRN
Qty: 60 CAPSULE | Refills: 0 | Status: SHIPPED | OUTPATIENT
Start: 2024-08-21 | End: 2024-09-10

## 2024-08-21 NOTE — PROGRESS NOTES
Subjective:      Patient ID: Teresa Hwang is a 48 y.o. female.    Vitals:  vitals were not taken for this visit.     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    Nicotine dependence, cigarettes, in remission 03/26/2019    Obesity     Personal history of COVID-19     Primary hypertension 06/29/2023    Recurrent major depression in partial remission 03/26/2019    Surgical wound infection (MRSA) 12/2019     Past Surgical History:   Procedure Laterality Date    APPLICATION OF LARGE EXTERNAL FIXATION DEVICE TO TIBIA Left 01/10/2019    Procedure: APPLICATION, EXTERNAL FIXATION DEVICE, LARGE, TIBIA;  Surgeon: Domenic Galvan MD;  Location: [facility];  Service: Orthopedics;  Laterality: Left;    APPLICATION OF WOUND VACUUM-ASSISTED CLOSURE DEVICE Left 01/17/2019    Procedure: APPLICATION, WOUND VAC;  Surgeon: Barry Guzman MD;  Location: [facility];  Service: Orthopedics;  Laterality: Left;    COLONOSCOPY N/A 09/02/2022    Procedure: COLONOSCOPY;  Surgeon: Yon Ridley MD;  Location: [facility];  Service: Endoscopy;  Laterality: N/A;    FRACTURE SURGERY      C1 neck- halo    HERNIA REPAIR      HYSTERECTOMY  2009    tahbso    OOPHORECTOMY Bilateral     OPEN REDUCTION AND INTERNAL FIXATION (ORIF) OF FRACTURE OF TIBIAL PLATEAU Left 01/17/2019    Procedure: ORIF, FRACTURE, TIBIA, PLATEAU;  Surgeon: Barry Guzman MD;  Location: [facility] OR;  Service: Orthopedics;  Laterality: Left;    REMOVAL OF EXTERNAL FIXATION DEVICE Left 01/17/2019    Procedure: REMOVAL, EXTERNAL FIXATION DEVICE;  Surgeon: Barry Guzman MD;  Location: [facility];  Service: Orthopedics;  Laterality:  Left;    TRIGGER FINGER RELEASE Right 8/25/2023    Procedure: RELEASE, TRIGGER FINGER;  Surgeon: Sandeep Bang MD;  Location: [facility];  Service: Orthopedics;  Laterality: Right;  right trigger thumb release    TUBAL LIGATION  05/22/1997    UMBILICAL HERNIA REPAIR       Review of patient's allergies indicates:   Allergen Reactions    Macrobid [nitrofurantoin monohyd/m-cryst] Hives    Bactrim [sulfamethoxazole-trimethoprim]     Flagyl [metronidazole hcl] Hives     Current Outpatient Medications on File Prior to Visit   Medication Sig Dispense Refill    ascorbic acid, vitamin C, (VITAMIN C) 500 MG tablet Take 500 mg by mouth once daily.      azelastine (ASTELIN) 137 mcg (0.1 %) nasal spray 1 spray (137 mcg total) by Nasal route 2 (two) times daily. 30 mL 0    clonazePAM (KLONOPIN) 0.5 MG tablet Take 0.5-1 tablets (0.25-0.5 mg total) by mouth 2 (two) times daily as needed for Anxiety. 60 tablet 5    ELDERBERRY FRUIT ORAL Take 1 capsule by mouth once daily.      FLUoxetine 20 MG capsule Take 1 capsule (20 mg total) by mouth once daily. 90 capsule 3    fluticasone propionate (FLONASE) 50 mcg/actuation nasal spray 1 spray (50 mcg total) by Each Nostril route once daily. 16 g 0    gabapentin (NEURONTIN) 300 MG capsule Take 3 capsules (900 mg total) by mouth every evening. 90 capsule 5    nicotine, polacrilex, (NICORETTE) 2 mg Gum Take 1 each (2 mg total) by mouth as needed (for cigarette cravings). Use as directed on packaging. 100 each 5    semaglutide, weight loss, (WEGOVY) 0.25 mg/0.5 mL PnIj Inject 0.25 mg into the skin every 7 days. 2 mL 0    sodium chloride (SALINE NASAL) 0.65 % nasal spray Use as directed on product packaging 60 mL 5    triamcinolone acetonide 0.1% (KENALOG) 0.1 % cream Apply topically 2 (two) times daily. for 10 days 28.4 g 0    varenicline (CHANTIX) 1 mg Tab Take 1 tablet by mouth twice daily 60 tablet 3     Current Facility-Administered Medications on File Prior to Visit   Medication  Dose Route Frequency Provider Last Rate Last Admin    chlorhexidine 0.12 % solution 10 mL  10 mL Mouth/Throat On Call Procedure Xenia Mancilla PA-C   10 mL at 08/25/23 0821     Family History   Problem Relation Name Age of Onset    Breast cancer Mother Amrinda Maldonado 58    Cancer Mother Arminda Maldonado         Breast cancer    Depression Mother Arminda Maldonado         Depression    Diabetes Mother Arminda Maldonado     Breast cancer Sister Lizzy Cazares 46    Cancer Sister Lizzy Cazares         Breast cancer    No Known Problems Father      Breast cancer Paternal Grandmother         Medications Ordered                [facility] [address] [place], [place] [address] [facility]   [address] [facility] [address]    Telephone: [phone]   Fax: [phone]   Hours: Not open 24 hours                         E-Prescribed (1 of 1)              benzonatate (TESSALON) 100 MG capsule    Sig: Take 1 capsule (100 mg total) by mouth 3 (three) times daily as needed for Cough. May take 1-2 caps 3 times daily as needed.       Start: 8/21/24     Quantity: 60 capsule Refills: 0                           Ohs Peq Odvv Intake    8/21/2024  7:09 AM CDT - Filed by Patient   What is your current physical address in the event of a medical emergency? [address] [place] [id number]   Are you able to take your vital signs? No   Please attach any relevant images or files          2 days of sore throat and cough. COVID negative yesterday. Taking Nyquil with little relief. Needs a work excuse.    Sore Throat   Associated symptoms include congestion and coughing. Pertinent negatives include no diarrhea, ear pain, headaches, shortness of breath, trouble swallowing or vomiting.   Cough  Associated symptoms include chills, postnasal drip and a sore throat. Pertinent negatives include no ear pain, fever, headaches, myalgias, shortness of breath or wheezing.       Constitution: Positive for chills. Negative for fever.   HENT:  Positive for congestion,  postnasal drip and sore throat. Negative for ear pain, trouble swallowing and voice change.    Respiratory:  Positive for cough and sputum production. Negative for shortness of breath and wheezing.    Gastrointestinal:  Negative for nausea, vomiting and diarrhea.   Musculoskeletal:  Negative for muscle ache.   Allergic/Immunologic: Positive for sneezing.   Neurological:  Negative for headaches.        Objective:   The physical exam was conducted virtually.  Physical Exam   Constitutional: She is oriented to person, place, and time. She does not appear ill. No distress.   HENT:   Head: Normocephalic and atraumatic.   Nose: Nose normal.   Eyes: Extraocular movement intact   Pulmonary/Chest: Effort normal.   Abdominal: Normal appearance.   Musculoskeletal: Normal range of motion.         General: Normal range of motion.   Neurological: no focal deficit. She is alert and oriented to person, place, and time.   Psychiatric: Her behavior is normal. Mood normal.   Vitals reviewed.      Assessment:     1. Upper respiratory tract infection, unspecified type        Plan:   Further testing recommended.    Patient encouraged to monitor symptoms closely and instructed to follow-up for new or worsening symptoms. Further, in-person, evaluation may be necessary for continued treatment. Please follow up with your primary care doctor or specialist as needed. Verbally discussed plan. Patient confirms understanding and is in agreement with treatment and plan.     You must understand that you've received a Virtual Care evaluation only and that you may be released before all your medical problems are known or treated. You, the patient, will arrange for follow up care as instructed.      Upper respiratory tract infection, unspecified type  -     benzonatate (TESSALON) 100 MG capsule; Take 1 capsule (100 mg total) by mouth 3 (three) times daily as needed for Cough. May take 1-2 caps 3 times daily as needed.  Dispense: 60 capsule; Refill:  0             Patient Instructions   OVER THE COUNTER RECOMMENDATIONS/SUGGESTIONS (IF NO CONTRAINDICATIONS).     ·         Make sure to stay well hydrated.     ·         Use Nasal Saline to mechanically move any post nasal drip from your eustachian tube or from the back of your throat.     ·         Use warm saltwater gargles to ease your throat pain. Warm saltwater gargles as needed for sore throat-  1/2 tsp salt to 1 cup warm water, gargle as desired. Warm fluids tend to relieve a sore throat.     .         Throat lozenges, Chloraseptic spray or other over the counter treatments are ok to use as well. Use as directed.     ·         Use an antihistamine such as Claritin, Zyrtec or Allegra to dry you out.     ·         Use pseudoephedrine (behind the counter) to decongest. Pseudoephedrine  30 mg up to 240 mg /day. It can raise your blood pressure and give you palpitations.     ·         Use Mucinex (guaifenesin) to break up mucous up to 2400mg/day to loosen any mucous.     ·         The Mucinex DM pill has a cough suppressant that can be sedating. It can be used at night to stop the tickle at the back of your throat.     ·         You can use Mucinex D (it has guaifenesin and a high dose of pseudoephedrine) in the mornings to help decongest.     ·         Use Afrin (oxymetazoline) in each nare for no longer than 3 days, as it is addictive. It can also dry out your mucous membranes and cause elevated blood pressure. This is especially useful if you are flying.     ·         Use Flonase 1-2 sprays/nostril per day. It is a local acting steroid nasal spray, if you develop a bloody nose, stop using the medication immediately.     ·         Sometimes Nyquil at night is beneficial to help you get some rest, however it is sedating, and it does have an antihistamine, and Tylenol.     ·         Honey is a natural cough suppressant that can be used.     ·         Tylenol up to 4,000 mg a day is safe for short periods and can  be used for body aches, pain, and fever. However, in high doses and prolonged use it can cause liver irritation.     ·         Ibuprofen is a non-steroidal anti-inflammatory that can be used for body aches, pain, and fever. However, it can also cause stomach irritation if overused.

## 2024-08-21 NOTE — LETTER
August 21, 2024    Teresa Hwang  [address] [place] [address]             Virtual Visit - Urgent Care  Urgent Care  [address] [facility] [id number]   August 21, 2024     Patient: Teresa Hwang   YOB: 1975   Date of Visit: 8/21/2024       To Whom it May Concern:    Teresa Hwang was seen virtually on 8/21/2024. She may return to work provided symptoms have improved and she has been fever free for 24 hours without taking fever reducing medications.    Please excuse her from any classes or work missed.    If you have any questions or concerns, please don't hesitate to call.    Sincerely,         Kaela Don, NP

## 2024-08-23 ENCOUNTER — OFFICE VISIT (OUTPATIENT)
Dept: FAMILY MEDICINE | Facility: CLINIC | Age: 49
End: 2024-08-23
Attending: FAMILY MEDICINE
Payer: COMMERCIAL

## 2024-08-23 DIAGNOSIS — F17.211 CIGARETTE NICOTINE DEPENDENCE IN REMISSION: ICD-10-CM

## 2024-08-23 DIAGNOSIS — J30.2 SEASONAL ALLERGIC RHINITIS, UNSPECIFIED TRIGGER: Primary | ICD-10-CM

## 2024-08-23 RX ORDER — FLUTICASONE PROPIONATE 50 MCG
2 SPRAY, SUSPENSION (ML) NASAL DAILY
Qty: 16 G | Refills: 0 | Status: SHIPPED | OUTPATIENT
Start: 2024-08-23

## 2024-08-23 RX ORDER — METHYLPREDNISOLONE 4 MG/1
TABLET ORAL
Qty: 1 EACH | Refills: 0 | Status: SHIPPED | OUTPATIENT
Start: 2024-08-23

## 2024-08-23 NOTE — PROGRESS NOTES
Teresa Renuka Hwang    Chief Complaint   Patient presents with    URI       History of Present Illness:   The patient location is: In car (In [place])  The chief complaint leading to consultation is: Cold Symptoms    Visit type: audiovisual    Face to Face time with patient: 25 minutes with 25 minutes of total time spent on the encounter, which includes face to face time and non-face to face time preparing to see the patient (eg, review of tests), Obtaining and/or reviewing separately obtained history, Documenting clinical information in the electronic or other health record, Independently interpreting results (not separately reported) and communicating results to the patient/family/caregiver, or Care coordination (not separately reported).         Each patient to whom he or she provides medical services by telemedicine is:  (1) informed of the relationship between the physician and patient and the respective role of any other health care provider with respect to management of the patient; and (2) notified that he or she may decline to receive medical services by telemedicine and may withdraw from such care at any time.    Notes:     Ms. Hwang visits with me today with complaint of having cold symptoms.  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  She states while working she often gets wet from sweating in the heat but sleeps under AC when she gets home.  She states on Saturday night she awakened with sore throat, stopped up ears with ear ache, runny nose, headache, and cough productive of mucus.  She states she has been taking Luz Marina aspirin for headache and Benadryl and NyQuil for sinus symptoms with little help.  She states she no longer smokes cigarettes but takes Chantix daily to help for smoking cessation.  She states she took home COVID test on Tuesday of this week with negative result.  She states she had virtual visit on August 21, 2024 and was  prescribed Tessalon Perles for cough for treatment of URI and states she was told to wait a couple of days to take another home COVID test if not feeling well.  She states she was given a day off from work.  As she did not feel good today, she states she left work and recently took another home COVID tests which was also negative.  She reports no history of diabetes or hypertension.    Otherwise, she denies having fever, chills, fatigue, appetite changes; shortness of breath, wheezing; chest pain, palpitations, leg swelling; other sinus symptoms; abdominal pain, nausea, vomiting, diarrhea, constipation; unusual urinary symptoms; polydipsia, polyphagia, polyuria, hot or cold intolerance; back pain; anxiety, depression, homicidal or suicidal thoughts.      She is followed by PCP Dr. JIM Chery.       Sore Throat   This is a new problem. The current episode started in the past 7 days. The problem has been unchanged. The pain is worse on the right side. There has been no fever. The pain is mild. Associated symptoms include coughing, ear pain, headaches and a plugged ear sensation. Pertinent negatives include no abdominal pain, congestion, diarrhea, drooling, ear discharge, hoarse voice, shortness of breath, swollen glands, trouble swallowing or vomiting. She has tried NSAIDs and gargles for the symptoms. The treatment provided no relief.       Current Outpatient Medications   Medication Sig    ascorbic acid, vitamin C, (VITAMIN C) 500 MG tablet Take 500 mg by mouth once daily.    benzonatate (TESSALON) 100 MG capsule Take 1 capsule (100 mg total) by mouth 3 (three) times daily as needed for Cough. May take 1-2 caps 3 times daily as needed.    clonazePAM (KLONOPIN) 0.5 MG tablet Take 0.5-1 tablets (0.25-0.5 mg total) by mouth 2 (two) times daily as needed for Anxiety.    ELDERBERRY FRUIT ORAL Take 1 capsule by mouth once daily.    FLUoxetine 20 MG capsule Take 1 capsule (20 mg total) by mouth once daily.    gabapentin  (NEURONTIN) 300 MG capsule Take 3 capsules (900 mg total) by mouth every evening.    varenicline (CHANTIX) 1 mg Tab Take 1 tablet by mouth twice daily    azelastine (ASTELIN) 137 mcg (0.1 %) nasal spray 1 spray (137 mcg total) by Nasal route 2 (two) times daily. (Patient not taking: Reported on 8/23/2024)    fluticasone propionate (FLONASE) 50 mcg/actuation nasal spray 2 sprays (100 mcg total) by Each Nostril route once daily.    methylPREDNISolone (MEDROL DOSEPACK) 4 mg tablet use as directed    nicotine, polacrilex, (NICORETTE) 2 mg Gum Take 1 each (2 mg total) by mouth as needed (for cigarette cravings). Use as directed on packaging. (Patient not taking: Reported on 8/23/2024)    semaglutide, weight loss, (WEGOVY) 0.25 mg/0.5 mL PnIj Inject 0.25 mg into the skin every 7 days. (Patient not taking: Reported on 8/23/2024)    sodium chloride (SALINE NASAL) 0.65 % nasal spray Use as directed on product packaging (Patient not taking: Reported on 8/23/2024)    triamcinolone acetonide 0.1% (KENALOG) 0.1 % cream Apply topically 2 (two) times daily. for 10 days     No current facility-administered medications for this visit.     Facility-Administered Medications Ordered in Other Visits   Medication    chlorhexidine 0.12 % solution 10 mL         Review of Systems   Constitutional:  Negative for activity change, appetite change, chills, fatigue and fever.   HENT:  Positive for ear pain, rhinorrhea and sore throat. Negative for congestion, drooling, ear discharge, hoarse voice, postnasal drip, sinus pressure, sinus pain, sneezing and trouble swallowing.    Respiratory:  Positive for cough. Negative for shortness of breath.    Gastrointestinal:  Negative for abdominal pain, diarrhea and vomiting.   Endocrine: Negative for cold intolerance, heat intolerance, polydipsia, polyphagia and polyuria.   Genitourinary:  Negative for menstrual problem.   Musculoskeletal:  Negative for back pain.   Neurological:  Positive for headaches.    Psychiatric/Behavioral:  Negative for dysphoric mood and suicidal ideas. The patient is not nervous/anxious.         Negative for homicidal ideas.       Objective:  Physical Exam  Constitutional:       General: She is not in acute distress.     Appearance: Normal appearance. She is not ill-appearing, toxic-appearing or diaphoretic.      Comments: Pleasant.   Eyes:      Comments: She wears glasses.   Pulmonary:      Effort: Pulmonary effort is normal. No respiratory distress.   Musculoskeletal:         General: Normal range of motion.      Comments: She sits in car during virtual visit today.   Neurological:      General: No focal deficit present.      Mental Status: She is alert and oriented to person, place, and time.   Psychiatric:         Mood and Affect: Mood normal.         Behavior: Behavior normal.         Thought Content: Thought content normal.         Judgment: Judgment normal.         ASSESSMENT:  1. Seasonal allergic rhinitis, unspecified trigger    2. Cigarette nicotine dependence in remission        PLAN:  Thisia was seen today for uri.    Diagnoses and all orders for this visit:    Seasonal allergic rhinitis, unspecified trigger  -     methylPREDNISolone (MEDROL DOSEPACK) 4 mg tablet; use as directed  -     fluticasone propionate (FLONASE) 50 mcg/actuation nasal spray; 2 sprays (100 mcg total) by Each Nostril route once daily.    Cigarette nicotine dependence in remission      Continue current medications, follow low sodium, low cholesterol, low carb diet, daily walks.  Medrol dose pack as directed; medication precautions discussed with patient.  Prescription refill as noted above.  Work excuse for 8/23/2024 - 8/25/2024 with return 8/26/2024.  Follow up if symptoms worsen or fail to improve.

## 2024-09-10 ENCOUNTER — HOSPITAL ENCOUNTER (OUTPATIENT)
Dept: RADIOLOGY | Facility: HOSPITAL | Age: 49
Discharge: HOME | End: 2024-09-10
Attending: FAMILY MEDICINE
Payer: COMMERCIAL

## 2024-09-10 ENCOUNTER — HOSPITAL ENCOUNTER (OUTPATIENT)
Dept: RADIOLOGY | Facility: HOSPITAL | Age: 49
Discharge: HOME | End: 2024-09-10
Attending: INTERNAL MEDICINE
Payer: COMMERCIAL

## 2024-09-10 ENCOUNTER — OFFICE VISIT (OUTPATIENT)
Dept: INTERNAL MEDICINE | Facility: CLINIC | Age: 49
End: 2024-09-10
Payer: COMMERCIAL

## 2024-09-10 ENCOUNTER — OFFICE VISIT (OUTPATIENT)
Dept: OTOLARYNGOLOGY | Facility: CLINIC | Age: 49
End: 2024-09-10
Payer: COMMERCIAL

## 2024-09-10 VITALS
HEART RATE: 73 BPM | TEMPERATURE: 98 F | DIASTOLIC BLOOD PRESSURE: 82 MMHG | BODY MASS INDEX: 35.49 KG/M2 | HEIGHT: 62 IN | SYSTOLIC BLOOD PRESSURE: 126 MMHG | WEIGHT: 231.5 LBS | OXYGEN SATURATION: 99 % | BODY MASS INDEX: 42.6 KG/M2 | HEIGHT: 62 IN | WEIGHT: 192.88 LBS

## 2024-09-10 VITALS — WEIGHT: 230.81 LBS | BODY MASS INDEX: 42.47 KG/M2 | HEIGHT: 62 IN

## 2024-09-10 DIAGNOSIS — Z23 NEED FOR INFLUENZA VACCINATION: ICD-10-CM

## 2024-09-10 DIAGNOSIS — E04.1 THYROID NODULE: Primary | ICD-10-CM

## 2024-09-10 DIAGNOSIS — Z12.31 ENCOUNTER FOR SCREENING MAMMOGRAM FOR MALIGNANT NEOPLASM OF BREAST: ICD-10-CM

## 2024-09-10 DIAGNOSIS — F33.0 RECURRENT MILD MAJOR DEPRESSIVE DISORDER WITH ANXIETY: Chronic | ICD-10-CM

## 2024-09-10 DIAGNOSIS — F41.1 GENERALIZED ANXIETY DISORDER: Primary | Chronic | ICD-10-CM

## 2024-09-10 DIAGNOSIS — R91.1 PULMONARY NODULE 1 CM OR GREATER IN DIAMETER: ICD-10-CM

## 2024-09-10 DIAGNOSIS — R04.0 EPISTAXIS: ICD-10-CM

## 2024-09-10 DIAGNOSIS — F41.9 RECURRENT MILD MAJOR DEPRESSIVE DISORDER WITH ANXIETY: Chronic | ICD-10-CM

## 2024-09-10 DIAGNOSIS — J30.89 NON-SEASONAL ALLERGIC RHINITIS, UNSPECIFIED TRIGGER: ICD-10-CM

## 2024-09-10 DIAGNOSIS — F17.210 NICOTINE DEPENDENCE, CIGARETTES, UNCOMPLICATED: Chronic | ICD-10-CM

## 2024-09-10 DIAGNOSIS — F51.04 PSYCHOPHYSIOLOGIC INSOMNIA: Chronic | ICD-10-CM

## 2024-09-10 DIAGNOSIS — E04.1 THYROID NODULE: ICD-10-CM

## 2024-09-10 PROBLEM — F17.211 CIGARETTE NICOTINE DEPENDENCE IN REMISSION: Chronic | Status: ACTIVE | Noted: 2023-08-15

## 2024-09-10 PROCEDURE — 3044F HG A1C LEVEL LT 7.0%: CPT | Mod: CPTII,S$GLB,, | Performed by: FAMILY MEDICINE

## 2024-09-10 PROCEDURE — 31238 NSL/SINS NDSC SRG NSL HEMRRG: CPT | Mod: LT,S$GLB,, | Performed by: OTOLARYNGOLOGY

## 2024-09-10 PROCEDURE — 71250 CT THORAX DX C-: CPT | Mod: 26,,, | Performed by: RADIOLOGY

## 2024-09-10 PROCEDURE — 99999 PR PBB SHADOW E&M-EST. PATIENT-LVL IV: CPT | Mod: PBBFAC,,, | Performed by: OTOLARYNGOLOGY

## 2024-09-10 PROCEDURE — 71250 CT THORAX DX C-: CPT | Mod: TC

## 2024-09-10 PROCEDURE — 77063 BREAST TOMOSYNTHESIS BI: CPT | Mod: TC

## 2024-09-10 PROCEDURE — 77067 SCR MAMMO BI INCL CAD: CPT | Mod: TC

## 2024-09-10 PROCEDURE — 99214 OFFICE O/P EST MOD 30 MIN: CPT | Mod: 25,S$GLB,, | Performed by: OTOLARYNGOLOGY

## 2024-09-10 PROCEDURE — 3008F BODY MASS INDEX DOCD: CPT | Mod: CPTII,S$GLB,, | Performed by: FAMILY MEDICINE

## 2024-09-10 PROCEDURE — 99999 PR PBB SHADOW E&M-EST. PATIENT-LVL V: CPT | Mod: PBBFAC,,, | Performed by: FAMILY MEDICINE

## 2024-09-10 PROCEDURE — 90471 IMMUNIZATION ADMIN: CPT | Mod: S$GLB,,, | Performed by: FAMILY MEDICINE

## 2024-09-10 PROCEDURE — 90656 IIV3 VACC NO PRSV 0.5 ML IM: CPT | Mod: S$GLB,,, | Performed by: FAMILY MEDICINE

## 2024-09-10 PROCEDURE — 3008F BODY MASS INDEX DOCD: CPT | Mod: CPTII,S$GLB,, | Performed by: OTOLARYNGOLOGY

## 2024-09-10 PROCEDURE — 77063 BREAST TOMOSYNTHESIS BI: CPT | Mod: 26,,, | Performed by: RADIOLOGY

## 2024-09-10 PROCEDURE — 1159F MED LIST DOCD IN RCRD: CPT | Mod: CPTII,S$GLB,, | Performed by: OTOLARYNGOLOGY

## 2024-09-10 PROCEDURE — 3074F SYST BP LT 130 MM HG: CPT | Mod: CPTII,S$GLB,, | Performed by: FAMILY MEDICINE

## 2024-09-10 PROCEDURE — 99214 OFFICE O/P EST MOD 30 MIN: CPT | Mod: 25,S$GLB,, | Performed by: FAMILY MEDICINE

## 2024-09-10 PROCEDURE — 3079F DIAST BP 80-89 MM HG: CPT | Mod: CPTII,S$GLB,, | Performed by: FAMILY MEDICINE

## 2024-09-10 PROCEDURE — 1160F RVW MEDS BY RX/DR IN RCRD: CPT | Mod: CPTII,S$GLB,, | Performed by: OTOLARYNGOLOGY

## 2024-09-10 PROCEDURE — 77067 SCR MAMMO BI INCL CAD: CPT | Mod: 26,,, | Performed by: RADIOLOGY

## 2024-09-10 RX ORDER — BUSPIRONE HYDROCHLORIDE 15 MG/1
15 TABLET ORAL 2 TIMES DAILY
Qty: 60 TABLET | Refills: 1 | Status: SHIPPED | OUTPATIENT
Start: 2024-09-10

## 2024-09-10 RX ORDER — CLONAZEPAM 0.5 MG/1
.25-.5 TABLET ORAL 2 TIMES DAILY PRN
Qty: 60 TABLET | Refills: 5 | Status: SHIPPED | OUTPATIENT
Start: 2024-09-10 | End: 2025-09-10

## 2024-09-10 RX ORDER — VARENICLINE TARTRATE 1 MG/1
1 TABLET, FILM COATED ORAL 2 TIMES DAILY
Qty: 60 TABLET | Refills: 3 | Status: SHIPPED | OUTPATIENT
Start: 2024-09-10

## 2024-09-10 RX ORDER — FLUOXETINE HYDROCHLORIDE 20 MG/1
20 CAPSULE ORAL DAILY
Qty: 90 CAPSULE | Refills: 3 | Status: SHIPPED | OUTPATIENT
Start: 2024-09-10

## 2024-09-10 NOTE — PROGRESS NOTES
"Referring Provider:    No referring provider defined for this encounter.  Subjective:   Patient: Teresa Hwang [id number], :1975   Visit date:9/10/2024 11:27 AM    Chief Complaint:  Epistaxis (Nose bleeds started again about 1 month ago. Patient states her last nose bleed was about 3 days ago. L nostril.)    HPI:    Prior notes reviewed by myself.  Clinical documentation obtained by nursing staff reviewed.      49-year-old female presents for evaluation of recurrent left-sided epistaxis.  This started about 1 month ago and she has had multiple episodes.  She is able to get the bleeding to stop with gentle pressure and Kleenex.  She does have a prior history of cauterization roughly 1 year ago which was effective.    She feels that her blood pressure has been stable and she is not on any anticoagulation.  She denies any recent trauma      Objective:     Physical Exam:  Vitals:  Ht 5' 2" (1.575 m)   Wt 104.7 kg (230 lb 13.2 oz)   BMI 42.22 kg/m²   General appearance:  Well developed, well nourished    Ears:  Otoscopy of external auditory canals and tympanic membranes was normal, clinical speech reception thresholds grossly intact, no mass/lesion of auricle.    Nose:  No masses/lesions of external nose, nasal mucosa with prominent vessels along the left anterior and mid septum, septum, and turbinates were within normal limits.    Mouth:  No mass/lesion of lips, teeth, gums, hard/soft palate, tongue, tonsils, or oropharynx.    Neck & Lymphatics:  No cervical lymphadenopathy, no neck mass/crepitus/ asymmetry, trachea is midline, possible thyroid nodule.    PROCEDURE NOTE:  Diagnostic nasal endoscopy with control of epistaxis  Preprocedure diagnosis:  Chronic sinusitis  Postprocedure diangosis:  Same  Complications:  None  Blood Loss:  None    Procedure in detail:  After verbal consent was obtained, the patient's nasal cavity was anesthesized using topical Tetracaine and Neosynepherine.  A rigid 30 " degree endoscope was placed in first the right, then the left nasal cavity.  The inferior and middle turbinates were examined, and found to be normal bilaterally.  The middle meatus and maxillary antrum was also examined, and found to be normal bilaterally.  No purulent drainage or masses seen. The superior meatus as well as the sphenoethmoid recess were also inspected and noted to be free of purulent drainage, masses or other pathology.  Prominent vessels were noted along the left anterior and mid septum and these were cauterized using silver nitrate cautery. The patient tolerated the procedure well and there were no complications.    [x]  Data Reviewed:    Lab Results   Component Value Date    WBC 12.39 08/11/2023    HGB 12.6 08/11/2023    HCT 38.8 08/11/2023    MCV 92 08/11/2023    EOSINOPHIL 1.9 08/11/2023         Discussed with Dr. Chery      Assessment & Plan:   Thyroid nodule  -     US Thyroid; Future; Expected date: 09/10/2024    Epistaxis    Non-seasonal allergic rhinitis, unspecified trigger       Cauterization performed without incident.  I discussed her exam with Dr. Chery who feels that she may have a small thyroid nodule.  We agreed to move forward with a thyroid ultrasound.  F/U 1 month    Nose Bleed Instructions:  We had a long discussion regarding the importance of nasal moisture, and the use of a nasal saline spray or gel into nose four times daily to keep moist.    Bactroban ointment in nostril twice daily if ordered.  Do not sleep or sit for long periods of time under a ceiling fan or other source of aggressive airflow.  Use a humidifier in bedroom or any room in your home you spend long periods of time.  Engage in only light activity. No strenuous activity. No heavy lifting or straining.   Use a stool softener to avoid straining.   No bending over at the hips. Keep nose above your heart at all times.  Sneeze with an open mouth to reduce pressure from nose.   Avoid foods or drinks hot  in temperature for at least 48 hours then progress slowly.  Avoid hot steamy showers or baths for one week.    Home Treatment of Epistaxis    The biggest mistake people make when treating a nosebleed is to lean their head back. Epistaxis, the medical term for nosebleeds, is a mild ailment that can often be treated at home. The first step in treating nosebleeds is to recognize the etiology. Nosebleeds can occur due to:    Age  High blood pressure  Daily aspirin or blood thinner  Bleeding disorder  Common cold  Allergies  Low humidity and dry weather  Dependence on oxygen treatment  Deviated septum  Smoking  Alcohol abuse  Kidney or liver disease  Drug use (sniffing)  Frequent nose picking  Trauma to nose  Prevention of nosebleeds would be controlling or managing the risks above (i.e., humidifier, quit smoking, saline nasal spray, etc.). If a nosebleed does occur, we recommend the following steps for at-home treatment.    Stay calm- an increase in heart rate can cause more bleeding  2.   Keep your head above your heart- remain standing or sit-up  3.  Lean forward slightly - so the bleeding does not go down the back of your throat  4.  Pinch your nose at the lower portion (where your nostrils flare out) to provide pressure at the bleeding point - keep this pressure for at least 10 minutes  5.  If bleeding continues after step #4, gently blow the nose to remove clots and then spray 2-3 sprays of Afrin (pump spray mist, red and white box)  6.  Repeat step #4 - ok to repeat steps 5 and 6 up to 3 times    If your nosebleed continues after home treatment, the frequency of nosebleeds increases, nosebleed is caused by trauma to the nose, or occurs in a child under 2 years, then see an ENT doctor for further evaluation and treatment of the nosebleed.         Gil Chamorro M.D.  Department of Otolaryngology - Head & Neck Surgery  [address] [facility].  [place] [id number]  P: [phone]  F: [phone]        DISCLAIMER:  This note was prepared with [email or web address] voice recognition transcription software. Garbled syntax, mangled pronouns, and other bizarre constructions may be attributed to that software system. While efforts were made to correct any mistakes made by this voice recognition program, some errors and/or omissions may remain in the note that were missed when the note was originally created.

## 2024-09-10 NOTE — PATIENT INSTRUCTIONS
"Teresa Damico.    You asked about applying for disability.    Because the Social Security Administration (SSA) is a federal government-run organization, applying for disability benefits is typically the same in every state. There are three ways to file a claim for Social Security Disability benefits with the SSA. An individual can apply online, over the telephone, or in person.     -Online: Apply online at ssa.gov.     -Telephone: Apply over the telephone by calling the [facility]'s toll-free customer service line at [phone] (TTY [phone])     -In-person: Apply in-person at your local Social Security field office. The [place] field office is located at [address] [place] [address]. They are open Mon-Fri 9 AM - 4 PM. Their phone number is [phone]. (TTY [phone])    In the application process, you will provide them with basic information and complete several forms. One form collects information about your medical condition and how it affects your ability to work. Other forms give doctors, hospitals, and other health care professionals who have treated you permission to send information about your medical condition.    Don't delay applying for disability benefits if you can't get all of your information together quickly. They will help you get it.    Doctors and disability specialists in the state agency ask your doctors for any information they need about your condition. They'll consider all the facts in your case and use the medical evidence from your doctors, hospitals, clinics, or institutions where you've been treated and all other information. They will make the initial decision on your disability determination. Your own doctors don't decide if you're disabled; our role in this process is to provide the [facility] the information they request.    If you are interested in applying for Supplemental Nutrition Assistance Program (SNAP, what used to be referred to as "food stamps) through the " [facility], you can do so online at www.[facility].[place].gov/page/snap. Their customer service number is [phone] (Y [phone]). They will require proof of some of the information you report on your Application for Assistance, but they do not require a letter from your doctor.    I hope this information helps you.    Thanks for letting me care for you. I look forward to seeing you at your next appointment.    Sincerely,    JIM Chery MD     No orders of the defined types were placed in this encounter.

## 2024-09-10 NOTE — PROGRESS NOTES
OFFICE VISIT 9/10/24  8:20 AM CDT    History of Present Illness    Teresa presents today for follow up.    She reports weight loss of at least five lbs since quitting her job as a , attributing this to reduced stress and improved eating habits. Her previous job limited access to healthy food options, often relying on fast food. She attempted to prepare healthier meals but faced challenges with a malfunctioning refrigerator in her truck, resulting in food spoilage.    She recently quit her  job due to significant job-related stress. Since leaving this position, she reports improved emotional and physical well-being. She expresses relief and happiness about her decision to prioritize her health by leaving the stressful work environment.    She reports feeling overwhelmed and scared, experiencing chronic anxiety. She expresses concern about a potential thyroid nodule identified during exam. She denies any belief that it could be cancer, but admits to being scared and anxious about the possibility. She reports a tendency to expect negative outcomes due to past experiences. She requests additional medication for anxiety, stating that she gets overwhelmed easily. She expresses apprehension about starting a new job on the 15th, questioning her mental capacity to handle the stress. She voices concern about missing appointments and having to reschedule them. She inquires about the possibility of applying for disability due to her mental health struggles.    Current medications include:  - Clonazepam: Taking slightly less than prescribed, averaging a little less than two tablets per day  - Fluoxetine: Taking daily as directed  - Nicotine gum: Currently using  - Chantix: Still taking, reports it helps prevent relapse when an occasional cigarette is smoked. Requesting a refill.  She denies current use of Medrol Dosepak (inadvertently left on truck when quitting job) and Wegovy. She was unaware of an  Astelin nasal spray prescription.    She reports occasional slip-ups, averaging less than two cigarettes per day. She states that Chantix helps her get back on track when she has a cigarette, preventing full relapse.    She reports completion of CT scan, mammogram, and lab work.    She reports a history of nosebleeds. She previously received treatment from an ENT doctor who identified and cauterized a ruptured blood vessel in her nose. She has a scheduled follow-up visit with the ENT today for potential recurrence of nosebleeds and further treatment if necessary.    She expresses interest in receiving a flu vaccine and inquires about the pneumonia vaccine.     She's very anxious. She quit her job, but now she has additional financial stressors. I perceived a small non-tender nodule under the left thyroid, and she commented that she had an appointment with ENT Dr. Chamorro immediately after this appointment. She agreed to let Dr. Chamorro evaluate and make recommendations. This new finding is creating her anxiety, causing her to go to the worst-case scenario, believing that it was necessarily cancer. My reassurances were ineffective. She no-showed her appointment with ENT. I'm going to contact her to schedule an evaluation with a thyroid ultrasound.         Assessment & Plan     Assessed patient's recent weight loss and improved emotional/physical state after quitting  job   Evaluated ongoing smoking cessation efforts   Detected potential thyroid nodule during physical exam; deemed likely benign but warrants specialist evaluation   Considered patient's chronic anxiety; decided to add buspirone to current regimen of clonazepam and fluoxetine    F41.1 GENERALIZED ANXIETY DISORDER:   Assessed the patient's ongoing anxiety, feeling of being overwhelmed, scared, and nervous.   Noted that the patient is currently taking clonazepam less than prescribed and fluoxetine daily for anxiety.   Evaluated that the patient is  dealing with chronic anxiety consistently.   Discussed that buspirone is not an immediate solution for anxiety but may provide relief over time.   Prescribed buspirone 1 tablet twice daily for anxiety management.   Continued clonazepam at current dose (less than 2 tablets daily on average).   Continued fluoxetine at current dose.   Scheduled a video visit follow up in 2-3 weeks to review test results and evaluate response to buspirone.    E04.1 NONTOXIC SINGLE THYROID NODULE:   Palpated a small nodule on the patient's thyroid during physical exam.   Explained that thyroid nodules are common and the majority are benign.   Referred the patient to an ENT specialist for further evaluation of the thyroid nodule.    R04.0 EPISTAXIS:   Noted the patient's history of nosebleed due to a ruptured blood vessel.   Instructed the patient to proceed with the scheduled ENT appointment for nosebleed evaluation and potential re-treatment.    Z23 ENCOUNTER FOR IMMUNIZATION:   Discussed flu vaccine and other vaccinations with the patient.   Administered flu vaccine in office.   Recommend getting shingles vaccination at the pharmacy (no prescription required).    F17.200 NICOTINE DEPENDENCE, UNSPECIFIED, UNCOMPLICATED:   Evaluated the patient's current smoking cessation status and medication use.   Noted that the patient reports using nicotine gum and Chantix to manage smoking cessation, with occasional relapses.   Continued Chantix prescription and provided a refill for continued smoking cessation support.    J00 ACUTE NASOPHARYNGITIS [COMMON COLD]:   Noted that the patient reports having a cold.   Acknowledged the patient's request for cold treatment.    LIFESTYLE CHANGES:   Thisia to continue efforts to maintain healthier eating habits.   Note: The following information does not fit into any specific ICD-10 code or lifestyle change category: - Provided information about disability application process, noting unpredictability of  outcomes. - Follow up in 2-3 weeks via video visit to review test results.       1. Generalized anxiety disorder  -     clonazePAM (KLONOPIN) 0.5 MG tablet; Take 0.5-1 tablets (0.25-0.5 mg total) by mouth 2 (two) times daily as needed for Anxiety.  Dispense: 60 tablet; Refill: 5  -     FLUoxetine 20 MG capsule; Take 1 capsule (20 mg total) by mouth once daily.  Dispense: 90 capsule; Refill: 3  -     busPIRone (BUSPAR) 15 MG tablet; Take 1 tablet (15 mg total) by mouth 2 (two) times daily.  Dispense: 60 tablet; Refill: 1    2. Recurrent mild major depressive disorder with anxiety  -     FLUoxetine 20 MG capsule; Take 1 capsule (20 mg total) by mouth once daily.  Dispense: 90 capsule; Refill: 3    3. Psychophysiologic insomnia  -     clonazePAM (KLONOPIN) 0.5 MG tablet; Take 0.5-1 tablets (0.25-0.5 mg total) by mouth 2 (two) times daily as needed for Anxiety.  Dispense: 60 tablet; Refill: 5    4. Nicotine dependence, cigarettes, uncomplicated  -     varenicline (CHANTIX) 1 mg Tab; Take 1 tablet (1 mg total) by mouth 2 (two) times daily.  Dispense: 60 tablet; Refill: 3    5. Need for influenza vaccination  -     influenza (Flulaval, Fluzone, Fluarix) 45 mcg/0.5 mL IM vaccine (> or = 6 mo) 0.5 mL    6. Thyroid nodule  -     US Thyroid; Future; Expected date: 09/16/2024    No other significant complaints or concerns were reported.  Today's visit involved the intricate management of episodic problem(s) and the ongoing care for the patient's serious or complex condition(s) listed above, reflecting the inherent complexity of providing longitudinal, comprehensive evaluation and management as the central hub for the patient's primary care services.  [place] Board of Pharmacy Controlled Prescription Drug Monitoring database was queried and showed no activity to suggest abuse, diversion, or other improper use of prescription medications.   Refer to Patient Portal Message for additional education/instructions  "provided.  Vitals:    09/10/24 0856   BP: 126/82   BP Location: Right arm   Patient Position: Sitting   BP Method: Large (Manual)   Pulse: 73   Temp: 98 °F (36.7 °C)   TempSrc: Tympanic   SpO2: 99%   Weight: 105 kg (231 lb 7.7 oz)   Height: 5' 2" (1.575 m)   Physical Exam  Vitals reviewed.   Constitutional:       General: She is not in acute distress.     Appearance: Normal appearance. She is not ill-appearing or diaphoretic.   Neck:      Thyroid: Thyroid mass (subtle nodule on left) present. No thyromegaly or thyroid tenderness.      Vascular: No carotid bruit.   Cardiovascular:      Rate and Rhythm: Normal rate and regular rhythm.   Pulmonary:      Effort: Pulmonary effort is normal.      Breath sounds: Normal breath sounds.   Skin:     General: Skin is warm and dry.   Neurological:      Mental Status: She is alert and oriented to person, place, and time. Mental status is at baseline.   Psychiatric:         Mood and Affect: Mood normal.         Behavior: Behavior normal.         Judgment: Judgment normal.         This note was generated with the assistance of ambient listening technology. Verbal consent was obtained by the patient and accompanying visitor(s) for the recording of patient appointment to facilitate this note. I attest to having reviewed and edited the generated note for accuracy, though some syntax or spelling errors may persist. Please contact the author of this note for any clarification.    Documentation entered by me for this encounter may have been done in part using speech-recognition technology. Although I have made an effort to ensure accuracy, "sound like" errors may exist and should be interpreted in context.     Future Appointments   Date Time Provider Department Center   9/24/2024  7:20 AM EDGAR Chery MD [facility]       "

## 2024-09-12 ENCOUNTER — HOSPITAL ENCOUNTER (OUTPATIENT)
Dept: RADIOLOGY | Facility: HOSPITAL | Age: 49
Discharge: HOME | End: 2024-09-12
Attending: OTOLARYNGOLOGY
Payer: COMMERCIAL

## 2024-09-12 DIAGNOSIS — E04.1 THYROID NODULE: ICD-10-CM

## 2024-09-12 PROCEDURE — 76536 US EXAM OF HEAD AND NECK: CPT | Mod: TC

## 2024-09-12 PROCEDURE — 76536 US EXAM OF HEAD AND NECK: CPT | Mod: 26,,, | Performed by: RADIOLOGY

## 2024-09-20 NOTE — PROGRESS NOTES
Patient ID: [id number]     Chief Complaint: Follow-up    HPI:     Teresa Hwang is a 49 y.o. female with diagnosis of Prediabetes, Obesity. Patient referred by PCP. Patient's PCP is Dr. Chery. Patient seen in clinic today for Obesity. Patient last seen in clinic on 05/07/2024 per Dr. Briones, notes reviewed.     Weight history   Patient states she was a , didn't have a lot of healthy food options so ate fast food. Also would do a lot of sitting.     Previous Obesity Treatment:   Topamax (2023)- failed  Semaglutide (2023) - success     BMI:  44.44 (09/22/2024)  42.2 (09/10/2024)  35.3 (11/14/2023)  37.1 (08/04/2023)    Weight:  242 lbs (09/22/2024)  230 lbs (09/10/2024)  193 lbs (11/14/2023)  203 lbs (08/04/2023)    Ideal/Adjusted Body Weight:  Ideal: 110 lbs  Adjusted 163 lbs    Waist Circumference:  46 in    Percent Body Fat:   No In-Body noted    Neck Circumference:   Deferred, Dx with DEYA, on CPaP    InBody:   None noted    Nutrition history:  Breakfast: hashbrowns with 2 cups of coffee with no cream or sugar  Lunch: 2 chicken tacos  Dinner: baked sweet potato with baked chicken breast  Snack: none  Water: 32 oz  Sugar Sweetened beverages: rarely  Etoh: 2 shots liquor per week  Cravings: denies  Patient states she tends to eat unhealthy when she is bored; also eats if she had trouble sleeping - usually a sandwich.   Dietician: denies    Current physical activity:   Walks for exercise  Barriers: financial, states recently started a new job    Stressors/Mental Health   On Fluoxetine, Buspirone, Clonazepam for Depression/Anxiety prescribed by PCP. Patient states medication working well.   Over the last two weeks how often have you been bothered by little interest or pleasure in doing things: 0  Over the last two weeks how often have you been bothered by feeling down, depressed or hopeless: 0  PHQ-2 Total Score: 0    Sleep habits  DEYA - on CPAP    CVD screening  The 10-year ASCVD risk  score (Sanket LOU, et al., 2019) is: 1.6%    Values used to calculate the score:      Age: 49 years      Sex: Female      Is Non- : Yes      Diabetic: No      Tobacco smoker: No      Systolic Blood Pressure: 130 mmHg      Is BP treated: No      HDL Cholesterol: 64 mg/dL      Total Cholesterol: 165 mg/dL    LMP: menopause    Contraception: menopause      I spent 24 minutes counseling patient on diet and physical activity.     Review of patient's allergies indicates:   Allergen Reactions    Macrobid [nitrofurantoin monohyd/m-cryst] Hives    Bactrim [sulfamethoxazole-trimethoprim]     Flagyl [metronidazole hcl] Hives     Current Outpatient Medications   Medication Instructions    ascorbic acid (vitamin C) (VITAMIN C) 500 mg, Oral, Daily    busPIRone (BUSPAR) 15 mg, Oral, 2 times daily    clonazePAM (KLONOPIN) 0.25-0.5 mg, Oral, 2 times daily PRN    ELDERBERRY FRUIT ORAL 1 capsule, Oral, Daily    FLUoxetine 20 mg, Oral, Daily    fluticasone propionate (FLONASE) 100 mcg, Each Nostril, Daily    gabapentin (NEURONTIN) 900 mg, Oral, Nightly    nicotine (polacrilex) (NICORETTE) 2 mg, Oral, As needed (PRN), Use as directed on packaging.    sodium chloride (SALINE NASAL) 0.65 % nasal spray Use as directed on product packaging    varenicline (CHANTIX) 1 mg, Oral, 2 times daily    WEGOVY 0.25 mg, Subcutaneous, Every 7 days     Past Surgical History:   Procedure Laterality Date    APPLICATION OF LARGE EXTERNAL FIXATION DEVICE TO TIBIA Left 01/10/2019    Procedure: APPLICATION, EXTERNAL FIXATION DEVICE, LARGE, TIBIA;  Surgeon: Domenic Galvan MD;  Location: [facility];  Service: Orthopedics;  Laterality: Left;    APPLICATION OF WOUND VACUUM-ASSISTED CLOSURE DEVICE Left 01/17/2019    Procedure: APPLICATION, WOUND VAC;  Surgeon: Barry Guzman MD;  Location: [facility];  Service: Orthopedics;  Laterality: Left;    COLONOSCOPY N/A 09/02/2022    Procedure: COLONOSCOPY;  Surgeon: Yon Ridley MD;  Location:  [facility];  Service: Endoscopy;  Laterality: N/A;    FRACTURE SURGERY      C1 neck- halo    HERNIA REPAIR      HYSTERECTOMY  2009    tahbso    OOPHORECTOMY Bilateral     OPEN REDUCTION AND INTERNAL FIXATION (ORIF) OF FRACTURE OF TIBIAL PLATEAU Left 2019    Procedure: ORIF, FRACTURE, TIBIA, PLATEAU;  Surgeon: Barry Guzamn MD;  Location: [facility] OR;  Service: Orthopedics;  Laterality: Left;    REMOVAL OF EXTERNAL FIXATION DEVICE Left 2019    Procedure: REMOVAL, EXTERNAL FIXATION DEVICE;  Surgeon: Barry Guzman MD;  Location: [facility] OR;  Service: Orthopedics;  Laterality: Left;    TRIGGER FINGER RELEASE Right 2023    Procedure: RELEASE, TRIGGER FINGER;  Surgeon: Sandeep Bang MD;  Location: [facility] OR;  Service: Orthopedics;  Laterality: Right;  right trigger thumb release    TUBAL LIGATION  1997    UMBILICAL HERNIA REPAIR       family history includes Breast cancer in her paternal grandmother; Breast cancer (age of onset: 46) in her sister; Breast cancer (age of onset: 58) in her mother; Cancer in her mother and sister; Depression in her mother; Diabetes in her mother; No Known Problems in her father.    Social History     Socioeconomic History    Marital status:     Number of children: 5   Occupational History    Occupation:    Tobacco Use    Smoking status: Former     Current packs/day: 0.00     Average packs/day: 1 pack/day for 33.0 years (33.0 ttl pk-yrs)     Types: Cigarettes     Start date:      Quit date:      Years since quittin.7     Passive exposure: Past    Smokeless tobacco: Never   Substance and Sexual Activity    Alcohol use: Yes     Alcohol/week: 2.0 standard drinks of alcohol     Types: 2 Glasses of wine per week     Comment: Stopped    Drug use: No    Sexual activity: Not Currently     Partners: Male     Birth control/protection: None     Social Determinants of Health     Financial Resource Strain: Low Risk  (11/15/2023)    Overall Financial  "Resource Strain (CARDIA)     Difficulty of Paying Living Expenses: Not hard at all   Food Insecurity: Patient Declined (11/15/2023)    Hunger Vital Sign     Worried About Running Out of Food in the Last Year: Patient declined     Ran Out of Food in the Last Year: Patient declined   Transportation Needs: Patient Declined (11/15/2023)    PRAPARE - Transportation     Lack of Transportation (Medical): Patient declined     Lack of Transportation (Non-Medical): Patient declined   Physical Activity: Insufficiently Active (11/15/2023)    Exercise Vital Sign     Days of Exercise per Week: 3 days     Minutes of Exercise per Session: 30 min   Stress: No Stress Concern Present (11/15/2023)    [employer] - Occupational Stress Questionnaire     Feeling of Stress : Only a little   Housing Stability: Unknown (11/15/2023)    Housing Stability Vital Sign     Unable to Pay for Housing in the Last Year: Patient refused     Number of Places Lived in the Last Year: 0     Unstable Housing in the Last Year: Patient refused       Subjective:     Review of Systems   Constitutional: Negative.    HENT: Negative.     Eyes: Negative.    Respiratory: Negative.     Cardiovascular: Negative.    Gastrointestinal: Negative.    Endocrine: Negative.    Genitourinary: Negative.    Musculoskeletal: Negative.    Skin: Negative.    Allergic/Immunologic: Negative.    Neurological: Negative.    Hematological: Negative.    Psychiatric/Behavioral: Negative.       Objective:     Visit Vitals  /82 (BP Location: Left arm, Patient Position: Sitting, BP Method: Large (Manual))   Pulse 66   Temp 97.8 °F (36.6 °C) (Oral)   Resp 18   Ht 5' 2" (1.575 m)   Wt 110.2 kg (242 lb 15.2 oz)   SpO2 99%   BMI 44.44 kg/m²       Physical Exam  Vitals reviewed.   Constitutional:       Appearance: Normal appearance. She is obese.   HENT:      Head: Normocephalic and atraumatic.   Eyes:      Extraocular Movements: Extraocular movements intact.     "  Conjunctiva/sclera: Conjunctivae normal.      Pupils: Pupils are equal, round, and reactive to light.   Cardiovascular:      Rate and Rhythm: Normal rate and regular rhythm.      Heart sounds: Normal heart sounds.   Pulmonary:      Effort: Pulmonary effort is normal.      Breath sounds: Normal breath sounds.   Abdominal:      General: Bowel sounds are normal.   Musculoskeletal:         General: Normal range of motion.      Cervical back: Normal range of motion.   Skin:     General: Skin is warm and dry.   Neurological:      Mental Status: She is alert and oriented to person, place, and time.   Psychiatric:         Mood and Affect: Mood normal.         Behavior: Behavior normal.       Labs Reviewed:     Hematology:  Lab Results   Component Value Date    WBC 12.39 08/11/2023    HGB 12.6 08/11/2023    HCT 38.8 08/11/2023     08/11/2023     Chemistry:  Lab Results   Component Value Date     09/10/2024    K 4.0 09/10/2024    BUN 14 09/10/2024    CREATININE 0.8 09/10/2024    EGFRNORACEVR >60.0 09/10/2024    CALCIUM 9.4 09/10/2024    ALKPHOS 74 09/10/2024    LABPROT 10.0 01/10/2019    ALBUMIN 4.1 09/10/2024    AST 18 09/10/2024    ALT 15 09/10/2024    MG 2.0 01/11/2019    PHOS 3.8 01/11/2019      Lab Results   Component Value Date    HGBA1C 5.9 (H) 09/10/2024      Lipid Panel:  Lab Results   Component Value Date    CHOL 165 09/10/2024    HDL 64 09/10/2024    TRIG 69 09/10/2024    TOTALCHOLEST 2.6 09/10/2024      Thyroid:  Lab Results   Component Value Date    TSH 3.299 09/10/2024      Urine:  Lab Results   Component Value Date    APPEARANCEUA Clear 10/14/2021    PROTEINUA Negative 10/14/2021    LEUKOCYTESUR Negative 10/14/2021        Assessment:       ICD-10-CM ICD-9-CM   1. Prediabetes  R73.03 790.29   2. DEYA on CPAP  G47.33 327.23   3. Generalized anxiety disorder  F41.1 300.02   4. Recurrent mild major depressive disorder with anxiety  F33.0 296.31    F41.9 300.00   5. Class 3 severe obesity due to excess  calories with serious comorbidity and body mass index (BMI) of 40.0 to 44.9 in adult  E66.01 278.01    Z68.41 V85.41        Plan:     1. Prediabetes  A1c: 5.9  ADA diet  Weight loss encouraged  Start Semaglutide 0.25 mg SC weekly    2. DEYA on CPAP  Continue CPAP nightly    3. Generalized anxiety disorder  Continue Fluoxetine, Clonazepam, Buspirone    4. Recurrent mild major depressive disorder with anxiety  Continue Fluoxetine, Clonazepam, Buspirone    5. Class 3 severe obesity due to excess calories with serious comorbidity and body mass index (BMI) of 40.0 to 44.9 in adult  BMI: 44.44  Weight: 243 lbs  TSH: 3.299  Vitamin D level: none noted, will order at follow up appt    A modest (5-10%) weight loss improves health and quality of life.     Goal: lose weight  Short-term: Lose 10 lbs in 8 weeks  Long-term: Lose 60 lbs  *Keep in mind that, on average, you may lose 1-2 lbs per week*    Willingness to change: 6/10    Patient qualifies for anti-obesity medications due to BMI of 44.44 with comorbidity. Anti-obesity medications are an adjunct to nutritional, physical activity, and behavioral therapies. Medication alone cannot treat obesity. I recommend starting medication to significantly improve patient's risk factor/s.   Medication:   Semaglutide (Wegovy)  Dosing: start 0.25 mg SC weekly  Discussed at length potential pharmacologic options. She/he desires consideration of GLP-1. Risks/benefits/common side effects discussed patient including nausea and pain at injection site. She is aware she should not get pregnant while taking and medication not approved to take while breastfeeding. Patient is menopausal. No personal/family hx of MEN 2 or medullary thyroid cancer. No hx of thyroid nodules or biliary disease/gallstones. Also reviewed with patient gastroparesis and potential for mental health changes. Notify provider immediately if any significant side effects or issues. Discussed with patient that with substantial or  rapid weight loss, gallstones could develop. Also discussed with patient GLP-1 MOA and common side effects. Instructed patient how to use with demo pen; patient practiced in office. Patient advised if approved will get medication education handout from pharmacy.  Recommend InBody due to possible muscle loss.   Birth Control *(need 2 forms of birth control)*  Monitor for pancreatitis   Strength training at least 2 days a week to help prevent decrease in muscle mass.     Nutrition: Aim for 1,350 Calories per day.      Protein: goal is a minimum of 60g/day. Protein has a slower rate of digestion = greater satiety (fullness).        Aim for <25g of added sugar per day.      Recommend to increase fiber intake.   www.fullplateliving.org      Eat Fit Shopping List.      Prepare meals in advance.      Eat breakfast within 2 hours of waking up.       Track what you eat. Research shows that people who track their food intake are more successful with weight management. This creates awareness of what you are eating/drinking and can help you see where to make changes.       Get to know your plate.  www.Tropp[employer].gov      Stay hydrated.     Activity: 2-3 days of strength training. This can be body weight, light weight or elastic bands exercises. [employer] and [employer] are great sources for free workout plans/videos.     Start slowly with an activity you enjoy and make it a habit.     Ask a family member or friend to get active with you.     Aim for 5,000 - 10,000 steps per day     Schedule time to make physical activity a part of your daily routine.     Exercise prescription: (FITTE)    Frequency: 3-4 days per week    Intensity: low    Type: walking    Time: 20 minutes    Enjoyment: enjoys walking on the treadmill     Sleep: 7-8 hours of sleep a night    *Recognize your progress and remember that each day is a new day*  *Stay on track, even when you feel like you're not making progress*  *Sit Less, Stand Often, Move More*  *You  don't have to be perfect; be persistent*    Avoiding Weight Regain:  - continued with modified food intake (changed eating habits)  - eat breakfast every day  - weigh yourself at least once a week  - watch less than 10 hours of TV per week  - continue with increased physical activity  - physical activity, on average, about 1 hour per day      Follow up in about 8 weeks (around 11/18/2024) for Virtual Visit. In addition to their scheduled follow up, the patient has also been instructed to follow up on as needed basis.     Nadine Tolliver, EVELYNP

## 2024-09-23 ENCOUNTER — OFFICE VISIT (OUTPATIENT)
Dept: PRIMARY CARE CLINIC | Facility: CLINIC | Age: 49
End: 2024-09-23
Payer: COMMERCIAL

## 2024-09-23 VITALS
WEIGHT: 242.94 LBS | OXYGEN SATURATION: 99 % | SYSTOLIC BLOOD PRESSURE: 130 MMHG | BODY MASS INDEX: 44.71 KG/M2 | DIASTOLIC BLOOD PRESSURE: 82 MMHG | TEMPERATURE: 98 F | HEART RATE: 66 BPM | HEIGHT: 62 IN | RESPIRATION RATE: 18 BRPM

## 2024-09-23 DIAGNOSIS — F33.0 RECURRENT MILD MAJOR DEPRESSIVE DISORDER WITH ANXIETY: Chronic | ICD-10-CM

## 2024-09-23 DIAGNOSIS — E66.01 CLASS 3 SEVERE OBESITY DUE TO EXCESS CALORIES WITH SERIOUS COMORBIDITY AND BODY MASS INDEX (BMI) OF 40.0 TO 44.9 IN ADULT: Chronic | ICD-10-CM

## 2024-09-23 DIAGNOSIS — F41.1 GENERALIZED ANXIETY DISORDER: Chronic | ICD-10-CM

## 2024-09-23 DIAGNOSIS — R73.03 PREDIABETES: Primary | Chronic | ICD-10-CM

## 2024-09-23 DIAGNOSIS — F41.9 RECURRENT MILD MAJOR DEPRESSIVE DISORDER WITH ANXIETY: Chronic | ICD-10-CM

## 2024-09-23 DIAGNOSIS — G47.33 OSA ON CPAP: Chronic | ICD-10-CM

## 2024-09-23 PROBLEM — E66.813 CLASS 3 SEVERE OBESITY DUE TO EXCESS CALORIES WITH SERIOUS COMORBIDITY AND BODY MASS INDEX (BMI) OF 40.0 TO 44.9 IN ADULT: Chronic | Status: ACTIVE | Noted: 2017-03-13

## 2024-09-23 PROCEDURE — 3008F BODY MASS INDEX DOCD: CPT | Mod: CPTII,S$GLB,, | Performed by: NURSE PRACTITIONER

## 2024-09-23 PROCEDURE — 3075F SYST BP GE 130 - 139MM HG: CPT | Mod: CPTII,S$GLB,, | Performed by: NURSE PRACTITIONER

## 2024-09-23 PROCEDURE — 99402 PREV MED CNSL INDIV APPRX 30: CPT | Mod: S$GLB,,, | Performed by: NURSE PRACTITIONER

## 2024-09-23 PROCEDURE — 99214 OFFICE O/P EST MOD 30 MIN: CPT | Mod: 25,S$GLB,, | Performed by: NURSE PRACTITIONER

## 2024-09-23 PROCEDURE — 3044F HG A1C LEVEL LT 7.0%: CPT | Mod: CPTII,S$GLB,, | Performed by: NURSE PRACTITIONER

## 2024-09-23 PROCEDURE — 1159F MED LIST DOCD IN RCRD: CPT | Mod: CPTII,S$GLB,, | Performed by: NURSE PRACTITIONER

## 2024-09-23 PROCEDURE — 99999 PR PBB SHADOW E&M-EST. PATIENT-LVL V: CPT | Mod: PBBFAC,,, | Performed by: NURSE PRACTITIONER

## 2024-09-23 PROCEDURE — 3079F DIAST BP 80-89 MM HG: CPT | Mod: CPTII,S$GLB,, | Performed by: NURSE PRACTITIONER

## 2024-09-23 RX ORDER — SEMAGLUTIDE 0.25 MG/.5ML
0.25 INJECTION, SOLUTION SUBCUTANEOUS
Qty: 2 ML | Refills: 1 | Status: SHIPPED | OUTPATIENT
Start: 2024-09-23

## 2024-09-23 NOTE — PATIENT INSTRUCTIONS
Nutrition: Aim for 1,350 Calories per day.      Protein: goal is a minimum of 60g/day. Protein has a slower rate of digestion = greater satiety (fullness).        Aim for <25g of added sugar per day.      Recommend to increase fiber intake.   www.fullplateliving.org      Eat Fit Shopping List.      Prepare meals in advance.      Eat breakfast within 2 hours of waking up.       Track what you eat. Research shows that people who track their food intake are more successful with weight management. This creates awareness of what you are eating/drinking and can help you see where to make changes.       Get to know your plate.  www.myplate.gov      Stay hydrated.     Activity: 2-3 days of strength training. This can be body weight, light weight or elastic bands exercises. [employer] and [employer] are great sources for free workout plans/videos.     Start slowly with an activity you enjoy and make it a habit.     Ask a family member or friend to get active with you.     Aim for 5,000 - 10,000 steps per day     Schedule time to make physical activity a part of your daily routine.     Exercise prescription: (FITTE)    Frequency: 3-4 days per week    Intensity: low    Type: walking    Time: 20 minutes    Enjoyment: enjoys walking on the treadmill     Sleep: 7-8 hours of sleep a night    *Recognize your progress and remember that each day is a new day*  *Stay on track, even when you feel like you're not making progress*  *Sit Less, Stand Often, Move More*  *You don't have to be perfect; be persistent*    Avoiding Weight Regain:  - continued with modified food intake (changed eating habits)  - eat breakfast every day  - weigh yourself at least once a week  - watch less than 10 hours of TV per week  - continue with increased physical activity  - physical activity, on average, about 1 hour per day              PRODUCE  [] All fresh fruit   [] All fresh vegetables   [] All fresh herbs  [] All herb purees + pastes  [] Pre-spiralized  "vegetable noodles   [] Steam-In-The-Bag begetables  [] Riced cauliflower  [] Jicama sticks  [] Love Beets  all varieties  [] Wholly Guacamole  all varieties  [] Hummus  all varieties, chickpea + vegetable  [] Tofu Shirataki noodles    [] Tofu  all varieties  [] Tempeh  all varieties     PROTEIN  CHICKEN   [] Boneless, skinless breasts  [] Boneless, skinless thighs  [] Ground chicken breast, at least 93% lean  [] Chicken breast cutlet  [] Aidell's  Chicken Sausage + Chicken Meatballs     TURKEY   [] Turkey breast tenderloin   [] Ground turkey breast, at least 93% lean  [] Gratz Naturals  Turkey Sausage     BEEF  [] Tenderloin  [] Sirloin  [] Top Loin  [] Flank Steak  [] Round Steak  [] Filet  [] Lean ground beef, at least 93% lean + grass-fed preferable     PORK  [] Tenderloin  [] Pork Chop  [] Center Cut  [] Raiza Naturals  No-Sugar Villagomez     BISON  [] [place]  90 - 95% lean     SEAFOOD  [] All fresh fish + seafood; locally sourced when possible  [] Smoked salmon     HEAT + EAT ENTREES   [] Warren's Natural Foods  Chicken, Pork, Beef  [] Antoni  "All Natural" Grilled Chicken Breast + Strips, all varieties     SAUCES SPREADS + DIPS  [] Bitchin Sauce  Original, Chipotle, Cilantro [place]  [] Rangel's Kitchen  Tzatziki Yogurt Dip, Babaganoush, Hummus  [] Wholly Guacamole  all varieties  [] Hummus  all varieties  [] [place] Gringo Salsa  all varieties  [] Mrs. Vee's Salsa  all varieties  [] Stubb's All Natural BBQ Sauce  [] Primal Kitchen  [place], Ketchup, BBQ Sauce  [] Primal Kitchen Pasta Sauce  Roasted Garlic, Tomato Basil, no-dairy Vodka Sauce  [] Sal & Amanda's  HeartSmart Pasta Sauce     DAIRY/DAIRY SUBSTITUTES/EGGS  EGGS   [] All eggs  cage-free, pasture-raise preferable  [] Crepini  egg wraps  [] Vital Farms  Pasture-Raised Egg Bites  [] JUST Egg [vegan]      CREAMERS   [] Califia  Better Half, original + vanilla unsweetened  [] NutPods  all varieties     MILK   [] Horizon Organic  " all varieties except chocolate  [] Organic Valley  all varieties except chocolate  [] Organic Valley  ultra-filtered, reduced fat milk      PLANT_BASED MILK ALTERNATIVES  [] All unsweetened almond milks  original, vanilla + chocolate  [] Ripple  unsweetened   [] Milkadamia  original +_ vanilla, unsweetened   [] Forager  original + vanilla, unsweetened   [] Silk Organic  soy milk, unsweetened  [] Oatly  unsweetened  [] Califia  regular + protein-fortified oat milk, unsweetened      CHEESES  [] Regular or reduced fat cheeses  [] BelGioso  Fresh Mozzarella Snack Packs, Parmesan Power-full Snack   [] Goat cheese  [] Fresh mozzarella  [] String cheese  all varieties  [] Beatriz Cottage Cheese  [] Ceci's Cultured Cottage Cheese  [] Carla Life 'Just Like Mozzarella'  plant-based shreds and other varieties  [] Parmela Creamery  plant-based shredded cheese     YOGURT  [] Fage  2% low-fat, plain  [] Siggi's  plain, vanilla  [] Chobani Greek  nonfat + whole milk yogurt, plain   [] Chobani Less Sugar  all flavored varieties   [] Oikos Greek  nonfat, plain  [] Two Good  all varieties   [] Irish Provisions  plain  [] [place] Organic  low-fat + nonfat, plain  [] [facility]  goat milk yogurt, plain  [] Kefit  unsweetened, plain  [] Forager  Greek style unsweetened, plain [dairy-free]  [] [place]  unsweetened Greek style, plain [dairy-free]  [] [facility]  almond milk yogurt, vanilla or plain, unsweetened [dairy-free]     FREEZER SECTION  FROZEN VEGGIES  [] All plain frozen veggies + greens [e.g. broccoli, brussels, carrots, okra, mushrooms, zucchini, yellow squash, butternut squash, kale, spinach, michela greens]  [] Riced veggies [e.g. cauliflower, broccoli, butternut squash]  [] Edamame  all varieties  [] Green Giant  [] Veggie Spirals  [] Marinated Veggies [e.g. eggplant, peppers, zucchini]  [] Simply Steam [place] Sprouts  [] Birds Eye  [] Power Blend Italian Style  lentils, broccoli, kale,  zucchini  []  Joffre Sprouts & Carrots  [] Oven Roasters Broccoli & Cauliflower  [] [place] Blend  [] Tattooed   [] Green Bean Blend  [] Farmer's Market Ratatouille  [] Butter Balsamic Glazed Vegetables  [] Riced Cauliflower & Quinoa Mediterranean Style  [] Loni's Good Life  Southern Style Greens [sauteed kale + onion]     FROZEN FRUITS  [] All unsweetened frozen fruits  all varieties  [] Dole Fruits & Veggie Blends  Berries 'n Kale  [] Dole Mix-ins  Triple Berry      FROZEN ENTREES  [] The Good Kitchen meals  all varieties [ e.g. Chili Lime Chicken Over Riced Cauliflower]  [] Premium Paleo  Not Trent Momma's Meatloaf  [] Primal Kitchen  Chicken Pesto + Steak Fajitas w/ Peppers & Onions  [] Eating Well Frozen Entrees  Butter Chicken Masala, Steak Carne Asada, Creamy Pesto Chicken, Chicken + Wild Rice Stroganoff, Yellow [place] Chicken, Sun-dried Tomato Chicken, Chicken Lo Mein  [] Realgood Entree Bowls  Tanzanian Inspired Beef Bowl over Riced Cauliflower, Chicken Burrito Bowl   [] Great Karma Coconut [place]  [] Vanessa's  Tamale Aliya with Black Beans, Vegetable Lasagna  [] Kashi Mayan [place] Bake  [] Healthy Choice  Simply Steamers Chicken Fried Rice  [] Basil Pesto Chicken & Ecuadorean Style Pork Power Bowls  [] Tattooed   Enchilada Bowl  [] Jessica Farms  Spicy Black Bean Burgers     FROZEN PIZZAS  [] Cauli'flour Foods  Cauliflower Pizza Crusts  [] Outer Aisle  Cauliflower Crust  [] Vanessa's  Veggie Crust Cheese Pizza  [] Quest Pizza      VEGETARIAN PRODUCTS  [] Beyond Meat  ground 'meat' + grilled 'chicken' strips  [] Tofurkey  Original Italian Sausage + Original Tempeh  [] Gardein  Beefless Ground + Meatless Meatballs  [] Jessica Farms Grillers  Original Burger, Crumbles, Meatballs     ICE CREAMS + FROZEN DESSERTS  [] Halo Top  regular + keto series, pops  [] Rebel  ice cream + dessert sandwiches  [] Enlightened  ice cream + bars  [] Nightfood  ice cream  [] Realgood   ice cream  [] Arctic Zero Fit  frozen pint  [] The Frozen Farmer  sorbets  [] Wholly Rollies  Protein Balls, all varieties  [] Dream Pops  Coconut Latte     FROZEN BREAKFASTS  [] Realgood  Breakfast Sandwiches on Cauliflower Cheesy Bread  [] Rebel  ice cream + dessert sandwiches  [] Enlightened  ice cream + bars  [] Nightfood  ice cream  [] Realgood  ice cream  [] Arctic Zero Fit  frozen pint  [] The Frozen Farmer  sorbets  [] Wholly Rollies  Protein Balls, all varieties  [] Dream Pops  Coconut Latte     BREADS/BUNS/WRAPS  [] Randy Bread: All Types - In Freezer Section   [] Flat Out Light Wraps - All Varieties   [] Flat Out Protein Up Carb Down Flat Bread   [] Kontos Whole Wheat Pocket Larissa   [] Agustín JOSEPH 100% Whole Wheat Tortillas   [] LaTortilla Factory Tortillas - Smart & Delicious; 50 or 80-calorie   [] Nature's Own 100% Whole Wheat Bread   [] Orowheat Healthful - 100% Whole Wheat Slice Bread and Bunkie Thins   [] Orowheat Healthful - Whole Wheat Nuts & Grain Bread; Flax & Seed Bread   [] Pepperidge Farm Natural Whole Grain 15 Bread   [] Pepperidge Farm Natural Whole Grain English Muffin - 100% Whole Wheat   [] Pepperidge Farm Very Thin 100% Whole Wheat   [] Alie Sullivan 45 Calories and Delightful   [] Antonio' 100% Whole Wheat Thin-Sliced Bagels and English Muffins   [] Western Bagel: Perfect 10      GLUTEN FREE  [] Tristan's Gluten Free Bread   [] Milton Bakehouse 7-Grain Gluten Free Bread      LEGUME PASTA   [] Explore Asian Organic Black Bean Spaghetti   [] Modern Table   [] Tolerant Foods         NUT BUTTERS & JELLIES     NUT BUTTERS   [] Better'n Chocolate: Coconut Chocolate Peanut Butter Spread   [] Better'n Peanut Butter - All Types   [] Earth Balance Coconut and Peanut Spread   [] Ady's Nut Travel[place]   [] MaraNatha: All Natural Roasted Cashew Butter - [place] or Creamy   [] MaraNatha: Roasted Peanut Butter   [] Nuts 'N More Peanut Travelers Rest - All Flavors   [] PB2 Powder - Original or Chocolate    [] Skippy Natural - Creamy, Super Chunk   [] Smart Balance Peanut Butter - [place] or Creamy   [] Peanut Butter & Company:   [] Smooth , Crunch Time, The Heat Is On, Old Fashioned Smooth, Mighty Nut- Powdered Peanut Butter, Squeeze Pack   [] Smucker's Natural Peanut Butter - [place] or Creamy   [] Sunbutter Nut Butter   [] Wild Friends Protein Peanut Butter/[place] o Butter - Vanilla or Chocolate      JELLIES  o Polaner's All Fruit   o Clearly Organic Best Choice: Strawberry Fruit Spread         SNACKS     BARS  [] Kashi Bars - Chewy or Crunchy; Honey [place] o Flax or Peanut Butter   [] KIND Bars - 5 Grams of Sugar or Less   [] KIND Protein Bars - Strong and KIND   [] [facility] Valley Protein Bar - All Varieties   [] Nature Valley Roasted Nut Crunch - [place] Crunch; Peanut Crunch   [] [facility] Simple Nut Bar - Roasted Peanut & Honey   [] [facility] Simple Nut Bar - [place], Cashew & Sea Salt   [] [facility] Nut Bell Bar - Salted Caramel Peanut   [] Think Thin Protein Bars   [] Quest Bars, Power Crunch Bars, Pure Protein Bars      BEEF JERKY - NITRATE FREE   [] Game On   [] Grass Run Farms   [] Krave   [] Ostrim   [] Perky Jerky   [] Primal Strips Meatless Vegan Jerky   [] [place]      CHIPS   [] Beanitos Chips   [] Fruit Crisps - e.g. Brother's-All-Natural, Bare Fruit, Yoga Chips   [] Altagracia's Soy Crisps: 1.3 ounce bag   [] Quest Protein Chips   [] Wasa Whole Wheat Crisp Bread      CRACKERS  [] Shanon's Gone Crackers   [] Nabisco Triscuit: Regular and Thin Crisp Crackers   [] Vans Say Cheese Crackers (G-F)      POPCORN/NUTS   [] Manoj Sweet's Smart Pop Popcorn - Single Serving   [] 100-Calorie Pack of Nuts - All Varieties      PROTEIN POWDERS & DRINKS  []  Protein -  Whey Protein Powder   [] Garden of Life Raw Protein Powder   [] Iconic Ready-To-Drink Protein Drink   [] Cooley One Protein Powder   [] VegaSport Protein Powder      SALSA/HUMMUS/DIPS   [] Eat Well Embrace Life: Zesty  Roque Carrot o Hummus   [] Pre-Portioned Guacamole Packs   [] Catracho's   [] Tostitos Restaurant Style Salsa         SOUPS   [] Vanessa's Organic Soups - Lentil, Vegetable, Split Pea, Low-Sodium      CANNED GOODS   [] 100% Pure Pumpkin   [] BlueRunner Creole Cream-Style Red Beans or Navy Beans   [] Cajun Power Chicken Gumbo Base   [] Chicken of the Sea Fairlawn Baudette   [] Jacklyn Fresh Cut Sliced Beets   [] Hormel Breast of Chicken in Water   [] LeSuer Tender Baby Whole Carrots   [] Chidi Tabasco [place] Starter   [] Scarlettist: Chunk Lite Tuna in Water, Gourmet Select Pouches   [] Scarlettist: Yellowfin Tuna Fillets   [] Trappey's: Kidney, Butter, Campbell, Black Eye, Field, and Black Beans   [] INGRID Ragland's Turnip Greens or Valeriy Spinach      CONDIMENTS/ SAUCES/SPREADS/ SPICES  [] Jc Graham's Magic Seasonings - Regular or Salt Free   [] Maricruz Gill's Sauces - All Flavors   [] Laughing Cow Light - All Flavors   [] Dash Salt-Free Marinade - All Flavors   [] Melchor & Amanda's: Heart Smart Pasta Sauces   [] Tabasco      SALAD DRESSINGS  [] Carmela's Naturals: Lite Honey Mustard   [] Harley's Own: Lighten Up Salad Dressing - All Varieties   [] OPA Greek Yogurt Dressings - Ranch, Blue o Cheese, Caesar, Feta Dill      SWEETENERS  [] Sweet [place] Sweetener   [] Swerve   [] Truvia      BEVERAGES  [] Coconut Water   [] Crystal Light PURE - All Flavors   [] Honest Tea: Just Green Tea, Unsweetened   [] Kombucha Tea   [] [place]   [] [facility] Bloody Shanon Mix   [] Metromint - Zero-Sugar; All Natural Flavored   [] Nessa - Plain or Flavored   [] Milena Sanchez   [] Steaz - Zero-Sugar, All-Natural, Sparkling Tea   [] Tea Bags: Any Brand - e.g. Tristen, Yogi, Tazzo, Celestial   [] V8 100% Vegetable Juice   [] Vitamin Water Zero   [] Water   [] Zevia - Stevia Sweetened Soft Drink      BEER/AUGUSTO/LIQUORS  []Bess's Premier Light 64 Calories   [] Bud Select - 55 Calories   [] [place] Sisters Bloody Shanon Mix   [] Ramón Lewis  Draft - 64 Calories   [] Red or White Wine - All Varieties      CEREALS: HOT/COLD   [] Danitza's Puffin's Original Cereal  [] Bobbi's Mill Oat Bran Hot Cereal - Cracked Wheat, Multi-Grain  [] Kashi GoLean Cereal  [] Kashi GoLean Hot Cereal packets - Vanilla; Honey Cinnamon  [] Bhavna's Special K Protein Cereal  [] Archana's Steel Cut Haley Oatmeal  [] Nature's Path Smart Bran  [] Anglican Instant Oatmeal packet, Original  [] Anglican Old Fashioned Anglican Oats  [] Uncle Won's Whole Wheat & Flaxseed Original Cereal

## 2024-09-24 ENCOUNTER — PATIENT MESSAGE (OUTPATIENT)
Dept: INTERNAL MEDICINE | Facility: CLINIC | Age: 49
End: 2024-09-24

## 2024-09-24 ENCOUNTER — OFFICE VISIT (OUTPATIENT)
Dept: INTERNAL MEDICINE | Facility: CLINIC | Age: 49
End: 2024-09-24
Payer: COMMERCIAL

## 2024-09-24 DIAGNOSIS — G89.28 OTHER CHRONIC POSTPROCEDURAL PAIN: Chronic | ICD-10-CM

## 2024-09-24 DIAGNOSIS — F41.9 RECURRENT MILD MAJOR DEPRESSIVE DISORDER WITH ANXIETY: Chronic | ICD-10-CM

## 2024-09-24 DIAGNOSIS — R73.03 PREDIABETES: Chronic | ICD-10-CM

## 2024-09-24 DIAGNOSIS — F17.210 NICOTINE DEPENDENCE, CIGARETTES, UNCOMPLICATED: Chronic | ICD-10-CM

## 2024-09-24 DIAGNOSIS — E66.01 CLASS 3 SEVERE OBESITY DUE TO EXCESS CALORIES WITH SERIOUS COMORBIDITY AND BODY MASS INDEX (BMI) OF 40.0 TO 44.9 IN ADULT: Chronic | ICD-10-CM

## 2024-09-24 DIAGNOSIS — F33.0 RECURRENT MILD MAJOR DEPRESSIVE DISORDER WITH ANXIETY: Chronic | ICD-10-CM

## 2024-09-24 DIAGNOSIS — F41.1 GENERALIZED ANXIETY DISORDER: Chronic | ICD-10-CM

## 2024-09-24 DIAGNOSIS — R04.0 EPISTAXIS: Primary | ICD-10-CM

## 2024-09-24 DIAGNOSIS — F51.04 PSYCHOPHYSIOLOGIC INSOMNIA: Chronic | ICD-10-CM

## 2024-09-24 PROCEDURE — 3044F HG A1C LEVEL LT 7.0%: CPT | Mod: CPTII,95,, | Performed by: FAMILY MEDICINE

## 2024-09-24 PROCEDURE — 99214 OFFICE O/P EST MOD 30 MIN: CPT | Mod: 95,,, | Performed by: FAMILY MEDICINE

## 2024-09-24 PROCEDURE — 1160F RVW MEDS BY RX/DR IN RCRD: CPT | Mod: CPTII,95,, | Performed by: FAMILY MEDICINE

## 2024-09-24 PROCEDURE — 1159F MED LIST DOCD IN RCRD: CPT | Mod: CPTII,95,, | Performed by: FAMILY MEDICINE

## 2024-09-24 PROCEDURE — G2211 COMPLEX E/M VISIT ADD ON: HCPCS | Mod: 95,,, | Performed by: FAMILY MEDICINE

## 2024-09-24 RX ORDER — FLUOXETINE HYDROCHLORIDE 20 MG/1
20 CAPSULE ORAL DAILY
Qty: 90 CAPSULE | Refills: 3 | Status: SHIPPED | OUTPATIENT
Start: 2024-09-24

## 2024-09-24 RX ORDER — CLONAZEPAM 0.5 MG/1
.25-.5 TABLET ORAL 2 TIMES DAILY PRN
Qty: 60 TABLET | Refills: 5 | Status: SHIPPED | OUTPATIENT
Start: 2024-09-24 | End: 2025-09-24

## 2024-09-24 RX ORDER — GABAPENTIN 300 MG/1
900 CAPSULE ORAL NIGHTLY
Qty: 90 CAPSULE | Refills: 5 | Status: SHIPPED | OUTPATIENT
Start: 2024-09-24

## 2024-09-24 RX ORDER — VARENICLINE TARTRATE 1 MG/1
1 TABLET, FILM COATED ORAL 2 TIMES DAILY
Qty: 60 TABLET | Refills: 3 | Status: SHIPPED | OUTPATIENT
Start: 2024-09-24

## 2024-09-24 RX ORDER — BUSPIRONE HYDROCHLORIDE 15 MG/1
15 TABLET ORAL 2 TIMES DAILY
Qty: 180 TABLET | Refills: 2 | Status: SHIPPED | OUTPATIENT
Start: 2024-09-24
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
## 2024-09-30 ENCOUNTER — OFFICE VISIT (OUTPATIENT)
Dept: INTERNAL MEDICINE | Facility: CLINIC | Age: 49
End: 2024-09-30
Payer: COMMERCIAL

## 2024-09-30 DIAGNOSIS — B00.1 COLD SORE: Primary | ICD-10-CM

## 2024-09-30 PROCEDURE — 1159F MED LIST DOCD IN RCRD: CPT | Mod: CPTII,95,, | Performed by: PEDIATRICS

## 2024-09-30 PROCEDURE — 99213 OFFICE O/P EST LOW 20 MIN: CPT | Mod: 95,,, | Performed by: PEDIATRICS

## 2024-09-30 PROCEDURE — 3044F HG A1C LEVEL LT 7.0%: CPT | Mod: CPTII,95,, | Performed by: PEDIATRICS

## 2024-09-30 PROCEDURE — 1160F RVW MEDS BY RX/DR IN RCRD: CPT | Mod: CPTII,95,, | Performed by: PEDIATRICS

## 2024-09-30 RX ORDER — MUPIROCIN 20 MG/G
OINTMENT TOPICAL 4 TIMES DAILY
Qty: 15 G | Refills: 0 | Status: SHIPPED | OUTPATIENT
Start: 2024-09-30

## 2024-09-30 NOTE — PROGRESS NOTES
TELEMEDICINE VIRTUAL VISIT    Subjective:       Patient ID: Teresa Hwang is a 49 y.o. female.    Chief Complaint: Nose Problem    She has had a sore corner left nostril day after seeing Dr Chamorro for epistaxis. Neosporin has not helped. Not worsening. I cannot see on televisit. No hx of cold sores in past.      Review of Systems   Constitutional:  Negative for activity change and unexpected weight change.   HENT:  Negative for hearing loss, rhinorrhea and trouble swallowing.    Eyes:  Negative for discharge and visual disturbance.   Respiratory:  Negative for chest tightness and wheezing.    Cardiovascular:  Negative for chest pain and palpitations.   Gastrointestinal:  Negative for blood in stool, constipation, diarrhea and vomiting.   Endocrine: Negative for polydipsia and polyuria.   Genitourinary:  Negative for difficulty urinating, dysuria, hematuria and menstrual problem.   Musculoskeletal:  Negative for arthralgias, joint swelling and neck pain.   Neurological:  Negative for weakness and headaches.   Psychiatric/Behavioral:  Negative for confusion and dysphoric mood.        Objective:      CONSTITUTIONAL: No apparent distress. Does not appear acutely ill or septic. Appears adequately hydrated.  PULM: Breathing unlabored.  PSYCHIATRIC: Alert and conversant and grossly oriented. Mood is grossly neutral. Affect appropriate. Judgment and insight grossly intact.      Assessment:       1. Cold sore        Plan:       Cold sore  -     mupirocin (BACTROBAN) 2 % ointment; Apply topically 4 (four) times daily.  Dispense: 15 g; Refill: 0       Use bactroban with Qtip to help with healing. If viral, I explained to her that at this stage, it will have to heal on its own. If it does not, she should see Dr Chamorro again. If future Abrevia may help if used in first few hours of outbreak. F/U as needed. Total chart time 10 minutes.  Documentation entered by me for this encounter may have been done in part using  "speech-recognition technology. Although I have made an effort to ensure accuracy, "sound like" errors may exist and should be interpreted in context. -ROSELYN Garduno MD    Visit Details: This visit was a telemedicine virtual visit with synchronous audio and video. Teresa reported that her location at the time of this visit was in the [facility]. Teresa had the choice to come into office to receive these medical services. Teresa chose and consented to receive these medical services by telemedicine.  "

## 2024-10-10 ENCOUNTER — OFFICE VISIT (OUTPATIENT)
Dept: OTOLARYNGOLOGY | Facility: CLINIC | Age: 49
End: 2024-10-10
Payer: COMMERCIAL

## 2024-10-10 VITALS — WEIGHT: 237 LBS | HEIGHT: 62 IN | BODY MASS INDEX: 43.61 KG/M2

## 2024-10-10 DIAGNOSIS — R04.0 EPISTAXIS: Primary | ICD-10-CM

## 2024-10-10 DIAGNOSIS — J30.89 NON-SEASONAL ALLERGIC RHINITIS, UNSPECIFIED TRIGGER: ICD-10-CM

## 2024-10-10 PROCEDURE — 3044F HG A1C LEVEL LT 7.0%: CPT | Mod: CPTII,S$GLB,, | Performed by: OTOLARYNGOLOGY

## 2024-10-10 PROCEDURE — 1159F MED LIST DOCD IN RCRD: CPT | Mod: CPTII,S$GLB,, | Performed by: OTOLARYNGOLOGY

## 2024-10-10 PROCEDURE — 3008F BODY MASS INDEX DOCD: CPT | Mod: CPTII,S$GLB,, | Performed by: OTOLARYNGOLOGY

## 2024-10-10 PROCEDURE — 99999 PR PBB SHADOW E&M-EST. PATIENT-LVL III: CPT | Mod: PBBFAC,,, | Performed by: OTOLARYNGOLOGY

## 2024-10-10 PROCEDURE — 99214 OFFICE O/P EST MOD 30 MIN: CPT | Mod: S$GLB,,, | Performed by: OTOLARYNGOLOGY

## 2024-10-10 RX ORDER — FLUTICASONE PROPIONATE 50 MCG
2 SPRAY, SUSPENSION (ML) NASAL DAILY
Qty: 16 G | Refills: 5 | Status: SHIPPED | OUTPATIENT
Start: 2024-10-10 | End: 2024-11-09

## 2024-10-10 NOTE — PROGRESS NOTES
"Referring Provider:    No referring provider defined for this encounter.  Subjective:   Patient: Teresa Hwang [id number], :1975   Visit date:10/10/2024 11:27 AM    Chief Complaint:  Epistaxis (1 month follow up on nosebleeds. Patient states she fell asleep on her face, woke up with nasal congestion. When her nose bled it was only for 30 secs. She believes the nose bled due to her sleep on her face. L nostril. Only 1 nosebleed.)    HPI:    Prior notes reviewed by myself.  Clinical documentation obtained by nursing staff reviewed.      49-year-old female presents for evaluation of recurrent left-sided epistaxis.  This started about 1 month ago and she has had multiple episodes.  She is able to get the bleeding to stop with gentle pressure and Kleenex.  She does have a prior history of cauterization roughly 1 year ago which was effective.    She feels that her blood pressure has been stable and she is not on any anticoagulation.  She denies any recent trauma    10/10/24 update:  Minimal bleeding since last visit      Objective:     Physical Exam:  Vitals:  Ht 5' 2" (1.575 m)   Wt 107.5 kg (236 lb 15.9 oz)   BMI 43.35 kg/m²   General appearance:  Well developed, well nourished    Ears:  Otoscopy of external auditory canals and tympanic membranes was normal, clinical speech reception thresholds grossly intact, no mass/lesion of auricle.    Nose:  No masses/lesions of external nose, nasal mucosa with healed cautery site left anterior and mid septum, septum, and turbinates were within normal limits.    Mouth:  No mass/lesion of lips, teeth, gums, hard/soft palate, tongue, tonsils, or oropharynx.    Neck & Lymphatics:  No cervical lymphadenopathy, no neck mass/crepitus/ asymmetry, trachea is midline, possible thyroid nodule.      [x]  Data Reviewed:    Lab Results   Component Value Date    WBC 12.39 2023    HGB 12.6 2023    HCT 38.8 2023    MCV 92 2023    EOSINOPHIL 1.9 " 08/11/2023         Discussed with Dr. Chery      Assessment & Plan:   Epistaxis  -     fluticasone propionate (FLONASE) 50 mcg/actuation nasal spray; 2 sprays (100 mcg total) by Each Nostril route once daily.  Dispense: 16 g; Refill: 5    Non-seasonal allergic rhinitis, unspecified trigger         Cauterization performed without incident.  I discussed her exam with Dr. Chery who feels that she may have a small thyroid nodule.  We agreed to move forward with a thyroid ultrasound.  F/U 1 month    10/10/24 update:  Doing great, continue SNS.  We will refill her flonase    Nose Bleed Instructions:  We had a long discussion regarding the importance of nasal moisture, and the use of a nasal saline spray or gel into nose four times daily to keep moist.    Bactroban ointment in nostril twice daily if ordered.  Do not sleep or sit for long periods of time under a ceiling fan or other source of aggressive airflow.  Use a humidifier in bedroom or any room in your home you spend long periods of time.  Engage in only light activity. No strenuous activity. No heavy lifting or straining.   Use a stool softener to avoid straining.   No bending over at the hips. Keep nose above your heart at all times.  Sneeze with an open mouth to reduce pressure from nose.   Avoid foods or drinks hot in temperature for at least 48 hours then progress slowly.  Avoid hot steamy showers or baths for one week.    Home Treatment of Epistaxis    The biggest mistake people make when treating a nosebleed is to lean their head back. Epistaxis, the medical term for nosebleeds, is a mild ailment that can often be treated at home. The first step in treating nosebleeds is to recognize the etiology. Nosebleeds can occur due to:    Age  High blood pressure  Daily aspirin or blood thinner  Bleeding disorder  Common cold  Allergies  Low humidity and dry weather  Dependence on oxygen treatment  Deviated septum  Smoking  Alcohol abuse  Kidney or liver  disease  Drug use (sniffing)  Frequent nose picking  Trauma to nose  Prevention of nosebleeds would be controlling or managing the risks above (i.e., humidifier, quit smoking, saline nasal spray, etc.). If a nosebleed does occur, we recommend the following steps for at-home treatment.    Stay calm- an increase in heart rate can cause more bleeding  2.   Keep your head above your heart- remain standing or sit-up  3.  Lean forward slightly - so the bleeding does not go down the back of your throat  4.  Pinch your nose at the lower portion (where your nostrils flare out) to provide pressure at the bleeding point - keep this pressure for at least 10 minutes  5.  If bleeding continues after step #4, gently blow the nose to remove clots and then spray 2-3 sprays of Afrin (pump spray mist, red and white box)  6.  Repeat step #4 - ok to repeat steps 5 and 6 up to 3 times    If your nosebleed continues after home treatment, the frequency of nosebleeds increases, nosebleed is caused by trauma to the nose, or occurs in a child under 2 years, then see an ENT doctor for further evaluation and treatment of the nosebleed.         Gil Chamorro M.D.  Department of Otolaryngology - Head & Neck Surgery  [address] [place] [address]  P: [phone]  F: [phone]        DISCLAIMER: This note was prepared with [employer] voice recognition transcription software. Garbled syntax, mangled pronouns, and other bizarre constructions may be attributed to that software system. While efforts were made to correct any mistakes made by this voice recognition program, some errors and/or omissions may remain in the note that were missed when the note was originally created.

## 2024-11-08 ENCOUNTER — PATIENT MESSAGE (OUTPATIENT)
Dept: PRIMARY CARE CLINIC | Facility: CLINIC | Age: 49
End: 2024-11-08
Payer: COMMERCIAL

## 2024-11-08 NOTE — PROGRESS NOTES
Patient ID: [id number]     Chief Complaint: Obesity    HPI:     Teresa Hwang is a 49 y.o. female with diagnosis of Prediabetes, Obesity. Patient referred by PCP. Patient's PCP is Dr. Chery. Patient seen in clinic today for Obesity. Patient last seen in clinic on 09/23/2024.     Weight history   09/23/2024: Patient states she was a , didn't have a lot of healthy food options so ate fast food. Also would do a lot of sitting.   11/11/2024: at previous appt, patient started on Semaglutide 0.25 mg weekly, denies side effects. Patient states she has noticed a slight decrease in appetite. Patient requesting increase in dose. States she has been making healthier options with food.     Previous Obesity Treatment:   Topamax (2023)- failed  Semaglutide (2023) - success     BMI:  44.2 (11/11/2024)  44.44 (09/22/2024)  42.2 (09/10/2024)  35.3 (11/14/2023)  37.1 (08/04/2023)    Weight:  Wt Readings from Last 6 Encounters:   11/11/24 109.8 kg (242 lb 1 oz)   10/10/24 107.5 kg (236 lb 15.9 oz)   09/23/24 110.2 kg (242 lb 15.2 oz)   09/10/24 87.5 kg (192 lb 14.4 oz)   09/10/24 104.7 kg (230 lb 13.2 oz)   09/10/24 105 kg (231 lb 7.7 oz)     Ideal/Adjusted Body Weight:  Ideal: 110 lbs  Adjusted 163 lbs    Waist Circumference:  45 in    Percent Body Fat:   60.4% (11/11/2024)  https://www.calculator.net/body-fat-calculator.html    Neck Circumference:   15 in    InBody:   None noted    Nutrition history:  Breakfast: coffee   Lunch: none  Dinner: turkey wing and mustard greens  Snack: none  Water: 32 oz  Sugar Sweetened beverages: rarely  Etoh: 2 shots liquor per week  Cravings: denies  Patient states she tends to eat unhealthy when she is bored; also eats if she had trouble sleeping - usually a sandwich.   Dietician: denies    Current physical activity:   Walks for exercise  Barriers: financial, states recently started a new job    Stressors/Mental Health   On Fluoxetine, Buspirone, Clonazepam for  Depression/Anxiety prescribed by PCP. Patient states medication working well.   Over the last two weeks how often have you been bothered by little interest or pleasure in doing things: 0  Over the last two weeks how often have you been bothered by feeling down, depressed or hopeless: 0  PHQ-2 Total Score: 0    Sleep habits  DEYA - on CPAP    CVD screening  The 10-year ASCVD risk score (Sanket LOU, et al., 2019) is: 1.4%    Values used to calculate the score:      Age: 49 years      Sex: Female      Is Non- : Yes      Diabetic: No      Tobacco smoker: No      Systolic Blood Pressure: 126 mmHg      Is BP treated: No      HDL Cholesterol: 64 mg/dL      Total Cholesterol: 165 mg/dL    LMP: menopause    Contraception: menopause      I spent 12 minutes counseling patient on diet and physical activity.     Review of patient's allergies indicates:   Allergen Reactions    Macrobid [nitrofurantoin monohyd/m-cryst] Hives    Bactrim [sulfamethoxazole-trimethoprim]     Flagyl [metronidazole hcl] Hives     Current Outpatient Medications   Medication Instructions    ascorbic acid (vitamin C) (VITAMIN C) 500 mg, Daily    busPIRone (BUSPAR) 15 mg, Oral, 2 times daily    clonazePAM (KLONOPIN) 0.25-0.5 mg, Oral, 2 times daily PRN    ELDERBERRY FRUIT ORAL 1 capsule, Daily    FLUoxetine 20 mg, Oral, Daily    fluticasone propionate (FLONASE) 100 mcg, Each Nostril, Daily    gabapentin (NEURONTIN) 900 mg, Oral, Nightly    mupirocin (BACTROBAN) 2 % ointment Topical (Top), 4 times daily    nicotine (polacrilex) (NICORETTE) 2 mg, Oral, As needed (PRN), Use as directed on packaging.    sodium chloride (SALINE NASAL) 0.65 % nasal spray Use as directed on product packaging    varenicline (CHANTIX) 1 mg, Oral, 2 times daily    WEGOVY 0.5 mg, Subcutaneous, Every 7 days     Past Surgical History:   Procedure Laterality Date    APPLICATION OF LARGE EXTERNAL FIXATION DEVICE TO TIBIA Left 01/10/2019    Procedure: APPLICATION,  EXTERNAL FIXATION DEVICE, LARGE, TIBIA;  Surgeon: Domenic Galvan MD;  Location: [facility] OR;  Service: Orthopedics;  Laterality: Left;    APPLICATION OF WOUND VACUUM-ASSISTED CLOSURE DEVICE Left 01/17/2019    Procedure: APPLICATION, WOUND VAC;  Surgeon: Barry Guzman MD;  Location: [facility] OR;  Service: Orthopedics;  Laterality: Left;    COLONOSCOPY N/A 09/02/2022    Procedure: COLONOSCOPY;  Surgeon: Yon Ridley MD;  [facility];  Service: Endoscopy;  Laterality: N/A;    FRACTURE SURGERY      C1 neck- halo    HERNIA REPAIR      HYSTERECTOMY  2009    tahbso    OOPHORECTOMY Bilateral     OPEN REDUCTION AND INTERNAL FIXATION (ORIF) OF FRACTURE OF TIBIAL PLATEAU Left 01/17/2019    Procedure: ORIF, FRACTURE, TIBIA, PLATEAU;  Surgeon: Barry Guzman MD;  Location: [facility] OR;  Service: Orthopedics;  Laterality: Left;    REMOVAL OF EXTERNAL FIXATION DEVICE Left 01/17/2019    Procedure: REMOVAL, EXTERNAL FIXATION DEVICE;  Surgeon: Barry Guzman MD;  Location: [facility] OR;  Service: Orthopedics;  Laterality: Left;    TRIGGER FINGER RELEASE Right 8/25/2023    Procedure: RELEASE, TRIGGER FINGER;  Surgeon: Sandeep Bang MD;  Location: [facility] OR;  Service: Orthopedics;  Laterality: Right;  right trigger thumb release    TUBAL LIGATION  05/22/1997    UMBILICAL HERNIA REPAIR       family history includes Breast cancer in her paternal grandmother; Breast cancer (age of onset: 46) in her sister; Breast cancer (age of onset: 58) in her mother; Cancer in her mother and sister; Depression in her mother; Diabetes in her mother; No Known Problems in her father.    Social History     Socioeconomic History    Marital status:     Number of children: 5   Occupational History    Occupation:    Tobacco Use    Smoking status: Former     Current packs/day: 0.00     Average packs/day: 1 pack/day for 33.0 years (33.0 ttl pk-yrs)     Types: Cigarettes     Start date: 1990     Quit date: 2023     Years since  quittin.8     Passive exposure: Past    Smokeless tobacco: Never   Substance and Sexual Activity    Alcohol use: Yes     Alcohol/week: 2.0 standard drinks of alcohol     Types: 2 Glasses of wine per week     Comment: Stopped    Drug use: No    Sexual activity: Not Currently     Partners: Male     Birth control/protection: None     Social Drivers of Health     Financial Resource Strain: Low Risk  (11/15/2023)    Overall Financial Resource Strain (CARDIA)     Difficulty of Paying Living Expenses: Not hard at all   Food Insecurity: Patient Declined (11/15/2023)    Hunger Vital Sign     Worried About Running Out of Food in the Last Year: Patient declined     Ran Out of Food in the Last Year: Patient declined   Transportation Needs: Patient Declined (11/15/2023)    PRAPARE - Transportation     Lack of Transportation (Medical): Patient declined     Lack of Transportation (Non-Medical): Patient declined   Physical Activity: Insufficiently Active (11/15/2023)    Exercise Vital Sign     Days of Exercise per Week: 3 days     Minutes of Exercise per Session: 30 min   Stress: No Stress Concern Present (11/15/2023)    [employer] - Occupational Stress Questionnaire     Feeling of Stress : Only a little   Housing Stability: Unknown (11/15/2023)    Housing Stability Vital Sign     Unable to Pay for Housing in the Last Year: Patient refused     Number of Places Lived in the Last Year: 0     Unstable Housing in the Last Year: Patient refused       Subjective:     Review of Systems   Constitutional: Negative.    HENT: Negative.     Eyes: Negative.    Respiratory: Negative.     Cardiovascular: Negative.    Gastrointestinal: Negative.    Endocrine: Negative.    Genitourinary: Negative.    Musculoskeletal: Negative.    Skin: Negative.    Allergic/Immunologic: Negative.    Neurological: Negative.    Hematological: Negative.    Psychiatric/Behavioral: Negative.       Objective:     Visit Vitals  /80  "(BP Location: Left arm, Patient Position: Sitting)   Pulse 66   Temp 99.1 °F (37.3 °C) (Tympanic)   Ht 5' 2" (1.575 m)   Wt 109.8 kg (242 lb 1 oz)   SpO2 100%   BMI 44.27 kg/m²       Physical Exam  Vitals reviewed.   Constitutional:       Appearance: Normal appearance. She is obese.   HENT:      Head: Normocephalic and atraumatic.   Eyes:      Extraocular Movements: Extraocular movements intact.      Conjunctiva/sclera: Conjunctivae normal.      Pupils: Pupils are equal, round, and reactive to light.   Cardiovascular:      Rate and Rhythm: Normal rate and regular rhythm.      Heart sounds: Normal heart sounds.   Pulmonary:      Effort: Pulmonary effort is normal.      Breath sounds: Normal breath sounds.   Abdominal:      General: Bowel sounds are normal.   Musculoskeletal:         General: Normal range of motion.      Cervical back: Normal range of motion.   Skin:     General: Skin is warm and dry.   Neurological:      Mental Status: She is alert and oriented to person, place, and time.   Psychiatric:         Mood and Affect: Mood normal.         Behavior: Behavior normal.       Labs Reviewed:     Hematology:  Lab Results   Component Value Date    WBC 12.39 08/11/2023    HGB 12.6 08/11/2023    HCT 38.8 08/11/2023     08/11/2023     Chemistry:  Lab Results   Component Value Date     09/10/2024    K 4.0 09/10/2024    BUN 14 09/10/2024    CREATININE 0.8 09/10/2024    EGFRNORACEVR >60.0 09/10/2024    CALCIUM 9.4 09/10/2024    ALKPHOS 74 09/10/2024    LABPROT 10.0 01/10/2019    ALBUMIN 4.1 09/10/2024    AST 18 09/10/2024    ALT 15 09/10/2024    MG 2.0 01/11/2019    PHOS 3.8 01/11/2019      Lab Results   Component Value Date    HGBA1C 5.9 (H) 09/10/2024      Lipid Panel:  Lab Results   Component Value Date    CHOL 165 09/10/2024    HDL 64 09/10/2024    TRIG 69 09/10/2024    TOTALCHOLEST 2.6 09/10/2024      Thyroid:  Lab Results   Component Value Date    TSH 3.299 09/10/2024      Urine:  Lab Results " "  Component Value Date    APPEARANCEUA Clear 10/14/2021    PROTEINUA Negative 10/14/2021    LEUKOCYTESUR Negative 10/14/2021        Assessment:       ICD-10-CM ICD-9-CM   1. Prediabetes  R73.03 790.29   2. DEYA on CPAP  G47.33 327.23   3. Generalized anxiety disorder  F41.1 300.02   4. Recurrent mild major depressive disorder with anxiety  F33.0 296.31    F41.9 300.00   5. Class 3 severe obesity due to excess calories with serious comorbidity and body mass index (BMI) of 40.0 to 44.9 in adult  E66.813 278.01    E66.01 V85.41    Z68.41         Plan:     1. Prediabetes  A1c: 5.9  ADA diet  Weight loss encouraged  Increase Semaglutide to 0.5 mg SC weekly    2. DEYA on CPAP  Continue CPAP nightly    3. Generalized anxiety disorder  Continue Fluoxetine, Clonazepam, Buspirone    4. Recurrent mild major depressive disorder with anxiety  Continue Fluoxetine, Clonazepam, Buspirone    5. Class 3 severe obesity due to excess calories with serious comorbidity and body mass index (BMI) of 40.0 to 44.9 in adult  Body mass index is 44.27 kg/m².  Wt Readings from Last 1 Encounters:   11/11/24 109.8 kg (242 lb 1 oz)   Waist Circumference: 45 in  TSH   Date Value Ref Range Status   09/10/2024 3.299 0.400 - 4.000 uIU/mL Final     No results found for: "[id number]"     A modest (5-10%) weight loss improves health and quality of life.     Goal: lose weight  Short-term: Lose 10 lbs in 8 weeks  Long-term: Lose 60 lbs  *Keep in mind that, on average, you may lose 1-2 lbs per week*    Willingness to change: 6/10    Patient qualifies for anti-obesity medications due to BMI 44 with comorbidity. Anti-obesity medications are an adjunct to nutritional, physical activity, and behavioral therapies. Medication alone cannot treat obesity. I recommend starting medication to significantly improve patient's risk factor/s.   Medication:   Semaglutide (Wegovy)  Dosing: increase to 0.5 mg SC weekly  Discussed at length potential pharmacologic options. She " desires consideration of GLP-1. Risks/benefits/common side effects discussed patient including nausea and pain at injection site. She is aware she should not get pregnant while taking and medication not approved to take while breastfeeding. Patient is menopausal. No personal/family hx of MEN 2 or medullary thyroid cancer. No hx of thyroid nodules or biliary disease/gallstones. Also reviewed with patient gastroparesis and potential for mental health changes. Notify provider immediately if any significant side effects or issues. Discussed with patient that with substantial or rapid weight loss, gallstones could develop. Also discussed with patient GLP-1 MOA and common side effects. Instructed patient how to use with demo pen; patient practiced in office. Patient advised if approved will get medication education handout from pharmacy.  Recommend InBody due to possible muscle loss.   Birth Control: post-menopause  Monitor for pancreatitis   Strength training at least 2 days a week to help prevent decrease in muscle mass.     Nutrition: Aim for 1,350 Calories per day.      Protein: goal is a minimum of 80g/day. Protein has a slower rate of digestion = greater satiety (fullness).        Aim for <25g of added sugar per day.      Recommend to increase fiber intake.   www.fullplateliving.org      Eat Fit Shopping List.      Prepare meals in advance.      Eat breakfast within 2 hours of waking up.       Track what you eat. Research shows that people who track their food intake are more successful with weight management. This creates awareness of what you are eating/drinking and can help you see where to make changes.       Get to know your plate.  www.myplate.gov      Stay hydrated.     Activity: 2-3 days of strength training. This can be body weight, light weight or elastic bands exercises. [employer] and [employer] are great sources for free workout plans/videos.     Start slowly with an activity you enjoy and make it a  habit.     Ask a family member or friend to get active with you.     Aim for 5,000 - 10,000 steps per day     Schedule time to make physical activity a part of your daily routine.     Exercise prescription: (FITTE)    Frequency: 3-4 days per week    Intensity: low    Type: walking    Time: 20 minutes    Enjoyment: enjoys walking on the treadmill     Sleep: 7-8 hours of sleep a night    *Recognize your progress and remember that each day is a new day*  *Stay on track, even when you feel like you're not making progress*  *Sit Less, Stand Often, Move More*  *You don't have to be perfect; be persistent*    Avoiding Weight Regain:  - continued with modified food intake (changed eating habits)  - eat breakfast every day  - weigh yourself at least once a week  - watch less than 10 hours of TV per week  - continue with increased physical activity  - physical activity, on average, about 1 hour per day      Follow up in about 6 weeks (around 12/23/2024). In addition to their scheduled follow up, the patient has also been instructed to follow up on as needed basis.     Nadine Tolliver, TATO

## 2024-11-11 ENCOUNTER — LAB VISIT (OUTPATIENT)
Dept: LAB | Facility: HOSPITAL | Age: 49
End: 2024-11-11
Attending: NURSE PRACTITIONER
Payer: COMMERCIAL

## 2024-11-11 ENCOUNTER — OFFICE VISIT (OUTPATIENT)
Dept: PRIMARY CARE CLINIC | Facility: CLINIC | Age: 49
End: 2024-11-11
Payer: COMMERCIAL

## 2024-11-11 ENCOUNTER — PATIENT MESSAGE (OUTPATIENT)
Dept: PRIMARY CARE CLINIC | Facility: CLINIC | Age: 49
End: 2024-11-11

## 2024-11-11 VITALS
OXYGEN SATURATION: 100 % | WEIGHT: 242.06 LBS | HEART RATE: 66 BPM | DIASTOLIC BLOOD PRESSURE: 80 MMHG | HEIGHT: 62 IN | BODY MASS INDEX: 44.55 KG/M2 | SYSTOLIC BLOOD PRESSURE: 126 MMHG | TEMPERATURE: 99 F

## 2024-11-11 DIAGNOSIS — E66.813 CLASS 3 SEVERE OBESITY DUE TO EXCESS CALORIES WITH SERIOUS COMORBIDITY AND BODY MASS INDEX (BMI) OF 40.0 TO 44.9 IN ADULT: Chronic | ICD-10-CM

## 2024-11-11 DIAGNOSIS — R73.03 PREDIABETES: Primary | Chronic | ICD-10-CM

## 2024-11-11 DIAGNOSIS — E66.01 CLASS 3 SEVERE OBESITY DUE TO EXCESS CALORIES WITH SERIOUS COMORBIDITY AND BODY MASS INDEX (BMI) OF 40.0 TO 44.9 IN ADULT: Chronic | ICD-10-CM

## 2024-11-11 DIAGNOSIS — F41.9 RECURRENT MILD MAJOR DEPRESSIVE DISORDER WITH ANXIETY: Chronic | ICD-10-CM

## 2024-11-11 DIAGNOSIS — F41.1 GENERALIZED ANXIETY DISORDER: Chronic | ICD-10-CM

## 2024-11-11 DIAGNOSIS — G47.33 OSA ON CPAP: Chronic | ICD-10-CM

## 2024-11-11 DIAGNOSIS — F33.0 RECURRENT MILD MAJOR DEPRESSIVE DISORDER WITH ANXIETY: Chronic | ICD-10-CM

## 2024-11-11 LAB — 25(OH)D3+25(OH)D2 SERPL-MCNC: 30 NG/ML (ref 30–96)

## 2024-11-11 PROCEDURE — 1159F MED LIST DOCD IN RCRD: CPT | Mod: CPTII,S$GLB,, | Performed by: NURSE PRACTITIONER

## 2024-11-11 PROCEDURE — 99401 PREV MED CNSL INDIV APPRX 15: CPT | Mod: S$GLB,,, | Performed by: NURSE PRACTITIONER

## 2024-11-11 PROCEDURE — 3074F SYST BP LT 130 MM HG: CPT | Mod: CPTII,S$GLB,, | Performed by: NURSE PRACTITIONER

## 2024-11-11 PROCEDURE — 1160F RVW MEDS BY RX/DR IN RCRD: CPT | Mod: CPTII,S$GLB,, | Performed by: NURSE PRACTITIONER

## 2024-11-11 PROCEDURE — 3079F DIAST BP 80-89 MM HG: CPT | Mod: CPTII,S$GLB,, | Performed by: NURSE PRACTITIONER

## 2024-11-11 PROCEDURE — 36415 COLL VENOUS BLD VENIPUNCTURE: CPT | Performed by: NURSE PRACTITIONER

## 2024-11-11 PROCEDURE — 99999 PR PBB SHADOW E&M-EST. PATIENT-LVL V: CPT | Mod: PBBFAC,,, | Performed by: NURSE PRACTITIONER

## 2024-11-11 PROCEDURE — 3044F HG A1C LEVEL LT 7.0%: CPT | Mod: CPTII,S$GLB,, | Performed by: NURSE PRACTITIONER

## 2024-11-11 PROCEDURE — 99214 OFFICE O/P EST MOD 30 MIN: CPT | Mod: 25,S$GLB,, | Performed by: NURSE PRACTITIONER

## 2024-11-11 PROCEDURE — 82306 VITAMIN D 25 HYDROXY: CPT | Performed by: NURSE PRACTITIONER

## 2024-11-11 PROCEDURE — 3008F BODY MASS INDEX DOCD: CPT | Mod: CPTII,S$GLB,, | Performed by: NURSE PRACTITIONER

## 2024-11-11 RX ORDER — SEMAGLUTIDE 0.5 MG/.5ML
0.5 INJECTION, SOLUTION SUBCUTANEOUS
Qty: 2 ML | Refills: 1 | Status: SHIPPED | OUTPATIENT
Start: 2024-11-11

## 2024-11-25 DIAGNOSIS — B00.1 COLD SORE: ICD-10-CM

## 2024-11-25 RX ORDER — MUPIROCIN 20 MG/G
OINTMENT TOPICAL 4 TIMES DAILY
Qty: 22 G | Refills: 0 | Status: SHIPPED | OUTPATIENT
Start: 2024-11-25

## 2024-11-25 NOTE — TELEPHONE ENCOUNTER
Refill Routing Note   Medication(s) are not appropriate for processing by [facility] for the following reason(s):        Outside of protocol    [facility] action(s):  Route             Appointments  past 12m or future 3m with PCP    Date Provider   Last Visit   9/30/2024 Adan Garduno MD   Next Visit   Visit date not found Adan Garduno MD   ED visits in past 90 days: 0        Note composed:2:34 PM 11/25/2024

## 2024-11-27 ENCOUNTER — OFFICE VISIT (OUTPATIENT)
Dept: INTERNAL MEDICINE | Facility: CLINIC | Age: 49
End: 2024-11-27
Payer: COMMERCIAL

## 2024-11-27 DIAGNOSIS — L02.91 ABSCESS: Primary | ICD-10-CM

## 2024-11-27 PROCEDURE — 1159F MED LIST DOCD IN RCRD: CPT | Mod: CPTII,95,, | Performed by: PHYSICIAN ASSISTANT

## 2024-11-27 PROCEDURE — 1160F RVW MEDS BY RX/DR IN RCRD: CPT | Mod: CPTII,95,, | Performed by: PHYSICIAN ASSISTANT

## 2024-11-27 PROCEDURE — 99213 OFFICE O/P EST LOW 20 MIN: CPT | Mod: 95,,, | Performed by: PHYSICIAN ASSISTANT

## 2024-11-27 PROCEDURE — 3044F HG A1C LEVEL LT 7.0%: CPT | Mod: CPTII,95,, | Performed by: PHYSICIAN ASSISTANT

## 2024-11-27 RX ORDER — DOXYCYCLINE 100 MG/1
100 CAPSULE ORAL 2 TIMES DAILY
Qty: 20 CAPSULE | Refills: 0 | Status: SHIPPED | OUTPATIENT
Start: 2024-11-27 | End: 2024-12-07

## 2024-11-27 NOTE — PROGRESS NOTES
Subjective:      Patient ID: Teresa Hwang is a 49 y.o. female.    Chief Complaint: No chief complaint on file.    HPI  History of Present Illness    CHIEF COMPLAINT:  Ms. Hwang presents today with a possible spider bite.    SKIN CONCERN:  She reports a possible spider bite that formed a small ball on her buttock above the crack, which burst and still has some leakage.      SOCIAL HISTORY:  She mentions a habit of sleeping without underwear.    Medications were reviewed        Patient Active Problem List   Diagnosis    Obesity    Class 3 severe obesity due to excess calories with serious comorbidity and body mass index (BMI) of 40.0 to 44.9 in adult    Bilateral primary osteoarthritis of knee    Recurrent mild major depressive disorder with anxiety    Nicotine dependence, cigarettes, uncomplicated    Psychophysiologic insomnia    Thrombocytosis    Long-term current use of benzodiazepine    DEYA on CPAP    Chronic post-op pain    Generalized anxiety disorder    Prediabetes    History of COVID-19 (June 2022)    Breast cancer screening, high risk patient    Family history of breast cancer (mother and sister)    Primary hypertension    Pulmonary nodule 1 cm or greater in diameter    Cigarette nicotine dependence in remission    Trigger finger of right thumb    Low libido         Current Outpatient Medications:     ascorbic acid, vitamin C, (VITAMIN C) 500 MG tablet, Take 500 mg by mouth once daily., Disp: , Rfl:     busPIRone (BUSPAR) 15 MG tablet, Take 1 tablet (15 mg total) by mouth 2 (two) times daily., Disp: 180 tablet, Rfl: 2    clonazePAM (KLONOPIN) 0.5 MG tablet, Take 0.5-1 tablets (0.25-0.5 mg total) by mouth 2 (two) times daily as needed for Anxiety., Disp: 60 tablet, Rfl: 5    doxycycline (MONODOX) 100 MG capsule, Take 1 capsule (100 mg total) by mouth 2 (two) times daily. Do not take with milk or yogurt for 10 days, Disp: 20 capsule, Rfl: 0    ELDERBERRY FRUIT ORAL, Take 1 capsule by  mouth once daily., Disp: , Rfl:     FLUoxetine 20 MG capsule, Take 1 capsule (20 mg total) by mouth once daily., Disp: 90 capsule, Rfl: 3    fluticasone propionate (FLONASE) 50 mcg/actuation nasal spray, 2 sprays (100 mcg total) by Each Nostril route once daily., Disp: 16 g, Rfl: 0    gabapentin (NEURONTIN) 300 MG capsule, Take 3 capsules (900 mg total) by mouth every evening., Disp: 90 capsule, Rfl: 5    mupirocin (BACTROBAN) 2 % ointment, APPLY TOPICALLY 4 TIMES DAILY, Disp: 22 g, Rfl: 0    nicotine, polacrilex, (NICORETTE) 2 mg Gum, Take 1 each (2 mg total) by mouth as needed (for cigarette cravings). Use as directed on packaging., Disp: 100 each, Rfl: 5    semaglutide, weight loss, (WEGOVY) 0.5 mg/0.5 mL PnIj, Inject 0.5 mg into the skin every 7 days., Disp: 2 mL, Rfl: 1    sodium chloride (SALINE NASAL) 0.65 % nasal spray, Use as directed on product packaging, Disp: 60 mL, Rfl: 5    varenicline (CHANTIX) 1 mg Tab, Take 1 tablet (1 mg total) by mouth 2 (two) times daily., Disp: 60 tablet, Rfl: 3  No current facility-administered medications for this visit.    Facility-Administered Medications Ordered in Other Visits:     chlorhexidine 0.12 % solution 10 mL, 10 mL, Mouth/Throat, On Call Procedure, Xenia Mancilla PA-C, 10 mL at 08/25/23 0821    Review of Systems   Constitutional:  Negative for activity change, appetite change, chills, diaphoresis, fatigue, fever and unexpected weight change.   HENT: Negative.  Negative for congestion, hearing loss, postnasal drip, rhinorrhea, sore throat, trouble swallowing and voice change.    Eyes: Negative.  Negative for discharge and visual disturbance.   Respiratory: Negative.  Negative for cough, choking, chest tightness, shortness of breath and wheezing.    Cardiovascular:  Negative for chest pain, palpitations and leg swelling.   Gastrointestinal:  Negative for abdominal distention, abdominal pain, blood in stool, constipation, diarrhea, nausea and vomiting.   Endocrine:  Negative for cold intolerance, heat intolerance, polydipsia and polyuria.   Genitourinary: Negative.  Negative for difficulty urinating, dysuria, frequency, hematuria and menstrual problem.   Musculoskeletal:  Negative for arthralgias, back pain, gait problem, joint swelling, myalgias and neck pain.   Skin:  Positive for wound. Negative for color change, pallor and rash.   Neurological:  Negative for dizziness, tremors, weakness, light-headedness, numbness and headaches.   Hematological:  Negative for adenopathy.   Psychiatric/Behavioral:  Negative for behavioral problems, confusion, dysphoric mood, self-injury, sleep disturbance and suicidal ideas. The patient is not nervous/anxious.      Objective:   There were no vitals taken for this visit.    Physical Exam  Vitals and nursing note reviewed.   Constitutional:       General: She is not in acute distress.     Appearance: Normal appearance. She is well-developed. She is not ill-appearing, toxic-appearing or diaphoretic.   HENT:      Head: Normocephalic and atraumatic.   Pulmonary:      Effort: Pulmonary effort is normal. No respiratory distress.      Breath sounds: Normal breath sounds.   Musculoskeletal:         General: Normal range of motion.   Skin:     General: Skin is warm.      Capillary Refill: Capillary refill takes less than 2 seconds.      Findings: No rash.   Neurological:      Mental Status: She is alert and oriented to person, place, and time.      Motor: No weakness.      Coordination: Coordination normal.      Gait: Gait normal.   Psychiatric:         Mood and Affect: Mood normal.         Behavior: Behavior normal.         Thought Content: Thought content normal.         Judgment: Judgment normal.         Assessment:     1. Abscess      Plan:   Abscess  -     doxycycline (MONODOX) 100 MG capsule; Take 1 capsule (100 mg total) by mouth 2 (two) times daily. Do not take with milk or yogurt for 10 days  Dispense: 20 capsule; Refill: 0    Assessment &  Plan    Assessed potential spider bite on patient's buttocks  Recommend drainage of abscess to prevent trapped fluid  Considered antibiotic therapy to prevent worsening of infection  Chose doxycycline due to patient's allergy to Bactrim    CUTANEOUS ABSCESS OF BUTTOCK:  - Explained importance of complete drainage for proper healing.  - Ms. Hwang to sit in bathtub and use Hibiclens to clean affected area.  - Ms. Hwang to apply pressure to abscess to ensure complete drainage.  - Started Hibiclens (OTC), use to clean affected area as directed.  - Explained importance of maintaining hygiene in shared shower facilities.  - Recommend using Hibiclens for regular bathing when using shared shower facilities.    BLISTER OF EXTERNAL GENITAL ORGANS:  - Discussed potential risks of sleeping without underwear in new environments.  - Ms. Hwang to wear protective clothing (e.g. underwear) when sleeping in new environments.    LONG TERM USE OF ANTIBIOTICS:  - Started doxycycline, take twice daily for 10 days.    FOLLOW-UP:  - Message via MyChart if condition worsens or becomes more painful.  - Follow up if condition does not improve, may need manual drainage.         Follow up if symptoms worsen or fail to improve.

## 2024-12-26 ENCOUNTER — PATIENT MESSAGE (OUTPATIENT)
Dept: PRIMARY CARE CLINIC | Facility: CLINIC | Age: 49
End: 2024-12-26
Payer: COMMERCIAL

## 2024-12-26 ENCOUNTER — OFFICE VISIT (OUTPATIENT)
Dept: INTERNAL MEDICINE | Facility: CLINIC | Age: 49
End: 2024-12-26
Payer: COMMERCIAL

## 2024-12-26 DIAGNOSIS — J06.9 VIRAL UPPER RESPIRATORY TRACT INFECTION WITH COUGH: Primary | ICD-10-CM

## 2024-12-26 RX ORDER — PROMETHAZINE HYDROCHLORIDE AND DEXTROMETHORPHAN HYDROBROMIDE 6.25; 15 MG/5ML; MG/5ML
5 SYRUP ORAL NIGHTLY PRN
Qty: 118 ML | Refills: 0 | Status: SHIPPED | OUTPATIENT
Start: 2024-12-26

## 2024-12-26 RX ORDER — FLUTICASONE PROPIONATE 50 MCG
2 SPRAY, SUSPENSION (ML) NASAL DAILY
Qty: 16 G | Refills: 0 | Status: SHIPPED | OUTPATIENT
Start: 2024-12-26

## 2024-12-26 RX ORDER — GUAIFENESIN 600 MG/1
1200 TABLET, EXTENDED RELEASE ORAL 2 TIMES DAILY
Qty: 40 TABLET | Refills: 0 | Status: SHIPPED | OUTPATIENT
Start: 2024-12-26 | End: 2025-01-05

## 2024-12-26 NOTE — PROGRESS NOTES
Subjective:       Patient ID: Teresa Hwang is a 49 y.o. female.    Chief Complaint: Cough and Nasal Congestion (2 days expose to flu)    The patient location is: [place]  The chief complaint leading to consultation is: cough and congestion    Visit type: audiovisual    Face to Face time with patient: 10 minutes  15 minutes of total time spent on the encounter, which includes face to face time and non-face to face time preparing to see the patient (eg, review of tests), Obtaining and/or reviewing separately obtained history, Documenting clinical information in the electronic or other health record, Independently interpreting results (not separately reported) and communicating results to the patient/family/caregiver, or Care coordination (not separately reported).     Each patient to whom he or she provides medical services by telemedicine is:  (1) informed of the relationship between the physician and patient and the respective role of any other health care provider with respect to management of the patient; and (2) notified that he or she may decline to receive medical services by telemedicine and may withdraw from such care at any time.    HISTORY OF PRESENT ILLNESS:  Her symptoms began on 12/24/24 after exposure to cold rain. Initial symptoms included chills without fever. She currently experiences productive cough with green mucus, ear pain, and sore throat. The cough is worse during the day and disrupts her sleep. She has developed new onset of occasional wheezing. She reports exposure to her grandson who had influenza but was reportedly no longer contagious at time of contact. Denies CP, SOB, headaches, nausea, vomiting, diarrhea, weakness, or numbness.      Review of Systems   Constitutional:  Positive for chills. Negative for fever.   HENT:  Positive for ear pain, postnasal drip and sore throat. Negative for rhinorrhea.    Respiratory:  Positive for cough and wheezing. Negative for  shortness of breath.    Cardiovascular:  Negative for chest pain.   Gastrointestinal:  Negative for abdominal pain, diarrhea, nausea and vomiting.   Musculoskeletal:  Positive for myalgias.   Skin:  Negative for rash.   Allergic/Immunologic: Negative for environmental allergies.   Neurological:  Negative for headaches.         Objective:     Physical Exam  Constitutional:       General: She is not in acute distress.  Pulmonary:      Effort: Pulmonary effort is normal. No respiratory distress.   Neurological:      Mental Status: She is alert.   Psychiatric:         Mood and Affect: Mood normal.         Thought Content: Thought content normal.         Judgment: Judgment normal.           Physical exam is limited due to virtual nature of visit.  Assessment:       1. Viral upper respiratory tract infection with cough        Plan:   IMPRESSION:  - Assessed symptoms consistent with viral illness, likely too late for antiviral treatment  - Considered risk of pneumonia given wheezing, but deferred immediate in-person evaluation based on current presentation  - Recommend symptomatic treatment focused on cough management and mucus expulsion    1. Viral upper respiratory tract infection with cough  -     guaiFENesin (MUCINEX) 600 mg 12 hr tablet; Take 2 tablets (1,200 mg total) by mouth 2 (two) times daily. for 10 days  Dispense: 40 tablet; Refill: 0  -     promethazine-dextromethorphan (PROMETHAZINE-DM) 6.25-15 mg/5 mL Syrp; Take 5 mLs by mouth nightly as needed (cough).  Dispense: 118 mL; Refill: 0  -     fluticasone propionate (FLONASE) 50 mcg/actuation nasal spray; 2 sprays (100 mcg total) by Each Nostril route once daily.  Dispense: 16 g; Refill: 0  - Started Mucinex (guaifenesin) twice daily with plenty of water to help expel mucus.  - Patient to increase fluid intake, especially when taking Mucinex.  - Started promethazine with dextromethorphan cough syrup at night for cough suppression and improved sleep.  - Started  Flonase (fluticasone) nasal spray daily for congestion.  - Discussed that nasal steroids typically take a few days of consistent use to become effective.  - Contact the office if wheezing/breathing worsens or does not improve in the next few days for potential in-person evaluation, lung auscultation, and possible chest XR.    FOLLOW-UP:  - Follow up if wheezing persists or worsens, or if albuterol inhaler is needed more than daily.

## 2024-12-27 ENCOUNTER — OFFICE VISIT (OUTPATIENT)
Dept: PRIMARY CARE CLINIC | Facility: CLINIC | Age: 49
End: 2024-12-27
Payer: COMMERCIAL

## 2024-12-27 DIAGNOSIS — R73.03 PREDIABETES: Primary | Chronic | ICD-10-CM

## 2024-12-27 DIAGNOSIS — F41.9 RECURRENT MILD MAJOR DEPRESSIVE DISORDER WITH ANXIETY: Chronic | ICD-10-CM

## 2024-12-27 DIAGNOSIS — E66.813 CLASS 3 SEVERE OBESITY DUE TO EXCESS CALORIES WITH SERIOUS COMORBIDITY AND BODY MASS INDEX (BMI) OF 40.0 TO 44.9 IN ADULT: Chronic | ICD-10-CM

## 2024-12-27 DIAGNOSIS — E66.01 CLASS 3 SEVERE OBESITY DUE TO EXCESS CALORIES WITH SERIOUS COMORBIDITY AND BODY MASS INDEX (BMI) OF 40.0 TO 44.9 IN ADULT: Chronic | ICD-10-CM

## 2024-12-27 DIAGNOSIS — G47.33 OSA ON CPAP: Chronic | ICD-10-CM

## 2024-12-27 DIAGNOSIS — F33.0 RECURRENT MILD MAJOR DEPRESSIVE DISORDER WITH ANXIETY: Chronic | ICD-10-CM

## 2024-12-27 RX ORDER — SEMAGLUTIDE 0.5 MG/.5ML
0.5 INJECTION, SOLUTION SUBCUTANEOUS
Qty: 2 ML | Refills: 0 | Status: SHIPPED | OUTPATIENT
Start: 2024-12-27

## 2024-12-27 NOTE — PROGRESS NOTES
Patient ID: [id number]     Chief Complaint: Follow-up    HPI:     The patient location is: home  The chief complaint leading to consultation is: follow up     Visit type: audiovisual    Face to Face time with patient: 10 minutes  14 minutes of total time spent on the encounter, which includes face to face time and non-face to face time preparing to see the patient (eg, review of tests), Obtaining and/or reviewing separately obtained history, Documenting clinical information in the electronic or other health record, Independently interpreting results (not separately reported) and communicating results to the patient/family/caregiver, or Care coordination (not separately reported).     Each patient to whom he or she provides medical services by telemedicine is:  (1) informed of the relationship between the physician and patient and the respective role of any other health care provider with respect to management of the patient; and (2) notified that he or she may decline to receive medical services by telemedicine and may withdraw from such care at any time.      Teresa Hwang is a 49 y.o. female with diagnosis of Prediabetes, Obesity, Anxiety, Depression Patient referred by PCP. Patient's PCP is Dr. Chery. Patient seen today for Obesity. Patient last seen in clinic on 11/11/2024.     Weight history   09/23/2024: Patient states she was a , didn't have a lot of healthy food options so ate fast food. Also would do a lot of sitting.   11/11/2024: at previous appt, patient started on Semaglutide 0.25 mg weekly, denies side effects. Patient states she has noticed a slight decrease in appetite. Patient requesting increase in dose. States she has been making healthier options with food.   12/27/2024: at previous appt, Semaglutide increased to 0.5 mg SC weekly. Patient states just started the 0.5 mg a week ago due to medication on backorder. Patient states has noticed a decrease in appetite since  dose increase. States food noise decreased. Has been trying to eat more protein.     Previous Obesity Treatment:   Topamax (2023)- failed  Semaglutide (2023) - success     BMI:  44.2 (11/11/2024)  44.4 (09/22/2024)  42.2 (09/10/2024)  35.3 (11/14/2023)  37.1 (08/04/2023)    Weight:  Wt Readings from Last 6 Encounters:   11/11/24 109.8 kg (242 lb 1 oz)   10/10/24 107.5 kg (236 lb 15.9 oz)   09/23/24 110.2 kg (242 lb 15.2 oz)   09/10/24 87.5 kg (192 lb 14.4 oz)   09/10/24 104.7 kg (230 lb 13.2 oz)   09/10/24 105 kg (231 lb 7.7 oz)     Ideal/Adjusted Body Weight:  Ideal: 110 lbs  Adjusted 163 lbs    Waist Circumference:  45 in    Percent Body Fat:   60.4% (11/11/2024)  https://www.calculator.net/body-fat-calculator.html    Neck Circumference:   15 in    InBody:   None noted    Nutrition history: (24 hour food recall)  Breakfast: none  Lunch: 2 pieces chicken  Dinner: mustard greens, cornbread  Snack: none  Water: 44 oz  Sugar Sweetened beverages: rarely  Etoh: 2 shots liquor per week  Satiety cues: sometimes felt full after consuming a meal  Cravings: denies  Food noise: yes  Patient states she tends to eat unhealthy when she is bored; also eats if she had trouble sleeping - usually a sandwich.   Dietician: denies    Current physical activity:   Walks for exercise  Barriers: financial, states recently started a new job    Stressors/Mental Health   On Fluoxetine, Buspirone, Clonazepam for Depression/Anxiety prescribed by PCP. Patient states medication working well.   Over the last two weeks how often have you been bothered by little interest or pleasure in doing things: 0  Over the last two weeks how often have you been bothered by feeling down, depressed or hopeless: 0  PHQ-2 Total Score: 0    Sleep habits  DEYA - on CPAP    CVD screening  The 10-year ASCVD risk score (Sanket LOU, et al., 2019) is: 1.4%    Values used to calculate the score:      Age: 49 years      Sex: Female      Is Non- : Yes       Diabetic: No      Tobacco smoker: No      Systolic Blood Pressure: 126 mmHg      Is BP treated: No      HDL Cholesterol: 64 mg/dL      Total Cholesterol: 165 mg/dL    LMP: menopause    Contraception: menopause      I spent 5 minutes counseling patient on diet and physical activity.     Review of patient's allergies indicates:   Allergen Reactions    Macrobid [nitrofurantoin monohyd/m-cryst] Hives    Bactrim [sulfamethoxazole-trimethoprim]     Flagyl [metronidazole hcl] Hives     Current Outpatient Medications   Medication Instructions    ascorbic acid (vitamin C) (VITAMIN C) 500 mg, Daily    busPIRone (BUSPAR) 15 mg, Oral, 2 times daily    clonazePAM (KLONOPIN) 0.25-0.5 mg, Oral, 2 times daily PRN    ELDERBERRY FRUIT ORAL 1 capsule, Daily    FLUoxetine 20 mg, Oral, Daily    fluticasone propionate (FLONASE) 100 mcg, Each Nostril, Daily    gabapentin (NEURONTIN) 900 mg, Oral, Nightly    guaiFENesin (MUCINEX) 1,200 mg, Oral, 2 times daily    mupirocin (BACTROBAN) 2 % ointment 4 times daily, Apply to affectedd area    nicotine (polacrilex) (NICORETTE) 2 mg, Oral, As needed (PRN), Use as directed on packaging.    promethazine-dextromethorphan (PROMETHAZINE-DM) 6.25-15 mg/5 mL Syrp 5 mLs, Oral, Nightly PRN    sodium chloride (SALINE NASAL) 0.65 % nasal spray Use as directed on product packaging    varenicline (CHANTIX) 1 mg, Oral, 2 times daily    WEGOVY 0.5 mg, Subcutaneous, Every 7 days     Past Surgical History:   Procedure Laterality Date    APPLICATION OF LARGE EXTERNAL FIXATION DEVICE TO TIBIA Left 01/10/2019    Procedure: APPLICATION, EXTERNAL FIXATION DEVICE, LARGE, TIBIA;  Surgeon: Domenic Galvan MD;  Location: [facility] OR;  Service: Orthopedics;  Laterality: Left;    APPLICATION OF WOUND VACUUM-ASSISTED CLOSURE DEVICE Left 01/17/2019    Procedure: APPLICATION, WOUND VAC;  Surgeon: Barry Guzman MD;  Location: [facility] OR;  Service: Orthopedics;  Laterality: Left;    COLONOSCOPY N/A 09/02/2022    Procedure:  COLONOSCOPY;  Surgeon: Yon Ridley MD;  Location: [facility];  Service: Endoscopy;  Laterality: N/A;    FRACTURE SURGERY      C1 neck- halo    HERNIA REPAIR      HYSTERECTOMY  2009    tahbso    OOPHORECTOMY Bilateral     OPEN REDUCTION AND INTERNAL FIXATION (ORIF) OF FRACTURE OF TIBIAL PLATEAU Left 2019    Procedure: ORIF, FRACTURE, TIBIA, PLATEAU;  Surgeon: Barry Guzman MD;  Location: [facility] OR;  Service: Orthopedics;  Laterality: Left;    REMOVAL OF EXTERNAL FIXATION DEVICE Left 2019    Procedure: REMOVAL, EXTERNAL FIXATION DEVICE;  Surgeon: Barry Guzman MD;  Location: [facility] OR;  Service: Orthopedics;  Laterality: Left;    TRIGGER FINGER RELEASE Right 2023    Procedure: RELEASE, TRIGGER FINGER;  Surgeon: Sandeep Bang MD;  Location: [facility] OR;  Service: Orthopedics;  Laterality: Right;  right trigger thumb release    TUBAL LIGATION  1997    UMBILICAL HERNIA REPAIR       family history includes Breast cancer in her paternal grandmother; Breast cancer (age of onset: 46) in her sister; Breast cancer (age of onset: 58) in her mother; Cancer in her mother and sister; Depression in her mother; Diabetes in her mother; No Known Problems in her father.    Social History     Socioeconomic History    Marital status:     Number of children: 5   Occupational History    Occupation:    Tobacco Use    Smoking status: Former     Current packs/day: 0.00     Average packs/day: 1 pack/day for 33.0 years (33.0 ttl pk-yrs)     Types: Cigarettes     Start date:      Quit date:      Years since quittin.9     Passive exposure: Past    Smokeless tobacco: Never   Substance and Sexual Activity    Alcohol use: Yes     Alcohol/week: 2.0 standard drinks of alcohol     Types: 2 Glasses of wine per week     Comment: Stopped    Drug use: No    Sexual activity: Not Currently     Partners: Male     Birth control/protection: None     Social Drivers of Health     Financial  Resource Strain: Medium Risk (11/27/2024)    Overall Financial Resource Strain (CARDIA)     Difficulty of Paying Living Expenses: Somewhat hard   Food Insecurity: Food Insecurity Present (11/27/2024)    Hunger Vital Sign     Worried About Running Out of Food in the Last Year: Sometimes true     Ran Out of Food in the Last Year: Sometimes true   Transportation Needs: Patient Declined (11/15/2023)    PRAPARE - Transportation     Lack of Transportation (Medical): Patient declined     Lack of Transportation (Non-Medical): Patient declined   Physical Activity: Insufficiently Active (11/27/2024)    Exercise Vital Sign     Days of Exercise per Week: 2 days     Minutes of Exercise per Session: 10 min   Stress: No Stress Concern Present (11/27/2024)    [employer] - Occupational Stress Questionnaire     Feeling of Stress : Not at all   Housing Stability: High Risk (11/27/2024)    Housing Stability Vital Sign     Unable to Pay for Housing in the Last Year: Yes       Subjective:     Review of Systems   Constitutional: Negative.    HENT: Negative.     Eyes: Negative.    Respiratory: Negative.     Cardiovascular: Negative.    Gastrointestinal: Negative.    Endocrine: Negative.    Genitourinary: Negative.    Musculoskeletal: Negative.    Skin: Negative.    Allergic/Immunologic: Negative.    Neurological: Negative.    Hematological: Negative.    Psychiatric/Behavioral: Negative.       Objective:     There were no vitals taken for this visit.      Physical Exam  Neurological:      Mental Status: She is alert and oriented to person, place, and time.   Psychiatric:         Mood and Affect: Mood normal.       Labs Reviewed:     Hematology:  Lab Results   Component Value Date    WBC 12.39 08/11/2023    HGB 12.6 08/11/2023    HCT 38.8 08/11/2023     08/11/2023     Chemistry:  Lab Results   Component Value Date     09/10/2024    K 4.0 09/10/2024    BUN 14 09/10/2024    CREATININE 0.8 09/10/2024     EGFRNORACEVR >60.0 09/10/2024    CALCIUM 9.4 09/10/2024    ALKPHOS 74 09/10/2024    LABPROT 10.0 01/10/2019    ALBUMIN 4.1 09/10/2024    AST 18 09/10/2024    ALT 15 09/10/2024    MG 2.0 01/11/2019    PHOS 3.8 01/11/2019    [id number] 30 11/11/2024      Lab Results   Component Value Date    HGBA1C 5.9 (H) 09/10/2024      Lipid Panel:  Lab Results   Component Value Date    CHOL 165 09/10/2024    HDL 64 09/10/2024    TRIG 69 09/10/2024    TOTALCHOLEST 2.6 09/10/2024      Thyroid:  Lab Results   Component Value Date    TSH 3.299 09/10/2024      Urine:  Lab Results   Component Value Date    APPEARANCEUA Clear 10/14/2021    PROTEINUA Negative 10/14/2021    LEUKOCYTESUR Negative 10/14/2021        Assessment:       ICD-10-CM ICD-9-CM   1. Prediabetes  R73.03 790.29   2. DEYA on CPAP  G47.33 327.23   3. Class 3 severe obesity due to excess calories with serious comorbidity and body mass index (BMI) of 40.0 to 44.9 in adult  E66.813 278.01    E66.01 V85.41    Z68.41    4. Recurrent mild major depressive disorder with anxiety  F33.0 296.31    F41.9 300.00       Plan:     1. Prediabetes  A1c: 5.9  ADA diet  Weight loss encouraged  Continue Semaglutide to 0.5 mg SC weekly    2. DEYA on CPAP  Continue CPAP nightly    3. Class 3 severe obesity due to excess calories with serious comorbidity and body mass index (BMI) of 40.0 to 44.9 in adult  There is no height or weight on file to calculate BMI.  Wt Readings from Last 1 Encounters:   11/11/24 109.8 kg (242 lb 1 oz)   Waist Circumference: 45 in  TSH   Date Value Ref Range Status   09/10/2024 3.299 0.400 - 4.000 uIU/mL Final     Vit D, 25-Hydroxy   Date Value Ref Range Status   11/11/2024 30 30 - 96 ng/mL Final     Comment:     Vitamin D deficiency.........<10 ng/mL                              Vitamin D insufficiency......10-29 ng/mL       Vitamin D sufficiency........> or equal to 30 ng/mL  Vitamin D toxicity............>100 ng/mL        A modest (5-10%) weight loss improves  health and quality of life.     Goal: lose weight  Short-term: Lose 10 lbs in 8 weeks  Long-term: Lose 60 lbs  *Keep in mind that, on average, you may lose 1-2 lbs per week*    Willingness to change: 6/10    Patient qualifies for anti-obesity medications due to BMI 44 with comorbidity. Anti-obesity medications are an adjunct to nutritional, physical activity, and behavioral therapies. Medication alone cannot treat obesity. I recommend starting medication to significantly improve patient's risk factor/s.   Medication:   Semaglutide (Wegovy)  Dosing: continue 0.5 mg SC weekly  Discussed at length potential pharmacologic options. She desires consideration of GLP-1. Risks/benefits/common side effects discussed patient including nausea and pain at injection site. She is aware she should not get pregnant while taking and medication not approved to take while breastfeeding. Patient is menopausal. No personal/family hx of MEN 2 or medullary thyroid cancer. No hx of thyroid nodules or biliary disease/gallstones. Also reviewed with patient gastroparesis and potential for mental health changes. Notify provider immediately if any significant side effects or issues. Discussed with patient that with substantial or rapid weight loss, gallstones could develop. Also discussed with patient GLP-1 MOA and common side effects. Instructed patient how to use with demo pen; patient practiced in office. Patient advised if approved will get medication education handout from pharmacy.  Recommend InBody due to possible muscle loss.   Birth Control: post-menopause  Monitor for pancreatitis   Strength training at least 2 days a week to help prevent decrease in muscle mass.     Nutrition: Aim for 1,350 Calories per day.      Protein: goal is a minimum of 80g/day. Protein has a slower rate of digestion = greater satiety (fullness).        Aim for <25g of added sugar per day.      Recommend to increase fiber intake.   www.fullplateliving.org       Eat Fit Shopping List.      Prepare meals in advance.      Eat breakfast within 2 hours of waking up.       Track what you eat. Research shows that people who track their food intake are more successful with weight management. This creates awareness of what you are eating/drinking and can help you see where to make changes.       Get to know your plate.  www.myplate.gov      Stay hydrated.     Activity: 2-3 days of strength training. This can be body weight, light weight or elastic bands exercises. [employer] and [employer] are great sources for free workout plans/videos.     Start slowly with an activity you enjoy and make it a habit.     Ask a family member or friend to get active with you.     Aim for 5,000 - 10,000 steps per day     Schedule time to make physical activity a part of your daily routine.     Exercise prescription: (FITTE)    Frequency: 3-4 days per week    Intensity: low    Type: walking    Time: 20 minutes    Enjoyment: enjoys walking on the treadmill     Sleep: 7-8 hours of sleep a night    *Recognize your progress and remember that each day is a new day*  *Stay on track, even when you feel like you're not making progress*  *Sit Less, Stand Often, Move More*  *You don't have to be perfect; be persistent*    Avoiding Weight Regain:  - continued with modified food intake (changed eating habits)  - eat breakfast every day  - weigh yourself at least once a week  - watch less than 10 hours of TV per week  - continue with increased physical activity  - physical activity, on average, about 1 hour per day    4. Recurrent mild major depressive disorder with anxiety  Continue Fluoxetine, Clonazepam, Buspirone       Follow up in about 8 weeks (around 2/21/2025). In addition to their scheduled follow up, the patient has also been instructed to follow up on as needed basis.     Nadine Tolliver, TATO

## 2024-12-27 NOTE — PATIENT INSTRUCTIONS
10 Eating Tips for Patients on GLP-1 Medications     Eat high protein foods first.  Animal based proteins - beef, chicken, fish, shellfish, turkey, pork, eggs, cheese (do not rely too heavily on cheese since it is high in fat), low carb Greek yogurt, cottage cheese, whey protein powder or drinks, protein bars.  Plant based proteins - tofu, tempeh, seitan, Quorn, Beyond Meat, pea protein, brown rice protein, pea protein powder or drinks, protein bars.    Eat non-starchy vegetables next.   Artichokes, asparagus, broccoli, brussels sprouts, cabbage, carrots, cauliflower, celery, michela greens, cucumber, eggplant, green beans, hearts of palm, kale, letuce (all varieties), mushrooms (all varieties), okra (not breaded), onions, parsley, peppers (all varieties), pickles (dill only), radishes, sauerkraut, seaweed, snap and snow peas, spinach, sprouts, spaghetti squash, yellow squash, zucchini, tomatoes, turnip greens.     Allow some healthy fats.   Nuts, seeds, olive oil, avocado and avocado oil, peanut oil, buter, coconut oil, robert seeds, flax seeds.   Try to limit processed seed oils like canola, corn, and soybean oil. Many are found in mayonnaise, salad dressing, margarine, and processed foods.     Eat fruit and/or starch last if you are still hungry.  Best fruits (not juices) - berries, apples, oranges, peaches pears, kiwi.   Best complex starches - ½ cup beans (like martinez, lima, navy, chili, black, etc.), low carb tortillas or zero net carb street tacos, ½ cup cooked chickpea or lentil pasta, ½ cup sweet potatoes, flourless or keto bread, ½ cup cooked old fashioned or steel cut oats, ½ cup cooked parboiled rice, ½ cup cooked brown rice, ½ cup cooked wild rice, ½ cup cooked basmati rice, 3 cups popped popcorn.     Avoid high fat and fried foods.     Avoid processed meats.     Avoid refined carbohydrates and sugar.  White bread, white rice, pasta   Desserts - cookies, pastries, ice cream   Most breakfast cereals    Sugar-sweetened beverages, candies, chocolate     Limit carbonated beverages - can contribute to bloating.     Limit alcohol intake.     Eat slowly and avoid feeling too full.

## 2025-01-07 ENCOUNTER — E-VISIT (OUTPATIENT)
Dept: INTERNAL MEDICINE | Facility: CLINIC | Age: 50
End: 2025-01-07
Payer: COMMERCIAL

## 2025-01-07 DIAGNOSIS — J01.90 ACUTE SINUSITIS, RECURRENCE NOT SPECIFIED, UNSPECIFIED LOCATION: Primary | ICD-10-CM

## 2025-01-08 ENCOUNTER — PATIENT MESSAGE (OUTPATIENT)
Dept: INTERNAL MEDICINE | Facility: CLINIC | Age: 50
End: 2025-01-08
Payer: COMMERCIAL

## 2025-01-08 RX ORDER — AMOXICILLIN AND CLAVULANATE POTASSIUM 875; 125 MG/1; MG/1
1 TABLET, FILM COATED ORAL 2 TIMES DAILY
Qty: 20 TABLET | Refills: 0 | Status: SHIPPED | OUTPATIENT
Start: 2025-01-08 | End: 2025-01-18

## 2025-01-08 NOTE — PROGRESS NOTES
Patient ID: Teresa Hwang is a 49 y.o. female.    Chief Complaint: General Illness (Entered automatically based on patient selection in [employer].)    The patient initiated a request through [employer] on 1/7/2025 for evaluation and management with a chief complaint of General Illness (Entered automatically based on patient selection in [employer].)     I evaluated the questionnaire submission on 1/8/2025.    [facility]Cough [facility]    1/7/2025  6:23 AM CST - Filed by Patient   What do you need help with? Sinus Infection   Do you agree to participate in an E-Visit? Yes   If you have any of the following symptoms, go to your local emergency room or call 911: I acknowledge   Do you have any of the following pregnancy-related conditions? None   What is the main issue you would like addressed today? Kerry been tested twice for covid the results were negative. My throat is extremely sore and mucus is draining from my nose to my throat.  Can you please call me in some antibiotics at [address], [facility] [address] / [phone]. Ty   Do you think you might have COVID or the Flu? No   Have you tested positive for COVID or Flu? No   What symptoms do you currently have?  Headache;  Nasal Congestion;  Runny nose;  Sore throat;  Ear pain   Have you had any of the following? None of the above   Have you ever smoked? I smoked in the past   Have you had a fever? No   When did your symptoms first appear? 1/2/2025   In the last two weeks, have you been in close contact with someone who has COVID-19 or the Flu? No   List what you have done or taken to help your symptoms. My sister and her kids had strep throat and sinus infection,  kerry been taking nyquil and tea   How severe are your symptoms? Severe   Have your symptoms gotten better or worse since they started?  Worse   Do you have transportation to get testing if it is needed and ordered for you at an [facility] location? No   Provide any  additional information you feel is important. 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 I cannot use my phone any time while I'm training.  Please send some antibiotics to  [facility]  [address], [address] [facility] [address] ([phone]. Yen been tested for covid and the flu and both were negative.  Please send that in because this freezing weather isn't making it better. I have to physically climb up to the top of the truck and perform my job duties. Thanks in advance.   Please attach any relevant images or files    Are you able to take your vital signs? No       Patient Active Problem List   Diagnosis    Obesity    Class 3 severe obesity due to excess calories with serious comorbidity and body mass index (BMI) of 40.0 to 44.9 in adult    Bilateral primary osteoarthritis of knee    Recurrent mild major depressive disorder with anxiety    Nicotine dependence, cigarettes, uncomplicated    Psychophysiologic insomnia    Thrombocytosis    Long-term current use of benzodiazepine    DEYA on CPAP    Chronic post-op pain    Generalized anxiety disorder    Prediabetes    History of COVID-19 (2022)    Breast cancer screening, high risk patient    Family history of breast cancer (mother and sister)    Primary hypertension    Pulmonary nodule 1 cm or greater in diameter    Cigarette nicotine dependence in remission    Trigger finger of right thumb    Low libido      Encounter Diagnosis   Name Primary?    Acute sinusitis, recurrence not specified, unspecified location Yes        No orders of the defined types were placed in this encounter.     Medications Ordered This Encounter   Medications    amoxicillin-clavulanate 875-125mg (AUGMENTIN) 875-125 mg per tablet     Sig: Take 1 tablet by mouth 2 (two) times daily. for 10 days     Dispense:  20 tablet     Refill:  0    -start coricidin hbp otc      Follow up if symptoms worsen or fail to improve.      E-Visit Time Trackin  min

## 2025-01-17 ENCOUNTER — PATIENT MESSAGE (OUTPATIENT)
Dept: PRIMARY CARE CLINIC | Facility: CLINIC | Age: 50
End: 2025-01-17
Payer: COMMERCIAL

## 2025-02-11 ENCOUNTER — E-VISIT (OUTPATIENT)
Dept: INTERNAL MEDICINE | Facility: CLINIC | Age: 50
End: 2025-02-11
Payer: COMMERCIAL

## 2025-02-11 DIAGNOSIS — B37.31 YEAST VAGINITIS: Primary | ICD-10-CM

## 2025-02-12 ENCOUNTER — PATIENT MESSAGE (OUTPATIENT)
Dept: INTERNAL MEDICINE | Facility: CLINIC | Age: 50
End: 2025-02-12
Payer: COMMERCIAL

## 2025-02-12 RX ORDER — FLUCONAZOLE 150 MG/1
150 TABLET ORAL DAILY
Qty: 1 TABLET | Refills: 0 | Status: SHIPPED | OUTPATIENT
Start: 2025-02-12 | End: 2025-02-13

## 2025-02-12 NOTE — PROGRESS NOTES
Patient ID: Teresa Hwang is a 49 y.o. female.    Chief Complaint: General Illness (Entered automatically based on patient selection in [facility].)    The patient initiated a request through [employer] on 2/11/2025 for evaluation and management with a chief complaint of General Illness (Entered automatically based on patient selection in [facility].)     I evaluated the questionnaire submission on 2/12/2025.    [facility]    2/11/2025  9:34 PM CST - Filed by Patient   What do you need help with? Vaginal Symptoms   Do you agree to participate in an E-Visit? Yes   If you have any of the following symptoms,  please do not complete an E-Visit,  schedule an appointment with your provider: I acknowledge   Do you have any of the following pregnancy-related conditions? None   What is the main issue you would like addressed today? Vaginal itching, clear discharge,  no odor   Which of the following vaginal concerns do you have? Discharge;  Itching   Do you have vaginal discharge? Clear discharge   Do you have pain while passing urine? No   Do you have any of the following symptoms? None of the above    Have you taken antibiotics in the last two weeks? No    Do you use any of the following? No   Which of the following applies to your menstrual period? Have not had for a while   Which of the following applies to your menstrual cycle? No bleeding   Do you have spotting between periods? No   Do you have pain with your period? No   Have you had similar symptoms in the past? No   Have you had a temperature of 100.4 or higher? No   Provide any additional information you feel is important. I did have intercourse, and i used bains hair remover.   Please attach any relevant images or files    Are you able to take your vital signs? No         Encounter Diagnosis   Name Primary?    Yeast vaginitis Yes        No orders of the defined types were placed in this encounter.     Medications Ordered This  Encounter   Medications    fluconazole (DIFLUCAN) 150 MG Tab     Sig: Take 1 tablet (150 mg total) by mouth once daily. for 1 day     Dispense:  1 tablet     Refill:  0      Allergic to metronidazole. If symtpoms do not resolve with diflucan will need to come in for vaginal swab.     Follow up if symptoms worsen or fail to improve.      E-Visit Time Trackin min

## 2025-02-24 ENCOUNTER — TELEPHONE (OUTPATIENT)
Dept: PRIMARY CARE CLINIC | Facility: CLINIC | Age: 50
End: 2025-02-24
Payer: COMMERCIAL

## 2025-02-24 ENCOUNTER — OFFICE VISIT (OUTPATIENT)
Dept: PRIMARY CARE CLINIC | Facility: CLINIC | Age: 50
End: 2025-02-24
Payer: COMMERCIAL

## 2025-02-24 DIAGNOSIS — E66.813 CLASS 3 SEVERE OBESITY DUE TO EXCESS CALORIES WITH SERIOUS COMORBIDITY AND BODY MASS INDEX (BMI) OF 40.0 TO 44.9 IN ADULT: Chronic | ICD-10-CM

## 2025-02-24 DIAGNOSIS — F41.9 RECURRENT MILD MAJOR DEPRESSIVE DISORDER WITH ANXIETY: Chronic | ICD-10-CM

## 2025-02-24 DIAGNOSIS — F33.0 RECURRENT MILD MAJOR DEPRESSIVE DISORDER WITH ANXIETY: Chronic | ICD-10-CM

## 2025-02-24 DIAGNOSIS — R73.03 PREDIABETES: Primary | Chronic | ICD-10-CM

## 2025-02-24 DIAGNOSIS — G47.33 OSA ON CPAP: Chronic | ICD-10-CM

## 2025-02-24 DIAGNOSIS — E66.01 CLASS 3 SEVERE OBESITY DUE TO EXCESS CALORIES WITH SERIOUS COMORBIDITY AND BODY MASS INDEX (BMI) OF 40.0 TO 44.9 IN ADULT: Chronic | ICD-10-CM

## 2025-02-24 RX ORDER — SEMAGLUTIDE 1 MG/.5ML
1 INJECTION, SOLUTION SUBCUTANEOUS
Qty: 2 ML | Refills: 1 | Status: SHIPPED | OUTPATIENT
Start: 2025-02-24

## 2025-02-24 NOTE — PROGRESS NOTES
Patient ID: [id number]     Chief Complaint: Follow-up    HPI:     The patient location is: home  The chief complaint leading to consultation is: follow up    Visit type: audiovisual    Face to Face time with patient: 10 minutes  12 minutes of total time spent on the encounter, which includes face to face time and non-face to face time preparing to see the patient (eg, review of tests), Obtaining and/or reviewing separately obtained history, Documenting clinical information in the electronic or other health record, Independently interpreting results (not separately reported) and communicating results to the patient/family/caregiver, or Care coordination (not separately reported).     Each patient to whom he or she provides medical services by telemedicine is:  (1) informed of the relationship between the physician and patient and the respective role of any other health care provider with respect to management of the patient; and (2) notified that he or she may decline to receive medical services by telemedicine and may withdraw from such care at any time.      Teresa Hwang is a 49 y.o. female with diagnosis of Prediabetes, Obesity, Anxiety, Depression Patient referred by PCP. Patient's PCP is Dr. Chery. Patient seen today for Obesity. Patient last seen on 12/27/2024.     Weight history   09/23/2024: Patient states she was a , didn't have a lot of healthy food options so ate fast food. Also would do a lot of sitting.   11/11/2024: at previous appt, patient started on Semaglutide 0.25 mg weekly, denies side effects. Patient states she has noticed a slight decrease in appetite. Patient requesting increase in dose. States she has been making healthier options with food.   12/27/2024: at previous appt, Semaglutide increased to 0.5 mg SC weekly. Patient states just started the 0.5 mg a week ago due to medication on backorder. Patient states has noticed a decrease in appetite since dose  increase. States food noise decreased. Has been trying to eat more protein.   02/24/2025: at previous appt, Wegovy continued at 0.5 mg weekly. Patient states tolerating well. Appetite decreased. Patient states increased protein intake. Most recent at home weight was 220 lbs. Patient states increased physical activity.     Previous Obesity Treatment:   Diet/Exercise - unsuccessful   Topamax (2023)- failed  Semaglutide (2023) - helpful with weight loss    BMI:  44.2 (11/11/2024)  44.4 (09/22/2024)  42.2 (09/10/2024)    Weight:  Wt Readings from Last 6 Encounters:   11/11/24 109.8 kg (242 lb 1 oz)   10/10/24 107.5 kg (236 lb 15.9 oz)   09/23/24 110.2 kg (242 lb 15.2 oz)   09/10/24 87.5 kg (192 lb 14.4 oz)   09/10/24 104.7 kg (230 lb 13.2 oz)   09/10/24 105 kg (231 lb 7.7 oz)     Ideal/Adjusted Body Weight:  Ideal: 110 lbs  Adjusted 163 lbs    Neck Circumference:   15 in    Waist Circumference:  45 in    Percent Body Fat:   60.4% (11/11/2024)  https://www.calculator.net/body-fat-calculator.html    InBody:   None noted    Nutrition history: (24 hour food recall)  Breakfast: none  Lunch: yogurt, granola   Dinner: beans, sausage   Snack: none  Water: 44 oz  Sugar Sweetened beverages: rarely  Etoh: 2 shots liquor per week  Satiety cues: sometimes felt full after consuming a meal  Cravings: denies  Food noise: yes  Patient states she tends to eat unhealthy when she is bored; also eats if she had trouble sleeping - usually a sandwich.   Dietician: denies    Current physical activity:   Walks for exercise  Barriers: financial, states recently started a new job    Stressors/Mental Health   On Fluoxetine, Buspirone, Clonazepam for Depression/Anxiety prescribed by PCP. Patient states medication working well.   Over the last two weeks how often have you been bothered by little interest or pleasure in doing things: 0  Over the last two weeks how often have you been bothered by feeling down, depressed or hopeless: 0  PHQ-2 Total  Score: 0    Sleep habits  DEYA - on CPAP; averages 6 hours/night     CVD screening  The 10-year ASCVD risk score (Sanket LOU, et al., 2019) is: 1.4%    Values used to calculate the score:      Age: 49 years      Sex: Female      Is Non- : Yes      Diabetic: No      Tobacco smoker: No      Systolic Blood Pressure: 126 mmHg      Is BP treated: No      HDL Cholesterol: 64 mg/dL      Total Cholesterol: 165 mg/dL    LMP: menopause    Contraception: menopause      I spent 9 minutes counseling patient on diet and physical activity.     Review of patient's allergies indicates:   Allergen Reactions    Macrobid [nitrofurantoin monohyd/m-cryst] Hives    Bactrim [sulfamethoxazole-trimethoprim]     Flagyl [metronidazole hcl] Hives     Current Outpatient Medications   Medication Instructions    ascorbic acid (vitamin C) (VITAMIN C) 500 mg, Daily    busPIRone (BUSPAR) 15 mg, Oral, 2 times daily    clonazePAM (KLONOPIN) 0.25-0.5 mg, Oral, 2 times daily PRN    ELDERBERRY FRUIT ORAL 1 capsule, Daily    FLUoxetine 20 mg, Oral, Daily    fluticasone propionate (FLONASE) 100 mcg, Each Nostril, Daily    gabapentin (NEURONTIN) 900 mg, Oral, Nightly    mupirocin (BACTROBAN) 2 % ointment 4 times daily, Apply to affectedd area    nicotine (polacrilex) (NICORETTE) 2 mg, Oral, As needed (PRN), Use as directed on packaging.    promethazine-dextromethorphan (PROMETHAZINE-DM) 6.25-15 mg/5 mL Syrp 5 mLs, Oral, Nightly PRN    sodium chloride (SALINE NASAL) 0.65 % nasal spray Use as directed on product packaging    varenicline tartrate (CHANTIX) 1 mg, Oral, 2 times daily    WEGOVY 1 mg, Subcutaneous, Every 7 days     Past Surgical History:   Procedure Laterality Date    APPLICATION OF LARGE EXTERNAL FIXATION DEVICE TO TIBIA Left 01/10/2019    Procedure: APPLICATION, EXTERNAL FIXATION DEVICE, LARGE, TIBIA;  Surgeon: Domenic Galvan MD;  Location: [facility];  Service: Orthopedics;  Laterality: Left;    APPLICATION OF WOUND  VACUUM-ASSISTED CLOSURE DEVICE Left 2019    Procedure: APPLICATION, WOUND VAC;  Surgeon: Barry Guzman MD;  Location: [facility] OR;  Service: Orthopedics;  Laterality: Left;    COLONOSCOPY N/A 2022    Procedure: COLONOSCOPY;  Surgeon: Yon Ridley MD;  Location: [facility] ENDO;  Service: Endoscopy;  Laterality: N/A;    FRACTURE SURGERY      C1 neck- halo    HERNIA REPAIR      HYSTERECTOMY  2009    tahbso    OOPHORECTOMY Bilateral     OPEN REDUCTION AND INTERNAL FIXATION (ORIF) OF FRACTURE OF TIBIAL PLATEAU Left 2019    Procedure: ORIF, FRACTURE, TIBIA, PLATEAU;  Surgeon: Barry Guzman MD;  Location: [facility] OR;  Service: Orthopedics;  Laterality: Left;    REMOVAL OF EXTERNAL FIXATION DEVICE Left 2019    Procedure: REMOVAL, EXTERNAL FIXATION DEVICE;  Surgeon: Barry Guzman MD;  Location: [facility] OR;  Service: Orthopedics;  Laterality: Left;    TRIGGER FINGER RELEASE Right 2023    Procedure: RELEASE, TRIGGER FINGER;  Surgeon: Sandeep Bang MD;  Location: [facility] OR;  Service: Orthopedics;  Laterality: Right;  right trigger thumb release    TUBAL LIGATION  1997    UMBILICAL HERNIA REPAIR       family history includes Breast cancer in her paternal grandmother; Breast cancer (age of onset: 46) in her sister; Breast cancer (age of onset: 58) in her mother; Cancer in her mother and sister; Depression in her mother; Diabetes in her mother; No Known Problems in her father.    Social History     Socioeconomic History    Marital status:     Number of children: 5   Occupational History    Occupation:    Tobacco Use    Smoking status: Former     Current packs/day: 0.00     Average packs/day: 1 pack/day for 33.0 years (33.0 ttl pk-yrs)     Types: Cigarettes     Start date:      Quit date:      Years since quittin.1     Passive exposure: Past    Smokeless tobacco: Never   Substance and Sexual Activity    Alcohol use: Yes     Alcohol/week: 2.0 standard drinks of  alcohol     Types: 2 Glasses of wine per week     Comment: Stopped    Drug use: No    Sexual activity: Not Currently     Partners: Male     Birth control/protection: None     Social Drivers of Health     Financial Resource Strain: Medium Risk (11/27/2024)    Overall Financial Resource Strain (CARDIA)     Difficulty of Paying Living Expenses: Somewhat hard   Food Insecurity: Food Insecurity Present (11/27/2024)    Hunger Vital Sign     Worried About Running Out of Food in the Last Year: Sometimes true     Ran Out of Food in the Last Year: Sometimes true   Transportation Needs: Patient Declined (11/15/2023)    PRAPARE - Transportation     Lack of Transportation (Medical): Patient declined     Lack of Transportation (Non-Medical): Patient declined   Physical Activity: Insufficiently Active (11/27/2024)    Exercise Vital Sign     Days of Exercise per Week: 2 days     Minutes of Exercise per Session: 10 min   Stress: No Stress Concern Present (11/27/2024)    [employer] - Occupational Stress Questionnaire     Feeling of Stress : Not at all   Housing Stability: High Risk (11/27/2024)    Housing Stability Vital Sign     Unable to Pay for Housing in the Last Year: Yes       Subjective:     Review of Systems   Constitutional: Negative.    HENT: Negative.     Eyes: Negative.    Respiratory: Negative.     Cardiovascular: Negative.    Gastrointestinal: Negative.    Endocrine: Negative.    Genitourinary: Negative.    Musculoskeletal: Negative.    Skin: Negative.    Allergic/Immunologic: Negative.    Neurological: Negative.    Hematological: Negative.    Psychiatric/Behavioral: Negative.       Objective:     There were no vitals taken for this visit.      Physical Exam  Neurological:      Mental Status: She is alert and oriented to person, place, and time.   Psychiatric:         Mood and Affect: Mood normal.       Labs Reviewed:     Hematology:  Lab Results   Component Value Date    WBC 12.39  08/11/2023    HGB 12.6 08/11/2023    HCT 38.8 08/11/2023     08/11/2023     Chemistry:  Lab Results   Component Value Date     09/10/2024    K 4.0 09/10/2024    BUN 14 09/10/2024    CREATININE 0.8 09/10/2024    EGFRNORACEVR >60.0 09/10/2024    CALCIUM 9.4 09/10/2024    ALKPHOS 74 09/10/2024    LABPROT 10.0 01/10/2019    ALBUMIN 4.1 09/10/2024    AST 18 09/10/2024    ALT 15 09/10/2024    MG 2.0 01/11/2019    PHOS 3.8 01/11/2019    KWAQGACF38VG 30 11/11/2024      Lab Results   Component Value Date    HGBA1C 5.9 (H) 09/10/2024      Lipid Panel:  Lab Results   Component Value Date    CHOL 165 09/10/2024    HDL 64 09/10/2024    TRIG 69 09/10/2024    TOTALCHOLEST 2.6 09/10/2024      Thyroid:  Lab Results   Component Value Date    TSH 3.299 09/10/2024      Urine:  Lab Results   Component Value Date    APPEARANCEUA Clear 10/14/2021    PROTEINUA Negative 10/14/2021    LEUKOCYTESUR Negative 10/14/2021        Assessment:       ICD-10-CM ICD-9-CM   1. Prediabetes  R73.03 790.29   2. DEYA on CPAP  G47.33 327.23   3. Class 3 severe obesity due to excess calories with serious comorbidity and body mass index (BMI) of 40.0 to 44.9 in adult  E66.813 278.01    E66.01 V85.41    Z68.41    4. Recurrent mild major depressive disorder with anxiety  F33.0 296.31    F41.9 300.00       Plan:     1. Prediabetes  A1c: 5.9  ADA diet  Weight loss encouraged  Continue Semaglutide to 0.5 mg SC weekly    2. DEYA on CPAP  Continue CPAP nightly    3. Class 3 severe obesity due to excess calories with serious comorbidity and body mass index (BMI) of 40.0 to 44.9 in adult  There is no height or weight on file to calculate BMI.  Wt Readings from Last 1 Encounters:   11/11/24 109.8 kg (242 lb 1 oz)   Waist Circumference: 45 in  TSH   Date Value Ref Range Status   09/10/2024 3.299 0.400 - 4.000 uIU/mL Final     Vit D, 25-Hydroxy   Date Value Ref Range Status   11/11/2024 30 30 - 96 ng/mL Final     Comment:     Vitamin D deficiency.........<10  ng/mL                              Vitamin D insufficiency......10-29 ng/mL       Vitamin D sufficiency........> or equal to 30 ng/mL  Vitamin D toxicity............>100 ng/mL        A modest (5-10%) weight loss improves health and quality of life.     Goal: lose weight  Short-term: Lose 10 lbs in 8 weeks  Long-term: Lose 60 lbs  *Keep in mind that, on average, you may lose 1-2 lbs per week*    Willingness to change: 6/10    Patient qualifies for anti-obesity medications due to BMI 44 with comorbidity. Anti-obesity medications are an adjunct to nutritional, physical activity, and behavioral therapies. Medication alone cannot treat obesity. I recommend starting medication to significantly improve patient's risk factor/s.   Medication:   Semaglutide (Wegovy)  Dosing: increase to 1 mg SC weekly    Nutrition: Aim for 1,350 Calories per day.      Protein: goal is a minimum of 80g/day. Protein has a slower rate of digestion = greater satiety (fullness).        Aim for <25g of added sugar per day.      Recommend to increase fiber intake.   www.fullplateliving.org      Eat Fit Shopping List.      Prepare meals in advance.      Eat breakfast within 2 hours of waking up.       Track what you eat. Research shows that people who track their food intake are more successful with weight management. This creates awareness of what you are eating/drinking and can help you see where to make changes.       Get to know your plate.  www.myplate.gov      Stay hydrated.     Activity: 2-3 days of strength training. This can be body weight, light weight or elastic bands exercises. [employer] and [employer] are great sources for free workout plans/videos.     Start slowly with an activity you enjoy and make it a habit.     Ask a family member or friend to get active with you.     Aim for 5,000 - 10,000 steps per day     Schedule time to make physical activity a part of your daily routine.     Exercise prescription: (FITTE)    Frequency: 3-4 days  per week    Intensity: low    Type: walking    Time: 20 minutes    Enjoyment: enjoys walking on the treadmill     Sleep: 7-8 hours of sleep a night    *Recognize your progress and remember that each day is a new day*  *Stay on track, even when you feel like you're not making progress*  *Sit Less, Stand Often, Move More*  *You don't have to be perfect; be persistent*    Avoiding Weight Regain:  - continued with modified food intake (changed eating habits)  - eat breakfast every day  - weigh yourself at least once a week  - watch less than 10 hours of TV per week  - continue with increased physical activity  - physical activity, on average, about 1 hour per day    4. Recurrent mild major depressive disorder with anxiety  Continue Fluoxetine, Clonazepam, Buspirone       Follow up in about 6 weeks (around 4/7/2025). In addition to their scheduled follow up, the patient has also been instructed to follow up on as needed basis.     Nadine Tolliver, EVELYNP

## 2025-03-26 ENCOUNTER — OFFICE VISIT (OUTPATIENT)
Dept: INTERNAL MEDICINE | Facility: CLINIC | Age: 50
End: 2025-03-26
Payer: COMMERCIAL

## 2025-03-26 DIAGNOSIS — T63.461A WASP STING, ACCIDENTAL OR UNINTENTIONAL, INITIAL ENCOUNTER: ICD-10-CM

## 2025-03-26 DIAGNOSIS — L03.213 PERIORBITAL CELLULITIS OF RIGHT EYE: Primary | ICD-10-CM

## 2025-03-26 DIAGNOSIS — F41.1 GENERALIZED ANXIETY DISORDER: Chronic | ICD-10-CM

## 2025-03-26 DIAGNOSIS — F51.04 PSYCHOPHYSIOLOGIC INSOMNIA: Chronic | ICD-10-CM

## 2025-03-26 RX ORDER — FLUCONAZOLE 150 MG/1
150 TABLET ORAL DAILY
Qty: 2 TABLET | Refills: 0 | Status: SHIPPED | OUTPATIENT
Start: 2025-03-26 | End: 2025-03-27

## 2025-03-26 RX ORDER — MINERAL OIL AND PETROLATUM 150; 830 MG/G; MG/G
OINTMENT OPHTHALMIC 4 TIMES DAILY PRN
Qty: 3.5 G | Refills: 1 | Status: SHIPPED | OUTPATIENT
Start: 2025-03-26

## 2025-03-26 RX ORDER — AMOXICILLIN AND CLAVULANATE POTASSIUM 875; 125 MG/1; MG/1
1 TABLET, FILM COATED ORAL EVERY 12 HOURS
Qty: 14 TABLET | Refills: 0 | Status: SHIPPED | OUTPATIENT
Start: 2025-03-26 | End: 2025-04-02

## 2025-03-26 RX ORDER — CLINDAMYCIN HYDROCHLORIDE 300 MG/1
300 CAPSULE ORAL EVERY 8 HOURS
Qty: 21 CAPSULE | Refills: 0 | Status: SHIPPED | OUTPATIENT
Start: 2025-03-26 | End: 2025-04-02

## 2025-03-26 RX ORDER — MUPIROCIN 20 MG/G
OINTMENT TOPICAL 2 TIMES DAILY
Qty: 30 G | Refills: 0 | Status: SHIPPED | OUTPATIENT
Start: 2025-03-26 | End: 2025-04-09

## 2025-03-26 NOTE — LETTER
March 26, 2025      [facility]  [address] [place] [address] [address] [address]  Phone: [phone]  Fax: [phone]       Patient: Teresa Hwang   YOB: 1975  Date of Visit: 03/26/2025    To Whom It May Concern:    Mayra Hwang  was at [facility] on 03/26/2025. The patient may return to work/school on 3/31/2025 with no restrictions. If you have any questions or concerns, or if I can be of further assistance, please do not hesitate to contact me.    Sincerely,    Ligia Ramirez PA-C

## 2025-03-26 NOTE — TELEPHONE ENCOUNTER
Care Due:                  Date            Visit Type   Department     Provider  --------------------------------------------------------------------------------                                MYCHART                              SAME DAY                              VIRTUAL      [facility] INTER[facility]  Last Visit: 09-      VISIT        MEDICINE       Adan Garduno  Next Visit: None Scheduled  None         None Found                                                            Last  Test          Frequency    Reason                     Performed    Due Date  --------------------------------------------------------------------------------    Office Visit  6 months...  varenicline..............  09- 03-    [facility] Embedded Care Due Messages. Reference number: [id number].   3/26/2025 4:31:15 PM CDT

## 2025-03-26 NOTE — PROGRESS NOTES
Subjective:       Patient ID: Teresa Grayson [facility] is a 49 y.o. female.    Chief Complaint: Insect Bite (Got bit Sunday. /Eye is swollen /Been taking benadryl for it )      The patient location is: at home  The chief complaint leading to consultation is: wasp sting, eye swollen    Visit type: audiovisual    Face to Face time with patient: 18 minutes (chart review started 15:48; video started 15:48; video ended 16:08)  22 minutes of total time spent on the encounter, which includes face to face time and non-face to face time preparing to see the patient (eg, review of tests), Obtaining and/or reviewing separately obtained history, Documenting clinical information in the electronic or other health record, Independently interpreting results (not separately reported) and communicating results to the patient/family/caregiver, or Care coordination (not separately reported).     Each patient to whom he or she provides medical services by telemedicine is:  (1) informed of the relationship between the physician and patient and the respective role of any other health care provider with respect to management of the patient; and (2) notified that he or she may decline to receive medical services by telemedicine and may withdraw from such care at any time.      History of Present Illness    CHIEF COMPLAINT:  - Teresa presents with a swollen right eye due to a wasp sting that occurred four days ago.    HPI:  - Teresa reports being stung by a wasp on her right eye 4 days ago while in her work truck en route to work. She is highly allergic to wasps. The sting resulted in significant swelling of her right eye, which she describes as slightly warm to the touch and painful. She also mentions mild eye watering. She has been taking Benadryl to manage her symptoms. She denies fever, changes in vision, and discharge or green crusty material from the eye.        Insect Bite  Pertinent negatives include no arthralgias, chest pain,  headaches, joint swelling, neck pain, vomiting or weakness.     Review of Systems   Constitutional:  Negative for activity change and unexpected weight change.   HENT:  Negative for hearing loss, rhinorrhea and trouble swallowing.    Eyes:  Positive for pain and discharge. Negative for visual disturbance.   Respiratory:  Negative for chest tightness and wheezing.    Cardiovascular:  Negative for chest pain and palpitations.   Gastrointestinal:  Negative for blood in stool, constipation, diarrhea and vomiting.   Endocrine: Negative for polydipsia and polyuria.   Genitourinary:  Negative for difficulty urinating, dysuria, hematuria and menstrual problem.   Musculoskeletal:  Negative for arthralgias, joint swelling and neck pain.   Skin:  Positive for wound.   Neurological:  Negative for weakness and headaches.   Psychiatric/Behavioral:  Negative for confusion and dysphoric mood.          Objective:        Wt Readings from Last 3 Encounters:   11/11/24 109.8 kg (242 lb 1 oz)   10/10/24 107.5 kg (236 lb 15.9 oz)   09/23/24 110.2 kg (242 lb 15.2 oz)     Temp Readings from Last 3 Encounters:   11/11/24 99.1 °F (37.3 °C) (Tympanic)   09/23/24 97.8 °F (36.6 °C) (Oral)   09/10/24 98 °F (36.7 °C) (Tympanic)     BP Readings from Last 3 Encounters:   11/11/24 126/80   09/23/24 130/82   09/10/24 126/82     Pulse Readings from Last 3 Encounters:   11/11/24 66   09/23/24 66   09/10/24 73     There is no height or weight on file to calculate BMI.    Physical Exam  Constitutional:       General: She is not in acute distress.     Appearance: Normal appearance. She is not ill-appearing or toxic-appearing.   HENT:      Head: Normocephalic and atraumatic.   Eyes:      Extraocular Movements: Extraocular movements intact.      Conjunctiva/sclera: Conjunctivae normal.      Pupils: Pupils are equal, round, and reactive to light.        Comments: Small laceration near right eyebrow from wasp sting, moderate periorbital edema with concern for  cellulitis.    Cardiovascular:      Rate and Rhythm: Normal rate.   Pulmonary:      Effort: Pulmonary effort is normal. No respiratory distress.   Neurological:      General: No focal deficit present.      Mental Status: She is alert and oriented to person, place, and time. Mental status is at baseline.   Psychiatric:         Mood and Affect: Mood normal.         Behavior: Behavior normal.         Thought Content: Thought content normal.         Judgment: Judgment normal.         Assessment:       1. Periorbital cellulitis of right eye    2. Wasp sting, accidental or unintentional, initial encounter        Plan:   1. Periorbital cellulitis of right eye  -     mupirocin (BACTROBAN) 2 % ointment; Apply topically 2 (two) times daily. Apply to affectedd area for 14 days  Dispense: 30 g; Refill: 0  -     amoxicillin-clavulanate 875-125mg (AUGMENTIN) 875-125 mg per tablet; Take 1 tablet by mouth every 12 (twelve) hours. Take with food for 7 days  Dispense: 14 tablet; Refill: 0  -     clindamycin (CLEOCIN) 300 MG capsule; Take 1 capsule (300 mg total) by mouth every 8 (eight) hours. Take with food for 7 days  Dispense: 21 capsule; Refill: 0  -     white petrolatum-mineral oiL (ARTIFICIAL TEARS, MILANA/MIN,) 83-15 % Oint; Place into the right eye 4 (four) times daily as needed (dry eyes or itching eyes).  Dispense: 3.5 g; Refill: 1    2. Wasp sting, accidental or unintentional, initial encounter    Other orders  -     fluconazole (DIFLUCAN) 150 MG Tab; Take 1 tablet (150 mg total) by mouth once daily. Take one tablet if you start having symptoms of a yeast infection, then if still having symptoms 3 days later you can take the second tablet (doses must be 3 days apart). for 1 day  Dispense: 2 tablet; Refill: 0          Assessment & Plan    IMPRESSION:  - Diagnosed periorbital cellulitis based on presentation of swollen, warm, red, and painful right eye following wasp sting 4 days ago.  - Considered history of allergies to  Macrobid, Bactrim, and Flagyl in medication selection.    CELLULITIS OF RIGHT ORBIT (PERIORBITAL CELLULITIS):   Assessed the patient's condition as periorbital cellulitis following a wasp sting near the right eye four days ago.   Observed significant swelling around the right eye, which is warm to touch, painful, and has slight watery discharge.   Prescribed clindamycin 1 capsule every 8 hours with food for 1 week and Augmentin 2 times daily with food for aggressive antibiotic treatment to prevent potential hospitalization.   Prescribed lubricating eye drops, to be used up to 4 times daily as needed for dryness, wateriness, or itching.   Educated the patient about the potential seriousness of periorbital cellulitis and possible side effects of strong antibiotics, including upset stomach.   Instructed the patient on signs of C. difficile infection (significant diarrhea, fever, abdominal pain) and the need for immediate medical attention if these occur.   Recommend taking a probiotic supplement or consuming yogurt while on antibiotics to maintain gut health and help prevent C. diff infection.   Advised the patient to seek emergency care if condition worsens, such as eye swelling shut or fever developing.   Issued a work excuse until Monday (31st).    WASP STING:   Prescribed antibiotic ointment for application to the wasp sting site.   Noted that the patient has been taking Benadryl for symptom relief.   Assessed the patient's allergy status to wasps.   Documented that the patient was stung by a wasp while in a work truck on the way to work.   Noted that the patient reports being highly allergic to wasps.    URTICARIA AND ALLERGIES:   Noted the patient's history of hives with Flagyl and Macrobid.   Prescribed Diflucan to be used if a yeast infection develops, which may indirectly prevent urticaria from antibiotic use.   Confirmed the patient's allergies to Macrobid, Bactrim, and Flagyl.   Noted allergic reactions:  hives with Flagyl and Macrobid, and wilt with Bactrim.   Assessed the patient's tolerance to penicillin-type medications.   Selected antibiotics (clindamycin and Augmentin) based on the patient's allergy profile.   Prescribed Diflucan, to be used if a yeast infection develops due to the patient's tendency for yeast infections with antibiotic use.        This note was generated with the assistance of ambient listening technology. Verbal consent was obtained by the patient and accompanying visitor(s) for the recording of patient appointment to facilitate this note. I attest to having reviewed and edited the generated note for accuracy, though some syntax or spelling errors may persist. Please contact the author of this note for any clarification.

## 2025-03-27 RX ORDER — CLONAZEPAM 0.5 MG/1
TABLET ORAL
Qty: 60 TABLET | Refills: 0 | Status: SHIPPED | OUTPATIENT
Start: 2025-03-27

## 2025-03-27 NOTE — TELEPHONE ENCOUNTER
REFILL APPROVED. Will address further refills at upcoming appointment with me listed below.  #LMRX   --------------------------------  Future Appointments   Date Time Provider Department Center   4/7/2025  8:20 AM Nadine Tolliver FNP [facility]

## 2025-03-28 DIAGNOSIS — F17.210 NICOTINE DEPENDENCE, CIGARETTES, UNCOMPLICATED: Chronic | ICD-10-CM

## 2025-03-28 DIAGNOSIS — K11.7 DRUG-INDUCED XEROSTOMIA: ICD-10-CM

## 2025-03-28 DIAGNOSIS — F51.04 PSYCHOPHYSIOLOGIC INSOMNIA: Chronic | ICD-10-CM

## 2025-03-28 DIAGNOSIS — L03.213 PERIORBITAL CELLULITIS OF RIGHT EYE: ICD-10-CM

## 2025-03-28 DIAGNOSIS — T50.905A DRUG-INDUCED XEROSTOMIA: ICD-10-CM

## 2025-03-28 DIAGNOSIS — R04.0 EPISTAXIS: ICD-10-CM

## 2025-03-28 DIAGNOSIS — F41.1 GENERALIZED ANXIETY DISORDER: Chronic | ICD-10-CM

## 2025-03-28 RX ORDER — CLONAZEPAM 0.5 MG/1
TABLET ORAL
Qty: 60 TABLET | Refills: 0 | OUTPATIENT
Start: 2025-03-28

## 2025-03-28 NOTE — TELEPHONE ENCOUNTER
Refill Routing Note   Medication(s) are not appropriate for processing by [facility] for the following reason(s):        Outside of protocol    [facility] action(s):  Route               Appointments  past 12m or future 3m with PCP    Date Provider   Last Visit   9/24/2024 EDGAR Chery MD   Next Visit   Visit date not found EDGAR Chery MD   ED visits in past 90 days: 0        Note composed:2:39 PM 03/28/2025

## 2025-04-04 RX ORDER — VITAMIN A 3000 MCG
CAPSULE ORAL
Qty: 60 ML | Refills: 0 | Status: SHIPPED | OUTPATIENT
Start: 2025-04-04

## 2025-04-04 RX ORDER — MICONAZOLE NITRATE 2 %
2 CREAM (GRAM) TOPICAL
Qty: 100 EACH | Refills: 0 | Status: SHIPPED | OUTPATIENT
Start: 2025-04-04

## 2025-04-04 RX ORDER — VARENICLINE TARTRATE 1 MG/1
1 TABLET, FILM COATED ORAL 2 TIMES DAILY
Qty: 60 TABLET | Refills: 0 | Status: SHIPPED | OUTPATIENT
Start: 2025-04-04

## 2025-04-04 RX ORDER — MINERAL OIL AND PETROLATUM 150; 830 MG/G; MG/G
OINTMENT OPHTHALMIC 4 TIMES DAILY PRN
Qty: 3.5 G | Refills: 1 | OUTPATIENT
Start: 2025-04-04

## 2025-04-04 NOTE — TELEPHONE ENCOUNTER
Response to request for refill of this medicine handled separately.  Requested Prescriptions     Pending Prescriptions Disp Refills    white petrolatum-mineral oiL (ARTIFICIAL TEARS, MILANA/MIN,) 83-15 % Oint 3.5 g 1     Sig: Place into the right eye 4 (four) times daily as needed (dry eyes or itching eyes).

## 2025-04-04 NOTE — TELEPHONE ENCOUNTER
No care due was identified.  [facility] Embedded Care Due Messages. Reference number: [id number].   3/28/2025 11:50:34 AM CDT  
REFILL APPROVED. Will address further refills at upcoming appointment with me listed below.  #LMRX   --------------------------------  Future Appointments   Date Time Provider Department Center   4/14/2025  9:20 AM Nadine Tolliver FNP [facility]      
Refill Routing Note   Medication(s) are not appropriate for processing by [facility] for the following reason(s):        Outside of protocol    [facility] action(s):  Route             Appointments  past 12m or future 3m with PCP    Date Provider   Last Visit   9/24/2024 EDGAR Chery MD   Next Visit   3/28/2025 EDGAR Chery MD   ED visits in past 90 days: 0        Note composed:4:34 PM 03/28/2025               
at home:

## 2025-04-06 DIAGNOSIS — F51.04 PSYCHOPHYSIOLOGIC INSOMNIA: Chronic | ICD-10-CM

## 2025-04-06 DIAGNOSIS — F41.1 GENERALIZED ANXIETY DISORDER: Chronic | ICD-10-CM

## 2025-04-06 NOTE — TELEPHONE ENCOUNTER
No care due was identified.  [facility] Embedded Care Due Messages. Reference number: [id number].   4/06/2025 10:32:11 AM CDT

## 2025-04-07 ENCOUNTER — PATIENT MESSAGE (OUTPATIENT)
Dept: INTERNAL MEDICINE | Facility: CLINIC | Age: 50
End: 2025-04-07
Payer: COMMERCIAL

## 2025-04-07 NOTE — TELEPHONE ENCOUNTER
Refill Routing Note   Medication(s) are not appropriate for processing by [facility] for the following reason(s):        Outside of protocol    [facility] action(s):  Route               Appointments  past 12m or future 3m with PCP    Date Provider   Last Visit   9/24/2024 EDGAR Chery MD   Next Visit   Visit date not found EDGAR Chery MD   ED visits in past 90 days: 0        Note composed:9:59 AM 04/07/2025

## 2025-04-09 RX ORDER — CLONAZEPAM 0.5 MG/1
TABLET ORAL
Qty: 60 TABLET | Refills: 0 | Status: SHIPPED | OUTPATIENT
Start: 2025-04-09

## 2025-04-09 NOTE — TELEPHONE ENCOUNTER
Limited refill approved.  Appointment (virtual or in-office) required for more refills.    TO MY TEAM: Please help [facility] schedule appointment before they will run out of their medicine.

## 2025-04-14 ENCOUNTER — OFFICE VISIT (OUTPATIENT)
Dept: PRIMARY CARE CLINIC | Facility: CLINIC | Age: 50
End: 2025-04-14
Payer: COMMERCIAL

## 2025-04-14 VITALS
BODY MASS INDEX: 43.86 KG/M2 | OXYGEN SATURATION: 97 % | DIASTOLIC BLOOD PRESSURE: 72 MMHG | WEIGHT: 238.31 LBS | HEIGHT: 62 IN | SYSTOLIC BLOOD PRESSURE: 130 MMHG | HEART RATE: 67 BPM | RESPIRATION RATE: 18 BRPM

## 2025-04-14 DIAGNOSIS — E66.813 CLASS 3 SEVERE OBESITY DUE TO EXCESS CALORIES WITH SERIOUS COMORBIDITY AND BODY MASS INDEX (BMI) OF 40.0 TO 44.9 IN ADULT: Chronic | ICD-10-CM

## 2025-04-14 DIAGNOSIS — R73.03 PREDIABETES: Primary | Chronic | ICD-10-CM

## 2025-04-14 DIAGNOSIS — G47.33 OSA ON CPAP: Chronic | ICD-10-CM

## 2025-04-14 DIAGNOSIS — F33.0 RECURRENT MILD MAJOR DEPRESSIVE DISORDER WITH ANXIETY: Chronic | ICD-10-CM

## 2025-04-14 DIAGNOSIS — E66.01 CLASS 3 SEVERE OBESITY DUE TO EXCESS CALORIES WITH SERIOUS COMORBIDITY AND BODY MASS INDEX (BMI) OF 40.0 TO 44.9 IN ADULT: Chronic | ICD-10-CM

## 2025-04-14 DIAGNOSIS — F41.9 RECURRENT MILD MAJOR DEPRESSIVE DISORDER WITH ANXIETY: Chronic | ICD-10-CM

## 2025-04-14 PROCEDURE — 3075F SYST BP GE 130 - 139MM HG: CPT | Mod: CPTII,S$GLB,, | Performed by: NURSE PRACTITIONER

## 2025-04-14 PROCEDURE — 3078F DIAST BP <80 MM HG: CPT | Mod: CPTII,S$GLB,, | Performed by: NURSE PRACTITIONER

## 2025-04-14 PROCEDURE — 3008F BODY MASS INDEX DOCD: CPT | Mod: CPTII,S$GLB,, | Performed by: NURSE PRACTITIONER

## 2025-04-14 PROCEDURE — 99214 OFFICE O/P EST MOD 30 MIN: CPT | Mod: S$GLB,,, | Performed by: NURSE PRACTITIONER

## 2025-04-14 PROCEDURE — 99999 PR PBB SHADOW E&M-EST. PATIENT-LVL IV: CPT | Mod: PBBFAC,,, | Performed by: NURSE PRACTITIONER

## 2025-04-14 PROCEDURE — 1159F MED LIST DOCD IN RCRD: CPT | Mod: CPTII,S$GLB,, | Performed by: NURSE PRACTITIONER

## 2025-04-14 PROCEDURE — 1160F RVW MEDS BY RX/DR IN RCRD: CPT | Mod: CPTII,S$GLB,, | Performed by: NURSE PRACTITIONER

## 2025-04-14 RX ORDER — SEMAGLUTIDE 1.7 MG/.75ML
1.7 INJECTION, SOLUTION SUBCUTANEOUS
Qty: 3 ML | Refills: 0 | Status: SHIPPED | OUTPATIENT
Start: 2025-04-14

## 2025-04-14 NOTE — LETTER
April 14, 2025      [facility]  [address]  [place] [address]  Phone: [phone]  Fax: [phone]       Patient: Teresa Hwang   YOB: 1975  Date of Visit: 04/14/2025    To Whom It May Concern:    Mayra Hwang  was at [facility] on 04/14/2025. The patient may return to work/school on 04/15/2025 with no restrictions. If you have any questions or concerns, or if I can be of further assistance, please do not hesitate to contact me.    Sincerely,    Kristi Contreras MA

## 2025-04-14 NOTE — PROGRESS NOTES
Patient ID: [id number]     Chief Complaint: Follow-up    HPI:     Teresa Hwang is a 49 y.o. female with diagnosis of Prediabetes, Obesity, Anxiety, Depression Patient referred by PCP. Patient's PCP is Dr. Chrey. Patient seen today for Obesity. Patient last seen on 02/24/2025.     Weight history   09/23/2024: Patient states she was a , didn't have a lot of healthy food options so ate fast food. Also would do a lot of sitting.   11/11/2024: at previous appt, patient started on Semaglutide 0.25 mg weekly, denies side effects. Patient states she has noticed a slight decrease in appetite. Patient requesting increase in dose. States she has been making healthier options with food.   12/27/2024: at previous appt, Semaglutide increased to 0.5 mg SC weekly. Patient states just started the 0.5 mg a week ago due to medication on backorder. Patient states has noticed a decrease in appetite since dose increase. States food noise decreased. Has been trying to eat more protein.   02/24/2025: at previous appt, Wegovy continued at 0.5 mg weekly. Patient states tolerating well. Appetite decreased. Patient states increased protein intake. Most recent at home weight was 220 lbs. Patient states increased physical activity.   04/14/2025: at previous appt, semaglutide increased to 1 mg SC weekly. States doesn't feel that medication is lasting, her appetite increases after 2 days. Request dose increase. Recently started exercising.     Previous Obesity Treatment:   Diet/Exercise - unsuccessful   Topamax (2023)- failed  Semaglutide (2023) - helpful with weight loss    BMI:  43.5 (04/13/2025)   44.2 (11/11/2024)  44.4 (09/22/2024)  42.2 (09/10/2024)    Weight:  Wt Readings from Last 6 Encounters:   04/14/25 108.1 kg (238 lb 5.1 oz)   11/11/24 109.8 kg (242 lb 1 oz)   10/10/24 107.5 kg (236 lb 15.9 oz)   09/23/24 110.2 kg (242 lb 15.2 oz)   09/10/24 87.5 kg (192 lb 14.4 oz)   09/10/24 104.7 kg (230 lb 13.2 oz)      Ideal/Adjusted Body Weight:  Ideal: 110 lbs  Adjusted 163 lbs    Neck Circumference:   15 in    Waist Circumference:  44 in (04/13/2025)  45 in    Percent Body Fat:   60.4% (11/11/2024)  https://www.calculator.net/body-fat-calculator.html    InBody:   None noted    Nutrition history: (24 hour food recall)  Breakfast: coffee (black)   Lunch: none   Dinner: chicken taco   Snack: none  Water: 44 oz  Sugar Sweetened beverages: rarely  Etoh: 2 shots liquor per week  Satiety cues: sometimes felt full after consuming a meal  Cravings: denies  Food noise: yes  Patient states she tends to eat unhealthy when she is bored; also eats if she had trouble sleeping - usually a sandwich.   Dietician: denies    Current physical activity:   Walks for exercise  Barriers: financial, states recently started a new job    Stressors/Mental Health   On Fluoxetine, Buspirone, Clonazepam for Depression/Anxiety prescribed by PCP. Patient states medication working well.   Over the last two weeks how often have you been bothered by little interest or pleasure in doing things: 0  Over the last two weeks how often have you been bothered by feeling down, depressed or hopeless: 0  PHQ-2 Total Score: 0    Sleep habits  DEYA - on CPAP; averages 6 hours/night     CVD screening  The 10-year ASCVD risk score (Sanket LOU, et al., 2019) is: 1.6%    Values used to calculate the score:      Age: 49 years      Sex: Female      Is Non- : Yes      Diabetic: No      Tobacco smoker: No      Systolic Blood Pressure: 130 mmHg      Is BP treated: No      HDL Cholesterol: 64 mg/dL      Total Cholesterol: 165 mg/dL    LMP: menopause    Contraception: menopause      I spent 6 minutes counseling patient on diet and physical activity.     Review of patient's allergies indicates:   Allergen Reactions    Macrobid [nitrofurantoin monohyd/m-cryst] Hives    Bactrim [sulfamethoxazole-trimethoprim]     Flagyl [metronidazole hcl] Hives     Current  Outpatient Medications   Medication Instructions    ascorbic acid (vitamin C) (VITAMIN C) 500 mg, Daily    busPIRone (BUSPAR) 15 mg, Oral, 2 times daily    clonazePAM (KLONOPIN) 0.5 MG tablet TAKE 1/2 TO 1 (ONE-HALF TO ONE) TABLET BY MOUTH TWICE DAILY AS NEEDED FOR ANXIETY    ELDERBERRY FRUIT ORAL 1 capsule, Daily    FLUoxetine 20 mg, Oral, Daily    fluticasone propionate (FLONASE) 100 mcg, Each Nostril, Daily    gabapentin (NEURONTIN) 900 mg, Oral, Nightly    nicotine (polacrilex) (NICORETTE) 2 mg, Oral, As needed (PRN), Use as directed on packaging.    promethazine-dextromethorphan (PROMETHAZINE-DM) 6.25-15 mg/5 mL Syrp 5 mLs, Oral, Nightly PRN    sodium chloride (SALINE NASAL) 0.65 % nasal spray Use as directed on product packaging    varenicline tartrate (CHANTIX) 1 mg, Oral, 2 times daily    WEGOVY 1.7 mg, Subcutaneous, Every 7 days    white petrolatum-mineral oiL (ARTIFICIAL TEARS, MILANA/MIN,) 83-15 % Oint Right Eye, 4 times daily PRN     Past Surgical History:   Procedure Laterality Date    APPLICATION OF LARGE EXTERNAL FIXATION DEVICE TO TIBIA Left 01/10/2019    Procedure: APPLICATION, EXTERNAL FIXATION DEVICE, LARGE, TIBIA;  Surgeon: Domenic Galvan MD;  Location: [facility] OR;  Service: Orthopedics;  Laterality: Left;    APPLICATION OF WOUND VACUUM-ASSISTED CLOSURE DEVICE Left 01/17/2019    Procedure: APPLICATION, WOUND VAC;  Surgeon: Barry Guzman MD;  Location: [facility] OR;  Service: Orthopedics;  Laterality: Left;    COLONOSCOPY N/A 09/02/2022    Procedure: COLONOSCOPY;  Surgeon: Yon Ridley MD;  Location: [facility] ENDO;  Service: Endoscopy;  Laterality: N/A;    FRACTURE SURGERY      C1 neck- halo    HERNIA REPAIR      HYSTERECTOMY  2009    tahbso    OOPHORECTOMY Bilateral     OPEN REDUCTION AND INTERNAL FIXATION (ORIF) OF FRACTURE OF TIBIAL PLATEAU Left 01/17/2019    Procedure: ORIF, FRACTURE, TIBIA, PLATEAU;  Surgeon: Barry Guzman MD;  Location: [facility] OR;  Service: Orthopedics;  Laterality: Left;     REMOVAL OF EXTERNAL FIXATION DEVICE Left 2019    Procedure: REMOVAL, EXTERNAL FIXATION DEVICE;  Surgeon: Barry Guzman MD;  Location: [facility] OR;  Service: Orthopedics;  Laterality: Left;    TRIGGER FINGER RELEASE Right 2023    Procedure: RELEASE, TRIGGER FINGER;  Surgeon: Sandeep Bang MD;  Location: [facility] OR;  Service: Orthopedics;  Laterality: Right;  right trigger thumb release    TUBAL LIGATION  1997    UMBILICAL HERNIA REPAIR       family history includes Breast cancer in her paternal grandmother; Breast cancer (age of onset: 46) in her sister; Breast cancer (age of onset: 58) in her mother; Cancer in her mother and sister; Depression in her mother; Diabetes in her mother; No Known Problems in her father.    Social History     Socioeconomic History    Marital status:     Number of children: 5   Occupational History    Occupation:    Tobacco Use    Smoking status: Former     Current packs/day: 0.00     Average packs/day: 1 pack/day for 33.0 years (33.0 ttl pk-yrs)     Types: Cigarettes     Start date:      Quit date:      Years since quittin.2     Passive exposure: Past    Smokeless tobacco: Never   Substance and Sexual Activity    Alcohol use: Yes     Alcohol/week: 2.0 standard drinks of alcohol     Types: 2 Glasses of wine per week     Comment: Stopped    Drug use: No    Sexual activity: Not Currently     Partners: Male     Birth control/protection: None     Social Drivers of Health     Financial Resource Strain: Medium Risk (2024)    Overall Financial Resource Strain (CARDIA)     Difficulty of Paying Living Expenses: Somewhat hard   Food Insecurity: Food Insecurity Present (2024)    Hunger Vital Sign     Worried About Running Out of Food in the Last Year: Sometimes true     Ran Out of Food in the Last Year: Sometimes true   Transportation Needs: Patient Declined (11/15/2023)    PRAPARE - Transportation     Lack of Transportation (Medical):  "Patient declined     Lack of Transportation (Non-Medical): Patient declined   Physical Activity: Insufficiently Active (11/27/2024)    Exercise Vital Sign     Days of Exercise per Week: 2 days     Minutes of Exercise per Session: 10 min   Stress: No Stress Concern Present (11/27/2024)    [employer] - Occupational Stress Questionnaire     Feeling of Stress : Not at all   Housing Stability: High Risk (11/27/2024)    Housing Stability Vital Sign     Unable to Pay for Housing in the Last Year: Yes       Subjective:     Review of Systems   Constitutional: Negative.    HENT: Negative.     Eyes: Negative.    Respiratory: Negative.     Cardiovascular: Negative.    Gastrointestinal: Negative.    Endocrine: Negative.    Genitourinary: Negative.    Musculoskeletal: Negative.    Skin: Negative.    Allergic/Immunologic: Negative.    Neurological: Negative.    Hematological: Negative.    Psychiatric/Behavioral: Negative.       Objective:     Visit Vitals  /72 (BP Location: Left arm, Patient Position: Sitting)   Pulse 67   Resp 18   Ht 5' 2" (1.575 m)   Wt 108.1 kg (238 lb 5.1 oz)   SpO2 97%   BMI 43.59 kg/m²       Physical Exam  Vitals reviewed.   Constitutional:       Appearance: Normal appearance. She is obese.   HENT:      Head: Normocephalic and atraumatic.   Eyes:      Extraocular Movements: Extraocular movements intact.      Conjunctiva/sclera: Conjunctivae normal.      Pupils: Pupils are equal, round, and reactive to light.   Cardiovascular:      Rate and Rhythm: Normal rate and regular rhythm.      Heart sounds: Normal heart sounds.   Pulmonary:      Effort: Pulmonary effort is normal.      Breath sounds: Normal breath sounds.   Abdominal:      General: Bowel sounds are normal.   Musculoskeletal:         General: Normal range of motion.      Cervical back: Normal range of motion.   Skin:     General: Skin is warm and dry.   Neurological:      Mental Status: She is alert and oriented to " person, place, and time.   Psychiatric:         Mood and Affect: Mood normal.         Behavior: Behavior normal.       Labs Reviewed:     Hematology:  Lab Results   Component Value Date    WBC 12.39 08/11/2023    HGB 12.6 08/11/2023    HCT 38.8 08/11/2023     08/11/2023     Chemistry:  Lab Results   Component Value Date     09/10/2024    K 4.0 09/10/2024    BUN 14 09/10/2024    CREATININE 0.8 09/10/2024    EGFRNORACEVR >60.0 09/10/2024    CALCIUM 9.4 09/10/2024    ALKPHOS 74 09/10/2024    LABPROT 10.0 01/10/2019    ALBUMIN 4.1 09/10/2024    AST 18 09/10/2024    ALT 15 09/10/2024    MG 2.0 01/11/2019    PHOS 3.8 01/11/2019    YAMPAZOG81SF 30 11/11/2024      Lab Results   Component Value Date    HGBA1C 5.9 (H) 09/10/2024      Lipid Panel:  Lab Results   Component Value Date    CHOL 165 09/10/2024    HDL 64 09/10/2024    TRIG 69 09/10/2024    TOTALCHOLEST 2.6 09/10/2024      Thyroid:  Lab Results   Component Value Date    TSH 3.299 09/10/2024      Urine:  Lab Results   Component Value Date    APPEARANCEUA Clear 10/14/2021    PROTEINUA Negative 10/14/2021    LEUKOCYTESUR Negative 10/14/2021        Assessment:       ICD-10-CM ICD-9-CM   1. Prediabetes  R73.03 790.29   2. DEYA on CPAP  G47.33 327.23   3. Class 3 severe obesity due to excess calories with serious comorbidity and body mass index (BMI) of 40.0 to 44.9 in adult  E66.813 278.01    E66.01 V85.41    Z68.41    4. Recurrent mild major depressive disorder with anxiety  F33.0 296.31    F41.9 300.00       Plan:     1. Prediabetes  Glucose: 93  A1c: 5.9  ADA diet  Weight loss encouraged  Increase Semaglutide to 1.7 mg SC weekly    2. DEYA on CPAP  Continue CPAP nightly    3. Class 3 severe obesity due to excess calories with serious comorbidity and body mass index (BMI) of 40.0 to 44.9 in adult  Body mass index is 43.59 kg/m².  Wt Readings from Last 1 Encounters:   04/14/25 108.1 kg (238 lb 5.1 oz)   Waist Circumference: 44 in  TSH   Date Value Ref Range  Status   09/10/2024 3.299 0.400 - 4.000 uIU/mL Final     Vit D, 25-Hydroxy   Date Value Ref Range Status   11/11/2024 30 30 - 96 ng/mL Final     Comment:     Vitamin D deficiency.........<10 ng/mL                              Vitamin D insufficiency......10-29 ng/mL       Vitamin D sufficiency........> or equal to 30 ng/mL  Vitamin D toxicity............>100 ng/mL        A modest (5-10%) weight loss improves health and quality of life.     Goal: lose weight  Short-term: Lose 10 lbs in 8 weeks  Long-term: Lose 60 lbs  *Keep in mind that, on average, you may lose 1-2 lbs per week*    Willingness to change: 6/10    Patient qualifies for anti-obesity medications due to BMI 44 with comorbidity. Anti-obesity medications are an adjunct to nutritional, physical activity, and behavioral therapies. Medication alone cannot treat obesity. I recommend starting medication to significantly improve patient's risk factor/s.   Medication:   Semaglutide (Wegovy)  Dosing: increase to 1.7 mg SC weekly    Nutrition: Aim for 1,350 Calories per day.      Protein: goal is a minimum of 80g/day. Protein has a slower rate of digestion = greater satiety (fullness).        Aim for <25g of added sugar per day.      Recommend to increase fiber intake.   www.fullplateliving.org      Eat Fit Shopping List.      Prepare meals in advance.      Eat breakfast within 2 hours of waking up.       Track what you eat. Research shows that people who track their food intake are more successful with weight management. This creates awareness of what you are eating/drinking and can help you see where to make changes.       Get to know your plate.  www.myplate.gov      Stay hydrated.     Activity: 2-3 days of strength training. This can be body weight, light weight or elastic bands exercises. [employer] and [employer] are great sources for free workout plans/videos.     Start slowly with an activity you enjoy and make it a habit.     Ask a family member or friend to  get active with you.     Aim for 5,000 - 10,000 steps per day     Schedule time to make physical activity a part of your daily routine.     Exercise prescription: (FITTE)    Frequency: 3-4 days per week    Intensity: low    Type: walking    Time: 20 minutes    Enjoyment: enjoys walking on the treadmill     Sleep: 7-8 hours of sleep a night    *Recognize your progress and remember that each day is a new day*  *Stay on track, even when you feel like you're not making progress*  *Sit Less, Stand Often, Move More*  *You don't have to be perfect; be persistent*    Avoiding Weight Regain:  - continued with modified food intake (changed eating habits)  - eat breakfast every day  - weigh yourself at least once a week  - watch less than 10 hours of TV per week  - continue with increased physical activity  - physical activity, on average, about 1 hour per day    4. Recurrent mild major depressive disorder with anxiety  Continue Fluoxetine, Clonazepam, Buspirone       Follow up in about 4 weeks (around 5/12/2025) for Virtual Visit. In addition to their scheduled follow up, the patient has also been instructed to follow up on as needed basis.     TATO Lei

## 2025-05-10 NOTE — TELEPHONE ENCOUNTER
Called patient to schedule appointment   Lab Results   Component Value Date    HGBA1C 8.5 (H) 04/30/2025     Recent Labs     05/09/25  0632 05/09/25  1632 05/09/25  2115 05/10/25  0745   POCGLU 104 161* 179* 125   Current regimen Lantus 12 units nightly  Continue Accu-Cheks, and sliding scale insulin

## 2025-05-22 ENCOUNTER — ON-DEMAND VIRTUAL (OUTPATIENT)
Dept: URGENT CARE | Facility: CLINIC | Age: 50
End: 2025-05-22
Payer: COMMERCIAL

## 2025-05-22 DIAGNOSIS — M25.562 ACUTE PAIN OF LEFT KNEE: Primary | ICD-10-CM

## 2025-05-22 RX ORDER — IBUPROFEN 600 MG/1
600 TABLET, FILM COATED ORAL EVERY 8 HOURS PRN
Qty: 30 TABLET | Refills: 0 | Status: SHIPPED | OUTPATIENT
Start: 2025-05-22 | End: 2025-06-01

## 2025-05-22 NOTE — PATIENT INSTRUCTIONS

## 2025-05-22 NOTE — PROGRESS NOTES
Subjective:      Patient ID: Teresa Hwang is a 49 y.o. female.    Vitals:  vitals were not taken for this visit.     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    Nicotine dependence, cigarettes, in remission 03/26/2019    Obesity     Personal history of COVID-19     Primary hypertension 06/29/2023    Recurrent major depression in partial remission 03/26/2019    Surgical wound infection (MRSA) 12/2019     Past Surgical History:   Procedure Laterality Date    APPLICATION OF LARGE EXTERNAL FIXATION DEVICE TO TIBIA Left 01/10/2019    Procedure: APPLICATION, EXTERNAL FIXATION DEVICE, LARGE, TIBIA;  Surgeon: Domenic Galvan MD;  Location: [facility];  Service: Orthopedics;  Laterality: Left;    APPLICATION OF WOUND VACUUM-ASSISTED CLOSURE DEVICE Left 01/17/2019    Procedure: APPLICATION, WOUND VAC;  Surgeon: Barry Guzman MD;  Location: [facility];  Service: Orthopedics;  Laterality: Left;    COLONOSCOPY N/A 09/02/2022    Procedure: COLONOSCOPY;  Surgeon: Yon Ridley MD;  Location: [facility];  Service: Endoscopy;  Laterality: N/A;    FRACTURE SURGERY      C1 neck- halo    HERNIA REPAIR      HYSTERECTOMY  2009    tahbso    OOPHORECTOMY Bilateral     OPEN REDUCTION AND INTERNAL FIXATION (ORIF) OF FRACTURE OF TIBIAL PLATEAU Left 01/17/2019    Procedure: ORIF, FRACTURE, TIBIA, PLATEAU;  Surgeon: Barry Guzman MD;  Location: [facility];  Service: Orthopedics;  Laterality: Left;    REMOVAL OF EXTERNAL FIXATION DEVICE Left 01/17/2019    Procedure: REMOVAL, EXTERNAL FIXATION DEVICE;  Surgeon: Barry Guzman MD;  Location: [facility];  Service:  "Orthopedics;  Laterality: Left;    TRIGGER FINGER RELEASE Right 8/25/2023    Procedure: RELEASE, TRIGGER FINGER;  Surgeon: Sandeep Bang MD;  Location: [facility];  Service: Orthopedics;  Laterality: Right;  right trigger thumb release    TUBAL LIGATION  05/22/1997    UMBILICAL HERNIA REPAIR       Review of patient's allergies indicates:   Allergen Reactions    Macrobid [nitrofurantoin monohyd/m-cryst] Hives    Bactrim [sulfamethoxazole-trimethoprim]     Flagyl [metronidazole hcl] Hives     Medications Ordered Prior to Encounter[1]  Family History   Problem Relation Name Age of Onset    Breast cancer Mother Arminda Maldonado 58    Cancer Mother Arminda Maldonado         Breast cancer    Depression Mother Arminda Maldonado         Depression    Diabetes Mother Arminda Maldonado     Breast cancer Sister Lizzy Cazares 46    Cancer Sister Lizzy Cazares         Breast cancer    No Known Problems Father      Breast cancer Paternal Grandmother         Medications Ordered                [facility] [address] [place] [address]    Telephone: [phone]   Fax: [phone]   Hours: Not open 24 hours                         E-Prescribed (1 of 1)              ibuprofen (ADVIL,MOTRIN) 600 MG tablet    Sig: Take 1 tablet (600 mg total) by mouth every 8 (eight) hours as needed for Pain.       Start: 5/22/25     Quantity: 30 tablet Refills: 0                           Ohs Peq Odvv Intake    5/22/2025  3:02 PM CDT - Filed by Patient   What is your current physical address in the event of a medical emergency? [address] [facility] [address]   Are you able to take your vital signs? No   Please attach any relevant images or files    Is your employer contracted with [facility]? No         Pt presents with c/o pain to her left knee after a slip and fall on yesterday, reports she is able to bear wt on the left leg, but pain is "excruciating", and concerns for a fracture, as she " broke her leg prior.   Denies CP, SOB, dizziness, ha.      Knee Pain   The incident occurred 12 to 24 hours ago. The injury mechanism was a fall. The pain is present in the left knee and left leg. The pain is severe. The pain has been Constant since onset. Pertinent negatives include no inability to bear weight, loss of sensation, muscle weakness, numbness or tingling. The symptoms are aggravated by movement and weight bearing. She has tried NSAIDs and non-weight bearing for the symptoms.       Cardiovascular:  Negative for chest pain.   Respiratory:  Negative for shortness of breath.    Musculoskeletal:  Positive for joint pain (left knee) and arthritis.   Neurological:  Negative for dizziness and numbness.        Objective:   The physical exam was conducted virtually.  Physical Exam   Constitutional: She is oriented to person, place, and time. No distress.   HENT:   Head: Normocephalic.   Ears:   Right Ear: External ear normal.   Left Ear: External ear normal.   Nose: No rhinorrhea or congestion.   Eyes: Conjunctivae are normal.   Neck: Neck supple.   Pulmonary/Chest: Effort normal. No respiratory distress.   Musculoskeletal:      Comments: Unable to examine the knee during the visit   Neurological: She is alert and oriented to person, place, and time.   Skin: Skin is no rash.     CMP  Sodium   Date Value Ref Range Status   09/10/2024 141 136 - 145 mmol/L Final     Potassium   Date Value Ref Range Status   09/10/2024 4.0 3.5 - 5.1 mmol/L Final     Chloride   Date Value Ref Range Status   09/10/2024 104 95 - 110 mmol/L Final     CO2   Date Value Ref Range Status   09/10/2024 25 23 - 29 mmol/L Final     Glucose   Date Value Ref Range Status   09/10/2024 93 70 - 110 mg/dL Final     BUN   Date Value Ref Range Status   09/10/2024 14 6 - 20 mg/dL Final     Creatinine   Date Value Ref Range Status   09/10/2024 0.8 0.5 - 1.4 mg/dL Final     Calcium   Date Value Ref Range Status   09/10/2024 9.4 8.7 - 10.5 mg/dL Final      Total Protein   Date Value Ref Range Status   09/10/2024 7.1 6.0 - 8.4 g/dL Final     Albumin   Date Value Ref Range Status   09/10/2024 4.1 3.5 - 5.2 g/dL Final     Total Bilirubin   Date Value Ref Range Status   09/10/2024 0.5 0.1 - 1.0 mg/dL Final     Comment:     For infants and newborns, interpretation of results should be based  on gestational age, weight and in agreement with clinical  observations.    Premature Infant recommended reference ranges:  Up to 24 hours.............<8.0 mg/dL  Up to 48 hours............<12.0 mg/dL  3-5 days..................<15.0 mg/dL  6-29 days.................<15.0 mg/dL       Alkaline Phosphatase   Date Value Ref Range Status   09/10/2024 74 55 - 135 U/L Final     AST   Date Value Ref Range Status   09/10/2024 18 10 - 40 U/L Final     ALT   Date Value Ref Range Status   09/10/2024 15 10 - 44 U/L Final     Anion Gap   Date Value Ref Range Status   09/10/2024 12 8 - 16 mmol/L Final     eGFR   Date Value Ref Range Status   09/10/2024 >60.0 >60 mL/min/1.73 m^2 Final         Assessment:     1. Acute pain of left knee        Plan:   Discussed with pt recommend an inperson visit to examine the knee    Please  go to the Emergency Department for any concerns or worsening of condition.    Rest, ice, compression and elevation to the affected joint or limb as needed.     Acute pain of left knee  -     ibuprofen (ADVIL,MOTRIN) 600 MG tablet; Take 1 tablet (600 mg total) by mouth every 8 (eight) hours as needed for Pain.  Dispense: 30 tablet; Refill: 0    We appreciate you trusting us with your medical care. We hope you feel better soon. We will be happy to take care of you for all of your future medical needs.     You must understand that you've received Virtual treatment only and that you may be released before all your medical problems are known or treated. You, the patient, will arrange for follow up care as instructed.     Follow up with your PCP or specialty clinic as directed in  the next 1-2 days if not improved or as needed. You can call [phone] to schedule an appointment with the appropriate provider.     If your condition worsens we recommend that you receive another evaluation in person, with your primary care provider, urgent care or at the emergency room immediately or contact your primary medical clinics after hours call service to discuss your concerns.                     [1]   Current Outpatient Medications on File Prior to Visit   Medication Sig Dispense Refill    ascorbic acid, vitamin C, (VITAMIN C) 500 MG tablet Take 500 mg by mouth once daily.      busPIRone (BUSPAR) 15 MG tablet Take 1 tablet (15 mg total) by mouth 2 (two) times daily. 180 tablet 2    clonazePAM (KLONOPIN) 0.5 MG tablet TAKE 1/2 TO 1 (ONE-HALF TO ONE) TABLET BY MOUTH TWICE DAILY AS NEEDED FOR ANXIETY 60 tablet 0    ELDERBERRY FRUIT ORAL Take 1 capsule by mouth once daily.      FLUoxetine 20 MG capsule Take 1 capsule (20 mg total) by mouth once daily. 90 capsule 3    fluticasone propionate (FLONASE) 50 mcg/actuation nasal spray 2 sprays (100 mcg total) by Each Nostril route once daily. 16 g 0    gabapentin (NEURONTIN) 300 MG capsule Take 3 capsules (900 mg total) by mouth every evening. 90 capsule 5    nicotine, polacrilex, (NICORETTE) 2 mg Gum Take 1 each (2 mg total) by mouth as needed (for cigarette cravings). Use as directed on packaging. 100 each 0    promethazine-dextromethorphan (PROMETHAZINE-DM) 6.25-15 mg/5 mL Syrp Take 5 mLs by mouth nightly as needed (cough). 118 mL 0    semaglutide, weight loss, (WEGOVY) 1.7 mg/0.75 mL PnIj Inject 1.7 mg into the skin every 7 days. 3 mL 0    sodium chloride (SALINE NASAL) 0.65 % nasal spray Use as directed on product packaging 60 mL 0    varenicline tartrate (CHANTIX) 1 mg Tab Take 1 tablet (1 mg total) by mouth 2 (two) times daily. 60 tablet 0    white petrolatum-mineral oiL (ARTIFICIAL TEARS, MILANA/MIN,) 83-15 % Oint Place into the right eye 4 (four)  times daily as needed (dry eyes or itching eyes). 3.5 g 1     Current Facility-Administered Medications on File Prior to Visit   Medication Dose Route Frequency Provider Last Rate Last Admin    chlorhexidine 0.12 % solution 10 mL  10 mL Mouth/Throat On Call Procedure Xenia Mancilla PA-C   10 mL at 08/25/23 08

## 2025-05-28 ENCOUNTER — ON-DEMAND VIRTUAL (OUTPATIENT)
Dept: URGENT CARE | Facility: CLINIC | Age: 50
End: 2025-05-28
Payer: COMMERCIAL

## 2025-05-28 DIAGNOSIS — L02.91 ABSCESS: Primary | ICD-10-CM

## 2025-05-28 PROCEDURE — 98004 SYNCH AUDIO-VIDEO EST SF 10: CPT | Mod: 95,,, | Performed by: NURSE PRACTITIONER

## 2025-05-28 RX ORDER — CLINDAMYCIN HYDROCHLORIDE 150 MG/1
300 CAPSULE ORAL EVERY 6 HOURS
Qty: 40 CAPSULE | Refills: 0 | Status: SHIPPED | OUTPATIENT
Start: 2025-05-28 | End: 2025-06-02

## 2025-05-28 NOTE — PROGRESS NOTES
Subjective:      Patient ID: Teresa Hwang is a 49 y.o. female.    Vitals:  vitals were not taken for this visit.     Chief Complaint: Abscess      Visit Type: TELE AUDIOVISUAL - This visit was conducted virtually based on  subjective information and limited objective exam    Present with the patient at the time of consultation: TELEMED PRESENT WITH PATIENT: None  LOCATION OF PATIENT [facility]   Two patient identifiers used to verify patient- saying out date of birth and full name.       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;  Service: Orthopedics;  Laterality: Left;    APPLICATION OF WOUND VACUUM-ASSISTED CLOSURE DEVICE Left 01/17/2019    Procedure: APPLICATION, WOUND VAC;  Surgeon: Barry Guzman MD;  Location: [facility];  Service: Orthopedics;  Laterality: Left;    COLONOSCOPY N/A 09/02/2022    Procedure: COLONOSCOPY;  Surgeon: Yon Ridley MD;  Location: [facility];  Service: Endoscopy;  Laterality: N/A;    FRACTURE SURGERY      C1 neck- halo    HERNIA REPAIR      HYSTERECTOMY  2009    tahbso    OOPHORECTOMY Bilateral     OPEN REDUCTION AND INTERNAL FIXATION (ORIF) OF FRACTURE OF TIBIAL PLATEAU Left 01/17/2019    Procedure: ORIF, FRACTURE, TIBIA, PLATEAU;  Surgeon: Barry Guzman MD;  Location: [facility];  Service: Orthopedics;   Laterality: Left;    REMOVAL OF EXTERNAL FIXATION DEVICE Left 01/17/2019    Procedure: REMOVAL, EXTERNAL FIXATION DEVICE;  Surgeon: Barry Guzman MD;  Location: [facility] OR;  Service: Orthopedics;  Laterality: Left;    TRIGGER FINGER RELEASE Right 8/25/2023    Procedure: RELEASE, TRIGGER FINGER;  Surgeon: Sandeep Bang MD;  Location: [facility] OR;  Service: Orthopedics;  Laterality: Right;  right trigger thumb release    TUBAL LIGATION  05/22/1997    UMBILICAL HERNIA REPAIR       Review of patient's allergies indicates:   Allergen Reactions    Macrobid [nitrofurantoin monohyd/m-cryst] Hives    Bactrim [sulfamethoxazole-trimethoprim]     Flagyl [metronidazole hcl] Hives     Medications Ordered Prior to Encounter[1]  Family History   Problem Relation Name Age of Onset    Breast cancer Mother Arminda Maldonado 58    Cancer Mother Arminda Maldonado         Breast cancer    Depression Mother Arminda Maldonado         Depression    Diabetes Mother Arminda Maldonado     Breast cancer Sister Lizzy Cazares 46    Cancer Sister Lizzy Cazares         Breast cancer    No Known Problems Father      Breast cancer Paternal Grandmother         Medications Ordered                [facility] [address] [place] [address]    Telephone: [phone]   Fax: [phone]   Hours: Not open 24 hours                         E-Prescribed (1 of 1)              clindamycin (CLEOCIN) 150 MG capsule    Sig: Take 2 capsules (300 mg total) by mouth every 6 (six) hours. for 5 days       Start: 5/28/25     Quantity: 40 capsule Refills: 0                           Ohs Peq Odvv Intake    5/28/2025  4:35 PM CDT - Filed by Patient   What is your current physical address in the event of a medical emergency? [address] [place] [address]   Are you able to take your vital signs? No   Please attach any relevant images or files    Is your employer contracted with [facility]? No         50 yo female complains  of abscess to buttock. Onset 1 week ago, actively draining. Denies fever/chills. Patient employed as . Reports pain worse with sitting long periods. She denies hx of DM. Denies recent antibiotics.       ROS     Objective:   The physical exam was conducted virtually.    AAO x 3 ; no acute distress noted; appearance normal; mood and behavior normal; thought process normal  Head- normocephalic  Nose- appears normal, no discharge or erythema  Eyes- pupils appear normal in size, no drainage, no erythema  Ears- normal appearing; no discharge, no erythema  Mouth- appears normal  Oropharynx- no erythema, lesions  Lungs- breathing at a normal rate, no acute distress noted  Heart- no reports of tachycardia, palpitations, chest pain  Abdomen- non distended, non tender reported by patient  Skin- warm and dry, no erythema or edema noted by patient or visualized        Assessment:     1. Abscess        Plan:   Apply warm compress to site 3 to 5 times daily  Avoid sitting in tub, advise shower instead until site healed  Completed antibiotics as prescribed  Recommend evaluation in person if symptoms do not improve within 2-3 days of taking antibiotics      Thank you for choosing [facility]!    Our goal in the [facility] is to always provide outstanding medical care. You may receive a survey by mail or e-mail in the next week regarding your experience today. We would greatly appreciate you completing and returning the survey. Your feedback provides us with a way to recognize our staff who provide very good care, and it helps us learn how to improve when your experience was below our aspiration of excellence.         We appreciate you trusting us with your medical care. We hope you feel better soon. We will be happy to take care of you for all of your future medical needs.    You must understand that you've received an Urgent Care treatment only and that you may be released before all  your medical problems are known or treated. You, the patient, will arrange for follow up care as instructed.    Follow up with your PCP or specialty clinic as directed in the next 1-2 weeks if not improved or as needed.  You can call [phone] to schedule an appointment with the appropriate provider.    If your condition worsens we recommend that you receive another evaluation in person, with your primary care provider, urgent care or at the emergency room immediately or contact your primary medical clinics after hours call service to discuss your concerns.         Abscess  -     clindamycin (CLEOCIN) 150 MG capsule; Take 2 capsules (300 mg total) by mouth every 6 (six) hours. for 5 days  Dispense: 40 capsule; Refill: 0                          [1]   Current Outpatient Medications on File Prior to Visit   Medication Sig Dispense Refill    ascorbic acid, vitamin C, (VITAMIN C) 500 MG tablet Take 500 mg by mouth once daily.      busPIRone (BUSPAR) 15 MG tablet Take 1 tablet (15 mg total) by mouth 2 (two) times daily. 180 tablet 2    clonazePAM (KLONOPIN) 0.5 MG tablet TAKE 1/2 TO 1 (ONE-HALF TO ONE) TABLET BY MOUTH TWICE DAILY AS NEEDED FOR ANXIETY 60 tablet 0    ELDERBERRY FRUIT ORAL Take 1 capsule by mouth once daily.      FLUoxetine 20 MG capsule Take 1 capsule (20 mg total) by mouth once daily. 90 capsule 3    fluticasone propionate (FLONASE) 50 mcg/actuation nasal spray 2 sprays (100 mcg total) by Each Nostril route once daily. 16 g 0    gabapentin (NEURONTIN) 300 MG capsule Take 3 capsules (900 mg total) by mouth every evening. 90 capsule 5    ibuprofen (ADVIL,MOTRIN) 600 MG tablet Take 1 tablet (600 mg total) by mouth every 8 (eight) hours as needed for Pain. 30 tablet 0    nicotine, polacrilex, (NICORETTE) 2 mg Gum Take 1 each (2 mg total) by mouth as needed (for cigarette cravings). Use as directed on packaging. 100 each 0    promethazine-dextromethorphan (PROMETHAZINE-DM) 6.25-15 mg/5 mL Syrp Take 5 mLs  by mouth nightly as needed (cough). 118 mL 0    semaglutide, weight loss, (WEGOVY) 1.7 mg/0.75 mL PnIj Inject 1.7 mg into the skin every 7 days. 3 mL 0    sodium chloride (SALINE NASAL) 0.65 % nasal spray Use as directed on product packaging 60 mL 0    varenicline tartrate (CHANTIX) 1 mg Tab Take 1 tablet (1 mg total) by mouth 2 (two) times daily. 60 tablet 0    white petrolatum-mineral oiL (ARTIFICIAL TEARS, MILANA/MIN,) 83-15 % Oint Place into the right eye 4 (four) times daily as needed (dry eyes or itching eyes). 3.5 g 1     Current Facility-Administered Medications on File Prior to Visit   Medication Dose Route Frequency Provider Last Rate Last Admin    chlorhexidine 0.12 % solution 10 mL  10 mL Mouth/Throat On Call Procedure Xenia Mancilla PA-C   10 mL at 08/25/23 0821

## 2025-05-28 NOTE — LETTER
May 28, 2025      Virtual Visit - Urgent Care  [address] [facility] [id number]       Patient: Teresa Hwang   YOB: 1975  Date of Visit: 05/28/2025    To Whom It May Concern:    Mayra Hwang  was at [facility] on 05/28/2025. The patient may return to work/school on 6/2/25.  If you have any questions or concerns, or if I can be of further assistance, please do not hesitate to contact me.    Sincerely,    Elisa Wright, NP

## 2025-06-05 ENCOUNTER — OFFICE VISIT (OUTPATIENT)
Dept: PRIMARY CARE CLINIC | Facility: CLINIC | Age: 50
End: 2025-06-05
Payer: COMMERCIAL

## 2025-06-05 DIAGNOSIS — G47.33 OSA ON CPAP: Chronic | ICD-10-CM

## 2025-06-05 DIAGNOSIS — R73.03 PREDIABETES: Chronic | ICD-10-CM

## 2025-06-05 DIAGNOSIS — R73.03 PREDIABETES: Primary | Chronic | ICD-10-CM

## 2025-06-05 DIAGNOSIS — E66.813 CLASS 3 SEVERE OBESITY DUE TO EXCESS CALORIES WITH SERIOUS COMORBIDITY AND BODY MASS INDEX (BMI) OF 40.0 TO 44.9 IN ADULT: Chronic | ICD-10-CM

## 2025-06-05 DIAGNOSIS — E66.01 CLASS 3 SEVERE OBESITY DUE TO EXCESS CALORIES WITH SERIOUS COMORBIDITY AND BODY MASS INDEX (BMI) OF 40.0 TO 44.9 IN ADULT: Chronic | ICD-10-CM

## 2025-06-05 DIAGNOSIS — F33.0 RECURRENT MILD MAJOR DEPRESSIVE DISORDER WITH ANXIETY: Chronic | ICD-10-CM

## 2025-06-05 DIAGNOSIS — F41.9 RECURRENT MILD MAJOR DEPRESSIVE DISORDER WITH ANXIETY: Chronic | ICD-10-CM

## 2025-06-05 RX ORDER — SEMAGLUTIDE 1.7 MG/.75ML
1.7 INJECTION, SOLUTION SUBCUTANEOUS
Qty: 3 ML | Refills: 0 | OUTPATIENT
Start: 2025-06-05

## 2025-06-05 RX ORDER — SEMAGLUTIDE 1.7 MG/.75ML
1.7 INJECTION, SOLUTION SUBCUTANEOUS
Qty: 3 ML | Refills: 1 | Status: SHIPPED | OUTPATIENT
Start: 2025-06-05
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
## 2025-06-10 ENCOUNTER — PATIENT MESSAGE (OUTPATIENT)
Dept: PRIMARY CARE CLINIC | Facility: CLINIC | Age: 50
End: 2025-06-10
Payer: COMMERCIAL

## 2025-06-10 ENCOUNTER — TELEPHONE (OUTPATIENT)
Dept: PRIMARY CARE CLINIC | Facility: CLINIC | Age: 50
End: 2025-06-10
Payer: COMMERCIAL

## 2025-06-16 ENCOUNTER — PATIENT MESSAGE (OUTPATIENT)
Dept: PRIMARY CARE CLINIC | Facility: CLINIC | Age: 50
End: 2025-06-16
Payer: COMMERCIAL

## 2025-06-21 ENCOUNTER — ON-DEMAND VIRTUAL (OUTPATIENT)
Dept: URGENT CARE | Facility: CLINIC | Age: 50
End: 2025-06-21
Payer: COMMERCIAL
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
## 2025-06-21 DIAGNOSIS — S50.861A INSECT BITE OF RIGHT FOREARM, INITIAL ENCOUNTER: Primary | ICD-10-CM

## 2025-06-21 RX ORDER — EPINEPHRINE 0.3 MG/.3ML
1 INJECTION SUBCUTANEOUS ONCE
Qty: 0.3 ML | Refills: 0 | Status: SHIPPED | OUTPATIENT
Start: 2025-06-21 | End: 2025-06-21

## 2025-06-21 NOTE — PROGRESS NOTES
Subjective:      Patient ID: Teresa Hwang is a 49 y.o. female.    Vitals:  vitals were not taken for this visit.     Chief Complaint: No chief complaint on file.      Visit Type: TELE AUDIOVISUAL - This visit was conducted virtually based on  subjective information and limited objective exam    Present with the patient at the time of consultation: TELEMED PRESENT WITH PATIENT: Teresa Linda   LOCATION OF PATIENT [place]   Two patient identifiers used to verify patient- saying out date of birth and full name.       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    Nicotine dependence, cigarettes, in remission 03/26/2019    Obesity     Personal history of COVID-19     Primary hypertension 06/29/2023    Recurrent major depression in partial remission 03/26/2019    Surgical wound infection (MRSA) 12/2019     Past Surgical History:   Procedure Laterality Date    APPLICATION OF LARGE EXTERNAL FIXATION DEVICE TO TIBIA Left 01/10/2019    Procedure: APPLICATION, EXTERNAL FIXATION DEVICE, LARGE, TIBIA;  Surgeon: Domenic Galvan MD;  Location: [facility];  Service: Orthopedics;  Laterality: Left;    APPLICATION OF WOUND VACUUM-ASSISTED CLOSURE DEVICE Left 01/17/2019    Procedure: APPLICATION, WOUND VAC;  Surgeon: Barry Guzman MD;  Location: [facility] OR;  Service: Orthopedics;  Laterality: Left;    COLONOSCOPY N/A 09/02/2022    Procedure: COLONOSCOPY;  Surgeon: Yon Ridley MD;  [facility] ENDO;  Service: Endoscopy;  Laterality: N/A;    FRACTURE SURGERY      C1 neck- halo    HERNIA REPAIR      HYSTERECTOMY  2009    tahbso    OOPHORECTOMY Bilateral     OPEN REDUCTION AND INTERNAL FIXATION (ORIF) OF FRACTURE OF TIBIAL PLATEAU Left 01/17/2019    Procedure: ORIF, FRACTURE, TIBIA, PLATEAU;  Surgeon: Brary Guzman MD;  Location: [facility] OR;   Service: Orthopedics;  Laterality: Left;    REMOVAL OF EXTERNAL FIXATION DEVICE Left 01/17/2019    Procedure: REMOVAL, EXTERNAL FIXATION DEVICE;  Surgeon: Barry Guzman MD;  Location: [facility] OR;  Service: Orthopedics;  Laterality: Left;    TRIGGER FINGER RELEASE Right 8/25/2023    Procedure: RELEASE, TRIGGER FINGER;  Surgeon: Sandeep Bang MD;  Location: [facility] OR;  Service: Orthopedics;  Laterality: Right;  right trigger thumb release    TUBAL LIGATION  05/22/1997    UMBILICAL HERNIA REPAIR       Review of patient's allergies indicates:   Allergen Reactions    Macrobid [nitrofurantoin monohyd/m-cryst] Hives    Bactrim [sulfamethoxazole-trimethoprim]     Flagyl [metronidazole hcl] Hives     Medications Ordered Prior to Encounter[1]  Family History   Problem Relation Name Age of Onset    Breast cancer Mother Arminda Maldonado 58    Cancer Mother Arminda Maldonado         Breast cancer    Depression Mother Arminda Maldonado         Depression    Diabetes Mother Arminda Maldonado     Breast cancer Sister Lizzy Cazares 46    Cancer Sister Lizzy Cazares         Breast cancer    No Known Problems Father      Breast cancer Paternal Grandmother             No questionnaires on file.    HPI insect bite x3 by wasps that occurred about one hour prior to appointment, patient states that she is currently in route to her nearest urgent care for further treatment, patient visualized driving in her car during her virtual visit. Patient states that an allergic reaction to wasp stings has happened before and she is requesting a prescription for an epi pen as [place]   ROS swelling of the area around site with rash and hives present that began 5 to 10  minutes after being stung by 3 wasps, patient  denies swelling of the hands, face tongue or throat. Patient denies difficulty breathing and is able to speak clearly on the phone without cough or shortness of breath during our  visit.     Objective:   The physical exam was conducted virtually.    AAO x 3 ; no acute distress noted; appearance normal; mood and behavior normal; thought process normal  Head- normocephalic  Nose- appears normal, no discharge or erythema  Eyes- pupils appear normal in size, no drainage, no erythema  Ears- normal appearing; no discharge, no erythema  Mouth- appears normal  Oropharynx- no erythema, lesions  Lungs- breathing at a normal rate, no acute distress noted  Heart- no reports of tachycardia, palpitations, chest pain  Abdomen- non distended, non tender reported by patient  Skin- warm and dry, erythema and edema, urticaria with 3 swollen lesions that resemble insect bites noted to patients right forearm noted visualized  Psych- as above; no si/hi      Assessment:     1. Insect bite of right forearm, initial encounter        Plan:     Patient currently in route to be seen inpatient, patient encouraged to continue to nearest urgent care and if symptoms such as difficulty breathing, swelling of the tongue throat or face occurs patient should pull over immediately and call [phone]   Sent order to epi pen to patients pharmacy as requested by patient   Discussed epi pen use and that patient should use practice injector provided and read all instructions before administering  Discussed that if patient were to ever have to use epi pen she must seek immediate emergency care in the ED  Discussed that patient should also instruct family members and close friends on epi pen use in case she is unable to administer it to herself     Thank you for choosing [facility]!    Our goal in the [facility] is to always provide outstanding medical care. You may receive a survey by mail or e-mail in the next week regarding your experience today. We would greatly appreciate you completing and returning the survey. Your feedback provides us with a way to recognize our staff who provide very good care,  and it helps us learn how to improve when your experience was below our aspiration of excellence.         We appreciate you trusting us with your medical care. We hope you feel better soon. We will be happy to take care of you for all of your future medical needs.    You must understand that you've received an Urgent Care treatment only and that you may be released before all your medical problems are known or treated. You, the patient, will arrange for follow up care as instructed.    Follow up with your PCP or specialty clinic as directed in the next 1-2 weeks if not improved or as needed.  You can call [phone] to schedule an appointment with the appropriate provider.    If your condition worsens we recommend that you receive another evaluation in person, with your primary care provider, urgent care or at the emergency room immediately or contact your primary medical clinics after hours call service to discuss your concerns.         Insect bite of right forearm, initial encounter                          [1]   Current Outpatient Medications on File Prior to Visit   Medication Sig Dispense Refill    ascorbic acid, vitamin C, (VITAMIN C) 500 MG tablet Take 500 mg by mouth once daily.      busPIRone (BUSPAR) 15 MG tablet Take 1 tablet (15 mg total) by mouth 2 (two) times daily. 180 tablet 2    clonazePAM (KLONOPIN) 0.5 MG tablet TAKE 1/2 TO 1 (ONE-HALF TO ONE) TABLET BY MOUTH TWICE DAILY AS NEEDED FOR ANXIETY 60 tablet 0    ELDERBERRY FRUIT ORAL Take 1 capsule by mouth once daily.      FLUoxetine 20 MG capsule Take 1 capsule (20 mg total) by mouth once daily. 90 capsule 3    fluticasone propionate (FLONASE) 50 mcg/actuation nasal spray 2 sprays (100 mcg total) by Each Nostril route once daily. 16 g 0    gabapentin (NEURONTIN) 300 MG capsule Take 3 capsules (900 mg total) by mouth every evening. 90 capsule 5    nicotine, polacrilex, (NICORETTE) 2 mg Gum Take 1 each (2 mg total) by mouth as needed (for cigarette  cravings). Use as directed on packaging. 100 each 0    promethazine-dextromethorphan (PROMETHAZINE-DM) 6.25-15 mg/5 mL Syrp Take 5 mLs by mouth nightly as needed (cough). 118 mL 0    semaglutide, weight loss, (WEGOVY) 1.7 mg/0.75 mL PnIj Inject 1.7 mg into the skin every 7 days. 3 mL 1    sodium chloride (SALINE NASAL) 0.65 % nasal spray Use as directed on product packaging 60 mL 0    varenicline tartrate (CHANTIX) 1 mg Tab Take 1 tablet (1 mg total) by mouth 2 (two) times daily. 60 tablet 0    white petrolatum-mineral oiL (ARTIFICIAL TEARS, MILANA/MIN,) 83-15 % Oint Place into the right eye 4 (four) times daily as needed (dry eyes or itching eyes). 3.5 g 1     Current Facility-Administered Medications on File Prior to Visit   Medication Dose Route Frequency Provider Last Rate Last Admin    chlorhexidine 0.12 % solution 10 mL  10 mL Mouth/Throat On Call Procedure Xenia Mancilla PA-C   10 mL at 08/25/23 0889

## 2025-06-24 ENCOUNTER — TELEPHONE (OUTPATIENT)
Dept: PRIMARY CARE CLINIC | Facility: CLINIC | Age: 50
End: 2025-06-24
Payer: COMMERCIAL

## 2025-07-02 NOTE — PROGRESS NOTES
Subjective:      Patient ID: Teresa Cazares is a 44 y.o. female.    Chief Complaint: Insect Bite      The patient location is: home in [place]  The chief complaint leading to consultation is: insect bite to right eye brow    Visit type: audiovisual    Face to Face time with patient: 12 minutes  15 minutes of total time spent on the encounter, which includes face to face time and non-face to face time preparing to see the patient (eg, review of tests), Obtaining and/or reviewing separately obtained history, Documenting clinical information in the electronic or other health record, Independently interpreting results (not separately reported) and communicating results to the patient/family/caregiver, or Care coordination (not separately reported).     Each patient to whom he or she provides medical services by telemedicine is:  (1) informed of the relationship between the physician and patient and the respective role of any other health care provider with respect to management of the patient; and (2) notified that he or she may decline to receive medical services by telemedicine and may withdraw from such care at any time.    Insect Bite   This is a new problem. The current episode started yesterday. The problem occurs constantly. The problem has been gradually worsening. Associated symptoms include arthralgias and joint swelling. Pertinent negatives include no abdominal pain, anorexia, change in bowel habit, chest pain, chills, congestion, coughing, diaphoresis, fatigue, fever, headaches, myalgias, nausea, neck pain, numbness, rash, sore throat, swollen glands, urinary symptoms, vertigo, visual change, vomiting or weakness. Associated symptoms comments: Right eye lid swelling, pain, redness, warmth. Treatments tried: benadryl. The treatment provided no relief.     Patient Active Problem List   Diagnosis    BMI 40.0-44.9, adult    Bilateral primary osteoarthritis of knee    Hyphema of right eye     7/2/2025 11:17 AM   Goal Progression: Outcome Not Met - Continue to monitor. Pt has completed written safety plan in preparation for discharge. Pt is still demonstrating paranoia but denies any safety concerns at this time. Pt will be discharged and has follow up appointments scheduled. Rocio LIVE [facility] CS-IT       Recurrent major depression in partial remission    Nicotine dependence, cigarettes, in remission    Closed fracture of left lower leg with routine healing    Insomnia due to stress    Thrombocytosis    Polyarthralgia    Long term current use of antipsychotic medication    DEYA on CPAP    Chronic pain         Review of Systems   Constitutional: Negative for activity change, appetite change, chills, diaphoresis, fatigue, fever and unexpected weight change.   HENT: Positive for facial swelling. Negative for congestion, drooling, hearing loss, mouth sores, nosebleeds, postnasal drip, rhinorrhea, sore throat, trouble swallowing and voice change.    Eyes: Positive for pain and redness. Negative for photophobia, discharge, itching and visual disturbance.   Respiratory: Negative for cough, choking, chest tightness, shortness of breath, wheezing and stridor.    Cardiovascular: Negative for chest pain and palpitations.   Gastrointestinal: Negative for abdominal distention, abdominal pain, anal bleeding, anorexia, blood in stool, change in bowel habit, constipation, diarrhea, nausea, rectal pain and vomiting.   Endocrine: Negative for polydipsia and polyuria.   Genitourinary: Negative for difficulty urinating, dysuria, hematuria and menstrual problem.   Musculoskeletal: Positive for arthralgias and joint swelling. Negative for myalgias and neck pain.   Skin: Negative for rash.   Neurological: Negative for vertigo, weakness, numbness and headaches.   Psychiatric/Behavioral: Positive for dysphoric mood. Negative for confusion.     Objective:   There were no vitals taken for this visit.    Physical Exam   Constitutional: She appears well-developed and well-nourished. No distress.   HENT:   Head: Normocephalic and atraumatic.   Eyes: Conjunctivae and EOM are normal. Right eye exhibits no discharge. Left eye exhibits no discharge.       Pulmonary/Chest: No respiratory distress.   Skin: She is not diaphoretic.                Assessment:     1. Insect bite of left eyelid, initial encounter      Plan:   Insect bite of left eyelid, initial encounter  -     predniSONE (DELTASONE) 20 MG tablet; Take 2 tablets with food for 3 days; then take one tablet with food for 2 days; then take 1/2 tablet with food for 2 days.  Dispense: 9 tablet; Refill: 0  -     cephALEXin (KEFLEX) 500 MG capsule; Take 1 capsule (500 mg total) by mouth every 12 (twelve) hours. for 10 days  Dispense: 20 capsule; Refill: 0    -antihistamines  -Educational handout on over-the-counter medications and at-home conservative care, pertinent to the patients diagnosis today, was handed to the patient and discussed in detail.      Follow up if symptoms worsen or fail to improve.

## 2025-07-09 DIAGNOSIS — F41.1 GENERALIZED ANXIETY DISORDER: Chronic | ICD-10-CM

## 2025-07-09 DIAGNOSIS — F51.04 PSYCHOPHYSIOLOGIC INSOMNIA: Chronic | ICD-10-CM

## 2025-07-10 RX ORDER — CLONAZEPAM 0.5 MG/1
TABLET ORAL
Qty: 60 TABLET | Refills: 0 | OUTPATIENT
Start: 2025-07-10

## 2025-07-10 NOTE — TELEPHONE ENCOUNTER
Care Due:                  Date            Visit Type   Department     Provider  --------------------------------------------------------------------------------                                MYCHART                              SAME DAY                              VIRTUAL      [facility] INTERNAL  Last Visit: 09-      VISIT        MEDICINE       Adan Garduno  Next Visit: None Scheduled  None         None Found                                                            Last  Test          Frequency    Reason                     Performed    Due Date  --------------------------------------------------------------------------------    Office Visit  6 months...  busPIRone, nicotine,,      09- 03-                             varenicline..............    Cr..........  12 months..  varenicline..............  09- 09-    [facility] Embedded Care Due Messages. Reference number: [id number].   7/09/2025 8:09:35 PM CDT

## 2025-07-10 NOTE — TELEPHONE ENCOUNTER
Refill not approved. REASON: This is a controlled drug. She has not seen me or anyone on my team since September of last year. She has canceled or no-showed all her appointments with me or my team since then. An in-office appointment is necessary to refill this medicine.    Schedule her an appointment with Viky Herrera PA-C.

## 2025-07-10 NOTE — TELEPHONE ENCOUNTER
Refill Routing Note   Medication(s) are not appropriate for processing by [facility] for the following reason(s):        Outside of protocol    [facility] action(s):  Route   Requires appointment : Yes     Requires labs : Yes             Appointments  past 12m or future 3m with PCP    Date Provider   Last Visit   9/24/2024 EDGAR Chery MD   Next Visit   Visit date not found EDGAR Chery MD   ED visits in past 90 days: 0        Note composed:9:25 AM 07/10/2025

## 2025-07-11 ENCOUNTER — PATIENT MESSAGE (OUTPATIENT)
Dept: INTERNAL MEDICINE | Facility: CLINIC | Age: 50
End: 2025-07-11
Payer: COMMERCIAL

## 2025-07-15 ENCOUNTER — HOSPITAL ENCOUNTER (OUTPATIENT)
Dept: RADIOLOGY | Facility: HOSPITAL | Age: 50
Discharge: HOME | End: 2025-07-15
Attending: FAMILY MEDICINE
Payer: COMMERCIAL

## 2025-07-15 ENCOUNTER — TELEPHONE (OUTPATIENT)
Dept: PAIN MEDICINE | Facility: CLINIC | Age: 50
End: 2025-07-15
Payer: COMMERCIAL

## 2025-07-15 ENCOUNTER — OFFICE VISIT (OUTPATIENT)
Dept: INTERNAL MEDICINE | Facility: CLINIC | Age: 50
End: 2025-07-15
Payer: COMMERCIAL

## 2025-07-15 VITALS
SYSTOLIC BLOOD PRESSURE: 132 MMHG | HEART RATE: 78 BPM | WEIGHT: 244.5 LBS | BODY MASS INDEX: 44.99 KG/M2 | OXYGEN SATURATION: 98 % | HEIGHT: 62 IN | DIASTOLIC BLOOD PRESSURE: 84 MMHG

## 2025-07-15 DIAGNOSIS — E66.813 CLASS 3 SEVERE OBESITY DUE TO EXCESS CALORIES WITH SERIOUS COMORBIDITY AND BODY MASS INDEX (BMI) OF 40.0 TO 44.9 IN ADULT: Chronic | ICD-10-CM

## 2025-07-15 DIAGNOSIS — R73.03 PREDIABETES: Chronic | ICD-10-CM

## 2025-07-15 DIAGNOSIS — Z12.31 BREAST CANCER SCREENING BY MAMMOGRAM: ICD-10-CM

## 2025-07-15 DIAGNOSIS — M54.2 CHRONIC NECK PAIN: Chronic | ICD-10-CM

## 2025-07-15 DIAGNOSIS — G89.28 OTHER CHRONIC POSTPROCEDURAL PAIN: Chronic | ICD-10-CM

## 2025-07-15 DIAGNOSIS — F41.1 GENERALIZED ANXIETY DISORDER: Chronic | ICD-10-CM

## 2025-07-15 DIAGNOSIS — F51.04 PSYCHOPHYSIOLOGIC INSOMNIA: Chronic | ICD-10-CM

## 2025-07-15 DIAGNOSIS — Z91.038 HYMENOPTERA ALLERGY: Primary | Chronic | ICD-10-CM

## 2025-07-15 DIAGNOSIS — F41.9 RECURRENT MODERATE MAJOR DEPRESSIVE DISORDER WITH ANXIETY: Chronic | ICD-10-CM

## 2025-07-15 DIAGNOSIS — G89.29 CHRONIC NECK PAIN: Chronic | ICD-10-CM

## 2025-07-15 DIAGNOSIS — F33.1 RECURRENT MODERATE MAJOR DEPRESSIVE DISORDER WITH ANXIETY: Chronic | ICD-10-CM

## 2025-07-15 PROCEDURE — 99999 PR PBB SHADOW E&M-EST. PATIENT-LVL V: CPT | Mod: PBBFAC,,, | Performed by: FAMILY MEDICINE

## 2025-07-15 PROCEDURE — 72040 X-RAY EXAM NECK SPINE 2-3 VW: CPT | Mod: TC

## 2025-07-15 PROCEDURE — 72040 X-RAY EXAM NECK SPINE 2-3 VW: CPT | Mod: 26,,, | Performed by: STUDENT IN AN ORGANIZED HEALTH CARE EDUCATION/TRAINING PROGRAM

## 2025-07-15 RX ORDER — MELOXICAM 15 MG/1
15 TABLET ORAL DAILY PRN
Qty: 30 TABLET | Refills: 2 | Status: SHIPPED | OUTPATIENT
Start: 2025-07-15

## 2025-07-15 RX ORDER — GABAPENTIN 300 MG/1
300-600 CAPSULE ORAL NIGHTLY
Qty: 60 CAPSULE | Refills: 5 | Status: SHIPPED | OUTPATIENT
Start: 2025-07-15

## 2025-07-15 RX ORDER — EPINEPHRINE 0.3 MG/.3ML
INJECTION SUBCUTANEOUS
Qty: 2 EACH | Refills: 2 | Status: SHIPPED | OUTPATIENT
Start: 2025-07-15

## 2025-07-15 RX ORDER — CLONAZEPAM 0.5 MG/1
0.5 TABLET ORAL DAILY PRN
Qty: 30 TABLET | Refills: 3 | Status: SHIPPED | OUTPATIENT
Start: 2025-07-15

## 2025-07-15 RX ORDER — FLUOXETINE 20 MG/1
20 CAPSULE ORAL DAILY
Qty: 90 CAPSULE | Refills: 1 | Status: SHIPPED | OUTPATIENT
Start: 2025-07-15

## 2025-07-15 RX ORDER — BUSPIRONE HYDROCHLORIDE 15 MG/1
15 TABLET ORAL 2 TIMES DAILY
Qty: 180 TABLET | Refills: 2 | Status: SHIPPED | OUTPATIENT
Start: 2025-07-15

## 2025-07-15 NOTE — LETTER
07/15/2025        TO WHOM IT MAY CONCERN:     RE:  Teresa Grayson Jaiden ,  1975     Teresa was treated by me on 7/15/25.    She is expected to be able to return to work without restrictions in 5-6 days.    Please extend to Teresa all due courtesy and consideration and excuse her absence.    Sincerely,     JIM Chery MD

## 2025-07-15 NOTE — PROGRESS NOTES
OFFICE VISIT 7/15/25 11:40 AM CDT    CHIEF COMPLAINT: Follow-up   SUMMARY: Significant allergic reaction to recent bee stings assessed, EpiPen prescribed, advised on risk of worsening reactions, and instructed on ankle care; chronic neck pain with headaches and sleep disturbance evaluated, meloxicam started, gabapentin dose reduced, and XR Cervical Spine and Spine Care referral ordered; depression, anxiety, and insomnia addressed with medication adjustments, psychiatry referral, and mental health counseling; class 3 obesity and prediabetes monitored with labs ordered.    Hymenoptera allergy: She reported bee stings to both ankles two weeks prior with immediate symptoms of falling, generalized itching, neck pain, and shortness of breath, followed by persistent ankle swelling and pain. She also described a prior wasp sting with significant facial swelling. She received urgent care treatment and an injection at the time of the most recent sting. I assessed her for significant allergic reaction and prescribed epinephrine (EpiPen) for emergency use, with instructions to inject as needed for severe allergic reaction and call 911. She was advised on the progressive risk of worsening symptoms with each subsequent sting and instructed to use an Ace wrap for ankle swelling. Physical exam showed no signs of infection in her ankles.    Chronic neck pain: She described constant neck pain, worsening since beginning , with persistent severe headaches and sleep disruption. She has a history of C1 neck fracture treated with a halo brace and last neck MRI in 2020. On exam, no acute findings were noted. I ordered XR Cervical Spine and referred her to Spine Care. Meloxicam was started for neck pain, and gabapentin dose was reduced to 600 mg at bedtime.    Chronic post-op pain: She reported chronic pain after LLE ORIF complicated by post-op MRSA infection, with prior hardware removal and revision. She described gabapentin  as ineffective for her chronic post-op pain. I decreased gabapentin to 600 mg at bedtime, 1-2 tablets.    Generalized anxiety disorder, psychophysiologic insomnia, and recurrent moderate major depressive disorder with anxiety: She reported significant depression, stress, persistent nervousness, sleep difficulty, and passive suicidal ideation, as well as inconsistent use of prescribed medications due to driving safety concerns. She reported missed mental health appointments in the past. I continued buspirone and fluoxetine, continued clonazepam for anxiety and insomnia, and referred her to a mental health specialist for counseling. Psychiatry referral was placed.    Prediabetes: Hemoglobin A1C was 5.7 (H) on 07/15/25. I ordered hemoglobin A1C, lipid panel, and comprehensive metabolic panel to monitor glycemic and metabolic status.    Class 3 severe obesity due to excess calories with serious comorbidity and BMI 40.0-44.9 in adult: Her BMI was 44.72 kg/m2 on 07/15/25. Weight readings showed minor fluctuation over recent visits. She reported using food as a coping mechanism for depression and boredom and was unable to take semaglutide due to insurance coverage. I ordered hemoglobin A1C, lipid panel, and comprehensive metabolic panel for metabolic monitoring.    Breast cancer screening by mammogram: Mammogram with digital screening and tomosynthesis was ordered, with a future mammogram appointment scheduled for 09/15/25.    Follow up for Office Visit Appointment with Viky Herrera PA-C in 2 months. MRI appointment scheduled for 07/27/25, Spine Care appointment with Reina Pitts PA-C on 07/29/25, and follow-up with TATO Goodrich on 07/31/25.      1. Hymenoptera allergy  -     EPINEPHrine (EPIPEN 2-CHACHA) 0.3 mg/0.3 mL AtIn; Inject underneath skin according to directions on packaging once as needed for severe allergic reaction, then call 911.  Dispense: 2 each; Refill: 2    2. Prediabetes  -     Hemoglobin A1C;  Future; Expected date: 07/15/2025  -     Lipid Panel; Future; Expected date: 07/15/2025  -     Comprehensive Metabolic Panel; Future; Expected date: 07/15/2025    3. Chronic neck pain  -     Ambulatory referral/consult to Spine Care; Future; Expected date: 07/22/2025  -     X-Ray Cervical Spine 2 or 3 Views; Future; Expected date: 07/15/2025  -     meloxicam (MOBIC) 15 MG tablet; Take 1 tablet (15 mg total) by mouth daily as needed (for neck pain).  Dispense: 30 tablet; Refill: 2    4. Generalized anxiety disorder  -     busPIRone (BUSPAR) 15 MG tablet; Take 1 tablet (15 mg total) by mouth 2 (two) times daily.  Dispense: 180 tablet; Refill: 2  -     clonazePAM (KLONOPIN) 0.5 MG tablet; Take 1 tablet (0.5 mg total) by mouth daily as needed (for anxiety).  Dispense: 30 tablet; Refill: 3  -     FLUoxetine 20 MG capsule; Take 1 capsule (20 mg total) by mouth once daily.  Dispense: 90 capsule; Refill: 1  -     Ambulatory referral/consult to Psychiatry; Future; Expected date: 07/22/2025    5. Psychophysiologic insomnia  -     clonazePAM (KLONOPIN) 0.5 MG tablet; Take 1 tablet (0.5 mg total) by mouth daily as needed (for anxiety).  Dispense: 30 tablet; Refill: 3    6. Recurrent moderate major depressive disorder with anxiety  Assessment & Plan:  This is a chronic problem, with exacerbation or progression.     Orders:  -     FLUoxetine 20 MG capsule; Take 1 capsule (20 mg total) by mouth once daily.  Dispense: 90 capsule; Refill: 1  -     Ambulatory referral/consult to Psychiatry; Future; Expected date: 07/22/2025    7. Chronic post-op pain  Overview:  After 12/2019 LLE ORIF complicated by post-op MRSA wound infection requiring hardware removal and revision, incision and drainage of abscess, completed treatment with IV vancomycin    Orders:  -     gabapentin (NEURONTIN) 300 MG capsule; Take 1-2 capsules (300-600 mg total) by mouth every evening.  Dispense: 60 capsule; Refill: 5    8. Class 3 severe obesity due to excess  "calories with serious comorbidity and body mass index (BMI) of 40.0 to 44.9 in adult  -     Hemoglobin A1C; Future; Expected date: 07/15/2025  -     Lipid Panel; Future; Expected date: 07/15/2025  -     Comprehensive Metabolic Panel; Future; Expected date: 07/15/2025    9. Breast cancer screening by mammogram  -     Mammo Digital Screening Bilat w/ Alberto (XPD); Standing         Unless specified otherwise, chronic conditions are represented as and appear to be compensated/controlled and stable.  Today's visit involved the intricate management of episodic problem(s) and the ongoing care for the patient's serious or complex condition(s) listed above, reflecting the inherent complexity of providing longitudinal, comprehensive evaluation and management as the central hub for the patient's primary care services.  [place] Board of Pharmacy Controlled Prescription Drug Monitoring data reviewed.    Except as noted herein, ROS is otherwise negative.    Vitals:    07/15/25 1152   BP: 132/84   BP Location: Left arm   Patient Position: Sitting   Pulse: 78   SpO2: 98%   Weight: 110.9 kg (244 lb 7.8 oz)   Height: 5' 2" (1.575 m)   Physical Exam  Vitals reviewed.   Constitutional:       General: She is not in acute distress.     Appearance: Normal appearance. She is not ill-appearing or diaphoretic.   Cardiovascular:      Rate and Rhythm: Normal rate and regular rhythm.   Pulmonary:      Effort: Pulmonary effort is normal.      Breath sounds: Normal breath sounds.   Musculoskeletal:         General: No swelling or tenderness.   Skin:     General: Skin is warm and dry.   Neurological:      Mental Status: She is alert and oriented to person, place, and time. Mental status is at baseline.   Psychiatric:         Mood and Affect: Mood normal.         Behavior: Behavior normal.         Judgment: Judgment normal.         Documentation entered by me for this encounter may have been done in part using ambient-listening and " speech-recognition technologies. I have reviewed the content for accuracy, though errors in syntax, spelling, or similar-sounding words may be present and should be interpreted in context. Please contact the author for any clarification.     Follow up for OV with Viky Herrera PA-C in 2 months..

## 2025-07-16 ENCOUNTER — OFFICE VISIT (OUTPATIENT)
Dept: ORTHOPEDICS | Facility: CLINIC | Age: 50
End: 2025-07-16
Payer: COMMERCIAL

## 2025-07-16 VITALS — BODY MASS INDEX: 44.1 KG/M2 | HEIGHT: 62 IN | WEIGHT: 239.63 LBS

## 2025-07-16 DIAGNOSIS — M54.2 CHRONIC NECK PAIN: Chronic | ICD-10-CM

## 2025-07-16 DIAGNOSIS — M54.2 CERVICALGIA: Primary | ICD-10-CM

## 2025-07-16 DIAGNOSIS — G89.29 CHRONIC NECK PAIN: Chronic | ICD-10-CM

## 2025-07-16 PROCEDURE — 1159F MED LIST DOCD IN RCRD: CPT | Mod: CPTII,S$GLB,, | Performed by: PHYSICIAN ASSISTANT

## 2025-07-16 PROCEDURE — 3008F BODY MASS INDEX DOCD: CPT | Mod: CPTII,S$GLB,, | Performed by: PHYSICIAN ASSISTANT

## 2025-07-16 PROCEDURE — 99214 OFFICE O/P EST MOD 30 MIN: CPT | Mod: S$GLB,,, | Performed by: PHYSICIAN ASSISTANT

## 2025-07-16 PROCEDURE — 3044F HG A1C LEVEL LT 7.0%: CPT | Mod: CPTII,S$GLB,, | Performed by: PHYSICIAN ASSISTANT

## 2025-07-16 PROCEDURE — 99999 PR PBB SHADOW E&M-EST. PATIENT-LVL IV: CPT | Mod: PBBFAC,,, | Performed by: PHYSICIAN ASSISTANT

## 2025-07-16 NOTE — PROGRESS NOTES
DATE: 7/16/2025  PATIENT: Teresa Hwang    Supervising Physician: Paul Rollins M.D.    CHIEF COMPLAINT: neck pain    HISTORY:  Teresa Hwang is a 49 y.o. female  with PMH of C1 fracture in 2003, s/p halo for a year here for initial evaluation of neck pain (Neck - 10, Arm - 0). The pain has been present for years but has progressively worsened over the last 6 weeks. The patient describes the pain as aching. The pain is worse with sitting, lifting and driving and improved by lying down. There is no associated numbness and tingling. There is no subjective weakness. Prior treatments have included ibuprofen, physical therapy, home exercises and activity modification, but no ESIs or surgery.     The patient denies myelopathic symptoms such as handwriting changes or difficulty with buttons/coins/keys. Denies perineal paresthesias, bowel/bladder dysfunction.    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    Nicotine dependence, cigarettes, in remission 03/26/2019    Obesity     Personal history of COVID-19     Primary hypertension 06/29/2023    Recurrent major depression in partial remission 03/26/2019    Surgical wound infection (MRSA) 12/2019     Past Surgical History:   Procedure Laterality Date    APPLICATION OF LARGE EXTERNAL FIXATION DEVICE TO TIBIA Left 01/10/2019    Procedure: APPLICATION, EXTERNAL FIXATION DEVICE, LARGE, TIBIA;  Surgeon: Domenic Galvan MD;  Location: [facility] OR;  Service: Orthopedics;  Laterality: Left;    APPLICATION OF WOUND VACUUM-ASSISTED CLOSURE DEVICE Left 01/17/2019    Procedure: APPLICATION, WOUND VAC;  Surgeon: Barry Guzman MD;  Location: [facility] OR;  Service: Orthopedics;  Laterality: Left;    COLONOSCOPY N/A 09/02/2022    Procedure: COLONOSCOPY;   "Surgeon: Yon Ridley MD;  Location: [facility];  Service: Endoscopy;  Laterality: N/A;    FRACTURE SURGERY      C1 neck- halo    HERNIA REPAIR      HYSTERECTOMY  2009    tahbso    OOPHORECTOMY Bilateral     OPEN REDUCTION AND INTERNAL FIXATION (ORIF) OF FRACTURE OF TIBIAL PLATEAU Left 01/17/2019    Procedure: ORIF, FRACTURE, TIBIA, PLATEAU;  Surgeon: Barry Guzman MD;  Location: [facility] OR;  Service: Orthopedics;  Laterality: Left;    REMOVAL OF EXTERNAL FIXATION DEVICE Left 01/17/2019    Procedure: REMOVAL, EXTERNAL FIXATION DEVICE;  Surgeon: Barry Guzman MD;  Location: [facility] OR;  Service: Orthopedics;  Laterality: Left;    TRIGGER FINGER RELEASE Right 8/25/2023    Procedure: RELEASE, TRIGGER FINGER;  Surgeon: Sandeep Bang MD;  Location: [facility] OR;  Service: Orthopedics;  Laterality: Right;  right trigger thumb release    TUBAL LIGATION  05/22/1997    UMBILICAL HERNIA REPAIR         Medications:  Medications Ordered Prior to Encounter[1]    Social History: Social History[2]    REVIEW OF SYSTEMS:  Constitution: Negative. Negative for chills, fever and night sweats.   Cardiovascular: Negative for chest pain and syncope.   Respiratory: Negative for cough and shortness of breath.   Gastrointestinal: See HPI. Negative for nausea/vomiting. Negative for abdominal pain.  Genitourinary: See HPI. Negative for discoloration or dysuria.  Skin: Negative for dry skin, itching and rash.   Hematologic/Lymphatic: Negative for bleeding problem. Does not bruise/bleed easily.   Musculoskeletal: Negative for falls and muscle weakness.   Neurological: See HPI. No seizures.   Endocrine: Negative for polydipsia, polyphagia and polyuria.   Allergic/Immunologic: Negative for hives and persistent infections.  Psychiatric/Behavioral: Negative for depression and insomnia.         EXAM:  Ht 5' 2" (1.575 m)   Wt 108.7 kg (239 lb 10.2 oz)   BMI 43.83 kg/m²     General: The patient is a very pleasant 49 y.o. female in no " apparent distress, the patient is oriented to person, place and time.  Psych: Normal mood and affect  HEENT: Vision grossly intact, hearing intact to the spoken word.  Lungs: Respirations unlabored.  Gait: Antalgic station and gait, she uses a cane due to an old leg injury.   Skin: Cervical skin negative for rashes, lesions, hairy patches and surgical scars.  Range of motion: Cervical range of motion is acceptable. There is mild tenderness to palpation.  Spinal Balance: Global saggital and coronal spinal balance acceptable, no significant for scoliosis and kyphosis.  Musculoskeletal: No pain with the range of motion of the bilateral shoulders and elbows. Normal bulk and contour of the bilateral hands.  Vascular: Bilateral hands warm and well perfused, radial pulses 2+ bilaterally.  Neurological: Normal strength and tone in all major motor groups in the bilateral upper and lower extremities. Normal sensation to light touch in the C5-T1 and L2-S1 dermatomes bilaterally.  Deep tendon reflexes symmetric 2+ in the bilateral upper and lower extremities.  Negative Inverted Radial Reflex and Banks's bilaterally. Negative Babinski bilaterally.     IMAGING:   Today I personally reviewed AP and Lat upright C-spine films that demonstrate normal disc spacing and alignment.         Body mass index is 43.83 kg/m².    Hemoglobin A1C   Date Value Ref Range Status   09/10/2024 5.9 (H) 4.0 - 5.6 % Final     Comment:     ADA Screening Guidelines:  5.7-6.4%  Consistent with prediabetes  >or=6.5%  Consistent with diabetes    High levels of fetal hemoglobin interfere with the HbA1C  assay. Heterozygous hemoglobin variants (HbS, HgC, etc)do  not significantly interfere with this assay.   However, presence of multiple variants may affect accuracy.     06/30/2023 5.5 4.0 - 5.6 % Final     Comment:     ADA Screening Guidelines:  5.7-6.4%  Consistent with prediabetes  >or=6.5%  Consistent with diabetes    High levels of fetal hemoglobin  interfere with the HbA1C  assay. Heterozygous hemoglobin variants (HbS, HgC, etc)do  not significantly interfere with this assay.   However, presence of multiple variants may affect accuracy.     12/10/2021 5.7 (H) 4.0 - 5.6 % Final     Comment:     ADA Screening Guidelines:  5.7-6.4%  Consistent with prediabetes  >or=6.5%  Consistent with diabetes    High levels of fetal hemoglobin interfere with the HbA1C  assay. Heterozygous hemoglobin variants (HbS, HgC, etc)do  not significantly interfere with this assay.   However, presence of multiple variants may affect accuracy.       Hemoglobin A1c   Date Value Ref Range Status   07/15/2025 5.7 (H) 4.0 - 5.6 % Final     Comment:     ADA Screening Guidelines:  5.7-6.4%  Consistent with prediabetes  >=6.5%  Consistent with diabetes    High levels of fetal hemoglobin interfere with the HbA1C  assay. Heterozygous hemoglobin variants (HbS, HgC, etc)do  not significantly interfere with this assay.   However, presence of multiple variants may affect accuracy.           ASSESSMENT/PLAN:    Thisia was seen today for neck pain.    Diagnoses and all orders for this visit:    Cervicalgia  -     MRI Cervical Spine Without Contrast; Future    Chronic neck pain  -     Ambulatory referral/consult to Spine Care        Today we discussed at length all of the different treatment options including anti-inflammatories, acetaminophen, rest, ice, heat, physical therapy including strengthening and stretching exercises, home exercises, ROM, aerobic conditioning, aqua therapy, other modalities including ultrasound, massage, and dry needling, epidural steroid injections and finally surgical intervention.      I would like to get an MRI. Virtual for results               [1]   Current Outpatient Medications on File Prior to Visit   Medication Sig Dispense Refill    ascorbic acid, vitamin C, (VITAMIN C) 500 MG tablet Take 500 mg by mouth once daily.      busPIRone (BUSPAR) 15 MG tablet Take 1 tablet  (15 mg total) by mouth 2 (two) times daily. 180 tablet 2    clonazePAM (KLONOPIN) 0.5 MG tablet Take 1 tablet (0.5 mg total) by mouth daily as needed (for anxiety). 30 tablet 3    ELDERBERRY FRUIT ORAL Take 1 capsule by mouth once daily.      EPINEPHrine (EPIPEN 2-CHACHA) 0.3 mg/0.3 mL AtIn Inject underneath skin according to directions on packaging once as needed for severe allergic reaction, then call 911. 2 each 2    FLUoxetine 20 MG capsule Take 1 capsule (20 mg total) by mouth once daily. 90 capsule 1    fluticasone propionate (FLONASE) 50 mcg/actuation nasal spray 2 sprays (100 mcg total) by Each Nostril route once daily. 16 g 0    gabapentin (NEURONTIN) 300 MG capsule Take 1-2 capsules (300-600 mg total) by mouth every evening. 60 capsule 5    meloxicam (MOBIC) 15 MG tablet Take 1 tablet (15 mg total) by mouth daily as needed (for neck pain). 30 tablet 2    nicotine, polacrilex, (NICORETTE) 2 mg Gum Take 1 each (2 mg total) by mouth as needed (for cigarette cravings). Use as directed on packaging. 100 each 0    sodium chloride (SALINE NASAL) 0.65 % nasal spray Use as directed on product packaging 60 mL 0    varenicline tartrate (CHANTIX) 1 mg Tab Take 1 tablet (1 mg total) by mouth 2 (two) times daily. 60 tablet 0    white petrolatum-mineral oiL (ARTIFICIAL TEARS, MILANA/MIN,) 83-15 % Oint Place into the right eye 4 (four) times daily as needed (dry eyes or itching eyes). 3.5 g 1     Current Facility-Administered Medications on File Prior to Visit   Medication Dose Route Frequency Provider Last Rate Last Admin    [DISCONTINUED] chlorhexidine 0.12 % solution 10 mL  10 mL Mouth/Throat On Call Procedure Xenia Mancilla PA-C   10 mL at 08/25/23 0821   [2]   Social History  Socioeconomic History    Marital status:     Number of children: 5   Occupational History    Occupation:    Tobacco Use    Smoking status: Former     Current packs/day: 0.00     Average packs/day: 1 pack/day for 33.0 years (33.0  ttl pk-yrs)     Types: Cigarettes     Start date:      Quit date:      Years since quittin.5     Passive exposure: Past    Smokeless tobacco: Never   Substance and Sexual Activity    Alcohol use: Yes     Alcohol/week: 2.0 standard drinks of alcohol     Types: 2 Glasses of wine per week     Comment: Stopped    Drug use: No    Sexual activity: Not Currently     Partners: Male     Birth control/protection: None     Social Drivers of Health     Financial Resource Strain: Medium Risk (2024)    Overall Financial Resource Strain (CARDIA)     Difficulty of Paying Living Expenses: Somewhat hard   Food Insecurity: Food Insecurity Present (2024)    Hunger Vital Sign     Worried About Running Out of Food in the Last Year: Sometimes true     Ran Out of Food in the Last Year: Sometimes true   Transportation Needs: Patient Declined (11/15/2023)    PRAPARE - Transportation     Lack of Transportation (Medical): Patient declined     Lack of Transportation (Non-Medical): Patient declined   Physical Activity: Insufficiently Active (2024)    Exercise Vital Sign     Days of Exercise per Week: 2 days     Minutes of Exercise per Session: 10 min   Stress: No Stress Concern Present (2024)    [employer] - Occupational Stress Questionnaire     Feeling of Stress : Not at all   Housing Stability: High Risk (2024)    Housing Stability Vital Sign     Unable to Pay for Housing in the Last Year: Yes

## 2025-07-22 ENCOUNTER — TELEPHONE (OUTPATIENT)
Dept: ORTHOPEDICS | Facility: CLINIC | Age: 50
End: 2025-07-22
Payer: COMMERCIAL

## 2025-07-22 ENCOUNTER — PATIENT MESSAGE (OUTPATIENT)
Dept: ORTHOPEDICS | Facility: CLINIC | Age: 50
End: 2025-07-22
Payer: COMMERCIAL

## 2025-07-22 NOTE — TELEPHONE ENCOUNTER
Called pt to confirm virtual appointment next week with Reina. She did not answer and I could not leave a voicemail, so I sent her a portal message confirming the appointment.

## 2025-07-26 ENCOUNTER — ON-DEMAND VIRTUAL (OUTPATIENT)
Dept: URGENT CARE | Facility: CLINIC | Age: 50
End: 2025-07-26
Payer: COMMERCIAL

## 2025-07-26 DIAGNOSIS — N39.0 URINARY TRACT INFECTION WITHOUT HEMATURIA, SITE UNSPECIFIED: Primary | ICD-10-CM

## 2025-07-26 PROCEDURE — 98005 SYNCH AUDIO-VIDEO EST LOW 20: CPT | Mod: 95,,, | Performed by: PHYSICIAN ASSISTANT

## 2025-07-26 RX ORDER — CEPHALEXIN 500 MG/1
500 CAPSULE ORAL EVERY 12 HOURS
Qty: 20 CAPSULE | Refills: 0 | Status: SHIPPED | OUTPATIENT
Start: 2025-07-26 | End: 2025-08-05

## 2025-07-26 NOTE — PROGRESS NOTES
Subjective:      Patient ID: Teresa Hwang is a 49 y.o. female.    Vitals:  vitals were not taken for this visit.     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    Nicotine dependence, cigarettes, in remission 03/26/2019    Obesity     Personal history of COVID-19     Primary hypertension 06/29/2023    Recurrent major depression in partial remission 03/26/2019    Surgical wound infection (MRSA) 12/2019     Past Surgical History:   Procedure Laterality Date    APPLICATION OF LARGE EXTERNAL FIXATION DEVICE TO TIBIA Left 01/10/2019    Procedure: APPLICATION, EXTERNAL FIXATION DEVICE, LARGE, TIBIA;  Surgeon: Domenic Galvan MD;  Location: [facility];  Service: Orthopedics;  Laterality: Left;    APPLICATION OF WOUND VACUUM-ASSISTED CLOSURE DEVICE Left 01/17/2019    Procedure: APPLICATION, WOUND VAC;  Surgeon: Barry Guzman MD;  Location: [facility] OR;  Service: Orthopedics;  Laterality: Left;    COLONOSCOPY N/A 09/02/2022    Procedure: COLONOSCOPY;  Surgeon: Yon Ridley MD;  Location: [facility];  Service: Endoscopy;  Laterality: N/A;    FRACTURE SURGERY      C1 neck- halo    HERNIA REPAIR      HYSTERECTOMY  2009    tahbso    OOPHORECTOMY Bilateral     OPEN REDUCTION AND INTERNAL FIXATION (ORIF) OF FRACTURE OF TIBIAL PLATEAU Left 01/17/2019    Procedure: ORIF, FRACTURE, TIBIA, PLATEAU;  Surgeon: Barry Guzman MD;  Location: [facility] OR;  Service: Orthopedics;  Laterality: Left;    REMOVAL OF EXTERNAL FIXATION DEVICE Left 01/17/2019    Procedure: REMOVAL, EXTERNAL FIXATION DEVICE;  Surgeon: Barry Guzman MD;  Location: [facility] OR;  Service: Orthopedics;   Laterality: Left;    TRIGGER FINGER RELEASE Right 8/25/2023    Procedure: RELEASE, TRIGGER FINGER;  Surgeon: Sandeep Bang MD;  Location: [facility];  Service: Orthopedics;  Laterality: Right;  right trigger thumb release    TUBAL LIGATION  05/22/1997    UMBILICAL HERNIA REPAIR       Review of patient's allergies indicates:   Allergen Reactions    Macrobid [nitrofurantoin monohyd/m-cryst] Hives    Bactrim [sulfamethoxazole-trimethoprim]     Flagyl [metronidazole hcl] Hives     Medications Ordered Prior to Encounter[1]  Family History   Problem Relation Name Age of Onset    Breast cancer Mother Arminda Maldonado 58    Cancer Mother Arminda Maldonado         Breast cancer    Depression Mother Arminda Maldonado         Depression    Diabetes Mother Arminda Maldonado     Breast cancer Sister Lizzy Cazares 46    Cancer Sister Lizzy Cazares         Breast cancer    No Known Problems Father      Breast cancer Paternal Grandmother         Medications Ordered                [facility] [address] [place] [id number]    Telephone: [phone]   Fax: [phone]   Hours: Not open 24 hours                         E-Prescribed (1 of 1)              cephALEXin (KEFLEX) 500 MG capsule    Sig: Take 1 capsule (500 mg total) by mouth every 12 (twelve) hours. for 10 days       Start: 7/26/25     Quantity: 20 capsule Refills: 0                           Ohs Peq Odvv Intake    7/26/2025  6:17 AM CDT - Filed by Patient   What is your current physical address in the event of a medical emergency? [address] [place] [address]   Are you able to take your vital signs? No   Please attach any relevant images or files    Is your employer contracted with [employer]? No         Dysuria   This is a new problem. The current episode started yesterday. The problem occurs every urination. The problem has been unchanged. The quality of the pain is described as burning. There has been no fever.  Associated symptoms include frequency and urgency. Pertinent negatives include no flank pain, hematuria, hesitancy or nausea. She has tried increased fluids for the symptoms. The treatment provided mild relief.       Gastrointestinal:  Negative for nausea.   Genitourinary:  Positive for dysuria, frequency and urgency. Negative for flank pain and hematuria.        Objective:   The physical exam was conducted virtually.  Physical Exam   Abdominal: Normal appearance. Soft. flat abdomen There is no left CVA tenderness and no right CVA tenderness.   Neurological: She is alert.       Assessment:     1. Urinary tract infection without hematuria, site unspecified        Plan:       Urinary tract infection without hematuria, site unspecified  -     Urinalysis, Reflex to Urine Culture Urine, Clean Catch; Future; Expected date: 07/26/2025    Other orders  -     cephALEXin (KEFLEX) 500 MG capsule; Take 1 capsule (500 mg total) by mouth every 12 (twelve) hours. for 10 days  Dispense: 20 capsule; Refill: 0      PLEASE READ YOUR DISCHARGE INSTRUCTIONS ENTIRELY AS IT CONTAINS IMPORTANT INFORMATION.      Take the antibiotics to completion.     Drink plenty of fluids, wipe front to back, take showers not baths, no scented soaps, wear breathable cotton underwear, urinate after sexual intercourse.     Please go to Urgent Care or the ER for worsening symptoms including fever, worsening flank pain, vomiting, etc.       Please return or see your primary care doctor if you develop new or worsening symptoms.     Please arrange follow up with your primary medical clinic as soon as possible. You must understand that you've received an virtual treatment only and that you may be released before all of your medical problems are known or treated. You, the patient, will arrange for follow up as instructed. If your symptoms worsen or fail to improve you should go to the Emergency Room.  WE CANNOT RULE OUT ALL POSSIBLE CAUSES OF YOUR SYMPTOMS IN THE  VIRTUAL SETTING PLEASE GO TO THE ER IF YOU FEELS YOUR CONDITION IS WORSENING OR YOU WOULD LIKE EMERGENT EVALUATION.                    [1]   Current Outpatient Medications on File Prior to Visit   Medication Sig Dispense Refill    ascorbic acid, vitamin C, (VITAMIN C) 500 MG tablet Take 500 mg by mouth once daily.      busPIRone (BUSPAR) 15 MG tablet Take 1 tablet (15 mg total) by mouth 2 (two) times daily. 180 tablet 2    clonazePAM (KLONOPIN) 0.5 MG tablet Take 1 tablet (0.5 mg total) by mouth daily as needed (for anxiety). 30 tablet 3    ELDERBERRY FRUIT ORAL Take 1 capsule by mouth once daily.      EPINEPHrine (EPIPEN 2-CHACHA) 0.3 mg/0.3 mL AtIn Inject underneath skin according to directions on packaging once as needed for severe allergic reaction, then call 911. 2 each 2    FLUoxetine 20 MG capsule Take 1 capsule (20 mg total) by mouth once daily. 90 capsule 1    fluticasone propionate (FLONASE) 50 mcg/actuation nasal spray 2 sprays (100 mcg total) by Each Nostril route once daily. 16 g 0    gabapentin (NEURONTIN) 300 MG capsule Take 1-2 capsules (300-600 mg total) by mouth every evening. 60 capsule 5    meloxicam (MOBIC) 15 MG tablet Take 1 tablet (15 mg total) by mouth daily as needed (for neck pain). 30 tablet 2    nicotine, polacrilex, (NICORETTE) 2 mg Gum Take 1 each (2 mg total) by mouth as needed (for cigarette cravings). Use as directed on packaging. 100 each 0    sodium chloride (SALINE NASAL) 0.65 % nasal spray Use as directed on product packaging 60 mL 0    varenicline tartrate (CHANTIX) 1 mg Tab Take 1 tablet (1 mg total) by mouth 2 (two) times daily. 60 tablet 0    white petrolatum-mineral oiL (ARTIFICIAL TEARS, MILANA/MIN,) 83-15 % Oint Place into the right eye 4 (four) times daily as needed (dry eyes or itching eyes). 3.5 g 1     No current facility-administered medications on file prior to visit.

## 2025-07-26 NOTE — PATIENT INSTRUCTIONS
Thank you for choosing [facility]!    Our goal in the [facility] to always provide outstanding medical care. You may receive a survey by mail or e-mail in the next week regarding your experience today. We would greatly appreciate you completing and returning the survey. Your feedback provides us with a way to recognize our staff who provide very good care, and it helps us learn how to improve when your experience was below our aspiration of excellence.         We appreciate you trusting us with your medical care. We hope you feel better soon. We will be happy to take care of you for all of your future medical needs.    You must understand that you've received Virtual  treatment only and that you may be released before all your medical problems are known or treated. You, the patient, will arrange for follow up care as instructed.    Follow up with your PCP or specialty clinic as directed in the next 1-2 weeks if not improved or as needed.  You can call [phone] to schedule an appointment with the appropriate provider.    If your condition worsens we recommend that you receive another evaluation in person, with your primary care provider, urgent care or at the emergency room immediately or contact your primary medical clinics after hours call service to discuss your concerns.         Airway patent, Nasal mucosa clear. Mouth with normal mucosa. Throat has no vesicles, no oropharyngeal exudates and uvula is midline.

## 2025-07-27 ENCOUNTER — HOSPITAL ENCOUNTER (OUTPATIENT)
Dept: RADIOLOGY | Facility: HOSPITAL | Age: 50
Discharge: HOME | End: 2025-07-27
Attending: PHYSICIAN ASSISTANT
Payer: COMMERCIAL

## 2025-07-27 DIAGNOSIS — M54.2 CERVICALGIA: ICD-10-CM

## 2025-07-27 PROCEDURE — 72141 MRI NECK SPINE W/O DYE: CPT | Mod: TC

## 2025-07-27 PROCEDURE — 72141 MRI NECK SPINE W/O DYE: CPT | Mod: 26,,, | Performed by: RADIOLOGY

## 2025-07-29 ENCOUNTER — OFFICE VISIT (OUTPATIENT)
Dept: ORTHOPEDICS | Facility: CLINIC | Age: 50
End: 2025-07-29
Attending: PHYSICIAN ASSISTANT
Payer: COMMERCIAL

## 2025-07-29 DIAGNOSIS — G89.29 CHRONIC NECK PAIN: Primary | ICD-10-CM

## 2025-07-29 DIAGNOSIS — M54.2 CHRONIC NECK PAIN: Primary | ICD-10-CM

## 2025-07-29 PROCEDURE — 98004 SYNCH AUDIO-VIDEO EST SF 10: CPT | Mod: 95,,, | Performed by: PHYSICIAN ASSISTANT

## 2025-07-29 PROCEDURE — 3044F HG A1C LEVEL LT 7.0%: CPT | Mod: CPTII,95,, | Performed by: PHYSICIAN ASSISTANT

## 2025-07-29 NOTE — PROGRESS NOTES
The patient location is: [place]  The chief complaint leading to consultation is: MRI results    Visit type: audiovisual    Face to Face time with patient: 5  10 minutes of total time spent on the encounter, which includes face to face time and non-face to face time preparing to see the patient (eg, review of tests), Obtaining and/or reviewing separately obtained history, Documenting clinical information in the electronic or other health record, Independently interpreting results (not separately reported) and communicating results to the patient/family/caregiver, or Care coordination (not separately reported).         Each patient to whom he or she provides medical services by telemedicine is:  (1) informed of the relationship between the physician and patient and the respective role of any other health care provider with respect to management of the patient; and (2) notified that he or she may decline to receive medical services by telemedicine and may withdraw from such care at any time.    Notes:   The patient presents for MRI results.    MRI cervical spine demonstrates C3/4 foraminal stenosis.  No significant spinal stenosis.     A/P:  Today we discussed at length all of the different treatment options including anti-inflammatories, acetaminophen, rest, ice, heat, physical therapy including strengthening and stretching exercises, home exercises, ROM, aerobic conditioning, aqua therapy, other modalities including ultrasound, massage, and dry needling, epidural steroid injections and finally surgical intervention.      The patient would like to try PT first.  Orders placed today.

## (undated) DEVICE — SUT ETHILON 2-0 BLK PS-2

## (undated) DEVICE — CONTAINER SPECIMEN STRL 4OZ

## (undated) DEVICE — DRAPE PLASTIC U 60X72

## (undated) DEVICE — GAUZE SPONGE 4X4 12PLY

## (undated) DEVICE — SUT 4-0 ETHILON 18 PS-2

## (undated) DEVICE — SCRUB DYNA-HEX LIQ 4% CHG 4OZ

## (undated) DEVICE — SEE MEDLINE ITEM 157117

## (undated) DEVICE — KIT PREVENA PLUS

## (undated) DEVICE — STAPLER SKIN PROXIMATE WIDE

## (undated) DEVICE — GLOVE 8 PROTEXIS PI BLUE

## (undated) DEVICE — SEE MEDLINE ITEM 152523

## (undated) DEVICE — DRAPE MOBILE C-ARM

## (undated) DEVICE — POSITIONER HEAD DONUT 9IN FOAM

## (undated) DEVICE — BIT DRILL 2.8 W/QC PERCU 200MM

## (undated) DEVICE — SPONGE LAP 18X18 PREWASHED

## (undated) DEVICE — DRESSING XEROFORM FOIL PK 1X8

## (undated) DEVICE — KIT EVACUATOR 3-SPRING 1/8 DRN

## (undated) DEVICE — HEADREST ROUND DISP FOAM 9IN

## (undated) DEVICE — GOWN NONREINF SET-IN SLV 2XL

## (undated) DEVICE — GOWN SURG 2XL DISP TIE BACK

## (undated) DEVICE — DRAPE INCISE IOBAN 2 23X33IN

## (undated) DEVICE — APPLICATOR CHLORAPREP ORN 26ML

## (undated) DEVICE — BANDAGE ESMARK ELASTIC ST 4X9

## (undated) DEVICE — DRAPE C-ARMOR EQUIPMENT COVER

## (undated) DEVICE — ELECTRODE REM PLYHSV RETURN 9

## (undated) DEVICE — IMPLANTABLE DEVICE
Type: IMPLANTABLE DEVICE | Site: TIBIA | Status: NON-FUNCTIONAL
Removed: 2019-01-10

## (undated) DEVICE — NDL SAFETY 25G X 1.5 ECLIPSE

## (undated) DEVICE — SPONGE DERMACEA GAUZE 4X4

## (undated) DEVICE — SUT VICRYL BR 1 GEN 27 CT-1

## (undated) DEVICE — SYR 3CC LUER LOC

## (undated) DEVICE — TIP SUCTION YANKAUER

## (undated) DEVICE — PAD ABD 8X10 STERILE

## (undated) DEVICE — PAD CAST SPECIALIST STRL 3

## (undated) DEVICE — SEE MEDLINE ITEM 157131

## (undated) DEVICE — GLOVE PROTEXIS HYDROGEL SZ7.5

## (undated) DEVICE — PAD CAST SPECIALIST STRL 4

## (undated) DEVICE — NDL HYPO 27G X 1 1/2

## (undated) DEVICE — DRESSING N ADH OIL EMUL 3X3

## (undated) DEVICE — SOL NS 1000CC

## (undated) DEVICE — BLADE SURG #15 CARBON STEEL

## (undated) DEVICE — UNDERGLOVES BIOGEL PI SIZE 8.5

## (undated) DEVICE — BLADE SAW MED NAR 18X5.5MM

## (undated) DEVICE — MANIFOLD 4 PORT

## (undated) DEVICE — SUT VICRYL 2-0 CT-2 VCP269H

## (undated) DEVICE — SEE MEDLINE ITEM 152622

## (undated) DEVICE — SEE MEDLINE ITEM 152522

## (undated) DEVICE — PAD CAST SPECIALIST STRL 6

## (undated) DEVICE — BANDAGE CONFORM 3IN STRL

## (undated) DEVICE — DRESSING TRANS 2X2 TEGADERM

## (undated) DEVICE — DRAPE EXTREMITY W/ABC NON-SLIP

## (undated) DEVICE — SEE MEDLINE ITEM 157027

## (undated) DEVICE — SEE MEDLINE ITEM 146308

## (undated) DEVICE — BLADE SURG CARBON STEEL #10

## (undated) DEVICE — TOURNIQUET SB QC SP 24X4IN

## (undated) DEVICE — DRESSING XEROFORM NONADH 1X8IN

## (undated) DEVICE — SEE MEDLINE ITEM 146298

## (undated) DEVICE — SOL NACL IRR 1000ML BTL

## (undated) DEVICE — DRAPE STERI INSTRUMENT 1018

## (undated) DEVICE — BANDAGE DERMACEA STRETCH 4X1IN

## (undated) DEVICE — SYR 10CC LUER LOCK

## (undated) DEVICE — COVER OVERHEAD SURG LT BLUE

## (undated) DEVICE — SUT VICRYL 2-0 36 CT-1

## (undated) DEVICE — DRAPE U SPLIT SHEET 54X76IN

## (undated) DEVICE — SCRUB HIBICLENS 4% CHG 4OZ

## (undated) DEVICE — BANDAGE MATRIX HK LOOP 4IN 5YD

## (undated) DEVICE — UNDERGLOVES BIOGEL PI SIZE 7.5

## (undated) DEVICE — ALCOHOL 70% ISOP RUBBING 4OZ

## (undated) DEVICE — BIT DRILL 2.5

## (undated) DEVICE — SUT VICRYL PLUS 3-0 PS2 18

## (undated) DEVICE — SUPPORT ULNA NERVE PROTECTOR

## (undated) DEVICE — TOURNIQUET SB QC DP 44X4IN

## (undated) DEVICE — Device

## (undated) DEVICE — TOURNIQUET SB QC DP 18X4IN

## (undated) DEVICE — BIT DRILL 3.5MM

## (undated) DEVICE — SEE MEDLINE ITEM 157216

## (undated) DEVICE — GLOVE PROTEXIS HYDROGEL SZ8

## (undated) DEVICE — BANDAGE ACE DOUBLE STER 6IN

## (undated) DEVICE — SEE MEDLINE ITEM 146231

## (undated) DEVICE — NDL HYPO SAFETY 25GX1.5IN

## (undated) DEVICE — LINER GLOVE POWDERFREE SZ 6.5

## (undated) DEVICE — GLOVE BIOGEL ECLIPSE SZ 6.5

## (undated) DEVICE — DRAPE HAND STERILE

## (undated) DEVICE — SCREW CORTEX 3.5MM SELF-TAPPIN
Type: IMPLANTABLE DEVICE | Site: TIBIA | Status: NON-FUNCTIONAL
Removed: 2019-01-17

## (undated) DEVICE — DRAPE SURG W/TWL 17 5/8X23

## (undated) DEVICE — GLOVE BIOGEL SZ 8 1/2

## (undated) DEVICE — DRAPE THREE-QTR REINF 53X77IN

## (undated) DEVICE — SEE MEDLINE ITEM 152530

## (undated) DEVICE — SEE MEDLINE ITEM 152529

## (undated) DEVICE — DRAPE INCISE IOBAN 2 23X17IN

## (undated) DEVICE — GOWN POLY REINF BRTH SLV XL

## (undated) DEVICE — DECANTER VIAL ASEPTIC TRANSFER

## (undated) DEVICE — SUT ETHILON 2-0 PSLX 30IN

## (undated) DEVICE — ELECTRODE BLADE INSULATED 1 IN

## (undated) DEVICE — PACK BASIC SETUP SC BR

## (undated) DEVICE — COVER LIGHT HANDLE 80/CA

## (undated) DEVICE — DRAPE STERI U-SHAPED 47X51IN

## (undated) DEVICE — CELL SAVER 4/CASE

## (undated) DEVICE — DECANTER 6 VIAL

## (undated) DEVICE — TOWEL OR DISP STRL BLUE 4/PK

## (undated) DEVICE — PAD ABDOMINAL STERILE 8X10IN

## (undated) DEVICE — SPONGE COTTON TRAY 4X4IN